# Patient Record
Sex: FEMALE | Race: BLACK OR AFRICAN AMERICAN | NOT HISPANIC OR LATINO | URBAN - METROPOLITAN AREA
[De-identification: names, ages, dates, MRNs, and addresses within clinical notes are randomized per-mention and may not be internally consistent; named-entity substitution may affect disease eponyms.]

---

## 2024-04-16 ENCOUNTER — EMERGENCY (EMERGENCY)
Facility: HOSPITAL | Age: 69
LOS: 0 days | Discharge: ANOTHER HOSPITAL | End: 2024-04-16
Attending: STUDENT IN AN ORGANIZED HEALTH CARE EDUCATION/TRAINING PROGRAM
Payer: MEDICARE

## 2024-04-16 ENCOUNTER — INPATIENT (INPATIENT)
Facility: HOSPITAL | Age: 69
LOS: 55 days | Discharge: NURSING, REHAB OR LONG-TERM CARE | DRG: 4 | End: 2024-06-11
Attending: STUDENT IN AN ORGANIZED HEALTH CARE EDUCATION/TRAINING PROGRAM | Admitting: INTERNAL MEDICINE
Payer: COMMERCIAL

## 2024-04-16 VITALS
RESPIRATION RATE: 40 BRPM | DIASTOLIC BLOOD PRESSURE: 66 MMHG | HEIGHT: 66 IN | OXYGEN SATURATION: 91 % | WEIGHT: 242.51 LBS | TEMPERATURE: 100 F | HEART RATE: 122 BPM | SYSTOLIC BLOOD PRESSURE: 105 MMHG

## 2024-04-16 VITALS
HEIGHT: 66 IN | HEART RATE: 130 BPM | RESPIRATION RATE: 35 BRPM | WEIGHT: 250 LBS | OXYGEN SATURATION: 92 % | SYSTOLIC BLOOD PRESSURE: 99 MMHG | DIASTOLIC BLOOD PRESSURE: 70 MMHG

## 2024-04-16 VITALS
HEART RATE: 119 BPM | SYSTOLIC BLOOD PRESSURE: 159 MMHG | RESPIRATION RATE: 44 BRPM | DIASTOLIC BLOOD PRESSURE: 81 MMHG | OXYGEN SATURATION: 95 %

## 2024-04-16 DIAGNOSIS — Z91.148 PATIENT'S OTHER NONCOMPLIANCE WITH MEDICATION REGIMEN FOR OTHER REASON: ICD-10-CM

## 2024-04-16 DIAGNOSIS — I50.9 HEART FAILURE, UNSPECIFIED: ICD-10-CM

## 2024-04-16 DIAGNOSIS — J44.9 CHRONIC OBSTRUCTIVE PULMONARY DISEASE, UNSPECIFIED: ICD-10-CM

## 2024-04-16 DIAGNOSIS — Z74.01 BED CONFINEMENT STATUS: ICD-10-CM

## 2024-04-16 DIAGNOSIS — R05.9 COUGH, UNSPECIFIED: ICD-10-CM

## 2024-04-16 DIAGNOSIS — I11.0 HYPERTENSIVE HEART DISEASE WITH HEART FAILURE: ICD-10-CM

## 2024-04-16 DIAGNOSIS — C91.00 ACUTE LYMPHOBLASTIC LEUKEMIA NOT HAVING ACHIEVED REMISSION: ICD-10-CM

## 2024-04-16 DIAGNOSIS — Z20.822 CONTACT WITH AND (SUSPECTED) EXPOSURE TO COVID-19: ICD-10-CM

## 2024-04-16 DIAGNOSIS — T50.906A UNDERDOSING OF UNSPECIFIED DRUGS, MEDICAMENTS AND BIOLOGICAL SUBSTANCES, INITIAL ENCOUNTER: ICD-10-CM

## 2024-04-16 DIAGNOSIS — I69.351 HEMIPLEGIA AND HEMIPARESIS FOLLOWING CEREBRAL INFARCTION AFFECTING RIGHT DOMINANT SIDE: ICD-10-CM

## 2024-04-16 DIAGNOSIS — E78.5 HYPERLIPIDEMIA, UNSPECIFIED: ICD-10-CM

## 2024-04-16 DIAGNOSIS — R06.02 SHORTNESS OF BREATH: ICD-10-CM

## 2024-04-16 DIAGNOSIS — R00.0 TACHYCARDIA, UNSPECIFIED: ICD-10-CM

## 2024-04-16 DIAGNOSIS — R07.9 CHEST PAIN, UNSPECIFIED: ICD-10-CM

## 2024-04-16 LAB
ADD ON TEST-SPECIMEN IN LAB: SIGNIFICANT CHANGE UP
ADD ON TEST-SPECIMEN IN LAB: SIGNIFICANT CHANGE UP
ALBUMIN FLD-MCNC: 3 G/DL — SIGNIFICANT CHANGE UP
ALBUMIN SERPL ELPH-MCNC: 2.9 G/DL — LOW (ref 3.3–5)
ALBUMIN SERPL ELPH-MCNC: 3 G/DL — LOW (ref 3.3–5)
ALBUMIN SERPL ELPH-MCNC: 3.3 G/DL — SIGNIFICANT CHANGE UP (ref 3.3–5)
ALBUMIN SERPL ELPH-MCNC: 3.8 G/DL — SIGNIFICANT CHANGE UP (ref 3.3–5)
ALP SERPL-CCNC: 59 U/L — SIGNIFICANT CHANGE UP (ref 40–120)
ALP SERPL-CCNC: 70 U/L — SIGNIFICANT CHANGE UP (ref 40–120)
ALP SERPL-CCNC: 70 U/L — SIGNIFICANT CHANGE UP (ref 40–120)
ALP SERPL-CCNC: 71 U/L — SIGNIFICANT CHANGE UP (ref 40–120)
ALT FLD-CCNC: 10 U/L — SIGNIFICANT CHANGE UP (ref 10–45)
ALT FLD-CCNC: 11 U/L — SIGNIFICANT CHANGE UP (ref 10–45)
ALT FLD-CCNC: 12 U/L — SIGNIFICANT CHANGE UP (ref 10–45)
ALT FLD-CCNC: 12 U/L — SIGNIFICANT CHANGE UP (ref 12–78)
ANION GAP SERPL CALC-SCNC: 11 MMOL/L — SIGNIFICANT CHANGE UP (ref 5–17)
ANION GAP SERPL CALC-SCNC: 13 MMOL/L — SIGNIFICANT CHANGE UP (ref 5–17)
ANION GAP SERPL CALC-SCNC: 16 MMOL/L — SIGNIFICANT CHANGE UP (ref 5–17)
ANION GAP SERPL CALC-SCNC: 7 MMOL/L — SIGNIFICANT CHANGE UP (ref 5–17)
ANISOCYTOSIS BLD QL: SLIGHT — SIGNIFICANT CHANGE UP
APPEARANCE UR: ABNORMAL
APTT BLD: 32 SEC — SIGNIFICANT CHANGE UP (ref 24.5–35.6)
APTT BLD: 34.9 SEC — SIGNIFICANT CHANGE UP (ref 24.5–35.6)
APTT BLD: 35.6 SEC — SIGNIFICANT CHANGE UP (ref 24.5–35.6)
AST SERPL-CCNC: 21 U/L — SIGNIFICANT CHANGE UP (ref 15–37)
AST SERPL-CCNC: 26 U/L — SIGNIFICANT CHANGE UP (ref 10–40)
AST SERPL-CCNC: 29 U/L — SIGNIFICANT CHANGE UP (ref 10–40)
AST SERPL-CCNC: 31 U/L — SIGNIFICANT CHANGE UP (ref 10–40)
B PERT IGG+IGM PNL SER: ABNORMAL
BACTERIA # UR AUTO: NEGATIVE /HPF — SIGNIFICANT CHANGE UP
BASE EXCESS BLDA CALC-SCNC: 2.6 MMOL/L — SIGNIFICANT CHANGE UP (ref -2–3)
BASE EXCESS BLDV CALC-SCNC: 1.2 MMOL/L — SIGNIFICANT CHANGE UP (ref -2–3)
BASE EXCESS BLDV CALC-SCNC: 1.5 MMOL/L — SIGNIFICANT CHANGE UP (ref -2–3)
BASE EXCESS BLDV CALC-SCNC: 5.7 MMOL/L — HIGH (ref -2–3)
BASOPHILS # BLD AUTO: 0 K/UL — SIGNIFICANT CHANGE UP (ref 0–0.2)
BASOPHILS # BLD AUTO: 0 K/UL — SIGNIFICANT CHANGE UP (ref 0–0.2)
BASOPHILS NFR BLD AUTO: 0 % — SIGNIFICANT CHANGE UP (ref 0–2)
BASOPHILS NFR BLD AUTO: 0 % — SIGNIFICANT CHANGE UP (ref 0–2)
BILIRUB SERPL-MCNC: 0.8 MG/DL — SIGNIFICANT CHANGE UP (ref 0.2–1.2)
BILIRUB SERPL-MCNC: 1.1 MG/DL — SIGNIFICANT CHANGE UP (ref 0.2–1.2)
BILIRUB SERPL-MCNC: 1.1 MG/DL — SIGNIFICANT CHANGE UP (ref 0.2–1.2)
BILIRUB SERPL-MCNC: 1.3 MG/DL — HIGH (ref 0.2–1.2)
BILIRUB UR-MCNC: NEGATIVE — SIGNIFICANT CHANGE UP
BLOOD GAS COMMENTS, VENOUS: SIGNIFICANT CHANGE UP
BUN SERPL-MCNC: 31 MG/DL — HIGH (ref 7–23)
BUN SERPL-MCNC: 35 MG/DL — HIGH (ref 7–23)
BUN SERPL-MCNC: 36 MG/DL — HIGH (ref 7–23)
BUN SERPL-MCNC: 38 MG/DL — HIGH (ref 7–23)
CA-I SERPL-SCNC: 1.13 MMOL/L — LOW (ref 1.15–1.33)
CA-I SERPL-SCNC: 1.17 MMOL/L — SIGNIFICANT CHANGE UP (ref 1.15–1.33)
CALCIUM SERPL-MCNC: 9.1 MG/DL — SIGNIFICANT CHANGE UP (ref 8.4–10.5)
CALCIUM SERPL-MCNC: 9.3 MG/DL — SIGNIFICANT CHANGE UP (ref 8.4–10.5)
CALCIUM SERPL-MCNC: 9.3 MG/DL — SIGNIFICANT CHANGE UP (ref 8.4–10.5)
CALCIUM SERPL-MCNC: 9.4 MG/DL — SIGNIFICANT CHANGE UP (ref 8.5–10.1)
CAST: 8 /LPF — HIGH (ref 0–4)
CHLORIDE BLDV-SCNC: 95 MMOL/L — LOW (ref 98–107)
CHLORIDE BLDV-SCNC: 96 MMOL/L — SIGNIFICANT CHANGE UP (ref 96–108)
CHLORIDE BLDV-SCNC: 97 MMOL/L — SIGNIFICANT CHANGE UP (ref 96–108)
CHLORIDE SERPL-SCNC: 95 MMOL/L — LOW (ref 96–108)
CHLORIDE SERPL-SCNC: 96 MMOL/L — SIGNIFICANT CHANGE UP (ref 96–108)
CHLORIDE SERPL-SCNC: 96 MMOL/L — SIGNIFICANT CHANGE UP (ref 96–108)
CHLORIDE SERPL-SCNC: 97 MMOL/L — SIGNIFICANT CHANGE UP (ref 96–108)
CO2 BLDA-SCNC: 28 MMOL/L — HIGH (ref 19–24)
CO2 BLDV-SCNC: 29 MMOL/L — HIGH (ref 22–26)
CO2 BLDV-SCNC: 31 MMOL/L — HIGH (ref 22–26)
CO2 BLDV-SCNC: 34 MMOL/L — HIGH (ref 22–26)
CO2 SERPL-SCNC: 22 MMOL/L — SIGNIFICANT CHANGE UP (ref 22–31)
CO2 SERPL-SCNC: 22 MMOL/L — SIGNIFICANT CHANGE UP (ref 22–31)
CO2 SERPL-SCNC: 24 MMOL/L — SIGNIFICANT CHANGE UP (ref 22–31)
CO2 SERPL-SCNC: 29 MMOL/L — SIGNIFICANT CHANGE UP (ref 22–31)
COLOR FLD: ABNORMAL
COLOR SPEC: YELLOW — SIGNIFICANT CHANGE UP
CREAT SERPL-MCNC: 0.74 MG/DL — SIGNIFICANT CHANGE UP (ref 0.5–1.3)
CREAT SERPL-MCNC: 0.75 MG/DL — SIGNIFICANT CHANGE UP (ref 0.5–1.3)
CREAT SERPL-MCNC: 0.79 MG/DL — SIGNIFICANT CHANGE UP (ref 0.5–1.3)
CREAT SERPL-MCNC: 0.84 MG/DL — SIGNIFICANT CHANGE UP (ref 0.5–1.3)
D DIMER BLD IA.RAPID-MCNC: 711 NG/ML DDU — HIGH
DIFF PNL FLD: ABNORMAL
EGFR: 76 ML/MIN/1.73M2 — SIGNIFICANT CHANGE UP
EGFR: 81 ML/MIN/1.73M2 — SIGNIFICANT CHANGE UP
EGFR: 87 ML/MIN/1.73M2 — SIGNIFICANT CHANGE UP
EGFR: 88 ML/MIN/1.73M2 — SIGNIFICANT CHANGE UP
ELLIPTOCYTES BLD QL SMEAR: SLIGHT — SIGNIFICANT CHANGE UP
EOSINOPHIL # BLD AUTO: 0 K/UL — SIGNIFICANT CHANGE UP (ref 0–0.5)
EOSINOPHIL # BLD AUTO: 0 K/UL — SIGNIFICANT CHANGE UP (ref 0–0.5)
EOSINOPHIL NFR BLD AUTO: 0 % — SIGNIFICANT CHANGE UP (ref 0–6)
EOSINOPHIL NFR BLD AUTO: 0 % — SIGNIFICANT CHANGE UP (ref 0–6)
FLUAV AG NPH QL: SIGNIFICANT CHANGE UP
FLUBV AG NPH QL: SIGNIFICANT CHANGE UP
FLUID INTAKE SUBSTANCE CLASS: SIGNIFICANT CHANGE UP
GAS PNL BLDA: SIGNIFICANT CHANGE UP
GAS PNL BLDV: 126 MMOL/L — LOW (ref 136–145)
GAS PNL BLDV: 129 MMOL/L — LOW (ref 136–145)
GAS PNL BLDV: 131 MMOL/L — LOW (ref 136–145)
GAS PNL BLDV: SIGNIFICANT CHANGE UP
GIANT PLATELETS BLD QL SMEAR: PRESENT — SIGNIFICANT CHANGE UP
GLUCOSE BLDC GLUCOMTR-MCNC: 164 MG/DL — HIGH (ref 70–99)
GLUCOSE BLDC GLUCOMTR-MCNC: 199 MG/DL — HIGH (ref 70–99)
GLUCOSE BLDC GLUCOMTR-MCNC: 234 MG/DL — HIGH (ref 70–99)
GLUCOSE BLDV-MCNC: 115 MG/DL — HIGH (ref 65–95)
GLUCOSE BLDV-MCNC: 139 MG/DL — HIGH (ref 70–99)
GLUCOSE BLDV-MCNC: 140 MG/DL — HIGH (ref 70–99)
GLUCOSE FLD-MCNC: <6 MG/DL — SIGNIFICANT CHANGE UP
GLUCOSE SERPL-MCNC: 113 MG/DL — HIGH (ref 70–99)
GLUCOSE SERPL-MCNC: 140 MG/DL — HIGH (ref 70–99)
GLUCOSE SERPL-MCNC: 157 MG/DL — HIGH (ref 70–99)
GLUCOSE SERPL-MCNC: 248 MG/DL — HIGH (ref 70–99)
GLUCOSE UR QL: NEGATIVE MG/DL — SIGNIFICANT CHANGE UP
GRAM STN FLD: ABNORMAL
GRAM STN FLD: ABNORMAL
GRAM STN FLD: SIGNIFICANT CHANGE UP
HCO3 BLDA-SCNC: 27 MMOL/L — SIGNIFICANT CHANGE UP (ref 21–28)
HCO3 BLDV-SCNC: 28 MMOL/L — SIGNIFICANT CHANGE UP (ref 22–29)
HCO3 BLDV-SCNC: 29 MMOL/L — SIGNIFICANT CHANGE UP (ref 22–29)
HCO3 BLDV-SCNC: 32 MMOL/L — HIGH (ref 22–28)
HCT VFR BLD CALC: 39.5 % — SIGNIFICANT CHANGE UP (ref 34.5–45)
HCT VFR BLD CALC: 39.6 % — SIGNIFICANT CHANGE UP (ref 34.5–45)
HCT VFR BLD CALC: 40 % — SIGNIFICANT CHANGE UP (ref 34.5–45)
HCT VFR BLDA CALC: 38 % — SIGNIFICANT CHANGE UP (ref 34.5–46.5)
HCT VFR BLDA CALC: 41 % — SIGNIFICANT CHANGE UP (ref 34.5–46.5)
HCT VFR BLDA CALC: 42 % — SIGNIFICANT CHANGE UP (ref 37–47)
HGB BLD CALC-MCNC: 12.8 G/DL — SIGNIFICANT CHANGE UP (ref 11.7–16.1)
HGB BLD CALC-MCNC: 13.8 G/DL — SIGNIFICANT CHANGE UP (ref 11.7–16.1)
HGB BLD CALC-MCNC: 14 G/DL — SIGNIFICANT CHANGE UP (ref 11.7–16.1)
HGB BLD-MCNC: 12 G/DL — SIGNIFICANT CHANGE UP (ref 11.5–15.5)
HGB BLD-MCNC: 13.3 G/DL — SIGNIFICANT CHANGE UP (ref 11.5–15.5)
HGB BLD-MCNC: 13.4 G/DL — SIGNIFICANT CHANGE UP (ref 11.5–15.5)
INR BLD: 1.3 RATIO — HIGH (ref 0.85–1.18)
INR BLD: 1.41 RATIO — HIGH (ref 0.85–1.18)
INR BLD: 1.43 RATIO — HIGH (ref 0.85–1.18)
KETONES UR-MCNC: NEGATIVE MG/DL — SIGNIFICANT CHANGE UP
LACTATE BLDV-MCNC: 1.1 MMOL/L — SIGNIFICANT CHANGE UP (ref 0.5–2)
LACTATE BLDV-MCNC: 1.6 MMOL/L — HIGH (ref 0.56–1.39)
LACTATE BLDV-MCNC: 2 MMOL/L — SIGNIFICANT CHANGE UP (ref 0.5–2)
LDH SERPL L TO P-CCNC: 289 U/L — HIGH (ref 50–242)
LDH SERPL L TO P-CCNC: 774 U/L — SIGNIFICANT CHANGE UP
LEUKOCYTE ESTERASE UR-ACNC: NEGATIVE — SIGNIFICANT CHANGE UP
LYMPHOCYTES # BLD AUTO: 205.05 K/UL — HIGH (ref 1–3.3)
LYMPHOCYTES # BLD AUTO: 223.97 K/UL — HIGH (ref 1–3.3)
LYMPHOCYTES # BLD AUTO: 88 % — HIGH (ref 13–44)
LYMPHOCYTES # BLD AUTO: 96 % — HIGH (ref 13–44)
LYMPHOCYTES # FLD: 50 % — SIGNIFICANT CHANGE UP
LYMPHOCYTES # SPEC AUTO: 9 % — HIGH (ref 0–0)
MACROCYTES BLD QL: SLIGHT — SIGNIFICANT CHANGE UP
MAGNESIUM SERPL-MCNC: 2.8 MG/DL — HIGH (ref 1.6–2.6)
MAGNESIUM SERPL-MCNC: 2.8 MG/DL — HIGH (ref 1.6–2.6)
MAGNESIUM SERPL-MCNC: 2.9 MG/DL — HIGH (ref 1.6–2.6)
MANUAL DIF COMMENT BLD-IMP: SIGNIFICANT CHANGE UP
MANUAL SMEAR VERIFICATION: SIGNIFICANT CHANGE UP
MANUAL SMEAR VERIFICATION: SIGNIFICANT CHANGE UP
MCHC RBC-ENTMCNC: 26.2 PG — LOW (ref 27–34)
MCHC RBC-ENTMCNC: 28.5 PG — SIGNIFICANT CHANGE UP (ref 27–34)
MCHC RBC-ENTMCNC: 28.6 PG — SIGNIFICANT CHANGE UP (ref 27–34)
MCHC RBC-ENTMCNC: 30.3 GM/DL — LOW (ref 32–36)
MCHC RBC-ENTMCNC: 33.5 G/DL — SIGNIFICANT CHANGE UP (ref 32–36)
MCHC RBC-ENTMCNC: 33.7 GM/DL — SIGNIFICANT CHANGE UP (ref 32–36)
MCV RBC AUTO: 84.8 FL — SIGNIFICANT CHANGE UP (ref 80–100)
MCV RBC AUTO: 85.3 FL — SIGNIFICANT CHANGE UP (ref 80–100)
MCV RBC AUTO: 86.5 FL — SIGNIFICANT CHANGE UP (ref 80–100)
METHOD TYPE: SIGNIFICANT CHANGE UP
MICROCYTES BLD QL: SLIGHT — SIGNIFICANT CHANGE UP
MONOCYTES # BLD AUTO: 2.33 K/UL — HIGH (ref 0–0.9)
MONOCYTES # BLD AUTO: 4.67 K/UL — HIGH (ref 0–0.9)
MONOCYTES NFR BLD AUTO: 1 % — LOW (ref 2–14)
MONOCYTES NFR BLD AUTO: 2 % — SIGNIFICANT CHANGE UP (ref 2–14)
MONOS+MACROS # FLD: 40 % — SIGNIFICANT CHANGE UP
NEUTROPHILS # BLD AUTO: 4.66 K/UL — SIGNIFICANT CHANGE UP (ref 1.8–7.4)
NEUTROPHILS # BLD AUTO: 4.67 K/UL — SIGNIFICANT CHANGE UP (ref 1.8–7.4)
NEUTROPHILS NFR BLD AUTO: 1 % — LOW (ref 43–77)
NEUTROPHILS NFR BLD AUTO: 2 % — LOW (ref 43–77)
NEUTROPHILS-BODY FLUID: 10 % — SIGNIFICANT CHANGE UP
NEUTS BAND # BLD: 1 % — SIGNIFICANT CHANGE UP (ref 0–8)
NITRITE UR-MCNC: NEGATIVE — SIGNIFICANT CHANGE UP
NRBC # BLD: 0 /100 WBCS — SIGNIFICANT CHANGE UP (ref 0–0)
NRBC # BLD: SIGNIFICANT CHANGE UP /100 WBCS (ref 0–0)
NT-PROBNP SERPL-SCNC: 1606 PG/ML — HIGH (ref 0–125)
NT-PROBNP SERPL-SCNC: 2617 PG/ML — HIGH (ref 0–300)
OVALOCYTES BLD QL SMEAR: SLIGHT — SIGNIFICANT CHANGE UP
PCO2 BLDA: 41 MMHG — SIGNIFICANT CHANGE UP (ref 32–45)
PCO2 BLDV: 50 MMHG — HIGH (ref 39–42)
PCO2 BLDV: 55 MMHG — SIGNIFICANT CHANGE UP (ref 42–55)
PCO2 BLDV: 56 MMHG — HIGH (ref 39–42)
PH BLDA: 7.43 — SIGNIFICANT CHANGE UP (ref 7.35–7.45)
PH BLDV: 7.32 — SIGNIFICANT CHANGE UP (ref 7.32–7.43)
PH BLDV: 7.35 — SIGNIFICANT CHANGE UP (ref 7.32–7.43)
PH BLDV: 7.38 — SIGNIFICANT CHANGE UP (ref 7.32–7.43)
PH FLD: 7.19 — SIGNIFICANT CHANGE UP
PH UR: 5.5 — SIGNIFICANT CHANGE UP (ref 5–8)
PHOSPHATE SERPL-MCNC: 4.9 MG/DL — HIGH (ref 2.5–4.5)
PHOSPHATE SERPL-MCNC: 5 MG/DL — HIGH (ref 2.5–4.5)
PLAT MORPH BLD: NORMAL — SIGNIFICANT CHANGE UP
PLAT MORPH BLD: NORMAL — SIGNIFICANT CHANGE UP
PLATELET # BLD AUTO: 112 K/UL — LOW (ref 150–400)
PLATELET # BLD AUTO: 96 K/UL — LOW (ref 150–400)
PLATELET # BLD AUTO: 96 K/UL — LOW (ref 150–400)
PO2 BLDA: 71 MMHG — LOW (ref 83–108)
PO2 BLDV: 45 MMHG — SIGNIFICANT CHANGE UP (ref 25–45)
PO2 BLDV: 49 MMHG — HIGH (ref 25–45)
PO2 BLDV: 52 MMHG — HIGH (ref 25–45)
POLYCHROMASIA BLD QL SMEAR: SLIGHT — SIGNIFICANT CHANGE UP
POTASSIUM BLDV-SCNC: 4.7 MMOL/L — SIGNIFICANT CHANGE UP (ref 3.5–5.1)
POTASSIUM BLDV-SCNC: 4.8 MMOL/L — SIGNIFICANT CHANGE UP (ref 3.5–5.1)
POTASSIUM BLDV-SCNC: 7.7 MMOL/L — CRITICAL HIGH (ref 3.5–5.1)
POTASSIUM SERPL-MCNC: 4.7 MMOL/L — SIGNIFICANT CHANGE UP (ref 3.5–5.3)
POTASSIUM SERPL-MCNC: 4.9 MMOL/L — SIGNIFICANT CHANGE UP (ref 3.5–5.3)
POTASSIUM SERPL-MCNC: 5.3 MMOL/L — SIGNIFICANT CHANGE UP (ref 3.5–5.3)
POTASSIUM SERPL-MCNC: 6.1 MMOL/L — HIGH (ref 3.5–5.3)
POTASSIUM SERPL-SCNC: 4.7 MMOL/L — SIGNIFICANT CHANGE UP (ref 3.5–5.3)
POTASSIUM SERPL-SCNC: 4.9 MMOL/L — SIGNIFICANT CHANGE UP (ref 3.5–5.3)
POTASSIUM SERPL-SCNC: 5.3 MMOL/L — SIGNIFICANT CHANGE UP (ref 3.5–5.3)
POTASSIUM SERPL-SCNC: 6.1 MMOL/L — HIGH (ref 3.5–5.3)
PROT FLD-MCNC: 4.7 G/DL — SIGNIFICANT CHANGE UP
PROT SERPL-MCNC: 6.1 G/DL — SIGNIFICANT CHANGE UP (ref 6–8.3)
PROT SERPL-MCNC: 6.8 G/DL — SIGNIFICANT CHANGE UP (ref 6–8.3)
PROT SERPL-MCNC: 7.2 G/DL — SIGNIFICANT CHANGE UP (ref 6–8.3)
PROT SERPL-MCNC: 7.4 GM/DL — SIGNIFICANT CHANGE UP (ref 6–8.3)
PROT UR-MCNC: 100 MG/DL
PROTHROM AB SERPL-ACNC: 14.7 SEC — HIGH (ref 9.5–13)
PROTHROM AB SERPL-ACNC: 14.9 SEC — HIGH (ref 9.5–13)
PROTHROM AB SERPL-ACNC: 15.3 SEC — HIGH (ref 9.5–13)
RAPID RVP RESULT: SIGNIFICANT CHANGE UP
RBC # BLD: 4.03 M/UL — SIGNIFICANT CHANGE UP (ref 3.8–5.2)
RBC # BLD: 4.58 M/UL — SIGNIFICANT CHANGE UP (ref 3.8–5.2)
RBC # BLD: 4.66 M/UL — SIGNIFICANT CHANGE UP (ref 3.8–5.2)
RBC # BLD: 4.69 M/UL — SIGNIFICANT CHANGE UP (ref 3.8–5.2)
RBC # FLD: 16.1 % — HIGH (ref 10.3–14.5)
RBC # FLD: 16.9 % — HIGH (ref 10.3–14.5)
RBC # FLD: 17.1 % — HIGH (ref 10.3–14.5)
RBC BLD AUTO: ABNORMAL
RBC BLD AUTO: ABNORMAL
RBC CASTS # UR COMP ASSIST: 2 /HPF — SIGNIFICANT CHANGE UP (ref 0–4)
RCV VOL RI: HIGH CELLS/UL (ref 0–5)
RETICS #: 45.5 K/UL — SIGNIFICANT CHANGE UP (ref 25–125)
RETICS/RBC NFR: 1.1 % — SIGNIFICANT CHANGE UP (ref 0.5–2.5)
REVIEW: SIGNIFICANT CHANGE UP
RSV RNA NPH QL NAA+NON-PROBE: SIGNIFICANT CHANGE UP
S PNEUM DNA BLD POS QL NAA+NON-PROBE: SIGNIFICANT CHANGE UP
SAO2 % BLDA: 95.7 % — SIGNIFICANT CHANGE UP (ref 94–98)
SAO2 % BLDV: 76.2 % — SIGNIFICANT CHANGE UP (ref 67–88)
SAO2 % BLDV: 78.6 % — LOW (ref 94–98)
SAO2 % BLDV: 80.2 % — SIGNIFICANT CHANGE UP (ref 67–88)
SARS-COV-2 RNA SPEC QL NAA+PROBE: SIGNIFICANT CHANGE UP
SARS-COV-2 RNA SPEC QL NAA+PROBE: SIGNIFICANT CHANGE UP
SODIUM SERPL-SCNC: 130 MMOL/L — LOW (ref 135–145)
SODIUM SERPL-SCNC: 131 MMOL/L — LOW (ref 135–145)
SODIUM SERPL-SCNC: 133 MMOL/L — LOW (ref 135–145)
SODIUM SERPL-SCNC: 134 MMOL/L — LOW (ref 135–145)
SP GR SPEC: 1.02 — SIGNIFICANT CHANGE UP (ref 1–1.03)
SPECIMEN SOURCE: SIGNIFICANT CHANGE UP
SQUAMOUS # UR AUTO: 5 /HPF — SIGNIFICANT CHANGE UP (ref 0–5)
TARGETS BLD QL SMEAR: SLIGHT — SIGNIFICANT CHANGE UP
TOTAL NUCLEATED CELL COUNT, BODY FLUID: HIGH CELLS/UL (ref 0–5)
TROPONIN I, HIGH SENSITIVITY RESULT: 12.5 NG/L — SIGNIFICANT CHANGE UP
TROPONIN T, HIGH SENSITIVITY RESULT: 37 NG/L — SIGNIFICANT CHANGE UP (ref 0–51)
TUBE TYPE: SIGNIFICANT CHANGE UP
UROBILINOGEN FLD QL: 0.2 MG/DL — SIGNIFICANT CHANGE UP (ref 0.2–1)
WBC # BLD: 233.01 K/UL — CRITICAL HIGH (ref 3.8–10.5)
WBC # BLD: 233.3 K/UL — CRITICAL HIGH (ref 3.8–10.5)
WBC # BLD: 278.74 K/UL — CRITICAL HIGH (ref 3.8–10.5)
WBC # FLD AUTO: 233.01 K/UL — CRITICAL HIGH (ref 3.8–10.5)
WBC # FLD AUTO: 233.3 K/UL — CRITICAL HIGH (ref 3.8–10.5)
WBC # FLD AUTO: 278.74 K/UL — CRITICAL HIGH (ref 3.8–10.5)
WBC UR QL: 1 /HPF — SIGNIFICANT CHANGE UP (ref 0–5)

## 2024-04-16 PROCEDURE — 99223 1ST HOSP IP/OBS HIGH 75: CPT

## 2024-04-16 PROCEDURE — G0452: CPT | Mod: 26,59

## 2024-04-16 PROCEDURE — 93308 TTE F-UP OR LMTD: CPT | Mod: 26,GC

## 2024-04-16 PROCEDURE — 99285 EMERGENCY DEPT VISIT HI MDM: CPT | Mod: 25

## 2024-04-16 PROCEDURE — 32551 INSERTION OF CHEST TUBE: CPT | Mod: GC,59

## 2024-04-16 PROCEDURE — 93308 TTE F-UP OR LMTD: CPT | Mod: 26

## 2024-04-16 PROCEDURE — 70450 CT HEAD/BRAIN W/O DYE: CPT | Mod: 26

## 2024-04-16 PROCEDURE — 99291 CRITICAL CARE FIRST HOUR: CPT | Mod: GC,25

## 2024-04-16 PROCEDURE — 88291 CYTO/MOLECULAR REPORT: CPT | Mod: 59

## 2024-04-16 PROCEDURE — 71045 X-RAY EXAM CHEST 1 VIEW: CPT | Mod: 26,77

## 2024-04-16 PROCEDURE — 31500 INSERT EMERGENCY AIRWAY: CPT

## 2024-04-16 PROCEDURE — G0452: CPT | Mod: 26

## 2024-04-16 PROCEDURE — 32557 INSERT CATH PLEURA W/ IMAGE: CPT | Mod: GC,59

## 2024-04-16 PROCEDURE — 88189 FLOWCYTOMETRY/READ 16 & >: CPT

## 2024-04-16 PROCEDURE — 76604 US EXAM CHEST: CPT | Mod: 26,GC

## 2024-04-16 PROCEDURE — 71045 X-RAY EXAM CHEST 1 VIEW: CPT | Mod: 26

## 2024-04-16 PROCEDURE — 85060 BLOOD SMEAR INTERPRETATION: CPT

## 2024-04-16 PROCEDURE — 83020 HEMOGLOBIN ELECTROPHORESIS: CPT | Mod: 26

## 2024-04-16 PROCEDURE — 36569 INSJ PICC 5 YR+ W/O IMAGING: CPT | Mod: GC,59

## 2024-04-16 PROCEDURE — 93010 ELECTROCARDIOGRAM REPORT: CPT

## 2024-04-16 PROCEDURE — 99291 CRITICAL CARE FIRST HOUR: CPT | Mod: 25

## 2024-04-16 PROCEDURE — 71045 X-RAY EXAM CHEST 1 VIEW: CPT | Mod: 26,76,77

## 2024-04-16 RX ORDER — PROPOFOL 10 MG/ML
10 INJECTION, EMULSION INTRAVENOUS
Qty: 500 | Refills: 0 | Status: DISCONTINUED | OUTPATIENT
Start: 2024-04-16 | End: 2024-04-16

## 2024-04-16 RX ORDER — DEXTROSE 50 % IN WATER 50 %
15 SYRINGE (ML) INTRAVENOUS ONCE
Refills: 0 | Status: DISCONTINUED | OUTPATIENT
Start: 2024-04-16 | End: 2024-04-16

## 2024-04-16 RX ORDER — CALCIUM GLUCONATE 100 MG/ML
2 VIAL (ML) INTRAVENOUS ONCE
Refills: 0 | Status: COMPLETED | OUTPATIENT
Start: 2024-04-16 | End: 2024-04-16

## 2024-04-16 RX ORDER — PIPERACILLIN AND TAZOBACTAM 4; .5 G/20ML; G/20ML
3.38 INJECTION, POWDER, LYOPHILIZED, FOR SOLUTION INTRAVENOUS ONCE
Refills: 0 | Status: COMPLETED | OUTPATIENT
Start: 2024-04-17 | End: 2024-04-16

## 2024-04-16 RX ORDER — CHLORHEXIDINE GLUCONATE 213 G/1000ML
1 SOLUTION TOPICAL
Refills: 0 | Status: DISCONTINUED | OUTPATIENT
Start: 2024-04-16 | End: 2024-05-09

## 2024-04-16 RX ORDER — VANCOMYCIN HCL 1 G
1750 VIAL (EA) INTRAVENOUS EVERY 12 HOURS
Refills: 0 | Status: DISCONTINUED | OUTPATIENT
Start: 2024-04-16 | End: 2024-04-17

## 2024-04-16 RX ORDER — PIPERACILLIN AND TAZOBACTAM 4; .5 G/20ML; G/20ML
3.38 INJECTION, POWDER, LYOPHILIZED, FOR SOLUTION INTRAVENOUS ONCE
Refills: 0 | Status: COMPLETED | OUTPATIENT
Start: 2024-04-16 | End: 2024-04-16

## 2024-04-16 RX ORDER — HYDROCORTISONE 20 MG
50 TABLET ORAL EVERY 6 HOURS
Refills: 0 | Status: DISCONTINUED | OUTPATIENT
Start: 2024-04-16 | End: 2024-04-17

## 2024-04-16 RX ORDER — INSULIN HUMAN 100 [IU]/ML
5 INJECTION, SOLUTION SUBCUTANEOUS ONCE
Refills: 0 | Status: COMPLETED | OUTPATIENT
Start: 2024-04-16 | End: 2024-04-16

## 2024-04-16 RX ORDER — CHLORHEXIDINE GLUCONATE 213 G/1000ML
15 SOLUTION TOPICAL EVERY 12 HOURS
Refills: 0 | Status: DISCONTINUED | OUTPATIENT
Start: 2024-04-16 | End: 2024-05-16

## 2024-04-16 RX ORDER — DEXTROSE 50 % IN WATER 50 %
12.5 SYRINGE (ML) INTRAVENOUS ONCE
Refills: 0 | Status: DISCONTINUED | OUTPATIENT
Start: 2024-04-16 | End: 2024-04-23

## 2024-04-16 RX ORDER — NOREPINEPHRINE BITARTRATE/D5W 8 MG/250ML
0.05 PLASTIC BAG, INJECTION (ML) INTRAVENOUS
Qty: 8 | Refills: 0 | Status: DISCONTINUED | OUTPATIENT
Start: 2024-04-16 | End: 2024-04-20

## 2024-04-16 RX ORDER — INSULIN LISPRO 100/ML
VIAL (ML) SUBCUTANEOUS EVERY 6 HOURS
Refills: 0 | Status: DISCONTINUED | OUTPATIENT
Start: 2024-04-16 | End: 2024-05-10

## 2024-04-16 RX ORDER — MIDAZOLAM HYDROCHLORIDE 1 MG/ML
2 INJECTION, SOLUTION INTRAMUSCULAR; INTRAVENOUS ONCE
Refills: 0 | Status: DISCONTINUED | OUTPATIENT
Start: 2024-04-16 | End: 2024-04-16

## 2024-04-16 RX ORDER — ENOXAPARIN SODIUM 100 MG/ML
40 INJECTION SUBCUTANEOUS EVERY 24 HOURS
Refills: 0 | Status: DISCONTINUED | OUTPATIENT
Start: 2024-04-16 | End: 2024-04-18

## 2024-04-16 RX ORDER — ACETAMINOPHEN 500 MG
1000 TABLET ORAL ONCE
Refills: 0 | Status: COMPLETED | OUTPATIENT
Start: 2024-04-16 | End: 2024-04-16

## 2024-04-16 RX ORDER — SODIUM CHLORIDE 9 MG/ML
1000 INJECTION, SOLUTION INTRAVENOUS
Refills: 0 | Status: DISCONTINUED | OUTPATIENT
Start: 2024-04-16 | End: 2024-04-16

## 2024-04-16 RX ORDER — ETOMIDATE 2 MG/ML
20 INJECTION INTRAVENOUS ONCE
Refills: 0 | Status: COMPLETED | OUTPATIENT
Start: 2024-04-16 | End: 2024-04-16

## 2024-04-16 RX ORDER — SODIUM CHLORIDE 9 MG/ML
1000 INJECTION INTRAMUSCULAR; INTRAVENOUS; SUBCUTANEOUS ONCE
Refills: 0 | Status: COMPLETED | OUTPATIENT
Start: 2024-04-16 | End: 2024-04-16

## 2024-04-16 RX ORDER — VANCOMYCIN HCL 1 G
1000 VIAL (EA) INTRAVENOUS ONCE
Refills: 0 | Status: COMPLETED | OUTPATIENT
Start: 2024-04-16 | End: 2024-04-16

## 2024-04-16 RX ORDER — MIDAZOLAM HYDROCHLORIDE 1 MG/ML
0.02 INJECTION, SOLUTION INTRAMUSCULAR; INTRAVENOUS
Qty: 100 | Refills: 0 | Status: DISCONTINUED | OUTPATIENT
Start: 2024-04-16 | End: 2024-04-16

## 2024-04-16 RX ORDER — DEXTROSE 50 % IN WATER 50 %
25 SYRINGE (ML) INTRAVENOUS ONCE
Refills: 0 | Status: DISCONTINUED | OUTPATIENT
Start: 2024-04-16 | End: 2024-04-23

## 2024-04-16 RX ORDER — ROCURONIUM BROMIDE 10 MG/ML
100 VIAL (ML) INTRAVENOUS ONCE
Refills: 0 | Status: COMPLETED | OUTPATIENT
Start: 2024-04-16 | End: 2024-04-16

## 2024-04-16 RX ORDER — POLYETHYLENE GLYCOL 3350 17 G/17G
17 POWDER, FOR SOLUTION ORAL EVERY 12 HOURS
Refills: 0 | Status: DISCONTINUED | OUTPATIENT
Start: 2024-04-16 | End: 2024-05-16

## 2024-04-16 RX ORDER — SODIUM CHLORIDE 9 MG/ML
1000 INJECTION, SOLUTION INTRAVENOUS ONCE
Refills: 0 | Status: COMPLETED | OUTPATIENT
Start: 2024-04-16 | End: 2024-04-16

## 2024-04-16 RX ORDER — PROPOFOL 10 MG/ML
10 INJECTION, EMULSION INTRAVENOUS
Qty: 1000 | Refills: 0 | Status: DISCONTINUED | OUTPATIENT
Start: 2024-04-16 | End: 2024-04-20

## 2024-04-16 RX ORDER — FENTANYL CITRATE 50 UG/ML
50 INJECTION INTRAVENOUS ONCE
Refills: 0 | Status: DISCONTINUED | OUTPATIENT
Start: 2024-04-16 | End: 2024-04-16

## 2024-04-16 RX ORDER — SENNA PLUS 8.6 MG/1
10 TABLET ORAL AT BEDTIME
Refills: 0 | Status: DISCONTINUED | OUTPATIENT
Start: 2024-04-16 | End: 2024-06-07

## 2024-04-16 RX ORDER — AZITHROMYCIN 500 MG/1
500 TABLET, FILM COATED ORAL EVERY 24 HOURS
Refills: 0 | Status: DISCONTINUED | OUTPATIENT
Start: 2024-04-16 | End: 2024-04-17

## 2024-04-16 RX ORDER — PIPERACILLIN AND TAZOBACTAM 4; .5 G/20ML; G/20ML
3.38 INJECTION, POWDER, LYOPHILIZED, FOR SOLUTION INTRAVENOUS EVERY 8 HOURS
Refills: 0 | Status: DISCONTINUED | OUTPATIENT
Start: 2024-04-17 | End: 2024-04-17

## 2024-04-16 RX ORDER — GLUCAGON INJECTION, SOLUTION 0.5 MG/.1ML
1 INJECTION, SOLUTION SUBCUTANEOUS ONCE
Refills: 0 | Status: DISCONTINUED | OUTPATIENT
Start: 2024-04-16 | End: 2024-04-23

## 2024-04-16 RX ORDER — DEXTROSE 50 % IN WATER 50 %
50 SYRINGE (ML) INTRAVENOUS ONCE
Refills: 0 | Status: COMPLETED | OUTPATIENT
Start: 2024-04-16 | End: 2024-04-16

## 2024-04-16 RX ORDER — SODIUM CHLORIDE 9 MG/ML
1000 INJECTION INTRAMUSCULAR; INTRAVENOUS; SUBCUTANEOUS
Refills: 0 | Status: DISCONTINUED | OUTPATIENT
Start: 2024-04-16 | End: 2024-04-16

## 2024-04-16 RX ORDER — DEXTROSE 10 % IN WATER 10 %
125 INTRAVENOUS SOLUTION INTRAVENOUS ONCE
Refills: 0 | Status: DISCONTINUED | OUTPATIENT
Start: 2024-04-16 | End: 2024-04-16

## 2024-04-16 RX ORDER — IPRATROPIUM/ALBUTEROL SULFATE 18-103MCG
3 AEROSOL WITH ADAPTER (GRAM) INHALATION ONCE
Refills: 0 | Status: COMPLETED | OUTPATIENT
Start: 2024-04-16 | End: 2024-04-16

## 2024-04-16 RX ORDER — FUROSEMIDE 40 MG
40 TABLET ORAL ONCE
Refills: 0 | Status: COMPLETED | OUTPATIENT
Start: 2024-04-16 | End: 2024-04-16

## 2024-04-16 RX ADMIN — SODIUM CHLORIDE 1000 MILLILITER(S): 9 INJECTION INTRAMUSCULAR; INTRAVENOUS; SUBCUTANEOUS at 11:43

## 2024-04-16 RX ADMIN — SENNA PLUS 10 MILLILITER(S): 8.6 TABLET ORAL at 22:12

## 2024-04-16 RX ADMIN — Medication 125 MILLIGRAM(S): at 05:00

## 2024-04-16 RX ADMIN — Medication 3 MILLILITER(S): at 05:01

## 2024-04-16 RX ADMIN — PROPOFOL 6.6 MICROGRAM(S)/KG/MIN: 10 INJECTION, EMULSION INTRAVENOUS at 17:06

## 2024-04-16 RX ADMIN — Medication 250 MILLIGRAM(S): at 07:38

## 2024-04-16 RX ADMIN — Medication 50 MILLIGRAM(S): at 17:07

## 2024-04-16 RX ADMIN — Medication 50 MILLIGRAM(S): at 23:55

## 2024-04-16 RX ADMIN — ENOXAPARIN SODIUM 40 MILLIGRAM(S): 100 INJECTION SUBCUTANEOUS at 14:10

## 2024-04-16 RX ADMIN — PIPERACILLIN AND TAZOBACTAM 3.38 GRAM(S): 4; .5 INJECTION, POWDER, LYOPHILIZED, FOR SOLUTION INTRAVENOUS at 07:00

## 2024-04-16 RX ADMIN — Medication 1000 MILLIGRAM(S): at 14:40

## 2024-04-16 RX ADMIN — Medication 100 MILLIGRAM(S): at 11:03

## 2024-04-16 RX ADMIN — ETOMIDATE 20 MILLIGRAM(S): 2 INJECTION INTRAVENOUS at 11:02

## 2024-04-16 RX ADMIN — SODIUM CHLORIDE 1000 MILLILITER(S): 9 INJECTION, SOLUTION INTRAVENOUS at 14:10

## 2024-04-16 RX ADMIN — Medication 1000 MILLIGRAM(S): at 07:00

## 2024-04-16 RX ADMIN — Medication 250 MILLIGRAM(S): at 16:22

## 2024-04-16 RX ADMIN — PIPERACILLIN AND TAZOBACTAM 200 GRAM(S): 4; .5 INJECTION, POWDER, LYOPHILIZED, FOR SOLUTION INTRAVENOUS at 14:10

## 2024-04-16 RX ADMIN — Medication 10.3 MICROGRAM(S)/KG/MIN: at 11:43

## 2024-04-16 RX ADMIN — PIPERACILLIN AND TAZOBACTAM 200 GRAM(S): 4; .5 INJECTION, POWDER, LYOPHILIZED, FOR SOLUTION INTRAVENOUS at 06:47

## 2024-04-16 RX ADMIN — MIDAZOLAM HYDROCHLORIDE 2.2 MG/KG/HR: 1 INJECTION, SOLUTION INTRAMUSCULAR; INTRAVENOUS at 11:42

## 2024-04-16 RX ADMIN — CHLORHEXIDINE GLUCONATE 15 MILLILITER(S): 213 SOLUTION TOPICAL at 17:08

## 2024-04-16 RX ADMIN — Medication 40 MILLIGRAM(S): at 05:58

## 2024-04-16 RX ADMIN — PROPOFOL 6.6 MICROGRAM(S)/KG/MIN: 10 INJECTION, EMULSION INTRAVENOUS at 22:12

## 2024-04-16 RX ADMIN — Medication 200 GRAM(S): at 14:22

## 2024-04-16 RX ADMIN — Medication 2: at 17:07

## 2024-04-16 RX ADMIN — Medication 1000 MILLIGRAM(S): at 08:00

## 2024-04-16 RX ADMIN — CHLORHEXIDINE GLUCONATE 1 APPLICATION(S): 213 SOLUTION TOPICAL at 14:11

## 2024-04-16 RX ADMIN — PIPERACILLIN AND TAZOBACTAM 25 GRAM(S): 4; .5 INJECTION, POWDER, LYOPHILIZED, FOR SOLUTION INTRAVENOUS at 23:54

## 2024-04-16 RX ADMIN — Medication 50 MILLILITER(S): at 14:21

## 2024-04-16 RX ADMIN — FENTANYL CITRATE 50 MICROGRAM(S): 50 INJECTION INTRAVENOUS at 16:31

## 2024-04-16 RX ADMIN — INSULIN HUMAN 5 UNIT(S): 100 INJECTION, SOLUTION SUBCUTANEOUS at 14:21

## 2024-04-16 RX ADMIN — Medication 400 MILLIGRAM(S): at 06:47

## 2024-04-16 RX ADMIN — Medication 1: at 23:55

## 2024-04-16 RX ADMIN — Medication 10.3 MICROGRAM(S)/KG/MIN: at 22:12

## 2024-04-16 RX ADMIN — AZITHROMYCIN 255 MILLIGRAM(S): 500 TABLET, FILM COATED ORAL at 14:10

## 2024-04-16 RX ADMIN — SODIUM CHLORIDE 1000 MILLILITER(S): 9 INJECTION INTRAMUSCULAR; INTRAVENOUS; SUBCUTANEOUS at 09:41

## 2024-04-16 RX ADMIN — PIPERACILLIN AND TAZOBACTAM 25 GRAM(S): 4; .5 INJECTION, POWDER, LYOPHILIZED, FOR SOLUTION INTRAVENOUS at 16:32

## 2024-04-16 RX ADMIN — MIDAZOLAM HYDROCHLORIDE 2 MILLIGRAM(S): 1 INJECTION, SOLUTION INTRAMUSCULAR; INTRAVENOUS at 11:06

## 2024-04-16 RX ADMIN — Medication 400 MILLIGRAM(S): at 14:10

## 2024-04-16 RX ADMIN — FENTANYL CITRATE 50 MICROGRAM(S): 50 INJECTION INTRAVENOUS at 17:01

## 2024-04-16 NOTE — CONSULT NOTE ADULT - SUBJECTIVE AND OBJECTIVE BOX
HPI:      14 point ROS otherwise negative    PAST MEDICAL & SURGICAL HISTORY:      Allergies    No Known Allergies    Intolerances        MEDICATIONS  (STANDING):  midazolam Infusion 0.02 mG/kG/Hr (2.2 mL/Hr) IV Continuous <Continuous>  norepinephrine Infusion 0.05 MICROgram(s)/kG/Min (10.3 mL/Hr) IV Continuous <Continuous>  sodium chloride 0.9% Bolus 1000 milliLiter(s) IV Bolus once    MEDICATIONS  (PRN):      FAMILY HISTORY:      SOCIAL HISTORY: No EtOH, no tobacco    Height (cm): 167.6 (04-16 @ 10:33), 167.6 (04-16 @ 04:53)  Weight (kg): 110 (04-16 @ 10:33), 113.4 (04-16 @ 04:53)  BMI (kg/m2): 39.2 (04-16 @ 10:33), 40.4 (04-16 @ 04:53)  BSA (m2): 2.17 (04-16 @ 10:33), 2.2 (04-16 @ 04:53)    VITALS:   T(F): 100.3 (04-16-24 @ 10:33), Max: 104 (04-16-24 @ 06:34)  HR: 122 (04-16-24 @ 10:33)  BP: 105/66 (04-16-24 @ 10:33)  RR: 40 (04-16-24 @ 10:33)  SpO2: 91% (04-16-24 @ 10:33)  Wt(kg): --    PHYSICAL EXAM    GENERAL: NAD, well-developed  HEAD:  Atraumatic, Normocephalic  EYES: EOMI, PERRLA, conjunctiva and sclera clear  NECK: Supple, No JVD  CHEST/LUNG: Clear to auscultation bilaterally; No wheeze  HEART: Regular rate and rhythm; No murmurs, rubs, or gallops  ABDOMEN: Soft, Nontender, Nondistended; Bowel sounds present  EXTREMITIES:  2+ Peripheral Pulses, No clubbing, cyanosis, or edema  NEUROLOGY: non-focal  SKIN: No rashes or lesions    LABS:                         13.3   233.01 )-----------( 112      ( 16 Apr 2024 11:09 )             39.5     04-16    133<L>  |  97  |  31<H>  ----------------------------<  113<H>  4.9   |  29  |  0.84    Ca    9.4      16 Apr 2024 05:05  Mg     2.9     04-16    TPro  7.4  /  Alb  2.9<L>  /  TBili  1.1  /  DBili  x   /  AST  21  /  ALT  12  /  AlkPhos  71  04-16    Magnesium: 2.9 mg/dL (04-16 @ 05:05)    PT/INR - ( 16 Apr 2024 08:14 )   PT: 15.3 sec;   INR: 1.30 ratio         PTT - ( 16 Apr 2024 08:14 )  PTT:35.6 sec      IMAGING: HPI:  69F h/o CVA (bedbound at baseline), COPD, CHF, HTN p/w acute shortness of breath to outside ED. Initially treated for acute pulmonary edema with sublingual nitro x 2, Lasix, and BiPAP. Pt was also found to be febrile to 103, so treated for PNA. BIPAP led to improvement in O2 to 89-90. Pt now transferred to [place] for white count of 233 and plan for possible leukophoresis. In ED, pt intubated iso respiratory tiring and decreasing mental status. Also started on levo for hypotn.     14 point ROS otherwise negative    PAST MEDICAL & SURGICAL HISTORY:      Allergies    No Known Allergies    Intolerances        MEDICATIONS  (STANDING):  midazolam Infusion 0.02 mG/kG/Hr (2.2 mL/Hr) IV Continuous <Continuous>  norepinephrine Infusion 0.05 MICROgram(s)/kG/Min (10.3 mL/Hr) IV Continuous <Continuous>  sodium chloride 0.9% Bolus 1000 milliLiter(s) IV Bolus once    MEDICATIONS  (PRN):      FAMILY HISTORY:      SOCIAL HISTORY: No EtOH, no tobacco    Height (cm): 167.6 (04-16 @ 10:33), 167.6 (04-16 @ 04:53)  Weight (kg): 110 (04-16 @ 10:33), 113.4 (04-16 @ 04:53)  BMI (kg/m2): 39.2 (04-16 @ 10:33), 40.4 (04-16 @ 04:53)  BSA (m2): 2.17 (04-16 @ 10:33), 2.2 (04-16 @ 04:53)    VITALS:   T(F): 100.3 (04-16-24 @ 10:33), Max: 104 (04-16-24 @ 06:34)  HR: 122 (04-16-24 @ 10:33)  BP: 105/66 (04-16-24 @ 10:33)  RR: 40 (04-16-24 @ 10:33)  SpO2: 91% (04-16-24 @ 10:33)  Wt(kg): --    PHYSICAL EXAM    GENERAL: intubated and sedated  HEAD:  Atraumatic, Normocephalic  EYES: EOMI, PERRLA, conjunctiva and sclera clear  NECK: Supple, No JVD  CHEST/LUNG: mechanical breath sounds b/l; No rales, rhonchi, wheezing, or rubs  HEART: Regular rate and rhythm; No murmurs, rubs, or gallops  ABDOMEN: Soft, Nontender, Nondistended; Bowel sounds present  EXTREMITIES:  2+ Peripheral Pulses, No clubbing, cyanosis, or edema  NEUROLOGY: non-focal  SKIN: No rashes or lesions    LABS:                         13.3   233.01 )-----------( 112      ( 16 Apr 2024 11:09 )             39.5     04-16    133<L>  |  97  |  31<H>  ----------------------------<  113<H>  4.9   |  29  |  0.84    Ca    9.4      16 Apr 2024 05:05  Mg     2.9     04-16    TPro  7.4  /  Alb  2.9<L>  /  TBili  1.1  /  DBili  x   /  AST  21  /  ALT  12  /  AlkPhos  71  04-16    Magnesium: 2.9 mg/dL (04-16 @ 05:05)    PT/INR - ( 16 Apr 2024 08:14 )   PT: 15.3 sec;   INR: 1.30 ratio         PTT - ( 16 Apr 2024 08:14 )  PTT:35.6 sec      IMAGING:

## 2024-04-16 NOTE — ED PROVIDER NOTE - PHYSICAL EXAMINATION
Gen: Awake and alert  Head: NCAT  HEENT: EOMI, oral mucosa moist, normal conjunctiva  Lung: tachypneic, accessory muscle use, coarse breath sounds b/l R > L  CV: Tachycardic, no murmurs  Abd: non distended, soft, nontender, no guarding, no CVA tenderness  MSK: no visible deformities  Neuro: LUE contracted, moving R side  Skin: Warm, well perfused  Psych: normal affect

## 2024-04-16 NOTE — ED PROCEDURE NOTE - PROCEDURE ADDITIONAL DETAILS
Emergency Department Focused Ultrasound performed at patient's bedside for educational purposes. The study will have a follow up study performed or was performed in the direct supervision of an ultrasound trained attending.
Emergency Department Focused Ultrasound performed at patient's bedside.  The complete report can be found in PACS.

## 2024-04-16 NOTE — H&P ADULT - ASSESSMENT
69F h/o CVA (bedbound at baseline), COPD, CHF, HTN presenting as transfer from [facility] for SOB iso leukocytosis to 233 c/f acute leukemia. Pt intubated and on pressors, transferred to MICU for further management and leukophoresis.    Neuro  -sedated on   -CTH showing old infarct, no acute process      CV  #Hemodynamics  -currently on levo, ween as tolerated    Resp  -intubated    GI    /Renal    Heme/Onc  #Leukocytosis  - on presentation  -heme c/s appreciated  -plan for urgent leukophoresis    ID    Endo    Ethics 69F h/o CVA (bedbound at baseline), COPD, CHF, HTN presenting as transfer from [facility] for SOB iso leukocytosis to 233 c/f acute leukemia. Pt intubated and on pressors, transferred to MICU for further management and possible leukophoresis.    Neuro  #Encephalopathy   -CTH showing old infarct, no acute process  -sedated on prop      CV  #Hemodynamics  -hypotensive likely iso sedation for intubation and volume down component   -volume down on POCUS, will start with 1L LR bolus  -currently on levo, ween as tolerated      #Chest Pain  -demand vs leukostasis vs non cardiac   -reported JOSEF in II, III, aVF at [facility]  -repeat EKG  -trend trops to peak    #CHF  -f/u TTE    Resp  #Acute hypoxic respiratory failure  -pna vs leukostasis   -intubated: 460/14/30/5  -will treat pna with vanc, zosyn, azithro; deescalate pending cx results  -f/u CXR    GI  #Diet: TF    /Renal  -no active issues    Heme/Onc  #Leukocytosis  - on presentation  -heme c/s appreciated  -no plan for leukophoresis at this time given mature lymphocytes  -follow TLS labs    #DVT ppx: lovenox    ID  #PNA  -f/u cultures, legionella  -cover with vanc, zosyn, azithro and deescalate pending results     Endo  -ISS q6h while NPO    Ethics: Full Code 69F h/o CVA (bedbound at baseline), COPD, CHF, HTN presenting as transfer from [facility] for SOB iso leukocytosis to 233 c/f acute leukemia. Pt intubated and on pressors, transferred to MICU for further management and possible leukophoresis.    Neuro  #Encephalopathy   -CTH showing old infarct, no acute process  -sedated on prop      CV  #Hemodynamics  -hypotensive likely iso sedation for intubation and volume down component   -volume down on POCUS, will start with 1L LR bolus  -currently on levo, ween as tolerated      #Chest Pain  -demand vs leukostasis vs non cardiac   -reported JOSEF in II, III, aVF at [facility]  -repeat EKG  -trend trops to peak  -cards recs appreciated    #CHF  -f/u TTE    Resp  #Acute hypoxic respiratory failure  -pna vs leukostasis   -intubated: 460/14/30/5  -will treat pna with vanc, zosyn, azithro; deescalate pending cx results  -f/u CXR    GI  #Diet: TF    /Renal  -no active issues    Heme/Onc  #Leukocytosis  - on presentation  -heme c/s appreciated  -no plan for leukophoresis at this time given mature lymphocytes  -follow TLS labs    #DVT ppx: lovenox    ID  #PNA  -f/u cultures, legionella  -cover with vanc, zosyn, azithro and deescalate pending results     Endo  -ISS q6h while NPO    Ethics: Full Code 69F h/o CVA (bedbound at baseline), COPD, CHF, HTN presenting as transfer from [facility] for SOB iso leukocytosis to 233 c/f acute leukemia. Pt intubated and on pressors, transferred to MICU for further management and possible leukophoresis.    Neuro  #Encephalopathy   -p/w lethargy potentially from leukostasis vs sepsis  -CTH showing old infarct, no acute process  -currently sedated on propofol while intubated      CV  #Hemodynamics  -hypotensive likely iso vasoplegia from sedatives and sepsis  -volume down on POCUS, will start with 1L LR bolus  -currently on levo, ween as tolerated      #Chest Pain  -demand vs leukostasis vs non cardiac   -reported JOSEF in II, III, aVF at [facility]  -repeat EKG  -trend trops to peak  -cards recs appreciated    #CHF  -unclear hx but elevated pro-BNP to 2600s  -f/u TTE    Resp  #Acute hypoxic respiratory failure  -pna vs leukostasis   -intubated: 460/14/30/5  -will treat pna with vanc, zosyn, azithro; deescalate pending cx results  -f/u CXR    GI  #Diet: TF    /Renal  -no active issues    Heme/Onc  #Leukocytosis  - on presentation  -heme c/s appreciated  -no plan for leukophoresis at this time given mature lymphocytes  -follow TLS labs    #DVT ppx: lovenox    ID  #PNA  -f/u cultures, legionella  -cover with vanc, zosyn, azithro and deescalate pending results     Endo  -ISS q6h while NPO    Ethics: Full Code

## 2024-04-16 NOTE — PATIENT PROFILE ADULT - FUNCTIONAL ASSESSMENT - DAILY ACTIVITY SECTION LABEL
No new care gaps identified.  Powered by [employer]. Reference number: [id number].   4/04/2022 10:33:25 AM CDT   .

## 2024-04-16 NOTE — H&P ADULT - ATTENDING COMMENTS
1. Acute hypoxemic respiratory failure due to LLL pneumonia in patient with CLL.  Adequately oxygenating on FIO2 30%. Continue current AC vent settings..  Pocus with moderate L pleural effusion. Will place small chest to drain and send labs to r/o empyema.  2. ID. POCUS reveals LLL consolidation with dynamic air bronchograms. Start Vancomycin , Zosyn  and Azithromycin. Check MRSA nasal swab and urine legionella antigen,  3. Hypotension from hypovolemia. POCUS with very small IVC and effacement of LV cavity. Pt received 1 liter fluids in ED. Will give additional liter of LR and start  tube feeds. Titrated Levophed for [id number].  4.Heme: WBC> 200k. Small cells . No blast cells. Pt with CLL. No need for leukopheresis.  5.Cardiac.  minimal ST elevation inferior  leads . Follow troponin. and ekg pro-BNP may be elevated from sepsis.  6.DVT prophylaxis: Lovenox  7. GOC: Full code

## 2024-04-16 NOTE — ED PROVIDER NOTE - OBJECTIVE STATEMENT
70 y/o F w/ PMH of CVA w/ L sided weakness, bed bound, HTN, CHF, COPD, HLD presenting w/ shortness of breath. BIBEMS. EMS were called for pt having SOB and chest pain that started this evening. EMS found pt hypoxic to 60s and hypertensive to over 200 systolic. Placed on CPAP and given 2 sublingual nitro sprays. Oxygen improved to 90s. No fever, abd pain, nausea, vomiting.

## 2024-04-16 NOTE — ED ADULT NURSE NOTE - OBJECTIVE STATEMENT
69 year old female pt transfer from [place] for dyspnea and ches tpain x 1 hour, as per ems that took her to  pt was on 3L nc, pt was placed on cpap and taken to  pt o2 improved, pt presented to [facility] on cpap and tachypneic to 42RR, pt drowsy and lethargic, tachycardic, pt intubated 1106

## 2024-04-16 NOTE — ED ADULT TRIAGE NOTE - NSTRIAGECARE_GEN_A_ER
taken to Dr. Talha CHEN and RN Kathrine / Vangie at bedside. placed on monitors. Respiratory at bedside placed on Bipap.

## 2024-04-16 NOTE — ED PROVIDER NOTE - CLINICAL SUMMARY MEDICAL DECISION MAKING FREE TEXT BOX
70 y/o F w/ PMH of CVA w/ L sided weakness, bed bound, HTN, CHF, COPD, HLD presenting w/ shortness of breath. BIBEMS. EMS were called for pt having SOB and chest pain that started this evening. EMS found pt hypoxic to 60s and hypertensive to over 200 systolic. Placed on CPAP and given 2 sublingual nitro sprays. Oxygen improved to 90s. No fever, abd pain, nausea, vomiting. Pt in resp distress on arrival. Transitioned to bipap w/ pt reporting breathing improving. Concern for acute CHF vs. COPD. Doubtful ACS. Possible viral URI. Found to be febrile, eval for PNA. Less likely PE given coarse breath sounds making alternative pulm pathology more likely. Plan for labs, meds, imaging, EKG. Pt will require admission. Will reassess the need for additional interventions as clinically warranted. Refer to any progress notes for updates on clinical course and as a continuation of this MDM.

## 2024-04-16 NOTE — ED ADULT NURSE NOTE - NSFALLHARMRISKINTERV_ED_ALL_ED

## 2024-04-16 NOTE — ED PROVIDER NOTE - OBJECTIVE STATEMENT
69F PMH CVA with L residual weakness, bed bound, CHF, COPD, HLD presenting as transfer from [place] for sob, increased 68 y/o F w/ PMH of CVA w/ L sided weakness, bed bound, HTN, CHF, COPD, HLD presenting w/ shortness of breath. BIBEMS. EMS were called for pt having SOB and chest pain that started this evening. EMS found pt hypoxic to 60s and hypertensive to over 200 systolic. Placed on CPAP and given 2 sublingual nitro sprays. Oxygen improved to 90s. No fever, abd pain, nausea, vomiting. *** pending completion. 69F PMH CVA with L residual weakness, bed bound, CHF, COPD, HLD presenting as transfer from [place] for sob and chest pain in the setting of , pt arrives on bipap 100% fio2 with continued increased WOB, pt also febrile, tachycardic, normotensive, pt received IV abx, duonebs, steroids, 2 sublingual nitroglycerin at outside hospital. Pt arrived to [place] hypoxic to 60s and hypertensive. On NS ED arrival, pt noted to be more fatigued and waxing and waning mental status. No fever, cough, abdominal pain, vomiting, diarrhea according to [place] notes.

## 2024-04-16 NOTE — ED ADULT TRIAGE NOTE - CHIEF COMPLAINT QUOTE
Pt BIBA from home, c/o dyspnea and chest pain x 1 hour. As per EMS, pt was found at home on NC 3L at baseline with O2 sat 62% with rales. With EMS pt placed on CPAP, 2 nitro sprays given by EMS. O2 sat improved to 92%. 20 gauge place in R hand.   pmhx: CVA (bedbound), COPD, CHF, HTN, HLD. Pt BIBA from home, c/o dyspnea and chest pain x 1 hour. As per EMS, pt was found at home on NC 3L which she uses at baseline with O2 sat 62% with rales. With EMS pt placed on CPAP, 2 nitro sprays given by EMS. O2 sat improved to 92%. 20 gauge place in R hand.   pmhx: CVA (bedbound), COPD, CHF, HTN, HLD.

## 2024-04-16 NOTE — ED PROVIDER NOTE - PHYSICAL EXAMINATION
GENERAL: Appears fatigued with increased WOB on bipap  HEENT: NC/AT,   LUNGS: Rales and diminished breath sounds throughout the L lung, course breath sounds at R lung.   CARDIAC: Tachycardic, regular  ABDOMEN: Soft, NT, ND, no rebound, no guarding.  EXT: No edema, no calf tenderness, distal pulses 2+ bilaterally  NEURO: Moving all extremities.   SKIN: Warm and dry.   PSYCH: Normal affect.

## 2024-04-16 NOTE — ED ADULT TRIAGE NOTE - SOURCE OF INFORMATION
Subjective           DURING THE VISIT WITH THE PATIENT TODAY , PATIENT HAD FACE MASK, MY MEDICAL ASSISTANT AND I  HAD PROPPER PROTECTIVE EQUIPMENT, AND I DID HAND HYGIENE WITH SOAP AND WATER BEFORE AND AFTER THE VISIT.     REASONS FOR FOLLOWUP:1  MASSIVE PULMONARY EMBOLISM WITH SEVERE PULMONARY HYPERTENSION REQUIRING CATHETER EMBOLECTOMY  AND SUBSEQUENT ANTICOAGULATION WITH ELIQUIS.  2. DVT RLE AFTER LONG DRIVE 20 HS TO [place].  3. POSITIVE ANTICARDIOLIPIN ANTIBODY IGM. NEGATIVE LUPUS ANTICOAGULANT.    HISTORY OF PRESENT ILLNESS:  The patient is a 61 y.o. year old male  who is here for follow-up with the above-mentioned history.      This patient returns today to the office for follow up. Overall he continues doing fantastic going back to his normal performance of his physical activity on a regular schedule with no limitations. Occasionally he has minimal cramping in the right lower extremity area where he had the clot before. He has not had any swelling or postphlebitic syndrome. His appetite is good, his weight is stable, his bowel function and urination is normal. He has no new cardiovascular or respiratory issues. He denies any fever or infection. He has questions along with his wife in regard to his thrombophilia assessment.         HEMATOLOGIC HISTORY:  Obviously the story of this patient is very typical. He drove to [place] on his own for 20 hours, not stopping. He developed pain in the calf of the right lower extremity that he called charley horse. He has shortness of breath presumed to be related to altitude sickness in [place] and upon returning to the [place] the shortness of breath remained ongoing and became worse. Since then he has had times that he has been able to do his workouts with no major difficulties and later on his workouts have been minimized given the shortness of breath produced by minimal physical activity. A few days ago he had a new pain in the right calf close to the Achilles  EMS tendon. While mowing the grass yesterday he had syncope and very likely the explanation of all this is his pulmonary vasculature could not accommodate anymore blood clots and the flow through the left ventricle from the pulmonary veins was minimized producing syncope. The patient is alive by miracle. The procedure done by Dr. Cantu and stated above is more than dramatic in the description and tells us the amount of clots that this patient had. I pointed out to the patient that it is a miracle that he stayed alive. Many people go through this and they do not live to tell a story. Therefore, if somebody needs to have flowers and chocolates, it is Dr. Cantu and the emergency room team at [facility]. I think the quick transition and the care that this patient received is world quality and deserves to be credited.      The patient has no personal history of thrombophilia before. He has no family history of thrombophilia. He has not been taking anabolic steroids. His white count, hemoglobin and platelets are normal. His platelet count is minimally low because of platelet consumption associated with massive amount of clotting. Chemistry profile is normal. The CT angiogram from [facility] has been reviewed. Eventually we will need to ask the radiologist to amend the report because he mentioned something in the renal artery that probably is a type error. The description by Dr. Cantu has been reviewed.      The patient is already receiving Eliquis and I advised him that he will remain on Eliquis at least for a year and in my opinion probably for the rest of his life. Obviously we need to proceed with thrombophilia assessment but I think in my opinion the most likely factor that triggered all this was the long trip to [place]. He probably has had showers and showers and showers of clots and emboli and his pulmonary circulation could not accommodate anymore clots. He ran out of physical space to  put more clots and the circulation into the left side of the heart through the pulmonary veins ran out. I pointed out to him that it is like if the main water entrance of the water to the house has a clog, the rest of the house is not going to have any water. In any event the patient is up and running. He looks terrific. He has no clinical evidence of pulmonary hypertension on clinical examination and it is amazing that he is alive. I think the tolerance to all this is because he is a very fit individual who exercises all the time.      Obviously I am going to need to proceed with thrombophilia assessment that will include factor V Leiden mutation, prothrombin gene mutation, antithrombin III, lupus anticoagulant, anti-glycoprotein antibody profile, anticardiolipin antibody profile as well as protein C, protein S, fibrinogen, lipid profile as well as lupus anticoagulant. I will perform as well a serum protein electrophoresis and serum free light chains.      The patient has had screening colonoscopy in the past, prostate examination being negative normal. No family history of malignancy. No family history of thrombophilia. I need to see this patient in the office in a couple of weeks to go through the report of his laboratory testing. Eventually we will need to repeat a CT angiogram of the chest to be sure that all of his clots in the pulmonary circulation are clean and I am sure Dr. Cantu eventually will perform another echocardiogram to be sure that there is resolution of his pulmonary hypertension by this methodology.      I pointed out to the patient that he will need to stay away from trauma and I made the description in the common sense utilization at this point.      The patient likes to drink his beers. He does not get drunk but I pointed out that anticoagulants are not good friends of alcohol and his wife will take care of this problem.      He raised the question if sex life will have anything to do with  all these issues in regard to inability to perform and I pointed out to him that should not be an issue. I asked him to give himself a break until things get better in the next couple of weeks.    The patient had a history of going to [place] to take a car to his daughter, driving for 20 hours almost nonstop and he developed swelling and pain in the right lower extremity that continued intermittently after he culminated all the issues pertinent to above. My notes from the original consultation were very explicit and described the history of the present illness extremely well in my opinion. In any event, since the patient was discharged from the hospital, he has been getting better and better as time goes by. He has very occasional sensation of flip flop in his heart but no sensation of persistent tachyarrhythmia. No chest pain, no pleuritic pain, no hemoptysis, no cough, no shortness of breath. His appetite has been terrific. He has not had any diarrhea like he was having before and he has no abdominal pain, distention or jaundice. His urination is normal. He has had significant improvement but not complete resolution of the swelling in the right lower extremity with no pain. He has not had any clinical bleeding. He feels extremely well. He has not had any fever or infection. He reminded me that some of these events happened after he proceeded with his COVID vaccination weeks before the trip.    THROMBOPHILIA  CLINICAL AND LABORATORY REPORT:  DATE:    10 /4 /2021     DIAGNOSIS: MASSIVE PE REQUIRING EMBOLECTOMY. DVT RLE      KEY: P EQUAL TO POSITIVE  , N  EQUAL TO NEGATIVE. NA : NOT APLICABLE    PREPARED BY HAYLEY DICKERSON MD STAYS IN THE CHART PERMANENTLY      CLINICAL FACTORS:     OBESITY: ___N___     DIABETES: __N____.    IMMOBILITY: __N____.    SMOKING HISTORY : ______N______ PACK A DAY/ YEARS ________    LONG TRIPS BY CAR OR AIRPLANE: __YES 20 HS DRIVE TO [place]______    RECENT SURGERY: ____N______   LOCATION:  ________    TRAUMA: ___N_____    MEDICATIONS:   BIRTH CONTROL MEDICATIONS ___N_____ ANDROGENS _N______ ESTROGENS ___N_____ HEPARIN __N______ OTHERS ____N___    PREGNANCY:   ____N____.    FAMILY HISTORY OF BLOOD CLOTHS:    POSITIVE:   ________ NEGATIVE ___N____ on 3/21/2022 brother has pe after prostate cancer surgery    HEMATOLOGIC DISORDER:  POLYCYTHEMIA VERA ____N___ THROMBOCYTOSIS ___N___    DIC:___N____  TTP ___N_______    COLLAGEN VASCULAR DISEASE: _N____   DIAGNOSIS: ________________________.    VASCULITIS: TYPE AND LOCATION ____N___________ BIOPSY PROVEN:____________    CANCER DIAGNOSIS: __N_____    TYPE ________    CANCER SCREENING:  BREAST _______ COLON ___Y_____ LUNG ________ GENITOURINARY __Y_______   CT SCANS ___________ NORMAL ______ ABNORMAL ________    INDWELLING VASCULAR DEVICE:  ___N______. LOCATION _________    INFLAMMATORY BOWEL DISEASE: ULCERATIVE COLITIS ______N____   CHRON'S DISEASE ___________    RECENT COVID 19 INFECTION __N_____________.          LABORATORY:    FACTOR V LEIDEN MUTATION: POSITIVE   _____ NEGATIVE __Y___ HETEROZYGOUS ________ HOMOZYGOUS ________    PROTHROMBIN GENE MUTATION:  POSITIVE _____ NEGATIVE __Y___    ANTITHROMBIN III : NORMAL _____   ABNORMAL __Y____ QUANTIFICATION: __66_____    PROTEIN S ANTIGEN ____N______ PROTEIN S FUNCTIONAL _____N___ PROTEIN C ___N______     LUPUS ANTICOAGULANT: POSITIVE _____  NEGATIVE _N____. REPEATED  6 WEEKS LATER ______    ANTICARDIOLIPIN ANTIBODY PROFILE : POSITIVE ___Y___  NEGATIVE ______  IGG __N___ IGM _34____ REPEATED 6 WEEKS LATER _________    ANTI GLYCOPROTEIN ANTIBODY:  POSITIVE _____  NEGATIVE  __N_____   IGG _____ IGM _____ REPEATED 6 WEEKS LATER ________    ANTIPHOSPATIDIL SERINE ANTIBODY:  POSITIVE _____ NEGATIVE __N_____    SERUM PROTEIN ELECTROPHORESIS AND IMMUNOFIXATION: NO MONOCLONAL PROTEIN ___N_____ POSITIVE MONOCLONAL PROTEIN ______ TYPE _________    C REACTIVE PROTEIN HIGH SENSIBILITY: NORMAL _______ ABNORMAL  :QUANTIFICATION ________    SEDIMENTATION RATE: _____ MM/H.    FIBRINOGEN LEVEL: _________ NORMAL __Y_____  ABNORMAL __________.    LIPID PROFILE:    CHOLESTEROL HIGH _____N_____  TRYGLYCERIDES HIGH  ___N_____    HEPARIN ASSOCIATED ANTIPLATELET ANTIBODY: __NA_______          Past Medical History:   Diagnosis Date   • DVT (deep venous thrombosis) (HCC)     RLE in the distal femoral, popliteal, posterior tibial, peroneal, and soleal.   • Pulmonary emboli (HCC)         Past Surgical History:   Procedure Laterality Date   • APPENDECTOMY     • CARDIAC CATHETERIZATION N/A 9/18/2021    Procedure: Percutaneous Manual Thrombectomy- INARI;  Surgeon: Enrike Cantu MD;  Location: [facility] CATH INVASIVE LOCATION;  Service: Cardiovascular;  Laterality: N/A;   • CARDIAC CATHETERIZATION N/A 9/18/2021    Procedure: Right Heart Cath;  Surgeon: Enrike Cantu MD;  Location: [facility] CATH INVASIVE LOCATION;  Service: Cardiovascular;  Laterality: N/A;   • COLONOSCOPY      negative normal   • EMBOLECTOMY     • INTERVENTIONAL RADIOLOGY PROCEDURE Bilateral 9/18/2021    Procedure: Pumonary angiography -Bilateral;  Surgeon: Enrike Cantu MD;  Location: [facility] CATH INVASIVE LOCATION;  Service: Cardiovascular;  Laterality: Bilateral;   • TONSILLECTOMY          Current Outpatient Medications on File Prior to Visit   Medication Sig Dispense Refill   • Eliquis 5 MG tablet tablet      • escitalopram (LEXAPRO) 20 MG tablet Take 20 mg by mouth Daily.     • loratadine (CLARITIN) 10 MG tablet Take 10 mg by mouth Daily.     • rivaroxaban (XARELTO) 20 MG tablet Take one tablet daily. 30 tablet 6   • Triamcinolone Acetonide (NASACORT) 55 MCG/ACT nasal inhaler 2 sprays into the nostril(s) as directed by provider Daily.       No current facility-administered medications on file prior to visit.        ALLERGIES:    Allergies   Allergen Reactions   • Penicillins Rash        Social History     Socioeconomic History   • Marital status:      Spouse  "name: Marimar   Tobacco Use   • Smoking status: Never Smoker   • Smokeless tobacco: Never Used   • Tobacco comment: CAFFEINE -  2 CUPS COFFEE DAILY    Vaping Use   • Vaping Use: Never used   Substance and Sexual Activity   • Alcohol use: Not Currently     Alcohol/week: 6.0 standard drinks     Types: 6 Cans of beer per week   • Drug use: Never   • Sexual activity: Defer        Family History   Problem Relation Age of Onset   • Hypertension Mother    • Hypertension Father    • Suicidality Brother    • Autism Son    • Hypertension Brother           Objective     Vitals:    03/21/22 0803   BP: 122/81   Pulse: 72   Resp: 16   Temp: 97.1 °F (36.2 °C)   TempSrc: Temporal   SpO2: 97%   Weight: 100 kg (220 lb 6.4 oz)   Height: 175.3 cm (69.02\")   PainSc: 0-No pain     Current Status 10/4/2021   ECOG score 0       Physical Exam   I HAVE PERSONALLY REVIEWED THE HISTORY OF THE PRESENT ILLNESS, PAST MEDICAL HISTORY, FAMILY HISTORY, SOCIAL HISTORY, ALLERGIES, MEDICATIONS STATED ABOVE IN THE  NOTE FROM TODAY.        GENERAL:  Well-developed, well-nourished  Patient  in no acute distress.   SKIN:  Warm, dry ,NO purpura ,NO petechiae, no rash.  HEENT:  Pupils were equal and reactive to light and accomodation, conjunctivae noninjected, no pterygium, normal extraocular movements, normal visual acuity.   NECK:  Supple with good range of motion; no thyromegaly , no other masses, no JVD or bruits,.No carotid artery pain, no carotid abnormal pulsation , NO arterial dance.  LYMPHATICS:  No cervical, NO supraclavicular, NO axillary,NO epitrochlear , NO inguinal adenopathies.  CARDIAC   normal rate , regular rhythm, without murmur,NO rubs NO S3 NO S4   LUNGS: normal breath sounds bilateral, no wheezing, NO rhonchi, NO crackles ,NO rubs.  VASCULAR VENOUS: no cyanosis, NO collateral circulation, NO varicosities, NO edema, NO palpable cords, NO pain,NO erythema, NO pigmentation of the skin.  ABDOMEN:  Soft, NO pain,no hepatomegaly, no " splenomegaly,no masses, no ascites, no collateral circulation,no distention,no Osburn sign.  EXTREMITIES  AND SPINE:  No clubbing, no cyanosis ,no deformities , no pain .No kyphosis,  no pain in spine, no pain in ribs , no pain in pelvic bone.  NEUROLOGICAL:  Patient was awake, alert, oriented to time, person and place.        RECENT LABS:  Hematology WBC   Date Value Ref Range Status   03/21/2022 5.85 3.40 - 10.80 10*3/mm3 Final     RBC   Date Value Ref Range Status   03/21/2022 4.98 4.14 - 5.80 10*6/mm3 Final     Hemoglobin   Date Value Ref Range Status   03/21/2022 15.0 13.0 - 17.7 g/dL Final     Hematocrit   Date Value Ref Range Status   03/21/2022 45.6 37.5 - 51.0 % Final     Platelets   Date Value Ref Range Status   03/21/2022 176 140 - 450 10*3/mm3 Final       CBC:    WBC   Date Value Ref Range Status   03/21/2022 5.85 3.40 - 10.80 10*3/mm3 Final     RBC   Date Value Ref Range Status   03/21/2022 4.98 4.14 - 5.80 10*6/mm3 Final     Hemoglobin   Date Value Ref Range Status   03/21/2022 15.0 13.0 - 17.7 g/dL Final     Hematocrit   Date Value Ref Range Status   03/21/2022 45.6 37.5 - 51.0 % Final     MCV   Date Value Ref Range Status   03/21/2022 91.6 79.0 - 97.0 fL Final     MCH   Date Value Ref Range Status   03/21/2022 30.1 26.6 - 33.0 pg Final     MCHC   Date Value Ref Range Status   03/21/2022 32.9 31.5 - 35.7 g/dL Final     RDW   Date Value Ref Range Status   03/21/2022 12.5 12.3 - 15.4 % Final     RDW-SD   Date Value Ref Range Status   03/21/2022 41.8 37.0 - 54.0 fl Final     MPV   Date Value Ref Range Status   03/21/2022 9.4 6.0 - 12.0 fL Final     Platelets   Date Value Ref Range Status   03/21/2022 176 140 - 450 10*3/mm3 Final     Neutrophil %   Date Value Ref Range Status   03/21/2022 56.9 42.7 - 76.0 % Final     Lymphocyte %   Date Value Ref Range Status   03/21/2022 28.2 19.6 - 45.3 % Final     Monocyte %   Date Value Ref Range Status   03/21/2022 10.3 5.0 - 12.0 % Final     Eosinophil %   Date  Value Ref Range Status   03/21/2022 3.6 0.3 - 6.2 % Final     Basophil %   Date Value Ref Range Status   03/21/2022 0.7 0.0 - 1.5 % Final     Immature Grans %   Date Value Ref Range Status   03/21/2022 0.3 0.0 - 0.5 % Final     Neutrophils, Absolute   Date Value Ref Range Status   03/21/2022 3.33 1.70 - 7.00 10*3/mm3 Final     Lymphocytes, Absolute   Date Value Ref Range Status   03/21/2022 1.65 0.70 - 3.10 10*3/mm3 Final     Monocytes, Absolute   Date Value Ref Range Status   03/21/2022 0.60 0.10 - 0.90 10*3/mm3 Final     Eosinophils, Absolute   Date Value Ref Range Status   03/21/2022 0.21 0.00 - 0.40 10*3/mm3 Final     Basophils, Absolute   Date Value Ref Range Status   03/21/2022 0.04 0.00 - 0.20 10*3/mm3 Final     Immature Grans, Absolute   Date Value Ref Range Status   03/21/2022 0.02 0.00 - 0.05 10*3/mm3 Final     nRBC   Date Value Ref Range Status   03/21/2022 0.0 0.0 - 0.2 /100 WBC Final        CMP:    Glucose   Date Value Ref Range Status   12/28/2021 85 65 - 99 mg/dL Final     BUN   Date Value Ref Range Status   12/28/2021 21 8 - 23 mg/dL Final     Creatinine   Date Value Ref Range Status   12/28/2021 0.95 0.76 - 1.27 mg/dL Final     Sodium   Date Value Ref Range Status   12/28/2021 138 136 - 145 mmol/L Final     Potassium   Date Value Ref Range Status   12/28/2021 4.0 3.5 - 5.2 mmol/L Final     Chloride   Date Value Ref Range Status   12/28/2021 101 98 - 107 mmol/L Final     CO2   Date Value Ref Range Status   12/28/2021 27.3 22.0 - 29.0 mmol/L Final     Calcium   Date Value Ref Range Status   12/28/2021 9.3 8.6 - 10.5 mg/dL Final     Total Protein   Date Value Ref Range Status   12/28/2021 7.4 6.0 - 8.5 g/dL Final     Albumin   Date Value Ref Range Status   12/28/2021 4.50 3.50 - 5.20 g/dL Final     ALT (SGPT)   Date Value Ref Range Status   12/28/2021 32 1 - 41 U/L Final     AST (SGOT)   Date Value Ref Range Status   12/28/2021 36 1 - 40 U/L Final     Alkaline Phosphatase   Date Value Ref Range Status    12/28/2021 93 39 - 117 U/L Final     Total Bilirubin   Date Value Ref Range Status   12/28/2021 0.6 0.0 - 1.2 mg/dL Final     Globulin   Date Value Ref Range Status   12/28/2021 2.9 gm/dL Final     A/G Ratio   Date Value Ref Range Status   12/28/2021 1.6 g/dL Final     BUN/Creatinine Ratio   Date Value Ref Range Status   12/28/2021 22.1 7.0 - 25.0 Final     Anion Gap   Date Value Ref Range Status   12/28/2021 9.7 5.0 - 15.0 mmol/L Final           Recent imaging:    No results found.     Assessment/Plan     Diagnoses and all orders for this visit:    1. Anticardiolipin antibody positive (Primary)    2. Acute saddle pulmonary embolism with acute cor pulmonale (HCC)    3. Acute deep vein thrombosis (DVT) of distal vein of right lower extremity (HCC)         The patient was brought back to the office on 10/04/2021. Since the hospital stay he has been terrific at home. He has gained back his mobility and his ability to function with no limitations. He has had near complete resolution of the swelling and pain in the right calf and right lower extremity and he has not had any shortness of breath, palpitations, pleuritic pain, cough, hemoptysis. He occasional has the sensation of the heart that flip flops and lasts for 1 or 2 seconds in absence of any pain.     He has not had any clinical bleeding on the Eliquis.     His clinical examination today besides minimal residual swelling in the right lower extremity shows no other abnormalities and there is no evidence clinically of pulmonary hypertension.     The thrombophilia labs have documented the only abnormality, an anticardiolipin antibody IgM that was marginally positive. The rest of the studies as posted above in the hematological history were negative. This leads me to conclude that the most likely factor that triggered the clot in this patient was the positive anticardiolipin antibody but most importantly the long drive to [place], 20 hours in a car. The patient  stopped on 2 occasions for gasoline and food and that was the rest of the story. He developed swelling on the trip in the right lower extremity and he kept having swelling and pain in the calf for many weeks after that having very likely frequent showers of emboli that filled up the pulmonary circulation and culminated with his syncopal episode while mowing grass.     The patient's clinical examination today otherwise is unrevealing. He feels great. He also has resolution of the thrombocytopenia that he had in the hospital that is probably associated with excessive platelet consumption but massive amount of clots. The platelet count, hemoglobin and white count today are completely normal.     My recommendations for this patient are as follows:   1. He will remain on his Eliquis for the time being and I have recommended to the patient to remain on this medicine for the rest of his life. I feel afraid of this patient having another episode and I feel afraid of this patient having any other issues that would culminate with more complications or problems associated with this process.   2. I advised the patient to have a repeat CT angiogram of the chest and Doppler study of the right lower extremity in 5 weeks and to repeat the level of antithrombin III as well as the anticardiolipin antibody in 5 weeks. I would like to review him back in 6 weeks.   3. I advised him to use elastic support in the right lower extremity if possible all the time. He does not need to use this at nighttime.   4. I advised him against any potential for trauma to minimize bleeding associated with anticoagulant use.   5. I advised him to resume his beer drinking that he loves to do. I asked him that he needs to do this even with more than moderation. Maybe a beer here and there and he is not allowed to get tipsy. He is aware that too much alcohol and anticoagulants are not good friends from the point of view of falling, trauma and circumstances  of that nature.     The patient raised the question about his sex life and I told him that I think he can resume this according to his circumstances with no significant limitations.     I discussed all these facts with the patient. I provided him copies of the laboratory testing and I discussed this with the patient’s wife.     They raised the question if I do believe that the COVID vaccination had something to do with this. He received this a few weeks before the trip to [place]. Maybe that was a triggering factor for anticardiolipin antibodies. I am not sure about the answer that I need to do in this regard. He received Pfizer vaccination. I advised him though in the home run to avoid the use of a 2nd dose on himself at this point until we clarify and remove more smoke out of the air.  I reviewed the patient on 11/15/2021. Since the previous visit he has had complete resolution of the symptoms pertinent to his pulmonary embolism. He has had a normal clinical examination today. He has had minimal leftover edema in the right lower extremity that is barely visible to my eyes today.    The CT angiogram of the chest shows complete resolution of his massive amount of pulmonary embolism and there is no evidence of pulmonary hypertension.     The Doppler study of the lower extremity documents a subacute clot in the popliteal vein on the right lower extremity. No other sites of clots.     The patient was sent back to the lab today to obtain his laboratory testing that includes a CBC, anticardiolipin antibody and glycoprotein antibodies as well as antithrombin-III.          I had a lengthy conversation with the patient today in discussion with him and his wife present in the room in regard issues pertinent to anticoagulants. I strongly believe that this patient needs to remain on anticoagulant for the rest of his life given the gravity of the clinical picture that he presented with the first time. Maybe in the future it  will be amenable for us to decrease the degree of anticoagulant utilization. His insurance company has already notified him that for next year they will not pay for Eliquis that will obligate us to make the transition from Eliquis to Xarelto. I do not have any difficulties with this. The patient has tablets of Eliquis to last for another 3-4 weeks. I asked him to go ahead and continue taking them and once that he is done with the last dose of Eliquis he will initiate Xarelto at the dose of 20 mg once a day.     I pointed out to the patient that he needs to wear elastic support in his right lower extremity no matter what the situation is. I asked him to stay away from extensive amount of alcohol consumption and minimize trauma.     The patient returned to the office on 03/21/2022. Since the previous visit he has been feeling terrific. He is back to his normal exercise activity with no limitations whatsoever. He is able to do anything that he needs to do physically wise and he has no sensation of shortness of breath, chest pain or palpitation. There is no postphlebitic syndrome in the right lower extremity. The patient remains taking his Eliquis in a normal schedule with no clinical bleeding.     The patient raised the question and his wife sent the message in regard to the correlation between COVID testing and blood clots. Typically the association is related to [employer] vaccine, not related to the Moderna or Pfizer vaccine. [employer] that is not utilized in [place] also has some of those concerns in regard to clots. Mostly happens in women. 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 He required removal of clots and thrombolytic therapy.  He is alive because of a miracle in my opinion and his neighbors who pay attention to his actions outside on the day that the issue happened.    He raised the question in regard for how long he needs to be anticoagulated. I told him that given the intensity of his process in my opinion is that probably for life but he does not want to listen to this answer yet. My approach will be eventually to do a D-dimer that entire literature has been documented to be predictive of future events if the test is abnormal before any anticoagulation is discontinued.     I advised him again to avoid trauma at any cost remaining on anticoagulation and I asked him to return to see us back in 6 months.     If he wishes to have a booster shot for COVID he is welcome to do that either receiving the Pfizer vaccine or Moderna vaccine that is my recommendation.     I advised him also to remain physically active like he is. He is a fan of exercise and I asked him to continue to work on this.    Other than that no other significant recommendation for him at this point. He brought up to me the story that his brother had prostate cancer surgery recently and he ended up with a pulmonary embolism. We have done the thrombophilia assessment for family and all of the testing posted above was negative.     ·

## 2024-04-16 NOTE — ED PROVIDER NOTE - CLINICAL SUMMARY MEDICAL DECISION MAKING FREE TEXT BOX
Gen 69-year-old history of CVA bedbound history of COPD hypertension CHF presents with acute shortness of breath to outside ED initially treated for acute pulmonary edema with sublingual nitro x 2 Lasix and BiPAP with minimal improvement then large left pleural effusion was identified patient found to be febrile to 103 treated for pneumonia mild improvement on BiPAP of oxygenation to pulse ox of 8990 transferred to [place] for white count of 233 no differential was sent with concern for acute leukemia on arrival patient blood pressure 90 systolic tacky to 130 POCUS demonstrated hyperdynamic LV IVC less than 2 cm with consolidation and atelectasis on left with large pleural effusion patient tiring with decreased mental status decision for urgent intubation delayed sequence intubation after BiPAP with glide scope 7.5 with direct visualization color change and breath sounds bilaterally postintubation blood pressure dropped to 80 systolic increased with fluids to 90 but now refractory hypotension will start Levophed EKG demonstrated subtle ST elevations inferiorly with no ST changes and RV for cardiology consulted likely secondary to medical pathology likely demand ischemia will sedate with Versed rather than propofol as blood pressure is low MICU consult pending CCU cards consult pending heme-onc aware that patient is in ED with white count of 230,000 we will discuss possibility of leukapheresis will continue to monitor in ICU

## 2024-04-16 NOTE — ED POST DISCHARGE NOTE - RESULT SUMMARY
[facility] lab called: (+) blood culture, gram positive cocci in pairs in aerobic and anaerobic.

## 2024-04-16 NOTE — CHART NOTE - NSCHARTNOTEFT_GEN_A_CORE
Was called to evaluate for possible STEMI in this patient. Pt is a 69F h/o CVA (bedbound at baseline), COPD, CHF, HTN p/w acute shortness of breath to [facility]. Initially treated for acute pulmonary edema with sublingual nitro x 2, Lasix, and BiPAP. Pt was also found to be febrile to 103, so treated for PNA. BIPAP led to improvement in O2 to 89-90. Pt now transferred to [place] for white count of 233 and plan for leukophoresis. In ED, pt intubated iso respiratory tiring and decreasing mental status. Also started on levo for hypotn.      Called to evaluate EKG as STEMI, on review and discussion with interventionalist Dr. Oliveira, does not meet STEMI criteria, no reciprocal changes and no clear elevations but rather some isolated MI depressions and ST relatively stable in comparison to TP segment from AM EKG. Would repeat serial EKGs and continue to trend hsTnT, would rule out PE given tachycardia and hypoxia. Could obtain TTE on this admission for further workup but presentation likely in the setting of severe hyperleukocytosis and underlying hematologic malignancy. To be admitted to MICU for further care

## 2024-04-16 NOTE — CONSULT NOTE ADULT - ATTENDING COMMENTS
67-yr-old woman admitted with worsening CHF/COPD found to have a WBC count of 230K. Automated diff as well as PBS significant for mature lymphocytes, many are atypical but matured. Agree with fellow's plan of w/u and management. No leukopheresis is needed unless peripheral blood flow suggests scute leukemia. Supportive. 67-yr-old woman admitted with worsening CHF/COPD found to have a WBC count of 230K. Automated diff as well as PBS significant for mature lymphocytes, many are atypical but matured. 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 Agree with fellow's plan of w/u and management. No leukopheresis is needed unless peripheral blood flow suggests scute leukemia. Supportive.

## 2024-04-16 NOTE — ED PROVIDER NOTE - WR ORDER DATE AND TIME 1
Subjective:       Bryce Fox is a 9 y.o. male who presents for evaluation of symptoms of a URI. Symptoms include cough described as productive of preston sputum and purulent nasal discharge. Onset of symptoms was several week ago, and has been gradually worsening since that time. Treatment to date:  OTC sudafed .  Parent denies fever, nausea, vomiting, diarrhea, abdominal pain, and rash    Review of Systems  Review of Systems   Constitutional:  Negative for activity change, appetite change, fatigue and fever.   HENT:  Positive for nasal congestion.    Respiratory:  Positive for cough.    Gastrointestinal:  Negative for abdominal pain, constipation, diarrhea, nausea and vomiting.   Integumentary:  Negative for rash.        Objective:     Vitals:    03/22/24 1322   Temp: 98.1 °F (36.7 °C)   TempSrc: Tympanic   Weight: 39.8 kg (87 lb 11.9 oz)      Physical Exam  Constitutional:       Appearance: Normal appearance.   HENT:      Head: Normocephalic and atraumatic.      Right Ear: Tympanic membrane, ear canal and external ear normal.      Left Ear: Tympanic membrane, ear canal and external ear normal.      Nose: Congestion present. No rhinorrhea.      Mouth/Throat:      Mouth: Mucous membranes are moist.      Pharynx: Posterior oropharyngeal erythema present. No oropharyngeal exudate.   Eyes:      Conjunctiva/sclera: Conjunctivae normal.   Cardiovascular:      Rate and Rhythm: Normal rate and regular rhythm.      Pulses: Normal pulses.      Heart sounds: Normal heart sounds.   Pulmonary:      Effort: Pulmonary effort is normal. No respiratory distress.      Breath sounds: No stridor. No wheezing or rhonchi.      Comments: Dullness over R lower lobe  Abdominal:      General: Bowel sounds are normal. There is no distension.      Palpations: Abdomen is soft.      Tenderness: There is no abdominal tenderness.   Musculoskeletal:         General: Normal range of motion.      Cervical back: Normal range of motion and neck supple.    Skin:     General: Skin is warm and dry.      Capillary Refill: Capillary refill takes less than 2 seconds.   Neurological:      General: No focal deficit present.      Mental Status: He is alert.          Assessment:     1. Community acquired pneumonia, unspecified laterality  Blood ( rust colored ) sputum, dullness on aucultation concerning for CAP  -     amoxicillin (AMOXIL) 500 MG Tab; Take 1 tablet (500 mg total) by mouth every 12 (twelve) hours. for 7 days  Dispense: 14 tablet; Refill: 0      16-Apr-2024 10:54

## 2024-04-16 NOTE — PATIENT PROFILE ADULT - FALL HARM RISK - ATTEMPT OOB
S/W PT, APPT 3/13 9:30 @  [facility]    RT DIAG MAMMOGRAM ONLY, DOES NOT NEED ULTRASOUND PER REPORT (DIAGNOSIS CALCIFICATIONS)   No

## 2024-04-16 NOTE — ED ADULT NURSE NOTE - OBJECTIVE STATEMENT
Pt BIBEMS. As per EMS, pt presents to ED w/ c/o dyspnea and chest pain x1 hour. As per EMS, pt found on 3L [place] home O2, was satting 62% in the field. Pt presents to ED on EMS CPAP. Pt presently tachypneic, tachycardic, and rales noted to b/l anterior chest. Dr. Palencia at bedside to assess patient. Pt placed on hospital BiPAP and cardiac and SPO2 monitor. Left sided weakness noted to upper and lower left extremities due to past CVA, pt is bedbound at baseline. PMH CVA, COPD, CHF, HTN, HLD.

## 2024-04-16 NOTE — H&P ADULT - HISTORY OF PRESENT ILLNESS
69F h/o CVA (bedbound at baseline), COPD, CHF, HTN p/w acute shortness of breath to outside ED. Initially treated for acute pulmonary edema with sublingual nitro x 2, Lasix, and BiPAP. Pt was also found to be febrile to 103, so treated for PNA. BIPAP led to improvement in O2 to 89-90. Pt now transferred to [place] for white count of 233 and plan for leukophoresis. In ED, pt intubated iso respiratory tiring and decreasing mental status. Also started on levo for hypotn.  69F h/o CVA (bedbound at baseline), COPD, CHF, HTN p/w acute shortness of breath to outside ED. Initially treated for acute pulmonary edema with sublingual nitro x 2, Lasix, and BiPAP. Pt was also found to be febrile to 103, so treated for PNA. BIPAP led to improvement in O2 to 89-90. Pt now transferred to [place] for white count of 233 and plan for possible leukophoresis. In ED, pt intubated iso respiratory tiring and decreasing mental status. Also started on levo for hypotn.

## 2024-04-16 NOTE — ED PROVIDER NOTE - PROGRESS NOTE DETAILS
Kamilla Fernández DO (PGY3): Pt received 1L at [place], received second liter of IVF here. Pt became hypotensive, started levophed. hx of CHF. ABX given at [place]. hem onc bedside the think there are no blasts so no longer contemplating leukophoresis -- no shiley will be placed- Kamilla Fernández DO (PGY3): Pt tba to MICU for septic shock secondary to pneumonia with acute leukemia. Kamilla Fernández DO (PGY3): Pt received 1L IVF at [place], received second liter of IVF here. Pt became hypotensive, hx of CHF so would not like to give additional IVF, started levophed with improvement. ABX given at [place].

## 2024-04-16 NOTE — ED PROVIDER NOTE - CARE PLAN
Principal Discharge DX:	Lymphoblastic leukemia, acute  Secondary Diagnosis:	Acute respiratory failure with hypoxia  Secondary Diagnosis:	Pleural effusion  Secondary Diagnosis:	Septic shock   1

## 2024-04-16 NOTE — CHART NOTE - NSCHARTNOTEFT_GEN_A_CORE
: JASBIR Cates MD    INDICATION: Shock, acute respiratory failure    PROCEDURE:  [x] LIMITED ECHO  [x] LIMITED CHEST  [ ] LIMITED RETROPERITONEAL  [ ] LIMITED ABDOMINAL  [ ] LIMITED DVT  [ ] OTHER    FINDINGS: Dense LLL consolidation that contains dynamic air bronchograms, with associated moderate to large simple pleural effusion.  Hyperdynamic LV function with complete effacement of end-systolic LV walls.  Grossly normal RV systolic function.  IVC collapsed and distends to <5mm.      INTERPRETATION:  Dense LLL consolidation with dynamic air bronchograms, most consistent with pneumonia, with associated moderate to large pleural effusion.  Hyperdynamic underfilled LV, which probably represents fluid responsiveness given the clinical setting of vasopressor-dependent shock.    -Jeffery Cates M.D.   PGY-5 EM/IM/CC  Pager #[phone] : JASBIR Cates MD, Berto Villa MD    INDICATION: Shock, acute respiratory failure    PROCEDURE:  [x] LIMITED ECHO  [x] LIMITED CHEST  [ ] LIMITED RETROPERITONEAL  [ ] LIMITED ABDOMINAL  [ ] LIMITED DVT  [ ] OTHER    FINDINGS: Dense LLL consolidation that contains dynamic air bronchograms, with associated moderate to large simple pleural effusion.  Hyperdynamic LV function with complete effacement of end-systolic LV walls.  Grossly normal RV systolic function.  IVC collapsed and distends to <5mm.      INTERPRETATION:  Dense LLL consolidation with dynamic air bronchograms, most consistent with pneumonia, with associated moderate to large pleural effusion.  Hyperdynamic underfilled LV, which probably represents fluid responsiveness given the clinical setting of vasopressor-dependent shock.    -Jeffery Cates M.D.   PGY-5 EM/IM/CC  Pager #[phone]    I have assisted and supervised entire procedure.     Berto Villa MD

## 2024-04-16 NOTE — ED ADULT NURSE REASSESSMENT NOTE - NS ED NURSE REASSESS COMMENT FT1
Pt currently on BiPAP, resting in stretcher at this time. Pt provided with perineal care, new diaper and purewick applied. Clean gown placed on patient and linens changed. Plan of care ongoing. Pt currently on BiPAP, pt tolerating BiPAP well. Pt RR and work of breathing improving at this time. BP currently stable. Pt provided with perineal care, new diaper and purewick applied. Clean gown placed on patient and linens changed. Plan of care ongoing.

## 2024-04-16 NOTE — H&P ADULT - NSHPPHYSICALEXAM_GEN_ALL_CORE
PHYSICAL EXAM:  GENERAL: NAD, lying in bed comfortably  HEAD:  Atraumatic, Normocephalic  EYES: EOMI, PERRLA, conjunctiva and sclera clear  ENT: Moist mucous membranes  NECK: Supple, No JVD  CHEST/LUNG: Clear to auscultation bilaterally; No rales, rhonchi, wheezing, or rubs. Unlabored respirations  HEART: Regular rate and rhythm; No murmurs, rubs, or gallops  ABDOMEN: Bowel sounds present; Soft, Nontender, Nondistended. No hepatomegally  EXTREMITIES:  2+ Peripheral Pulses, brisk capillary refill. No clubbing, cyanosis, or edema  NERVOUS SYSTEM:  Alert & Oriented X3, speech clear. No deficits   MSK: FROM all 4 extremities, full and equal strength  SKIN: No rashes or lesions PHYSICAL EXAM:  GENERAL: NAD, sedated  HEAD:  Atraumatic, Normocephalic  CHEST/LUNG: mechanical breath sounds b/l; No rales, rhonchi, wheezing, or rubs  HEART: tachycardic; No murmurs, rubs, or gallops  ABDOMEN: Bowel sounds present; Soft, Nontender, Nondistended. No hepatomegally  EXTREMITIES:  2+ Peripheral Pulses, brisk capillary refill. No clubbing, cyanosis, or edema

## 2024-04-16 NOTE — ED POST DISCHARGE NOTE - DETAILS
CHart reviewed pt was transferred to [facility] and admitted. Dr Viji Collins made aware through teams and will follow up.

## 2024-04-16 NOTE — H&P ADULT - NSHPLABSRESULTS_GEN_ALL_CORE
13.3   233.01 )-----------( 112      ( 16 Apr 2024 11:09 )             39.5       04-16    134<L>  |  96  |  36<H>  ----------------------------<  140<H>  5.3   |  22  |  0.75    Ca    9.3      16 Apr 2024 11:09  Mg     2.9     04-16    TPro  7.2  /  Alb  3.8  /  TBili  1.1  /  DBili  x   /  AST  26  /  ALT  10  /  AlkPhos  70  04-16          ABG - ( 16 Apr 2024 12:15 )  pH, Arterial: 7.43  pH, Blood: x     /  pCO2: 41    /  pO2: 71    / HCO3: 27    / Base Excess: 2.6   /  SaO2: 95.7                Urinalysis Basic - ( 16 Apr 2024 11:09 )    Color: x / Appearance: x / SG: x / pH: x  Gluc: 140 mg/dL / Ketone: x  / Bili: x / Urobili: x   Blood: x / Protein: x / Nitrite: x   Leuk Esterase: x / RBC: x / WBC x   Sq Epi: x / Non Sq Epi: x / Bacteria: x        PT/INR - ( 16 Apr 2024 11:09 )   PT: 14.9 sec;   INR: 1.43 ratio         PTT - ( 16 Apr 2024 11:09 )  PTT:34.9 sec    Lactate Trend            CAPILLARY BLOOD GLUCOSE

## 2024-04-16 NOTE — ED ADULT NURSE NOTE - CHIEF COMPLAINT QUOTE
Pt BIBA from home, c/o dyspnea and chest pain x 1 hour. As per EMS, pt was found at home on NC 3L which she uses at baseline with O2 sat 62% with rales. With EMS pt placed on CPAP, 2 nitro sprays given by EMS. O2 sat improved to 92%. 20 gauge place in R hand.   pmhx: CVA (bedbound), COPD, CHF, HTN, HLD.

## 2024-04-16 NOTE — ED PROVIDER NOTE - CHIEF COMPLAINT
The patient is a 69y Female complaining of  The patient is a 69y Female complaining of sob/hyperleukocytosis.

## 2024-04-16 NOTE — PATIENT PROFILE ADULT - FALL HARM RISK - HARM RISK INTERVENTIONS

## 2024-04-16 NOTE — ED PROVIDER NOTE - PROGRESS NOTE DETAILS
Attending Talha: signed out to morning attending pending rpt/additional labs. may require transfer for heme/onc eval given marked leukocytosis if rpt testing confirms value. Jean: Spoke with transfer center hematology/ oncology.  Will transfer to [facility] ED.  Updated patient - states she was prescribed medications for leukemia but has not been taking them for the last 2 months.  States she was following here but no other MRN in system and unknown oncologist.  At this time patient appears to be stable on bipap for transfer Jean: Spoke with transfer center hematology/ oncology.  Will transfer to [facility] ED.  Updated patient - states she was prescribed medications for leukemia but has not been taking them for the last 2 months.  States she was following here but no other MRN in system and unknown oncologist.  At this time patient appears to be stable on bipap for transfer.  Sent additional dic labs.

## 2024-04-16 NOTE — CONSULT NOTE ADULT - ASSESSMENT
PLAN:  - Please place [facility] for urgent leukophorersis  - Dr. Teodoro Valerio from blood bank is aware  - T&S STAT  - Ordered and will review peripheral smear  - Requested diff for CBC STAT    NOTE INCOMPLETE UNTIL ATTENDING SIGNS    ***************************************************************  Lynne King, PGY4  Fellow Hematology/Oncology  pager: [phone]   Available on [employer] Teams  After 5pm or on weekends please contact  to page on-call fellow   ***************************************************************       PLAN:  - Determined not to perform leukophoresis, discussed with blood bank (Dr. Valerio) and Dr. Cuevas. On peripheral smear, heterogeneous population of lymphocytes.   - Send viscosity  - Sent flow cytometry, molecular, and FISH on peripheral blood (gave forms and tubes to ED)  - Please trend TLS labs (uric acid, LDH, CMP, Mg, Phos) and CBC w/ diff and retic daily  - If uric acid > 8, start allopurinol and give 3 mg IV rasburicase, and if uric acid > 12 give 6 mg rasburicase  - T&S STAT  - Ordered and will review peripheral smear  - Requested diff for CBC STAT    NOTE INCOMPLETE UNTIL ATTENDING SIGNS    ***************************************************************  Lynne King, PGY4  Fellow Hematology/Oncology  pager: [phone]   Available on [employer] Teams  After 5pm or on weekends please contact  to page on-call fellow   ***************************************************************   69F h/o CVA (bedbound at baseline), COPD, CHF, HTN presenting as transfer from [facility] for SOB iso leukocytosis to 233 c/f acute leukemia. Pt intubated and on pressors in setting of possible pneumonia. Hematology consulted for leukocytosis, suspect underlying CLL with reactive lymphocytosis 2/2 sepsis.     Labs at admission: , Hgb 13.4, PLT 96, 96% lymphocytes, 2% neutrophils.   On peripheral smear (4/16/24): heterogeneous population of lymphocytes, smudge cells present, rare blasts, rare target cells, polychromasia, giant platelets present.     PLAN:  # Leukocytosis  - Determined not to perform leukophoresis, discussed with blood bank (Dr. Valerio) and Dr. Cuevas. Given rare blasts and heterogeneous population of lymphocytes no indication for leukophoresis.  - Please get CT c/a/p with IV contrast to assess for LAD and hepatosplenomegaly   - Please send serum viscosity  - Infectious work up and management per primary team  - Sent flow cytometry, molecular, and FISH on peripheral blood (gave forms and tubes to ED). Will expedite pathology.   - Please trend TLS labs (uric acid, LDH, CMP, Mg, Phos) and CBC w/ diff and retic daily  - If uric acid > 8, start allopurinol and give 3 mg IV rasburicase, and if uric acid > 12 give 6 mg rasburicase  - Maintain active T&S   - Hematology will continue to follow. Will set up appointment at [facility] upon hospital discharge. Please advise when patient is ready to be discharged.    NOTE INCOMPLETE UNTIL ATTENDING SIGNS    ***************************************************************  Lynne King, PGY4  Fellow Hematology/Oncology  pager: [phone]   Available on [employer] Teams  After 5pm or on weekends please contact  to page on-call fellow   ***************************************************************   69F h/o CVA (bedbound at baseline), COPD, CHF, HTN presenting as transfer from [facility] for SOB iso leukocytosis to 233 c/f acute leukemia. Pt intubated and on pressors in setting of possible pneumonia. Hematology consulted for leukocytosis, suspect underlying CLL with reactive lymphocytosis 2/2 sepsis.     Labs at admission: , Hgb 13.4, PLT 96, 96% lymphocytes, 2% neutrophils.   On peripheral smear (4/16/24): heterogeneous population of lymphocytes, smudge cells present, rare blasts, rare target cells, polychromasia, giant platelets present, reactive lymphocytes     PLAN:  # Leukocytosis  - Determined not to perform leukophoresis, discussed with blood bank (Dr. Valerio) and Dr. Cuevas. Given rare blasts and heterogeneous population of lymphocytes no indication for leukophoresis.  - Please get CT c/a/p with IV contrast to assess for LAD and hepatosplenomegaly   - Please order EBV, CMV and Hgb electrophoresis  - Please send serum viscosity  - Infectious work up and management per primary team  - Sent flow cytometry, molecular, and FISH on peripheral blood (gave forms and tubes to ED). Will expedite pathology.   - Please trend TLS labs (uric acid, LDH, CMP, Mg, Phos) and CBC w/ diff and retic daily  - If uric acid > 8, start allopurinol and give 3 mg IV rasburicase, and if uric acid > 12 give 6 mg rasburicase  - Maintain active T&S   - Hematology will continue to follow. Will set up appointment at [facility] upon hospital discharge. Please advise when patient is ready to be discharged.    NOTE INCOMPLETE UNTIL ATTENDING SIGNS    ***************************************************************  Lynne King, PGY4  Fellow Hematology/Oncology  pager: [phone]   Available on [employer] Teams  After 5pm or on weekends please contact  to page on-call fellow   ***************************************************************

## 2024-04-17 LAB
ALBUMIN SERPL ELPH-MCNC: 2.9 G/DL — LOW (ref 3.3–5)
ALBUMIN SERPL ELPH-MCNC: 2.9 G/DL — LOW (ref 3.3–5)
ALP SERPL-CCNC: 63 U/L — SIGNIFICANT CHANGE UP (ref 40–120)
ALP SERPL-CCNC: 68 U/L — SIGNIFICANT CHANGE UP (ref 40–120)
ALT FLD-CCNC: 14 U/L — SIGNIFICANT CHANGE UP (ref 10–45)
ALT FLD-CCNC: 15 U/L — SIGNIFICANT CHANGE UP (ref 10–45)
ANION GAP SERPL CALC-SCNC: 13 MMOL/L — SIGNIFICANT CHANGE UP (ref 5–17)
ANION GAP SERPL CALC-SCNC: 14 MMOL/L — SIGNIFICANT CHANGE UP (ref 5–17)
APTT BLD: 30 SEC — SIGNIFICANT CHANGE UP (ref 24.5–35.6)
AST SERPL-CCNC: 34 U/L — SIGNIFICANT CHANGE UP (ref 10–40)
AST SERPL-CCNC: 40 U/L — SIGNIFICANT CHANGE UP (ref 10–40)
BASE EXCESS BLDV CALC-SCNC: -0.1 MMOL/L — SIGNIFICANT CHANGE UP (ref -2–3)
BASOPHILS # BLD AUTO: 0 K/UL — SIGNIFICANT CHANGE UP (ref 0–0.2)
BASOPHILS # BLD AUTO: 0.1 K/UL — SIGNIFICANT CHANGE UP (ref 0–0.2)
BASOPHILS NFR BLD AUTO: 0 % — SIGNIFICANT CHANGE UP (ref 0–2)
BASOPHILS NFR BLD AUTO: 0 % — SIGNIFICANT CHANGE UP (ref 0–2)
BILIRUB SERPL-MCNC: 0.8 MG/DL — SIGNIFICANT CHANGE UP (ref 0.2–1.2)
BILIRUB SERPL-MCNC: 1 MG/DL — SIGNIFICANT CHANGE UP (ref 0.2–1.2)
BUN SERPL-MCNC: 32 MG/DL — HIGH (ref 7–23)
BUN SERPL-MCNC: 40 MG/DL — HIGH (ref 7–23)
CA-I SERPL-SCNC: 1.2 MMOL/L — SIGNIFICANT CHANGE UP (ref 1.15–1.33)
CALCIUM SERPL-MCNC: 9.2 MG/DL — SIGNIFICANT CHANGE UP (ref 8.4–10.5)
CALCIUM SERPL-MCNC: 9.4 MG/DL — SIGNIFICANT CHANGE UP (ref 8.4–10.5)
CHLORIDE BLDV-SCNC: 96 MMOL/L — SIGNIFICANT CHANGE UP (ref 96–108)
CHLORIDE SERPL-SCNC: 95 MMOL/L — LOW (ref 96–108)
CHLORIDE SERPL-SCNC: 97 MMOL/L — SIGNIFICANT CHANGE UP (ref 96–108)
CMV DNA CSF QL NAA+PROBE: SIGNIFICANT CHANGE UP IU/ML
CMV DNA SPEC NAA+PROBE-LOG#: SIGNIFICANT CHANGE UP LOG10IU/ML
CO2 BLDV-SCNC: 30 MMOL/L — HIGH (ref 22–26)
CO2 SERPL-SCNC: 22 MMOL/L — SIGNIFICANT CHANGE UP (ref 22–31)
CO2 SERPL-SCNC: 25 MMOL/L — SIGNIFICANT CHANGE UP (ref 22–31)
CREAT SERPL-MCNC: 0.59 MG/DL — SIGNIFICANT CHANGE UP (ref 0.5–1.3)
CREAT SERPL-MCNC: 0.69 MG/DL — SIGNIFICANT CHANGE UP (ref 0.5–1.3)
CULTURE RESULTS: SIGNIFICANT CHANGE UP
EGFR: 94 ML/MIN/1.73M2 — SIGNIFICANT CHANGE UP
EGFR: 98 ML/MIN/1.73M2 — SIGNIFICANT CHANGE UP
EOSINOPHIL # BLD AUTO: 0 K/UL — SIGNIFICANT CHANGE UP (ref 0–0.5)
EOSINOPHIL # BLD AUTO: 0 K/UL — SIGNIFICANT CHANGE UP (ref 0–0.5)
EOSINOPHIL NFR BLD AUTO: 0 % — SIGNIFICANT CHANGE UP (ref 0–6)
EOSINOPHIL NFR BLD AUTO: 0 % — SIGNIFICANT CHANGE UP (ref 0–6)
GAS PNL BLDA: SIGNIFICANT CHANGE UP
GAS PNL BLDV: 129 MMOL/L — LOW (ref 136–145)
GAS PNL BLDV: SIGNIFICANT CHANGE UP
GLUCOSE BLDC GLUCOMTR-MCNC: 126 MG/DL — HIGH (ref 70–99)
GLUCOSE BLDC GLUCOMTR-MCNC: 127 MG/DL — HIGH (ref 70–99)
GLUCOSE BLDC GLUCOMTR-MCNC: 129 MG/DL — HIGH (ref 70–99)
GLUCOSE BLDC GLUCOMTR-MCNC: 205 MG/DL — HIGH (ref 70–99)
GLUCOSE BLDV-MCNC: 182 MG/DL — HIGH (ref 70–99)
GLUCOSE SERPL-MCNC: 117 MG/DL — HIGH (ref 70–99)
GLUCOSE SERPL-MCNC: 191 MG/DL — HIGH (ref 70–99)
GRAM STN FLD: ABNORMAL
HAPTOGLOB SERPL-MCNC: 216 MG/DL — HIGH (ref 34–200)
HCO3 BLDV-SCNC: 28 MMOL/L — SIGNIFICANT CHANGE UP (ref 22–29)
HCT VFR BLD CALC: 33.6 % — LOW (ref 34.5–45)
HCT VFR BLD CALC: 34.8 % — SIGNIFICANT CHANGE UP (ref 34.5–45)
HCT VFR BLDA CALC: 38 % — SIGNIFICANT CHANGE UP (ref 34.5–46.5)
HCV AB S/CO SERPL IA: 0.04 S/CO — SIGNIFICANT CHANGE UP (ref 0–0.99)
HCV AB SERPL-IMP: SIGNIFICANT CHANGE UP
HGB BLD CALC-MCNC: 12.5 G/DL — SIGNIFICANT CHANGE UP (ref 11.7–16.1)
HGB BLD-MCNC: 11 G/DL — LOW (ref 11.5–15.5)
HGB BLD-MCNC: 11.3 G/DL — LOW (ref 11.5–15.5)
HGB S BLD QL: NEGATIVE — SIGNIFICANT CHANGE UP
HOROWITZ INDEX BLDV+IHG-RTO: 40 — SIGNIFICANT CHANGE UP
IMM GRANULOCYTES NFR BLD AUTO: 0.5 % — SIGNIFICANT CHANGE UP (ref 0–0.9)
INR BLD: 1.36 RATIO — HIGH (ref 0.85–1.18)
LACTATE BLDV-MCNC: 1.5 MMOL/L — SIGNIFICANT CHANGE UP (ref 0.5–2)
LDH SERPL L TO P-CCNC: 243 U/L — HIGH (ref 50–242)
LDH SERPL L TO P-CCNC: 263 U/L — HIGH (ref 50–242)
LEGIONELLA AG UR QL: NEGATIVE — SIGNIFICANT CHANGE UP
LYMPHOCYTES # BLD AUTO: 205.17 K/UL — HIGH (ref 1–3.3)
LYMPHOCYTES # BLD AUTO: 218.29 K/UL — HIGH (ref 1–3.3)
LYMPHOCYTES # BLD AUTO: 91 % — HIGH (ref 13–44)
LYMPHOCYTES # BLD AUTO: 92.1 % — HIGH (ref 13–44)
LYMPHOCYTES # SPEC AUTO: 7 % — HIGH (ref 0–0)
MAGNESIUM SERPL-MCNC: 2.9 MG/DL — HIGH (ref 1.6–2.6)
MAGNESIUM SERPL-MCNC: 3 MG/DL — HIGH (ref 1.6–2.6)
MANUAL SMEAR VERIFICATION: SIGNIFICANT CHANGE UP
MCHC RBC-ENTMCNC: 27.8 PG — SIGNIFICANT CHANGE UP (ref 27–34)
MCHC RBC-ENTMCNC: 27.8 PG — SIGNIFICANT CHANGE UP (ref 27–34)
MCHC RBC-ENTMCNC: 32.5 GM/DL — SIGNIFICANT CHANGE UP (ref 32–36)
MCHC RBC-ENTMCNC: 32.7 GM/DL — SIGNIFICANT CHANGE UP (ref 32–36)
MCV RBC AUTO: 84.8 FL — SIGNIFICANT CHANGE UP (ref 80–100)
MCV RBC AUTO: 85.5 FL — SIGNIFICANT CHANGE UP (ref 80–100)
MONOCYTES # BLD AUTO: 2.25 K/UL — HIGH (ref 0–0.9)
MONOCYTES # BLD AUTO: 9.25 K/UL — HIGH (ref 0–0.9)
MONOCYTES NFR BLD AUTO: 1 % — LOW (ref 2–14)
MONOCYTES NFR BLD AUTO: 3.9 % — SIGNIFICANT CHANGE UP (ref 2–14)
MRSA PCR RESULT.: SIGNIFICANT CHANGE UP
NEUTROPHILS # BLD AUTO: 2.25 K/UL — SIGNIFICANT CHANGE UP (ref 1.8–7.4)
NEUTROPHILS # BLD AUTO: 8.38 K/UL — HIGH (ref 1.8–7.4)
NEUTROPHILS NFR BLD AUTO: 1 % — LOW (ref 43–77)
NEUTROPHILS NFR BLD AUTO: 3.5 % — LOW (ref 43–77)
NRBC # BLD: 0 /100 WBCS — SIGNIFICANT CHANGE UP (ref 0–0)
NRBC # BLD: 0 /100 WBCS — SIGNIFICANT CHANGE UP (ref 0–0)
PCO2 BLDV: 61 MMHG — HIGH (ref 39–42)
PH BLDV: 7.27 — LOW (ref 7.32–7.43)
PHOSPHATE SERPL-MCNC: 4.4 MG/DL — SIGNIFICANT CHANGE UP (ref 2.5–4.5)
PHOSPHATE SERPL-MCNC: 6.5 MG/DL — HIGH (ref 2.5–4.5)
PLAT MORPH BLD: NORMAL — SIGNIFICANT CHANGE UP
PLATELET # BLD AUTO: 78 K/UL — LOW (ref 150–400)
PLATELET # BLD AUTO: 78 K/UL — LOW (ref 150–400)
PO2 BLDV: 36 MMHG — SIGNIFICANT CHANGE UP (ref 25–45)
POTASSIUM BLDV-SCNC: 4.4 MMOL/L — SIGNIFICANT CHANGE UP (ref 3.5–5.1)
POTASSIUM SERPL-MCNC: 3.4 MMOL/L — LOW (ref 3.5–5.3)
POTASSIUM SERPL-MCNC: 4.5 MMOL/L — SIGNIFICANT CHANGE UP (ref 3.5–5.3)
POTASSIUM SERPL-SCNC: 3.4 MMOL/L — LOW (ref 3.5–5.3)
POTASSIUM SERPL-SCNC: 4.5 MMOL/L — SIGNIFICANT CHANGE UP (ref 3.5–5.3)
PROT SERPL-MCNC: 6.3 G/DL — SIGNIFICANT CHANGE UP (ref 6–8.3)
PROT SERPL-MCNC: 6.5 G/DL — SIGNIFICANT CHANGE UP (ref 6–8.3)
PROTHROM AB SERPL-ACNC: 14.2 SEC — HIGH (ref 9.5–13)
RBC # BLD: 3.96 M/UL — SIGNIFICANT CHANGE UP (ref 3.8–5.2)
RBC # BLD: 4.07 M/UL — SIGNIFICANT CHANGE UP (ref 3.8–5.2)
RBC # BLD: 4.07 M/UL — SIGNIFICANT CHANGE UP (ref 3.8–5.2)
RBC # FLD: 16.1 % — HIGH (ref 10.3–14.5)
RBC # FLD: 16.1 % — HIGH (ref 10.3–14.5)
RBC BLD AUTO: SIGNIFICANT CHANGE UP
RETICS #: 54.1 K/UL — SIGNIFICANT CHANGE UP (ref 25–125)
RETICS/RBC NFR: 1.3 % — SIGNIFICANT CHANGE UP (ref 0.5–2.5)
S AUREUS DNA NOSE QL NAA+PROBE: DETECTED
SAO2 % BLDV: 53.5 % — LOW (ref 67–88)
SODIUM SERPL-SCNC: 131 MMOL/L — LOW (ref 135–145)
SODIUM SERPL-SCNC: 135 MMOL/L — SIGNIFICANT CHANGE UP (ref 135–145)
SOLUBILITY: NEGATIVE — SIGNIFICANT CHANGE UP
SPECIMEN SOURCE: SIGNIFICANT CHANGE UP
SPECIMEN SOURCE: SIGNIFICANT CHANGE UP
TROPONIN T, HIGH SENSITIVITY RESULT: 62 NG/L — HIGH (ref 0–51)
TROPONIN T, HIGH SENSITIVITY RESULT: 75 NG/L — HIGH (ref 0–51)
URATE SERPL-MCNC: 4.8 MG/DL — SIGNIFICANT CHANGE UP (ref 2.5–7)
URATE SERPL-MCNC: 5.2 MG/DL — SIGNIFICANT CHANGE UP (ref 2.5–7)
WBC # BLD: 225.46 K/UL — CRITICAL HIGH (ref 3.8–10.5)
WBC # BLD: 237.12 K/UL — CRITICAL HIGH (ref 3.8–10.5)
WBC # FLD AUTO: 225.46 K/UL — CRITICAL HIGH (ref 3.8–10.5)
WBC # FLD AUTO: 237.12 K/UL — CRITICAL HIGH (ref 3.8–10.5)

## 2024-04-17 PROCEDURE — 99291 CRITICAL CARE FIRST HOUR: CPT | Mod: GC,25

## 2024-04-17 PROCEDURE — 76604 US EXAM CHEST: CPT | Mod: 26,GC

## 2024-04-17 PROCEDURE — 93308 TTE F-UP OR LMTD: CPT | Mod: 26,GC

## 2024-04-17 PROCEDURE — 93306 TTE W/DOPPLER COMPLETE: CPT | Mod: 26

## 2024-04-17 RX ORDER — IPRATROPIUM/ALBUTEROL SULFATE 18-103MCG
3 AEROSOL WITH ADAPTER (GRAM) INHALATION EVERY 6 HOURS
Refills: 0 | Status: DISCONTINUED | OUTPATIENT
Start: 2024-04-17 | End: 2024-04-23

## 2024-04-17 RX ORDER — POTASSIUM CHLORIDE 20 MEQ
10 PACKET (EA) ORAL
Refills: 0 | Status: COMPLETED | OUTPATIENT
Start: 2024-04-17 | End: 2024-04-17

## 2024-04-17 RX ORDER — NYSTATIN CREAM 100000 [USP'U]/G
1 CREAM TOPICAL
Refills: 0 | Status: DISCONTINUED | OUTPATIENT
Start: 2024-04-17 | End: 2024-06-11

## 2024-04-17 RX ORDER — MUPIROCIN 20 MG/G
1 OINTMENT TOPICAL
Refills: 0 | Status: COMPLETED | OUTPATIENT
Start: 2024-04-17 | End: 2024-04-22

## 2024-04-17 RX ORDER — ACETAMINOPHEN 500 MG
650 TABLET ORAL ONCE
Refills: 0 | Status: COMPLETED | OUTPATIENT
Start: 2024-04-17 | End: 2024-04-17

## 2024-04-17 RX ORDER — CEFTRIAXONE 500 MG/1
2000 INJECTION, POWDER, FOR SOLUTION INTRAMUSCULAR; INTRAVENOUS EVERY 24 HOURS
Refills: 0 | Status: COMPLETED | OUTPATIENT
Start: 2024-04-17 | End: 2024-04-23

## 2024-04-17 RX ADMIN — NYSTATIN CREAM 1 APPLICATION(S): 100000 CREAM TOPICAL at 06:01

## 2024-04-17 RX ADMIN — Medication 250 MILLIGRAM(S): at 05:09

## 2024-04-17 RX ADMIN — Medication 650 MILLIGRAM(S): at 23:00

## 2024-04-17 RX ADMIN — PROPOFOL 6.6 MICROGRAM(S)/KG/MIN: 10 INJECTION, EMULSION INTRAVENOUS at 06:01

## 2024-04-17 RX ADMIN — Medication 2: at 05:09

## 2024-04-17 RX ADMIN — ENOXAPARIN SODIUM 40 MILLIGRAM(S): 100 INJECTION SUBCUTANEOUS at 13:51

## 2024-04-17 RX ADMIN — CHLORHEXIDINE GLUCONATE 1 APPLICATION(S): 213 SOLUTION TOPICAL at 05:08

## 2024-04-17 RX ADMIN — SENNA PLUS 10 MILLILITER(S): 8.6 TABLET ORAL at 21:12

## 2024-04-17 RX ADMIN — Medication 10.3 MICROGRAM(S)/KG/MIN: at 05:07

## 2024-04-17 RX ADMIN — Medication 100 MILLIEQUIVALENT(S): at 13:50

## 2024-04-17 RX ADMIN — POLYETHYLENE GLYCOL 3350 17 GRAM(S): 17 POWDER, FOR SOLUTION ORAL at 05:07

## 2024-04-17 RX ADMIN — Medication 100 MILLIEQUIVALENT(S): at 16:28

## 2024-04-17 RX ADMIN — CEFTRIAXONE 100 MILLIGRAM(S): 500 INJECTION, POWDER, FOR SOLUTION INTRAMUSCULAR; INTRAVENOUS at 11:47

## 2024-04-17 RX ADMIN — NYSTATIN CREAM 1 APPLICATION(S): 100000 CREAM TOPICAL at 21:12

## 2024-04-17 RX ADMIN — PROPOFOL 6.6 MICROGRAM(S)/KG/MIN: 10 INJECTION, EMULSION INTRAVENOUS at 21:12

## 2024-04-17 RX ADMIN — Medication 10.3 MICROGRAM(S)/KG/MIN: at 21:12

## 2024-04-17 RX ADMIN — POLYETHYLENE GLYCOL 3350 17 GRAM(S): 17 POWDER, FOR SOLUTION ORAL at 17:32

## 2024-04-17 RX ADMIN — MUPIROCIN 1 APPLICATION(S): 20 OINTMENT TOPICAL at 17:31

## 2024-04-17 RX ADMIN — CHLORHEXIDINE GLUCONATE 15 MILLILITER(S): 213 SOLUTION TOPICAL at 17:32

## 2024-04-17 RX ADMIN — PROPOFOL 6.6 MICROGRAM(S)/KG/MIN: 10 INJECTION, EMULSION INTRAVENOUS at 05:07

## 2024-04-17 RX ADMIN — Medication 100 MILLIEQUIVALENT(S): at 14:56

## 2024-04-17 RX ADMIN — CHLORHEXIDINE GLUCONATE 15 MILLILITER(S): 213 SOLUTION TOPICAL at 05:07

## 2024-04-17 RX ADMIN — Medication 50 MILLIGRAM(S): at 05:07

## 2024-04-17 RX ADMIN — PIPERACILLIN AND TAZOBACTAM 25 GRAM(S): 4; .5 INJECTION, POWDER, LYOPHILIZED, FOR SOLUTION INTRAVENOUS at 06:01

## 2024-04-17 RX ADMIN — PROPOFOL 6.6 MICROGRAM(S)/KG/MIN: 10 INJECTION, EMULSION INTRAVENOUS at 17:32

## 2024-04-17 NOTE — DIETITIAN INITIAL EVALUATION ADULT - PERTINENT LABORATORY DATA
04-17    131<L>  |  95<L>  |  40<H>  ----------------------------<  191<H>  4.5   |  22  |  0.69    Ca    9.2      17 Apr 2024 00:19  Phos  6.5     04-17  Mg     2.9     04-17    TPro  6.3  /  Alb  2.9<L>  /  TBili  1.0  /  DBili  x   /  AST  40  /  ALT  14  /  AlkPhos  68  04-17  POCT Blood Glucose.: 205 mg/dL (04-17-24 @ 05:02)

## 2024-04-17 NOTE — PROGRESS NOTE ADULT - ASSESSMENT
69F h/o CVA (bedbound at baseline), COPD, CHF, HTN presenting as transfer from [facility] for SOB iso leukocytosis to 233 c/f acute leukemia. Pt intubated and on pressors, transferred to MICU for further management and possible leukophoresis.    Neuro  #Encephalopathy   -p/w lethargy potentially from leukostasis vs sepsis  -CTH showing old infarct, no acute process  -currently sedated on propofol while intubated      CV  #Hemodynamics  -hypotensive likely iso vasoplegia from sedatives and sepsis  -volume down on POCUS, will start with 1L LR bolus  -currently on levo, ween as tolerated      #Chest Pain  -demand vs leukostasis vs non cardiac   -reported JOSEF in II, III, aVF at [facility]  -trops peaked  -cards recs appreciated    #CHF  -unclear hx but elevated pro-BNP to 2600s  -f/u TTE    Resp  #Acute hypoxic respiratory failure  -pna vs leukostasis   -intubated: 460/18/30/5  -s/p thora draining 1500cc fluid 4/16; c/w exudative effusion  -MRSA neg  -pleural fluid growing gr + cocci pairs; BCx growing strep pneumo  -will treat pna with vanc, zosyn, azithro; deescalate pending cx results  -azithro dc'd as qtc 580, dc vanc*    GI  #Diet: TF    /Renal  -no active issues    Heme/Onc  #Leukocytosis  - on presentation  -heme c/s appreciated  -no plan for leukophoresis at this time given mature lymphocytes  -follow TLS labs    #DVT ppx: lovenox    ID  #PNA  -f/u cultures, legionella  -cover with vanc, zosyn, azithro and deescalate pending results     Endo  -ISS q6h while NPO    Ethics: Full Code 69F h/o CVA (bedbound at baseline), COPD, CHF, HTN presenting as transfer from [facility] for SOB iso leukocytosis to 233 c/f acute leukemia. Pt intubated and on pressors, transferred to MICU for further management and possible leukophoresis.    Neuro  #Encephalopathy   -p/w lethargy potentially from leukostasis vs sepsis  -CTH showing old infarct, no acute process  -currently sedated on propofol while intubated      CV  #Hemodynamics  -hypotensive likely iso vasoplegia from sedatives and sepsis  -volume down on POCUS, will start with 1L LR bolus  -currently on levo, ween as tolerated      #Chest Pain  -demand vs leukostasis vs non cardiac   -reported JOSEF in II, III, aVF at [facility] --> repeat EKG without ST changes or Q waves  -trops peaked  -cards recs appreciated    #CHF  -unclear hx but elevated pro-BNP to 2600s  -TTE: EF 54%, no significant abnormalities     Resp  #Acute hypoxic respiratory failure  -pna vs leukostasis   -intubated: 460/18/40/5  -s/p thora draining 1500cc fluid 4/16; c/w exudative effusion  -MRSA neg  -pleural fluid growing gr + cocci pairs; BCx growing strep pneumo  -was on vanc/zosyn/azithro (4/16)-->deescalated to CTX (4/17 - )    GI  #Diet: TF    /Renal  -no active issues    Heme/Onc  #Leukocytosis  #CLL  - on presentation  -heme c/s appreciated  -no plan for leukophoresis at this time given mature lymphocytes  -follow TLS labs    #DVT ppx: lovenox    ID  #PNA  -BCx + strep pneumo; fluid culture + gr + cocci  -was on vanc/zosyn/azithro (4/16)-->deescalated to CTX (4/17 - )    Endo  -ISS q6h while NPO    Ethics: Full Code

## 2024-04-17 NOTE — DIETITIAN INITIAL EVALUATION ADULT - NS FNS DIET ORDER
Diet, NPO with Tube Feed:   Tube Feeding Modality: Orogastric  Jevity 1.2 Emre (JEVITY1.2RTH)  Total Volume for 24 Hours (mL): 1080  Continuous  Starting Tube Feed Rate {mL per Hour}: 10  Increase Tube Feed Rate by (mL): 20  Until Goal Tube Feed Rate (mL per Hour): 60  Tube Feed Duration (in Hours): 18  Tube Feed Start Time: 11:00 (04-16-24 @ 14:08)

## 2024-04-17 NOTE — ADVANCED PRACTICE NURSE CONSULT - RECOMMEDATIONS
Impression   right heel deep tissue injury, present on admission.  bilateral sacrum/buttock deep tissue injury, present on admission.   bilateral breast ITD (intertriginous dermatitis)   fecal incontinence   bilateral groin/jennifer rectal incontinence associated dermatitis     Recommendations   1. Bilateral  , sacral / buttocks  deep tissue injury      jennifer rectal/ bilateral groin  incontinence dermatitis     Topical therapy- sacral/bilateral buttocks- cleanse w/incontinent cleanser, pat dry & apply TRAID   twice daily & prn soiling . Monitor  for changes .  2. Right deep tissue injury( A).  cleans  with normal saline pat dry and apply no sting barrier film ( Cavilon ) daily and monitor for changes .  (B)Elevate heels; apply Complete Cair air fluidized boots; ensure that the soles of the feet are not resting on the foot board of the bed.  3  Incontinent management - incontinent cleanser, pads,  jennifer care  BID  4. Maintain on an alternating air with low air loss surface   5. Turn & reposition every 2 hr; Use positioning pillow to turn and reposition, soft pillow between bony prominences; continue measures to decrease friction/shear/pressure.  6. Nutrition optimization.  7.  Place waffle cushion when out of bed to chair .   8. bilateral breast ITD (intertriginous dermatitis) cleanse skin and pat dry thoroughly then apply nystatin  powder as proscribe by covering provider   plan of care reviewed with covering staff RN Impression   right heel deep tissue injury, present on admission.  bilateral sacrum/buttock deep tissue injury, present on admission.   bilateral breast ITD (intertriginous dermatitis)   fecal incontinence   jennifer rectal incontinence associated dermatitis     Recommendations   1. Bilateral  , sacral / buttocks  deep tissue injury      jennifer rectal  incontinence dermatitis     Topical therapy- sacral/bilateral buttocks- cleanse w/incontinent cleanser, pat dry & apply TRAID   twice daily & prn soiling . Monitor  for changes .  2. Right deep tissue injury( A).  cleans  with normal saline pat dry and apply no sting barrier film ( Cavilon ) daily and monitor for changes .  (B)Elevate heels; apply Complete Cair air fluidized boots; ensure that the soles of the feet are not resting on the foot board of the bed.  3  Incontinent management - incontinent cleanser, pads,  jennifer care  BID  4. Maintain on an alternating air with low air loss surface   5. Turn & reposition every 2 hr; Use positioning pillow to turn and reposition, soft pillow between bony prominences; continue measures to decrease friction/shear/pressure.  6. Nutrition optimization.  7.  Place waffle cushion when out of bed to chair .   8. bilateral breast ITD (intertriginous dermatitis) cleanse skin and pat dry thoroughly then apply nystatin  powder as proscribe by covering provider   plan of care reviewed with covering staff RN

## 2024-04-17 NOTE — CHART NOTE - NSCHARTNOTEFT_GEN_A_CORE
: Devan Hewitt PGY-1, Jeffery Cates MD, Berto Villa MD    INDICATION: Shock, Acute respiratory failure    PROCEDURE:  [x] LIMITED ECHO  [x] LIMITED CHEST  [ ] LIMITED RETROPERITONEAL  [ ] LIMITED ABDOMINAL  [ ] LIMITED DVT  [ ] NEEDLE GUIDANCE VASCULAR  [ ] NEEDLE GUIDANCE THORACENTESIS  [ ] NEEDLE GUIDANCE PARACENTESIS  [ ] NEEDLE GUIDANCE PERICARDIOCENTESIS  [ ] OTHER    FINDINGS:  Lungs: Re-demonstrated LLL consolidation with dynamic air bronchograms and associated pleural effusion.   Heart: Normal LVSF. LV > RV. No pericardial effusion. IVC 1.1.    INTERPRETATION:  Re-demonstrated pneumonia with dynamic air bronchograms with associated pleural effusion   No cardiac limitations.    Images uploaded on [employer] : Devan Hewitt PGY-1, Jeffery Cates MD, Berto Villa MD    INDICATION: Shock, Acute respiratory failure    PROCEDURE:  [x] LIMITED ECHO  [x] LIMITED CHEST  [ ] LIMITED RETROPERITONEAL  [ ] LIMITED ABDOMINAL  [ ] LIMITED DVT  [ ] NEEDLE GUIDANCE VASCULAR  [ ] NEEDLE GUIDANCE THORACENTESIS  [ ] NEEDLE GUIDANCE PARACENTESIS  [ ] NEEDLE GUIDANCE PERICARDIOCENTESIS  [ ] OTHER    FINDINGS:  Lungs: Re-demonstrated LLL consolidation with dynamic air bronchograms and associated pleural effusion.   Heart: Normal LVSF. LV > RV. No pericardial effusion. IVC 1.1.    INTERPRETATION:  Re-demonstrated pneumonia with dynamic air bronchograms with associated pleural effusion   No cardiac limitations.    Images uploaded on [employer]    I have assisted and supervised entire procedure.     Berto Villa MD

## 2024-04-17 NOTE — DIETITIAN INITIAL EVALUATION ADULT - ORAL INTAKE PTA/DIET HISTORY
Unknown how pt was eating PTA. Unknown if pt followed therapeutic diet. Unknown if pt with chewing/swallowing issues. Unknown if pt took oral nutrition supplements or micronutrients. NKFA.

## 2024-04-17 NOTE — ADVANCED PRACTICE NURSE CONSULT - ASSESSMENT
arrived on unit, patient was found lying in a low air loss pressure redistribution support surface style bed.  patient  is unable to turn independently and staff assistance x 2was provided, indwelling urethral cathter present. fecal incontinence documented in patient chart.   Nataly rectal/bilateral groin  area with erythema and indurated consistent with incontinence dermatitis.  bilateral  sacrum /buttocks non-blanchable deep red and maroon discoloration and hyperpigmentation approximately 10 cm x 10 cm x 0 cm, consistent with a  deep tissue injury, present on admission.  right  heel  skin is boggy and there is non- blanchable deep  red discoloration  noted to measure approximately 2cm x 2cm x 0cm, consistent  this deep tissue injury, present on admission.   bilateral under  breast moist with scattered Satalite lesion ITD (intertriginous dermatitis)   plan of care reviewed with staff RN COVERING PATIENT.  arrived on unit, patient was found lying in a low air loss pressure redistribution support surface style bed.  patient  is unable to turn independently and staff assistance x 2was provided, indwelling urethral cathter present. fecal incontinence documented in patient chart.   Nataly rectal area with erythema and indurated consistent with incontinence dermatitis.  bilateral  sacrum /buttocks non-blanchable deep red and maroon discoloration and hyperpigmentation approximately 10 cm x 10 cm x 0 cm, consistent with a  deep tissue injury, present on admission.  right  heel  skin is boggy and there is non- blanchable deep  red discoloration  noted to measure approximately 2cm x 2cm x 0cm, consistent  this deep tissue injury, present on admission.   bilateral under  breast moist with scattered Satalite lesion ITD (intertriginous dermatitis)   plan of care reviewed with staff RN COVERING PATIENT.

## 2024-04-17 NOTE — DIETITIAN INITIAL EVALUATION ADULT - REASON INDICATOR FOR ASSESSMENT
Consult for tube feeding, pressure injury stage 2 or >, MST Score 2 or >  Source: Medical record and Interdisciplinary Rounds (pt intubated and sedated)

## 2024-04-17 NOTE — PROGRESS NOTE ADULT - ATTENDING COMMENTS
1. Acute hypoxemic respiratory failure due to LLL pneumonia in patient with CLL.  Adequately oxygenating on FIO2 30%. Continue current AC vent settings..  Pocus with moderate L pleural effusion.  Test results reveal complicated pleural effusion with gram + cocci. Likely strep. See ID below..  2. ID. POCUS reveals LLL consolidation with dynamic air bronchograms. Blood cx with strep pneumonia. Pleural fluid likely strep pneumonia. Change antibiotics to Ceftriaxone.  Continue chest tube drainage. Pocus without collections   on L side where chest tube placed. No need for MIST. Chest tube off suction.  3. Hypotension from hypovolemia. POCUS with very  IVC  1.1  Increase tube feeds. Consider additional fluid. and effacement of LV cavity. Titrated Levophed for [id number].  4.Heme: WBC> 200k. Small cells . No blast cells. Pt with CLL. No need for leukopheresis.  5.Cardiac.  minimal ST elevation inferior  leads  troponin decreasing.  6.DVT prophylaxis: Lovenox  7. GOC: Full code

## 2024-04-17 NOTE — DIETITIAN INITIAL EVALUATION ADULT - OTHER CALCULATIONS
Fluid needs deferred to provider. The modified [employer] equation (BISHOP) 2010 equation was used to calculator resting energy expenditure: 1941 kcals

## 2024-04-17 NOTE — DIETITIAN INITIAL EVALUATION ADULT - ENTERAL
Glucerna 1.5 at 10 mL/hr increasing only as tolerated and electrolytes WNL to goal rate 70 mL/hr x18 hours to provide total volume 1260 mL, 1890 kcals, 104 gm protein, and 956 mL free water. Meets 17 kcals/kG based on dosing wt 111 kg and 1.6 gm protein/kG based on IBW 63.5 kg. Trend phosphorus.

## 2024-04-17 NOTE — PROGRESS NOTE ADULT - SUBJECTIVE AND OBJECTIVE BOX
Patient is a 69y old  Female who presents with a chief complaint of Transfer for leukophoresis (16 Apr 2024 12:59)      24 hour events: ***    REVIEW OF SYSTEMS:  Constitutional: [ ] fevers [ ] chills [ ] weight loss [ ] weight gain  HEENT: [ ] dry eyes [ ] eye irritation [ ] postnasal drip [ ] nasal congestion  CV: [ ] chest pain [ ] orthopnea [ ] palpitations [ ] murmur  Resp: [ ] cough [ ] shortness of breath [ ] dyspnea [ ] wheezing [ ] sputum [ ] hemoptysis  GI: [ ] nausea [ ] vomiting [ ] diarrhea [ ] constipation [ ] abd pain [ ] dysphagia   : [ ] dysuria [ ] nocturia [ ] hematuria [ ] increased urinary frequency  Musculoskeletal: [ ] back pain [ ] myalgias [ ] arthralgias [ ] fracture  Skin: [ ] rash [ ] itch  Neurological: [ ] headache [ ] dizziness [ ] syncope [ ] weakness [ ] numbness  Psychiatric: [ ] anxiety [ ] depression  Endocrine: [ ] diabetes [ ] thyroid problem  Hematologic/Lymphatic: [ ] anemia [ ] bleeding problem  Allergic/Immunologic: [ ] itchy eyes [ ] nasal discharge [ ] hives [ ] angioedema  [ ] All other systems negative  [ ] Unable to assess ROS because ________    OBJECTIVE:  ICU Vital Signs Last 24 Hrs  T(C): 38.3 (17 Apr 2024 06:00), Max: 40.3 (16 Apr 2024 13:01)  T(F): 100.9 (17 Apr 2024 06:00), Max: 104.5 (16 Apr 2024 13:01)  HR: 97 (17 Apr 2024 07:00) (87 - 145)  BP: 101/52 (17 Apr 2024 07:00) (72/50 - 159/81)  BP(mean): 75 (17 Apr 2024 07:00) (58 - 102)  ABP: --  ABP(mean): --  RR: 19 (17 Apr 2024 07:00) (14 - 46)  SpO2: 98% (17 Apr 2024 07:00) (90% - 100%)    O2 Parameters below as of 17 Apr 2024 00:40  Patient On (Oxygen Delivery Method): ventilator, rate down to 18          Mode: AC/ CMV (Assist Control/ Continuous Mandatory Ventilation), RR (machine): 18, TV (machine): 460, FiO2: 40, PEEP: 5, ITime: 1, MAP: 9, PIP: 28    04-16 @ 07:01  -  04-17 @ 07:00  --------------------------------------------------------  IN: 3973.2 mL / OUT: 2790 mL / NET: 1183.2 mL      CAPILLARY BLOOD GLUCOSE      POCT Blood Glucose.: 205 mg/dL (17 Apr 2024 05:02)      PHYSICAL EXAM:  GENERAL: NAD, lying in bed comfortably  HEAD:  Atraumatic, Normocephalic  EYES: EOMI, PERRLA, conjunctiva and sclera clear  ENT: Moist mucous membranes  NECK: Supple, No JVD  CHEST/LUNG: Clear to auscultation bilaterally; No rales, rhonchi, wheezing, or rubs. Unlabored respirations  HEART: Regular rate and rhythm; No murmurs, rubs, or gallops  ABDOMEN: Bowel sounds present; Soft, Nontender, Nondistended. No hepatomegally  EXTREMITIES:  2+ Peripheral Pulses, brisk capillary refill. No clubbing, cyanosis, or edema  NERVOUS SYSTEM:  Alert & Oriented X3, speech clear. No deficits   MSK: FROM all 4 extremities, full and equal strength  SKIN: No rashes or lesions      LINES:    HOSPITAL MEDICATIONS:  MEDICATIONS  (STANDING):  chlorhexidine 0.12% Liquid 15 milliLiter(s) Oral Mucosa every 12 hours  chlorhexidine 2% Cloths 1 Application(s) Topical <User Schedule>  dextrose 50% Injectable 25 Gram(s) IV Push once  dextrose 50% Injectable 12.5 Gram(s) IV Push once  enoxaparin Injectable 40 milliGRAM(s) SubCutaneous every 24 hours  glucagon  Injectable 1 milliGRAM(s) IntraMuscular once  hydrocortisone sodium succinate Injectable 50 milliGRAM(s) IV Push every 6 hours  insulin lispro (ADMELOG) corrective regimen sliding scale   SubCutaneous every 6 hours  norepinephrine Infusion 0.05 MICROgram(s)/kG/Min (10.3 mL/Hr) IV Continuous <Continuous>  piperacillin/tazobactam IVPB.. 3.375 Gram(s) IV Intermittent every 8 hours  polyethylene glycol 3350 17 Gram(s) Oral every 12 hours  propofol Infusion 10 MICROgram(s)/kG/Min (6.6 mL/Hr) IV Continuous <Continuous>  senna Syrup 10 milliLiter(s) Oral at bedtime  vancomycin  IVPB 1750 milliGRAM(s) IV Intermittent every 12 hours    MEDICATIONS  (PRN):  albuterol/ipratropium for Nebulization 3 milliLiter(s) Nebulizer every 6 hours PRN Shortness of Breath and/or Wheezing  nystatin Powder 1 Application(s) Topical two times a day PRN skin irritation      LABS:                        11.3   237.12 )-----------( 78       ( 17 Apr 2024 00:19 )             34.8     Hgb Trend: 11.3<--, 12.0<--, 13.3<--, 13.4<--  04-17    131<L>  |  95<L>  |  40<H>  ----------------------------<  191<H>  4.5   |  22  |  0.69    Ca    9.2      17 Apr 2024 00:19  Phos  6.5     04-17  Mg     2.9     04-17    TPro  6.3  /  Alb  2.9<L>  /  TBili  1.0  /  DBili  x   /  AST  40  /  ALT  14  /  AlkPhos  68  04-17    Creatinine Trend: 0.69<--, 0.74<--, 0.79<--, 0.75<--, 0.84<--  PT/INR - ( 17 Apr 2024 00:20 )   PT: 14.2 sec;   INR: 1.36 ratio         PTT - ( 17 Apr 2024 00:20 )  PTT:30.0 sec  Urinalysis Basic - ( 17 Apr 2024 00:19 )    Color: x / Appearance: x / SG: x / pH: x  Gluc: 191 mg/dL / Ketone: x  / Bili: x / Urobili: x   Blood: x / Protein: x / Nitrite: x   Leuk Esterase: x / RBC: x / WBC x   Sq Epi: x / Non Sq Epi: x / Bacteria: x      Arterial Blood Gas:  04-17 @ 01:48  7.34/48/70/26/96.1/-0.3  ABG lactate: --  Arterial Blood Gas:  04-17 @ 00:15  7.28/54/71/25/94.4/-2.1  ABG lactate: --  Arterial Blood Gas:  04-16 @ 13:23  7.41/43/75/27/96.7/2.3  ABG lactate: --  Arterial Blood Gas:  04-16 @ 12:15  7.43/41/71/27/95.7/2.6  ABG lactate: --    Venous Blood Gas:  04-17 @ 03:11  7.27/61/36/28/53.5  VBG Lactate: 1.5  Venous Blood Gas:  04-16 @ 15:37  7.35/50/45/28/76.2  VBG Lactate: 1.1  Venous Blood Gas:  04-16 @ 10:46  7.32/56/49/29/80.2  VBG Lactate: 2.0  Venous Blood Gas:  04-16 @ 05:19  7.38/55/52/32/78.6  VBG Lactate: 1.60       Patient is a 69y old  Female who presents with a chief complaint of Transfer for leukophoresis (16 Apr 2024 12:59)      24 hour events: Chito fisher'fran overnight iso prolonged QTc to 590.     REVIEW OF SYSTEMS:  Constitutional: [ ] fevers [ ] chills [ ] weight loss [ ] weight gain  HEENT: [ ] dry eyes [ ] eye irritation [ ] postnasal drip [ ] nasal congestion  CV: [ ] chest pain [ ] orthopnea [ ] palpitations [ ] murmur  Resp: [ ] cough [ ] shortness of breath [ ] dyspnea [ ] wheezing [ ] sputum [ ] hemoptysis  GI: [ ] nausea [ ] vomiting [ ] diarrhea [ ] constipation [ ] abd pain [ ] dysphagia   : [ ] dysuria [ ] nocturia [ ] hematuria [ ] increased urinary frequency  Musculoskeletal: [ ] back pain [ ] myalgias [ ] arthralgias [ ] fracture  Skin: [ ] rash [ ] itch  Neurological: [ ] headache [ ] dizziness [ ] syncope [ ] weakness [ ] numbness  Psychiatric: [ ] anxiety [ ] depression  Endocrine: [ ] diabetes [ ] thyroid problem  Hematologic/Lymphatic: [ ] anemia [ ] bleeding problem  Allergic/Immunologic: [ ] itchy eyes [ ] nasal discharge [ ] hives [ ] angioedema  [ ] All other systems negative  [ x] Unable to assess ROS because sedated    OBJECTIVE:  ICU Vital Signs Last 24 Hrs  T(C): 38.3 (17 Apr 2024 06:00), Max: 40.3 (16 Apr 2024 13:01)  T(F): 100.9 (17 Apr 2024 06:00), Max: 104.5 (16 Apr 2024 13:01)  HR: 97 (17 Apr 2024 07:00) (87 - 145)  BP: 101/52 (17 Apr 2024 07:00) (72/50 - 159/81)  BP(mean): 75 (17 Apr 2024 07:00) (58 - 102)  ABP: --  ABP(mean): --  RR: 19 (17 Apr 2024 07:00) (14 - 46)  SpO2: 98% (17 Apr 2024 07:00) (90% - 100%)    O2 Parameters below as of 17 Apr 2024 00:40  Patient On (Oxygen Delivery Method): ventilator, rate down to 18          Mode: AC/ CMV (Assist Control/ Continuous Mandatory Ventilation), RR (machine): 18, TV (machine): 460, FiO2: 40, PEEP: 5, ITime: 1, MAP: 9, PIP: 28    04-16 @ 07:01  -  04-17 @ 07:00  --------------------------------------------------------  IN: 3973.2 mL / OUT: 2790 mL / NET: 1183.2 mL      CAPILLARY BLOOD GLUCOSE      POCT Blood Glucose.: 205 mg/dL (17 Apr 2024 05:02)      PHYSICAL EXAM:  GENERAL: NAD, sedated  HEAD:  Atraumatic, Normocephalic  CHEST/LUNG: mechanical breath sounds; b/l rhonchi  HEART: Regular rate and rhythm; No murmurs, rubs, or gallops  ABDOMEN: Bowel sounds present; Soft, Nontender, Nondistended. No hepatomegally  EXTREMITIES:  2+ Peripheral Pulses, brisk capillary refill. No clubbing, cyanosis, or edema      LINES:    HOSPITAL MEDICATIONS:  MEDICATIONS  (STANDING):  chlorhexidine 0.12% Liquid 15 milliLiter(s) Oral Mucosa every 12 hours  chlorhexidine 2% Cloths 1 Application(s) Topical <User Schedule>  dextrose 50% Injectable 25 Gram(s) IV Push once  dextrose 50% Injectable 12.5 Gram(s) IV Push once  enoxaparin Injectable 40 milliGRAM(s) SubCutaneous every 24 hours  glucagon  Injectable 1 milliGRAM(s) IntraMuscular once  hydrocortisone sodium succinate Injectable 50 milliGRAM(s) IV Push every 6 hours  insulin lispro (ADMELOG) corrective regimen sliding scale   SubCutaneous every 6 hours  norepinephrine Infusion 0.05 MICROgram(s)/kG/Min (10.3 mL/Hr) IV Continuous <Continuous>  piperacillin/tazobactam IVPB.. 3.375 Gram(s) IV Intermittent every 8 hours  polyethylene glycol 3350 17 Gram(s) Oral every 12 hours  propofol Infusion 10 MICROgram(s)/kG/Min (6.6 mL/Hr) IV Continuous <Continuous>  senna Syrup 10 milliLiter(s) Oral at bedtime  vancomycin  IVPB 1750 milliGRAM(s) IV Intermittent every 12 hours    MEDICATIONS  (PRN):  albuterol/ipratropium for Nebulization 3 milliLiter(s) Nebulizer every 6 hours PRN Shortness of Breath and/or Wheezing  nystatin Powder 1 Application(s) Topical two times a day PRN skin irritation      LABS:                        11.3   237.12 )-----------( 78       ( 17 Apr 2024 00:19 )             34.8     Hgb Trend: 11.3<--, 12.0<--, 13.3<--, 13.4<--  04-17    131<L>  |  95<L>  |  40<H>  ----------------------------<  191<H>  4.5   |  22  |  0.69    Ca    9.2      17 Apr 2024 00:19  Phos  6.5     04-17  Mg     2.9     04-17    TPro  6.3  /  Alb  2.9<L>  /  TBili  1.0  /  DBili  x   /  AST  40  /  ALT  14  /  AlkPhos  68  04-17    Creatinine Trend: 0.69<--, 0.74<--, 0.79<--, 0.75<--, 0.84<--  PT/INR - ( 17 Apr 2024 00:20 )   PT: 14.2 sec;   INR: 1.36 ratio         PTT - ( 17 Apr 2024 00:20 )  PTT:30.0 sec  Urinalysis Basic - ( 17 Apr 2024 00:19 )    Color: x / Appearance: x / SG: x / pH: x  Gluc: 191 mg/dL / Ketone: x  / Bili: x / Urobili: x   Blood: x / Protein: x / Nitrite: x   Leuk Esterase: x / RBC: x / WBC x   Sq Epi: x / Non Sq Epi: x / Bacteria: x      Arterial Blood Gas:  04-17 @ 01:48  7.34/48/70/26/96.1/-0.3  ABG lactate: --  Arterial Blood Gas:  04-17 @ 00:15  7.28/54/71/25/94.4/-2.1  ABG lactate: --  Arterial Blood Gas:  04-16 @ 13:23  7.41/43/75/27/96.7/2.3  ABG lactate: --  Arterial Blood Gas:  04-16 @ 12:15  7.43/41/71/27/95.7/2.6  ABG lactate: --    Venous Blood Gas:  04-17 @ 03:11  7.27/61/36/28/53.5  VBG Lactate: 1.5  Venous Blood Gas:  04-16 @ 15:37  7.35/50/45/28/76.2  VBG Lactate: 1.1  Venous Blood Gas:  04-16 @ 10:46  7.32/56/49/29/80.2  VBG Lactate: 2.0  Venous Blood Gas:  04-16 @ 05:19  7.38/55/52/32/78.6  VBG Lactate: 1.60

## 2024-04-17 NOTE — DIETITIAN INITIAL EVALUATION ADULT - PERTINENT MEDS FT
MEDICATIONS  (STANDING):  cefTRIAXone   IVPB 2000 milliGRAM(s) IV Intermittent every 24 hours  chlorhexidine 0.12% Liquid 15 milliLiter(s) Oral Mucosa every 12 hours  chlorhexidine 2% Cloths 1 Application(s) Topical <User Schedule>  dextrose 50% Injectable 25 Gram(s) IV Push once  dextrose 50% Injectable 12.5 Gram(s) IV Push once  enoxaparin Injectable 40 milliGRAM(s) SubCutaneous every 24 hours  glucagon  Injectable 1 milliGRAM(s) IntraMuscular once  insulin lispro (ADMELOG) corrective regimen sliding scale   SubCutaneous every 6 hours  mupirocin 2% Nasal 1 Application(s) Both Nostrils two times a day  norepinephrine Infusion 0.05 MICROgram(s)/kG/Min (10.3 mL/Hr) IV Continuous <Continuous>  polyethylene glycol 3350 17 Gram(s) Oral every 12 hours  propofol Infusion 10 MICROgram(s)/kG/Min (6.6 mL/Hr) IV Continuous <Continuous>  senna Syrup 10 milliLiter(s) Oral at bedtime    MEDICATIONS  (PRN):  albuterol/ipratropium for Nebulization 3 milliLiter(s) Nebulizer every 6 hours PRN Shortness of Breath and/or Wheezing  nystatin Powder 1 Application(s) Topical two times a day PRN skin irritation

## 2024-04-17 NOTE — DIETITIAN INITIAL EVALUATION ADULT - ADD RECOMMEND
Continue to trend labs, weight, skin integrity, and intake. As medically feasible, provide multivitamin and Vitamin C for wound healing.

## 2024-04-17 NOTE — ADVANCED PRACTICE NURSE CONSULT - REASON FOR CONSULT
Consult to assess patient skin initiated by RN sacrum deep tissue injury, present on admission.      History of Present Illness:  Reason for Admission: Transfer for leukophoresis  History of Present Illness:   69F h/o CVA (bedbound at baseline), COPD, CHF, HTN p/w acute shortness of breath to outside ED. Initially treated for acute pulmonary edema with sublingual nitro x 2, Lasix, and BiPAP. Pt was also found to be febrile to 103, so treated for PNA. BIPAP led to improvement in O2 to 89-90. Pt now transferred to [place] for white count of 233 and plan for possible leukophoresis. In ED, pt intubated iso respiratory tiring and decreasing mental status. Also started on levo for hypotn.

## 2024-04-17 NOTE — DIETITIAN INITIAL EVALUATION ADULT - NSFNSGIIOFT_GEN_A_CORE
04-16-24 @ 07:01  -  04-17-24 @ 07:00  --------------------------------------------------------  OUT:    Chest Tube (mL): 715 mL  Total OUT: 715 mL    Total NET: -565 mL

## 2024-04-17 NOTE — DIETITIAN INITIAL EVALUATION ADULT - OTHER INFO
Wt Hx:   Dosing wt 111 kG/244.7 lbs   UBW unknown.    Ht: 68 inches    IBW: 154 lbs   IBW%: 159%  Wt Hx per HIE (lbs): 250 (4/16/24 - day of admission)     Nutrition-Related Concerns:   - Intubated 4/16. On propofol at 9.99 mL/hr (264 kcals/day)  - Elevated BG; On sliding scale of insulin   - Elevated phosphorus noted   - Pleural effusion s/p chest tube placement  - Septic Shock. Pressor: norepinephrine at 0.08 micrograms/kG/min

## 2024-04-18 LAB
-  CEFTRIAXONE (MENINGITIDIS): SIGNIFICANT CHANGE UP
-  CEFTRIAXONE (NON-MENINGITIDIS): SIGNIFICANT CHANGE UP
-  CLINDAMYCIN: SIGNIFICANT CHANGE UP
-  ERYTHROMYCIN: SIGNIFICANT CHANGE UP
-  LEVOFLOXACIN: SIGNIFICANT CHANGE UP
-  PENICILLIN (MENINGITIDIS): SIGNIFICANT CHANGE UP
-  PENICILLIN (NON-MENINGITIDIS): SIGNIFICANT CHANGE UP
-  PENICILLIN (ORAL PENICILLIN V): SIGNIFICANT CHANGE UP
-  TETRACYCLINE: SIGNIFICANT CHANGE UP
-  TRIMETHOPRIM/SULFAMETHOXAZOLE: SIGNIFICANT CHANGE UP
-  VANCOMYCIN: SIGNIFICANT CHANGE UP
ALBUMIN SERPL ELPH-MCNC: 2.4 G/DL — LOW (ref 3.3–5)
ALBUMIN SERPL ELPH-MCNC: 2.7 G/DL — LOW (ref 3.3–5)
ALBUMIN SERPL ELPH-MCNC: 2.7 G/DL — LOW (ref 3.3–5)
ALP SERPL-CCNC: 62 U/L — SIGNIFICANT CHANGE UP (ref 40–120)
ALP SERPL-CCNC: 73 U/L — SIGNIFICANT CHANGE UP (ref 40–120)
ALP SERPL-CCNC: 78 U/L — SIGNIFICANT CHANGE UP (ref 40–120)
ALT FLD-CCNC: 35 U/L — SIGNIFICANT CHANGE UP (ref 10–45)
ALT FLD-CCNC: 49 U/L — HIGH (ref 10–45)
ALT FLD-CCNC: 51 U/L — HIGH (ref 10–45)
ANION GAP SERPL CALC-SCNC: 10 MMOL/L — SIGNIFICANT CHANGE UP (ref 5–17)
ANION GAP SERPL CALC-SCNC: 13 MMOL/L — SIGNIFICANT CHANGE UP (ref 5–17)
ANION GAP SERPL CALC-SCNC: 9 MMOL/L — SIGNIFICANT CHANGE UP (ref 5–17)
APTT BLD: 27.3 SEC — SIGNIFICANT CHANGE UP (ref 24.5–35.6)
AST SERPL-CCNC: 79 U/L — HIGH (ref 10–40)
AST SERPL-CCNC: 84 U/L — HIGH (ref 10–40)
AST SERPL-CCNC: 97 U/L — HIGH (ref 10–40)
BASE EXCESS BLDV CALC-SCNC: 5.7 MMOL/L — HIGH (ref -2–3)
BASOPHILS # BLD AUTO: 0.1 K/UL — SIGNIFICANT CHANGE UP (ref 0–0.2)
BASOPHILS NFR BLD AUTO: 0 % — SIGNIFICANT CHANGE UP (ref 0–2)
BCR/ABL BY RT - PCR QUANTITATIVE: SIGNIFICANT CHANGE UP
BILIRUB SERPL-MCNC: 0.6 MG/DL — SIGNIFICANT CHANGE UP (ref 0.2–1.2)
BUN SERPL-MCNC: 31 MG/DL — HIGH (ref 7–23)
BUN SERPL-MCNC: 31 MG/DL — HIGH (ref 7–23)
BUN SERPL-MCNC: 34 MG/DL — HIGH (ref 7–23)
CA-I SERPL-SCNC: 1.22 MMOL/L — SIGNIFICANT CHANGE UP (ref 1.15–1.33)
CALCIUM SERPL-MCNC: 9.3 MG/DL — SIGNIFICANT CHANGE UP (ref 8.4–10.5)
CALCIUM SERPL-MCNC: 9.5 MG/DL — SIGNIFICANT CHANGE UP (ref 8.4–10.5)
CALCIUM SERPL-MCNC: 9.6 MG/DL — SIGNIFICANT CHANGE UP (ref 8.4–10.5)
CHLORIDE BLDV-SCNC: 103 MMOL/L — SIGNIFICANT CHANGE UP (ref 96–108)
CHLORIDE SERPL-SCNC: 100 MMOL/L — SIGNIFICANT CHANGE UP (ref 96–108)
CHLORIDE SERPL-SCNC: 101 MMOL/L — SIGNIFICANT CHANGE UP (ref 96–108)
CHLORIDE SERPL-SCNC: 97 MMOL/L — SIGNIFICANT CHANGE UP (ref 96–108)
CO2 BLDV-SCNC: 33 MMOL/L — HIGH (ref 22–26)
CO2 SERPL-SCNC: 24 MMOL/L — SIGNIFICANT CHANGE UP (ref 22–31)
CO2 SERPL-SCNC: 26 MMOL/L — SIGNIFICANT CHANGE UP (ref 22–31)
CO2 SERPL-SCNC: 27 MMOL/L — SIGNIFICANT CHANGE UP (ref 22–31)
CREAT SERPL-MCNC: 0.41 MG/DL — LOW (ref 0.5–1.3)
CREAT SERPL-MCNC: 0.44 MG/DL — LOW (ref 0.5–1.3)
CREAT SERPL-MCNC: 0.52 MG/DL — SIGNIFICANT CHANGE UP (ref 0.5–1.3)
CULTURE RESULTS: ABNORMAL
CULTURE RESULTS: ABNORMAL
CULTURE RESULTS: SIGNIFICANT CHANGE UP
DNA PLOIDY SPEC FC-IMP: SIGNIFICANT CHANGE UP
EBV EA AB SER IA-ACNC: <5 U/ML — SIGNIFICANT CHANGE UP
EBV EA AB TITR SER IF: POSITIVE
EBV EA IGG SER-ACNC: NEGATIVE — SIGNIFICANT CHANGE UP
EBV NA IGG SER IA-ACNC: 447 U/ML — HIGH
EBV PATRN SPEC IB-IMP: SIGNIFICANT CHANGE UP
EBV VCA IGG AVIDITY SER QL IA: POSITIVE
EBV VCA IGM SER IA-ACNC: <10 U/ML — SIGNIFICANT CHANGE UP
EBV VCA IGM SER IA-ACNC: >750 U/ML — HIGH
EBV VCA IGM TITR FLD: NEGATIVE — SIGNIFICANT CHANGE UP
EGFR: 101 ML/MIN/1.73M2 — SIGNIFICANT CHANGE UP
EGFR: 105 ML/MIN/1.73M2 — SIGNIFICANT CHANGE UP
EGFR: 107 ML/MIN/1.73M2 — SIGNIFICANT CHANGE UP
EOSINOPHIL # BLD AUTO: 0 K/UL — SIGNIFICANT CHANGE UP (ref 0–0.5)
EOSINOPHIL NFR BLD AUTO: 0 % — SIGNIFICANT CHANGE UP (ref 0–6)
GAS PNL BLDV: 136 MMOL/L — SIGNIFICANT CHANGE UP (ref 136–145)
GAS PNL BLDV: SIGNIFICANT CHANGE UP
GAS PNL BLDV: SIGNIFICANT CHANGE UP
GLUCOSE BLDC GLUCOMTR-MCNC: 144 MG/DL — HIGH (ref 70–99)
GLUCOSE BLDC GLUCOMTR-MCNC: 156 MG/DL — HIGH (ref 70–99)
GLUCOSE BLDV-MCNC: 174 MG/DL — HIGH (ref 70–99)
GLUCOSE SERPL-MCNC: 111 MG/DL — HIGH (ref 70–99)
GLUCOSE SERPL-MCNC: 117 MG/DL — HIGH (ref 70–99)
GLUCOSE SERPL-MCNC: 119 MG/DL — HIGH (ref 70–99)
GRAM STN FLD: ABNORMAL
GRAM STN FLD: ABNORMAL
HCO3 BLDV-SCNC: 31 MMOL/L — HIGH (ref 22–29)
HCT VFR BLD CALC: 32.4 % — LOW (ref 34.5–45)
HCT VFR BLDA CALC: 32 % — LOW (ref 34.5–46.5)
HGB BLD CALC-MCNC: 10.7 G/DL — LOW (ref 11.7–16.1)
HGB BLD-MCNC: 10.8 G/DL — LOW (ref 11.5–15.5)
HLX FLT3 FINAL REPORT: SIGNIFICANT CHANGE UP
IGA FLD-MCNC: 113 MG/DL — SIGNIFICANT CHANGE UP (ref 84–499)
IGG FLD-MCNC: 535 MG/DL — LOW (ref 610–1660)
IGM SERPL-MCNC: 44 MG/DL — SIGNIFICANT CHANGE UP (ref 35–242)
IMM GRANULOCYTES NFR BLD AUTO: 0.5 % — SIGNIFICANT CHANGE UP (ref 0–0.9)
INR BLD: 1.11 RATIO — SIGNIFICANT CHANGE UP (ref 0.85–1.18)
KAPPA LC SER QL IFE: 14.14 MG/DL — HIGH (ref 0.33–1.94)
KAPPA/LAMBDA FREE LIGHT CHAIN RATIO, SERUM: 11.98 RATIO — HIGH (ref 0.26–1.65)
LACTATE BLDV-MCNC: 1.7 MMOL/L — SIGNIFICANT CHANGE UP (ref 0.5–2)
LAMBDA LC SER QL IFE: 1.18 MG/DL — SIGNIFICANT CHANGE UP (ref 0.57–2.63)
LDH SERPL L TO P-CCNC: 313 U/L — HIGH (ref 50–242)
LEGIONELLA AG UR QL: NEGATIVE — SIGNIFICANT CHANGE UP
LYMPHOCYTES # BLD AUTO: 207.72 K/UL — HIGH (ref 1–3.3)
LYMPHOCYTES # BLD AUTO: 91.4 % — HIGH (ref 13–44)
MAGNESIUM SERPL-MCNC: 3.1 MG/DL — HIGH (ref 1.6–2.6)
MAGNESIUM SERPL-MCNC: 3.1 MG/DL — HIGH (ref 1.6–2.6)
MCHC RBC-ENTMCNC: 28.3 PG — SIGNIFICANT CHANGE UP (ref 27–34)
MCHC RBC-ENTMCNC: 33.3 GM/DL — SIGNIFICANT CHANGE UP (ref 32–36)
MCV RBC AUTO: 84.8 FL — SIGNIFICANT CHANGE UP (ref 80–100)
METHOD TYPE: SIGNIFICANT CHANGE UP
MONOCYTES # BLD AUTO: 7.83 K/UL — HIGH (ref 0–0.9)
MONOCYTES NFR BLD AUTO: 3.4 % — SIGNIFICANT CHANGE UP (ref 2–14)
NEUTROPHILS # BLD AUTO: 10.57 K/UL — HIGH (ref 1.8–7.4)
NEUTROPHILS NFR BLD AUTO: 4.7 % — LOW (ref 43–77)
NRBC # BLD: 0 /100 WBCS — SIGNIFICANT CHANGE UP (ref 0–0)
ORGANISM # SPEC MICROSCOPIC CNT: ABNORMAL
ORGANISM # SPEC MICROSCOPIC CNT: ABNORMAL
ORGANISM # SPEC MICROSCOPIC CNT: SIGNIFICANT CHANGE UP
PCO2 BLDV: 48 MMHG — HIGH (ref 39–42)
PH BLDV: 7.42 — SIGNIFICANT CHANGE UP (ref 7.32–7.43)
PHOSPHATE SERPL-MCNC: 3.1 MG/DL — SIGNIFICANT CHANGE UP (ref 2.5–4.5)
PHOSPHATE SERPL-MCNC: 3.4 MG/DL — SIGNIFICANT CHANGE UP (ref 2.5–4.5)
PLATELET # BLD AUTO: 72 K/UL — LOW (ref 150–400)
PO2 BLDV: 44 MMHG — SIGNIFICANT CHANGE UP (ref 25–45)
POTASSIUM BLDV-SCNC: 3.6 MMOL/L — SIGNIFICANT CHANGE UP (ref 3.5–5.1)
POTASSIUM SERPL-MCNC: 4.6 MMOL/L — SIGNIFICANT CHANGE UP (ref 3.5–5.3)
POTASSIUM SERPL-MCNC: 4.8 MMOL/L — SIGNIFICANT CHANGE UP (ref 3.5–5.3)
POTASSIUM SERPL-MCNC: 5.7 MMOL/L — HIGH (ref 3.5–5.3)
POTASSIUM SERPL-SCNC: 4.6 MMOL/L — SIGNIFICANT CHANGE UP (ref 3.5–5.3)
POTASSIUM SERPL-SCNC: 4.8 MMOL/L — SIGNIFICANT CHANGE UP (ref 3.5–5.3)
POTASSIUM SERPL-SCNC: 5.7 MMOL/L — HIGH (ref 3.5–5.3)
PROT SERPL-MCNC: 6.1 G/DL — SIGNIFICANT CHANGE UP (ref 6–8.3)
PROT SERPL-MCNC: 6.3 G/DL — SIGNIFICANT CHANGE UP (ref 6–8.3)
PROT SERPL-MCNC: 6.5 G/DL — SIGNIFICANT CHANGE UP (ref 6–8.3)
PROTHROM AB SERPL-ACNC: 12.2 SEC — SIGNIFICANT CHANGE UP (ref 9.5–13)
RBC # BLD: 3.82 M/UL — SIGNIFICANT CHANGE UP (ref 3.8–5.2)
RBC # FLD: 16 % — HIGH (ref 10.3–14.5)
SAO2 % BLDV: 72 % — SIGNIFICANT CHANGE UP (ref 67–88)
SODIUM SERPL-SCNC: 134 MMOL/L — LOW (ref 135–145)
SODIUM SERPL-SCNC: 136 MMOL/L — SIGNIFICANT CHANGE UP (ref 135–145)
SODIUM SERPL-SCNC: 137 MMOL/L — SIGNIFICANT CHANGE UP (ref 135–145)
SPECIMEN SOURCE: SIGNIFICANT CHANGE UP
TM INTERPRETATION: SIGNIFICANT CHANGE UP
URATE SERPL-MCNC: 3.2 MG/DL — SIGNIFICANT CHANGE UP (ref 2.5–7)
WBC # BLD: 227.29 K/UL — CRITICAL HIGH (ref 3.8–10.5)
WBC # FLD AUTO: 227.29 K/UL — CRITICAL HIGH (ref 3.8–10.5)

## 2024-04-18 PROCEDURE — 71045 X-RAY EXAM CHEST 1 VIEW: CPT | Mod: 26

## 2024-04-18 PROCEDURE — 76604 US EXAM CHEST: CPT | Mod: 26

## 2024-04-18 PROCEDURE — 99291 CRITICAL CARE FIRST HOUR: CPT | Mod: GC

## 2024-04-18 RX ORDER — FENTANYL CITRATE 50 UG/ML
25 INJECTION INTRAVENOUS
Refills: 0 | Status: DISCONTINUED | OUTPATIENT
Start: 2024-04-18 | End: 2024-04-18

## 2024-04-18 RX ORDER — ENOXAPARIN SODIUM 100 MG/ML
40 INJECTION SUBCUTANEOUS EVERY 12 HOURS
Refills: 0 | Status: DISCONTINUED | OUTPATIENT
Start: 2024-04-18 | End: 2024-04-23

## 2024-04-18 RX ORDER — FENTANYL CITRATE 50 UG/ML
50 INJECTION INTRAVENOUS
Refills: 0 | Status: DISCONTINUED | OUTPATIENT
Start: 2024-04-18 | End: 2024-04-21

## 2024-04-18 RX ORDER — DEXMEDETOMIDINE HYDROCHLORIDE IN 0.9% SODIUM CHLORIDE 4 UG/ML
0.2 INJECTION INTRAVENOUS
Qty: 200 | Refills: 0 | Status: DISCONTINUED | OUTPATIENT
Start: 2024-04-18 | End: 2024-04-18

## 2024-04-18 RX ORDER — POTASSIUM CHLORIDE 20 MEQ
10 PACKET (EA) ORAL
Refills: 0 | Status: DISCONTINUED | OUTPATIENT
Start: 2024-04-18 | End: 2024-04-18

## 2024-04-18 RX ORDER — SODIUM CHLORIDE 9 MG/ML
1000 INJECTION, SOLUTION INTRAVENOUS
Refills: 0 | Status: DISCONTINUED | OUTPATIENT
Start: 2024-04-18 | End: 2024-04-20

## 2024-04-18 RX ORDER — DEXMEDETOMIDINE HYDROCHLORIDE IN 0.9% SODIUM CHLORIDE 4 UG/ML
0.2 INJECTION INTRAVENOUS
Qty: 200 | Refills: 0 | Status: DISCONTINUED | OUTPATIENT
Start: 2024-04-18 | End: 2024-04-19

## 2024-04-18 RX ORDER — DEXMEDETOMIDINE HYDROCHLORIDE IN 0.9% SODIUM CHLORIDE 4 UG/ML
0.05 INJECTION INTRAVENOUS
Qty: 200 | Refills: 0 | Status: DISCONTINUED | OUTPATIENT
Start: 2024-04-18 | End: 2024-04-18

## 2024-04-18 RX ORDER — ACETAMINOPHEN 500 MG
1000 TABLET ORAL ONCE
Refills: 0 | Status: COMPLETED | OUTPATIENT
Start: 2024-04-18 | End: 2024-04-18

## 2024-04-18 RX ORDER — FENTANYL CITRATE 50 UG/ML
50 INJECTION INTRAVENOUS ONCE
Refills: 0 | Status: DISCONTINUED | OUTPATIENT
Start: 2024-04-18 | End: 2024-04-18

## 2024-04-18 RX ADMIN — Medication 650 MILLIGRAM(S): at 00:00

## 2024-04-18 RX ADMIN — FENTANYL CITRATE 50 MICROGRAM(S): 50 INJECTION INTRAVENOUS at 21:15

## 2024-04-18 RX ADMIN — CHLORHEXIDINE GLUCONATE 15 MILLILITER(S): 213 SOLUTION TOPICAL at 05:04

## 2024-04-18 RX ADMIN — Medication 10.3 MICROGRAM(S)/KG/MIN: at 21:14

## 2024-04-18 RX ADMIN — ENOXAPARIN SODIUM 40 MILLIGRAM(S): 100 INJECTION SUBCUTANEOUS at 10:13

## 2024-04-18 RX ADMIN — POLYETHYLENE GLYCOL 3350 17 GRAM(S): 17 POWDER, FOR SOLUTION ORAL at 17:08

## 2024-04-18 RX ADMIN — SENNA PLUS 10 MILLILITER(S): 8.6 TABLET ORAL at 21:14

## 2024-04-18 RX ADMIN — CHLORHEXIDINE GLUCONATE 1 APPLICATION(S): 213 SOLUTION TOPICAL at 05:14

## 2024-04-18 RX ADMIN — PROPOFOL 6.6 MICROGRAM(S)/KG/MIN: 10 INJECTION, EMULSION INTRAVENOUS at 02:20

## 2024-04-18 RX ADMIN — Medication 1: at 17:08

## 2024-04-18 RX ADMIN — Medication 1000 MILLIGRAM(S): at 13:00

## 2024-04-18 RX ADMIN — CEFTRIAXONE 100 MILLIGRAM(S): 500 INJECTION, POWDER, FOR SOLUTION INTRAMUSCULAR; INTRAVENOUS at 10:51

## 2024-04-18 RX ADMIN — PROPOFOL 6.6 MICROGRAM(S)/KG/MIN: 10 INJECTION, EMULSION INTRAVENOUS at 21:14

## 2024-04-18 RX ADMIN — FENTANYL CITRATE 50 MICROGRAM(S): 50 INJECTION INTRAVENOUS at 21:00

## 2024-04-18 RX ADMIN — PROPOFOL 6.6 MICROGRAM(S)/KG/MIN: 10 INJECTION, EMULSION INTRAVENOUS at 17:08

## 2024-04-18 RX ADMIN — ENOXAPARIN SODIUM 40 MILLIGRAM(S): 100 INJECTION SUBCUTANEOUS at 17:08

## 2024-04-18 RX ADMIN — Medication 10.3 MICROGRAM(S)/KG/MIN: at 05:14

## 2024-04-18 RX ADMIN — Medication 400 MILLIGRAM(S): at 12:47

## 2024-04-18 RX ADMIN — MUPIROCIN 1 APPLICATION(S): 20 OINTMENT TOPICAL at 05:05

## 2024-04-18 RX ADMIN — SODIUM CHLORIDE 75 MILLILITER(S): 9 INJECTION, SOLUTION INTRAVENOUS at 22:54

## 2024-04-18 RX ADMIN — POLYETHYLENE GLYCOL 3350 17 GRAM(S): 17 POWDER, FOR SOLUTION ORAL at 05:05

## 2024-04-18 RX ADMIN — PROPOFOL 6.6 MICROGRAM(S)/KG/MIN: 10 INJECTION, EMULSION INTRAVENOUS at 12:38

## 2024-04-18 RX ADMIN — DEXMEDETOMIDINE HYDROCHLORIDE IN 0.9% SODIUM CHLORIDE 5.55 MICROGRAM(S)/KG/HR: 4 INJECTION INTRAVENOUS at 08:03

## 2024-04-18 RX ADMIN — MUPIROCIN 1 APPLICATION(S): 20 OINTMENT TOPICAL at 17:08

## 2024-04-18 RX ADMIN — CHLORHEXIDINE GLUCONATE 15 MILLILITER(S): 213 SOLUTION TOPICAL at 17:08

## 2024-04-18 NOTE — PROGRESS NOTE ADULT - ATTENDING COMMENTS
1. Acute hypoxemic respiratory failure due to LLL pneumonia in patient with CLL.  Adequately oxygenating on FIO2 30%. Continue current AC vent settings. Did not tolerate PS wean today.  2. Pocus with moderate L pleural effusion.  Empyema. Still with > 200mls out put Continue to monitor on water seal. Test results reveal complicated  2. ID. POCUS reveals LLL consolidation with dynamic air bronchograms. Blood cx with strep pneumonia. Pleural fluid likely strep pneumonia. Change antibiotics to Ceftriaxone.  Continue chest tube drainage. Pocus without collections   on L side where chest tube placed. No need for MIST. Chest tube off suction.  3. Hypotension from sepsis and sedation. Titrate Levophed for [id number].  4.Heme: WBC> 200k. Small cells . No blast cells. Pt with CLL. No need for leukopheresis.  5.Cardiac.  minimal ST elevation inferior  leads  troponin decreasing.  6.DVT prophylaxis: Lovenox  7. GOC: Full code

## 2024-04-18 NOTE — PROGRESS NOTE ADULT - SUBJECTIVE AND OBJECTIVE BOX
Patient is a 69y old  Female who presents with a chief complaint of Acute lymphoblastic leukemia (ALL) not having achieved remission     (17 Apr 2024 10:41)      24 hour events: ***    REVIEW OF SYSTEMS:  Constitutional: [ ] fevers [ ] chills [ ] weight loss [ ] weight gain  HEENT: [ ] dry eyes [ ] eye irritation [ ] postnasal drip [ ] nasal congestion  CV: [ ] chest pain [ ] orthopnea [ ] palpitations [ ] murmur  Resp: [ ] cough [ ] shortness of breath [ ] dyspnea [ ] wheezing [ ] sputum [ ] hemoptysis  GI: [ ] nausea [ ] vomiting [ ] diarrhea [ ] constipation [ ] abd pain [ ] dysphagia   : [ ] dysuria [ ] nocturia [ ] hematuria [ ] increased urinary frequency  Musculoskeletal: [ ] back pain [ ] myalgias [ ] arthralgias [ ] fracture  Skin: [ ] rash [ ] itch  Neurological: [ ] headache [ ] dizziness [ ] syncope [ ] weakness [ ] numbness  Psychiatric: [ ] anxiety [ ] depression  Endocrine: [ ] diabetes [ ] thyroid problem  Hematologic/Lymphatic: [ ] anemia [ ] bleeding problem  Allergic/Immunologic: [ ] itchy eyes [ ] nasal discharge [ ] hives [ ] angioedema  [ ] All other systems negative  [ ] Unable to assess ROS because ________    OBJECTIVE:  ICU Vital Signs Last 24 Hrs  T(C): 37.9 (18 Apr 2024 04:00), Max: 38.6 (18 Apr 2024 01:00)  T(F): 100.2 (18 Apr 2024 04:00), Max: 101.5 (18 Apr 2024 01:00)  HR: 79 (18 Apr 2024 07:00) (79 - 114)  BP: 94/50 (18 Apr 2024 07:00) (80/43 - 143/64)  BP(mean): 67 (18 Apr 2024 07:00) (55 - 92)  ABP: --  ABP(mean): --  RR: 18 (18 Apr 2024 07:00) (18 - 41)  SpO2: 98% (18 Apr 2024 07:00) (92% - 100%)    O2 Parameters below as of 17 Apr 2024 20:00  Patient On (Oxygen Delivery Method): ventilator          Mode: AC/ CMV (Assist Control/ Continuous Mandatory Ventilation), RR (machine): 18, TV (machine): 460, FiO2: 30, PEEP: 5, ITime: 1, MAP: 9, PIP: 25    04-17 @ 07:01  -  04-18 @ 07:00  --------------------------------------------------------  IN: 2228.1 mL / OUT: 2145 mL / NET: 83.1 mL      CAPILLARY BLOOD GLUCOSE      POCT Blood Glucose.: 144 mg/dL (18 Apr 2024 05:03)      PHYSICAL EXAM:  GENERAL: NAD, lying in bed comfortably  HEAD:  Atraumatic, Normocephalic  EYES: EOMI, PERRLA, conjunctiva and sclera clear  ENT: Moist mucous membranes  NECK: Supple, No JVD  CHEST/LUNG: Clear to auscultation bilaterally; No rales, rhonchi, wheezing, or rubs. Unlabored respirations  HEART: Regular rate and rhythm; No murmurs, rubs, or gallops  ABDOMEN: Bowel sounds present; Soft, Nontender, Nondistended. No hepatomegally  EXTREMITIES:  2+ Peripheral Pulses, brisk capillary refill. No clubbing, cyanosis, or edema  NERVOUS SYSTEM:  Alert & Oriented X3, speech clear. No deficits   MSK: FROM all 4 extremities, full and equal strength  SKIN: No rashes or lesions      LINES:    HOSPITAL MEDICATIONS:  MEDICATIONS  (STANDING):  cefTRIAXone   IVPB 2000 milliGRAM(s) IV Intermittent every 24 hours  chlorhexidine 0.12% Liquid 15 milliLiter(s) Oral Mucosa every 12 hours  chlorhexidine 2% Cloths 1 Application(s) Topical <User Schedule>  dextrose 50% Injectable 25 Gram(s) IV Push once  dextrose 50% Injectable 12.5 Gram(s) IV Push once  enoxaparin Injectable 40 milliGRAM(s) SubCutaneous every 24 hours  glucagon  Injectable 1 milliGRAM(s) IntraMuscular once  insulin lispro (ADMELOG) corrective regimen sliding scale   SubCutaneous every 6 hours  mupirocin 2% Nasal 1 Application(s) Both Nostrils two times a day  norepinephrine Infusion 0.05 MICROgram(s)/kG/Min (10.3 mL/Hr) IV Continuous <Continuous>  polyethylene glycol 3350 17 Gram(s) Oral every 12 hours  propofol Infusion 10 MICROgram(s)/kG/Min (6.6 mL/Hr) IV Continuous <Continuous>  senna Syrup 10 milliLiter(s) Oral at bedtime    MEDICATIONS  (PRN):  albuterol/ipratropium for Nebulization 3 milliLiter(s) Nebulizer every 6 hours PRN Shortness of Breath and/or Wheezing  nystatin Powder 1 Application(s) Topical two times a day PRN skin irritation      LABS:                        10.8   227.29 )-----------( 72       ( 18 Apr 2024 00:48 )             32.4     Hgb Trend: 10.8<--, 11.0<--, 11.3<--, 12.0<--, 13.3<--  04-18    134<L>  |  97  |  34<H>  ----------------------------<  117<H>  4.8   |  24  |  0.52    Ca    9.6      18 Apr 2024 00:48  Phos  3.4     04-18  Mg     3.1     04-18    TPro  6.5  /  Alb  2.4<L>  /  TBili  0.6  /  DBili  x   /  AST  84<H>  /  ALT  35  /  AlkPhos  78  04-18    Creatinine Trend: 0.52<--, 0.59<--, 0.69<--, 0.74<--, 0.79<--, 0.75<--  PT/INR - ( 18 Apr 2024 00:48 )   PT: 12.2 sec;   INR: 1.11 ratio         PTT - ( 18 Apr 2024 00:48 )  PTT:27.3 sec  Urinalysis Basic - ( 18 Apr 2024 00:48 )    Color: x / Appearance: x / SG: x / pH: x  Gluc: 117 mg/dL / Ketone: x  / Bili: x / Urobili: x   Blood: x / Protein: x / Nitrite: x   Leuk Esterase: x / RBC: x / WBC x   Sq Epi: x / Non Sq Epi: x / Bacteria: x      Arterial Blood Gas:  04-17 @ 23:56  7.48/41/67/30/95.3/6.4  ABG lactate: --  Arterial Blood Gas:  04-17 @ 01:48  7.34/48/70/26/96.1/-0.3  ABG lactate: --  Arterial Blood Gas:  04-17 @ 00:15  7.28/54/71/25/94.4/-2.1  ABG lactate: --  Arterial Blood Gas:  04-16 @ 13:23  7.41/43/75/27/96.7/2.3  ABG lactate: --  Arterial Blood Gas:  04-16 @ 12:15  7.43/41/71/27/95.7/2.6  ABG lactate: --    Venous Blood Gas:  04-17 @ 03:11  7.27/61/36/28/53.5  VBG Lactate: 1.5  Venous Blood Gas:  04-16 @ 15:37  7.35/50/45/28/76.2  VBG Lactate: 1.1  Venous Blood Gas:  04-16 @ 10:46  7.32/56/49/29/80.2  VBG Lactate: 2.0       Patient is a 69y old  Female who presents with a chief complaint of Acute lymphoblastic leukemia (ALL) not having achieved remission     (17 Apr 2024 10:41)      24 hour events: Tmax 101.5 overnight.    REVIEW OF SYSTEMS:  Constitutional: [ ] fevers [ ] chills [ ] weight loss [ ] weight gain  HEENT: [ ] dry eyes [ ] eye irritation [ ] postnasal drip [ ] nasal congestion  CV: [ ] chest pain [ ] orthopnea [ ] palpitations [ ] murmur  Resp: [ ] cough [ ] shortness of breath [ ] dyspnea [ ] wheezing [ ] sputum [ ] hemoptysis  GI: [ ] nausea [ ] vomiting [ ] diarrhea [ ] constipation [ ] abd pain [ ] dysphagia   : [ ] dysuria [ ] nocturia [ ] hematuria [ ] increased urinary frequency  Musculoskeletal: [ ] back pain [ ] myalgias [ ] arthralgias [ ] fracture  Skin: [ ] rash [ ] itch  Neurological: [ ] headache [ ] dizziness [ ] syncope [ ] weakness [ ] numbness  Psychiatric: [ ] anxiety [ ] depression  Endocrine: [ ] diabetes [ ] thyroid problem  Hematologic/Lymphatic: [ ] anemia [ ] bleeding problem  Allergic/Immunologic: [ ] itchy eyes [ ] nasal discharge [ ] hives [ ] angioedema  [ ] All other systems negative  [x ] Unable to assess ROS because sedation    OBJECTIVE:  ICU Vital Signs Last 24 Hrs  T(C): 37.9 (18 Apr 2024 04:00), Max: 38.6 (18 Apr 2024 01:00)  T(F): 100.2 (18 Apr 2024 04:00), Max: 101.5 (18 Apr 2024 01:00)  HR: 79 (18 Apr 2024 07:00) (79 - 114)  BP: 94/50 (18 Apr 2024 07:00) (80/43 - 143/64)  BP(mean): 67 (18 Apr 2024 07:00) (55 - 92)  ABP: --  ABP(mean): --  RR: 18 (18 Apr 2024 07:00) (18 - 41)  SpO2: 98% (18 Apr 2024 07:00) (92% - 100%)    O2 Parameters below as of 17 Apr 2024 20:00  Patient On (Oxygen Delivery Method): ventilator          Mode: AC/ CMV (Assist Control/ Continuous Mandatory Ventilation), RR (machine): 18, TV (machine): 460, FiO2: 30, PEEP: 5, ITime: 1, MAP: 9, PIP: 25    04-17 @ 07:01  -  04-18 @ 07:00  --------------------------------------------------------  IN: 2228.1 mL / OUT: 2145 mL / NET: 83.1 mL      CAPILLARY BLOOD GLUCOSE      POCT Blood Glucose.: 144 mg/dL (18 Apr 2024 05:03)      PHYSICAL EXAM:  GENERAL: NAD, opening eyes and moving extremities spontaneously   HEAD:  Atraumatic, Normocephalic  EYES: EOMI, conjunctiva and sclera clear  CHEST/LUNG: mechanical breath sounds b/l; No rales, rhonchi, wheezing, or rubs. Unlabored respirations  HEART: Regular rate and rhythm; No murmurs, rubs, or gallops  ABDOMEN: Bowel sounds present; Soft, Nontender, Nondistended. No hepatomegally  EXTREMITIES:  2+ Peripheral Pulses, brisk capillary refill. No clubbing, cyanosis, or edema    LINES:    HOSPITAL MEDICATIONS:  MEDICATIONS  (STANDING):  cefTRIAXone   IVPB 2000 milliGRAM(s) IV Intermittent every 24 hours  chlorhexidine 0.12% Liquid 15 milliLiter(s) Oral Mucosa every 12 hours  chlorhexidine 2% Cloths 1 Application(s) Topical <User Schedule>  dextrose 50% Injectable 25 Gram(s) IV Push once  dextrose 50% Injectable 12.5 Gram(s) IV Push once  enoxaparin Injectable 40 milliGRAM(s) SubCutaneous every 24 hours  glucagon  Injectable 1 milliGRAM(s) IntraMuscular once  insulin lispro (ADMELOG) corrective regimen sliding scale   SubCutaneous every 6 hours  mupirocin 2% Nasal 1 Application(s) Both Nostrils two times a day  norepinephrine Infusion 0.05 MICROgram(s)/kG/Min (10.3 mL/Hr) IV Continuous <Continuous>  polyethylene glycol 3350 17 Gram(s) Oral every 12 hours  propofol Infusion 10 MICROgram(s)/kG/Min (6.6 mL/Hr) IV Continuous <Continuous>  senna Syrup 10 milliLiter(s) Oral at bedtime    MEDICATIONS  (PRN):  albuterol/ipratropium for Nebulization 3 milliLiter(s) Nebulizer every 6 hours PRN Shortness of Breath and/or Wheezing  nystatin Powder 1 Application(s) Topical two times a day PRN skin irritation      LABS:                        10.8   227.29 )-----------( 72       ( 18 Apr 2024 00:48 )             32.4     Hgb Trend: 10.8<--, 11.0<--, 11.3<--, 12.0<--, 13.3<--  04-18    134<L>  |  97  |  34<H>  ----------------------------<  117<H>  4.8   |  24  |  0.52    Ca    9.6      18 Apr 2024 00:48  Phos  3.4     04-18  Mg     3.1     04-18    TPro  6.5  /  Alb  2.4<L>  /  TBili  0.6  /  DBili  x   /  AST  84<H>  /  ALT  35  /  AlkPhos  78  04-18    Creatinine Trend: 0.52<--, 0.59<--, 0.69<--, 0.74<--, 0.79<--, 0.75<--  PT/INR - ( 18 Apr 2024 00:48 )   PT: 12.2 sec;   INR: 1.11 ratio         PTT - ( 18 Apr 2024 00:48 )  PTT:27.3 sec  Urinalysis Basic - ( 18 Apr 2024 00:48 )    Color: x / Appearance: x / SG: x / pH: x  Gluc: 117 mg/dL / Ketone: x  / Bili: x / Urobili: x   Blood: x / Protein: x / Nitrite: x   Leuk Esterase: x / RBC: x / WBC x   Sq Epi: x / Non Sq Epi: x / Bacteria: x      Arterial Blood Gas:  04-17 @ 23:56  7.48/41/67/30/95.3/6.4  ABG lactate: --  Arterial Blood Gas:  04-17 @ 01:48  7.34/48/70/26/96.1/-0.3  ABG lactate: --  Arterial Blood Gas:  04-17 @ 00:15  7.28/54/71/25/94.4/-2.1  ABG lactate: --  Arterial Blood Gas:  04-16 @ 13:23  7.41/43/75/27/96.7/2.3  ABG lactate: --  Arterial Blood Gas:  04-16 @ 12:15  7.43/41/71/27/95.7/2.6  ABG lactate: --    Venous Blood Gas:  04-17 @ 03:11  7.27/61/36/28/53.5  VBG Lactate: 1.5  Venous Blood Gas:  04-16 @ 15:37  7.35/50/45/28/76.2  VBG Lactate: 1.1  Venous Blood Gas:  04-16 @ 10:46  7.32/56/49/29/80.2  VBG Lactate: 2.0

## 2024-04-18 NOTE — CHART NOTE - NSCHARTNOTEFT_GEN_A_CORE
: Melina CATES NP     INDICATION: Shock     PROCEDURE:  [x ] LIMITED ECHO  [x ] LIMITED CHEST  [ ] LIMITED RETROPERITONEAL  [x ] LIMITED ABDOMINAL  [ ] LIMITED DVT  [ ] NEEDLE GUIDANCE VASCULAR  [ ] NEEDLE GUIDANCE THORACENTESIS  [ ] NEEDLE GUIDANCE PARACENTESIS  [ ] NEEDLE GUIDANCE PERICARDIOCENTESIS  [ ] OTHER    FINDINGS:  Echo   RV smaller than LV   LV and RV appear to have normal systolic function   IVC 1.2 cm   no pericardial effusion seen     Chest   Right and left anterior chest wall with A line predominance   small consolidation with small effusion on right   Unable to assess due to patient position and dressing     Abd   Bladder is distended with anechoic fluid       INTERPRETATION:  Normal Cardiac size and function   Right pleural base with small consolidation and small PLEF   distended bladder

## 2024-04-18 NOTE — PROGRESS NOTE ADULT - ASSESSMENT
69F h/o CVA (bedbound at baseline), COPD, CHF, HTN presenting as transfer from [facility] for SOB iso leukocytosis to 233 c/f acute leukemia. Pt intubated and on pressors, transferred to MICU for further management and possible leukophoresis.    Neuro  #Encephalopathy   -p/w lethargy potentially from leukostasis vs sepsis  -CTH showing old infarct, no acute process  -currently sedated on propofol while intubated      CV  #Hemodynamics  -hypotensive likely iso vasoplegia from sedatives and sepsis  -volume down on POCUS, will start with 1L LR bolus  -currently on levo, ween as tolerated      #Chest Pain  -demand vs leukostasis vs non cardiac   -reported JOSEF in II, III, aVF at [facility] --> repeat EKG without ST changes or Q waves  -trops peaked  -cards recs appreciated    #CHF  -unclear hx but elevated pro-BNP to 2600s  -TTE: EF 54%, no significant abnormalities     Resp  #Acute hypoxic respiratory failure  -pna vs leukostasis   -intubated: 460/18/40/5  -s/p thora draining 1500cc fluid 4/16; c/w exudative effusion  -MRSA neg  -pleural fluid growing gr + cocci pairs; BCx growing strep pneumo  -was on vanc/zosyn/azithro (4/16)-->deescalated to CTX 2g qd (4/17 - )    GI  #Diet: TF    /Renal  -no active issues    Heme/Onc  #Leukocytosis  #CLL  - on presentation  -heme c/s appreciated  -no plan for leukophoresis at this time given mature lymphocytes  -follow TLS labs    #DVT ppx: lovenox    ID  #PNA  -BCx + strep pneumo; fluid culture + gr + cocci  -was on vanc/zosyn/azithro (4/16)-->deescalated to CTX (4/17 - )    Endo  -ISS q6h while NPO    Ethics: Full Code 69F h/o CVA (bedbound at baseline), COPD, CHF, HTN presenting as transfer from [facility] for SOB iso leukocytosis to 233 c/f acute leukemia. Pt intubated and on pressors, transferred to MICU for further management and possible leukophoresis.    Neuro  #Encephalopathy   -p/w lethargy potentially from leukostasis vs sepsis  -CTH showing old infarct, no acute process  -currently sedated on propofol/precedex while intubated      CV  #Hemodynamics  -hypotensive likely iso vasoplegia from sedatives and sepsis  -volume down on POCUS, will start with 1L LR bolus  -currently on levo, ween as tolerated      #Chest Pain  -demand vs leukostasis vs non cardiac   -reported JOSEF in II, III, aVF at [facility] --> repeat EKG without ST changes or Q waves  -trops peaked  -cards recs appreciated    #CHF  -unclear hx but elevated pro-BNP to 2600s  -TTE: EF 54%, no significant abnormalities     Resp  #Acute hypoxic respiratory failure  -pna vs leukostasis   -intubated: 460/18/40/5; ween off as tolerated  -s/p thora draining 1500cc fluid 4/16; c/w exudative effusion  -MRSA neg  -pleural fluid growing gr + cocci pairs; BCx growing strep pneumo  -was on vanc/zosyn/azithro (4/16)-->deescalated to CTX 2g qd (4/17 - )    GI  #Diet: TF    /Renal  -no active issues    Heme/Onc  #Leukocytosis  #CLL  - on presentation  -heme c/s appreciated  -no plan for leukophoresis at this time given mature lymphocytes  -follow TLS labs    #DVT ppx: lovenox    ID  #PNA  -BCx + strep pneumo; fluid culture + gr + cocci  -was on vanc/zosyn/azithro (4/16)-->deescalated to CTX (4/17 - )    Endo  -ISS q6h while NPO    Ethics: Full Code 69F h/o CVA (bedbound at baseline), COPD, CHF, HTN presenting as transfer from [facility] for SOB iso leukocytosis to 233 c/f acute leukemia. Pt intubated and on pressors, transferred to MICU for further management and possible leukophoresis.    Neuro  #Encephalopathy   -p/w lethargy potentially from leukostasis vs sepsis  -CTH showing old infarct, no acute process  -currently sedated on propofol with plan to switch to precedex while intubated      CV  #Hemodynamics  -hypotensive likely iso vasoplegia from sedatives and sepsis  -volume down on POCUS, will start with 1L LR bolus  -currently on levo, ween as tolerated      #Chest Pain  -demand vs leukostasis vs non cardiac   -reported JOSEF in II, III, aVF at [facility] --> repeat EKG without ST changes or Q waves  -trops peaked  -cards recs appreciated    #CHF  -unclear hx but elevated pro-BNP to 2600s  -TTE: EF 54%, no significant abnormalities     Resp  #Acute hypoxic respiratory failure  -pna vs leukostasis   -intubated: 460/18/40/5; ween off as tolerated  -s/p thora draining 1500cc fluid 4/16; c/w exudative effusion  -MRSA neg  -pleural fluid growing gr + cocci pairs; BCx growing strep pneumo  -was on vanc/zosyn/azithro (4/16)-->deescalated to CTX 2g qd (4/17 - )    GI  #Diet: TF    /Renal  -no active issues    Heme/Onc  #Leukocytosis  #CLL  - on presentation  -heme c/s appreciated  -no plan for leukophoresis at this time given mature lymphocytes  -follow TLS labs    #DVT ppx: lovenox    ID  #PNA  -BCx + strep pneumo; fluid culture + gr + cocci  -repeat BCx 4/18  -was on vanc/zosyn/azithro (4/16)-->deescalated to CTX (4/17 - )    Endo  -ISS q6h while NPO    Ethics: Full Code

## 2024-04-19 LAB
ALBUMIN SERPL ELPH-MCNC: 2.8 G/DL — LOW (ref 3.3–5)
ALP SERPL-CCNC: 71 U/L — SIGNIFICANT CHANGE UP (ref 40–120)
ALT FLD-CCNC: 54 U/L — HIGH (ref 10–45)
ANION GAP SERPL CALC-SCNC: 11 MMOL/L — SIGNIFICANT CHANGE UP (ref 5–17)
APTT BLD: 29.4 SEC — SIGNIFICANT CHANGE UP (ref 24.5–35.6)
AST SERPL-CCNC: 76 U/L — HIGH (ref 10–40)
BASOPHILS # BLD AUTO: 0.15 K/UL — SIGNIFICANT CHANGE UP (ref 0–0.2)
BASOPHILS NFR BLD AUTO: 0.1 % — SIGNIFICANT CHANGE UP (ref 0–2)
BILIRUB SERPL-MCNC: 0.5 MG/DL — SIGNIFICANT CHANGE UP (ref 0.2–1.2)
BUN SERPL-MCNC: 24 MG/DL — HIGH (ref 7–23)
CALCIUM SERPL-MCNC: 9.2 MG/DL — SIGNIFICANT CHANGE UP (ref 8.4–10.5)
CHLORIDE SERPL-SCNC: 102 MMOL/L — SIGNIFICANT CHANGE UP (ref 96–108)
CO2 SERPL-SCNC: 25 MMOL/L — SIGNIFICANT CHANGE UP (ref 22–31)
CREAT SERPL-MCNC: 0.37 MG/DL — LOW (ref 0.5–1.3)
EGFR: 110 ML/MIN/1.73M2 — SIGNIFICANT CHANGE UP
EOSINOPHIL # BLD AUTO: 0.02 K/UL — SIGNIFICANT CHANGE UP (ref 0–0.5)
EOSINOPHIL NFR BLD AUTO: 0 % — SIGNIFICANT CHANGE UP (ref 0–6)
FIBRINOGEN AG PPP IA-MCNC: 880 MG/DL — HIGH (ref 233–496)
GAS PNL BLDA: SIGNIFICANT CHANGE UP
GLUCOSE BLDC GLUCOMTR-MCNC: 127 MG/DL — HIGH (ref 70–99)
GLUCOSE BLDC GLUCOMTR-MCNC: 142 MG/DL — HIGH (ref 70–99)
GLUCOSE BLDC GLUCOMTR-MCNC: 159 MG/DL — HIGH (ref 70–99)
GLUCOSE BLDC GLUCOMTR-MCNC: 186 MG/DL — HIGH (ref 70–99)
GLUCOSE SERPL-MCNC: 172 MG/DL — HIGH (ref 70–99)
HCT VFR BLD CALC: 32.4 % — LOW (ref 34.5–45)
HGB BLD-MCNC: 10.4 G/DL — LOW (ref 11.5–15.5)
IMM GRANULOCYTES NFR BLD AUTO: 0.4 % — SIGNIFICANT CHANGE UP (ref 0–0.9)
INR BLD: 1.19 RATIO — HIGH (ref 0.85–1.18)
LDH SERPL L TO P-CCNC: 229 U/L — SIGNIFICANT CHANGE UP (ref 50–242)
LYMPHOCYTES # BLD AUTO: 174.32 K/UL — HIGH (ref 1–3.3)
LYMPHOCYTES # BLD AUTO: 91.2 % — HIGH (ref 13–44)
MAGNESIUM SERPL-MCNC: 2.9 MG/DL — HIGH (ref 1.6–2.6)
MCHC RBC-ENTMCNC: 27.4 PG — SIGNIFICANT CHANGE UP (ref 27–34)
MCHC RBC-ENTMCNC: 32.1 GM/DL — SIGNIFICANT CHANGE UP (ref 32–36)
MCV RBC AUTO: 85.3 FL — SIGNIFICANT CHANGE UP (ref 80–100)
MONOCYTES # BLD AUTO: 4.76 K/UL — HIGH (ref 0–0.9)
MONOCYTES NFR BLD AUTO: 2.5 % — SIGNIFICANT CHANGE UP (ref 2–14)
NEUTROPHILS # BLD AUTO: 11.13 K/UL — HIGH (ref 1.8–7.4)
NEUTROPHILS NFR BLD AUTO: 5.8 % — LOW (ref 43–77)
NRBC # BLD: 0 /100 WBCS — SIGNIFICANT CHANGE UP (ref 0–0)
PHOSPHATE SERPL-MCNC: 2.5 MG/DL — SIGNIFICANT CHANGE UP (ref 2.5–4.5)
PLATELET # BLD AUTO: 66 K/UL — LOW (ref 150–400)
POTASSIUM SERPL-MCNC: 4.4 MMOL/L — SIGNIFICANT CHANGE UP (ref 3.5–5.3)
POTASSIUM SERPL-SCNC: 4.4 MMOL/L — SIGNIFICANT CHANGE UP (ref 3.5–5.3)
PROT SERPL-MCNC: 6.1 G/DL — SIGNIFICANT CHANGE UP (ref 6–8.3)
PROTHROM AB SERPL-ACNC: 12.4 SEC — SIGNIFICANT CHANGE UP (ref 9.5–13)
RBC # BLD: 3.8 M/UL — SIGNIFICANT CHANGE UP (ref 3.8–5.2)
RBC # FLD: 15.8 % — HIGH (ref 10.3–14.5)
SODIUM SERPL-SCNC: 138 MMOL/L — SIGNIFICANT CHANGE UP (ref 135–145)
URATE SERPL-MCNC: 2.3 MG/DL — LOW (ref 2.5–7)
WBC # BLD: 191.12 K/UL — CRITICAL HIGH (ref 3.8–10.5)
WBC # FLD AUTO: 191.12 K/UL — CRITICAL HIGH (ref 3.8–10.5)

## 2024-04-19 PROCEDURE — 71045 X-RAY EXAM CHEST 1 VIEW: CPT | Mod: 26

## 2024-04-19 PROCEDURE — 76604 US EXAM CHEST: CPT | Mod: 26,GC

## 2024-04-19 PROCEDURE — 99291 CRITICAL CARE FIRST HOUR: CPT | Mod: GC,25

## 2024-04-19 PROCEDURE — 99233 SBSQ HOSP IP/OBS HIGH 50: CPT | Mod: GC

## 2024-04-19 RX ORDER — SODIUM CHLORIDE 9 MG/ML
4 INJECTION INTRAMUSCULAR; INTRAVENOUS; SUBCUTANEOUS EVERY 6 HOURS
Refills: 0 | Status: DISCONTINUED | OUTPATIENT
Start: 2024-04-19 | End: 2024-04-23

## 2024-04-19 RX ORDER — ACETAMINOPHEN 500 MG
650 TABLET ORAL ONCE
Refills: 0 | Status: COMPLETED | OUTPATIENT
Start: 2024-04-19 | End: 2024-04-19

## 2024-04-19 RX ORDER — POTASSIUM PHOSPHATE, MONOBASIC POTASSIUM PHOSPHATE, DIBASIC 236; 224 MG/ML; MG/ML
15 INJECTION, SOLUTION INTRAVENOUS ONCE
Refills: 0 | Status: COMPLETED | OUTPATIENT
Start: 2024-04-19 | End: 2024-04-19

## 2024-04-19 RX ORDER — ACETAMINOPHEN 500 MG
1000 TABLET ORAL ONCE
Refills: 0 | Status: COMPLETED | OUTPATIENT
Start: 2024-04-19 | End: 2024-04-19

## 2024-04-19 RX ORDER — PANTOPRAZOLE SODIUM 20 MG/1
40 TABLET, DELAYED RELEASE ORAL DAILY
Refills: 0 | Status: DISCONTINUED | OUTPATIENT
Start: 2024-04-19 | End: 2024-04-20

## 2024-04-19 RX ORDER — ACETAMINOPHEN 500 MG
650 TABLET ORAL ONCE
Refills: 0 | Status: COMPLETED | OUTPATIENT
Start: 2024-04-19 | End: 2024-04-20

## 2024-04-19 RX ADMIN — FENTANYL CITRATE 50 MICROGRAM(S): 50 INJECTION INTRAVENOUS at 12:05

## 2024-04-19 RX ADMIN — CHLORHEXIDINE GLUCONATE 15 MILLILITER(S): 213 SOLUTION TOPICAL at 05:03

## 2024-04-19 RX ADMIN — MUPIROCIN 1 APPLICATION(S): 20 OINTMENT TOPICAL at 05:04

## 2024-04-19 RX ADMIN — SODIUM CHLORIDE 4 MILLILITER(S): 9 INJECTION INTRAMUSCULAR; INTRAVENOUS; SUBCUTANEOUS at 23:45

## 2024-04-19 RX ADMIN — ENOXAPARIN SODIUM 40 MILLIGRAM(S): 100 INJECTION SUBCUTANEOUS at 17:24

## 2024-04-19 RX ADMIN — POTASSIUM PHOSPHATE, MONOBASIC POTASSIUM PHOSPHATE, DIBASIC 62.5 MILLIMOLE(S): 236; 224 INJECTION, SOLUTION INTRAVENOUS at 02:16

## 2024-04-19 RX ADMIN — Medication 1: at 00:38

## 2024-04-19 RX ADMIN — DEXMEDETOMIDINE HYDROCHLORIDE IN 0.9% SODIUM CHLORIDE 5.55 MICROGRAM(S)/KG/HR: 4 INJECTION INTRAVENOUS at 21:00

## 2024-04-19 RX ADMIN — FENTANYL CITRATE 50 MICROGRAM(S): 50 INJECTION INTRAVENOUS at 00:45

## 2024-04-19 RX ADMIN — ENOXAPARIN SODIUM 40 MILLIGRAM(S): 100 INJECTION SUBCUTANEOUS at 05:03

## 2024-04-19 RX ADMIN — FENTANYL CITRATE 50 MICROGRAM(S): 50 INJECTION INTRAVENOUS at 16:05

## 2024-04-19 RX ADMIN — Medication 10.3 MICROGRAM(S)/KG/MIN: at 05:04

## 2024-04-19 RX ADMIN — POLYETHYLENE GLYCOL 3350 17 GRAM(S): 17 POWDER, FOR SOLUTION ORAL at 05:03

## 2024-04-19 RX ADMIN — FENTANYL CITRATE 50 MICROGRAM(S): 50 INJECTION INTRAVENOUS at 01:00

## 2024-04-19 RX ADMIN — Medication 400 MILLIGRAM(S): at 12:05

## 2024-04-19 RX ADMIN — DEXMEDETOMIDINE HYDROCHLORIDE IN 0.9% SODIUM CHLORIDE 5.55 MICROGRAM(S)/KG/HR: 4 INJECTION INTRAVENOUS at 05:04

## 2024-04-19 RX ADMIN — Medication 3 MILLILITER(S): at 23:05

## 2024-04-19 RX ADMIN — Medication 1000 MILLIGRAM(S): at 12:20

## 2024-04-19 RX ADMIN — POLYETHYLENE GLYCOL 3350 17 GRAM(S): 17 POWDER, FOR SOLUTION ORAL at 17:24

## 2024-04-19 RX ADMIN — CEFTRIAXONE 100 MILLIGRAM(S): 500 INJECTION, POWDER, FOR SOLUTION INTRAMUSCULAR; INTRAVENOUS at 12:00

## 2024-04-19 RX ADMIN — Medication 650 MILLIGRAM(S): at 03:01

## 2024-04-19 RX ADMIN — Medication 10.3 MICROGRAM(S)/KG/MIN: at 21:00

## 2024-04-19 RX ADMIN — CHLORHEXIDINE GLUCONATE 15 MILLILITER(S): 213 SOLUTION TOPICAL at 17:25

## 2024-04-19 RX ADMIN — FENTANYL CITRATE 50 MICROGRAM(S): 50 INJECTION INTRAVENOUS at 08:42

## 2024-04-19 RX ADMIN — PROPOFOL 6.6 MICROGRAM(S)/KG/MIN: 10 INJECTION, EMULSION INTRAVENOUS at 15:56

## 2024-04-19 RX ADMIN — Medication 650 MILLIGRAM(S): at 02:16

## 2024-04-19 RX ADMIN — FENTANYL CITRATE 50 MICROGRAM(S): 50 INJECTION INTRAVENOUS at 15:51

## 2024-04-19 RX ADMIN — PROPOFOL 6.6 MICROGRAM(S)/KG/MIN: 10 INJECTION, EMULSION INTRAVENOUS at 05:05

## 2024-04-19 RX ADMIN — SENNA PLUS 10 MILLILITER(S): 8.6 TABLET ORAL at 22:00

## 2024-04-19 RX ADMIN — FENTANYL CITRATE 50 MICROGRAM(S): 50 INJECTION INTRAVENOUS at 12:20

## 2024-04-19 RX ADMIN — FENTANYL CITRATE 50 MICROGRAM(S): 50 INJECTION INTRAVENOUS at 08:57

## 2024-04-19 RX ADMIN — MUPIROCIN 1 APPLICATION(S): 20 OINTMENT TOPICAL at 17:25

## 2024-04-19 RX ADMIN — DEXMEDETOMIDINE HYDROCHLORIDE IN 0.9% SODIUM CHLORIDE 5.55 MICROGRAM(S)/KG/HR: 4 INJECTION INTRAVENOUS at 00:38

## 2024-04-19 RX ADMIN — CHLORHEXIDINE GLUCONATE 1 APPLICATION(S): 213 SOLUTION TOPICAL at 05:04

## 2024-04-19 RX ADMIN — Medication 1: at 05:03

## 2024-04-19 RX ADMIN — Medication 1: at 23:55

## 2024-04-19 NOTE — CHART NOTE - NSCHARTNOTEFT_GEN_A_CORE
: Devan Hewitt PGY-1, Jeffery Cates MD, Berto Villa MD     INDICATION: Shock    PROCEDURE:  [x] LIMITED ECHO  [x] LIMITED CHEST  [ ] LIMITED RETROPERITONEAL  [ ] LIMITED ABDOMINAL  [ ] LIMITED DVT  [ ] NEEDLE GUIDANCE VASCULAR  [ ] NEEDLE GUIDANCE THORACENTESIS  [ ] NEEDLE GUIDANCE PARACENTESIS  [ ] NEEDLE GUIDANCE PERICARDIOCENTESIS  [ ] OTHER    FINDINGS:  Lungs: A line predominance in bilateral anterior lung fields. Lung sliding b/l. Scattered B lines in left lung posterior field. Small effusion on the right.   Heart: IVC 1.5 cm    INTERPRETATION:  Right pleural effusion. Resolving left-sided pneumonia.     Images uploaded on [employer] : Devan Hewitt PGY-1, Jeffery Cates MD, Berto Villa MD     INDICATION: Shock    PROCEDURE:  [x] LIMITED ECHO  [x] LIMITED CHEST  [ ] LIMITED RETROPERITONEAL  [ ] LIMITED ABDOMINAL  [ ] LIMITED DVT  [ ] NEEDLE GUIDANCE VASCULAR  [ ] NEEDLE GUIDANCE THORACENTESIS  [ ] NEEDLE GUIDANCE PARACENTESIS  [ ] NEEDLE GUIDANCE PERICARDIOCENTESIS  [ ] OTHER    FINDINGS:  Lungs: A line predominance in bilateral anterior lung fields. Lung sliding b/l. Scattered B lines in left lung posterior field and consolidation. Dynamic air bronchograms not visualized in today's exam. Small effusion on the right.   Heart: IVC 1.5 cm    INTERPRETATION:  Right pleural effusion. Improving left-sided pneumonia.     Images uploaded on [employer] : Devan Hewitt PGY-1, Jeffery Cates MD, Berto Villa MD     INDICATION: Shock    PROCEDURE:  [x] LIMITED ECHO  [x] LIMITED CHEST  [ ] LIMITED RETROPERITONEAL  [ ] LIMITED ABDOMINAL  [ ] LIMITED DVT  [ ] NEEDLE GUIDANCE VASCULAR  [ ] NEEDLE GUIDANCE THORACENTESIS  [ ] NEEDLE GUIDANCE PARACENTESIS  [ ] NEEDLE GUIDANCE PERICARDIOCENTESIS  [ ] OTHER    FINDINGS:  Lungs: A line predominance in bilateral anterior lung fields. Lung sliding b/l. Scattered B lines in left lung posterior field and consolidation. Dynamic air bronchograms not visualized in today's exam. Small effusion on the right.   Heart: IVC 1.5 cm    INTERPRETATION:   Very small Right pleural effusion. Improving left-sided pneumonia.     Images uploaded on [employer]      I have assisted and supervised entire procedure.    Berto Villa MD.

## 2024-04-19 NOTE — PROGRESS NOTE ADULT - ASSESSMENT
69F h/o CVA (bedbound at baseline), COPD, CHF, HTN presenting as transfer from [facility] for SOB iso leukocytosis to 233 c/f acute leukemia. Pt intubated and on pressors, transferred to MICU for further management and possible leukophoresis.    Neuro  #Encephalopathy   -p/w lethargy potentially from leukostasis vs sepsis  -CTH showing old infarct, no acute process  -currently sedated on propofol/precedex while intubated      CV  #Hemodynamics  -hypotensive likely iso vasoplegia from sedatives and sepsis  -volume down on POCUS, will start with 1L LR bolus  -currently on levo, ween as tolerated      #Chest Pain  -demand vs leukostasis vs non cardiac   -reported JOSEF in II, III, aVF at [facility] --> repeat EKG without ST changes or Q waves  -trops peaked  -cards recs appreciated    #CHF  -unclear hx but elevated pro-BNP to 2600s  -TTE: EF 54%, no significant abnormalities     Resp  #Acute hypoxic respiratory failure  -pna vs leukostasis   -intubated: 460/18/40/5; ween off as tolerated  -s/p thora draining 1500cc fluid 4/16 with chest tube placement; c/w exudative effusion  -MRSA neg  -pleural fluid growing rare alpha hemolytic strep; BCx growing strep pneumo  -was on vanc/zosyn/azithro (4/16)-->deescalated to CTX 2g qd (4/17 - )  -24h chest tube output: 90cc    GI  #Diet: TF    /Renal  -no active issues    Heme/Onc  #Leukocytosis  #CLL  - on presentation  -heme c/s appreciated  -no plan for leukophoresis at this time given mature lymphocytes  -follow TLS labs    #DVT ppx: lovenox    ID  #PNA  -BCx + strep pneumo; fluid culture with rare alpha hemolytic strep  - f/u repeat BCx 4/18  -was on vanc/zosyn/azithro (4/16)-->deescalated to CTX (4/17 - )    Endo  -ISS q6h while NPO    Ethics: Full Code 69F h/o CVA (bedbound at baseline), COPD, CHF, HTN presenting as transfer from [facility] for SOB iso leukocytosis to 233 c/f acute leukemia. Pt intubated and on pressors, transferred to MICU for further management and possible leukophoresis.    Neuro  #Encephalopathy   -p/w lethargy potentially from leukostasis vs sepsis  -CTH showing old infarct, no acute process  -currently sedated on precedex while intubated      CV  #Hemodynamics  -hypotensive likely iso vasoplegia from sedatives and sepsis  -volume down on POCUS, will start with 1L LR bolus  -currently on levo, ween as tolerated      #Chest Pain  -demand vs leukostasis vs non cardiac   -reported JOSEF in II, III, aVF at [facility] --> repeat EKG without ST changes or Q waves  -trops peaked  -cards recs appreciated    #CHF  -unclear hx but elevated pro-BNP to 2600s  -TTE: EF 54%, no significant abnormalities     Resp  #Acute hypoxic respiratory failure  -pna vs leukostasis   -intubated: 460/18/40/5; ween off as tolerated  -s/p thora draining 1500cc fluid 4/16 with chest tube placement; c/w exudative effusion  -MRSA neg  -pleural fluid growing rare alpha hemolytic strep; BCx growing strep pneumo  -was on vanc/zosyn/azithro (4/16)-->deescalated to CTX 2g qd (4/17 - )  -24h chest tube output: 90cc x24h - pull tube 4/19    GI  #Diet: TF    /Renal  -no active issues    Heme/Onc  #Leukocytosis  #CLL  - on presentation  -heme c/s appreciated  -no plan for leukophoresis at this time given mature lymphocytes  -follow TLS labs    #DVT ppx: lovenox    ID  #PNA  -BCx + strep pneumo; fluid culture with rare alpha hemolytic strep  - f/u repeat BCx 4/18  -was on vanc/zosyn/azithro (4/16)-->deescalated to CTX (4/17 - )    Endo  -ISS q6h while NPO    Ethics: Full Code

## 2024-04-19 NOTE — PROGRESS NOTE ADULT - ATTENDING COMMENTS
Patient with strepto coccus pneumonia and bacteremia with reactive lymphocytosis in patient with untreated chronic lymphocytic leukemia. Please continue antibiotics and we will follow course of infection. she remains orally intubated and on mechanical ventilation and sedation /muscle relaxant. She is not ready for anti CD 20 therapy until bacteremia resolves.

## 2024-04-19 NOTE — PROGRESS NOTE ADULT - SUBJECTIVE AND OBJECTIVE BOX
Patient is a 69y old  Female who presents with a chief complaint of Transfer for leukophoresis (18 Apr 2024 07:32)      24 hour events: ***    REVIEW OF SYSTEMS:  Constitutional: [ ] fevers [ ] chills [ ] weight loss [ ] weight gain  HEENT: [ ] dry eyes [ ] eye irritation [ ] postnasal drip [ ] nasal congestion  CV: [ ] chest pain [ ] orthopnea [ ] palpitations [ ] murmur  Resp: [ ] cough [ ] shortness of breath [ ] dyspnea [ ] wheezing [ ] sputum [ ] hemoptysis  GI: [ ] nausea [ ] vomiting [ ] diarrhea [ ] constipation [ ] abd pain [ ] dysphagia   : [ ] dysuria [ ] nocturia [ ] hematuria [ ] increased urinary frequency  Musculoskeletal: [ ] back pain [ ] myalgias [ ] arthralgias [ ] fracture  Skin: [ ] rash [ ] itch  Neurological: [ ] headache [ ] dizziness [ ] syncope [ ] weakness [ ] numbness  Psychiatric: [ ] anxiety [ ] depression  Endocrine: [ ] diabetes [ ] thyroid problem  Hematologic/Lymphatic: [ ] anemia [ ] bleeding problem  Allergic/Immunologic: [ ] itchy eyes [ ] nasal discharge [ ] hives [ ] angioedema  [ ] All other systems negative  [ ] Unable to assess ROS because ________    OBJECTIVE:  ICU Vital Signs Last 24 Hrs  T(C): 37.8 (19 Apr 2024 07:00), Max: 38.7 (19 Apr 2024 02:00)  T(F): 100 (19 Apr 2024 07:00), Max: 101.7 (19 Apr 2024 02:00)  HR: 68 (19 Apr 2024 07:00) (67 - 98)  BP: 91/53 (19 Apr 2024 07:00) (69/39 - 150/79)  BP(mean): 68 (19 Apr 2024 07:00) (49 - 109)  ABP: --  ABP(mean): --  RR: 20 (19 Apr 2024 07:00) (14 - 37)  SpO2: 99% (19 Apr 2024 07:00) (91% - 100%)    O2 Parameters below as of 18 Apr 2024 20:00  Patient On (Oxygen Delivery Method): ventilator    O2 Concentration (%): 30      Mode: AC/ CMV (Assist Control/ Continuous Mandatory Ventilation), RR (machine): 18, TV (machine): 460, FiO2: 30, PEEP: 5, ITime: 1, MAP: 11, PIP: 28    04-18 @ 07:01  -  04-19 @ 07:00  --------------------------------------------------------  IN: 3335.6 mL / OUT: 2015 mL / NET: 1320.6 mL      CAPILLARY BLOOD GLUCOSE      POCT Blood Glucose.: 159 mg/dL (19 Apr 2024 05:03)      PHYSICAL EXAM:  GENERAL: NAD, lying in bed comfortably  HEAD:  Atraumatic, Normocephalic  EYES: EOMI, PERRLA, conjunctiva and sclera clear  ENT: Moist mucous membranes  NECK: Supple, No JVD  CHEST/LUNG: Clear to auscultation bilaterally; No rales, rhonchi, wheezing, or rubs. Unlabored respirations  HEART: Regular rate and rhythm; No murmurs, rubs, or gallops  ABDOMEN: Bowel sounds present; Soft, Nontender, Nondistended. No hepatomegally  EXTREMITIES:  2+ Peripheral Pulses, brisk capillary refill. No clubbing, cyanosis, or edema  NERVOUS SYSTEM:  Alert & Oriented X3, speech clear. No deficits   MSK: FROM all 4 extremities, full and equal strength  SKIN: No rashes or lesions      LINES:    HOSPITAL MEDICATIONS:  MEDICATIONS  (STANDING):  cefTRIAXone   IVPB 2000 milliGRAM(s) IV Intermittent every 24 hours  chlorhexidine 0.12% Liquid 15 milliLiter(s) Oral Mucosa every 12 hours  chlorhexidine 2% Cloths 1 Application(s) Topical <User Schedule>  dexMEDEtomidine Infusion 0.2 MICROgram(s)/kG/Hr (5.55 mL/Hr) IV Continuous <Continuous>  dextrose 50% Injectable 25 Gram(s) IV Push once  dextrose 50% Injectable 12.5 Gram(s) IV Push once  enoxaparin Injectable 40 milliGRAM(s) SubCutaneous every 12 hours  glucagon  Injectable 1 milliGRAM(s) IntraMuscular once  insulin lispro (ADMELOG) corrective regimen sliding scale   SubCutaneous every 6 hours  lactated ringers. 1000 milliLiter(s) (75 mL/Hr) IV Continuous <Continuous>  mupirocin 2% Nasal 1 Application(s) Both Nostrils two times a day  norepinephrine Infusion 0.05 MICROgram(s)/kG/Min (10.3 mL/Hr) IV Continuous <Continuous>  polyethylene glycol 3350 17 Gram(s) Oral every 12 hours  propofol Infusion 10 MICROgram(s)/kG/Min (6.6 mL/Hr) IV Continuous <Continuous>  senna Syrup 10 milliLiter(s) Oral at bedtime    MEDICATIONS  (PRN):  albuterol/ipratropium for Nebulization 3 milliLiter(s) Nebulizer every 6 hours PRN Shortness of Breath and/or Wheezing  fentaNYL    Injectable 50 MICROGram(s) IV Push every 3 hours PRN Mild Pain (1 - 3)  nystatin Powder 1 Application(s) Topical two times a day PRN skin irritation      LABS:                        10.4   191.12 )-----------( 66       ( 19 Apr 2024 00:27 )             32.4     Hgb Trend: 10.4<--, 10.8<--, 11.0<--, 11.3<--, 12.0<--  04-19    138  |  102  |  24<H>  ----------------------------<  172<H>  4.4   |  25  |  0.37<L>    Ca    9.2      19 Apr 2024 00:27  Phos  2.5     04-19  Mg     2.9     04-19    TPro  6.1  /  Alb  2.8<L>  /  TBili  0.5  /  DBili  x   /  AST  76<H>  /  ALT  54<H>  /  AlkPhos  71  04-19    Creatinine Trend: 0.37<--, 0.44<--, 0.41<--, 0.52<--, 0.59<--, 0.69<--  PT/INR - ( 19 Apr 2024 00:27 )   PT: 12.4 sec;   INR: 1.19 ratio         PTT - ( 19 Apr 2024 00:27 )  PTT:29.4 sec  Urinalysis Basic - ( 19 Apr 2024 00:27 )    Color: x / Appearance: x / SG: x / pH: x  Gluc: 172 mg/dL / Ketone: x  / Bili: x / Urobili: x   Blood: x / Protein: x / Nitrite: x   Leuk Esterase: x / RBC: x / WBC x   Sq Epi: x / Non Sq Epi: x / Bacteria: x      Arterial Blood Gas:  04-19 @ 00:15  7.49/39/65/30/94.2/5.9  ABG lactate: --  Arterial Blood Gas:  04-17 @ 23:56  7.48/41/67/30/95.3/6.4  ABG lactate: --    Venous Blood Gas:  04-18 @ 22:21  7.42/48/44/31/72.0  VBG Lactate: 1.7       Patient is a 69y old  Female who presents with a chief complaint of Transfer for leukophoresis (18 Apr 2024 07:32)      24 hour events: Required 2 pushes of fent overnight and started on LR.    REVIEW OF SYSTEMS:  Constitutional: [ ] fevers [ ] chills [ ] weight loss [ ] weight gain  HEENT: [ ] dry eyes [ ] eye irritation [ ] postnasal drip [ ] nasal congestion  CV: [ ] chest pain [ ] orthopnea [ ] palpitations [ ] murmur  Resp: [ ] cough [ ] shortness of breath [ ] dyspnea [ ] wheezing [ ] sputum [ ] hemoptysis  GI: [ ] nausea [ ] vomiting [ ] diarrhea [ ] constipation [ ] abd pain [ ] dysphagia   : [ ] dysuria [ ] nocturia [ ] hematuria [ ] increased urinary frequency  Musculoskeletal: [ ] back pain [ ] myalgias [ ] arthralgias [ ] fracture  Skin: [ ] rash [ ] itch  Neurological: [ ] headache [ ] dizziness [ ] syncope [ ] weakness [ ] numbness  Psychiatric: [ ] anxiety [ ] depression  Endocrine: [ ] diabetes [ ] thyroid problem  Hematologic/Lymphatic: [ ] anemia [ ] bleeding problem  Allergic/Immunologic: [ ] itchy eyes [ ] nasal discharge [ ] hives [ ] angioedema  [ ] All other systems negative  [x ] Unable to assess ROS because sedated    OBJECTIVE:  ICU Vital Signs Last 24 Hrs  T(C): 37.8 (19 Apr 2024 07:00), Max: 38.7 (19 Apr 2024 02:00)  T(F): 100 (19 Apr 2024 07:00), Max: 101.7 (19 Apr 2024 02:00)  HR: 68 (19 Apr 2024 07:00) (67 - 98)  BP: 91/53 (19 Apr 2024 07:00) (69/39 - 150/79)  BP(mean): 68 (19 Apr 2024 07:00) (49 - 109)  ABP: --  ABP(mean): --  RR: 20 (19 Apr 2024 07:00) (14 - 37)  SpO2: 99% (19 Apr 2024 07:00) (91% - 100%)    O2 Parameters below as of 18 Apr 2024 20:00  Patient On (Oxygen Delivery Method): ventilator    O2 Concentration (%): 30      Mode: AC/ CMV (Assist Control/ Continuous Mandatory Ventilation), RR (machine): 18, TV (machine): 460, FiO2: 30, PEEP: 5, ITime: 1, MAP: 11, PIP: 28    04-18 @ 07:01  -  04-19 @ 07:00  --------------------------------------------------------  IN: 3335.6 mL / OUT: 2015 mL / NET: 1320.6 mL      CAPILLARY BLOOD GLUCOSE      POCT Blood Glucose.: 159 mg/dL (19 Apr 2024 05:03)      PHYSICAL EXAM:  GENERAL: NAD, sedated  HEAD:  Atraumatic, Normocephalic  CHEST/LUNG: mechanical breath sounds b/l; + rhonchi  HEART: Regular rate and rhythm; No murmurs, rubs, or gallops  ABDOMEN: Bowel sounds present; Soft, Nontender, Nondistended. No hepatomegally  EXTREMITIES:  2+ Peripheral Pulses, brisk capillary refill. No clubbing, cyanosis, or edema      LINES:    HOSPITAL MEDICATIONS:  MEDICATIONS  (STANDING):  cefTRIAXone   IVPB 2000 milliGRAM(s) IV Intermittent every 24 hours  chlorhexidine 0.12% Liquid 15 milliLiter(s) Oral Mucosa every 12 hours  chlorhexidine 2% Cloths 1 Application(s) Topical <User Schedule>  dexMEDEtomidine Infusion 0.2 MICROgram(s)/kG/Hr (5.55 mL/Hr) IV Continuous <Continuous>  dextrose 50% Injectable 25 Gram(s) IV Push once  dextrose 50% Injectable 12.5 Gram(s) IV Push once  enoxaparin Injectable 40 milliGRAM(s) SubCutaneous every 12 hours  glucagon  Injectable 1 milliGRAM(s) IntraMuscular once  insulin lispro (ADMELOG) corrective regimen sliding scale   SubCutaneous every 6 hours  lactated ringers. 1000 milliLiter(s) (75 mL/Hr) IV Continuous <Continuous>  mupirocin 2% Nasal 1 Application(s) Both Nostrils two times a day  norepinephrine Infusion 0.05 MICROgram(s)/kG/Min (10.3 mL/Hr) IV Continuous <Continuous>  polyethylene glycol 3350 17 Gram(s) Oral every 12 hours  propofol Infusion 10 MICROgram(s)/kG/Min (6.6 mL/Hr) IV Continuous <Continuous>  senna Syrup 10 milliLiter(s) Oral at bedtime    MEDICATIONS  (PRN):  albuterol/ipratropium for Nebulization 3 milliLiter(s) Nebulizer every 6 hours PRN Shortness of Breath and/or Wheezing  fentaNYL    Injectable 50 MICROGram(s) IV Push every 3 hours PRN Mild Pain (1 - 3)  nystatin Powder 1 Application(s) Topical two times a day PRN skin irritation      LABS:                        10.4   191.12 )-----------( 66       ( 19 Apr 2024 00:27 )             32.4     Hgb Trend: 10.4<--, 10.8<--, 11.0<--, 11.3<--, 12.0<--  04-19    138  |  102  |  24<H>  ----------------------------<  172<H>  4.4   |  25  |  0.37<L>    Ca    9.2      19 Apr 2024 00:27  Phos  2.5     04-19  Mg     2.9     04-19    TPro  6.1  /  Alb  2.8<L>  /  TBili  0.5  /  DBili  x   /  AST  76<H>  /  ALT  54<H>  /  AlkPhos  71  04-19    Creatinine Trend: 0.37<--, 0.44<--, 0.41<--, 0.52<--, 0.59<--, 0.69<--  PT/INR - ( 19 Apr 2024 00:27 )   PT: 12.4 sec;   INR: 1.19 ratio         PTT - ( 19 Apr 2024 00:27 )  PTT:29.4 sec  Urinalysis Basic - ( 19 Apr 2024 00:27 )    Color: x / Appearance: x / SG: x / pH: x  Gluc: 172 mg/dL / Ketone: x  / Bili: x / Urobili: x   Blood: x / Protein: x / Nitrite: x   Leuk Esterase: x / RBC: x / WBC x   Sq Epi: x / Non Sq Epi: x / Bacteria: x      Arterial Blood Gas:  04-19 @ 00:15  7.49/39/65/30/94.2/5.9  ABG lactate: --  Arterial Blood Gas:  04-17 @ 23:56  7.48/41/67/30/95.3/6.4  ABG lactate: --    Venous Blood Gas:  04-18 @ 22:21  7.42/48/44/31/72.0  VBG Lactate: 1.7

## 2024-04-19 NOTE — PROGRESS NOTE ADULT - ATTENDING COMMENTS
1. Acute hypoxemic respiratory failure due to LLL pneumonia in patient with CLL.  Adequately oxygenating on FIO2 30%. Continue current AC vent settings. Did not tolerate PS wean  again today.  2. Pocus with moderate L pleural effusion.  Empyema. Still with > 100mls out put. No pleura effusion seen on  POCUS. D/C chest tube  2. ID. POCUS reveals LLL consolidation with dynamic air bronchograms. Blood cx with strep pneumonia. Pleural fluid likely strep pneumonia. Change antibiotics to Ceftriaxone.    3. Hypotension from sepsis and sedation. Titrate Levophed for MNC60-96.  4.Heme: WBC< 200k. Small cells . No blast cells. Pt with CLL. No need for leukopheresis.  5.Cardiac.  minimal ST elevation inferior  leads  troponin decreasing.  6.DVT prophylaxis: Lovenox  7. GOC: Full code

## 2024-04-19 NOTE — CHART NOTE - NSCHARTNOTEFT_GEN_A_CORE
NITA FRIEDMAN  MRN-[id number] 69y    Discussed case with attending, minimal drainage from L chest tube. CXR reviewed prior to tube removal, no pneumothorax, minimal to no effusion.  No air leak. L sided chest tube removed without complication. Pigtail catheter remained stable and intact. Patient tolerated procedure well.       -Giovanni Pelayo PA-C NITA FRIEDMAN  MRN-[id number] 69y    Discussed case with attending, minimal drainage from L chest tube. CXR reviewed prior to tube removal, no pneumothorax, minimal to no effusion.  No air leak. L sided chest tube removed without complication. Pigtail catheter remained stable and intact. Patient tolerated procedure well. CXR ordered 1 hr post removal.      -Giovanni Pelayo PA-C

## 2024-04-19 NOTE — PROGRESS NOTE ADULT - SUBJECTIVE AND OBJECTIVE BOX
Hematology Follow-up    INTERVAL HPI/OVERNIGHT EVENTS:  Patient S&E at bedside. She wakes to verbal and physical stimuli but dozes off quickly. Unable to obtain ROS due to patient's current mental status on Precedex.    VITAL SIGNS:  T(F): 101.5 (04-19-24 @ 12:00)  HR: 69 (04-19-24 @ 12:30)  BP: 73/43 (04-19-24 @ 12:30)  RR: 22 (04-19-24 @ 12:30)  SpO2: 100% (04-19-24 @ 12:30)  Wt(kg): --    PHYSICAL EXAM:  Constitutional: Intubated. NAD  Eyes: PERRL, EOMI, sclera non-icteric  Respiratory: CTA b/l, good air entry b/l, no wheezing, rhonchi, rales, with normal respiratory effort and no intercostal retractions  Cardiovascular: RRR, normal S1S2, no M/R/G  Gastrointestinal: soft, NTND, no masses palpable, BS normal in all four quadrants, no HSM  Extremities:  no c/c/e  Neurological: Opens eyes and nods for yes/no questions but dozes off quickly  Skin: Normal temperature    MEDICATIONS  (STANDING):  cefTRIAXone   IVPB 2000 milliGRAM(s) IV Intermittent every 24 hours  chlorhexidine 0.12% Liquid 15 milliLiter(s) Oral Mucosa every 12 hours  chlorhexidine 2% Cloths 1 Application(s) Topical <User Schedule>  dexMEDEtomidine Infusion 0.2 MICROgram(s)/kG/Hr (5.55 mL/Hr) IV Continuous <Continuous>  dextrose 50% Injectable 25 Gram(s) IV Push once  dextrose 50% Injectable 12.5 Gram(s) IV Push once  enoxaparin Injectable 40 milliGRAM(s) SubCutaneous every 12 hours  glucagon  Injectable 1 milliGRAM(s) IntraMuscular once  insulin lispro (ADMELOG) corrective regimen sliding scale   SubCutaneous every 6 hours  lactated ringers. 1000 milliLiter(s) (75 mL/Hr) IV Continuous <Continuous>  mupirocin 2% Nasal 1 Application(s) Both Nostrils two times a day  norepinephrine Infusion 0.05 MICROgram(s)/kG/Min (10.3 mL/Hr) IV Continuous <Continuous>  polyethylene glycol 3350 17 Gram(s) Oral every 12 hours  propofol Infusion 10 MICROgram(s)/kG/Min (6.6 mL/Hr) IV Continuous <Continuous>  senna Syrup 10 milliLiter(s) Oral at bedtime    MEDICATIONS  (PRN):  albuterol/ipratropium for Nebulization 3 milliLiter(s) Nebulizer every 6 hours PRN Shortness of Breath and/or Wheezing  fentaNYL    Injectable 50 MICROGram(s) IV Push every 3 hours PRN Mild Pain (1 - 3)  nystatin Powder 1 Application(s) Topical two times a day PRN skin irritation      No Known Allergies      LABS:                        10.4   191.12 )-----------( 66       ( 19 Apr 2024 00:27 )             32.4     04-19    138  |  102  |  24<H>  ----------------------------<  172<H>  4.4   |  25  |  0.37<L>    Ca    9.2      19 Apr 2024 00:27  Phos  2.5     04-19  Mg     2.9     04-19    TPro  6.1  /  Alb  2.8<L>  /  TBili  0.5  /  DBili  x   /  AST  76<H>  /  ALT  54<H>  /  AlkPhos  71  04-19    PT/INR - ( 19 Apr 2024 00:27 )   PT: 12.4 sec;   INR: 1.19 ratio         PTT - ( 19 Apr 2024 00:27 )  PTT:29.4 sec Lactate Dehydrogenase, Serum: 229 U/L (04-19 @ 00:27)    Urinalysis Basic - ( 19 Apr 2024 00:27 )    Color: x / Appearance: x / SG: x / pH: x  Gluc: 172 mg/dL / Ketone: x  / Bili: x / Urobili: x   Blood: x / Protein: x / Nitrite: x   Leuk Esterase: x / RBC: x / WBC x   Sq Epi: x / Non Sq Epi: x / Bacteria: x        RADIOLOGY & ADDITIONAL TESTS:  Studies reviewed.

## 2024-04-19 NOTE — PROGRESS NOTE ADULT - ASSESSMENT
69F h/o CVA (bedbound at baseline), COPD, CHF, HTN presenting as transfer from [facility] for SOB iso leukocytosis to 233 c/f acute leukemia. Pt intubated and on pressors in setting of possible pneumonia. Hematology consulted for leukocytosis, suspect underlying CLL with reactive lymphocytosis 2/2 sepsis.     # CLL  Patient Labs at admission: , Hgb 13.4, PLT 96, 96% lymphocytes, 2% neutrophils.   On peripheral smear (4/16/24): heterogeneous population of lymphocytes, smudge cells present, rare blasts, rare target cells, polychromasia, giant platelets present, reactive lymphocytes   - Please get CT c/a/p with IV contrast to assess for LAD and hepatosplenomegaly, but not urgent.  - CMV negative, EBV serologies indicate recent or past infection  - f/u serum viscosity though low suspicion for leukostasis as CLL cells are mature  - IgG 535, no need for IVIg as IgG > 500  - Please trend TLS labs (uric acid, LDH, CMP, Mg, Phos) and CBC w/ diff and retic daily  - If uric acid > 8, start allopurinol and give 3 mg IV rasburicase, and if uric acid > 12 give 6 mg rasburicase  - She had prior workup at [facility] (prior flow cytometry from October 2023 is in the HIE). Will need to determine if she has a primary hematologist. If not, can offer to refer her to the [facility] [place]    ***Note is incomplete. Will discuss plan with attending.  Note is not finalized until signed by attending.     Ke Roy MD  Hematology/Oncology Fellow PGY-5  Pager: [facility] [phone] / AM[place] [phone]  After 5pm and on weekends please page on-call fellow  69F h/o CVA (bedbound at baseline), COPD, CHF, HTN presenting as transfer from [facility] for SOB iso leukocytosis to 233 c/f acute leukemia. Pt intubated and on pressors in setting of possible pneumonia. Hematology consulted for leukocytosis, suspect underlying CLL with reactive lymphocytosis 2/2 sepsis.     # CLL  Patient Labs at admission: , Hgb 13.4, PLT 96, 96% lymphocytes, 2% neutrophils.   On peripheral smear (4/16/24): heterogeneous population of lymphocytes, smudge cells present, rare blasts, rare target cells, polychromasia, giant platelets present, reactive lymphocytes   - Please get CT c/a/p with IV contrast to assess for LAD and hepatosplenomegaly, but not urgent.  - CMV negative, EBV serologies indicate recent or past infection  - f/u serum viscosity though low suspicion for leukostasis as CLL cells are mature  - IgG 535, no need for IVIg as IgG > 500  - Please trend TLS labs (uric acid, LDH, CMP, Mg, Phos) and CBC w/ diff and retic daily  - If uric acid > 8, start allopurinol and give 3 mg IV rasburicase, and if uric acid > 12 give 6 mg rasburicase  - She had prior workup at [facility] (prior flow cytometry from October 2023 is in the HIE). Will need to determine if she has a primary hematologist. If not, can offer to refer her to the [facility] [place]    Note is not finalized until signed by attending.     Ke Roy MD  Hematology/Oncology Fellow PGY-5  Pager: [facility] [phone] / AM[place]I [phone]  After 5pm and on weekends please page on-call fellow

## 2024-04-20 LAB
-  CEFTRIAXONE (NON-MENINGITIDIS): SIGNIFICANT CHANGE UP
-  CLINDAMYCIN: SIGNIFICANT CHANGE UP
-  ERYTHROMYCIN: SIGNIFICANT CHANGE UP
-  LEVOFLOXACIN: SIGNIFICANT CHANGE UP
-  PENICILLIN (NON-MENINGITIDIS): SIGNIFICANT CHANGE UP
-  TETRACYCLINE: SIGNIFICANT CHANGE UP
-  TRIMETHOPRIM/SULFAMETHOXAZOLE: SIGNIFICANT CHANGE UP
-  VANCOMYCIN: SIGNIFICANT CHANGE UP
ALBUMIN SERPL ELPH-MCNC: 2.6 G/DL — LOW (ref 3.3–5)
ALP SERPL-CCNC: 74 U/L — SIGNIFICANT CHANGE UP (ref 40–120)
ALT FLD-CCNC: 40 U/L — SIGNIFICANT CHANGE UP (ref 10–45)
ANION GAP SERPL CALC-SCNC: 7 MMOL/L — SIGNIFICANT CHANGE UP (ref 5–17)
ANION GAP SERPL CALC-SCNC: 9 MMOL/L — SIGNIFICANT CHANGE UP (ref 5–17)
APTT BLD: 30.4 SEC — SIGNIFICANT CHANGE UP (ref 24.5–35.6)
AST SERPL-CCNC: 44 U/L — HIGH (ref 10–40)
BASE EXCESS BLDV CALC-SCNC: 5 MMOL/L — HIGH (ref -2–3)
BASOPHILS # BLD AUTO: 0.2 K/UL — SIGNIFICANT CHANGE UP (ref 0–0.2)
BASOPHILS NFR BLD AUTO: 0.1 % — SIGNIFICANT CHANGE UP (ref 0–2)
BILIRUB SERPL-MCNC: 0.5 MG/DL — SIGNIFICANT CHANGE UP (ref 0.2–1.2)
BUN SERPL-MCNC: 14 MG/DL — SIGNIFICANT CHANGE UP (ref 7–23)
BUN SERPL-MCNC: 16 MG/DL — SIGNIFICANT CHANGE UP (ref 7–23)
CA-I SERPL-SCNC: 1.17 MMOL/L — SIGNIFICANT CHANGE UP (ref 1.15–1.33)
CALCIUM SERPL-MCNC: 8.6 MG/DL — SIGNIFICANT CHANGE UP (ref 8.4–10.5)
CALCIUM SERPL-MCNC: 9 MG/DL — SIGNIFICANT CHANGE UP (ref 8.4–10.5)
CHLORIDE BLDV-SCNC: 106 MMOL/L — SIGNIFICANT CHANGE UP (ref 96–108)
CHLORIDE SERPL-SCNC: 105 MMOL/L — SIGNIFICANT CHANGE UP (ref 96–108)
CHLORIDE SERPL-SCNC: 107 MMOL/L — SIGNIFICANT CHANGE UP (ref 96–108)
CO2 BLDV-SCNC: 31 MMOL/L — HIGH (ref 22–26)
CO2 SERPL-SCNC: 25 MMOL/L — SIGNIFICANT CHANGE UP (ref 22–31)
CO2 SERPL-SCNC: 27 MMOL/L — SIGNIFICANT CHANGE UP (ref 22–31)
CREAT SERPL-MCNC: 0.33 MG/DL — LOW (ref 0.5–1.3)
CREAT SERPL-MCNC: <0.3 MG/DL — LOW (ref 0.5–1.3)
EGFR: 113 ML/MIN/1.73M2 — SIGNIFICANT CHANGE UP
EGFR: 115 ML/MIN/1.73M2 — SIGNIFICANT CHANGE UP
EOSINOPHIL # BLD AUTO: 0.03 K/UL — SIGNIFICANT CHANGE UP (ref 0–0.5)
EOSINOPHIL NFR BLD AUTO: 0 % — SIGNIFICANT CHANGE UP (ref 0–6)
GAS PNL BLDV: 138 MMOL/L — SIGNIFICANT CHANGE UP (ref 136–145)
GAS PNL BLDV: SIGNIFICANT CHANGE UP
GLUCOSE BLDC GLUCOMTR-MCNC: 140 MG/DL — HIGH (ref 70–99)
GLUCOSE BLDC GLUCOMTR-MCNC: 143 MG/DL — HIGH (ref 70–99)
GLUCOSE BLDC GLUCOMTR-MCNC: 157 MG/DL — HIGH (ref 70–99)
GLUCOSE BLDV-MCNC: 166 MG/DL — HIGH (ref 70–99)
GLUCOSE SERPL-MCNC: 102 MG/DL — HIGH (ref 70–99)
GLUCOSE SERPL-MCNC: 169 MG/DL — HIGH (ref 70–99)
HCO3 BLDV-SCNC: 30 MMOL/L — HIGH (ref 22–29)
HCT VFR BLD CALC: 29 % — LOW (ref 34.5–45)
HCT VFR BLDA CALC: 30 % — LOW (ref 34.5–46.5)
HEMOGLOBIN INTERPRETATION: SIGNIFICANT CHANGE UP
HGB A MFR BLD: 60.1 % — LOW (ref 95.8–98)
HGB A2 MFR BLD: 3 % — SIGNIFICANT CHANGE UP (ref 2–3.2)
HGB BLD CALC-MCNC: 10 G/DL — LOW (ref 11.7–16.1)
HGB BLD-MCNC: 9.4 G/DL — LOW (ref 11.5–15.5)
HGB C MFR BLD: 36.5 % — HIGH
HGB F MFR BLD: 0.4 % — SIGNIFICANT CHANGE UP (ref 0–1)
HOROWITZ INDEX BLDV+IHG-RTO: 30 — SIGNIFICANT CHANGE UP
IMM GRANULOCYTES NFR BLD AUTO: 0.7 % — SIGNIFICANT CHANGE UP (ref 0–0.9)
INR BLD: 1.21 RATIO — HIGH (ref 0.85–1.18)
LACTATE BLDV-MCNC: 1.3 MMOL/L — SIGNIFICANT CHANGE UP (ref 0.5–2)
LDH SERPL L TO P-CCNC: 359 U/L — HIGH (ref 50–242)
LYMPHOCYTES # BLD AUTO: 149.01 K/UL — HIGH (ref 1–3.3)
LYMPHOCYTES # BLD AUTO: 88.2 % — HIGH (ref 13–44)
MAGNESIUM SERPL-MCNC: 2.8 MG/DL — HIGH (ref 1.6–2.6)
MCHC RBC-ENTMCNC: 27.9 PG — SIGNIFICANT CHANGE UP (ref 27–34)
MCHC RBC-ENTMCNC: 32.4 GM/DL — SIGNIFICANT CHANGE UP (ref 32–36)
MCV RBC AUTO: 86.1 FL — SIGNIFICANT CHANGE UP (ref 80–100)
METHOD TYPE: SIGNIFICANT CHANGE UP
MONOCYTES # BLD AUTO: 5.39 K/UL — HIGH (ref 0–0.9)
MONOCYTES NFR BLD AUTO: 3.2 % — SIGNIFICANT CHANGE UP (ref 2–14)
NEUTROPHILS # BLD AUTO: 13.18 K/UL — HIGH (ref 1.8–7.4)
NEUTROPHILS NFR BLD AUTO: 7.8 % — LOW (ref 43–77)
NRBC # BLD: 0 /100 WBCS — SIGNIFICANT CHANGE UP (ref 0–0)
ONKOSIGHT MYELOID SEQUENCE: (no result)
PCO2 BLDV: 45 MMHG — HIGH (ref 39–42)
PH BLDV: 7.43 — SIGNIFICANT CHANGE UP (ref 7.32–7.43)
PHOSPHATE SERPL-MCNC: 3.4 MG/DL — SIGNIFICANT CHANGE UP (ref 2.5–4.5)
PLATELET # BLD AUTO: 79 K/UL — LOW (ref 150–400)
PO2 BLDV: 39 MMHG — SIGNIFICANT CHANGE UP (ref 25–45)
POTASSIUM BLDV-SCNC: 4.6 MMOL/L — SIGNIFICANT CHANGE UP (ref 3.5–5.1)
POTASSIUM SERPL-MCNC: 4.5 MMOL/L — SIGNIFICANT CHANGE UP (ref 3.5–5.3)
POTASSIUM SERPL-MCNC: 6.1 MMOL/L — HIGH (ref 3.5–5.3)
POTASSIUM SERPL-SCNC: 4.5 MMOL/L — SIGNIFICANT CHANGE UP (ref 3.5–5.3)
POTASSIUM SERPL-SCNC: 6.1 MMOL/L — HIGH (ref 3.5–5.3)
PROT SERPL-MCNC: 5.7 G/DL — LOW (ref 6–8.3)
PROTHROM AB SERPL-ACNC: 13.2 SEC — HIGH (ref 9.5–13)
RBC # BLD: 3.37 M/UL — LOW (ref 3.8–5.2)
RBC # FLD: 15.9 % — HIGH (ref 10.3–14.5)
SAO2 % BLDV: 64.6 % — LOW (ref 67–88)
SODIUM SERPL-SCNC: 139 MMOL/L — SIGNIFICANT CHANGE UP (ref 135–145)
SODIUM SERPL-SCNC: 141 MMOL/L — SIGNIFICANT CHANGE UP (ref 135–145)
URATE SERPL-MCNC: 1.7 MG/DL — LOW (ref 2.5–7)
WBC # BLD: 168.91 K/UL — CRITICAL HIGH (ref 3.8–10.5)
WBC # FLD AUTO: 168.91 K/UL — CRITICAL HIGH (ref 3.8–10.5)

## 2024-04-20 PROCEDURE — 99291 CRITICAL CARE FIRST HOUR: CPT | Mod: GC

## 2024-04-20 PROCEDURE — 71045 X-RAY EXAM CHEST 1 VIEW: CPT | Mod: 26

## 2024-04-20 RX ORDER — PROPOFOL 10 MG/ML
10 INJECTION, EMULSION INTRAVENOUS
Qty: 1000 | Refills: 0 | Status: DISCONTINUED | OUTPATIENT
Start: 2024-04-20 | End: 2024-04-20

## 2024-04-20 RX ORDER — PROPOFOL 10 MG/ML
9.91 INJECTION, EMULSION INTRAVENOUS
Qty: 1000 | Refills: 0 | Status: DISCONTINUED | OUTPATIENT
Start: 2024-04-20 | End: 2024-05-04

## 2024-04-20 RX ORDER — NOREPINEPHRINE BITARTRATE/D5W 8 MG/250ML
0.05 PLASTIC BAG, INJECTION (ML) INTRAVENOUS
Qty: 8 | Refills: 0 | Status: DISCONTINUED | OUTPATIENT
Start: 2024-04-20 | End: 2024-04-21

## 2024-04-20 RX ORDER — DROXIDOPA 100 MG/1
100 CAPSULE ORAL THREE TIMES A DAY
Refills: 0 | Status: DISCONTINUED | OUTPATIENT
Start: 2024-04-20 | End: 2024-04-22

## 2024-04-20 RX ORDER — DEXMEDETOMIDINE HYDROCHLORIDE IN 0.9% SODIUM CHLORIDE 4 UG/ML
0.2 INJECTION INTRAVENOUS
Qty: 200 | Refills: 0 | Status: DISCONTINUED | OUTPATIENT
Start: 2024-04-20 | End: 2024-04-21

## 2024-04-20 RX ADMIN — SODIUM CHLORIDE 4 MILLILITER(S): 9 INJECTION INTRAMUSCULAR; INTRAVENOUS; SUBCUTANEOUS at 23:07

## 2024-04-20 RX ADMIN — Medication 1: at 06:00

## 2024-04-20 RX ADMIN — CHLORHEXIDINE GLUCONATE 15 MILLILITER(S): 213 SOLUTION TOPICAL at 17:06

## 2024-04-20 RX ADMIN — Medication 10.3 MICROGRAM(S)/KG/MIN: at 11:08

## 2024-04-20 RX ADMIN — DEXMEDETOMIDINE HYDROCHLORIDE IN 0.9% SODIUM CHLORIDE 5.55 MICROGRAM(S)/KG/HR: 4 INJECTION INTRAVENOUS at 22:30

## 2024-04-20 RX ADMIN — CEFTRIAXONE 100 MILLIGRAM(S): 500 INJECTION, POWDER, FOR SOLUTION INTRAMUSCULAR; INTRAVENOUS at 11:07

## 2024-04-20 RX ADMIN — DROXIDOPA 100 MILLIGRAM(S): 100 CAPSULE ORAL at 17:05

## 2024-04-20 RX ADMIN — MUPIROCIN 1 APPLICATION(S): 20 OINTMENT TOPICAL at 06:00

## 2024-04-20 RX ADMIN — PROPOFOL 6.6 MICROGRAM(S)/KG/MIN: 10 INJECTION, EMULSION INTRAVENOUS at 06:00

## 2024-04-20 RX ADMIN — CHLORHEXIDINE GLUCONATE 15 MILLILITER(S): 213 SOLUTION TOPICAL at 05:30

## 2024-04-20 RX ADMIN — SODIUM CHLORIDE 4 MILLILITER(S): 9 INJECTION INTRAMUSCULAR; INTRAVENOUS; SUBCUTANEOUS at 05:30

## 2024-04-20 RX ADMIN — Medication 650 MILLIGRAM(S): at 10:02

## 2024-04-20 RX ADMIN — PROPOFOL 6.6 MICROGRAM(S)/KG/MIN: 10 INJECTION, EMULSION INTRAVENOUS at 10:03

## 2024-04-20 RX ADMIN — Medication 650 MILLIGRAM(S): at 11:00

## 2024-04-20 RX ADMIN — ENOXAPARIN SODIUM 40 MILLIGRAM(S): 100 INJECTION SUBCUTANEOUS at 17:05

## 2024-04-20 RX ADMIN — PROPOFOL 6.6 MICROGRAM(S)/KG/MIN: 10 INJECTION, EMULSION INTRAVENOUS at 22:30

## 2024-04-20 RX ADMIN — SODIUM CHLORIDE 4 MILLILITER(S): 9 INJECTION INTRAMUSCULAR; INTRAVENOUS; SUBCUTANEOUS at 11:40

## 2024-04-20 RX ADMIN — PROPOFOL 6.6 MICROGRAM(S)/KG/MIN: 10 INJECTION, EMULSION INTRAVENOUS at 00:00

## 2024-04-20 RX ADMIN — DEXMEDETOMIDINE HYDROCHLORIDE IN 0.9% SODIUM CHLORIDE 5.55 MICROGRAM(S)/KG/HR: 4 INJECTION INTRAVENOUS at 11:08

## 2024-04-20 RX ADMIN — MUPIROCIN 1 APPLICATION(S): 20 OINTMENT TOPICAL at 17:05

## 2024-04-20 RX ADMIN — ENOXAPARIN SODIUM 40 MILLIGRAM(S): 100 INJECTION SUBCUTANEOUS at 06:00

## 2024-04-20 RX ADMIN — SENNA PLUS 10 MILLILITER(S): 8.6 TABLET ORAL at 22:30

## 2024-04-20 RX ADMIN — POLYETHYLENE GLYCOL 3350 17 GRAM(S): 17 POWDER, FOR SOLUTION ORAL at 17:05

## 2024-04-20 RX ADMIN — CHLORHEXIDINE GLUCONATE 1 APPLICATION(S): 213 SOLUTION TOPICAL at 05:30

## 2024-04-20 RX ADMIN — SODIUM CHLORIDE 4 MILLILITER(S): 9 INJECTION INTRAMUSCULAR; INTRAVENOUS; SUBCUTANEOUS at 17:46

## 2024-04-20 RX ADMIN — Medication 10.3 MICROGRAM(S)/KG/MIN: at 10:03

## 2024-04-20 NOTE — PROGRESS NOTE ADULT - ASSESSMENT
69F h/o CVA (bedbound at baseline), COPD, CHF, HTN presenting as transfer from [facility] for SOB iso leukocytosis to 233 c/f acute leukemia. Pt intubated and on pressors, transferred to MICU for further management and possible leukophoresis.    Neuro  #Encephalopathy   -p/w lethargy potentially from leukostasis vs sepsis  -CTH showing old infarct, no acute process  -currently sedated on precedex while intubated      CV  #Hemodynamics  -hypotensive likely iso vasoplegia from sedatives and sepsis  -volume down on POCUS, will start with 1L LR bolus  -currently on levo, ween as tolerated      #Chest Pain  -demand vs leukostasis vs non cardiac   -reported JOSEF in II, III, aVF at [facility] --> repeat EKG without ST changes or Q waves  -trops peaked  -cards recs appreciated    #CHF  -unclear hx but elevated pro-BNP to 2600s  -TTE: EF 54%, no significant abnormalities     Resp  #Acute hypoxic respiratory failure  -pna vs leukostasis   -intubated: 460/18/40/5; ween off as tolerated  -s/p thora draining 1500cc fluid 4/16 with chest tube placement; c/w exudative effusion  -MRSA neg  -pleural fluid growing rare alpha hemolytic strep; BCx growing strep pneumo  -was on vanc/zosyn/azithro (4/16)-->deescalated to CTX 2g qd (4/17 - )  -24h chest tube output: 90cc x24h - pull tube 4/19    GI  #Diet: TF    /Renal  -no active issues    Heme/Onc  #Leukocytosis  #CLL  - on presentation  -heme c/s appreciated  -no plan for leukophoresis at this time given mature lymphocytes  -follow TLS labs    #DVT ppx: lovenox    ID  #PNA  -BCx + strep pneumo; fluid culture with rare alpha hemolytic strep  - f/u repeat BCx 4/18  -was on vanc/zosyn/azithro (4/16)-->deescalated to CTX (4/17 - )    Endo  -ISS q6h while NPO    Ethics: Full Code 69F h/o CVA (bedbound at baseline), COPD, CHF, HTN presenting as transfer from [facility] for SOB iso leukocytosis to 233 c/f acute leukemia. Pt intubated and on pressors, transferred to MICU for further management and possible leukophoresis.    Neuro  #Encephalopathy   -p/w lethargy potentially from leukostasis vs sepsis  -CTH showing old infarct, no acute process  -currently sedated on precedex while intubated      CV  #Hemodynamics  -hypotensive likely iso vasoplegia from sedatives and sepsis  -volume down on POCUS, will start with 1L LR bolus  -currently on levo, ween as tolerated- can start midodrine or droxydopa to assist with weening      #Chest Pain  -demand vs leukostasis vs non cardiac   -reported JOSEF in II, III, aVF at [facility] --> repeat EKG without ST changes or Q waves  -trops peaked  -cards recs appreciated    #CHF  -unclear hx but elevated pro-BNP to 2600s  -TTE: EF 54%, no significant abnormalities     Resp  #Acute hypoxic respiratory failure  -pna vs leukostasis   -intubated: 460/18/40/5; ween off as tolerated  -s/p thora draining 1500cc fluid 4/16 with chest tube placement; c/w exudative effusion  -MRSA neg  -pleural fluid growing strep pneumo; BCx growing strep pneumo  -was on vanc/zosyn/azithro (4/16)-->deescalated to CTX 2g qd (4/17 - )  -24h chest tube output: 90cc x24h - removed 4/19  -f/u repeat CXR 4/20 to monitor small apical L pneumo post chest tube removal  -trial Tpiece 4/21    GI  #Diet: TF    /Renal  -no active issues    Heme/Onc  #Leukocytosis  #CLL  - on presentation  -heme c/s appreciated  -no plan for leukophoresis at this time given mature lymphocytes  -follow TLS labs    #DVT ppx: lovenox    ID  #PNA  -BCx + strep pneumo; fluid culture with strep pneumo  -repeat BCx 4/18 ngtd  -repeat BCx 4/20 iso fevers still  -was on vanc/zosyn/azithro (4/16)-->deescalated to CTX (4/17 - )    Endo  -ISS q6h while NPO    Ethics: Full Code

## 2024-04-20 NOTE — PROGRESS NOTE ADULT - ATTENDING COMMENTS
1. Acute hypoxemic respiratory failure due to LLL pneumonia in patient with CLL.  Adequately oxygenating on FIO2 30%. Continue current AC vent settings. Did not tolerate PS wean  again today.  2. Pocus with moderate L pleural effusion.  Empyema. No  PTX on CXR today.  2. ID. POCUS reveals LLL consolidation with dynamic air bronchograms. Blood cx with strep pneumonia. Pleural fluid likely strep pneumonia. Change antibiotics to Ceftriaxone.    3. Hypotension from sepsis and sedation. Titrate Levophed for [id number].  4.Heme: WBC< 200k. Small cells . No blast cells. Pt with CLL. No need for leukopheresis.  5.Cardiac.  minimal ST elevation inferior  leads  troponin decreasing.  6.DVT prophylaxis: Lovenox  7. GOC: Full code

## 2024-04-20 NOTE — PROGRESS NOTE ADULT - SUBJECTIVE AND OBJECTIVE BOX
Patient is a 69y old  Female who presents with a chief complaint of Transfer for leukapheresis (19 Apr 2024 10:37)      24 hour events: ***    REVIEW OF SYSTEMS:  Constitutional: [ ] fevers [ ] chills [ ] weight loss [ ] weight gain  HEENT: [ ] dry eyes [ ] eye irritation [ ] postnasal drip [ ] nasal congestion  CV: [ ] chest pain [ ] orthopnea [ ] palpitations [ ] murmur  Resp: [ ] cough [ ] shortness of breath [ ] dyspnea [ ] wheezing [ ] sputum [ ] hemoptysis  GI: [ ] nausea [ ] vomiting [ ] diarrhea [ ] constipation [ ] abd pain [ ] dysphagia   : [ ] dysuria [ ] nocturia [ ] hematuria [ ] increased urinary frequency  Musculoskeletal: [ ] back pain [ ] myalgias [ ] arthralgias [ ] fracture  Skin: [ ] rash [ ] itch  Neurological: [ ] headache [ ] dizziness [ ] syncope [ ] weakness [ ] numbness  Psychiatric: [ ] anxiety [ ] depression  Endocrine: [ ] diabetes [ ] thyroid problem  Hematologic/Lymphatic: [ ] anemia [ ] bleeding problem  Allergic/Immunologic: [ ] itchy eyes [ ] nasal discharge [ ] hives [ ] angioedema  [ ] All other systems negative  [ ] Unable to assess ROS because ________    OBJECTIVE:  ICU Vital Signs Last 24 Hrs  T(C): 37.7 (20 Apr 2024 06:00), Max: 38.6 (19 Apr 2024 12:00)  T(F): 99.9 (20 Apr 2024 06:00), Max: 101.5 (19 Apr 2024 12:00)  HR: 76 (20 Apr 2024 06:45) (62 - 85)  BP: 115/57 (20 Apr 2024 06:45) (73/43 - 122/59)  BP(mean): 82 (20 Apr 2024 06:45) (53 - 85)  ABP: --  ABP(mean): --  RR: 18 (20 Apr 2024 06:45) (0 - 38)  SpO2: 100% (20 Apr 2024 06:45) (88% - 100%)    O2 Parameters below as of 19 Apr 2024 23:49  Patient On (Oxygen Delivery Method): ventilator          Mode: AC/ CMV (Assist Control/ Continuous Mandatory Ventilation), RR (machine): 14, TV (machine): 460, FiO2: 30, PEEP: 5, ITime: 1, MAP: 11, PIP: 29    04-19 @ 07:01  -  04-20 @ 07:00  --------------------------------------------------------  IN: 2172.3 mL / OUT: 925 mL / NET: 1247.3 mL      CAPILLARY BLOOD GLUCOSE  157 (20 Apr 2024 05:45)      POCT Blood Glucose.: 157 mg/dL (20 Apr 2024 05:57)      PHYSICAL EXAM:  GENERAL: NAD, lying in bed comfortably  HEAD:  Atraumatic, Normocephalic  EYES: EOMI, PERRLA, conjunctiva and sclera clear  ENT: Moist mucous membranes  NECK: Supple, No JVD  CHEST/LUNG: Clear to auscultation bilaterally; No rales, rhonchi, wheezing, or rubs. Unlabored respirations  HEART: Regular rate and rhythm; No murmurs, rubs, or gallops  ABDOMEN: Bowel sounds present; Soft, Nontender, Nondistended. No hepatomegally  EXTREMITIES:  2+ Peripheral Pulses, brisk capillary refill. No clubbing, cyanosis, or edema  NERVOUS SYSTEM:  Alert & Oriented X3, speech clear. No deficits   MSK: FROM all 4 extremities, full and equal strength  SKIN: No rashes or lesions      LINES:    HOSPITAL MEDICATIONS:  MEDICATIONS  (STANDING):  acetaminophen   Oral Liquid .. 650 milliGRAM(s) Oral once  cefTRIAXone   IVPB 2000 milliGRAM(s) IV Intermittent every 24 hours  chlorhexidine 0.12% Liquid 15 milliLiter(s) Oral Mucosa every 12 hours  chlorhexidine 2% Cloths 1 Application(s) Topical <User Schedule>  dextrose 50% Injectable 25 Gram(s) IV Push once  dextrose 50% Injectable 12.5 Gram(s) IV Push once  enoxaparin Injectable 40 milliGRAM(s) SubCutaneous every 12 hours  glucagon  Injectable 1 milliGRAM(s) IntraMuscular once  insulin lispro (ADMELOG) corrective regimen sliding scale   SubCutaneous every 6 hours  mupirocin 2% Nasal 1 Application(s) Both Nostrils two times a day  norepinephrine Infusion 0.05 MICROgram(s)/kG/Min (10.3 mL/Hr) IV Continuous <Continuous>  polyethylene glycol 3350 17 Gram(s) Oral every 12 hours  propofol Infusion 10 MICROgram(s)/kG/Min (6.6 mL/Hr) IV Continuous <Continuous>  senna Syrup 10 milliLiter(s) Oral at bedtime  sodium chloride 3%  Inhalation 4 milliLiter(s) Inhalation every 6 hours    MEDICATIONS  (PRN):  albuterol/ipratropium for Nebulization 3 milliLiter(s) Nebulizer every 6 hours PRN Shortness of Breath and/or Wheezing  fentaNYL    Injectable 50 MICROGram(s) IV Push every 3 hours PRN Mild Pain (1 - 3)  nystatin Powder 1 Application(s) Topical two times a day PRN skin irritation      LABS:                        9.4    168.91 )-----------( 79       ( 20 Apr 2024 00:20 )             29.0     Hgb Trend: 9.4<--, 10.4<--, 10.8<--, 11.0<--, 11.3<--  04-20    139  |  105  |  16  ----------------------------<  169<H>  6.1<H>   |  27  |  0.33<L>    Ca    8.6      20 Apr 2024 00:20  Phos  3.4     04-20  Mg     2.8     04-20    TPro  5.7<L>  /  Alb  2.6<L>  /  TBili  0.5  /  DBili  x   /  AST  44<H>  /  ALT  40  /  AlkPhos  74  04-20    Creatinine Trend: 0.33<--, 0.37<--, 0.44<--, 0.41<--, 0.52<--, 0.59<--  PT/INR - ( 20 Apr 2024 00:20 )   PT: 13.2 sec;   INR: 1.21 ratio         PTT - ( 20 Apr 2024 00:20 )  PTT:30.4 sec  Urinalysis Basic - ( 20 Apr 2024 00:20 )    Color: x / Appearance: x / SG: x / pH: x  Gluc: 169 mg/dL / Ketone: x  / Bili: x / Urobili: x   Blood: x / Protein: x / Nitrite: x   Leuk Esterase: x / RBC: x / WBC x   Sq Epi: x / Non Sq Epi: x / Bacteria: x      Arterial Blood Gas:  04-19 @ 00:15  7.49/39/65/30/94.2/5.9  ABG lactate: --    Venous Blood Gas:  04-20 @ 00:10  7.43/45/39/30/64.6  VBG Lactate: 1.3  Venous Blood Gas:  04-18 @ 22:21  7.42/48/44/31/72.0  VBG Lactate: 1.7       Patient is a 69y old  Female who presents with a chief complaint of Transfer for leukapheresis (19 Apr 2024 10:37)      24 hour events: Started on hypertonic saline neb overnight for secretions. Not tolerating PS this morning.     REVIEW OF SYSTEMS:  Constitutional: [ ] fevers [ ] chills [ ] weight loss [ ] weight gain  HEENT: [ ] dry eyes [ ] eye irritation [ ] postnasal drip [ ] nasal congestion  CV: [ ] chest pain [ ] orthopnea [ ] palpitations [ ] murmur  Resp: [ ] cough [ ] shortness of breath [ ] dyspnea [ ] wheezing [ ] sputum [ ] hemoptysis  GI: [ ] nausea [ ] vomiting [ ] diarrhea [ ] constipation [ ] abd pain [ ] dysphagia   : [ ] dysuria [ ] nocturia [ ] hematuria [ ] increased urinary frequency  Musculoskeletal: [ ] back pain [ ] myalgias [ ] arthralgias [ ] fracture  Skin: [ ] rash [ ] itch  Neurological: [ ] headache [ ] dizziness [ ] syncope [ ] weakness [ ] numbness  Psychiatric: [ ] anxiety [ ] depression  Endocrine: [ ] diabetes [ ] thyroid problem  Hematologic/Lymphatic: [ ] anemia [ ] bleeding problem  Allergic/Immunologic: [ ] itchy eyes [ ] nasal discharge [ ] hives [ ] angioedema  [ ] All other systems negative  [x ] Unable to assess ROS because sedated    OBJECTIVE:  ICU Vital Signs Last 24 Hrs  T(C): 37.7 (20 Apr 2024 06:00), Max: 38.6 (19 Apr 2024 12:00)  T(F): 99.9 (20 Apr 2024 06:00), Max: 101.5 (19 Apr 2024 12:00)  HR: 76 (20 Apr 2024 06:45) (62 - 85)  BP: 115/57 (20 Apr 2024 06:45) (73/43 - 122/59)  BP(mean): 82 (20 Apr 2024 06:45) (53 - 85)  ABP: --  ABP(mean): --  RR: 18 (20 Apr 2024 06:45) (0 - 38)  SpO2: 100% (20 Apr 2024 06:45) (88% - 100%)    O2 Parameters below as of 19 Apr 2024 23:49  Patient On (Oxygen Delivery Method): ventilator          Mode: AC/ CMV (Assist Control/ Continuous Mandatory Ventilation), RR (machine): 14, TV (machine): 460, FiO2: 30, PEEP: 5, ITime: 1, MAP: 11, PIP: 29    04-19 @ 07:01  -  04-20 @ 07:00  --------------------------------------------------------  IN: 2172.3 mL / OUT: 925 mL / NET: 1247.3 mL      CAPILLARY BLOOD GLUCOSE  157 (20 Apr 2024 05:45)      POCT Blood Glucose.: 157 mg/dL (20 Apr 2024 05:57)      PHYSICAL EXAM:  GENERAL: NAD, opens eyes and moves extremities spontaneously   HEAD:  Atraumatic, Normocephalic  CHEST/LUNG: mechanical breath sounds b/l; + rhonchi  HEART: Regular rate and rhythm; No murmurs, rubs, or gallops  ABDOMEN: Bowel sounds present; Soft, Nontender, Nondistended. No hepatomegally  EXTREMITIES:  2+ Peripheral Pulses, brisk capillary refill. No clubbing, cyanosis, or edema      LINES:    HOSPITAL MEDICATIONS:  MEDICATIONS  (STANDING):  acetaminophen   Oral Liquid .. 650 milliGRAM(s) Oral once  cefTRIAXone   IVPB 2000 milliGRAM(s) IV Intermittent every 24 hours  chlorhexidine 0.12% Liquid 15 milliLiter(s) Oral Mucosa every 12 hours  chlorhexidine 2% Cloths 1 Application(s) Topical <User Schedule>  dextrose 50% Injectable 25 Gram(s) IV Push once  dextrose 50% Injectable 12.5 Gram(s) IV Push once  enoxaparin Injectable 40 milliGRAM(s) SubCutaneous every 12 hours  glucagon  Injectable 1 milliGRAM(s) IntraMuscular once  insulin lispro (ADMELOG) corrective regimen sliding scale   SubCutaneous every 6 hours  mupirocin 2% Nasal 1 Application(s) Both Nostrils two times a day  norepinephrine Infusion 0.05 MICROgram(s)/kG/Min (10.3 mL/Hr) IV Continuous <Continuous>  polyethylene glycol 3350 17 Gram(s) Oral every 12 hours  propofol Infusion 10 MICROgram(s)/kG/Min (6.6 mL/Hr) IV Continuous <Continuous>  senna Syrup 10 milliLiter(s) Oral at bedtime  sodium chloride 3%  Inhalation 4 milliLiter(s) Inhalation every 6 hours    MEDICATIONS  (PRN):  albuterol/ipratropium for Nebulization 3 milliLiter(s) Nebulizer every 6 hours PRN Shortness of Breath and/or Wheezing  fentaNYL    Injectable 50 MICROGram(s) IV Push every 3 hours PRN Mild Pain (1 - 3)  nystatin Powder 1 Application(s) Topical two times a day PRN skin irritation      LABS:                        9.4    168.91 )-----------( 79       ( 20 Apr 2024 00:20 )             29.0     Hgb Trend: 9.4<--, 10.4<--, 10.8<--, 11.0<--, 11.3<--  04-20    139  |  105  |  16  ----------------------------<  169<H>  6.1<H>   |  27  |  0.33<L>    Ca    8.6      20 Apr 2024 00:20  Phos  3.4     04-20  Mg     2.8     04-20    TPro  5.7<L>  /  Alb  2.6<L>  /  TBili  0.5  /  DBili  x   /  AST  44<H>  /  ALT  40  /  AlkPhos  74  04-20    Creatinine Trend: 0.33<--, 0.37<--, 0.44<--, 0.41<--, 0.52<--, 0.59<--  PT/INR - ( 20 Apr 2024 00:20 )   PT: 13.2 sec;   INR: 1.21 ratio         PTT - ( 20 Apr 2024 00:20 )  PTT:30.4 sec  Urinalysis Basic - ( 20 Apr 2024 00:20 )    Color: x / Appearance: x / SG: x / pH: x  Gluc: 169 mg/dL / Ketone: x  / Bili: x / Urobili: x   Blood: x / Protein: x / Nitrite: x   Leuk Esterase: x / RBC: x / WBC x   Sq Epi: x / Non Sq Epi: x / Bacteria: x      Arterial Blood Gas:  04-19 @ 00:15  7.49/39/65/30/94.2/5.9  ABG lactate: --    Venous Blood Gas:  04-20 @ 00:10  7.43/45/39/30/64.6  VBG Lactate: 1.3  Venous Blood Gas:  04-18 @ 22:21  7.42/48/44/31/72.0  VBG Lactate: 1.7

## 2024-04-21 LAB
ALBUMIN SERPL ELPH-MCNC: 2.5 G/DL — LOW (ref 3.3–5)
ALP SERPL-CCNC: 71 U/L — SIGNIFICANT CHANGE UP (ref 40–120)
ALT FLD-CCNC: 34 U/L — SIGNIFICANT CHANGE UP (ref 10–45)
ANION GAP SERPL CALC-SCNC: 13 MMOL/L — SIGNIFICANT CHANGE UP (ref 5–17)
APTT BLD: 30.5 SEC — SIGNIFICANT CHANGE UP (ref 24.5–35.6)
AST SERPL-CCNC: 31 U/L — SIGNIFICANT CHANGE UP (ref 10–40)
BASOPHILS # BLD AUTO: 0 K/UL — SIGNIFICANT CHANGE UP (ref 0–0.2)
BASOPHILS NFR BLD AUTO: 0 % — SIGNIFICANT CHANGE UP (ref 0–2)
BILIRUB SERPL-MCNC: 0.4 MG/DL — SIGNIFICANT CHANGE UP (ref 0.2–1.2)
BUN SERPL-MCNC: 13 MG/DL — SIGNIFICANT CHANGE UP (ref 7–23)
CALCIUM SERPL-MCNC: 8.7 MG/DL — SIGNIFICANT CHANGE UP (ref 8.4–10.5)
CHLORIDE SERPL-SCNC: 107 MMOL/L — SIGNIFICANT CHANGE UP (ref 96–108)
CO2 SERPL-SCNC: 24 MMOL/L — SIGNIFICANT CHANGE UP (ref 22–31)
CREAT SERPL-MCNC: <0.3 MG/DL — LOW (ref 0.5–1.3)
CULTURE RESULTS: ABNORMAL
CULTURE RESULTS: SIGNIFICANT CHANGE UP
CULTURE RESULTS: SIGNIFICANT CHANGE UP
EGFR: 115 ML/MIN/1.73M2 — SIGNIFICANT CHANGE UP
ELLIPTOCYTES BLD QL SMEAR: SLIGHT — SIGNIFICANT CHANGE UP
EOSINOPHIL # BLD AUTO: 0 K/UL — SIGNIFICANT CHANGE UP (ref 0–0.5)
EOSINOPHIL NFR BLD AUTO: 0 % — SIGNIFICANT CHANGE UP (ref 0–6)
GAS PNL BLDA: SIGNIFICANT CHANGE UP
GIANT PLATELETS BLD QL SMEAR: PRESENT — SIGNIFICANT CHANGE UP
GLUCOSE BLDC GLUCOMTR-MCNC: 126 MG/DL — HIGH (ref 70–99)
GLUCOSE BLDC GLUCOMTR-MCNC: 136 MG/DL — HIGH (ref 70–99)
GLUCOSE BLDC GLUCOMTR-MCNC: 140 MG/DL — HIGH (ref 70–99)
GLUCOSE BLDC GLUCOMTR-MCNC: 144 MG/DL — HIGH (ref 70–99)
GLUCOSE SERPL-MCNC: 173 MG/DL — HIGH (ref 70–99)
HCT VFR BLD CALC: 28 % — LOW (ref 34.5–45)
HGB BLD-MCNC: 9 G/DL — LOW (ref 11.5–15.5)
INR BLD: 1.28 RATIO — HIGH (ref 0.85–1.18)
LDH SERPL L TO P-CCNC: 280 U/L — HIGH (ref 50–242)
LG PLATELETS BLD QL AUTO: SLIGHT — SIGNIFICANT CHANGE UP
LYMPHOCYTES # BLD AUTO: 146.35 K/UL — HIGH (ref 1–3.3)
LYMPHOCYTES # BLD AUTO: 90 % — HIGH (ref 13–44)
MAGNESIUM SERPL-MCNC: 2.8 MG/DL — HIGH (ref 1.6–2.6)
MANUAL SMEAR VERIFICATION: SIGNIFICANT CHANGE UP
MCHC RBC-ENTMCNC: 28.2 PG — SIGNIFICANT CHANGE UP (ref 27–34)
MCHC RBC-ENTMCNC: 32.1 GM/DL — SIGNIFICANT CHANGE UP (ref 32–36)
MCV RBC AUTO: 87.8 FL — SIGNIFICANT CHANGE UP (ref 80–100)
MONOCYTES # BLD AUTO: 4.88 K/UL — HIGH (ref 0–0.9)
MONOCYTES NFR BLD AUTO: 3 % — SIGNIFICANT CHANGE UP (ref 2–14)
NEUTROPHILS # BLD AUTO: 11.38 K/UL — HIGH (ref 1.8–7.4)
NEUTROPHILS NFR BLD AUTO: 7 % — LOW (ref 43–77)
NRBC # BLD: 0 /100 WBCS — SIGNIFICANT CHANGE UP (ref 0–0)
ORGANISM # SPEC MICROSCOPIC CNT: ABNORMAL
ORGANISM # SPEC MICROSCOPIC CNT: ABNORMAL
PHOSPHATE SERPL-MCNC: 3.4 MG/DL — SIGNIFICANT CHANGE UP (ref 2.5–4.5)
PLAT MORPH BLD: ABNORMAL
PLATELET # BLD AUTO: 131 K/UL — LOW (ref 150–400)
POIKILOCYTOSIS BLD QL AUTO: SLIGHT — SIGNIFICANT CHANGE UP
POTASSIUM SERPL-MCNC: 4.9 MMOL/L — SIGNIFICANT CHANGE UP (ref 3.5–5.3)
POTASSIUM SERPL-SCNC: 4.9 MMOL/L — SIGNIFICANT CHANGE UP (ref 3.5–5.3)
PROT SERPL-MCNC: 6 G/DL — SIGNIFICANT CHANGE UP (ref 6–8.3)
PROTHROM AB SERPL-ACNC: 13.3 SEC — HIGH (ref 9.5–13)
RBC # BLD: 3.19 M/UL — LOW (ref 3.8–5.2)
RBC # FLD: 16.2 % — HIGH (ref 10.3–14.5)
RBC BLD AUTO: SIGNIFICANT CHANGE UP
SMUDGE CELLS # BLD: PRESENT — SIGNIFICANT CHANGE UP
SODIUM SERPL-SCNC: 144 MMOL/L — SIGNIFICANT CHANGE UP (ref 135–145)
SPECIMEN SOURCE: SIGNIFICANT CHANGE UP
TARGETS BLD QL SMEAR: SLIGHT — SIGNIFICANT CHANGE UP
URATE SERPL-MCNC: 1.5 MG/DL — LOW (ref 2.5–7)
WBC # BLD: 162.61 K/UL — CRITICAL HIGH (ref 3.8–10.5)
WBC # FLD AUTO: 162.61 K/UL — CRITICAL HIGH (ref 3.8–10.5)

## 2024-04-21 PROCEDURE — 71045 X-RAY EXAM CHEST 1 VIEW: CPT | Mod: 26

## 2024-04-21 PROCEDURE — 99291 CRITICAL CARE FIRST HOUR: CPT | Mod: GC

## 2024-04-21 RX ORDER — FENTANYL CITRATE 50 UG/ML
50 INJECTION INTRAVENOUS EVERY 4 HOURS
Refills: 0 | Status: DISCONTINUED | OUTPATIENT
Start: 2024-04-21 | End: 2024-04-25

## 2024-04-21 RX ORDER — ACETAMINOPHEN 500 MG
650 TABLET ORAL ONCE
Refills: 0 | Status: COMPLETED | OUTPATIENT
Start: 2024-04-21 | End: 2024-04-21

## 2024-04-21 RX ADMIN — DEXMEDETOMIDINE HYDROCHLORIDE IN 0.9% SODIUM CHLORIDE 5.55 MICROGRAM(S)/KG/HR: 4 INJECTION INTRAVENOUS at 05:44

## 2024-04-21 RX ADMIN — Medication 10.3 MICROGRAM(S)/KG/MIN: at 05:44

## 2024-04-21 RX ADMIN — SODIUM CHLORIDE 4 MILLILITER(S): 9 INJECTION INTRAMUSCULAR; INTRAVENOUS; SUBCUTANEOUS at 17:30

## 2024-04-21 RX ADMIN — CEFTRIAXONE 100 MILLIGRAM(S): 500 INJECTION, POWDER, FOR SOLUTION INTRAMUSCULAR; INTRAVENOUS at 11:26

## 2024-04-21 RX ADMIN — DROXIDOPA 100 MILLIGRAM(S): 100 CAPSULE ORAL at 05:44

## 2024-04-21 RX ADMIN — Medication 650 MILLIGRAM(S): at 20:45

## 2024-04-21 RX ADMIN — SENNA PLUS 10 MILLILITER(S): 8.6 TABLET ORAL at 21:49

## 2024-04-21 RX ADMIN — CHLORHEXIDINE GLUCONATE 15 MILLILITER(S): 213 SOLUTION TOPICAL at 05:45

## 2024-04-21 RX ADMIN — FENTANYL CITRATE 50 MICROGRAM(S): 50 INJECTION INTRAVENOUS at 18:25

## 2024-04-21 RX ADMIN — PROPOFOL 6.6 MICROGRAM(S)/KG/MIN: 10 INJECTION, EMULSION INTRAVENOUS at 17:20

## 2024-04-21 RX ADMIN — MUPIROCIN 1 APPLICATION(S): 20 OINTMENT TOPICAL at 17:20

## 2024-04-21 RX ADMIN — Medication 1: at 00:55

## 2024-04-21 RX ADMIN — POLYETHYLENE GLYCOL 3350 17 GRAM(S): 17 POWDER, FOR SOLUTION ORAL at 17:20

## 2024-04-21 RX ADMIN — DROXIDOPA 100 MILLIGRAM(S): 100 CAPSULE ORAL at 17:19

## 2024-04-21 RX ADMIN — DROXIDOPA 100 MILLIGRAM(S): 100 CAPSULE ORAL at 11:26

## 2024-04-21 RX ADMIN — FENTANYL CITRATE 50 MICROGRAM(S): 50 INJECTION INTRAVENOUS at 11:26

## 2024-04-21 RX ADMIN — MUPIROCIN 1 APPLICATION(S): 20 OINTMENT TOPICAL at 05:45

## 2024-04-21 RX ADMIN — ENOXAPARIN SODIUM 40 MILLIGRAM(S): 100 INJECTION SUBCUTANEOUS at 05:45

## 2024-04-21 RX ADMIN — ENOXAPARIN SODIUM 40 MILLIGRAM(S): 100 INJECTION SUBCUTANEOUS at 17:20

## 2024-04-21 RX ADMIN — FENTANYL CITRATE 50 MICROGRAM(S): 50 INJECTION INTRAVENOUS at 18:10

## 2024-04-21 RX ADMIN — SODIUM CHLORIDE 4 MILLILITER(S): 9 INJECTION INTRAMUSCULAR; INTRAVENOUS; SUBCUTANEOUS at 23:17

## 2024-04-21 RX ADMIN — SODIUM CHLORIDE 4 MILLILITER(S): 9 INJECTION INTRAMUSCULAR; INTRAVENOUS; SUBCUTANEOUS at 05:23

## 2024-04-21 RX ADMIN — PROPOFOL 6.6 MICROGRAM(S)/KG/MIN: 10 INJECTION, EMULSION INTRAVENOUS at 05:40

## 2024-04-21 RX ADMIN — POLYETHYLENE GLYCOL 3350 17 GRAM(S): 17 POWDER, FOR SOLUTION ORAL at 05:45

## 2024-04-21 RX ADMIN — Medication 10.3 MICROGRAM(S)/KG/MIN: at 17:20

## 2024-04-21 RX ADMIN — FENTANYL CITRATE 50 MICROGRAM(S): 50 INJECTION INTRAVENOUS at 11:45

## 2024-04-21 RX ADMIN — CHLORHEXIDINE GLUCONATE 15 MILLILITER(S): 213 SOLUTION TOPICAL at 17:20

## 2024-04-21 RX ADMIN — Medication 650 MILLIGRAM(S): at 20:03

## 2024-04-21 RX ADMIN — CHLORHEXIDINE GLUCONATE 1 APPLICATION(S): 213 SOLUTION TOPICAL at 05:45

## 2024-04-21 RX ADMIN — SODIUM CHLORIDE 4 MILLILITER(S): 9 INJECTION INTRAMUSCULAR; INTRAVENOUS; SUBCUTANEOUS at 11:20

## 2024-04-21 NOTE — PROGRESS NOTE ADULT - ASSESSMENT
69F h/o CVA (bedbound at baseline), COPD, CHF, HTN presenting as transfer from [facility] for SOB iso leukocytosis to 233 c/f acute leukemia. Pt intubated and on pressors, transferred to MICU for further management and possible leukophoresis.    Neuro  #Encephalopathy   -p/w lethargy potentially from leukostasis vs sepsis  -CTH showing old infarct, no acute process  -currently sedated propofol   -precedex stopped overnight        CV  #Hemodynamics  -hypotensive likely iso vasoplegia from sedatives and sepsis  -currently on levo, ween as tolerated- can start midodrine or droxydopa to assist with weening      #Chest Pain  -demand vs leukostasis vs non cardiac   -reported JOSEF in II, III, aVF at [facility] --> repeat EKG without ST changes or Q waves  -trops peaked  -cards recs appreciated    #CHF  -unclear hx but elevated pro-BNP to 2600s  -TTE: EF 54%, no significant abnormalities     Resp  #Acute hypoxic respiratory failure  -pna vs leukostasis   -intubated: 460/14/30/5; ween off as tolerated  -s/p thora draining 1500cc fluid 4/16 with chest tube placement; c/w exudative effusion  -MRSA neg  -pleural fluid growing strep pneumo; BCx growing strep pneumo  -was on vanc/zosyn/azithro (4/16)-->deescalated to CTX 2g qd (4/17 - )  -24h chest tube output: 90cc x24h - removed 4/19  -trial Tpiece 4/21    GI  #Diet: TF    /Renal  -no active issues    Heme/Onc  #Leukocytosis  #CLL  - on presentation  -heme c/s appreciated  -no plan for leukophoresis at this time given mature lymphocytes  -follow TLS labs    #DVT ppx: lovenox    ID  #PNA  -BCx + strep pneumo; fluid culture with strep pneumo  -repeat BCx 4/18 ngtd  -repeat BCx 4/20 iso fevers still  -was on vanc/zosyn/azithro (4/16)-->deescalated to CTX (4/17 - )    Endo  -ISS q6h while NPO    Ethics: Full Code   69F h/o CVA (bedbound at baseline), COPD, CHF, HTN presenting as transfer from [facility] for SOB iso leukocytosis to 233 c/f acute leukemia. Pt intubated and on pressors, transferred to MICU for further management and possible leukophoresis.    Neuro  #Encephalopathy   -p/w lethargy potentially from leukostasis vs sepsis  -CTH showing old infarct, no acute process  -currently sedated propofol   -precedex stopped overnight    CV  #Hemodynamics  -hypotensive likely iso vasoplegia from sedatives and sepsis  -currently on levo, ween as tolerated- can start midodrine or droxydopa to assist with weening    #Chest Pain  -demand vs leukostasis vs non cardiac   -reported JOSEF in II, III, aVF at [facility] --> repeat EKG without ST changes or Q waves  -trops peaked  -cards recs appreciated    #CHF  -unclear hx but elevated pro-BNP to 2600s  -TTE: EF 54%, no significant abnormalities     Resp  #Acute hypoxic respiratory failure  -pna vs leukostasis   -intubated: 460/14/30/5; ween off as tolerated  -s/p thora draining 1500cc fluid 4/16 with chest tube placement; c/w exudative effusion  -MRSA neg  -pleural fluid growing strep pneumo; BCx growing strep pneumo  -was on vanc/zosyn/azithro (4/16)-->deescalated to CTX 2g qd (4/17 - )  -24h chest tube output: 90cc x24h - removed 4/19  -trial Tpiece 4/21    GI  #Diet: TF    /Renal  -no active issues    Heme/Onc  #Leukocytosis  #CLL  - on presentation  -no plan for leukophoresis at this time given mature lymphocytes  -follow TLS labs  -If uric acid > 8, start allopurinol and give 3 mg IV rasburicase, and if uric acid > 12 give 6 mg rasburicase    #DVT ppx: lovenox    ID  #PNA  -BCx + strep pneumo; fluid culture with strep pneumo  -repeat BCx 4/18 ngtd  -repeat BCx 4/20 iso fevers still  -was on vanc/zosyn/azithro (4/16)-->deescalated to CTX (4/17 - )    Endo  -ISS q6h while NPO    Ethics: Full Code   69F h/o CVA (bedbound at baseline), COPD, CHF, HTN presenting as transfer from [facility] for SOB iso leukocytosis to 233 c/f acute leukemia. Pt intubated and on pressors, transferred to MICU for further management and possible leukophoresis.    Neuro  #Encephalopathy   -p/w lethargy potentially from leukostasis vs sepsis  -CTH showing old infarct, no acute process  -precedex stopped overnight  -d/c propofol    CV  #Hemodynamics  -hypotensive likely iso vasoplegia from sedatives and sepsis  -currently on levo, ween as tolerated- can start midodrine or droxydopa to assist with weening    #Chest Pain  -demand vs leukostasis vs non cardiac   -reported JOSEF in II, III, aVF at [facility] --> repeat EKG without ST changes or Q waves  -trops peaked  -cards recs appreciated    #CHF  -unclear hx but elevated pro-BNP to 2600s  -TTE: EF 54%, no significant abnormalities     Resp  #Acute hypoxic respiratory failure  -pna vs leukostasis   -intubated: 460/14/30/5; ween off as tolerated  -s/p thora draining 1500cc fluid 4/16 with chest tube placement; c/w exudative effusion  -MRSA neg  -pleural fluid growing strep pneumo; BCx growing strep pneumo  -was on vanc/zosyn/azithro (4/16)-->deescalated to CTX 2g qd (4/17 - )  -24h chest tube output: 90cc x24h - removed 4/19  -d/c daily xrays  -trial Tpiece 4/21    GI  #Diet: TF    /Renal  -no active issues    Heme/Onc  #Leukocytosis  #CLL  - on presentation  -no plan for leukophoresis at this time given mature lymphocytes  -follow TLS labs  -If uric acid > 8, start allopurinol and give 3 mg IV rasburicase, and if uric acid > 12 give 6 mg rasburicase    #DVT ppx: lovenox    ID  #PNA  -BCx + strep pneumo; fluid culture with strep pneumo  -repeat BCx 4/18 ngtd  -repeat BCx 4/20 iso fevers still  -was on vanc/zosyn/azithro (4/16)-->deescalated to CTX (4/17 - )    Endo  -ISS q6h while NPO    Ethics: Full Code   69F h/o CVA (bedbound at baseline), COPD, CHF, HTN presenting as transfer from [facility] for SOB iso leukocytosis to 233 c/f acute leukemia. Pt intubated and on pressors, transferred to MICU for further management and possible leukophoresis.    Neuro  #Encephalopathy   -p/w lethargy potentially from leukostasis vs sepsis  -CTH showing old infarct, no acute process  -precedex stopped overnight  -d/c propofol    CV  #Hemodynamics  -hypotensive likely iso vasoplegia from sedatives and sepsis  -currently on levo, ween as tolerated- can start midodrine or droxydopa to assist with weening    #Chest Pain  -demand vs leukostasis vs non cardiac   -reported JOSEF in II, III, aVF at [facility] --> repeat EKG without ST changes or Q waves  -trops peaked  -cards recs appreciated    #CHF  -unclear hx but elevated pro-BNP to 2600s  -TTE: EF 54%, no significant abnormalities     Resp  #Acute hypoxic respiratory failure  -pna vs leukostasis   -intubated: 460/14/30/5; ween off as tolerated  -s/p thora draining 1500cc fluid 4/16 with chest tube placement; c/w exudative effusion  -MRSA neg  -pleural fluid growing strep pneumo; BCx growing strep pneumo  -was on vanc/zosyn/azithro (4/16)-->deescalated to CTX 2g qd (4/17 - )  -24h chest tube output: 90cc x24h - removed 4/19  -d/c daily xrays  -trial Tpiece 4/21    GI  #Diet: TF    /Renal  -no active issues    Heme/Onc  #Leukocytosis  #CLL  - on presentation  -no plan for leukophoresis at this time given mature lymphocytes  -d/c trending TLS labs      #DVT ppx: lovenox    ID  #PNA  -BCx + strep pneumo; fluid culture with strep pneumo  -repeat BCx 4/18 ngtd  -repeat BCx 4/20 iso fevers still  -was on vanc/zosyn/azithro (4/16)-->deescalated to CTX (4/17 - )    Endo  -ISS q6h while NPO    Ethics: Full Code

## 2024-04-21 NOTE — PROGRESS NOTE ADULT - SUBJECTIVE AND OBJECTIVE BOX
69F h/o CVA (bedbound at baseline), COPD, CHF, HTN presenting as transfer from [facility] for SOB iso leukocytosis to 233 c/f acute leukemia. Pt intubated and on pressors, transferred to MICU for further management and possible leukophoresis.      INTERVAL HPI/OVERNIGHT EVENTS:    SUBJECTIVE: Patient seen and examined at bedside.     ROS: All negative except as listed above.    VITAL SIGNS:  ICU Vital Signs Last 24 Hrs  T(C): 38 (21 Apr 2024 08:00), Max: 38.5 (20 Apr 2024 10:00)  T(F): 100.4 (21 Apr 2024 08:00), Max: 101.3 (20 Apr 2024 10:00)  HR: 88 (21 Apr 2024 09:00) (59 - 89)  BP: 101/56 (21 Apr 2024 09:00) (84/46 - 120/56)  BP(mean): 74 (21 Apr 2024 09:00) (61 - 80)  ABP: --  ABP(mean): --  RR: 20 (21 Apr 2024 09:00) (14 - 31)  SpO2: 98% (21 Apr 2024 09:00) (96% - 100%)    O2 Parameters below as of 21 Apr 2024 08:00  Patient On (Oxygen Delivery Method): ventilator    O2 Concentration (%): 30      Mode: AC/ CMV (Assist Control/ Continuous Mandatory Ventilation), RR (machine): 14, TV (machine): 460, FiO2: 30, PEEP: 5, ITime: 1, MAP: 11, PIP: 29  Plateau pressure:   P/F ratio:     04-20 @ 07:01  -  04-21 @ 07:00  --------------------------------------------------------  IN: 2283.5 mL / OUT: 1200 mL / NET: 1083.5 mL    04-21 @ 07:01  -  04-21 @ 09:39  --------------------------------------------------------  IN: 79 mL / OUT: 0 mL / NET: 79 mL      CAPILLARY BLOOD GLUCOSE  157 (20 Apr 2024 05:45)      POCT Blood Glucose.: 136 mg/dL (21 Apr 2024 05:48)      ECG: reviewed.    PHYSICAL EXAM:    GENERAL: NAD, intubated  HEAD:  Atraumatic, normocephalic  EYES: EOMI, PERRLA,   NECK: Supple, trachea midline, no JVD  HEART: Regular rate and rhythm  LUNGS: mechanical respirations.   ABDOMEN: Soft, nontender, nondistended, +BS  EXTREMITIES: 2+ peripheral pulses bilaterally, cap refill<2 secs. No clubbing, cyanosis, or edema  NERVOUS SYSTEM: moves all extremities   SKIN: No rashes or lesions    MEDICATIONS:  MEDICATIONS  (STANDING):  cefTRIAXone   IVPB 2000 milliGRAM(s) IV Intermittent every 24 hours  chlorhexidine 0.12% Liquid 15 milliLiter(s) Oral Mucosa every 12 hours  chlorhexidine 2% Cloths 1 Application(s) Topical <User Schedule>  dexMEDEtomidine Infusion 0.2 MICROgram(s)/kG/Hr (5.55 mL/Hr) IV Continuous <Continuous>  dextrose 50% Injectable 25 Gram(s) IV Push once  dextrose 50% Injectable 12.5 Gram(s) IV Push once  droxidopa 100 milliGRAM(s) Oral three times a day  enoxaparin Injectable 40 milliGRAM(s) SubCutaneous every 12 hours  glucagon  Injectable 1 milliGRAM(s) IntraMuscular once  insulin lispro (ADMELOG) corrective regimen sliding scale   SubCutaneous every 6 hours  mupirocin 2% Nasal 1 Application(s) Both Nostrils two times a day  norepinephrine Infusion 0.049 MICROgram(s)/kG/Min (10.3 mL/Hr) IV Continuous <Continuous>  polyethylene glycol 3350 17 Gram(s) Oral every 12 hours  propofol Infusion 9.91 MICROgram(s)/kG/Min (6.6 mL/Hr) IV Continuous <Continuous>  senna Syrup 10 milliLiter(s) Oral at bedtime  sodium chloride 3%  Inhalation 4 milliLiter(s) Inhalation every 6 hours    MEDICATIONS  (PRN):  albuterol/ipratropium for Nebulization 3 milliLiter(s) Nebulizer every 6 hours PRN Shortness of Breath and/or Wheezing  fentaNYL    Injectable 50 MICROGram(s) IV Push every 3 hours PRN Mild Pain (1 - 3)  nystatin Powder 1 Application(s) Topical two times a day PRN skin irritation      ALLERGIES:  Allergies    No Known Allergies    Intolerances        LABS:                        9.0    162.61 )-----------( 131      ( 21 Apr 2024 00:48 )             28.0     04-21    144  |  107  |  13  ----------------------------<  173<H>  4.9   |  24  |  <0.30<L>    Ca    8.7      21 Apr 2024 00:48  Phos  3.4     04-21  Mg     2.8     04-21    TPro  6.0  /  Alb  2.5<L>  /  TBili  0.4  /  DBili  x   /  AST  31  /  ALT  34  /  AlkPhos  71  04-21    PT/INR - ( 21 Apr 2024 00:48 )   PT: 13.3 sec;   INR: 1.28 ratio         PTT - ( 21 Apr 2024 00:48 )  PTT:30.5 sec  Urinalysis Basic - ( 21 Apr 2024 00:48 )    Color: x / Appearance: x / SG: x / pH: x  Gluc: 173 mg/dL / Ketone: x  / Bili: x / Urobili: x   Blood: x / Protein: x / Nitrite: x   Leuk Esterase: x / RBC: x / WBC x   Sq Epi: x / Non Sq Epi: x / Bacteria: x      ABG:      vBG:    Micro:    Culture - Blood (collected 04-18-24 @ 11:00)  Source: .Blood Blood-Peripheral  Preliminary Report (04-20-24 @ 17:01):    No growth at 48 Hours    Culture - Blood (collected 04-18-24 @ 10:50)  Source: .Blood Blood-Peripheral  Preliminary Report (04-20-24 @ 17:01):    No growth at 48 Hours    Culture - Blood (collected 04-16-24 @ 15:40)  Source: .Blood Blood-Peripheral  Preliminary Report (04-20-24 @ 21:06):    No growth at 4 days    Culture - Blood (collected 04-16-24 @ 13:17)  Source: .Blood Blood-Peripheral  Preliminary Report (04-20-24 @ 18:01):    No growth at 4 days    Culture - Blood (collected 04-16-24 @ 06:42)  Source: .Blood Blood-Peripheral  Gram Stain (04-18-24 @ 11:52):    Growth in aerobic bottle: Gram Positive Cocci in Pairs and Chains    Growth in anaerobic bottle: Gram positive cocci in pairs  Final Report (04-18-24 @ 11:52):    Growth in aerobic and anaerobic bottles: Streptococcus pneumoniae    See previous culture [id number]    Culture - Blood (collected 04-16-24 @ 06:35)  Source: .Blood Blood-Peripheral  Gram Stain (04-18-24 @ 11:51):    Growth in aerobic bottle: Gram positive cocci in pairs    Growth in anaerobic bottle: Gram positive cocci in pairs  Final Report (04-18-24 @ 11:51):    Growth in aerobic and anaerobic bottles: Streptococcus pneumoniae    Direct identification is available within approximately 3-5    hours either by Blood Panel Multiplexed PCR or Direct    MALDI-TOF. Details: [email or web address]  Organism: Blood Culture PCR  Streptococcus pneumoniae (04-18-24 @ 11:51)  Organism: Streptococcus pneumoniae (04-18-24 @ 11:51)      Method Type: LESLY      -  Clindamycin: S <=0.06      -  Erythromycin: S <=0.06 Predicts results for azithromycin.      -  Levofloxacin: S 1      -  Tetracycline: S <=0.5      -  Trimethoprim/Sulfamethoxazole: S <=.25/4.75      -  Vancomycin: S 0.5      -  Ceftriaxone (meningitidis): S <=0.25      -  Ceftriaxone (non-meningitidis): S <=0.25      -  Penicillin (meningitidis): S 0.06      -  Penicillin (non-meningitidis): S 0.06      -  Penicillin (oral penicillin V): S 0.06  Organism: Blood Culture PCR (04-18-24 @ 11:51)      Method Type: PCR      -  Streptococcus pneumoniae: Detec        Culture - Sputum (collected 04-16-24 @ 15:44)  Source: ET Tube ET Tube  Gram Stain (04-16-24 @ 22:00):    Few polymorphonuclear leukocytes per low power field    No Squamous epithelial cells per low power field    No organisms seen per oil power field  Final Report (04-18-24 @ 09:40):    Normal Respiratory Kayla present        RADIOLOGY & ADDITIONAL TESTS: Reviewed. 69F h/o CVA (bedbound at baseline), COPD, CHF, HTN presenting as transfer from [facility] for SOB iso leukocytosis to 233 c/f acute leukemia. Pt intubated and on pressors, transferred to MICU for further management and possible leukophoresis.      INTERVAL HPI/OVERNIGHT EVENTS:  ON: patient became bradycardic to 35. Precedex was stopped.     SUBJECTIVE: Patient seen and examined at bedside.     REVIEW OF SYSTEMS:  Constitutional: [ ] fevers [ ] chills [ ] weight loss [ ] weight gain  HEENT: [ ] dry eyes [ ] eye irritation [ ] postnasal drip [ ] nasal congestion  CV: [ ] chest pain [ ] orthopnea [ ] palpitations [ ] murmur  Resp: [ ] cough [ ] shortness of breath [ ] dyspnea [ ] wheezing [ ] sputum [ ] hemoptysis  GI: [ ] nausea [ ] vomiting [ ] diarrhea [ ] constipation [ ] abd pain [ ] dysphagia   : [ ] dysuria [ ] nocturia [ ] hematuria [ ] increased urinary frequency  Musculoskeletal: [ ] back pain [ ] myalgias [ ] arthralgias [ ] fracture  Skin: [ ] rash [ ] itch  Neurological: [ ] headache [ ] dizziness [ ] syncope [ ] weakness [ ] numbness  Psychiatric: [ ] anxiety [ ] depression  Endocrine: [ ] diabetes [ ] thyroid problem  Hematologic/Lymphatic: [ ] anemia [ ] bleeding problem  Allergic/Immunologic: [ ] itchy eyes [ ] nasal discharge [ ] hives [ ] angioedema  [ ] All other systems negative  [ x] Unable to assess ROS because sedated    VITAL SIGNS:  ICU Vital Signs Last 24 Hrs  T(C): 38 (21 Apr 2024 08:00), Max: 38.5 (20 Apr 2024 10:00)  T(F): 100.4 (21 Apr 2024 08:00), Max: 101.3 (20 Apr 2024 10:00)  HR: 88 (21 Apr 2024 09:00) (59 - 89)  BP: 101/56 (21 Apr 2024 09:00) (84/46 - 120/56)  BP(mean): 74 (21 Apr 2024 09:00) (61 - 80)  ABP: --  ABP(mean): --  RR: 20 (21 Apr 2024 09:00) (14 - 31)  SpO2: 98% (21 Apr 2024 09:00) (96% - 100%)    O2 Parameters below as of 21 Apr 2024 08:00  Patient On (Oxygen Delivery Method): ventilator    O2 Concentration (%): 30      Mode: AC/ CMV (Assist Control/ Continuous Mandatory Ventilation), RR (machine): 14, TV (machine): 460, FiO2: 30, PEEP: 5, ITime: 1, MAP: 11, PIP: 29  Plateau pressure:   P/F ratio:     04-20 @ 07:01  -  04-21 @ 07:00  --------------------------------------------------------  IN: 2283.5 mL / OUT: 1200 mL / NET: 1083.5 mL    04-21 @ 07:01  -  04-21 @ 09:39  --------------------------------------------------------  IN: 79 mL / OUT: 0 mL / NET: 79 mL      CAPILLARY BLOOD GLUCOSE  157 (20 Apr 2024 05:45)      POCT Blood Glucose.: 136 mg/dL (21 Apr 2024 05:48)      ECG: reviewed.    PHYSICAL EXAM:    GENERAL: NAD, intubated  HEAD:  Atraumatic, normocephalic  EYES: EOMI, PERRLA,   NECK: Supple, trachea midline, no JVD  HEART: Regular rate and rhythm  LUNGS: mechanical respirations.   ABDOMEN: Soft, nontender, nondistended, +BS  EXTREMITIES: 2+ peripheral pulses bilaterally, cap refill<2 secs. No clubbing, cyanosis, or edema  NERVOUS SYSTEM: moves all extremities spontaneously  SKIN: No rashes or lesions    MEDICATIONS:  MEDICATIONS  (STANDING):  cefTRIAXone   IVPB 2000 milliGRAM(s) IV Intermittent every 24 hours  chlorhexidine 0.12% Liquid 15 milliLiter(s) Oral Mucosa every 12 hours  chlorhexidine 2% Cloths 1 Application(s) Topical <User Schedule>  dexMEDEtomidine Infusion 0.2 MICROgram(s)/kG/Hr (5.55 mL/Hr) IV Continuous <Continuous>  dextrose 50% Injectable 25 Gram(s) IV Push once  dextrose 50% Injectable 12.5 Gram(s) IV Push once  droxidopa 100 milliGRAM(s) Oral three times a day  enoxaparin Injectable 40 milliGRAM(s) SubCutaneous every 12 hours  glucagon  Injectable 1 milliGRAM(s) IntraMuscular once  insulin lispro (ADMELOG) corrective regimen sliding scale   SubCutaneous every 6 hours  mupirocin 2% Nasal 1 Application(s) Both Nostrils two times a day  norepinephrine Infusion 0.049 MICROgram(s)/kG/Min (10.3 mL/Hr) IV Continuous <Continuous>  polyethylene glycol 3350 17 Gram(s) Oral every 12 hours  propofol Infusion 9.91 MICROgram(s)/kG/Min (6.6 mL/Hr) IV Continuous <Continuous>  senna Syrup 10 milliLiter(s) Oral at bedtime  sodium chloride 3%  Inhalation 4 milliLiter(s) Inhalation every 6 hours    MEDICATIONS  (PRN):  albuterol/ipratropium for Nebulization 3 milliLiter(s) Nebulizer every 6 hours PRN Shortness of Breath and/or Wheezing  fentaNYL    Injectable 50 MICROGram(s) IV Push every 3 hours PRN Mild Pain (1 - 3)  nystatin Powder 1 Application(s) Topical two times a day PRN skin irritation      ALLERGIES:  Allergies    No Known Allergies    Intolerances        LABS:                        9.0    162.61 )-----------( 131      ( 21 Apr 2024 00:48 )             28.0     04-21    144  |  107  |  13  ----------------------------<  173<H>  4.9   |  24  |  <0.30<L>    Ca    8.7      21 Apr 2024 00:48  Phos  3.4     04-21  Mg     2.8     04-21    TPro  6.0  /  Alb  2.5<L>  /  TBili  0.4  /  DBili  x   /  AST  31  /  ALT  34  /  AlkPhos  71  04-21    PT/INR - ( 21 Apr 2024 00:48 )   PT: 13.3 sec;   INR: 1.28 ratio         PTT - ( 21 Apr 2024 00:48 )  PTT:30.5 sec  Urinalysis Basic - ( 21 Apr 2024 00:48 )    Color: x / Appearance: x / SG: x / pH: x  Gluc: 173 mg/dL / Ketone: x  / Bili: x / Urobili: x   Blood: x / Protein: x / Nitrite: x   Leuk Esterase: x / RBC: x / WBC x   Sq Epi: x / Non Sq Epi: x / Bacteria: x      ABG:      vBG:    Micro:    Culture - Blood (collected 04-18-24 @ 11:00)  Source: .Blood Blood-Peripheral  Preliminary Report (04-20-24 @ 17:01):    No growth at 48 Hours    Culture - Blood (collected 04-18-24 @ 10:50)  Source: .Blood Blood-Peripheral  Preliminary Report (04-20-24 @ 17:01):    No growth at 48 Hours    Culture - Blood (collected 04-16-24 @ 15:40)  Source: .Blood Blood-Peripheral  Preliminary Report (04-20-24 @ 21:06):    No growth at 4 days    Culture - Blood (collected 04-16-24 @ 13:17)  Source: .Blood Blood-Peripheral  Preliminary Report (04-20-24 @ 18:01):    No growth at 4 days    Culture - Blood (collected 04-16-24 @ 06:42)  Source: .Blood Blood-Peripheral  Gram Stain (04-18-24 @ 11:52):    Growth in aerobic bottle: Gram Positive Cocci in Pairs and Chains    Growth in anaerobic bottle: Gram positive cocci in pairs  Final Report (04-18-24 @ 11:52):    Growth in aerobic and anaerobic bottles: Streptococcus pneumoniae    See previous culture [id number]    Culture - Blood (collected 04-16-24 @ 06:35)  Source: .Blood Blood-Peripheral  Gram Stain (04-18-24 @ 11:51):    Growth in aerobic bottle: Gram positive cocci in pairs    Growth in anaerobic bottle: Gram positive cocci in pairs  Final Report (04-18-24 @ 11:51):    Growth in aerobic and anaerobic bottles: Streptococcus pneumoniae    Direct identification is available within approximately 3-5    hours either by Blood Panel Multiplexed PCR or Direct    MALDI-TOF. Details: [email or web address]  Organism: Blood Culture PCR  Streptococcus pneumoniae (04-18-24 @ 11:51)  Organism: Streptococcus pneumoniae (04-18-24 @ 11:51)      Method Type: LESLY      -  Clindamycin: S <=0.06      -  Erythromycin: S <=0.06 Predicts results for azithromycin.      -  Levofloxacin: S 1      -  Tetracycline: S <=0.5      -  Trimethoprim/Sulfamethoxazole: S <=.25/4.75      -  Vancomycin: S 0.5      -  Ceftriaxone (meningitidis): S <=0.25      -  Ceftriaxone (non-meningitidis): S <=0.25      -  Penicillin (meningitidis): S 0.06      -  Penicillin (non-meningitidis): S 0.06      -  Penicillin (oral penicillin V): S 0.06  Organism: Blood Culture PCR (04-18-24 @ 11:51)      Method Type: PCR      -  Streptococcus pneumoniae: Detec        Culture - Sputum (collected 04-16-24 @ 15:44)  Source: ET Tube ET Tube  Gram Stain (04-16-24 @ 22:00):    Few polymorphonuclear leukocytes per low power field    No Squamous epithelial cells per low power field    No organisms seen per oil power field  Final Report (04-18-24 @ 09:40):    Normal Respiratory Kayla present        RADIOLOGY & ADDITIONAL TESTS: Reviewed. 69F h/o CVA (bedbound at baseline), COPD, CHF, HTN presenting as transfer from [facility] for SOB iso leukocytosis to [facility] for possible leukophoresis. In ED patient found to be in septic shock,  Pt intubated and on pressors, transferred to MICU for further management and possible leukophoresis.      INTERVAL HPI/OVERNIGHT EVENTS:  ON: patient became bradycardic to 35. Precedex was stopped.     SUBJECTIVE: Patient seen and examined at bedside.     REVIEW OF SYSTEMS:  Constitutional: [ ] fevers [ ] chills [ ] weight loss [ ] weight gain  HEENT: [ ] dry eyes [ ] eye irritation [ ] postnasal drip [ ] nasal congestion  CV: [ ] chest pain [ ] orthopnea [ ] palpitations [ ] murmur  Resp: [ ] cough [ ] shortness of breath [ ] dyspnea [ ] wheezing [ ] sputum [ ] hemoptysis  GI: [ ] nausea [ ] vomiting [ ] diarrhea [ ] constipation [ ] abd pain [ ] dysphagia   : [ ] dysuria [ ] nocturia [ ] hematuria [ ] increased urinary frequency  Musculoskeletal: [ ] back pain [ ] myalgias [ ] arthralgias [ ] fracture  Skin: [ ] rash [ ] itch  Neurological: [ ] headache [ ] dizziness [ ] syncope [ ] weakness [ ] numbness  Psychiatric: [ ] anxiety [ ] depression  Endocrine: [ ] diabetes [ ] thyroid problem  Hematologic/Lymphatic: [ ] anemia [ ] bleeding problem  Allergic/Immunologic: [ ] itchy eyes [ ] nasal discharge [ ] hives [ ] angioedema  [ ] All other systems negative  [ x] Unable to assess ROS because sedated    VITAL SIGNS:  ICU Vital Signs Last 24 Hrs  T(C): 38 (21 Apr 2024 08:00), Max: 38.5 (20 Apr 2024 10:00)  T(F): 100.4 (21 Apr 2024 08:00), Max: 101.3 (20 Apr 2024 10:00)  HR: 88 (21 Apr 2024 09:00) (59 - 89)  BP: 101/56 (21 Apr 2024 09:00) (84/46 - 120/56)  BP(mean): 74 (21 Apr 2024 09:00) (61 - 80)  ABP: --  ABP(mean): --  RR: 20 (21 Apr 2024 09:00) (14 - 31)  SpO2: 98% (21 Apr 2024 09:00) (96% - 100%)    O2 Parameters below as of 21 Apr 2024 08:00  Patient On (Oxygen Delivery Method): ventilator    O2 Concentration (%): 30      Mode: AC/ CMV (Assist Control/ Continuous Mandatory Ventilation), RR (machine): 14, TV (machine): 460, FiO2: 30, PEEP: 5, ITime: 1, MAP: 11, PIP: 29  Plateau pressure:   P/F ratio:     04-20 @ 07:01  -  04-21 @ 07:00  --------------------------------------------------------  IN: 2283.5 mL / OUT: 1200 mL / NET: 1083.5 mL    04-21 @ 07:01  -  04-21 @ 09:39  --------------------------------------------------------  IN: 79 mL / OUT: 0 mL / NET: 79 mL      CAPILLARY BLOOD GLUCOSE  157 (20 Apr 2024 05:45)      POCT Blood Glucose.: 136 mg/dL (21 Apr 2024 05:48)      ECG: reviewed.    PHYSICAL EXAM:    GENERAL: NAD, intubated  HEAD:  Atraumatic, normocephalic  EYES: EOMI, PERRLA,   NECK: Supple, trachea midline, no JVD  HEART: Regular rate and rhythm  LUNGS: mechanical respirations.   ABDOMEN: Soft, nontender, nondistended, +BS  EXTREMITIES: 2+ peripheral pulses bilaterally, cap refill<2 secs. No clubbing, cyanosis, or edema  NERVOUS SYSTEM: moves all extremities spontaneously  SKIN: No rashes or lesions    MEDICATIONS:  MEDICATIONS  (STANDING):  cefTRIAXone   IVPB 2000 milliGRAM(s) IV Intermittent every 24 hours  chlorhexidine 0.12% Liquid 15 milliLiter(s) Oral Mucosa every 12 hours  chlorhexidine 2% Cloths 1 Application(s) Topical <User Schedule>  dexMEDEtomidine Infusion 0.2 MICROgram(s)/kG/Hr (5.55 mL/Hr) IV Continuous <Continuous>  dextrose 50% Injectable 25 Gram(s) IV Push once  dextrose 50% Injectable 12.5 Gram(s) IV Push once  droxidopa 100 milliGRAM(s) Oral three times a day  enoxaparin Injectable 40 milliGRAM(s) SubCutaneous every 12 hours  glucagon  Injectable 1 milliGRAM(s) IntraMuscular once  insulin lispro (ADMELOG) corrective regimen sliding scale   SubCutaneous every 6 hours  mupirocin 2% Nasal 1 Application(s) Both Nostrils two times a day  norepinephrine Infusion 0.049 MICROgram(s)/kG/Min (10.3 mL/Hr) IV Continuous <Continuous>  polyethylene glycol 3350 17 Gram(s) Oral every 12 hours  propofol Infusion 9.91 MICROgram(s)/kG/Min (6.6 mL/Hr) IV Continuous <Continuous>  senna Syrup 10 milliLiter(s) Oral at bedtime  sodium chloride 3%  Inhalation 4 milliLiter(s) Inhalation every 6 hours    MEDICATIONS  (PRN):  albuterol/ipratropium for Nebulization 3 milliLiter(s) Nebulizer every 6 hours PRN Shortness of Breath and/or Wheezing  fentaNYL    Injectable 50 MICROGram(s) IV Push every 3 hours PRN Mild Pain (1 - 3)  nystatin Powder 1 Application(s) Topical two times a day PRN skin irritation      ALLERGIES:  Allergies    No Known Allergies    Intolerances        LABS:                        9.0    162.61 )-----------( 131      ( 21 Apr 2024 00:48 )             28.0     04-21    144  |  107  |  13  ----------------------------<  173<H>  4.9   |  24  |  <0.30<L>    Ca    8.7      21 Apr 2024 00:48  Phos  3.4     04-21  Mg     2.8     04-21    TPro  6.0  /  Alb  2.5<L>  /  TBili  0.4  /  DBili  x   /  AST  31  /  ALT  34  /  AlkPhos  71  04-21    PT/INR - ( 21 Apr 2024 00:48 )   PT: 13.3 sec;   INR: 1.28 ratio         PTT - ( 21 Apr 2024 00:48 )  PTT:30.5 sec  Urinalysis Basic - ( 21 Apr 2024 00:48 )    Color: x / Appearance: x / SG: x / pH: x  Gluc: 173 mg/dL / Ketone: x  / Bili: x / Urobili: x   Blood: x / Protein: x / Nitrite: x   Leuk Esterase: x / RBC: x / WBC x   Sq Epi: x / Non Sq Epi: x / Bacteria: x      ABG:      vBG:    Micro:    Culture - Blood (collected 04-18-24 @ 11:00)  Source: .Blood Blood-Peripheral  Preliminary Report (04-20-24 @ 17:01):    No growth at 48 Hours    Culture - Blood (collected 04-18-24 @ 10:50)  Source: .Blood Blood-Peripheral  Preliminary Report (04-20-24 @ 17:01):    No growth at 48 Hours    Culture - Blood (collected 04-16-24 @ 15:40)  Source: .Blood Blood-Peripheral  Preliminary Report (04-20-24 @ 21:06):    No growth at 4 days    Culture - Blood (collected 04-16-24 @ 13:17)  Source: .Blood Blood-Peripheral  Preliminary Report (04-20-24 @ 18:01):    No growth at 4 days    Culture - Blood (collected 04-16-24 @ 06:42)  Source: .Blood Blood-Peripheral  Gram Stain (04-18-24 @ 11:52):    Growth in aerobic bottle: Gram Positive Cocci in Pairs and Chains    Growth in anaerobic bottle: Gram positive cocci in pairs  Final Report (04-18-24 @ 11:52):    Growth in aerobic and anaerobic bottles: Streptococcus pneumoniae    See previous culture [id number]    Culture - Blood (collected 04-16-24 @ 06:35)  Source: .Blood Blood-Peripheral  Gram Stain (04-18-24 @ 11:51):    Growth in aerobic bottle: Gram positive cocci in pairs    Growth in anaerobic bottle: Gram positive cocci in pairs  Final Report (04-18-24 @ 11:51):    Growth in aerobic and anaerobic bottles: Streptococcus pneumoniae    Direct identification is available within approximately 3-5    hours either by Blood Panel Multiplexed PCR or Direct    MALDI-TOF. Details: [email or web address]  Organism: Blood Culture PCR  Streptococcus pneumoniae (04-18-24 @ 11:51)  Organism: Streptococcus pneumoniae (04-18-24 @ 11:51)      Method Type: LESLY      -  Clindamycin: S <=0.06      -  Erythromycin: S <=0.06 Predicts results for azithromycin.      -  Levofloxacin: S 1      -  Tetracycline: S <=0.5      -  Trimethoprim/Sulfamethoxazole: S <=.25/4.75      -  Vancomycin: S 0.5      -  Ceftriaxone (meningitidis): S <=0.25      -  Ceftriaxone (non-meningitidis): S <=0.25      -  Penicillin (meningitidis): S 0.06      -  Penicillin (non-meningitidis): S 0.06      -  Penicillin (oral penicillin V): S 0.06  Organism: Blood Culture PCR (04-18-24 @ 11:51)      Method Type: PCR      -  Streptococcus pneumoniae: Detec        Culture - Sputum (collected 04-16-24 @ 15:44)  Source: ET Tube ET Tube  Gram Stain (04-16-24 @ 22:00):    Few polymorphonuclear leukocytes per low power field    No Squamous epithelial cells per low power field    No organisms seen per oil power field  Final Report (04-18-24 @ 09:40):    Normal Respiratory Kayla present        RADIOLOGY & ADDITIONAL TESTS: Reviewed.

## 2024-04-21 NOTE — PROGRESS NOTE ADULT - ATTENDING COMMENTS
1. Acute hypoxemic respiratory failure due to LLL pneumonia in patient with CLL.  Adequately oxygenating on FIO2 30%. Continue current AC vent settings. Did not tolerate PS wean  again today. still with thick secretions from ETT.  2. Pocus today with minimal plef on L side where chest tube pulled out. NO ptx on cxr.  3. ID.  Strep bacteremia , pneumonia and empyema. Continue ceftriaxone. POCUS reveals LLL consolidation with dynamic air bronchograms. Blood cx with strep pneumonia. Pleural fluid likely strep pneumonia. Change antibiotics to Ceftriaxone.    3. Hypotension from sepsis and sedation. Titrate Levophed for AIF19-72.  4.Heme: WBC< 200k. Small cells . No blast cells. Pt with CLL. No need for leukopheresis.  5.Cardiac.  minimal ST elevation inferior  leads  troponin decreasing.  6.DVT prophylaxis: Lovenox  7. GOC: Full code

## 2024-04-22 LAB
ALBUMIN SERPL ELPH-MCNC: 2.7 G/DL — LOW (ref 3.3–5)
ALP SERPL-CCNC: 78 U/L — SIGNIFICANT CHANGE UP (ref 40–120)
ALT FLD-CCNC: 56 U/L — HIGH (ref 10–45)
ANION GAP SERPL CALC-SCNC: 10 MMOL/L — SIGNIFICANT CHANGE UP (ref 5–17)
APTT BLD: 31.5 SEC — SIGNIFICANT CHANGE UP (ref 24.5–35.6)
AST SERPL-CCNC: 72 U/L — HIGH (ref 10–40)
BASOPHILS # BLD AUTO: 0.12 K/UL — SIGNIFICANT CHANGE UP (ref 0–0.2)
BASOPHILS NFR BLD AUTO: 0.1 % — SIGNIFICANT CHANGE UP (ref 0–2)
BILIRUB SERPL-MCNC: 0.3 MG/DL — SIGNIFICANT CHANGE UP (ref 0.2–1.2)
BUN SERPL-MCNC: 13 MG/DL — SIGNIFICANT CHANGE UP (ref 7–23)
CALCIUM SERPL-MCNC: 8.6 MG/DL — SIGNIFICANT CHANGE UP (ref 8.4–10.5)
CHLORIDE SERPL-SCNC: 107 MMOL/L — SIGNIFICANT CHANGE UP (ref 96–108)
CHROM ANALY INTERPHASE BLD FISH-IMP: SIGNIFICANT CHANGE UP
CO2 SERPL-SCNC: 26 MMOL/L — SIGNIFICANT CHANGE UP (ref 22–31)
CREAT SERPL-MCNC: 0.3 MG/DL — LOW (ref 0.5–1.3)
EGFR: 115 ML/MIN/1.73M2 — SIGNIFICANT CHANGE UP
EOSINOPHIL # BLD AUTO: 0.09 K/UL — SIGNIFICANT CHANGE UP (ref 0–0.5)
EOSINOPHIL NFR BLD AUTO: 0.1 % — SIGNIFICANT CHANGE UP (ref 0–6)
FLUAV AG NPH QL: SIGNIFICANT CHANGE UP
FLUBV AG NPH QL: SIGNIFICANT CHANGE UP
GLUCOSE BLDC GLUCOMTR-MCNC: 107 MG/DL — HIGH (ref 70–99)
GLUCOSE BLDC GLUCOMTR-MCNC: 118 MG/DL — HIGH (ref 70–99)
GLUCOSE BLDC GLUCOMTR-MCNC: 125 MG/DL — HIGH (ref 70–99)
GLUCOSE BLDC GLUCOMTR-MCNC: 95 MG/DL — SIGNIFICANT CHANGE UP (ref 70–99)
GLUCOSE SERPL-MCNC: 142 MG/DL — HIGH (ref 70–99)
HCT VFR BLD CALC: 27.3 % — LOW (ref 34.5–45)
HGB BLD-MCNC: 8.7 G/DL — LOW (ref 11.5–15.5)
IMM GRANULOCYTES NFR BLD AUTO: 0.7 % — SIGNIFICANT CHANGE UP (ref 0–0.9)
INR BLD: 1.26 RATIO — HIGH (ref 0.85–1.18)
LDH SERPL L TO P-CCNC: 280 U/L — HIGH (ref 50–242)
LYMPHOCYTES # BLD AUTO: 107.53 K/UL — HIGH (ref 1–3.3)
LYMPHOCYTES # BLD AUTO: 88.1 % — HIGH (ref 13–44)
MAGNESIUM SERPL-MCNC: 2.9 MG/DL — HIGH (ref 1.6–2.6)
MCHC RBC-ENTMCNC: 27.7 PG — SIGNIFICANT CHANGE UP (ref 27–34)
MCHC RBC-ENTMCNC: 31.9 GM/DL — LOW (ref 32–36)
MCV RBC AUTO: 86.9 FL — SIGNIFICANT CHANGE UP (ref 80–100)
MONOCYTES # BLD AUTO: 2.78 K/UL — HIGH (ref 0–0.9)
MONOCYTES NFR BLD AUTO: 2.3 % — SIGNIFICANT CHANGE UP (ref 2–14)
NEUTROPHILS # BLD AUTO: 10.67 K/UL — HIGH (ref 1.8–7.4)
NEUTROPHILS NFR BLD AUTO: 8.7 % — LOW (ref 43–77)
NRBC # BLD: 0 /100 WBCS — SIGNIFICANT CHANGE UP (ref 0–0)
PHOSPHATE SERPL-MCNC: 3.7 MG/DL — SIGNIFICANT CHANGE UP (ref 2.5–4.5)
PLATELET # BLD AUTO: 140 K/UL — LOW (ref 150–400)
POTASSIUM SERPL-MCNC: 3.9 MMOL/L — SIGNIFICANT CHANGE UP (ref 3.5–5.3)
POTASSIUM SERPL-SCNC: 3.9 MMOL/L — SIGNIFICANT CHANGE UP (ref 3.5–5.3)
PROT SERPL-MCNC: 6 G/DL — SIGNIFICANT CHANGE UP (ref 6–8.3)
PROTHROM AB SERPL-ACNC: 13.8 SEC — HIGH (ref 9.5–13)
RBC # BLD: 3.14 M/UL — LOW (ref 3.8–5.2)
RBC # FLD: 15.9 % — HIGH (ref 10.3–14.5)
RSV RNA NPH QL NAA+NON-PROBE: SIGNIFICANT CHANGE UP
SARS-COV-2 RNA SPEC QL NAA+PROBE: SIGNIFICANT CHANGE UP
SODIUM SERPL-SCNC: 143 MMOL/L — SIGNIFICANT CHANGE UP (ref 135–145)
URATE SERPL-MCNC: 1.5 MG/DL — LOW (ref 2.5–7)
WBC # BLD: 122.04 K/UL — CRITICAL HIGH (ref 3.8–10.5)
WBC # FLD AUTO: 122.04 K/UL — CRITICAL HIGH (ref 3.8–10.5)

## 2024-04-22 PROCEDURE — 99233 SBSQ HOSP IP/OBS HIGH 50: CPT

## 2024-04-22 PROCEDURE — 99291 CRITICAL CARE FIRST HOUR: CPT | Mod: GC,25

## 2024-04-22 PROCEDURE — 71045 X-RAY EXAM CHEST 1 VIEW: CPT | Mod: 26

## 2024-04-22 PROCEDURE — 74177 CT ABD & PELVIS W/CONTRAST: CPT | Mod: 26

## 2024-04-22 PROCEDURE — 71260 CT THORAX DX C+: CPT | Mod: 26

## 2024-04-22 PROCEDURE — 76604 US EXAM CHEST: CPT | Mod: 26,GC

## 2024-04-22 RX ORDER — POTASSIUM CHLORIDE 20 MEQ
20 PACKET (EA) ORAL ONCE
Refills: 0 | Status: COMPLETED | OUTPATIENT
Start: 2024-04-22 | End: 2024-04-22

## 2024-04-22 RX ORDER — POTASSIUM CHLORIDE 20 MEQ
10 PACKET (EA) ORAL
Refills: 0 | Status: DISCONTINUED | OUTPATIENT
Start: 2024-04-22 | End: 2024-04-22

## 2024-04-22 RX ORDER — ACETAMINOPHEN 500 MG
1000 TABLET ORAL ONCE
Refills: 0 | Status: COMPLETED | OUTPATIENT
Start: 2024-04-22 | End: 2024-04-22

## 2024-04-22 RX ORDER — SODIUM CHLORIDE 9 MG/ML
1000 INJECTION, SOLUTION INTRAVENOUS ONCE
Refills: 0 | Status: COMPLETED | OUTPATIENT
Start: 2024-04-22 | End: 2024-04-22

## 2024-04-22 RX ORDER — DROXIDOPA 100 MG/1
200 CAPSULE ORAL EVERY 8 HOURS
Refills: 0 | Status: DISCONTINUED | OUTPATIENT
Start: 2024-04-22 | End: 2024-04-23

## 2024-04-22 RX ADMIN — Medication 1000 MILLIGRAM(S): at 22:15

## 2024-04-22 RX ADMIN — DROXIDOPA 200 MILLIGRAM(S): 100 CAPSULE ORAL at 17:37

## 2024-04-22 RX ADMIN — ENOXAPARIN SODIUM 40 MILLIGRAM(S): 100 INJECTION SUBCUTANEOUS at 17:37

## 2024-04-22 RX ADMIN — DROXIDOPA 100 MILLIGRAM(S): 100 CAPSULE ORAL at 05:09

## 2024-04-22 RX ADMIN — SODIUM CHLORIDE 4 MILLILITER(S): 9 INJECTION INTRAMUSCULAR; INTRAVENOUS; SUBCUTANEOUS at 23:18

## 2024-04-22 RX ADMIN — SENNA PLUS 10 MILLILITER(S): 8.6 TABLET ORAL at 21:46

## 2024-04-22 RX ADMIN — Medication 3 MILLILITER(S): at 11:10

## 2024-04-22 RX ADMIN — DROXIDOPA 100 MILLIGRAM(S): 100 CAPSULE ORAL at 11:12

## 2024-04-22 RX ADMIN — MUPIROCIN 1 APPLICATION(S): 20 OINTMENT TOPICAL at 05:09

## 2024-04-22 RX ADMIN — CHLORHEXIDINE GLUCONATE 1 APPLICATION(S): 213 SOLUTION TOPICAL at 05:10

## 2024-04-22 RX ADMIN — CHLORHEXIDINE GLUCONATE 15 MILLILITER(S): 213 SOLUTION TOPICAL at 17:37

## 2024-04-22 RX ADMIN — SODIUM CHLORIDE 4 MILLILITER(S): 9 INJECTION INTRAMUSCULAR; INTRAVENOUS; SUBCUTANEOUS at 05:06

## 2024-04-22 RX ADMIN — SODIUM CHLORIDE 4 MILLILITER(S): 9 INJECTION INTRAMUSCULAR; INTRAVENOUS; SUBCUTANEOUS at 11:11

## 2024-04-22 RX ADMIN — Medication 20 MILLIEQUIVALENT(S): at 08:02

## 2024-04-22 RX ADMIN — PROPOFOL 6.6 MICROGRAM(S)/KG/MIN: 10 INJECTION, EMULSION INTRAVENOUS at 21:44

## 2024-04-22 RX ADMIN — CEFTRIAXONE 100 MILLIGRAM(S): 500 INJECTION, POWDER, FOR SOLUTION INTRAMUSCULAR; INTRAVENOUS at 11:12

## 2024-04-22 RX ADMIN — ENOXAPARIN SODIUM 40 MILLIGRAM(S): 100 INJECTION SUBCUTANEOUS at 05:09

## 2024-04-22 RX ADMIN — PROPOFOL 6.6 MICROGRAM(S)/KG/MIN: 10 INJECTION, EMULSION INTRAVENOUS at 08:02

## 2024-04-22 RX ADMIN — Medication 3 MILLILITER(S): at 17:02

## 2024-04-22 RX ADMIN — Medication 400 MILLIGRAM(S): at 21:45

## 2024-04-22 RX ADMIN — CHLORHEXIDINE GLUCONATE 15 MILLILITER(S): 213 SOLUTION TOPICAL at 05:10

## 2024-04-22 RX ADMIN — SODIUM CHLORIDE 4 MILLILITER(S): 9 INJECTION INTRAMUSCULAR; INTRAVENOUS; SUBCUTANEOUS at 17:02

## 2024-04-22 RX ADMIN — POLYETHYLENE GLYCOL 3350 17 GRAM(S): 17 POWDER, FOR SOLUTION ORAL at 17:35

## 2024-04-22 RX ADMIN — Medication 3 MILLILITER(S): at 23:23

## 2024-04-22 RX ADMIN — POLYETHYLENE GLYCOL 3350 17 GRAM(S): 17 POWDER, FOR SOLUTION ORAL at 05:09

## 2024-04-22 RX ADMIN — SODIUM CHLORIDE 500 MILLILITER(S): 9 INJECTION, SOLUTION INTRAVENOUS at 21:44

## 2024-04-22 NOTE — PROGRESS NOTE ADULT - ATTENDING COMMENTS
69 year old female with a history of CVA (bedbound at baseline) COPD, CHF, HTN presented initially as a transfer with hyperleukocytosis with concerning for acute leukemia (no blasts, more consistent with CLL and leukamoid reaction in the setting of acute infection) and acute hypoxic respiratory failure eventually found to have strep pneumonia bacteremia, pneumonia c/b empyema s/p chest tube (4/16-4/19)    Lines:   PIV    N:   - Propofol, RASS -1  - Fentanyl for pain  - Delirium precautions  - Daily SAT  CV:   Septic shock- trending towards improvement  TTE negative for vegetation  Elevated pro-BNP >2k  - Will increase Droxidopa, Off IV vasopressor support  - MAP >65  P: Acute hypoxic respiratory failure  Strep pneumonia  Empyema s/p chest tube  Intubated 4/16  - Lung protective settings  - Failing SBT 2/2 tachypnea, daily SBT  - Trend BG  - Suctioning, duonebs PRN, oral care  GI:   - Tube feeds  Renal:   - Trend cr, lytes  Heme onc:   CLL  Leukamoid reaction  - Eventual CT A/P per Heme Onc  - TLS labs per heme  - Appreciate heme recs  ID: Pneumonia, empyema s/p chest tube  Strep bacteremia  - Day 6 CTX  - Will redraw cultures if >100.4 and sustained  Endo: Avoid hypoglycemia,   BG <210    DVT: Lovenox BID  Full code  HCP- confirming, ?aunt

## 2024-04-22 NOTE — PROGRESS NOTE ADULT - SUBJECTIVE AND OBJECTIVE BOX
INTERVAL HPI/OVERNIGHT EVENTS:    SUBJECTIVE: Patient seen and examined at bedside. Off levo overnight, pressure support, febrile received tylenol    ROS: All negative except as listed above.    VITAL SIGNS:  ICU Vital Signs Last 24 Hrs  T(C): 37.9 (22 Apr 2024 07:00), Max: 38.5 (21 Apr 2024 20:00)  T(F): 100.2 (22 Apr 2024 07:00), Max: 101.3 (21 Apr 2024 20:00)  HR: 98 (22 Apr 2024 07:00) (70 - 110)  BP: 95/54 (22 Apr 2024 07:00) (78/53 - 121/58)  BP(mean): 72 (22 Apr 2024 07:00) (61 - 83)  ABP: --  ABP(mean): --  RR: 18 (22 Apr 2024 07:00) (14 - 27)  SpO2: 94% (22 Apr 2024 07:00) (94% - 100%)    O2 Parameters below as of 21 Apr 2024 19:00  Patient On (Oxygen Delivery Method): ventilator    O2 Concentration (%): 30      Mode: AC/ CMV (Assist Control/ Continuous Mandatory Ventilation), RR (machine): 14, TV (machine): 460, FiO2: 30, PEEP: 5, ITime: 1, MAP: 11, PIP: 29  Plateau pressure:   P/F ratio:     04-21 @ 07:01  -  04-22 @ 07:00  --------------------------------------------------------  IN: 2045.3 mL / OUT: 650 mL / NET: 1395.3 mL      CAPILLARY BLOOD GLUCOSE      POCT Blood Glucose.: 125 mg/dL (22 Apr 2024 05:07)      ECG: reviewed.    PHYSICAL EXAM:    GENERAL: NAD, lying in bed comfortably  HEAD:  Atraumatic, normocephalic  EYES: EOMI, PERRLA, conjunctiva and sclera clear  NECK: Supple, trachea midline, no JVD  HEART: Regular rate and rhythm, no murmurs, rubs, or gallops  LUNGS: Unlabored respirations.  Clear to auscultation bilaterally, no crackles, wheezing, or rhonchi  ABDOMEN: Soft, nontender, nondistended, +BS  EXTREMITIES: 2+ peripheral pulses bilaterally, cap refill<2 secs. No clubbing, cyanosis, or edema  NERVOUS SYSTEM:  A&Ox3, following commands, moving all extremities, no focal deficits   SKIN: No rashes or lesions    MEDICATIONS:  MEDICATIONS  (STANDING):  cefTRIAXone   IVPB 2000 milliGRAM(s) IV Intermittent every 24 hours  chlorhexidine 0.12% Liquid 15 milliLiter(s) Oral Mucosa every 12 hours  chlorhexidine 2% Cloths 1 Application(s) Topical <User Schedule>  dextrose 50% Injectable 25 Gram(s) IV Push once  dextrose 50% Injectable 12.5 Gram(s) IV Push once  droxidopa 100 milliGRAM(s) Oral three times a day  enoxaparin Injectable 40 milliGRAM(s) SubCutaneous every 12 hours  glucagon  Injectable 1 milliGRAM(s) IntraMuscular once  insulin lispro (ADMELOG) corrective regimen sliding scale   SubCutaneous every 6 hours  polyethylene glycol 3350 17 Gram(s) Oral every 12 hours  propofol Infusion 9.91 MICROgram(s)/kG/Min (6.6 mL/Hr) IV Continuous <Continuous>  senna Syrup 10 milliLiter(s) Oral at bedtime  sodium chloride 3%  Inhalation 4 milliLiter(s) Inhalation every 6 hours    MEDICATIONS  (PRN):  albuterol/ipratropium for Nebulization 3 milliLiter(s) Nebulizer every 6 hours PRN Shortness of Breath and/or Wheezing  fentaNYL    Injectable 50 MICROGram(s) IV Push every 4 hours PRN Moderate Pain (4 - 6)  nystatin Powder 1 Application(s) Topical two times a day PRN skin irritation      ALLERGIES:  Allergies    No Known Allergies    Intolerances        LABS:                        8.7    122.04 )-----------( 140      ( 22 Apr 2024 00:14 )             27.3     04-22    143  |  107  |  13  ----------------------------<  142<H>  3.9   |  26  |  0.30<L>    Ca    8.6      22 Apr 2024 00:15  Phos  3.7     04-22  Mg     2.9     04-22    TPro  6.0  /  Alb  2.7<L>  /  TBili  0.3  /  DBili  x   /  AST  72<H>  /  ALT  56<H>  /  AlkPhos  78  04-22    PT/INR - ( 22 Apr 2024 00:14 )   PT: 13.8 sec;   INR: 1.26 ratio         PTT - ( 22 Apr 2024 00:14 )  PTT:31.5 sec  Urinalysis Basic - ( 22 Apr 2024 00:15 )    Color: x / Appearance: x / SG: x / pH: x  Gluc: 142 mg/dL / Ketone: x  / Bili: x / Urobili: x   Blood: x / Protein: x / Nitrite: x   Leuk Esterase: x / RBC: x / WBC x   Sq Epi: x / Non Sq Epi: x / Bacteria: x      ABG:  pH, Arterial: 7.44 (04-21-24 @ 23:55)  pCO2, Arterial: 42 mmHg (04-21-24 @ 23:55)  pO2, Arterial: 103 mmHg (04-21-24 @ 23:55)      vBG:    Micro:    Culture - Blood (collected 04-20-24 @ 12:15)  Source: .Blood Blood-Peripheral  Preliminary Report (04-21-24 @ 19:02):    No growth at 24 hours    Culture - Blood (collected 04-20-24 @ 12:00)  Source: .Blood Blood-Peripheral  Preliminary Report (04-21-24 @ 19:02):    No growth at 24 hours    Culture - Blood (collected 04-18-24 @ 11:00)  Source: .Blood Blood-Peripheral  Preliminary Report (04-21-24 @ 17:01):    No growth at 72 Hours    Culture - Blood (collected 04-18-24 @ 10:50)  Source: .Blood Blood-Peripheral  Preliminary Report (04-21-24 @ 17:01):    No growth at 72 Hours    Culture - Blood (collected 04-16-24 @ 15:40)  Source: .Blood Blood-Peripheral  Final Report (04-21-24 @ 21:01):    No growth at 5 days    Culture - Blood (collected 04-16-24 @ 13:17)  Source: .Blood Blood-Peripheral  Final Report (04-21-24 @ 18:01):    No growth at 5 days    Culture - Blood (collected 04-16-24 @ 06:42)  Source: .Blood Blood-Peripheral  Gram Stain (04-18-24 @ 11:52):    Growth in aerobic bottle: Gram Positive Cocci in Pairs and Chains    Growth in anaerobic bottle: Gram positive cocci in pairs  Final Report (04-18-24 @ 11:52):    Growth in aerobic and anaerobic bottles: Streptococcus pneumoniae    See previous culture [id number]    Culture - Blood (collected 04-16-24 @ 06:35)  Source: .Blood Blood-Peripheral  Gram Stain (04-18-24 @ 11:51):    Growth in aerobic bottle: Gram positive cocci in pairs    Growth in anaerobic bottle: Gram positive cocci in pairs  Final Report (04-18-24 @ 11:51):    Growth in aerobic and anaerobic bottles: Streptococcus pneumoniae    Direct identification is available within approximately 3-5    hours either by Blood Panel Multiplexed PCR or Direct    MALDI-TOF. Details: [email or web address]  Organism: Blood Culture PCR  Streptococcus pneumoniae (04-18-24 @ 11:51)  Organism: Streptococcus pneumoniae (04-18-24 @ 11:51)      Method Type: LESLY      -  Clindamycin: S <=0.06      -  Erythromycin: S <=0.06 Predicts results for azithromycin.      -  Levofloxacin: S 1      -  Tetracycline: S <=0.5      -  Trimethoprim/Sulfamethoxazole: S <=.25/4.75      -  Vancomycin: S 0.5      -  Ceftriaxone (meningitidis): S <=0.25      -  Ceftriaxone (non-meningitidis): S <=0.25      -  Penicillin (meningitidis): S 0.06      -  Penicillin (non-meningitidis): S 0.06      -  Penicillin (oral penicillin V): S 0.06  Organism: Blood Culture PCR (04-18-24 @ 11:51)      Method Type: PCR      -  Streptococcus pneumoniae: Detec        Culture - Sputum (collected 04-16-24 @ 15:44)  Source: ET Tube ET Tube  Gram Stain (04-16-24 @ 22:00):    Few polymorphonuclear leukocytes per low power field    No Squamous epithelial cells per low power field    No organisms seen per oil power field  Final Report (04-18-24 @ 09:40):    Normal Respiratory Kayla present        RADIOLOGY & ADDITIONAL TESTS: Reviewed.       INTERVAL HPI/OVERNIGHT EVENTS:    SUBJECTIVE: Patient seen and examined at bedside. Off levo overnight, pressure support, febrile received tylenol    ROS: All negative except as listed above.    VITAL SIGNS:  ICU Vital Signs Last 24 Hrs  T(C): 37.9 (22 Apr 2024 07:00), Max: 38.5 (21 Apr 2024 20:00)  T(F): 100.2 (22 Apr 2024 07:00), Max: 101.3 (21 Apr 2024 20:00)  HR: 98 (22 Apr 2024 07:00) (70 - 110)  BP: 95/54 (22 Apr 2024 07:00) (78/53 - 121/58)  BP(mean): 72 (22 Apr 2024 07:00) (61 - 83)  ABP: --  ABP(mean): --  RR: 18 (22 Apr 2024 07:00) (14 - 27)  SpO2: 94% (22 Apr 2024 07:00) (94% - 100%)    O2 Parameters below as of 21 Apr 2024 19:00  Patient On (Oxygen Delivery Method): ventilator    O2 Concentration (%): 30      Mode: AC/ CMV (Assist Control/ Continuous Mandatory Ventilation), RR (machine): 14, TV (machine): 460, FiO2: 30, PEEP: 5, ITime: 1, MAP: 11, PIP: 29  Plateau pressure:   P/F ratio:     04-21 @ 07:01  -  04-22 @ 07:00  --------------------------------------------------------  IN: 2045.3 mL / OUT: 650 mL / NET: 1395.3 mL      CAPILLARY BLOOD GLUCOSE      POCT Blood Glucose.: 125 mg/dL (22 Apr 2024 05:07)      ECG: reviewed.    PHYSICAL EXAM:    GENERAL: NAD, lying in bed comfortably  HEAD:  Atraumatic, normocephalic  EYES: EOMI, PERRLA, conjunctiva and sclera clear  NECK: Supple, trachea midline, no JVD  HEART: Regular rate and rhythm, no murmurs, rubs, or gallops  LUNGS: Unlabored respirations.  Productive cough, diffuse expiratory wheezes on auscultation  ABDOMEN: Soft, nontender, nondistended, +BS  EXTREMITIES: 2+ peripheral pulses bilaterally, cap refill<2 secs. No clubbing, cyanosis, or edema  NERVOUS SYSTEM:  A&Ox3, following commands, moving all extremities, no focal deficits   SKIN: No rashes or lesions    MEDICATIONS:  MEDICATIONS  (STANDING):  cefTRIAXone   IVPB 2000 milliGRAM(s) IV Intermittent every 24 hours  chlorhexidine 0.12% Liquid 15 milliLiter(s) Oral Mucosa every 12 hours  chlorhexidine 2% Cloths 1 Application(s) Topical <User Schedule>  dextrose 50% Injectable 25 Gram(s) IV Push once  dextrose 50% Injectable 12.5 Gram(s) IV Push once  droxidopa 100 milliGRAM(s) Oral three times a day  enoxaparin Injectable 40 milliGRAM(s) SubCutaneous every 12 hours  glucagon  Injectable 1 milliGRAM(s) IntraMuscular once  insulin lispro (ADMELOG) corrective regimen sliding scale   SubCutaneous every 6 hours  polyethylene glycol 3350 17 Gram(s) Oral every 12 hours  propofol Infusion 9.91 MICROgram(s)/kG/Min (6.6 mL/Hr) IV Continuous <Continuous>  senna Syrup 10 milliLiter(s) Oral at bedtime  sodium chloride 3%  Inhalation 4 milliLiter(s) Inhalation every 6 hours    MEDICATIONS  (PRN):  albuterol/ipratropium for Nebulization 3 milliLiter(s) Nebulizer every 6 hours PRN Shortness of Breath and/or Wheezing  fentaNYL    Injectable 50 MICROGram(s) IV Push every 4 hours PRN Moderate Pain (4 - 6)  nystatin Powder 1 Application(s) Topical two times a day PRN skin irritation      ALLERGIES:  Allergies    No Known Allergies    Intolerances        LABS:                        8.7    122.04 )-----------( 140      ( 22 Apr 2024 00:14 )             27.3     04-22    143  |  107  |  13  ----------------------------<  142<H>  3.9   |  26  |  0.30<L>    Ca    8.6      22 Apr 2024 00:15  Phos  3.7     04-22  Mg     2.9     04-22    TPro  6.0  /  Alb  2.7<L>  /  TBili  0.3  /  DBili  x   /  AST  72<H>  /  ALT  56<H>  /  AlkPhos  78  04-22    PT/INR - ( 22 Apr 2024 00:14 )   PT: 13.8 sec;   INR: 1.26 ratio         PTT - ( 22 Apr 2024 00:14 )  PTT:31.5 sec  Urinalysis Basic - ( 22 Apr 2024 00:15 )    Color: x / Appearance: x / SG: x / pH: x  Gluc: 142 mg/dL / Ketone: x  / Bili: x / Urobili: x   Blood: x / Protein: x / Nitrite: x   Leuk Esterase: x / RBC: x / WBC x   Sq Epi: x / Non Sq Epi: x / Bacteria: x      ABG:  pH, Arterial: 7.44 (04-21-24 @ 23:55)  pCO2, Arterial: 42 mmHg (04-21-24 @ 23:55)  pO2, Arterial: 103 mmHg (04-21-24 @ 23:55)      vBG:    Micro:    Culture - Blood (collected 04-20-24 @ 12:15)  Source: .Blood Blood-Peripheral  Preliminary Report (04-21-24 @ 19:02):    No growth at 24 hours    Culture - Blood (collected 04-20-24 @ 12:00)  Source: .Blood Blood-Peripheral  Preliminary Report (04-21-24 @ 19:02):    No growth at 24 hours    Culture - Blood (collected 04-18-24 @ 11:00)  Source: .Blood Blood-Peripheral  Preliminary Report (04-21-24 @ 17:01):    No growth at 72 Hours    Culture - Blood (collected 04-18-24 @ 10:50)  Source: .Blood Blood-Peripheral  Preliminary Report (04-21-24 @ 17:01):    No growth at 72 Hours    Culture - Blood (collected 04-16-24 @ 15:40)  Source: .Blood Blood-Peripheral  Final Report (04-21-24 @ 21:01):    No growth at 5 days    Culture - Blood (collected 04-16-24 @ 13:17)  Source: .Blood Blood-Peripheral  Final Report (04-21-24 @ 18:01):    No growth at 5 days    Culture - Blood (collected 04-16-24 @ 06:42)  Source: .Blood Blood-Peripheral  Gram Stain (04-18-24 @ 11:52):    Growth in aerobic bottle: Gram Positive Cocci in Pairs and Chains    Growth in anaerobic bottle: Gram positive cocci in pairs  Final Report (04-18-24 @ 11:52):    Growth in aerobic and anaerobic bottles: Streptococcus pneumoniae    See previous culture [id number]    Culture - Blood (collected 04-16-24 @ 06:35)  Source: .Blood Blood-Peripheral  Gram Stain (04-18-24 @ 11:51):    Growth in aerobic bottle: Gram positive cocci in pairs    Growth in anaerobic bottle: Gram positive cocci in pairs  Final Report (04-18-24 @ 11:51):    Growth in aerobic and anaerobic bottles: Streptococcus pneumoniae    Direct identification is available within approximately 3-5    hours either by Blood Panel Multiplexed PCR or Direct    MALDI-TOF. Details: [email or web address]  Organism: Blood Culture PCR  Streptococcus pneumoniae (04-18-24 @ 11:51)  Organism: Streptococcus pneumoniae (04-18-24 @ 11:51)      Method Type: LESLY      -  Clindamycin: S <=0.06      -  Erythromycin: S <=0.06 Predicts results for azithromycin.      -  Levofloxacin: S 1      -  Tetracycline: S <=0.5      -  Trimethoprim/Sulfamethoxazole: S <=.25/4.75      -  Vancomycin: S 0.5      -  Ceftriaxone (meningitidis): S <=0.25      -  Ceftriaxone (non-meningitidis): S <=0.25      -  Penicillin (meningitidis): S 0.06      -  Penicillin (non-meningitidis): S 0.06      -  Penicillin (oral penicillin V): S 0.06  Organism: Blood Culture PCR (04-18-24 @ 11:51)      Method Type: PCR      -  Streptococcus pneumoniae: Detec        Culture - Sputum (collected 04-16-24 @ 15:44)  Source: ET Tube ET Tube  Gram Stain (04-16-24 @ 22:00):    Few polymorphonuclear leukocytes per low power field    No Squamous epithelial cells per low power field    No organisms seen per oil power field  Final Report (04-18-24 @ 09:40):    Normal Respiratory Kayla present        RADIOLOGY & ADDITIONAL TESTS: Reviewed.       INTERVAL HPI/OVERNIGHT EVENTS:    SUBJECTIVE: Patient seen and examined at bedside. Off levo overnight, pressure support, febrile received tylenol    ROS: All negative except as listed above.    VITAL SIGNS:  ICU Vital Signs Last 24 Hrs  T(C): 37.9 (22 Apr 2024 07:00), Max: 38.5 (21 Apr 2024 20:00)  T(F): 100.2 (22 Apr 2024 07:00), Max: 101.3 (21 Apr 2024 20:00)  HR: 98 (22 Apr 2024 07:00) (70 - 110)  BP: 95/54 (22 Apr 2024 07:00) (78/53 - 121/58)  BP(mean): 72 (22 Apr 2024 07:00) (61 - 83)  ABP: --  ABP(mean): --  RR: 18 (22 Apr 2024 07:00) (14 - 27)  SpO2: 94% (22 Apr 2024 07:00) (94% - 100%)    O2 Parameters below as of 21 Apr 2024 19:00  Patient On (Oxygen Delivery Method): ventilator    O2 Concentration (%): 30      Mode: AC/ CMV (Assist Control/ Continuous Mandatory Ventilation), RR (machine): 14, TV (machine): 460, FiO2: 30, PEEP: 5, ITime: 1, MAP: 11, PIP: 29  Plateau pressure:   P/F ratio:     04-21 @ 07:01  -  04-22 @ 07:00  --------------------------------------------------------  IN: 2045.3 mL / OUT: 650 mL / NET: 1395.3 mL      CAPILLARY BLOOD GLUCOSE      POCT Blood Glucose.: 125 mg/dL (22 Apr 2024 05:07)      ECG: reviewed.    PHYSICAL EXAM:    GENERAL: NAD, lying in bed comfortably, intubated and sedated on propofol; opens eyes to voice   HEAD:  Atraumatic, normocephalic  EYES: EOMI, PERRLA, conjunctiva and sclera clear  NECK: Supple, trachea midline, no JVD  HEART: Regular rate and rhythm, no murmurs, rubs, or gallops  LUNGS: Unlabored respirations, triggering own respirations intermittently, clear lungs   ABDOMEN: Soft, nontender, nondistended, +BS  EXTREMITIES: 2+ peripheral pulses bilaterally, cap refill<2 secs. No clubbing, cyanosis, or edema  NERVOUS SYSTEM:  Intubated sedated on Propofol, opens eyes and follows commands   SKIN: No rashes or lesions    MEDICATIONS:  MEDICATIONS  (STANDING):  cefTRIAXone   IVPB 2000 milliGRAM(s) IV Intermittent every 24 hours  chlorhexidine 0.12% Liquid 15 milliLiter(s) Oral Mucosa every 12 hours  chlorhexidine 2% Cloths 1 Application(s) Topical <User Schedule>  dextrose 50% Injectable 25 Gram(s) IV Push once  dextrose 50% Injectable 12.5 Gram(s) IV Push once  droxidopa 100 milliGRAM(s) Oral three times a day  enoxaparin Injectable 40 milliGRAM(s) SubCutaneous every 12 hours  glucagon  Injectable 1 milliGRAM(s) IntraMuscular once  insulin lispro (ADMELOG) corrective regimen sliding scale   SubCutaneous every 6 hours  polyethylene glycol 3350 17 Gram(s) Oral every 12 hours  propofol Infusion 9.91 MICROgram(s)/kG/Min (6.6 mL/Hr) IV Continuous <Continuous>  senna Syrup 10 milliLiter(s) Oral at bedtime  sodium chloride 3%  Inhalation 4 milliLiter(s) Inhalation every 6 hours    MEDICATIONS  (PRN):  albuterol/ipratropium for Nebulization 3 milliLiter(s) Nebulizer every 6 hours PRN Shortness of Breath and/or Wheezing  fentaNYL    Injectable 50 MICROGram(s) IV Push every 4 hours PRN Moderate Pain (4 - 6)  nystatin Powder 1 Application(s) Topical two times a day PRN skin irritation      ALLERGIES:  Allergies    No Known Allergies    Intolerances        LABS:                        8.7    122.04 )-----------( 140      ( 22 Apr 2024 00:14 )             27.3     04-22    143  |  107  |  13  ----------------------------<  142<H>  3.9   |  26  |  0.30<L>    Ca    8.6      22 Apr 2024 00:15  Phos  3.7     04-22  Mg     2.9     04-22    TPro  6.0  /  Alb  2.7<L>  /  TBili  0.3  /  DBili  x   /  AST  72<H>  /  ALT  56<H>  /  AlkPhos  78  04-22    PT/INR - ( 22 Apr 2024 00:14 )   PT: 13.8 sec;   INR: 1.26 ratio         PTT - ( 22 Apr 2024 00:14 )  PTT:31.5 sec  Urinalysis Basic - ( 22 Apr 2024 00:15 )    Color: x / Appearance: x / SG: x / pH: x  Gluc: 142 mg/dL / Ketone: x  / Bili: x / Urobili: x   Blood: x / Protein: x / Nitrite: x   Leuk Esterase: x / RBC: x / WBC x   Sq Epi: x / Non Sq Epi: x / Bacteria: x      ABG:  pH, Arterial: 7.44 (04-21-24 @ 23:55)  pCO2, Arterial: 42 mmHg (04-21-24 @ 23:55)  pO2, Arterial: 103 mmHg (04-21-24 @ 23:55)      vBG:    Micro:    Culture - Blood (collected 04-20-24 @ 12:15)  Source: .Blood Blood-Peripheral  Preliminary Report (04-21-24 @ 19:02):    No growth at 24 hours    Culture - Blood (collected 04-20-24 @ 12:00)  Source: .Blood Blood-Peripheral  Preliminary Report (04-21-24 @ 19:02):    No growth at 24 hours    Culture - Blood (collected 04-18-24 @ 11:00)  Source: .Blood Blood-Peripheral  Preliminary Report (04-21-24 @ 17:01):    No growth at 72 Hours    Culture - Blood (collected 04-18-24 @ 10:50)  Source: .Blood Blood-Peripheral  Preliminary Report (04-21-24 @ 17:01):    No growth at 72 Hours    Culture - Blood (collected 04-16-24 @ 15:40)  Source: .Blood Blood-Peripheral  Final Report (04-21-24 @ 21:01):    No growth at 5 days    Culture - Blood (collected 04-16-24 @ 13:17)  Source: .Blood Blood-Peripheral  Final Report (04-21-24 @ 18:01):    No growth at 5 days    Culture - Blood (collected 04-16-24 @ 06:42)  Source: .Blood Blood-Peripheral  Gram Stain (04-18-24 @ 11:52):    Growth in aerobic bottle: Gram Positive Cocci in Pairs and Chains    Growth in anaerobic bottle: Gram positive cocci in pairs  Final Report (04-18-24 @ 11:52):    Growth in aerobic and anaerobic bottles: Streptococcus pneumoniae    See previous culture [id number]    Culture - Blood (collected 04-16-24 @ 06:35)  Source: .Blood Blood-Peripheral  Gram Stain (04-18-24 @ 11:51):    Growth in aerobic bottle: Gram positive cocci in pairs    Growth in anaerobic bottle: Gram positive cocci in pairs  Final Report (04-18-24 @ 11:51):    Growth in aerobic and anaerobic bottles: Streptococcus pneumoniae    Direct identification is available within approximately 3-5    hours either by Blood Panel Multiplexed PCR or Direct    MALDI-TOF. Details: [email or web address]  Organism: Blood Culture PCR  Streptococcus pneumoniae (04-18-24 @ 11:51)  Organism: Streptococcus pneumoniae (04-18-24 @ 11:51)      Method Type: LESLY      -  Clindamycin: S <=0.06      -  Erythromycin: S <=0.06 Predicts results for azithromycin.      -  Levofloxacin: S 1      -  Tetracycline: S <=0.5      -  Trimethoprim/Sulfamethoxazole: S <=.25/4.75      -  Vancomycin: S 0.5      -  Ceftriaxone (meningitidis): S <=0.25      -  Ceftriaxone (non-meningitidis): S <=0.25      -  Penicillin (meningitidis): S 0.06      -  Penicillin (non-meningitidis): S 0.06      -  Penicillin (oral penicillin V): S 0.06  Organism: Blood Culture PCR (04-18-24 @ 11:51)      Method Type: PCR      -  Streptococcus pneumoniae: Detec        Culture - Sputum (collected 04-16-24 @ 15:44)  Source: ET Tube ET Tube  Gram Stain (04-16-24 @ 22:00):    Few polymorphonuclear leukocytes per low power field    No Squamous epithelial cells per low power field    No organisms seen per oil power field  Final Report (04-18-24 @ 09:40):    Normal Respiratory Kayla present        RADIOLOGY & ADDITIONAL TESTS: Reviewed.

## 2024-04-22 NOTE — CHART NOTE - NSCHARTNOTEFT_GEN_A_CORE
: Silvana Bauman     INDICATION: POCUS    PROCEDURE:  [ ] LIMITED ECHO  [x] LIMITED CHEST  [ ] LIMITED RETROPERITONEAL  [ ] LIMITED ABDOMINAL  [ ] LIMITED DVT  [ ] NEEDLE GUIDANCE VASCULAR  [ ] NEEDLE GUIDANCE THORACENTESIS  [ ] NEEDLE GUIDANCE PARACENTESIS  [ ] NEEDLE GUIDANCE PERICARDIOCENTESIS  [ ] OTHER    FINDINGS:  A-line predominant anterior lung fields b/l. L sided pleural effusion w/ heterogenous nature measuring about 2cm at posterior lung base.     INTERPRETATION:  May be reaccumulation of empyema, at this time not amenable to drainage. Will perform serial US.

## 2024-04-22 NOTE — PROGRESS NOTE ADULT - ASSESSMENT
69F h/o CVA (bedbound at baseline), COPD, CHF, HTN presenting as transfer from [facility] for SOB iso leukocytosis to 233 c/f acute leukemia. Pt intubated and on pressors in setting of possible pneumonia. Hematology consulted for leukocytosis, suspect underlying CLL with reactive lymphocytosis 2/2 sepsis.     # CLL  Patient Labs at admission: , Hgb 13.4, PLT 96, 96% lymphocytes, 2% neutrophils.   On peripheral smear (4/16/24): heterogeneous population of lymphocytes, smudge cells present, rare blasts, rare target cells, polychromasia, giant platelets present, reactive lymphocytes   - Please get CT c/a/p with IV contrast to assess for LAD and hepatosplenomegaly, but not urgent.  - CMV negative, EBV serologies indicate recent or past infection  - f/u serum viscosity though low suspicion for leukostasis as CLL cells are mature  - IgG 535, no need for IVIg as IgG > 500  - Please trend TLS labs (uric acid, LDH, CMP, Mg, Phos) and CBC w/ diff and retic daily  - If uric acid > 8, start allopurinol and give 3 mg IV rasburicase, and if uric acid > 12 give 6 mg rasburicase  - She had prior workup at [facility] (prior flow cytometry from October 2023 is in the HIE). Will need to determine if she has a primary hematologist. If not, can offer to refer her to the [facility], [place]    Note is not finalized until signed by attending.     ***************************************************************  Lynne King, PGY4  Fellow Hematology/Oncology  pager: [phone]   Available on Microsoft Teams  After 5pm or on weekends please contact  to page on-call fellow   ***************************************************************   69F h/o CVA (bedbound at baseline), COPD, CHF, HTN presenting as transfer from [facility] for SOB iso leukocytosis to 233 c/f acute leukemia. Pt intubated and on pressors in setting of strep pneumo pneumonia. Hematology consulted for leukocytosis, confirmed underlying CLL with reactive lymphocytosis 2/2 sepsis.     Labs today: , Hgb 8.7,   - Patient Labs at admission: , Hgb 13.4, PLT 96, 96% lymphocytes, 2% neutrophils.   - On peripheral smear (4/16/24): heterogeneous population of lymphocytes, smudge cells present, rare blasts, rare target cells, polychromasia, giant platelets present, reactive lymphocytes   - CMV negative, EBV serologies indicate recent or past   - There was no indication for leukophoresis at admission, no blasts seen on peripheral smear at admission. Now, WBCs are downtrending as infection is being treated.   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%)  - 4/18/24: Flow Cytometry c/w CLL/SLL 88.8% Monotypic B-cells positive for CD5 (dim), CD19, CD20 (dimmer), CD23 (partial), , surface kappa light chain; negative for CD10, CD11c, CD38, FMC7, lambda surface light chain    PLAN  # CLL  - Please get CT c/a/p with IV contrast to assess for LAD and hepatosplenomegaly, but not urgent  - f/u Cytogenetics from peripheral blood (collected 4/16/24)  - f/u serum viscosity though low suspicion for leukostasis as CLL cells are mature  - IgG 535, no need for IVIg as IgG > 500  - Please trend TLS labs (uric acid, LDH, CMP, Mg, Phos) and CBC w/ diff and retic daily  - If uric acid > 8, start allopurinol and give 3 mg IV rasburicase, and if uric acid > 12 give 6 mg rasburicase  - Confirmed with patient that she has had no prior bone marrow biopsy. Will recommend performing inpatient with IR once she is extubated to determine whether patient qualifies for treatment vs observation.  - She had prior workup at [facility] (prior flow cytometry from October 2023 is in the HIE). Will need to determine if she has a primary hematologist. If not, can offer to refer her to the [facility]    Note is not finalized until signed by attending.     ***************************************************************  Lynne King, PGY4  Fellow Hematology/Oncology  pager: [phone]   Available on Microsoft Teams  After 5pm or on weekends please contact  to page on-call fellow   ***************************************************************   69F h/o CVA (bedbound at baseline), COPD, CHF, HTN presenting as transfer from [facility] for SOB iso leukocytosis to 233 c/f acute leukemia. Pt intubated and on pressors in setting of strep pneumo pneumonia. Hematology consulted for leukocytosis, confirmed underlying CLL with reactive lymphocytosis 2/2 sepsis.     Labs today: , Hgb 8.7,   - Patient Labs at admission: , Hgb 13.4, PLT 96, 96% lymphocytes, 2% neutrophils.   - On peripheral smear (4/16/24): heterogeneous population of lymphocytes, smudge cells present, rare blasts, rare target cells, polychromasia, giant platelets present, reactive lymphocytes   - CMV negative, EBV serologies indicate recent or past   - There was no indication for leukophoresis at admission, no blasts seen on peripheral smear at admission. Now, WBCs are downtrending as infection is being treated.   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%)  - 4/18/24: Flow Cytometry c/w CLL/SLL 88.8% Monotypic B-cells positive for CD5 (dim), CD19, CD20 (dimmer), CD23 (partial), , surface kappa light chain; negative for CD10, CD11c, CD38, FMC7, lambda surface light chain    PLAN  # CLL  - Please get CT c/a/p with IV contrast to assess for LAD and hepatosplenomegaly, but not urgent  - f/u Cytogenetics from peripheral blood (collected 4/16/24)  - IgG 535, no need for IVIg as IgG > 500  - Please trend TLS labs (uric acid, LDH, CMP, Mg, Phos) and CBC w/ diff and retic daily  - If uric acid > 8, start allopurinol and give 3 mg IV rasburicase, and if uric acid > 12 give 6 mg rasburicase  - Confirmed with patient that she has had no prior bone marrow biopsy. Will recommend performing inpatient with IR once she is extubated to determine whether patient qualifies for treatment vs observation.  - She had prior workup at [facility] (prior flow cytometry from October 2023 is in the HIE). Will need to determine if she has a primary hematologist. If not, can offer to refer her to the [facility]    Note is not finalized until signed by attending.     ***************************************************************  Lynne King, PGY4  Fellow Hematology/Oncology  pager: [phone]   Available on [employer] Teams  After 5pm or on weekends please contact  to page on-call fellow   ***************************************************************

## 2024-04-22 NOTE — PROGRESS NOTE ADULT - SUBJECTIVE AND OBJECTIVE BOX
INTERVAL HPI/OVERNIGHT EVENTS:  No overnight events.     MEDICATIONS  (STANDING):  cefTRIAXone   IVPB 2000 milliGRAM(s) IV Intermittent every 24 hours  chlorhexidine 0.12% Liquid 15 milliLiter(s) Oral Mucosa every 12 hours  chlorhexidine 2% Cloths 1 Application(s) Topical <User Schedule>  dextrose 50% Injectable 25 Gram(s) IV Push once  dextrose 50% Injectable 12.5 Gram(s) IV Push once  droxidopa 100 milliGRAM(s) Oral three times a day  enoxaparin Injectable 40 milliGRAM(s) SubCutaneous every 12 hours  glucagon  Injectable 1 milliGRAM(s) IntraMuscular once  insulin lispro (ADMELOG) corrective regimen sliding scale   SubCutaneous every 6 hours  polyethylene glycol 3350 17 Gram(s) Oral every 12 hours  propofol Infusion 9.91 MICROgram(s)/kG/Min (6.6 mL/Hr) IV Continuous <Continuous>  senna Syrup 10 milliLiter(s) Oral at bedtime  sodium chloride 3%  Inhalation 4 milliLiter(s) Inhalation every 6 hours    MEDICATIONS  (PRN):  albuterol/ipratropium for Nebulization 3 milliLiter(s) Nebulizer every 6 hours PRN Shortness of Breath and/or Wheezing  fentaNYL    Injectable 50 MICROGram(s) IV Push every 4 hours PRN Moderate Pain (4 - 6)  nystatin Powder 1 Application(s) Topical two times a day PRN skin irritation    Allergies    No Known Allergies    Intolerances          VITAL SIGNS:  T(F): 100.2 (04-22-24 @ 08:00)  HR: 91 (04-22-24 @ 09:00)  BP: 86/51 (04-22-24 @ 09:00)  RR: 17 (04-22-24 @ 09:00)  SpO2: 99% (04-22-24 @ 09:00)  Wt(kg): --    PHYSICAL EXAM:    Constitutional: Intubated. NAD  Eyes: PERRL, EOMI, sclera non-icteric  Respiratory: CTA b/l, good air entry b/l, no wheezing, rhonchi, rales, with normal respiratory effort and no intercostal retractions  Cardiovascular: RRR, normal S1S2, no M/R/G  Gastrointestinal: soft, NTND, no masses palpable, BS normal in all four quadrants, no HSM  Extremities:  no c/c/e  Neurological: Opens eyes and nods for yes/no questions but dozes off quickly  Skin: Normal temperature    LABS:                        8.7    122.04 )-----------( 140      ( 22 Apr 2024 00:14 )             27.3     04-22    143  |  107  |  13  ----------------------------<  142<H>  3.9   |  26  |  0.30<L>    Ca    8.6      22 Apr 2024 00:15  Phos  3.7     04-22  Mg     2.9     04-22    TPro  6.0  /  Alb  2.7<L>  /  TBili  0.3  /  DBili  x   /  AST  72<H>  /  ALT  56<H>  /  AlkPhos  78  04-22    PT/INR - ( 22 Apr 2024 00:14 )   PT: 13.8 sec;   INR: 1.26 ratio         PTT - ( 22 Apr 2024 00:14 )  PTT:31.5 sec  Urinalysis Basic - ( 22 Apr 2024 00:15 )    Color: x / Appearance: x / SG: x / pH: x  Gluc: 142 mg/dL / Ketone: x  / Bili: x / Urobili: x   Blood: x / Protein: x / Nitrite: x   Leuk Esterase: x / RBC: x / WBC x   Sq Epi: x / Non Sq Epi: x / Bacteria: x        RADIOLOGY & ADDITIONAL TESTS:  Studies reviewed.   INTERVAL HPI/OVERNIGHT EVENTS:  No overnight events.     MEDICATIONS  (STANDING):  cefTRIAXone   IVPB 2000 milliGRAM(s) IV Intermittent every 24 hours  chlorhexidine 0.12% Liquid 15 milliLiter(s) Oral Mucosa every 12 hours  chlorhexidine 2% Cloths 1 Application(s) Topical <User Schedule>  dextrose 50% Injectable 25 Gram(s) IV Push once  dextrose 50% Injectable 12.5 Gram(s) IV Push once  droxidopa 100 milliGRAM(s) Oral three times a day  enoxaparin Injectable 40 milliGRAM(s) SubCutaneous every 12 hours  glucagon  Injectable 1 milliGRAM(s) IntraMuscular once  insulin lispro (ADMELOG) corrective regimen sliding scale   SubCutaneous every 6 hours  polyethylene glycol 3350 17 Gram(s) Oral every 12 hours  propofol Infusion 9.91 MICROgram(s)/kG/Min (6.6 mL/Hr) IV Continuous <Continuous>  senna Syrup 10 milliLiter(s) Oral at bedtime  sodium chloride 3%  Inhalation 4 milliLiter(s) Inhalation every 6 hours    MEDICATIONS  (PRN):  albuterol/ipratropium for Nebulization 3 milliLiter(s) Nebulizer every 6 hours PRN Shortness of Breath and/or Wheezing  fentaNYL    Injectable 50 MICROGram(s) IV Push every 4 hours PRN Moderate Pain (4 - 6)  nystatin Powder 1 Application(s) Topical two times a day PRN skin irritation    Allergies    No Known Allergies    Intolerances          VITAL SIGNS:  T(F): 100.2 (04-22-24 @ 08:00)  HR: 91 (04-22-24 @ 09:00)  BP: 86/51 (04-22-24 @ 09:00)  RR: 17 (04-22-24 @ 09:00)  SpO2: 99% (04-22-24 @ 09:00)  Wt(kg): --    PHYSICAL EXAM:    GENERAL: NAD, lying in bed comfortably, intubated and sedated on propofol; opens eyes to voice   HEAD:  Atraumatic, normocephalic  EYES: EOMI, PERRLA, conjunctiva and sclera clear  NECK: Supple, trachea midline, no JVD  HEART: Regular rate and rhythm, no murmurs, rubs, or gallops  LUNGS: Unlabored respirations, triggering own respirations intermittently, clear lungs   ABDOMEN: Soft, nontender, nondistended, +BS  EXTREMITIES: 2+ peripheral pulses bilaterally, cap refill<2 secs. No clubbing, cyanosis, or edema  NERVOUS SYSTEM:  Intubated sedated on Propofol, opens eyes and follows commands   SKIN: No rashes or lesions    LABS:                        8.7    122.04 )-----------( 140      ( 22 Apr 2024 00:14 )             27.3     04-22    143  |  107  |  13  ----------------------------<  142<H>  3.9   |  26  |  0.30<L>    Ca    8.6      22 Apr 2024 00:15  Phos  3.7     04-22  Mg     2.9     04-22    TPro  6.0  /  Alb  2.7<L>  /  TBili  0.3  /  DBili  x   /  AST  72<H>  /  ALT  56<H>  /  AlkPhos  78  04-22    PT/INR - ( 22 Apr 2024 00:14 )   PT: 13.8 sec;   INR: 1.26 ratio         PTT - ( 22 Apr 2024 00:14 )  PTT:31.5 sec  Urinalysis Basic - ( 22 Apr 2024 00:15 )    Color: x / Appearance: x / SG: x / pH: x  Gluc: 142 mg/dL / Ketone: x  / Bili: x / Urobili: x   Blood: x / Protein: x / Nitrite: x   Leuk Esterase: x / RBC: x / WBC x   Sq Epi: x / Non Sq Epi: x / Bacteria: x        RADIOLOGY & ADDITIONAL TESTS:  Studies reviewed.   INTERVAL HPI/OVERNIGHT EVENTS:  No overnight events. Patient remains intubated but answers by nodding head appropriately.    MEDICATIONS  (STANDING):  cefTRIAXone   IVPB 2000 milliGRAM(s) IV Intermittent every 24 hours  chlorhexidine 0.12% Liquid 15 milliLiter(s) Oral Mucosa every 12 hours  chlorhexidine 2% Cloths 1 Application(s) Topical <User Schedule>  dextrose 50% Injectable 25 Gram(s) IV Push once  dextrose 50% Injectable 12.5 Gram(s) IV Push once  droxidopa 100 milliGRAM(s) Oral three times a day  enoxaparin Injectable 40 milliGRAM(s) SubCutaneous every 12 hours  glucagon  Injectable 1 milliGRAM(s) IntraMuscular once  insulin lispro (ADMELOG) corrective regimen sliding scale   SubCutaneous every 6 hours  polyethylene glycol 3350 17 Gram(s) Oral every 12 hours  propofol Infusion 9.91 MICROgram(s)/kG/Min (6.6 mL/Hr) IV Continuous <Continuous>  senna Syrup 10 milliLiter(s) Oral at bedtime  sodium chloride 3%  Inhalation 4 milliLiter(s) Inhalation every 6 hours    MEDICATIONS  (PRN):  albuterol/ipratropium for Nebulization 3 milliLiter(s) Nebulizer every 6 hours PRN Shortness of Breath and/or Wheezing  fentaNYL    Injectable 50 MICROGram(s) IV Push every 4 hours PRN Moderate Pain (4 - 6)  nystatin Powder 1 Application(s) Topical two times a day PRN skin irritation    Allergies    No Known Allergies    Intolerances          VITAL SIGNS:  T(F): 100.2 (04-22-24 @ 08:00)  HR: 91 (04-22-24 @ 09:00)  BP: 86/51 (04-22-24 @ 09:00)  RR: 17 (04-22-24 @ 09:00)  SpO2: 99% (04-22-24 @ 09:00)  Wt(kg): --    PHYSICAL EXAM:    GENERAL: NAD, lying in bed comfortably, intubated and sedated on propofol; opens eyes to voice   HEAD:  Atraumatic, normocephalic  EYES: EOMI, PERRLA, conjunctiva and sclera clear  NECK: Supple, trachea midline, no JVD  HEART: Regular rate and rhythm, no murmurs, rubs, or gallops  LUNGS: Unlabored respirations, triggering own respirations intermittently, clear lungs   ABDOMEN: Soft, nontender, nondistended, +BS  EXTREMITIES: 2+ peripheral pulses bilaterally, cap refill<2 secs. No clubbing, cyanosis, or edema  NERVOUS SYSTEM:  Intubated sedated on Propofol, opens eyes and follows commands   SKIN: No rashes or lesions    LABS:                        8.7    122.04 )-----------( 140      ( 22 Apr 2024 00:14 )             27.3     04-22    143  |  107  |  13  ----------------------------<  142<H>  3.9   |  26  |  0.30<L>    Ca    8.6      22 Apr 2024 00:15  Phos  3.7     04-22  Mg     2.9     04-22    TPro  6.0  /  Alb  2.7<L>  /  TBili  0.3  /  DBili  x   /  AST  72<H>  /  ALT  56<H>  /  AlkPhos  78  04-22    PT/INR - ( 22 Apr 2024 00:14 )   PT: 13.8 sec;   INR: 1.26 ratio         PTT - ( 22 Apr 2024 00:14 )  PTT:31.5 sec  Urinalysis Basic - ( 22 Apr 2024 00:15 )    Color: x / Appearance: x / SG: x / pH: x  Gluc: 142 mg/dL / Ketone: x  / Bili: x / Urobili: x   Blood: x / Protein: x / Nitrite: x   Leuk Esterase: x / RBC: x / WBC x   Sq Epi: x / Non Sq Epi: x / Bacteria: x        RADIOLOGY & ADDITIONAL TESTS:  Studies reviewed.

## 2024-04-22 NOTE — PROGRESS NOTE ADULT - ATTENDING COMMENTS
69F h/o CVA (bedbound at baseline), COPD, CHF, HTN presenting as transfer from [facility] for SOB iso leukocytosis to 233 c/f acute leukemia. Pt intubated and on pressors in setting of strep pneumo pneumonia. Hematology consulted for leukocytosis, confirmed underlying CLL with reactive lymphocytosis 2/2 sepsis.     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%)  - 4/18/24: Flow Cytometry c/w CLL/SLL 88.8% Monotypic B-cells positive for CD5 (dim), CD19, CD20 (dimmer), CD23 (partial), , surface kappa light chain; negative for CD10, CD11c, CD38, FMC7, lambda surface light chain    - Please get CT c/a/p with IV contrast to assess for LAD and hepatosplenomegaly, but not urgent  - f/u Cytogenetics from peripheral blood (collected 4/16/24)  - Please trend TLS labs (uric acid, LDH, CMP, Mg, Phos) and CBC w/ diff and retic daily  - If uric acid > 8, start allopurinol and give 3 mg IV rasburicase, and if uric acid > 12 give 6 mg rasburicase  - Confirmed with patient that she has had no prior bone marrow biopsy. Will recommend performing inpatient with IR once she is extubated to determine whether patient qualifies for treatment vs observation.  Will follow

## 2024-04-22 NOTE — CHART NOTE - NSCHARTNOTEFT_GEN_A_CORE
Nutrition Follow Up Note  Patient seen for: length of stay on MICU follow up.     Chart reviewed, events noted.    Source: [] Patient       [x] Medical Record        [x] RN        [] Family at bedside       [x] Other: Interdisciplinary Rounds     -If unable to interview patient: [x] Trach/Vent/BiPAP  [] Disoriented/confused/inappropriate to interview    Diet Order:   Diet, NPO with Tube Feed:   Tube Feeding Modality: Orogastric  Jevity 1.2 Emre (JEVITY1.2RTH)  Total Volume for 24 Hours (mL): 1080  Continuous  Starting Tube Feed Rate {mL per Hour}: 10  Increase Tube Feed Rate by (mL): 20  Until Goal Tube Feed Rate (mL per Hour): 60  Tube Feed Duration (in Hours): 18  Tube Feed Start Time: 11:00 (24)    Enteral Order Provides: total volume 1080 mL, 1296 kcals, 60 gm protein, and 872 mL free water. Meets 12 kcals/kG based on daily wt 106.8 kg () and 0.9 gm protein/kG based on IBW 63.5 kg.    Current Pump Rate: Currently on hold per MICU 18 hr policy. Was at 60 mL/hr    5-Day Enteral Average Provision per RN flowsheet: 1010 mL, 1212 kcals, and 56 gm protein  5-Day Propofol Average Provision per RN flowsheet: 393 kcals (+ enteral = 1605 kcals)    Is current diet order appropriate/adequate? See recommendations below    PO intake :   [] >75%  Adequate    [] 50-75%  Fair       [] <50%  Poor   [x] N/A    Nutrition-related concerns:  - Intubated . On propofol at 23.31 mL/hr (615 kcals/day)  - Elevated BG; On sliding scale of insulin   - Elevated Mg noted     GI:  Last BM .   Bowel Regimen? [x] Yes   [] No    Weights:   Daily Weight in k.2 (), 106.8 (), 108.2 ()  Slight wt fluctuations noted, likely fluid shifts.  RD will continue to trend as new wts available/able.     Drug Dosing Weight  Height (cm): 172.7 (2024 13:01)  Weight (kg): 111 (2024 13:01)  BMI (kg/m2): 37.2 (2024 13:01); based on daily wt 106.8 kg (): 35.8    Nutritionally Pertinent:   MEDICATIONS  (STANDING):  cefTRIAXone   IVPB 2000 milliGRAM(s) IV Intermittent every 24 hours  dextrose 50% Injectable 25 Gram(s) IV Push once  dextrose 50% Injectable 12.5 Gram(s) IV Push once  droxidopa 100 milliGRAM(s) Oral three times a day  enoxaparin Injectable 40 milliGRAM(s) SubCutaneous every 12 hours  insulin lispro (ADMELOG) corrective regimen sliding scale   SubCutaneous every 6 hours  polyethylene glycol 3350 17 Gram(s) Oral every 12 hours  propofol Infusion 9.91 MICROgram(s)/kG/Min (6.6 mL/Hr) IV Continuous <Continuous>  senna Syrup 10 milliLiter(s) Oral at bedtime    Pertinent Labs:  @ 00:15: Na 143, BUN 13, Cr 0.30<L>, <H>, K+ 3.9, Phos 3.7, Mg 2.9<H>, Alk Phos 78, ALT/SGPT 56<H>, AST/SGOT 72<H>    Finger Sticks:  POCT Blood Glucose.: 125 mg/dL ( @ 05:07)  POCT Blood Glucose.: 144 mg/dL ( @ 23:50)  POCT Blood Glucose.: 140 mg/dL ( @ 17:21)  POCT Blood Glucose.: 126 mg/dL ( @ 11:25)    Skin per nursing documentation: Suspected deep tissue injury sacrum  Edema per nursing documentation: 2+ dependent    Estimated Energy Needs: (15-20 kcals/kg based on daily wt 106.8 kg ()) 2184-5061 kcals  Estimated Protein Needs: (1.5-2.0 gm/kg based on IBW 63.5 kg)  gm  Fluid needs deferred to provider.   [place] State Equation (based on daily wt 106.8 kg ()): 1877 kcals ()    Previous Nutrition Diagnosis: Increased protein-energy needs  Nutrition Diagnosis is: [x] ongoing  [] resolved [] not applicable     Nutrition Care Plan:  [x] In Progress  [] Achieved  [] Not applicable    New Nutrition Diagnosis: [x] Not applicable    Nutrition Interventions:     Education Provided:       [] Yes:  [x] No: Not applicable    Recommendations:      1) Vital High Protein at 10 mL/hr increasing only as tolerated and electrolytes WNL to goal rate 70 mL/hr x18 hours to provide total volume 1260 mL, 1260 kcals (Total 1875 kcals with propofol providing 615 kcals/day), 110 gm protein, and 1053 mL free water. Meets 17.6 kcals/kG based on daily wt 106.8 kg (04-18) with propofol and 1.7 gm protein/kG based on IBW 63.5 kg.  -> Continue to trend provision of propofol for changes to feeds   2) As medically feasible, provide multivitamin and Vitamin C for wound healing.     Monitoring and Evaluation:   Continue to monitor nutritional intake, tolerance to diet prescription, weights, labs, skin integrity    RD remains available upon request and will follow up per protocol  Taylor Aguero RD, MS, CDN, CNSC, CDCES TEAMS Nutrition Follow Up Note  Patient seen for: length of stay on MICU follow up.     Chart reviewed, events noted.    Source: [] Patient       [x] Medical Record        [x] RN        [] Family at bedside       [x] Other: Interdisciplinary Rounds     -If unable to interview patient: [x] Trach/Vent/BiPAP  [] Disoriented/confused/inappropriate to interview    Diet Order:   Diet, NPO with Tube Feed:   Tube Feeding Modality: Orogastric  Jevity 1.2 Emre (JEVITY1.2RTH)  Total Volume for 24 Hours (mL): 1080  Continuous  Starting Tube Feed Rate {mL per Hour}: 10  Increase Tube Feed Rate by (mL): 20  Until Goal Tube Feed Rate (mL per Hour): 60  Tube Feed Duration (in Hours): 18  Tube Feed Start Time: 11:00 (24)    Enteral Order Provides: total volume 1080 mL, 1296 kcals, 60 gm protein, and 872 mL free water. Meets 12 kcals/kG based on daily wt 106.8 kg () and 0.9 gm protein/kG based on IBW 63.5 kg.    Current Pump Rate: Currently on hold per MICU 18 hr policy. Was at 60 mL/hr    5-Day Enteral Average Provision per RN flowsheet: 1010 mL, 1212 kcals, and 56 gm protein  5-Day Propofol Average Provision per RN flowsheet: 393 kcals (+ enteral = 1605 kcals)    Is current diet order appropriate/adequate? See recommendations below    PO intake :   [] >75%  Adequate    [] 50-75%  Fair       [] <50%  Poor   [x] N/A    Nutrition-related concerns:  - Intubated . On propofol at 23.31 mL/hr (615 kcals/day)  - Elevated BG; On sliding scale of insulin   - Elevated Mg noted     GI:  Last BM .   Bowel Regimen? [x] Yes   [] No    Weights:   Daily Weight in k.2 (), 106.8 (), 108.2 ()  Slight wt fluctuations noted, likely fluid shifts.  RD will continue to trend as new wts available/able.     Drug Dosing Weight  Height (cm): 172.7 (2024 13:01)  Weight (kg): 111 (2024 13:01)  BMI (kg/m2): 37.2 (2024 13:01); based on daily wt 106.8 kg (): 35.8    Nutritionally Pertinent:   MEDICATIONS  (STANDING):  cefTRIAXone   IVPB 2000 milliGRAM(s) IV Intermittent every 24 hours  dextrose 50% Injectable 25 Gram(s) IV Push once  dextrose 50% Injectable 12.5 Gram(s) IV Push once  droxidopa 100 milliGRAM(s) Oral three times a day  enoxaparin Injectable 40 milliGRAM(s) SubCutaneous every 12 hours  insulin lispro (ADMELOG) corrective regimen sliding scale   SubCutaneous every 6 hours  polyethylene glycol 3350 17 Gram(s) Oral every 12 hours  propofol Infusion 9.91 MICROgram(s)/kG/Min (6.6 mL/Hr) IV Continuous <Continuous>  senna Syrup 10 milliLiter(s) Oral at bedtime    Pertinent Labs:  @ 00:15: Na 143, BUN 13, Cr 0.30<L>, <H>, K+ 3.9, Phos 3.7, Mg 2.9<H>, Alk Phos 78, ALT/SGPT 56<H>, AST/SGOT 72<H>    Finger Sticks:  POCT Blood Glucose.: 125 mg/dL ( @ 05:07)  POCT Blood Glucose.: 144 mg/dL ( @ 23:50)  POCT Blood Glucose.: 140 mg/dL ( @ 17:21)  POCT Blood Glucose.: 126 mg/dL ( @ 11:25)    Skin per nursing documentation: Suspected deep tissue injury sacrum  Edema per nursing documentation: 2+ dependent    Estimated Energy Needs: (15-20 kcals/kg based on daily wt 106.8 kg ()) 3799-9133 kcals  Estimated Protein Needs: (1.5-2.0 gm/kg based on IBW 63.5 kg)  gm  Fluid needs deferred to provider.   [place] State Equation (based on daily wt 106.8 kg ()): 1877 kcals ()    Previous Nutrition Diagnosis: Increased protein-energy needs  Nutrition Diagnosis is: [x] ongoing  [] resolved [] not applicable     Nutrition Care Plan:  [x] In Progress  [] Achieved  [] Not applicable    New Nutrition Diagnosis: [x] Not applicable    Nutrition Interventions:     Education Provided:       [] Yes:  [x] No: Not applicable    Recommendations:      1) Vital High Protein at goal rate 70 mL/hr x18 hours to provide total volume 1260 mL, 1260 kcals (Total 1875 kcals with propofol providing 615 kcals/day), 110 gm protein, and 1053 mL free water. Meets 17.6 kcals/kG based on daily wt 106.8 kg (04-18) with propofol and 1.7 gm protein/kG based on IBW 63.5 kg.  -> Continue to trend provision of propofol for changes to feeds   2) As medically feasible, provide multivitamin and Vitamin C for wound healing.     Monitoring and Evaluation:   Continue to monitor nutritional intake, tolerance to diet prescription, weights, labs, skin integrity    RD remains available upon request and will follow up per protocol  Taylor Aguero RD, MS, CDN, CNSC, CDCES TEAMS

## 2024-04-22 NOTE — PROGRESS NOTE ADULT - ASSESSMENT
69F h/o CVA (bedbound at baseline), COPD, CHF, HTN presenting as transfer from [facility] for SOB iso leukocytosis to 233 c/f acute leukemia. Pt intubated and on pressors, transferred to MICU for further management and possible leukophoresis.    Neuro  #Encephalopathy   -p/w lethargy likely sepsis; Per Heme, leukostasis unlikely as cells mature  -CTH showing old infarct, no acute process  - off Precedex and propofol    CV  #Hemodynamics  -hypotensive likely iso vasoplegia from sedatives and sepsis  -currently on levo, ween as tolerated- can start midodrine or droxydopa to assist with weening    #Chest Pain  -demand vs leukostasis vs non cardiac   -reported JOSEF in II, III, aVF at [facility] --> repeat EKG without ST changes or Q waves  -trops peaked  -cards recs appreciated    #CHF  -unclear hx but elevated pro-BNP to 2600s  -TTE: EF 54%, no significant abnormalities     Resp  #Acute hypoxic respiratory failure  -pna vs leukostasis   -intubated: 460/14/30/5; ween off as tolerated  -s/p thora draining 1500cc fluid 4/16 with chest tube placement; c/w exudative effusion  -MRSA neg  -pleural fluid growing strep pneumo; BCx growing strep pneumo  -was on vanc/zosyn/azithro (4/16)-->deescalated to CTX 2g qd (4/17 - )  -24h chest tube output: 90cc x24h - removed 4/19  -d/c daily xrays  -trial Tpiece 4/21    GI  #Diet: TF    /Renal  -no active issues    Heme/Onc  #Leukocytosis  #CLL  - on presentation  -no plan for leukophoresis at this time given mature lymphocytes  -d/c trending TLS labs      #DVT ppx: lovenox    ID  #PNA  -BCx + strep pneumo; fluid culture with strep pneumo  -repeat BCx 4/18 ngtd  -repeat BCx 4/20 iso fevers still  -was on vanc/zosyn/azithro (4/16)-->deescalated to CTX (4/17 - )    Endo  -ISS q6h while NPO    Ethics: Full Code   69F h/o CVA (bedbound at baseline), COPD, CHF, HTN presenting as transfer from [facility] for SOB iso leukocytosis to 233 c/f acute leukemia. Pt intubated and on pressors, transferred to MICU for further management and possible leukophoresis.    Neuro  #Encephalopathy   -p/w lethargy likely sepsis; Per Heme, leukostasis unlikely as cells mature  -CTH showing old infarct, no acute process  - off Precedex, on propofol appropriately sedated     CV  #Hemodynamics  -hypotensive likely iso vasoplegia from sedatives and sepsis  - Weaned off levo, on Droxydopa can start midodrine for assistance with weaning    #Chest Pain  -demand vs leukostasis vs non cardiac   -reported JOSEF in II, III, aVF at [facility] --> repeat EKG without ST changes or Q waves  -trops peaked  -cards recs appreciated    #CHF  -unclear hx but elevated pro-BNP to 2600s  -TTE: EF 54%, no significant abnormalities     Resp  #Acute hypoxic respiratory failure  -pna vs leukostasis   -intubated: 460/14/30/5; ween off as tolerated  -s/p thora draining 1500cc fluid 4/16 with chest tube placement; c/w exudative effusion  -MRSA neg  -pleural fluid growing strep pneumo; BCx growing strep pneumo  -was on vanc/zosyn/azithro (4/16)-->deescalated to CTX 2g qd (4/17 - )  -24h chest tube output: 90cc x24h - removed 4/19  -d/c daily xrays  -trial Tpiece 4/21    GI  #Diet: TF    /Renal  -no active issues    Heme/Onc  #Leukocytosis  #CLL  - on presentation  -no plan for leukophoresis at this time given mature lymphocytes  -d/c trending TLS labs      #DVT ppx: lovenox    ID  #PNA  -BCx + strep pneumo; fluid culture with strep pneumo  -repeat BCx 4/18 ngtd  -repeat BCx 4/20 iso fevers still  -was on vanc/zosyn/azithro (4/16)-->deescalated to CTX (4/17 - )    Endo  -ISS q6h while NPO    Ethics: Full Code   69F h/o CVA (bedbound at baseline), COPD, CHF, HTN presenting as transfer from [facility] for SOB iso leukocytosis to 233 c/f acute leukemia. Pt intubated and on pressors, transferred to MICU for further management and possible leukophoresis.    Neuro  #Encephalopathy   -p/w lethargy likely sepsis; Per Heme, leukostasis unlikely as cells mature  -CTH showing old infarct, no acute process  - off Precedex, on propofol, following commands.      CV  #Hemodynamics  -hypotensive likely iso vasoplegia from sedatives and sepsis  - Weaned off levo, on Droxydopa, elevated to 200 TID; MAP goal >65    #Chest Pain  -demand vs leukostasis vs non cardiac   -reported JOSEF in II, III, aVF at [facility] --> repeat EKG without ST changes or Q waves  -trops peaked    #CHF  -unclear hx but elevated pro-BNP to 2600s  -TTE: EF 54%, no significant abnormalities; CTM    Resp  #Acute hypoxic respiratory failure  #Empyema Pansensitive Strep Pneumo  -pna vs leukostasis   -intubated: 460/14/30/5; wean off as tolerated  -s/p thora draining 1500cc fluid 4/16 with chest tube placement; c/w exudative effusion growing Strep Pneumo  -MRSA neg  -pleural fluid growing strep pneumo; BCx growing strep pneumo; Pansensitive  -was on vanc/zosyn/azithro (4/16)-->deescalated to CTX 2g qd (4/17 - ); anticipate 5-day course   -24h chest tube output: 90cc x24h - removed 4/19; Residual 2cm effusion on Pocus on 4/22  -trial Tpiece 4/21    GI  #Diet: TF    /Renal  -no active issues    Heme/Onc  #Leukocytosis  #CLL  - on presentation; improving  -no plan for leukophoresis at this time given mature lymphocytes  -stop trending TLS labs as they have been stable       #DVT ppx: lovenox    ID  #Empyema 2/2 Pansensitive Strep Pneumo  #Bacteremia  -BCx + strep pneumo; fluid culture with strep pneumo  -repeat BCx 4/20 ngtd  -was on vanc/zosyn/azithro (4/16)-->deescalated to CTX (4/17 - )  - Low grade fevers to 100.4F 4/22 AM, repeat Cx if febrile again    Endo  -ISS q6h while NPO    Ethics: Full Code  Only known family member is Aunt Mili Gordon ([phone], [phone]) who lives in [place]

## 2024-04-23 LAB
ALBUMIN FLD-MCNC: 2.1 G/DL — SIGNIFICANT CHANGE UP
ALBUMIN SERPL ELPH-MCNC: 2.7 G/DL — LOW (ref 3.3–5)
ALP SERPL-CCNC: 77 U/L — SIGNIFICANT CHANGE UP (ref 40–120)
ALT FLD-CCNC: 57 U/L — HIGH (ref 10–45)
ANION GAP SERPL CALC-SCNC: 11 MMOL/L — SIGNIFICANT CHANGE UP (ref 5–17)
APPEARANCE UR: CLEAR — SIGNIFICANT CHANGE UP
APTT BLD: 30.6 SEC — SIGNIFICANT CHANGE UP (ref 24.5–35.6)
AST SERPL-CCNC: 52 U/L — HIGH (ref 10–40)
B PERT IGG+IGM PNL SER: ABNORMAL
BASOPHILS # BLD AUTO: 0.16 K/UL — SIGNIFICANT CHANGE UP (ref 0–0.2)
BASOPHILS NFR BLD AUTO: 0.1 % — SIGNIFICANT CHANGE UP (ref 0–2)
BILIRUB SERPL-MCNC: 0.4 MG/DL — SIGNIFICANT CHANGE UP (ref 0.2–1.2)
BILIRUB UR-MCNC: NEGATIVE — SIGNIFICANT CHANGE UP
BUN SERPL-MCNC: 14 MG/DL — SIGNIFICANT CHANGE UP (ref 7–23)
CALCIUM SERPL-MCNC: 8.5 MG/DL — SIGNIFICANT CHANGE UP (ref 8.4–10.5)
CHLORIDE SERPL-SCNC: 106 MMOL/L — SIGNIFICANT CHANGE UP (ref 96–108)
CO2 SERPL-SCNC: 25 MMOL/L — SIGNIFICANT CHANGE UP (ref 22–31)
COLOR FLD: ABNORMAL
COLOR SPEC: YELLOW — SIGNIFICANT CHANGE UP
CREAT SERPL-MCNC: 0.33 MG/DL — LOW (ref 0.5–1.3)
CULTURE RESULTS: SIGNIFICANT CHANGE UP
CULTURE RESULTS: SIGNIFICANT CHANGE UP
DIFF PNL FLD: NEGATIVE — SIGNIFICANT CHANGE UP
EGFR: 113 ML/MIN/1.73M2 — SIGNIFICANT CHANGE UP
EOSINOPHIL # BLD AUTO: 0.09 K/UL — SIGNIFICANT CHANGE UP (ref 0–0.5)
EOSINOPHIL NFR BLD AUTO: 0.1 % — SIGNIFICANT CHANGE UP (ref 0–6)
FLUID INTAKE SUBSTANCE CLASS: SIGNIFICANT CHANGE UP
GAS PNL BLDA: SIGNIFICANT CHANGE UP
GLUCOSE BLDC GLUCOMTR-MCNC: 117 MG/DL — HIGH (ref 70–99)
GLUCOSE BLDC GLUCOMTR-MCNC: 154 MG/DL — HIGH (ref 70–99)
GLUCOSE BLDC GLUCOMTR-MCNC: 84 MG/DL — SIGNIFICANT CHANGE UP (ref 70–99)
GLUCOSE FLD-MCNC: 7 MG/DL — SIGNIFICANT CHANGE UP
GLUCOSE SERPL-MCNC: 142 MG/DL — HIGH (ref 70–99)
GLUCOSE UR QL: NEGATIVE MG/DL — SIGNIFICANT CHANGE UP
HCT VFR BLD CALC: 26.1 % — LOW (ref 34.5–45)
HGB BLD-MCNC: 8.5 G/DL — LOW (ref 11.5–15.5)
IMM GRANULOCYTES NFR BLD AUTO: 0.6 % — SIGNIFICANT CHANGE UP (ref 0–0.9)
INR BLD: 1.29 RATIO — HIGH (ref 0.85–1.18)
KETONES UR-MCNC: NEGATIVE MG/DL — SIGNIFICANT CHANGE UP
LDH SERPL L TO P-CCNC: 9745 U/L — SIGNIFICANT CHANGE UP
LEUKOCYTE ESTERASE UR-ACNC: NEGATIVE — SIGNIFICANT CHANGE UP
LYMPHOCYTES # BLD AUTO: 111.14 K/UL — HIGH (ref 1–3.3)
LYMPHOCYTES # BLD AUTO: 84.6 % — HIGH (ref 13–44)
LYMPHOCYTES # FLD: 44 % — SIGNIFICANT CHANGE UP
MAGNESIUM SERPL-MCNC: 2.9 MG/DL — HIGH (ref 1.6–2.6)
MCHC RBC-ENTMCNC: 28.1 PG — SIGNIFICANT CHANGE UP (ref 27–34)
MCHC RBC-ENTMCNC: 32.6 GM/DL — SIGNIFICANT CHANGE UP (ref 32–36)
MCV RBC AUTO: 86.4 FL — SIGNIFICANT CHANGE UP (ref 80–100)
MONOCYTES # BLD AUTO: 6.79 K/UL — HIGH (ref 0–0.9)
MONOCYTES NFR BLD AUTO: 5.2 % — SIGNIFICANT CHANGE UP (ref 2–14)
MONOS+MACROS # FLD: 5 % — SIGNIFICANT CHANGE UP
NEUTROPHILS # BLD AUTO: 12.35 K/UL — HIGH (ref 1.8–7.4)
NEUTROPHILS NFR BLD AUTO: 9.4 % — LOW (ref 43–77)
NEUTROPHILS-BODY FLUID: 51 % — SIGNIFICANT CHANGE UP
NITRITE UR-MCNC: NEGATIVE — SIGNIFICANT CHANGE UP
NRBC # BLD: 0 /100 WBCS — SIGNIFICANT CHANGE UP (ref 0–0)
PH FLD: 7.45 — SIGNIFICANT CHANGE UP
PH UR: 5.5 — SIGNIFICANT CHANGE UP (ref 5–8)
PHOSPHATE SERPL-MCNC: 4.2 MG/DL — SIGNIFICANT CHANGE UP (ref 2.5–4.5)
PLATELET # BLD AUTO: 161 K/UL — SIGNIFICANT CHANGE UP (ref 150–400)
POTASSIUM SERPL-MCNC: 4.1 MMOL/L — SIGNIFICANT CHANGE UP (ref 3.5–5.3)
POTASSIUM SERPL-SCNC: 4.1 MMOL/L — SIGNIFICANT CHANGE UP (ref 3.5–5.3)
PROT FLD-MCNC: 3.8 G/DL — SIGNIFICANT CHANGE UP
PROT SERPL-MCNC: 5.9 G/DL — LOW (ref 6–8.3)
PROT UR-MCNC: SIGNIFICANT CHANGE UP MG/DL
PROTHROM AB SERPL-ACNC: 13.4 SEC — HIGH (ref 9.5–13)
RBC # BLD: 3.02 M/UL — LOW (ref 3.8–5.2)
RBC # FLD: 15.7 % — HIGH (ref 10.3–14.5)
RCV VOL RI: HIGH CELLS/UL (ref 0–5)
SODIUM SERPL-SCNC: 142 MMOL/L — SIGNIFICANT CHANGE UP (ref 135–145)
SP GR SPEC: >1.03 — HIGH (ref 1–1.03)
SPECIMEN SOURCE: SIGNIFICANT CHANGE UP
SPECIMEN SOURCE: SIGNIFICANT CHANGE UP
TOTAL NUCLEATED CELL COUNT, BODY FLUID: 8802 CELLS/UL — HIGH (ref 0–5)
TUBE TYPE: SIGNIFICANT CHANGE UP
UROBILINOGEN FLD QL: 0.2 MG/DL — SIGNIFICANT CHANGE UP (ref 0.2–1)
VISC SER: 1.5 — SIGNIFICANT CHANGE UP (ref 1.4–2.1)
VISC SER: 1.8 — SIGNIFICANT CHANGE UP (ref 1.4–2.1)
WBC # BLD: 131.35 K/UL — CRITICAL HIGH (ref 3.8–10.5)
WBC # FLD AUTO: 131.35 K/UL — CRITICAL HIGH (ref 3.8–10.5)

## 2024-04-23 PROCEDURE — 99292 CRITICAL CARE ADDL 30 MIN: CPT | Mod: GC,25

## 2024-04-23 PROCEDURE — 99291 CRITICAL CARE FIRST HOUR: CPT | Mod: GC,25

## 2024-04-23 PROCEDURE — 76604 US EXAM CHEST: CPT | Mod: 26,GC

## 2024-04-23 PROCEDURE — 32557 INSERT CATH PLEURA W/ IMAGE: CPT | Mod: LT

## 2024-04-23 PROCEDURE — 99222 1ST HOSP IP/OBS MODERATE 55: CPT

## 2024-04-23 RX ORDER — NOREPINEPHRINE BITARTRATE/D5W 8 MG/250ML
0.05 PLASTIC BAG, INJECTION (ML) INTRAVENOUS
Qty: 16 | Refills: 0 | Status: DISCONTINUED | OUTPATIENT
Start: 2024-04-23 | End: 2024-04-23

## 2024-04-23 RX ORDER — SODIUM CHLORIDE 9 MG/ML
4 INJECTION INTRAMUSCULAR; INTRAVENOUS; SUBCUTANEOUS EVERY 8 HOURS
Refills: 0 | Status: DISCONTINUED | OUTPATIENT
Start: 2024-04-23 | End: 2024-05-01

## 2024-04-23 RX ORDER — NOREPINEPHRINE BITARTRATE/D5W 8 MG/250ML
0.02 PLASTIC BAG, INJECTION (ML) INTRAVENOUS
Qty: 8 | Refills: 0 | Status: DISCONTINUED | OUTPATIENT
Start: 2024-04-23 | End: 2024-05-03

## 2024-04-23 RX ORDER — DROXIDOPA 100 MG/1
300 CAPSULE ORAL EVERY 8 HOURS
Refills: 0 | Status: DISCONTINUED | OUTPATIENT
Start: 2024-04-23 | End: 2024-04-23

## 2024-04-23 RX ORDER — IPRATROPIUM/ALBUTEROL SULFATE 18-103MCG
3 AEROSOL WITH ADAPTER (GRAM) INHALATION EVERY 8 HOURS
Refills: 0 | Status: DISCONTINUED | OUTPATIENT
Start: 2024-04-23 | End: 2024-05-16

## 2024-04-23 RX ORDER — DROXIDOPA 100 MG/1
300 CAPSULE ORAL EVERY 8 HOURS
Refills: 0 | Status: DISCONTINUED | OUTPATIENT
Start: 2024-04-23 | End: 2024-04-26

## 2024-04-23 RX ORDER — CEFTRIAXONE 500 MG/1
2000 INJECTION, POWDER, FOR SOLUTION INTRAMUSCULAR; INTRAVENOUS EVERY 24 HOURS
Refills: 0 | Status: DISCONTINUED | OUTPATIENT
Start: 2024-04-24 | End: 2024-04-24

## 2024-04-23 RX ADMIN — SODIUM CHLORIDE 4 MILLILITER(S): 9 INJECTION INTRAMUSCULAR; INTRAVENOUS; SUBCUTANEOUS at 13:16

## 2024-04-23 RX ADMIN — Medication 0: at 18:24

## 2024-04-23 RX ADMIN — Medication 3 MILLILITER(S): at 23:02

## 2024-04-23 RX ADMIN — SODIUM CHLORIDE 4 MILLILITER(S): 9 INJECTION INTRAMUSCULAR; INTRAVENOUS; SUBCUTANEOUS at 23:02

## 2024-04-23 RX ADMIN — Medication 0: at 11:10

## 2024-04-23 RX ADMIN — CEFTRIAXONE 100 MILLIGRAM(S): 500 INJECTION, POWDER, FOR SOLUTION INTRAMUSCULAR; INTRAVENOUS at 11:08

## 2024-04-23 RX ADMIN — ENOXAPARIN SODIUM 40 MILLIGRAM(S): 100 INJECTION SUBCUTANEOUS at 06:08

## 2024-04-23 RX ADMIN — CHLORHEXIDINE GLUCONATE 1 APPLICATION(S): 213 SOLUTION TOPICAL at 06:11

## 2024-04-23 RX ADMIN — PROPOFOL 6.6 MICROGRAM(S)/KG/MIN: 10 INJECTION, EMULSION INTRAVENOUS at 11:10

## 2024-04-23 RX ADMIN — CHLORHEXIDINE GLUCONATE 15 MILLILITER(S): 213 SOLUTION TOPICAL at 18:24

## 2024-04-23 RX ADMIN — CHLORHEXIDINE GLUCONATE 15 MILLILITER(S): 213 SOLUTION TOPICAL at 06:11

## 2024-04-23 RX ADMIN — Medication 3 MILLILITER(S): at 13:17

## 2024-04-23 RX ADMIN — DROXIDOPA 300 MILLIGRAM(S): 100 CAPSULE ORAL at 13:24

## 2024-04-23 RX ADMIN — Medication 1: at 05:50

## 2024-04-23 RX ADMIN — SODIUM CHLORIDE 4 MILLILITER(S): 9 INJECTION INTRAMUSCULAR; INTRAVENOUS; SUBCUTANEOUS at 05:03

## 2024-04-23 RX ADMIN — DROXIDOPA 200 MILLIGRAM(S): 100 CAPSULE ORAL at 02:17

## 2024-04-23 RX ADMIN — POLYETHYLENE GLYCOL 3350 17 GRAM(S): 17 POWDER, FOR SOLUTION ORAL at 18:24

## 2024-04-23 RX ADMIN — POLYETHYLENE GLYCOL 3350 17 GRAM(S): 17 POWDER, FOR SOLUTION ORAL at 06:08

## 2024-04-23 NOTE — OCCUPATIONAL THERAPY INITIAL EVALUATION ADULT - RANGE OF MOTION EXAMINATION, UPPER EXTREMITY
Left UE Passive ROM was WNL (within normal limits)/Right UE Active Assistive ROM was WNL (within normal limits)

## 2024-04-23 NOTE — CHART NOTE - NSCHARTNOTEFT_GEN_A_CORE
Patient requires an emergent procedure for Chest tube placement. Attempted to contact, the patient's family member but unable to make contact. Given the need for this procedure, will provide 2 physician consent with myself and Dr. Rogers.

## 2024-04-23 NOTE — PROGRESS NOTE ADULT - PROVIDER SPECIALTY LIST ADULT
Spoke with Malcolm at [facility]. Started a case. Case number is [id number]. They will need clinical office notes faxed to : [phone]   MICU

## 2024-04-23 NOTE — CHART NOTE - NSCHARTNOTEFT_GEN_A_CORE
Patient requires emergent chest tube placement for uncontrolled sepsis. Attempted to contact patients family member, Mili Evan, without response.   Given the necessity of the procedure, patient requires 2 physician consent. Myself and Dr. Adhikari will are in agreement to proceed with planned procedure.

## 2024-04-23 NOTE — PROGRESS NOTE ADULT - ATTENDING COMMENTS
69 year old female with a history of CVA (bedbound at baseline) COPD, CHF, HTN presented initially as a transfer with hyperleukocytosis with concerning for acute leukemia (no blasts, more consistent with CLL and leukamoid reaction in the setting of acute infection) and acute hypoxic respiratory failure eventually found to have strep pneumonia bacteremia, pneumonia c/b empyema s/p chest tube (4/16-4/19)    Continues to spike, 101   Lines:   PIV    N:   - Propofol, RASS -1; will check TG  - Fentanyl for pain  - Delirium precautions  - Daily SAT  CV:   Septic shock- trending towards improvement  TTE negative for vegetation  Elevated pro-BNP >2k  - Will increase Droxidopa, Off IV vasopressor support  - MAP >65  P: Acute hypoxic respiratory failure  Strep pneumonia  Empyema s/p chest tube (4/16-14/19)- did not do intrapleural fibrinolytic therapy  Intubated 4/16  - Continues to spike fevers, small- moderate sized loculated effusion. Difficult to access with bedside procedure. Will discuss with thoracic v. repeat drainage.   - Continueto monitor fluid with daily ultrasound  - Lung protective settings  - Failing SBT 2/2 tachypnea, daily SBT  - Trend BG  - Suctioning, duonebs PRN, oral care  GI:   - Tube feeds  Renal:   - Trend cr, lytes  Heme onc:   CLL  Leukamoid reaction  - Heme following  - TLS labs per heme  - Appreciate heme recs  ID: Pneumonia, empyema s/p chest tube without lytics, persistent fevers and fluid.  Strep bacteremia  - Day 6 CTX- will continue pending source control  - Will redraw cultures if >100.4 and sustained  - Management of pleural space for source control as above  Endo: Avoid hypoglycemia,   BG <210    DVT: Lovenox BID, weight based prophylaxis  Full code  HCP- Presumably Aunt (only known family member)

## 2024-04-23 NOTE — PHYSICAL THERAPY INITIAL EVALUATION ADULT - PRECAUTIONS/LIMITATIONS, REHAB EVAL
CHECK ONBASE FOR SCAN         Electronically Signed On 07/19/2017 11:10  __________________________________________________   Radha Gonzalez    
fall precautions

## 2024-04-23 NOTE — OCCUPATIONAL THERAPY INITIAL EVALUATION ADULT - ADDITIONAL COMMENTS
Per care coordinator note: " Patient resides on the main floor of a private home. 3 friends reside in the basement/upstairs apartment. PTA patient requires assistance with ADL's and ambulation. Patient owns rw, wc, shower chair, and home o2. Unsure of home o2 vendor. Patient with 2 HHA, unclear agency."

## 2024-04-23 NOTE — PROGRESS NOTE ADULT - ASSESSMENT
69F h/o CVA (bedbound at baseline), COPD, CHF, HTN presenting as transfer from [facility] for SOB iso leukocytosis to 233 c/f acute leukemia. Pt intubated and on pressors, transferred to MICU for further management and possible leukophoresis.    Neuro  #Encephalopathy   -p/w lethargy likely sepsis; Per Heme, leukostasis unlikely as cells mature  -CTH showing old infarct, no acute process  - off Precedex, on propofol, following commands.      CV  #Hemodynamics  -hypotensive likely iso vasoplegia from sedatives and sepsis  - Weaned off levo, on Droxydopa, elevated to 200 TID; MAP goal >65    #Chest Pain  -demand vs leukostasis vs non cardiac   -reported JOSEF in II, III, aVF at [facility] --> repeat EKG without ST changes or Q waves  -trops peaked    #CHF  -unclear hx but elevated pro-BNP to 2600s  -TTE: EF 54%, no significant abnormalities; CTM    Resp  #Acute hypoxic respiratory failure  #Empyema Pansensitive Strep Pneumo  -pna vs leukostasis   -intubated: 460/14/30/5; wean off as tolerated  -s/p thora draining 1500cc fluid 4/16 with chest tube placement; c/w exudative effusion growing Strep Pneumo  -MRSA neg  -pleural fluid growing strep pneumo; BCx growing strep pneumo; Pansensitive  -was on vanc/zosyn/azithro (4/16)-->deescalated to CTX 2g qd (4/17 - ); anticipate 5-day course   -24h chest tube output: 90cc x24h - removed 4/19; Residual 2cm effusion on Pocus on 4/22  -trial Tpiece 4/21    GI  #Diet: TF    /Renal  -no active issues    Heme/Onc  #Leukocytosis  #CLL  - on presentation; improving  -no plan for leukophoresis at this time given mature lymphocytes  -stop trending TLS labs as they have been stable       #DVT ppx: lovenox    ID  #Empyema 2/2 Pansensitive Strep Pneumo  #Bacteremia  -BCx + strep pneumo; fluid culture with strep pneumo  -repeat BCx 4/20 ngtd  -was on vanc/zosyn/azithro (4/16)-->deescalated to CTX (4/17 - )  - Low grade fevers to 100.4F 4/22 AM, repeat Cx if febrile again    Endo  -ISS q6h while NPO    Ethics: Full Code  Only known family member is Aunt Mili Gordon ([phone], [phone]) who lives in [place]   69F h/o CVA (bedbound at baseline), COPD, CHF, HTN presenting as transfer from [facility] for SOB iso leukocytosis to 233 c/f acute leukemia. Pt intubated and on pressors, transferred to MICU for further management and possible leukophoresis.    Neuro  #Encephalopathy   -p/w lethargy likely sepsis; Per Heme, leukostasis unlikely as cells mature  -CTH showing old infarct, no acute process  - off Precedex, on propofol, following commands, aim for RASS score -1 to 0    CV  #Hemodynamics  -hypotensive likely iso vasoplegia from sedatives and sepsis  - Weaned off levo, on Droxydopa, elevated to 300 TID; MAP goal >65    #Chest Pain  -demand vs leukostasis vs non cardiac   -reported JOSEF in II, III, aVF at [facility] --> repeat EKG without ST changes or Q waves  -trops peaked    #CHF  -unclear hx but elevated pro-BNP to 2600s  -TTE: EF 54%, no significant abnormalities; CTM    Resp  #Acute hypoxic respiratory failure  #Empyema Pansensitive Strep Pneumo  -intubated: 460/14/30/5; wean off as tolerated w/ daily CPAP trial  -s/p thora draining 1500cc fluid 4/16 with chest tube placement; c/w exudative effusion growing Strep Pneumo  -MRSA neg  -pleural fluid growing strep pneumo; BCx growing strep pneumo; Pansensitive  -was on vanc/zosyn/azithro (4/16)-->deescalated to CTX 2g qd (4/17 - ); c/w total 14-day course of Abx therapy   -24h chest tube output: 90cc x24h - removed 4/19; Residual 2cm effusion on Pocus on 4/22  -CT chest 4/22 reveals persistent loculated effusion, CT-Surg consulted regarding drainge     GI  #Diet: TF    /Renal  -no active issues    Heme/Onc  #Leukocytosis  #CLL  - on presentation; improving to 130s  -no plan for leukophoresis at this time given mature lymphocytes  -stop trending TLS labs as they have been stable   - CT Chest A&P showing diffuse axillary, inguinal mediastinal RP lymphadenopathy.  - Appreciate Heme recs       #DVT ppx: lovenox 40 BID     ID  #Empyema 2/2 Pansensitive Strep Pneumo  #Bacteremia  -BCx + strep pneumo; fluid culture with strep pneumo  -repeat BCx 4/20 ngtd, ReCx 4/23 due to fever   -was on vanc/zosyn/azithro (4/16)-->deescalated to CTX (4/17 - )  - Febrile to 38C 4/23; likely persistent infection iso inadequate Source control; CT-surg consulted for L loculated pleural effusion re-drainage     Endo  -ISS q6h while NPO    Ethics: Full Code  Only known family member is Aunt Mili Gordon ([phone], [phone]) who lives in [place]

## 2024-04-23 NOTE — PRE-ANESTHESIA EVALUATION ADULT - NSATTENDATTESTRD_GEN_ALL_CORE
The patient has been re-examined and I agree with the above assessment or I updated with my findings.
normal S1, S2 heard

## 2024-04-23 NOTE — CONSULT NOTE ADULT - SUBJECTIVE AND OBJECTIVE BOX
SURGERY CONSULT NOTE    Patient is a 69y old  Female who presents with a chief complaint of Transfer for leukophoresis (23 Apr 2024 13:43)      HPI:  69F h/o CVA (bedbound at baseline), COPD, CHF, HTN p/w acute shortness of breath to outside ED. Initially treated for acute pulmonary edema with sublingual nitro x 2, Lasix, and BiPAP. Pt was also found to be febrile to 103, so treated for PNA. BIPAP led to improvement in O2 to 89-90. Pt now transferred to [place] for white count of 233 and plan for possible leukophoresis. In ED, pt intubated iso respiratory tiring and decreasing mental status. Also started on levo for hypotn.  (16 Apr 2024 12:59)    Interval:  Patient was admitted to MICU and intubated for Acute hypoxic respiratory failure with strep pneumonia, had chest tube placed draining 1.5 L - Chest tube removed 4/19. Remains in ventilator, failing SBTs 2/2 tachypnea. Continues to spike fevers. CT 4/22 shows small- moderate sized loculated effusion. Thoracic surgery consulted.     PAST MEDICAL & SURGICAL HISTORY:  Acute CHF      COPD, severe        [  ] No significant past history as reviewed with the patient and family    FAMILY HISTORY:    [  ] Family history not pertinent as reviewed with the patient and family    SOCIAL HISTORY:    MEDICATIONS  (STANDING):  albuterol/ipratropium for Nebulization 3 milliLiter(s) Nebulizer every 8 hours  cefTRIAXone   IVPB 2000 milliGRAM(s) IV Intermittent every 24 hours  chlorhexidine 0.12% Liquid 15 milliLiter(s) Oral Mucosa every 12 hours  chlorhexidine 2% Cloths 1 Application(s) Topical <User Schedule>  dextrose 50% Injectable 25 Gram(s) IV Push once  dextrose 50% Injectable 12.5 Gram(s) IV Push once  glucagon  Injectable 1 milliGRAM(s) IntraMuscular once  insulin lispro (ADMELOG) corrective regimen sliding scale   SubCutaneous every 6 hours  norepinephrine Infusion 0.05 MICROgram(s)/kG/Min (5.2 mL/Hr) IV Continuous <Continuous>  polyethylene glycol 3350 17 Gram(s) Oral every 12 hours  propofol Infusion 9.91 MICROgram(s)/kG/Min (6.6 mL/Hr) IV Continuous <Continuous>  senna Syrup 10 milliLiter(s) Oral at bedtime  sodium chloride 3%  Inhalation 4 milliLiter(s) Inhalation every 8 hours    MEDICATIONS  (PRN):  fentaNYL    Injectable 50 MICROGram(s) IV Push every 4 hours PRN Moderate Pain (4 - 6)  nystatin Powder 1 Application(s) Topical two times a day PRN skin irritation    Allergies    No Known Allergies    Intolerances        Vital Signs Last 24 Hrs  T(C): 38 (23 Apr 2024 12:00), Max: 38.4 (22 Apr 2024 20:00)  T(F): 100.4 (23 Apr 2024 12:00), Max: 101.1 (22 Apr 2024 20:00)  HR: 87 (23 Apr 2024 15:54) (87 - 114)  BP: 96/50 (23 Apr 2024 15:00) (86/50 - 111/56)  BP(mean): 69 (23 Apr 2024 15:00) (57 - 81)  RR: 15 (23 Apr 2024 15:00) (14 - 34)  SpO2: 100% (23 Apr 2024 15:54) (96% - 100%)    Parameters below as of 23 Apr 2024 08:00  Patient On (Oxygen Delivery Method): ventilator    O2 Concentration (%): 30  Daily     Daily                             8.5    131.35 )-----------( 161      ( 23 Apr 2024 00:29 )             26.1     04-23    142  |  106  |  14  ----------------------------<  142<H>  4.1   |  25  |  0.33<L>    Ca    8.5      23 Apr 2024 00:29  Phos  4.2     04-23  Mg     2.9     04-23    TPro  5.9<L>  /  Alb  2.7<L>  /  TBili  0.4  /  DBili  x   /  AST  52<H>  /  ALT  57<H>  /  AlkPhos  77  04-23    PT/INR - ( 23 Apr 2024 00:29 )   PT: 13.4 sec;   INR: 1.29 ratio         PTT - ( 23 Apr 2024 00:29 )  PTT:30.6 sec  Urinalysis Basic - ( 23 Apr 2024 04:37 )    Color: Yellow / Appearance: Clear / SG: >1.030 / pH: x  Gluc: x / Ketone: Negative mg/dL  / Bili: Negative / Urobili: 0.2 mg/dL   Blood: x / Protein: Trace mg/dL / Nitrite: Negative   Leuk Esterase: Negative / RBC: x / WBC x   Sq Epi: x / Non Sq Epi: x / Bacteria: x        IMAGING STUDIES:  < from: CT Chest w/ IV Cont (04.22.24 @ 20:21) >  FINDINGS:  CHEST:  LUNGS AND LARGE AIRWAYS: Patent central airways. Left lower lobe   compressive atelectasis. Endotracheal tube is in place above the juan.  PLEURA: Small left and trace right pleural effusions. A small left apical   pneumothorax.  VESSELS: Within normal limits.  HEART: Heart size is normal.  No pericardial effusion.  MEDIASTINUM AND HEIDY: No lymphadenopathy.  CHEST WALL AND LOWER NECK: Axillary and cervical lymphadenopathy. A 4.4 x 2.8 cm left lower cervical node series 3 image 7. A 5.1 x 2 cm left axillary node image 51. Bilateral retropectoral lymph nodes.    < end of copied text >  < from: CT Chest w/ IV Cont (04.22.24 @ 20:21) >  IMPRESSION:  Small left apical pneumothorax.  Small left pleural effusion with left lower lobe compressive atelectasis.  Extensive lymphadenopathy consistent with history of lymphoma.             SURGERY CONSULT NOTE    Patient is a 69y old  Female who presents with a chief complaint of Transfer for leukophoresis (23 Apr 2024 13:43)      HPI:  69F h/o CVA (bedbound at baseline), COPD, CHF, HTN p/w acute shortness of breath to outside ED. Initially treated for acute pulmonary edema with sublingual nitro x 2, Lasix, and BiPAP. Pt was also found to be febrile to 103, so treated for PNA. BIPAP led to improvement in O2 to 89-90. Pt now transferred to [place] for white count of 233 and plan for possible leukophoresis. In ED, pt intubated iso respiratory tiring and decreasing mental status. Also started on levo for hypotn.  (16 Apr 2024 12:59)    Interval:  Patient was admitted to MICU and intubated for Acute hypoxic respiratory failure with strep pneumonia, had chest tube placed draining 1.5 L - Chest tube removed 4/19. Remains in ventilator, failing SBTs 2/2 tachypnea. Continues to spike fevers. CT 4/22 shows small- moderate sized loculated effusion. Thoracic surgery consulted.     PAST MEDICAL & SURGICAL HISTORY:  Acute CHF      COPD, severe        [  ] No significant past history as reviewed with the patient and family    FAMILY HISTORY:    [  ] Family history not pertinent as reviewed with the patient and family    SOCIAL HISTORY:    MEDICATIONS  (STANDING):  albuterol/ipratropium for Nebulization 3 milliLiter(s) Nebulizer every 8 hours  cefTRIAXone   IVPB 2000 milliGRAM(s) IV Intermittent every 24 hours  chlorhexidine 0.12% Liquid 15 milliLiter(s) Oral Mucosa every 12 hours  chlorhexidine 2% Cloths 1 Application(s) Topical <User Schedule>  dextrose 50% Injectable 25 Gram(s) IV Push once  dextrose 50% Injectable 12.5 Gram(s) IV Push once  glucagon  Injectable 1 milliGRAM(s) IntraMuscular once  insulin lispro (ADMELOG) corrective regimen sliding scale   SubCutaneous every 6 hours  norepinephrine Infusion 0.05 MICROgram(s)/kG/Min (5.2 mL/Hr) IV Continuous <Continuous>  polyethylene glycol 3350 17 Gram(s) Oral every 12 hours  propofol Infusion 9.91 MICROgram(s)/kG/Min (6.6 mL/Hr) IV Continuous <Continuous>  senna Syrup 10 milliLiter(s) Oral at bedtime  sodium chloride 3%  Inhalation 4 milliLiter(s) Inhalation every 8 hours    MEDICATIONS  (PRN):  fentaNYL    Injectable 50 MICROGram(s) IV Push every 4 hours PRN Moderate Pain (4 - 6)  nystatin Powder 1 Application(s) Topical two times a day PRN skin irritation    Allergies    No Known Allergies    Intolerances        Vital Signs Last 24 Hrs  T(C): 38 (23 Apr 2024 12:00), Max: 38.4 (22 Apr 2024 20:00)  T(F): 100.4 (23 Apr 2024 12:00), Max: 101.1 (22 Apr 2024 20:00)  HR: 87 (23 Apr 2024 15:54) (87 - 114)  BP: 96/50 (23 Apr 2024 15:00) (86/50 - 111/56)  BP(mean): 69 (23 Apr 2024 15:00) (57 - 81)  RR: 15 (23 Apr 2024 15:00) (14 - 34)  SpO2: 100% (23 Apr 2024 15:54) (96% - 100%)    Parameters below as of 23 Apr 2024 08:00  Patient On (Oxygen Delivery Method): ventilator    O2 Concentration (%): 30  Daily     Daily     PHYSICAL EXAM  General: sedated  Respiratory: intubated, MV  Cardio: s1s2 HR90                          8.5    131.35 )-----------( 161      ( 23 Apr 2024 00:29 )             26.1     04-23    142  |  106  |  14  ----------------------------<  142<H>  4.1   |  25  |  0.33<L>    Ca    8.5      23 Apr 2024 00:29  Phos  4.2     04-23  Mg     2.9     04-23    TPro  5.9<L>  /  Alb  2.7<L>  /  TBili  0.4  /  DBili  x   /  AST  52<H>  /  ALT  57<H>  /  AlkPhos  77  04-23    PT/INR - ( 23 Apr 2024 00:29 )   PT: 13.4 sec;   INR: 1.29 ratio         PTT - ( 23 Apr 2024 00:29 )  PTT:30.6 sec  Urinalysis Basic - ( 23 Apr 2024 04:37 )    Color: Yellow / Appearance: Clear / SG: >1.030 / pH: x  Gluc: x / Ketone: Negative mg/dL  / Bili: Negative / Urobili: 0.2 mg/dL   Blood: x / Protein: Trace mg/dL / Nitrite: Negative   Leuk Esterase: Negative / RBC: x / WBC x   Sq Epi: x / Non Sq Epi: x / Bacteria: x        IMAGING STUDIES:  < from: CT Chest w/ IV Cont (04.22.24 @ 20:21) >  FINDINGS:  CHEST:  LUNGS AND LARGE AIRWAYS: Patent central airways. Left lower lobe   compressive atelectasis. Endotracheal tube is in place above the juan.  PLEURA: Small left and trace right pleural effusions. A small left apical   pneumothorax.  VESSELS: Within normal limits.  HEART: Heart size is normal.  No pericardial effusion.  MEDIASTINUM AND HEIDY: No lymphadenopathy.  CHEST WALL AND LOWER NECK: Axillary and cervical lymphadenopathy. A 4.4 x 2.8 cm left lower cervical node series 3 image 7. A 5.1 x 2 cm left axillary node image 51. Bilateral retropectoral lymph nodes.    < end of copied text >  < from: CT Chest w/ IV Cont (04.22.24 @ 20:21) >  IMPRESSION:  Small left apical pneumothorax.  Small left pleural effusion with left lower lobe compressive atelectasis.  Extensive lymphadenopathy consistent with history of lymphoma.

## 2024-04-23 NOTE — PROGRESS NOTE ADULT - CRITICAL CARE ATTENDING COMMENT
Addendum 1:35PM:   Patient persistently febrile despite cooling blanket. No other appreciable infectious source. UA negative, blood cultures cleared.   Case discussed with Dr. Anna from thoracic. At this time favor repeat drainage with chest tube and intrapleural fibrinolytic therapy.   Reevaluated space with bedside ultrasonography. Effusion is small with appreciable loculations, with largest pocket difficult to access and close to spine. Case discussed with IR, will place consult for chest tube Addendum 1:35PM:   Patient persistently febrile despite cooling blanket. No other appreciable infectious source. UA negative, blood cultures cleared.   Case discussed with Dr. Anna from thoracic. At this time favor repeat drainage with chest tube and intrapleural fibrinolytic therapy.   Reevaluated space with bedside ultrasonography. Effusion is small with appreciable loculations, with largest pocket difficult to access and close to spine. Case discussed with IR, will place consult for chest tube  Additional 45 minutes critical care time spent Addendum 1:35PM:   Patient persistently febrile despite cooling blanket. No other appreciable infectious source. UA negative, blood cultures cleared. Resumed on levophed.  Case discussed with Dr. Anna from thoracic. At this time favor repeat drainage with chest tube and intrapleural fibrinolytic therapy.   Reevaluated space with bedside ultrasonography. Effusion is small with appreciable loculations, with largest pocket difficult to access and close to spine. Case discussed with IR, will place consult for chest tube  Additional 45 minutes critical care time spent

## 2024-04-23 NOTE — PHYSICAL THERAPY INITIAL EVALUATION ADULT - PERTINENT HX OF CURRENT PROBLEM, REHAB EVAL
69F h/o CVA (bedbound at baseline), COPD, CHF, HTN p/w acute shortness of breath to outside ED. Initially treated for acute pulmonary edema with sublingual nitro x 2, Lasix, and BiPAP. Pt was also found to be febrile to 103, so treated for PNA. BIPAP led to improvement in O2 to 89-90. Pt now transferred to [place] for white count of 233 and plan for possible leukophoresis. In ED, pt intubated iso respiratory tiring and decreasing mental status. Also started on levo for hypotn.

## 2024-04-23 NOTE — PROGRESS NOTE ADULT - SUBJECTIVE AND OBJECTIVE BOX
INTERVAL HPI/OVERNIGHT EVENTS:    SUBJECTIVE: Patient seen and examined at bedside. Off levo overnight, pressure support, febrile received tylenol    ROS: All negative except as listed above.    VITAL SIGNS:  ICU Vital Signs Last 24 Hrs  T(C): 37.9 (22 Apr 2024 07:00), Max: 38.5 (21 Apr 2024 20:00)  T(F): 100.2 (22 Apr 2024 07:00), Max: 101.3 (21 Apr 2024 20:00)  HR: 98 (22 Apr 2024 07:00) (70 - 110)  BP: 95/54 (22 Apr 2024 07:00) (78/53 - 121/58)  BP(mean): 72 (22 Apr 2024 07:00) (61 - 83)  ABP: --  ABP(mean): --  RR: 18 (22 Apr 2024 07:00) (14 - 27)  SpO2: 94% (22 Apr 2024 07:00) (94% - 100%)    O2 Parameters below as of 21 Apr 2024 19:00  Patient On (Oxygen Delivery Method): ventilator    O2 Concentration (%): 30      Mode: AC/ CMV (Assist Control/ Continuous Mandatory Ventilation), RR (machine): 14, TV (machine): 460, FiO2: 30, PEEP: 5, ITime: 1, MAP: 11, PIP: 29  Plateau pressure:   P/F ratio:     04-21 @ 07:01  -  04-22 @ 07:00  --------------------------------------------------------  IN: 2045.3 mL / OUT: 650 mL / NET: 1395.3 mL      CAPILLARY BLOOD GLUCOSE      POCT Blood Glucose.: 125 mg/dL (22 Apr 2024 05:07)      ECG: reviewed.    PHYSICAL EXAM:    GENERAL: NAD, lying in bed comfortably, intubated and sedated on propofol; opens eyes to voice   HEAD:  Atraumatic, normocephalic  EYES: EOMI, PERRLA, conjunctiva and sclera clear  NECK: Supple, trachea midline, no JVD  HEART: Regular rate and rhythm, no murmurs, rubs, or gallops  LUNGS: Unlabored respirations, triggering own respirations intermittently, clear lungs   ABDOMEN: Soft, nontender, nondistended, +BS  EXTREMITIES: 2+ peripheral pulses bilaterally, cap refill<2 secs. No clubbing, cyanosis, or edema  NERVOUS SYSTEM:  Intubated sedated on Propofol, opens eyes and follows commands   SKIN: No rashes or lesions    MEDICATIONS:  MEDICATIONS  (STANDING):  cefTRIAXone   IVPB 2000 milliGRAM(s) IV Intermittent every 24 hours  chlorhexidine 0.12% Liquid 15 milliLiter(s) Oral Mucosa every 12 hours  chlorhexidine 2% Cloths 1 Application(s) Topical <User Schedule>  dextrose 50% Injectable 25 Gram(s) IV Push once  dextrose 50% Injectable 12.5 Gram(s) IV Push once  droxidopa 100 milliGRAM(s) Oral three times a day  enoxaparin Injectable 40 milliGRAM(s) SubCutaneous every 12 hours  glucagon  Injectable 1 milliGRAM(s) IntraMuscular once  insulin lispro (ADMELOG) corrective regimen sliding scale   SubCutaneous every 6 hours  polyethylene glycol 3350 17 Gram(s) Oral every 12 hours  propofol Infusion 9.91 MICROgram(s)/kG/Min (6.6 mL/Hr) IV Continuous <Continuous>  senna Syrup 10 milliLiter(s) Oral at bedtime  sodium chloride 3%  Inhalation 4 milliLiter(s) Inhalation every 6 hours    MEDICATIONS  (PRN):  albuterol/ipratropium for Nebulization 3 milliLiter(s) Nebulizer every 6 hours PRN Shortness of Breath and/or Wheezing  fentaNYL    Injectable 50 MICROGram(s) IV Push every 4 hours PRN Moderate Pain (4 - 6)  nystatin Powder 1 Application(s) Topical two times a day PRN skin irritation      ALLERGIES:  Allergies    No Known Allergies    Intolerances        LABS:                        8.7    122.04 )-----------( 140      ( 22 Apr 2024 00:14 )             27.3     04-22    143  |  107  |  13  ----------------------------<  142<H>  3.9   |  26  |  0.30<L>    Ca    8.6      22 Apr 2024 00:15  Phos  3.7     04-22  Mg     2.9     04-22    TPro  6.0  /  Alb  2.7<L>  /  TBili  0.3  /  DBili  x   /  AST  72<H>  /  ALT  56<H>  /  AlkPhos  78  04-22    PT/INR - ( 22 Apr 2024 00:14 )   PT: 13.8 sec;   INR: 1.26 ratio         PTT - ( 22 Apr 2024 00:14 )  PTT:31.5 sec  Urinalysis Basic - ( 22 Apr 2024 00:15 )    Color: x / Appearance: x / SG: x / pH: x  Gluc: 142 mg/dL / Ketone: x  / Bili: x / Urobili: x   Blood: x / Protein: x / Nitrite: x   Leuk Esterase: x / RBC: x / WBC x   Sq Epi: x / Non Sq Epi: x / Bacteria: x      ABG:  pH, Arterial: 7.44 (04-21-24 @ 23:55)  pCO2, Arterial: 42 mmHg (04-21-24 @ 23:55)  pO2, Arterial: 103 mmHg (04-21-24 @ 23:55)      vBG:    Micro:    Culture - Blood (collected 04-20-24 @ 12:15)  Source: .Blood Blood-Peripheral  Preliminary Report (04-21-24 @ 19:02):    No growth at 24 hours    Culture - Blood (collected 04-20-24 @ 12:00)  Source: .Blood Blood-Peripheral  Preliminary Report (04-21-24 @ 19:02):    No growth at 24 hours    Culture - Blood (collected 04-18-24 @ 11:00)  Source: .Blood Blood-Peripheral  Preliminary Report (04-21-24 @ 17:01):    No growth at 72 Hours    Culture - Blood (collected 04-18-24 @ 10:50)  Source: .Blood Blood-Peripheral  Preliminary Report (04-21-24 @ 17:01):    No growth at 72 Hours    Culture - Blood (collected 04-16-24 @ 15:40)  Source: .Blood Blood-Peripheral  Final Report (04-21-24 @ 21:01):    No growth at 5 days    Culture - Blood (collected 04-16-24 @ 13:17)  Source: .Blood Blood-Peripheral  Final Report (04-21-24 @ 18:01):    No growth at 5 days    Culture - Blood (collected 04-16-24 @ 06:42)  Source: .Blood Blood-Peripheral  Gram Stain (04-18-24 @ 11:52):    Growth in aerobic bottle: Gram Positive Cocci in Pairs and Chains    Growth in anaerobic bottle: Gram positive cocci in pairs  Final Report (04-18-24 @ 11:52):    Growth in aerobic and anaerobic bottles: Streptococcus pneumoniae    See previous culture [id number]    Culture - Blood (collected 04-16-24 @ 06:35)  Source: .Blood Blood-Peripheral  Gram Stain (04-18-24 @ 11:51):    Growth in aerobic bottle: Gram positive cocci in pairs    Growth in anaerobic bottle: Gram positive cocci in pairs  Final Report (04-18-24 @ 11:51):    Growth in aerobic and anaerobic bottles: Streptococcus pneumoniae    Direct identification is available within approximately 3-5    hours either by Blood Panel Multiplexed PCR or Direct    MALDI-TOF. Details: [email or web address]  Organism: Blood Culture PCR  Streptococcus pneumoniae (04-18-24 @ 11:51)  Organism: Streptococcus pneumoniae (04-18-24 @ 11:51)      Method Type: LESLY      -  Clindamycin: S <=0.06      -  Erythromycin: S <=0.06 Predicts results for azithromycin.      -  Levofloxacin: S 1      -  Tetracycline: S <=0.5      -  Trimethoprim/Sulfamethoxazole: S <=.25/4.75      -  Vancomycin: S 0.5      -  Ceftriaxone (meningitidis): S <=0.25      -  Ceftriaxone (non-meningitidis): S <=0.25      -  Penicillin (meningitidis): S 0.06      -  Penicillin (non-meningitidis): S 0.06      -  Penicillin (oral penicillin V): S 0.06  Organism: Blood Culture PCR (04-18-24 @ 11:51)      Method Type: PCR      -  Streptococcus pneumoniae: Detec        Culture - Sputum (collected 04-16-24 @ 15:44)  Source: ET Tube ET Tube  Gram Stain (04-16-24 @ 22:00):    Few polymorphonuclear leukocytes per low power field    No Squamous epithelial cells per low power field    No organisms seen per oil power field  Final Report (04-18-24 @ 09:40):    Normal Respiratory Kayla present        RADIOLOGY & ADDITIONAL TESTS: Reviewed.

## 2024-04-23 NOTE — CONSULT NOTE ADULT - ATTENDING COMMENTS
morbidly obese female with small left effusion and persistent fevers, us not good window per icu team. recommend IR guided pigtail for MIST protocol  patient had pigtail placed with approx 100 cc in pleurevac - serous. will repeat cxr in AM for possible MIST

## 2024-04-23 NOTE — CONSULT NOTE ADULT - SUBJECTIVE AND OBJECTIVE BOX
Interventional Radiology    Evaluate for Procedure: left Chest Pigtail    HPI: 69 year old female with a history of CVA (bedbound at baseline) COPD, CHF, HTN presented initially as a transfer with hyperleukocytosis with concerning for acute leukemia (no blasts, more consistent with CLL and leukamoid reaction in the setting of acute infection) and acute hypoxic respiratory failure eventually found to have strep pneumonia bacteremia, pneumonia c/b empyema s/p chest tube (4/16-4/19). Complex left lung effusion noted on CT scan with concern for empyema IR consulted for left Chest pigtail drainage.    Allergies: No Known Allergies    Medications (Abx/Cardiac/Anticoagulation/Blood Products)  cefTRIAXone   IVPB: 100 mL/Hr IV Intermittent (04-23 @ 11:08)  droxidopa: 200 milliGRAM(s) Oral (04-23 @ 02:17)  droxidopa: 300 milliGRAM(s) Oral (04-23 @ 13:24)  droxidopa: 100 milliGRAM(s) Oral (04-22 @ 11:12)  enoxaparin Injectable: 40 milliGRAM(s) SubCutaneous (04-23 @ 06:08)    Data: T(C): 37.3  HR: 107  BP: 91/53  RR: 16  SpO2: 100%    -.35 / HgB 8.5 / Hct 26.1 / Plt 161  -Na 142 / Cl 106 / BUN 14 / Glucose 142  -K 4.1 / CO2 25 / Cr 0.33  -ALT 57 / Alk Phos 77 / T.Bili 0.4  -INR 1.29 / PTT 30.6    Radiology: < from: CT Chest w/ IV Cont (04.22.24 @ 20:21) >  PROCEDURE DATE:  04/22/2024      INTERPRETATION:  CLINICAL INFORMATION: Patient with CLL, evaluate for   hepatosplenomegaly and lymphadenopathy.    COMPARISON: None.    CONTRAST/COMPLICATIONS:  IV Contrast: Omnipaque 350 (accession [id number]), IV contrast documented   in unlinked concurrent exam (accession [id number])  90 cc administered   10   cc discarded  Oral Contrast: NONE  Complications: None reported at time of study completion    PROCEDURE:  CT of the Chest, Abdomen and Pelvis was performed.  Sagittal and coronal reformats were performed.    FINDINGS:  CHEST:  LUNGS AND LARGE AIRWAYS: Patent central airways. Left lower lobe   compressive atelectasis. Endotracheal tube is in place above the juan.  PLEURA: Small left and trace right pleural effusions. A small left apical   pneumothorax.  VESSELS: Within normal limits.  HEART: Heart size is normal.  No pericardial effusion.  MEDIASTINUM AND HEIDY: No lymphadenopathy.  CHEST WALL AND LOWER NECK: Axillary and cervical lymphadenopathy. A 4.4 x   2.8 cm left lower cervical node series 3 image 7. A 5.1 x 2 cm left   axillary node image 51. Bilateral retropectoral lymph nodes.    ABDOMEN AND PELVIS:  LIVER: Within normal limits.  BILE DUCTS: Normal caliber.  GALLBLADDER: Within normal limits.  SPLEEN: Borderline enlarged.  PANCREAS: Within normal limits.  ADRENALS: Within normal limits.  KIDNEYS/URETERS: Cysts.    BLADDER: Within normal limits.  REPRODUCTIVE ORGANS: Within normal limits.    BOWEL: No bowel obstruction. Nasogastric tube terminates in the stomach.    Rectum mildly distended by stool.  PERITONEUM: No ascites.  VESSELS:  IVCfilter.  RETROPERITONEUM/LYMPH NODES: Extensive inguinal and retroperitoneal   lymphadenopathy. There is confluent retroperitoneal lymphadenopathy   encasing the aorta and IVC. There is a reference 4.7 x 3.6 cm left   external iliac lymph node image 262. A 4 x 3 cm right inguinal node image   299.  ABDOMINAL WALL: Within normal limits.  BONES: Within normal limits.    IMPRESSION:  Small left apical pneumothorax.    Small left pleural effusion with left lower lobe compressive atelectasis.    Extensive lymphadenopathy consistent with history of lymphoma.          Assessment/Plan:  69 year old female with a history of CVA (bedbound at baseline) COPD, CHF, HTN presented initially as a transfer with hyperleukocytosis with concerning for acute leukemia (no blasts, more consistent with CLL and leukamoid reaction in the setting of acute infection) and acute hypoxic respiratory failure eventually found to have strep pneumonia bacteremia, pneumonia c/b empyema s/p chest tube (4/16-4/19). Complex left lung effusion noted on CT scan with concern for empyema IR consulted for left Chest pigtail drainage.    - Will plan for left Chest Pigtail drainage today in IR  - Place IR procedure order approved by Dr. Rogers

## 2024-04-23 NOTE — OCCUPATIONAL THERAPY INITIAL EVALUATION ADULT - PERTINENT HX OF CURRENT PROBLEM, REHAB EVAL
69F h/o CVA (bedbound at baseline), COPD, CHF, HTN p/w acute shortness of breath to outside ED. Initially treated for acute pulmonary edema with sublingual nitro x 2, Lasix, and BiPAP. Pt was also found to be febrile to 103, so treated for PNA. BIPAP led to improvement in O2 to 89-90. Pt now transferred to [place] for white count of 233 and plan for possible leukophoresis. In ED, pt intubated iso respiratory tiring and decreasing mental status. Also started on levo for hypotn.

## 2024-04-23 NOTE — CONSULT NOTE ADULT - ASSESSMENT
69F h/o CVA (bedbound at baseline), COPD, CHF, HTN admitted to MICU 4/16 and intubated for Acute hypoxic respiratory failure with strep pneumonia, had chest tube 4/16-4/19. Remains on ventilator, failing SBTs. Continues to spike fevers. CT 4/22 shows small- moderate sized loculated effusion. Thoracic surgery consulted.     Plan/Recs  - Recommend IR consult for repeat drainage with chest tube  - Please contact with any further questions, concerns    Thoracic Surgery  [id number]

## 2024-04-23 NOTE — PHYSICAL THERAPY INITIAL EVALUATION ADULT - STRENGTHENING, PT EVAL
GOAL: Pt will improve strength to at least a 3/5 in order to improve safety with functional mobility in 2 weeks

## 2024-04-23 NOTE — PRE PROCEDURE NOTE - PRE PROCEDURE EVALUATION
Interventional Radiology    HPI: 69y Female with sepsis and loculated left pleural effusion requiring chest tube.     Allergies: No Known Allergies    Medications (Abx/Cardiac/Anticoagulation/Blood Products)    cefTRIAXone   IVPB: 100 mL/Hr IV Intermittent (04-23 @ 11:08)  droxidopa: 300 milliGRAM(s) Oral (04-23 @ 13:24)  droxidopa: 100 milliGRAM(s) Oral (04-22 @ 11:12)  droxidopa: 200 milliGRAM(s) Oral (04-23 @ 02:17)  enoxaparin Injectable: 40 milliGRAM(s) SubCutaneous (04-23 @ 06:08)    Data:    T(C): 38  HR: 87  BP: 96/50  RR: 15  SpO2: 100%    Exam  General: No acute distress  Chest: Non labored breathing  Abdomen: Non-distended  Extremities: No swelling, warm    -.35 / HgB 8.5 / Hct 26.1 / Plt 161  -Na 142 / Cl 106 / BUN 14 / Glucose 142  -K 4.1 / CO2 25 / Cr 0.33  -ALT 57 / Alk Phos 77 / T.Bili 0.4  -INR1.29    Imaging: Reviewed     Plan: 69y Female presents for left chest tube placement.   - Unable to contact family for consent, 2 physician consent documented by ICU

## 2024-04-23 NOTE — CHART NOTE - NSCHARTNOTEFT_GEN_A_CORE
: Silvana Bauman     INDICATION: POCUS    PROCEDURE:  [ ] LIMITED ECHO  [x] LIMITED CHEST  [ ] LIMITED RETROPERITONEAL  [ ] LIMITED ABDOMINAL  [ ] LIMITED DVT  [ ] NEEDLE GUIDANCE VASCULAR  [ ] NEEDLE GUIDANCE THORACENTESIS  [ ] NEEDLE GUIDANCE PARACENTESIS  [ ] NEEDLE GUIDANCE PERICARDIOCENTESIS  [ ] OTHER    FINDINGS:  Scattered B lines on L side < R. 2.5 cm complex effusion seen posteriorly at L lung base.     INTERPRETATION:  Difficult to access complex effusion posteriorly that remains relatively stable in size since last exam.

## 2024-04-23 NOTE — PHYSICAL THERAPY INITIAL EVALUATION ADULT - ADDITIONAL COMMENTS
Per care coordinator note: " Patient resides on the main floor of a private home. 3 friends reside in the basement/upstairs apartment. PTA patient requires assistance with ADL's and ambulation. Patient owns rw, wc, shower chair, and home o2. Unsure of home o2 vendor. Patient with 2 HHA, unclear agency.

## 2024-04-24 DIAGNOSIS — J86.9 PYOTHORAX WITHOUT FISTULA: ICD-10-CM

## 2024-04-24 LAB
-  STAPHYLOCOCCUS EPIDERMIDIS, METHICILLIN RESISTANT: SIGNIFICANT CHANGE UP
ALBUMIN SERPL ELPH-MCNC: 2.4 G/DL — LOW (ref 3.3–5)
ALP SERPL-CCNC: 75 U/L — SIGNIFICANT CHANGE UP (ref 40–120)
ALT FLD-CCNC: 46 U/L — HIGH (ref 10–45)
ANION GAP SERPL CALC-SCNC: 11 MMOL/L — SIGNIFICANT CHANGE UP (ref 5–17)
APTT BLD: 28.2 SEC — SIGNIFICANT CHANGE UP (ref 24.5–35.6)
AST SERPL-CCNC: 33 U/L — SIGNIFICANT CHANGE UP (ref 10–40)
BASOPHILS # BLD AUTO: 0 K/UL — SIGNIFICANT CHANGE UP (ref 0–0.2)
BASOPHILS NFR BLD AUTO: 0 % — SIGNIFICANT CHANGE UP (ref 0–2)
BILIRUB SERPL-MCNC: 0.3 MG/DL — SIGNIFICANT CHANGE UP (ref 0.2–1.2)
BUN SERPL-MCNC: 16 MG/DL — SIGNIFICANT CHANGE UP (ref 7–23)
CALCIUM SERPL-MCNC: 8.8 MG/DL — SIGNIFICANT CHANGE UP (ref 8.4–10.5)
CHLORIDE SERPL-SCNC: 107 MMOL/L — SIGNIFICANT CHANGE UP (ref 96–108)
CO2 SERPL-SCNC: 23 MMOL/L — SIGNIFICANT CHANGE UP (ref 22–31)
CREAT SERPL-MCNC: 0.32 MG/DL — LOW (ref 0.5–1.3)
CULTURE RESULTS: ABNORMAL
EGFR: 114 ML/MIN/1.73M2 — SIGNIFICANT CHANGE UP
EOSINOPHIL # BLD AUTO: 0 K/UL — SIGNIFICANT CHANGE UP (ref 0–0.5)
EOSINOPHIL NFR BLD AUTO: 0 % — SIGNIFICANT CHANGE UP (ref 0–6)
GAS PNL BLDA: SIGNIFICANT CHANGE UP
GLUCOSE BLDC GLUCOMTR-MCNC: 119 MG/DL — HIGH (ref 70–99)
GLUCOSE BLDC GLUCOMTR-MCNC: 164 MG/DL — HIGH (ref 70–99)
GLUCOSE BLDC GLUCOMTR-MCNC: 197 MG/DL — HIGH (ref 70–99)
GLUCOSE SERPL-MCNC: 167 MG/DL — HIGH (ref 70–99)
GRAM STN FLD: ABNORMAL
GRAM STN FLD: SIGNIFICANT CHANGE UP
HCT VFR BLD CALC: 26.6 % — LOW (ref 34.5–45)
HGB BLD-MCNC: 8.3 G/DL — LOW (ref 11.5–15.5)
INR BLD: 1.24 RATIO — HIGH (ref 0.85–1.18)
LYMPHOCYTES # BLD AUTO: 137.04 K/UL — HIGH (ref 1–3.3)
LYMPHOCYTES # BLD AUTO: 86 % — HIGH (ref 13–44)
LYMPHOCYTES # SPEC AUTO: 6 % — HIGH (ref 0–0)
MAGNESIUM SERPL-MCNC: 2.9 MG/DL — HIGH (ref 1.6–2.6)
MANUAL SMEAR VERIFICATION: SIGNIFICANT CHANGE UP
MCHC RBC-ENTMCNC: 27.7 PG — SIGNIFICANT CHANGE UP (ref 27–34)
MCHC RBC-ENTMCNC: 31.2 GM/DL — LOW (ref 32–36)
MCV RBC AUTO: 88.7 FL — SIGNIFICANT CHANGE UP (ref 80–100)
METHOD TYPE: SIGNIFICANT CHANGE UP
MONOCYTES # BLD AUTO: 3.19 K/UL — HIGH (ref 0–0.9)
MONOCYTES NFR BLD AUTO: 2 % — SIGNIFICANT CHANGE UP (ref 2–14)
NEUTROPHILS # BLD AUTO: 9.56 K/UL — HIGH (ref 1.8–7.4)
NEUTROPHILS NFR BLD AUTO: 6 % — LOW (ref 43–77)
NRBC # BLD: 0 /100 WBCS — SIGNIFICANT CHANGE UP (ref 0–0)
ORGANISM # SPEC MICROSCOPIC CNT: ABNORMAL
ORGANISM # SPEC MICROSCOPIC CNT: ABNORMAL
PHOSPHATE SERPL-MCNC: 3.9 MG/DL — SIGNIFICANT CHANGE UP (ref 2.5–4.5)
PLAT MORPH BLD: NORMAL — SIGNIFICANT CHANGE UP
PLATELET # BLD AUTO: 237 K/UL — SIGNIFICANT CHANGE UP (ref 150–400)
POTASSIUM SERPL-MCNC: 4.5 MMOL/L — SIGNIFICANT CHANGE UP (ref 3.5–5.3)
POTASSIUM SERPL-SCNC: 4.5 MMOL/L — SIGNIFICANT CHANGE UP (ref 3.5–5.3)
PROT SERPL-MCNC: 6.3 G/DL — SIGNIFICANT CHANGE UP (ref 6–8.3)
PROTHROM AB SERPL-ACNC: 13.6 SEC — HIGH (ref 9.5–13)
RBC # BLD: 3 M/UL — LOW (ref 3.8–5.2)
RBC # FLD: 15.9 % — HIGH (ref 10.3–14.5)
RBC BLD AUTO: SIGNIFICANT CHANGE UP
SODIUM SERPL-SCNC: 141 MMOL/L — SIGNIFICANT CHANGE UP (ref 135–145)
SPECIMEN SOURCE: SIGNIFICANT CHANGE UP
TRIGL SERPL-MCNC: 248 MG/DL — HIGH
WBC # BLD: 159.35 K/UL — CRITICAL HIGH (ref 3.8–10.5)
WBC # FLD AUTO: 159.35 K/UL — CRITICAL HIGH (ref 3.8–10.5)

## 2024-04-24 PROCEDURE — 99232 SBSQ HOSP IP/OBS MODERATE 35: CPT

## 2024-04-24 PROCEDURE — 32561 LYSE CHEST FIBRIN INIT DAY: CPT | Mod: GC

## 2024-04-24 PROCEDURE — 71045 X-RAY EXAM CHEST 1 VIEW: CPT | Mod: 26

## 2024-04-24 PROCEDURE — 99231 SBSQ HOSP IP/OBS SF/LOW 25: CPT

## 2024-04-24 PROCEDURE — 99291 CRITICAL CARE FIRST HOUR: CPT | Mod: GC,25

## 2024-04-24 PROCEDURE — 93971 EXTREMITY STUDY: CPT | Mod: 26,59,LT

## 2024-04-24 PROCEDURE — 99233 SBSQ HOSP IP/OBS HIGH 50: CPT | Mod: 57

## 2024-04-24 RX ORDER — VANCOMYCIN HCL 1 G
VIAL (EA) INTRAVENOUS
Refills: 0 | Status: DISCONTINUED | OUTPATIENT
Start: 2024-04-24 | End: 2024-04-25

## 2024-04-24 RX ORDER — CEFEPIME 1 G/1
1000 INJECTION, POWDER, FOR SOLUTION INTRAMUSCULAR; INTRAVENOUS EVERY 12 HOURS
Refills: 0 | Status: DISCONTINUED | OUTPATIENT
Start: 2024-04-24 | End: 2024-04-24

## 2024-04-24 RX ORDER — VANCOMYCIN HCL 1 G
1750 VIAL (EA) INTRAVENOUS ONCE
Refills: 0 | Status: COMPLETED | OUTPATIENT
Start: 2024-04-24 | End: 2024-04-24

## 2024-04-24 RX ORDER — ACETAMINOPHEN 500 MG
650 TABLET ORAL ONCE
Refills: 0 | Status: COMPLETED | OUTPATIENT
Start: 2024-04-24 | End: 2024-04-24

## 2024-04-24 RX ORDER — VANCOMYCIN HCL 1 G
1750 VIAL (EA) INTRAVENOUS EVERY 12 HOURS
Refills: 0 | Status: DISCONTINUED | OUTPATIENT
Start: 2024-04-24 | End: 2024-04-24

## 2024-04-24 RX ORDER — VANCOMYCIN HCL 1 G
1750 VIAL (EA) INTRAVENOUS EVERY 12 HOURS
Refills: 0 | Status: DISCONTINUED | OUTPATIENT
Start: 2024-04-24 | End: 2024-04-25

## 2024-04-24 RX ORDER — DORNASE ALFA 1 MG/ML
5 SOLUTION RESPIRATORY (INHALATION) EVERY 12 HOURS
Refills: 0 | Status: COMPLETED | OUTPATIENT
Start: 2024-04-24 | End: 2024-04-27

## 2024-04-24 RX ORDER — HEPARIN SODIUM 5000 [USP'U]/ML
1400 INJECTION INTRAVENOUS; SUBCUTANEOUS
Qty: 25000 | Refills: 0 | Status: DISCONTINUED | OUTPATIENT
Start: 2024-04-24 | End: 2024-04-24

## 2024-04-24 RX ORDER — CEFTRIAXONE 500 MG/1
2000 INJECTION, POWDER, FOR SOLUTION INTRAMUSCULAR; INTRAVENOUS EVERY 24 HOURS
Refills: 0 | Status: DISCONTINUED | OUTPATIENT
Start: 2024-04-24 | End: 2024-04-25

## 2024-04-24 RX ORDER — ENOXAPARIN SODIUM 100 MG/ML
40 INJECTION SUBCUTANEOUS EVERY 12 HOURS
Refills: 0 | Status: DISCONTINUED | OUTPATIENT
Start: 2024-04-24 | End: 2024-04-24

## 2024-04-24 RX ORDER — ALTEPLASE 100 MG
10 KIT INTRAVENOUS EVERY 12 HOURS
Refills: 0 | Status: COMPLETED | OUTPATIENT
Start: 2024-04-24 | End: 2024-04-27

## 2024-04-24 RX ORDER — SODIUM CHLORIDE 9 MG/ML
40 INJECTION INTRAMUSCULAR; INTRAVENOUS; SUBCUTANEOUS EVERY 12 HOURS
Refills: 0 | Status: COMPLETED | OUTPATIENT
Start: 2024-04-24 | End: 2024-04-27

## 2024-04-24 RX ADMIN — SODIUM CHLORIDE 4 MILLILITER(S): 9 INJECTION INTRAMUSCULAR; INTRAVENOUS; SUBCUTANEOUS at 15:07

## 2024-04-24 RX ADMIN — DROXIDOPA 300 MILLIGRAM(S): 100 CAPSULE ORAL at 00:25

## 2024-04-24 RX ADMIN — CHLORHEXIDINE GLUCONATE 15 MILLILITER(S): 213 SOLUTION TOPICAL at 17:01

## 2024-04-24 RX ADMIN — DROXIDOPA 300 MILLIGRAM(S): 100 CAPSULE ORAL at 13:38

## 2024-04-24 RX ADMIN — Medication 3 MILLILITER(S): at 23:03

## 2024-04-24 RX ADMIN — SODIUM CHLORIDE 4 MILLILITER(S): 9 INJECTION INTRAMUSCULAR; INTRAVENOUS; SUBCUTANEOUS at 05:19

## 2024-04-24 RX ADMIN — Medication 1: at 17:01

## 2024-04-24 RX ADMIN — Medication 1: at 05:35

## 2024-04-24 RX ADMIN — Medication 1: at 01:07

## 2024-04-24 RX ADMIN — CEFTRIAXONE 100 MILLIGRAM(S): 500 INJECTION, POWDER, FOR SOLUTION INTRAMUSCULAR; INTRAVENOUS at 13:38

## 2024-04-24 RX ADMIN — DORNASE ALFA 5 MILLIGRAM(S): 1 SOLUTION RESPIRATORY (INHALATION) at 22:24

## 2024-04-24 RX ADMIN — Medication 3 MILLILITER(S): at 05:18

## 2024-04-24 RX ADMIN — PROPOFOL 6.6 MICROGRAM(S)/KG/MIN: 10 INJECTION, EMULSION INTRAVENOUS at 23:24

## 2024-04-24 RX ADMIN — SENNA PLUS 10 MILLILITER(S): 8.6 TABLET ORAL at 22:32

## 2024-04-24 RX ADMIN — FENTANYL CITRATE 50 MICROGRAM(S): 50 INJECTION INTRAVENOUS at 12:04

## 2024-04-24 RX ADMIN — SODIUM CHLORIDE 40 MILLILITER(S): 9 INJECTION INTRAMUSCULAR; INTRAVENOUS; SUBCUTANEOUS at 22:24

## 2024-04-24 RX ADMIN — Medication 650 MILLIGRAM(S): at 03:00

## 2024-04-24 RX ADMIN — CHLORHEXIDINE GLUCONATE 15 MILLILITER(S): 213 SOLUTION TOPICAL at 05:29

## 2024-04-24 RX ADMIN — POLYETHYLENE GLYCOL 3350 17 GRAM(S): 17 POWDER, FOR SOLUTION ORAL at 17:01

## 2024-04-24 RX ADMIN — Medication 250 MILLIGRAM(S): at 18:27

## 2024-04-24 RX ADMIN — DROXIDOPA 300 MILLIGRAM(S): 100 CAPSULE ORAL at 22:32

## 2024-04-24 RX ADMIN — DROXIDOPA 300 MILLIGRAM(S): 100 CAPSULE ORAL at 05:29

## 2024-04-24 RX ADMIN — CHLORHEXIDINE GLUCONATE 1 APPLICATION(S): 213 SOLUTION TOPICAL at 05:28

## 2024-04-24 RX ADMIN — Medication 3 MILLILITER(S): at 15:07

## 2024-04-24 RX ADMIN — Medication 250 MILLIGRAM(S): at 05:28

## 2024-04-24 RX ADMIN — CEFEPIME 100 MILLIGRAM(S): 1 INJECTION, POWDER, FOR SOLUTION INTRAMUSCULAR; INTRAVENOUS at 05:28

## 2024-04-24 RX ADMIN — ENOXAPARIN SODIUM 40 MILLIGRAM(S): 100 INJECTION SUBCUTANEOUS at 07:36

## 2024-04-24 RX ADMIN — SODIUM CHLORIDE 4 MILLILITER(S): 9 INJECTION INTRAMUSCULAR; INTRAVENOUS; SUBCUTANEOUS at 23:03

## 2024-04-24 RX ADMIN — PROPOFOL 6.6 MICROGRAM(S)/KG/MIN: 10 INJECTION, EMULSION INTRAVENOUS at 07:37

## 2024-04-24 RX ADMIN — ALTEPLASE 10 MILLIGRAM(S): KIT at 22:24

## 2024-04-24 RX ADMIN — Medication 4.16 MICROGRAM(S)/KG/MIN: at 22:32

## 2024-04-24 RX ADMIN — Medication 650 MILLIGRAM(S): at 02:27

## 2024-04-24 RX ADMIN — FENTANYL CITRATE 50 MICROGRAM(S): 50 INJECTION INTRAVENOUS at 11:54

## 2024-04-24 RX ADMIN — ENOXAPARIN SODIUM 40 MILLIGRAM(S): 100 INJECTION SUBCUTANEOUS at 17:07

## 2024-04-24 RX ADMIN — POLYETHYLENE GLYCOL 3350 17 GRAM(S): 17 POWDER, FOR SOLUTION ORAL at 05:29

## 2024-04-24 RX ADMIN — Medication 4.16 MICROGRAM(S)/KG/MIN: at 07:37

## 2024-04-24 NOTE — PROGRESS NOTE ADULT - SUBJECTIVE AND OBJECTIVE BOX
Interventional Radiology Follow-Up Note    This is a 69y Female s/p Left Chest tube placement on 4/23 in Interventional Radiology with Dr. Watson.     S: Patient seen and examined @ bedside. NAD, pending unable to provide appropriate HPI secondary to clinical condition      Medication:  cefepime   IVPB: (04-24)  cefTRIAXone   IVPB: (04-23)  droxidopa: (04-22)  droxidopa: (04-23)  droxidopa: (04-24)  droxidopa: (04-23)  enoxaparin Injectable: (04-24)  enoxaparin Injectable: (04-23)  norepinephrine Infusion: (04-23)  vancomycin  IVPB: (04-24)    Vitals:   T(F): 99.5, Max: 100.6 (00:00)  HR: 85  BP: 107/57  RR: 27  SpO2: 100%    Physical Exam:  General: NAD  Respiratory: intubated CPAP 15/5  Abdomen: soft, NTND, no peritoneal signs.  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     - continue global management per primary team  - trend vs/labs  - daily CXR while chest tube in place  - maintain to H2O seal  - f/u thoracic surgery recs  - record output q12 to facilitate timely removal  -they will benefit from VNS service to help with drainage catheter care; they should continue same drainage catheter care as an outpatient.     Please call IR at extension [phone] with any questions, concerns, or issues regarding above.       Interventional Radiology Follow-Up Note    This is a 69y Female s/p Left Chest tube placement on 4/23 in Interventional Radiology with Dr. Watson.     S: Patient seen and examined @ bedside. NAD, pending unable to provide appropriate HPI secondary to clinical condition      Medication:  cefepime   IVPB: (04-24)  cefTRIAXone   IVPB: (04-23)  droxidopa: (04-22)  droxidopa: (04-23)  droxidopa: (04-24)  droxidopa: (04-23)  enoxaparin Injectable: (04-24)  enoxaparin Injectable: (04-23)  norepinephrine Infusion: (04-23)  vancomycin  IVPB: (04-24)    Vitals:   T(F): 99.5, Max: 100.6 (00:00)  HR: 85  BP: 107/57  RR: 27  SpO2: 100%    Physical Exam:  General: NAD  Respiratory: intubated CPAP 15/5  Abdomen: soft, NTND, no peritoneal signs.  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     - continue global management per primary team  - trend vs/labs  - daily CXR while chest tube in place (4/24 AM CXR with tiny left apical ptx- continue to monitor)  - maintain to H2O seal  - f/u thoracic surgery recs  - record output q12 to facilitate timely removal  -they will benefit from VNS service to help with drainage catheter care; they should continue same drainage catheter care as an outpatient.     Please call IR at extension [phone] with any questions, concerns, or issues regarding above.       Interventional Radiology Follow-Up Note    This is a 69y Female s/p Left Chest tube placement on 4/23 in Interventional Radiology with Dr. Watson.     S: Patient seen and examined @ bedside. NAD, pending unable to provide appropriate HPI secondary to clinical condition      Medication:  cefepime   IVPB: (04-24)  cefTRIAXone   IVPB: (04-23)  droxidopa: (04-22)  droxidopa: (04-23)  droxidopa: (04-24)  droxidopa: (04-23)  enoxaparin Injectable: (04-24)  enoxaparin Injectable: (04-23)  norepinephrine Infusion: (04-23)  vancomycin  IVPB: (04-24)    Vitals:   T(F): 99.5, Max: 100.6 (00:00)  HR: 85  BP: 107/57  RR: 27  SpO2: 100%    Physical Exam:  General: NAD  Respiratory: intubated CPAP 15/5  Abdomen: soft, NTND, no peritoneal signs.  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     - continue global management per primary team  - trend vs/labs, wean O2 as tolerated  - daily CXR while chest tube in place (4/24 AM CXR with tiny left apical ptx- continue to monitor)  - maintain to H2O seal  - f/u pleural fluid studies- exudative by light's criteria, culture with NGTD, cyto pending  - record output q12 to facilitate timely removal    Please call IR at extension [phone] with any questions, concerns, or issues regarding above.

## 2024-04-24 NOTE — PROGRESS NOTE ADULT - PROBLEM SELECTOR PLAN 1
S/P pigtail placement  Out put minimal  MICU to proceed w/ [facility]  Dr Anna discussed w/ MICU attdg at bedside  Daily CXR

## 2024-04-24 NOTE — PROGRESS NOTE ADULT - SUBJECTIVE AND OBJECTIVE BOX
Subjective:  Orally intubated/sedated    V/S  T(C): 37.5 (04-24-24 @ 08:00), Max: 38.1 (04-24-24 @ 00:00)  HR: 96 (04-24-24 @ 11:00) (80 - 107)  BP: 99/50 (04-24-24 @ 11:00) (71/47 - 126/58)  RR: 33 (04-24-24 @ 11:00) (14 - 35)  SpO2: 100% (04-24-24 @ 11:00) (92% - 100%)      Vent Mode: CPAP with PS  FiO2: 30  PEEP: 5  MAP: 11  PIP: 21      CHEST TUBES:  Left CT     [  ]LWS  [ x ]H2O seal      I&O's Detail    23 Apr 2024 07:01  -  24 Apr 2024 07:00  --------------------------------------------------------  IN:    Enteral Tube Flush: 200 mL    IV PiggyBack: 600 mL    Norepinephrine: 4 mL    Norepinephrine: 104.3 mL    Propofol: 480 mL    Vital High Protein: 1050 mL  Total IN: 2438.3 mL    OUT:    Chest Tube (mL): 70 mL    Voided (mL): 550 mL  Total OUT: 620 mL    Total NET: 1818.3 mL      24 Apr 2024 07:01  -  24 Apr 2024 11:38  --------------------------------------------------------  IN:    Enteral Tube Flush: 50 mL    Norepinephrine: 39 mL    Propofol: 100 mL    Vital High Protein: 140 mL  Total IN: 329 mL    OUT:  Total OUT: 0 mL    Total NET: 329 mL          04-23-24 @ 07:01  -  04-24-24 @ 07:00  --------------------------------------------------------  IN: 2438.3 mL / OUT: 620 mL / NET: 1818.3 mL    04-24-24 @ 07:01  -  04-24-24 @ 11:38  --------------------------------------------------------  IN: 329 mL / OUT: 0 mL / NET: 329 mL      MEDICATIONS  (STANDING):  albuterol/ipratropium for Nebulization 3 milliLiter(s) Nebulizer every 8 hours  cefTRIAXone   IVPB 2000 milliGRAM(s) IV Intermittent every 24 hours  chlorhexidine 0.12% Liquid 15 milliLiter(s) Oral Mucosa every 12 hours  chlorhexidine 2% Cloths 1 Application(s) Topical <User Schedule>  droxidopa 300 milliGRAM(s) Oral every 8 hours  enoxaparin Injectable 40 milliGRAM(s) SubCutaneous every 12 hours  insulin lispro (ADMELOG) corrective regimen sliding scale   SubCutaneous every 6 hours  norepinephrine Infusion 0.02 MICROgram(s)/kG/Min (4.16 mL/Hr) IV Continuous <Continuous>  polyethylene glycol 3350 17 Gram(s) Oral every 12 hours  propofol Infusion 9.91 MICROgram(s)/kG/Min (6.6 mL/Hr) IV Continuous <Continuous>  senna Syrup 10 milliLiter(s) Oral at bedtime  sodium chloride 3%  Inhalation 4 milliLiter(s) Inhalation every 8 hours  vancomycin  IVPB 1750 milliGRAM(s) IV Intermittent every 12 hours  vancomycin  IVPB          04-24    141  |  107  |  16  ----------------------------<  167<H>  4.5   |  23  |  0.32<L>    Ca    8.8      24 Apr 2024 00:30  Phos  3.9     04-24  Mg     2.9     04-24    TPro  6.3  /  Alb  2.4<L>  /  TBili  0.3  /  DBili  x   /  AST  33  /  ALT  46<H>  /  AlkPhos  75  04-24                               8.3    159.35 )-----------( 237      ( 24 Apr 2024 00:30 )             26.6        PT/INR - ( 24 Apr 2024 00:30 )   PT: 13.6 sec;   INR: 1.24 ratio         PTT - ( 24 Apr 2024 00:30 )  PTT:28.2 sec         CAPILLARY BLOOD GLUCOSE      POCT Blood Glucose.: 119 mg/dL (24 Apr 2024 10:58)  POCT Blood Glucose.: 197 mg/dL (24 Apr 2024 05:31)  POCT Blood Glucose.: 84 mg/dL (23 Apr 2024 18:23)         Physical Exam:    GENERAL: NAD,  intubated and sedated on propofol;  HEART: Regular rate and rhythm, no murmurs, rubs, or gallops  LUNGS: Unlabored respirations, triggering own respirations intermittently, clear lungs L pigtail in place  ABDOMEN: Soft,obese abdomen  nontender, nondistended, +BS  EXTREMITIES Warm well perfused  No clubbing, cyanosis, or edema  NERVOUS SYSTEM:  Intubated sedated on Propofol, opens eyes and follows commands         PAST MEDICAL & SURGICAL HISTORY:  Acute CHF      COPD, severe

## 2024-04-24 NOTE — PROGRESS NOTE ADULT - SUBJECTIVE AND OBJECTIVE BOX
INTERVAL HPI/OVERNIGHT EVENTS:    SUBJECTIVE: Patient seen and examined at bedside.     ROS: All negative except as listed above.    VITAL SIGNS:  ICU Vital Signs Last 24 Hrs  T(C): 37.4 (24 Apr 2024 04:00), Max: 38.1 (24 Apr 2024 00:00)  T(F): 99.3 (24 Apr 2024 04:00), Max: 100.6 (24 Apr 2024 00:00)  HR: 88 (24 Apr 2024 06:45) (80 - 107)  BP: 100/57 (24 Apr 2024 06:45) (71/47 - 114/56)  BP(mean): 75 (24 Apr 2024 06:45) (55 - 84)  ABP: --  ABP(mean): --  RR: 19 (24 Apr 2024 06:45) (14 - 35)  SpO2: 100% (24 Apr 2024 06:45) (92% - 100%)    O2 Parameters below as of 24 Apr 2024 05:52  Patient On (Oxygen Delivery Method): ventilator          Mode: AC/ CMV (Assist Control/ Continuous Mandatory Ventilation), RR (machine): 14, TV (machine): 460, FiO2: 30, PEEP: 5, ITime: 1, MAP: 9, PIP: 27  Plateau pressure:   P/F ratio:     04-23 @ 07:01  -  04-24 @ 07:00  --------------------------------------------------------  IN: 2438.3 mL / OUT: 620 mL / NET: 1818.3 mL      CAPILLARY BLOOD GLUCOSE      POCT Blood Glucose.: 197 mg/dL (24 Apr 2024 05:31)      ECG: reviewed.    PHYSICAL EXAM:    GENERAL: NAD, lying in bed comfortably, intubated and sedated on propofol; opens eyes to voice   HEAD:  Atraumatic, normocephalic  EYES: EOMI, PERRLA, conjunctiva and sclera clear  NECK: Supple, trachea midline, no JVD  HEART: Regular rate and rhythm, no murmurs, rubs, or gallops  LUNGS: Unlabored respirations, triggering own respirations intermittently, clear lungs   ABDOMEN: Soft, nontender, nondistended, +BS  EXTREMITIES: 2+ peripheral pulses bilaterally, cap refill<2 secs. No clubbing, cyanosis, or edema  NERVOUS SYSTEM:  Intubated sedated on Propofol, opens eyes and follows commands   SKIN: No rashes or lesions    MEDICATIONS:  MEDICATIONS  (STANDING):  albuterol/ipratropium for Nebulization 3 milliLiter(s) Nebulizer every 8 hours  cefepime   IVPB 1000 milliGRAM(s) IV Intermittent every 12 hours  chlorhexidine 0.12% Liquid 15 milliLiter(s) Oral Mucosa every 12 hours  chlorhexidine 2% Cloths 1 Application(s) Topical <User Schedule>  droxidopa 300 milliGRAM(s) Oral every 8 hours  enoxaparin Injectable 40 milliGRAM(s) SubCutaneous every 12 hours  insulin lispro (ADMELOG) corrective regimen sliding scale   SubCutaneous every 6 hours  norepinephrine Infusion 0.02 MICROgram(s)/kG/Min (4.16 mL/Hr) IV Continuous <Continuous>  polyethylene glycol 3350 17 Gram(s) Oral every 12 hours  propofol Infusion 9.91 MICROgram(s)/kG/Min (6.6 mL/Hr) IV Continuous <Continuous>  senna Syrup 10 milliLiter(s) Oral at bedtime  sodium chloride 3%  Inhalation 4 milliLiter(s) Inhalation every 8 hours  vancomycin  IVPB      vancomycin  IVPB 1750 milliGRAM(s) IV Intermittent every 12 hours    MEDICATIONS  (PRN):  fentaNYL    Injectable 50 MICROGram(s) IV Push every 4 hours PRN Moderate Pain (4 - 6)  nystatin Powder 1 Application(s) Topical two times a day PRN skin irritation      ALLERGIES:  Allergies    No Known Allergies    Intolerances        LABS:                        8.3    159.35 )-----------( 237      ( 24 Apr 2024 00:30 )             26.6     04-24    141  |  107  |  16  ----------------------------<  167<H>  4.5   |  23  |  0.32<L>    Ca    8.8      24 Apr 2024 00:30  Phos  3.9     04-24  Mg     2.9     04-24    TPro  6.3  /  Alb  2.4<L>  /  TBili  0.3  /  DBili  x   /  AST  33  /  ALT  46<H>  /  AlkPhos  75  04-24    PT/INR - ( 24 Apr 2024 00:30 )   PT: 13.6 sec;   INR: 1.24 ratio         PTT - ( 24 Apr 2024 00:30 )  PTT:28.2 sec  Urinalysis Basic - ( 24 Apr 2024 00:30 )    Color: x / Appearance: x / SG: x / pH: x  Gluc: 167 mg/dL / Ketone: x  / Bili: x / Urobili: x   Blood: x / Protein: x / Nitrite: x   Leuk Esterase: x / RBC: x / WBC x   Sq Epi: x / Non Sq Epi: x / Bacteria: x      ABG:  pH, Arterial: 7.44 (04-24-24 @ 00:06)  pCO2, Arterial: 42 mmHg (04-24-24 @ 00:06)  pO2, Arterial: 137 mmHg (04-24-24 @ 00:06)      vBG:    Micro:    Culture - Blood (collected 04-22-24 @ 21:37)  Source: .Blood Blood-Peripheral  Gram Stain (04-24-24 @ 00:09):    Growth in aerobic bottle: Gram Positive Cocci in Clusters  Preliminary Report (04-24-24 @ 00:09):    Growth in aerobic bottle: Gram Positive Cocci in Clusters    Direct identification is available within approximately 3-5    hours either by Blood Panel Multiplexed PCR or Direct    MALDI-TOF. Details: [email or web address]  Organism: Blood Culture PCR (04-24-24 @ 00:15)  Organism: Blood Culture PCR (04-24-24 @ 00:15)      Method Type: PCR      -  Staphylococcus epidermidis, Methicillin resistant: Detec    Culture - Blood (collected 04-22-24 @ 19:30)  Source: .Blood Blood-Peripheral  Preliminary Report (04-24-24 @ 01:02):    No growth at 24 hours    Culture - Blood (collected 04-20-24 @ 12:15)  Source: .Blood Blood-Peripheral  Preliminary Report (04-23-24 @ 19:02):    No growth at 72 Hours    Culture - Blood (collected 04-20-24 @ 12:00)  Source: .Blood Blood-Peripheral  Preliminary Report (04-23-24 @ 19:02):    No growth at 72 Hours    Culture - Blood (collected 04-18-24 @ 11:00)  Source: .Blood Blood-Peripheral  Final Report (04-23-24 @ 17:01):    No growth at 5 days    Culture - Blood (collected 04-18-24 @ 10:50)  Source: .Blood Blood-Peripheral  Final Report (04-23-24 @ 17:01):    No growth at 5 days    Culture - Blood (collected 04-16-24 @ 15:40)  Source: .Blood Blood-Peripheral  Final Report (04-21-24 @ 21:01):    No growth at 5 days    Culture - Blood (collected 04-16-24 @ 13:17)  Source: .Blood Blood-Peripheral  Final Report (04-21-24 @ 18:01):    No growth at 5 days    Culture - Blood (collected 04-16-24 @ 06:42)  Source: .Blood Blood-Peripheral  Gram Stain (04-18-24 @ 11:52):    Growth in aerobic bottle: Gram Positive Cocci in Pairs and Chains    Growth in anaerobic bottle: Gram positive cocci in pairs  Final Report (04-18-24 @ 11:52):    Growth in aerobic and anaerobic bottles: Streptococcus pneumoniae    See previous culture [id number]    Culture - Blood (collected 04-16-24 @ 06:35)  Source: .Blood Blood-Peripheral  Gram Stain (04-18-24 @ 11:51):    Growth in aerobic bottle: Gram positive cocci in pairs    Growth in anaerobic bottle: Gram positive cocci in pairs  Final Report (04-18-24 @ 11:51):    Growth in aerobic and anaerobic bottles: Streptococcus pneumoniae    Direct identification is available within approximately 3-5    hours either by Blood Panel Multiplexed PCR or Direct    MALDI-TOF. Details: [email or web address]  Organism: Blood Culture PCR  Streptococcus pneumoniae (04-18-24 @ 11:51)  Organism: Streptococcus pneumoniae (04-18-24 @ 11:51)      Method Type: LESLY      -  Clindamycin: S <=0.06      -  Erythromycin: S <=0.06 Predicts results for azithromycin.      -  Levofloxacin: S 1      -  Tetracycline: S <=0.5      -  Trimethoprim/Sulfamethoxazole: S <=.25/4.75      -  Vancomycin: S 0.5      -  Ceftriaxone (meningitidis): S <=0.25      -  Ceftriaxone (non-meningitidis): S <=0.25      -  Penicillin (meningitidis): S 0.06      -  Penicillin (non-meningitidis): S 0.06      -  Penicillin (oral penicillin V): S 0.06  Organism: Blood Culture PCR (04-18-24 @ 11:51)      Method Type: PCR      -  Streptococcus pneumoniae: Detec        Culture - Sputum (collected 04-16-24 @ 15:44)  Source: ET Tube ET Tube  Gram Stain (04-16-24 @ 22:00):    Few polymorphonuclear leukocytes per low power field    No Squamous epithelial cells per low power field    No organisms seen per oil power field  Final Report (04-18-24 @ 09:40):    Normal Respiratory Kayla present        RADIOLOGY & ADDITIONAL TESTS: Reviewed.

## 2024-04-24 NOTE — PROGRESS NOTE ADULT - ASSESSMENT
69F h/o CVA (bedbound at baseline), COPD, CHF, HTN presenting as transfer from [facility] for SOB iso leukocytosis to 233 c/f acute leukemia. Pt intubated and on pressors, transferred to MICU for further management and possible leukophoresis.    Neuro  #Encephalopathy; currently intubated and sedated on Propofol   -p/w lethargy likely sepsis; Per Heme, leukostasis unlikely as cells mature  -CTH showing old infarct, no acute process  - off Precedex, on propofol, following commands, aim for RASS score -1 to 0    CV  #Hemodynamics  - hypotensive likely iso vasoplegia from sedatives and sepsis  - Back on Levophed for procedure, plan to wean off levo while on Droxidopa 300 TID     #Chest Pain  -demand vs leukostasis vs non cardiac   -reported JOSEF in II, III, aVF at [facility] --> repeat EKG without ST changes or Q waves  -trops peaked    #CHF  -unclear hx but elevated pro-BNP to 2600s  -TTE: EF 54%, no significant abnormalities; CTM    Resp  #Acute hypoxic respiratory failure  #Empyema Pansensitive Strep Pneumo  -intubated: 460/14/30/5; wean off as tolerated w/ daily CPAP trial  -s/p thora draining 1500cc fluid 4/16 with chest tube placement; c/w exudative effusion growing Strep Pneumo  -MRSA neg  -pleural fluid growing strep pneumo; BCx growing strep pneumo; Pansensitive  -was on vanc/zosyn/azithro (4/16)-->deescalated to CTX 2g qd (4/17 - ); c/w total 14-day course of Abx therapy   -24h chest tube output: 90cc x24h - removed 4/19; Residual 2cm effusion on Pocus on 4/22  -CT chest 4/22 reveals persistent loculated effusion, s/p IR Pigtail placement 4/24    GI  #Diet: TF    /Renal  -no active issues    Heme/Onc  #Leukocytosis  #CLL  - on presentation; improving to 130s  -no plan for leukophoresis at this time given mature lymphocytes  -stop trending TLS labs as they have been stable   - CT Chest A&P showing diffuse axillary, inguinal mediastinal RP lymphadenopathy.  - Appreciate Heme recs       #DVT ppx: lovenox 40 BID     ID  #Empyema 2/2 Pansensitive Strep Pneumo  #Bacteremia  -BCx + strep pneumo; fluid culture with strep pneumo  -repeat BCx 4/20 ngtd, ReCx 4/23 due to fever   -was on vanc/zosyn/azithro (4/16)-->deescalated to CTX (4/17 -4/23), Added on Vancomycin (4/23-) due to MRSE 1/2 Cx, expanded coverage to Cefepime (4/23- )     Endo  -ISS q6h while NPO    Ethics: Full Code  Only known family member is Aunt Mili Gordon ([phone], [phone]) who lives in [place]   69F h/o CVA (bedbound at baseline), COPD, CHF, HTN presenting as transfer from [facility] for SOB iso leukocytosis to 233 c/f acute leukemia. Pt intubated and on pressors, transferred to MICU for further management and possible leukophoresis.    Neuro  #Encephalopathy; currently intubated and sedated on Propofol   -p/w lethargy likely sepsis; Per Heme, leukostasis unlikely as cells mature  -CTH showing old infarct, no acute process  - off Precedex, on propofol, following commands, aim for RASS score -1 to 0    CV  #Septic Shock   - Strep Pneumo Bacteremia   - Back on Levophed for procedure, to wean off levo while on Droxidopa 300 TID     #Chest Pain  -demand vs leukostasis vs non cardiac   -reported JOSEF in II, III, aVF at [facility] --> repeat EKG without ST changes or Q waves  -trops peaked    #CHF  -unclear hx but elevated pro-BNP to 2600s  -TTE: EF 54%, no significant abnormalities; CTM    Resp  #Acute hypoxic respiratory failure  #Empyema Pansensitive Strep Pneumo  -intubated: 460/14/30/5; wean off as tolerated w/ daily CPAP trial  -s/p thora draining 1500cc fluid 4/16 with chest tube placement; c/w exudative effusion growing Strep Pneumo  -MRSA neg  -pleural fluid growing strep pneumo; BCx growing strep pneumo; Pansensitive  -was on vanc/zosyn/azithro (4/16)-->deescalated to CTX 2g qd (4/17 - ); c/w total 14-day course of Abx therapy   -CT chest 4/22 reveals persistent loculated effusion, s/p IR Pigtail placement 4/24 w/ Exudative effusion   - CPAP Trial daily    GI  #Diet: TF    /Renal  -no active issues    Heme/Onc  #Leukocytosis  #CLL  - on presentation; improving to 130s  -no plan for leukophoresis at this time given mature lymphocytes  -stop trending TLS labs as they have been stable   - CT Chest A&P showing diffuse axillary, inguinal mediastinal RP lymphadenopathy.  - Appreciate Heme recs       #DVT ppx: lovenox 40 BID     ID  #Empyema 2/2 Pansensitive Strep Pneumo  #Strep Pneumo Bacteremia  -BCx + strep pneumo; fluid culture with strep pneumo  -repeat BCx 4/20 ngtd, ReCx 4/23 due to fever   -CT Chest A&P demonstrates LLL empyema not showing any other intraabdominal infectious source  -was on vanc/zosyn/azithro (4/16)-->deescalated to CTX (4/17 -4/23), Added on Vancomycin (4/23-) due to MRSE 1/2 Cx, expanded coverage to Cefepime (4/23 1x dose), back on CTX for 14-day course   - Fever curve trending down, but persistently febrile. If persistent fevers, MARIO ALBERTO to eval for Endocarditis     #Positive MRSE BCx 1 of 2  - Repeat Cx obtained   - Vancomycin restarted (4/23- )  - Plan to DC Vanc if repeat Cx negative     Endo  -ISS q6h while NPO    Ethics: Full Code  Only known family member is Aunt Mili Gordon ([phone], [phone]) who lives in [place]

## 2024-04-24 NOTE — PROGRESS NOTE ADULT - ATTENDING COMMENTS
69 year old female with a history of CVA (bedbound at baseline) COPD, CHF, HTN presented initially as a transfer with hyperleukocytosis with concerning for acute leukemia (no blasts, more consistent with CLL and leukamoid reaction in the setting of acute infection) and acute hypoxic respiratory failure eventually found to have strep pneumonia bacteremia, pneumonia c/b empyema s/p chest tube (4/16-4/19)    Continues to spike, 101   Lines:   PIV  Chest tube 4/23/24    N:   - Propofol, RASS 0 to -1  - Fentanyl for pain  - Delirium precautions  - Daily SAT  CV:   Septic shock  TTE negative for vegetation  Elevated pro-BNP >2k  - On low dose levophed  - MAP >65  P: Acute hypoxic respiratory failure  Strep pneumonia  Empyema s/p chest tube (4/16-14/19)- did not do intrapleural fibrinolytic therapy; s/p CT 4/23/24 with IR- will plan for intrapleural fibrolytic therapy  Intubated 4/16  - No indication for surgical management of pleural space at this time. S/P CT placement, will plan to start 6 doses of intrapleural fibrinolytic therapy  - Continue to monitor fluid with bedside ultrasound  - Lung protective settings  - Failing SBT 2/2 tachypnea, daily SBT  - Trend BG  - Suctioning, duonebs PRN, oral care  GI:   - Tube feeds  Renal:   - Trend cr, lytes  Heme onc:   CLL  Leukamoid reaction  - Appreciate heme recs  ID: Pneumonia, empyema s/p chest tube without lytics, persistent fevers and fluid. Now s/p repeat CT placement 4/23/24, will plan for intrapleural fibrinolytic therapy  Strep bacteremia  - MRSE in 1 culture, suspect contaminant. Repeat cultures pending  - Day 7 CTX  - Will redraw cultures if >100.4 and sustained  - Management of pleural space for source control as above  - If fevers persistent, will plan for MARIO ALBERTO  Endo: Avoid hypoglycemia,   BG <210    DVT: Lovenox BID, weight based prophylaxis  Full code  HCP- Presumably Aunt (only known family member)

## 2024-04-24 NOTE — PROVIDER CONTACT NOTE (OTHER) - ASSESSMENT
Pt repeatedly attempting to pull ET tube despite being oriented and told not to. Pt has been notified of necessity of ventilation and what could occur should the ET tube be removed. She acknowledged said outcomes and expressed she would still like to be extubated.

## 2024-04-24 NOTE — PROGRESS NOTE ADULT - NS ATTEND AMEND GEN_ALL_CORE FT
patient with pigtail in place, drainage of serous fluid in pleurevac.   would recommend MIST protocol and rescan after completes dosing.   will follow

## 2024-04-24 NOTE — CHART NOTE - NSCHARTNOTEFT_GEN_A_CORE
: Silvana Bauman     INDICATION:     PROCEDURE:  [x] LIMITED ECHO  [x] LIMITED CHEST  [ ] LIMITED RETROPERITONEAL  [ ] LIMITED ABDOMINAL  [ ] LIMITED DVT  [ ] NEEDLE GUIDANCE VASCULAR  [ ] NEEDLE GUIDANCE THORACENTESIS  [ ] NEEDLE GUIDANCE PARACENTESIS  [ ] NEEDLE GUIDANCE PERICARDIOCENTESIS  [ ] OTHER    FINDINGS:  Parasternal long/short reveals grossly normal LV function w/ concentric squeeze.   INTERPRETATION: : Silvana Bauman     INDICATION:     PROCEDURE:  [x] LIMITED ECHO  [x] LIMITED CHEST  [ ] LIMITED RETROPERITONEAL  [ ] LIMITED ABDOMINAL  [ ] LIMITED DVT  [ ] NEEDLE GUIDANCE VASCULAR  [ ] NEEDLE GUIDANCE THORACENTESIS  [ ] NEEDLE GUIDANCE PARACENTESIS  [ ] NEEDLE GUIDANCE PERICARDIOCENTESIS  [ ] OTHER    FINDINGS:  Parasternal long/short reveals grossly normal LV function w/ concentric squeeze. Scattered B lines in R lung.     INTERPRETATION:  Exam equivocal compared to prior. No cardiac limitations.

## 2024-04-24 NOTE — PROGRESS NOTE ADULT - ASSESSMENT
69F h/o CVA (bedbound at baseline), COPD, CHF, HTN admitted to MICU 4/16 and intubated for Acute hypoxic respiratory failure with strep pneumonia, had chest tube 4/16-4/19. Remains on ventilator, failing SBTs. Continues to spike fevers. CT 4/22 shows small- moderate sized loculated effusion. Thoracic surgery consulted.   Pigtail placed 4/23

## 2024-04-24 NOTE — CHART NOTE - NSCHARTNOTEFT_GEN_A_CORE
Dose 1/6 of tPA/DNAse administered through chest tube for 1 hour.   Heparin drip held during instillation.

## 2024-04-25 LAB
ALBUMIN SERPL ELPH-MCNC: 2.8 G/DL — LOW (ref 3.3–5)
ALP SERPL-CCNC: 77 U/L — SIGNIFICANT CHANGE UP (ref 40–120)
ALT FLD-CCNC: 40 U/L — SIGNIFICANT CHANGE UP (ref 10–45)
ANION GAP SERPL CALC-SCNC: 9 MMOL/L — SIGNIFICANT CHANGE UP (ref 5–17)
APTT BLD: 30.9 SEC — SIGNIFICANT CHANGE UP (ref 24.5–35.6)
APTT BLD: 34 SEC — SIGNIFICANT CHANGE UP (ref 24.5–35.6)
AST SERPL-CCNC: 30 U/L — SIGNIFICANT CHANGE UP (ref 10–40)
BASOPHILS # BLD AUTO: 0 K/UL — SIGNIFICANT CHANGE UP (ref 0–0.2)
BASOPHILS # BLD AUTO: 0.06 K/UL — SIGNIFICANT CHANGE UP (ref 0–0.2)
BASOPHILS NFR BLD AUTO: 0 % — SIGNIFICANT CHANGE UP (ref 0–2)
BASOPHILS NFR BLD AUTO: 0.1 % — SIGNIFICANT CHANGE UP (ref 0–2)
BILIRUB SERPL-MCNC: 0.3 MG/DL — SIGNIFICANT CHANGE UP (ref 0.2–1.2)
BUN SERPL-MCNC: 12 MG/DL — SIGNIFICANT CHANGE UP (ref 7–23)
CALCIUM SERPL-MCNC: 8.9 MG/DL — SIGNIFICANT CHANGE UP (ref 8.4–10.5)
CHLORIDE SERPL-SCNC: 106 MMOL/L — SIGNIFICANT CHANGE UP (ref 96–108)
CO2 SERPL-SCNC: 25 MMOL/L — SIGNIFICANT CHANGE UP (ref 22–31)
CREAT SERPL-MCNC: <0.3 MG/DL — LOW (ref 0.5–1.3)
CULTURE RESULTS: SIGNIFICANT CHANGE UP
CULTURE RESULTS: SIGNIFICANT CHANGE UP
EGFR: 115 ML/MIN/1.73M2 — SIGNIFICANT CHANGE UP
EOSINOPHIL # BLD AUTO: 0 K/UL — SIGNIFICANT CHANGE UP (ref 0–0.5)
EOSINOPHIL # BLD AUTO: 0.07 K/UL — SIGNIFICANT CHANGE UP (ref 0–0.5)
EOSINOPHIL NFR BLD AUTO: 0 % — SIGNIFICANT CHANGE UP (ref 0–6)
EOSINOPHIL NFR BLD AUTO: 0.1 % — SIGNIFICANT CHANGE UP (ref 0–6)
GAS PNL BLDA: SIGNIFICANT CHANGE UP
GLUCOSE BLDC GLUCOMTR-MCNC: 121 MG/DL — HIGH (ref 70–99)
GLUCOSE BLDC GLUCOMTR-MCNC: 124 MG/DL — HIGH (ref 70–99)
GLUCOSE BLDC GLUCOMTR-MCNC: 129 MG/DL — HIGH (ref 70–99)
GLUCOSE BLDC GLUCOMTR-MCNC: 161 MG/DL — HIGH (ref 70–99)
GLUCOSE SERPL-MCNC: 156 MG/DL — HIGH (ref 70–99)
HCT VFR BLD CALC: 25 % — LOW (ref 34.5–45)
HCT VFR BLD CALC: 25.6 % — LOW (ref 34.5–45)
HGB BLD-MCNC: 8.1 G/DL — LOW (ref 11.5–15.5)
HGB BLD-MCNC: 8.3 G/DL — LOW (ref 11.5–15.5)
IMM GRANULOCYTES NFR BLD AUTO: 0.5 % — SIGNIFICANT CHANGE UP (ref 0–0.9)
INR BLD: 1.2 RATIO — HIGH (ref 0.85–1.18)
INR BLD: 1.33 RATIO — HIGH (ref 0.85–1.18)
LYMPHOCYTES # BLD AUTO: 101.8 K/UL — HIGH (ref 1–3.3)
LYMPHOCYTES # BLD AUTO: 112.36 K/UL — HIGH (ref 1–3.3)
LYMPHOCYTES # BLD AUTO: 88.4 % — HIGH (ref 13–44)
LYMPHOCYTES # BLD AUTO: 88.8 % — HIGH (ref 13–44)
MAGNESIUM SERPL-MCNC: 2.8 MG/DL — HIGH (ref 1.6–2.6)
MANUAL SMEAR VERIFICATION: SIGNIFICANT CHANGE UP
MCHC RBC-ENTMCNC: 28.2 PG — SIGNIFICANT CHANGE UP (ref 27–34)
MCHC RBC-ENTMCNC: 28.6 PG — SIGNIFICANT CHANGE UP (ref 27–34)
MCHC RBC-ENTMCNC: 32.4 GM/DL — SIGNIFICANT CHANGE UP (ref 32–36)
MCHC RBC-ENTMCNC: 32.4 GM/DL — SIGNIFICANT CHANGE UP (ref 32–36)
MCV RBC AUTO: 87.1 FL — SIGNIFICANT CHANGE UP (ref 80–100)
MCV RBC AUTO: 88.3 FL — SIGNIFICANT CHANGE UP (ref 80–100)
MONOCYTES # BLD AUTO: 3.63 K/UL — HIGH (ref 0–0.9)
MONOCYTES # BLD AUTO: 4.68 K/UL — HIGH (ref 0–0.9)
MONOCYTES NFR BLD AUTO: 3.2 % — SIGNIFICANT CHANGE UP (ref 2–14)
MONOCYTES NFR BLD AUTO: 3.7 % — SIGNIFICANT CHANGE UP (ref 2–14)
NEUTROPHILS # BLD AUTO: 9.08 K/UL — HIGH (ref 1.8–7.4)
NEUTROPHILS # BLD AUTO: 9.49 K/UL — HIGH (ref 1.8–7.4)
NEUTROPHILS NFR BLD AUTO: 7.5 % — LOW (ref 43–77)
NEUTROPHILS NFR BLD AUTO: 7.7 % — LOW (ref 43–77)
NRBC # BLD: 0 /100 WBCS — SIGNIFICANT CHANGE UP (ref 0–0)
PHOSPHATE SERPL-MCNC: 3.3 MG/DL — SIGNIFICANT CHANGE UP (ref 2.5–4.5)
PLAT MORPH BLD: NORMAL — SIGNIFICANT CHANGE UP
PLATELET # BLD AUTO: 226 K/UL — SIGNIFICANT CHANGE UP (ref 150–400)
PLATELET # BLD AUTO: 230 K/UL — SIGNIFICANT CHANGE UP (ref 150–400)
POTASSIUM SERPL-MCNC: 3.9 MMOL/L — SIGNIFICANT CHANGE UP (ref 3.5–5.3)
POTASSIUM SERPL-SCNC: 3.9 MMOL/L — SIGNIFICANT CHANGE UP (ref 3.5–5.3)
PROT SERPL-MCNC: 6.3 G/DL — SIGNIFICANT CHANGE UP (ref 6–8.3)
PROTHROM AB SERPL-ACNC: 13.1 SEC — HIGH (ref 9.5–13)
PROTHROM AB SERPL-ACNC: 13.9 SEC — HIGH (ref 9.5–13)
RBC # BLD: 2.83 M/UL — LOW (ref 3.8–5.2)
RBC # BLD: 2.94 M/UL — LOW (ref 3.8–5.2)
RBC # FLD: 15.3 % — HIGH (ref 10.3–14.5)
RBC # FLD: 15.6 % — HIGH (ref 10.3–14.5)
RBC BLD AUTO: SIGNIFICANT CHANGE UP
SMUDGE CELLS # BLD: PRESENT — SIGNIFICANT CHANGE UP
SODIUM SERPL-SCNC: 140 MMOL/L — SIGNIFICANT CHANGE UP (ref 135–145)
SPECIMEN SOURCE: SIGNIFICANT CHANGE UP
SPECIMEN SOURCE: SIGNIFICANT CHANGE UP
WBC # BLD: 115.18 K/UL — CRITICAL HIGH (ref 3.8–10.5)
WBC # BLD: 126.53 K/UL — CRITICAL HIGH (ref 3.8–10.5)
WBC # FLD AUTO: 115.18 K/UL — CRITICAL HIGH (ref 3.8–10.5)
WBC # FLD AUTO: 126.53 K/UL — CRITICAL HIGH (ref 3.8–10.5)

## 2024-04-25 PROCEDURE — 71045 X-RAY EXAM CHEST 1 VIEW: CPT | Mod: 26

## 2024-04-25 PROCEDURE — 99233 SBSQ HOSP IP/OBS HIGH 50: CPT | Mod: 57

## 2024-04-25 PROCEDURE — 99231 SBSQ HOSP IP/OBS SF/LOW 25: CPT

## 2024-04-25 PROCEDURE — 99291 CRITICAL CARE FIRST HOUR: CPT | Mod: GC,25

## 2024-04-25 PROCEDURE — 99222 1ST HOSP IP/OBS MODERATE 55: CPT

## 2024-04-25 PROCEDURE — 32562 LYSE CHEST FIBRIN SUBQ DAY: CPT | Mod: GC

## 2024-04-25 PROCEDURE — 99232 SBSQ HOSP IP/OBS MODERATE 35: CPT

## 2024-04-25 RX ORDER — AMPICILLIN SODIUM AND SULBACTAM SODIUM 250; 125 MG/ML; MG/ML
3 INJECTION, POWDER, FOR SUSPENSION INTRAMUSCULAR; INTRAVENOUS EVERY 6 HOURS
Refills: 0 | Status: COMPLETED | OUTPATIENT
Start: 2024-04-26 | End: 2024-05-08

## 2024-04-25 RX ORDER — AMPICILLIN SODIUM AND SULBACTAM SODIUM 250; 125 MG/ML; MG/ML
3 INJECTION, POWDER, FOR SUSPENSION INTRAMUSCULAR; INTRAVENOUS ONCE
Refills: 0 | Status: COMPLETED | OUTPATIENT
Start: 2024-04-25 | End: 2024-04-25

## 2024-04-25 RX ORDER — SODIUM CHLORIDE 9 MG/ML
1000 INJECTION, SOLUTION INTRAVENOUS
Refills: 0 | Status: DISCONTINUED | OUTPATIENT
Start: 2024-04-25 | End: 2024-04-25

## 2024-04-25 RX ORDER — ACETAMINOPHEN 500 MG
650 TABLET ORAL ONCE
Refills: 0 | Status: COMPLETED | OUTPATIENT
Start: 2024-04-25 | End: 2024-04-25

## 2024-04-25 RX ORDER — HEPARIN SODIUM 5000 [USP'U]/ML
1600 INJECTION INTRAVENOUS; SUBCUTANEOUS
Qty: 25000 | Refills: 0 | Status: DISCONTINUED | OUTPATIENT
Start: 2024-04-25 | End: 2024-04-25

## 2024-04-25 RX ORDER — FENTANYL CITRATE 50 UG/ML
50 INJECTION INTRAVENOUS
Refills: 0 | Status: DISCONTINUED | OUTPATIENT
Start: 2024-04-25 | End: 2024-05-02

## 2024-04-25 RX ORDER — HEPARIN SODIUM 5000 [USP'U]/ML
1400 INJECTION INTRAVENOUS; SUBCUTANEOUS
Qty: 25000 | Refills: 0 | Status: DISCONTINUED | OUTPATIENT
Start: 2024-04-25 | End: 2024-04-25

## 2024-04-25 RX ORDER — AMPICILLIN SODIUM AND SULBACTAM SODIUM 250; 125 MG/ML; MG/ML
INJECTION, POWDER, FOR SUSPENSION INTRAMUSCULAR; INTRAVENOUS
Refills: 0 | Status: COMPLETED | OUTPATIENT
Start: 2024-04-25 | End: 2024-05-08

## 2024-04-25 RX ORDER — FENTANYL CITRATE 50 UG/ML
50 INJECTION INTRAVENOUS ONCE
Refills: 0 | Status: DISCONTINUED | OUTPATIENT
Start: 2024-04-25 | End: 2024-04-25

## 2024-04-25 RX ADMIN — FENTANYL CITRATE 50 MICROGRAM(S): 50 INJECTION INTRAVENOUS at 11:52

## 2024-04-25 RX ADMIN — CHLORHEXIDINE GLUCONATE 15 MILLILITER(S): 213 SOLUTION TOPICAL at 17:06

## 2024-04-25 RX ADMIN — SODIUM CHLORIDE 40 MILLILITER(S): 9 INJECTION INTRAMUSCULAR; INTRAVENOUS; SUBCUTANEOUS at 12:00

## 2024-04-25 RX ADMIN — DROXIDOPA 300 MILLIGRAM(S): 100 CAPSULE ORAL at 13:34

## 2024-04-25 RX ADMIN — HEPARIN SODIUM 14 UNIT(S)/HR: 5000 INJECTION INTRAVENOUS; SUBCUTANEOUS at 07:39

## 2024-04-25 RX ADMIN — SODIUM CHLORIDE 40 MILLILITER(S): 9 INJECTION INTRAMUSCULAR; INTRAVENOUS; SUBCUTANEOUS at 17:08

## 2024-04-25 RX ADMIN — Medication 4.16 MICROGRAM(S)/KG/MIN: at 07:41

## 2024-04-25 RX ADMIN — PROPOFOL 6.6 MICROGRAM(S)/KG/MIN: 10 INJECTION, EMULSION INTRAVENOUS at 16:59

## 2024-04-25 RX ADMIN — Medication 4.16 MICROGRAM(S)/KG/MIN: at 19:34

## 2024-04-25 RX ADMIN — PROPOFOL 6.6 MICROGRAM(S)/KG/MIN: 10 INJECTION, EMULSION INTRAVENOUS at 20:51

## 2024-04-25 RX ADMIN — AMPICILLIN SODIUM AND SULBACTAM SODIUM 200 GRAM(S): 250; 125 INJECTION, POWDER, FOR SUSPENSION INTRAMUSCULAR; INTRAVENOUS at 17:07

## 2024-04-25 RX ADMIN — FENTANYL CITRATE 50 MICROGRAM(S): 50 INJECTION INTRAVENOUS at 20:45

## 2024-04-25 RX ADMIN — POLYETHYLENE GLYCOL 3350 17 GRAM(S): 17 POWDER, FOR SOLUTION ORAL at 17:07

## 2024-04-25 RX ADMIN — Medication 650 MILLIGRAM(S): at 18:05

## 2024-04-25 RX ADMIN — DROXIDOPA 300 MILLIGRAM(S): 100 CAPSULE ORAL at 22:34

## 2024-04-25 RX ADMIN — SODIUM CHLORIDE 4 MILLILITER(S): 9 INJECTION INTRAMUSCULAR; INTRAVENOUS; SUBCUTANEOUS at 05:03

## 2024-04-25 RX ADMIN — ALTEPLASE 10 MILLIGRAM(S): KIT at 12:00

## 2024-04-25 RX ADMIN — Medication 250 MILLIGRAM(S): at 05:24

## 2024-04-25 RX ADMIN — Medication 3 MILLILITER(S): at 21:27

## 2024-04-25 RX ADMIN — SODIUM CHLORIDE 4 MILLILITER(S): 9 INJECTION INTRAMUSCULAR; INTRAVENOUS; SUBCUTANEOUS at 13:03

## 2024-04-25 RX ADMIN — SODIUM CHLORIDE 4 MILLILITER(S): 9 INJECTION INTRAMUSCULAR; INTRAVENOUS; SUBCUTANEOUS at 21:28

## 2024-04-25 RX ADMIN — CEFTRIAXONE 100 MILLIGRAM(S): 500 INJECTION, POWDER, FOR SOLUTION INTRAMUSCULAR; INTRAVENOUS at 12:21

## 2024-04-25 RX ADMIN — Medication 1: at 00:43

## 2024-04-25 RX ADMIN — HEPARIN SODIUM 14 UNIT(S)/HR: 5000 INJECTION INTRAVENOUS; SUBCUTANEOUS at 02:15

## 2024-04-25 RX ADMIN — ALTEPLASE 10 MILLIGRAM(S): KIT at 17:06

## 2024-04-25 RX ADMIN — FENTANYL CITRATE 50 MICROGRAM(S): 50 INJECTION INTRAVENOUS at 12:15

## 2024-04-25 RX ADMIN — CHLORHEXIDINE GLUCONATE 15 MILLILITER(S): 213 SOLUTION TOPICAL at 05:24

## 2024-04-25 RX ADMIN — DORNASE ALFA 5 MILLIGRAM(S): 1 SOLUTION RESPIRATORY (INHALATION) at 12:00

## 2024-04-25 RX ADMIN — SODIUM CHLORIDE 100 MILLILITER(S): 9 INJECTION, SOLUTION INTRAVENOUS at 13:34

## 2024-04-25 RX ADMIN — DROXIDOPA 300 MILLIGRAM(S): 100 CAPSULE ORAL at 05:24

## 2024-04-25 RX ADMIN — Medication 3 MILLILITER(S): at 05:03

## 2024-04-25 RX ADMIN — POLYETHYLENE GLYCOL 3350 17 GRAM(S): 17 POWDER, FOR SOLUTION ORAL at 05:24

## 2024-04-25 RX ADMIN — FENTANYL CITRATE 50 MICROGRAM(S): 50 INJECTION INTRAVENOUS at 20:15

## 2024-04-25 RX ADMIN — CHLORHEXIDINE GLUCONATE 1 APPLICATION(S): 213 SOLUTION TOPICAL at 05:25

## 2024-04-25 RX ADMIN — PROPOFOL 6.6 MICROGRAM(S)/KG/MIN: 10 INJECTION, EMULSION INTRAVENOUS at 07:39

## 2024-04-25 RX ADMIN — Medication 650 MILLIGRAM(S): at 17:40

## 2024-04-25 RX ADMIN — Medication 3 MILLILITER(S): at 13:03

## 2024-04-25 RX ADMIN — DORNASE ALFA 5 MILLIGRAM(S): 1 SOLUTION RESPIRATORY (INHALATION) at 17:06

## 2024-04-25 RX ADMIN — FENTANYL CITRATE 50 MICROGRAM(S): 50 INJECTION INTRAVENOUS at 19:30

## 2024-04-25 RX ADMIN — PROPOFOL 6.6 MICROGRAM(S)/KG/MIN: 10 INJECTION, EMULSION INTRAVENOUS at 16:58

## 2024-04-25 RX ADMIN — SENNA PLUS 10 MILLILITER(S): 8.6 TABLET ORAL at 22:34

## 2024-04-25 RX ADMIN — FENTANYL CITRATE 50 MICROGRAM(S): 50 INJECTION INTRAVENOUS at 19:45

## 2024-04-25 NOTE — CHART NOTE - NSCHARTNOTEFT_GEN_A_CORE
Case discussed with MICU team. No indication for palliative care at this time. Please reconsult as needed. Can be reached by TEAMS M-F 9-5 Sammie Armendariz Any other time please page [phone] if needed

## 2024-04-25 NOTE — PROGRESS NOTE ADULT - NS ATTEND AMEND GEN_ALL_CORE FT
patient with drainage of approx 400 cc after tpa.   will continue to monitor for repeat imaging after MIST

## 2024-04-25 NOTE — CONSULT NOTE ADULT - ATTENDING COMMENTS
69-year-old female with a past medical history of CVA, bedbound at baseline, COPD, hypertension, CLL who initially had presented to an outside hospital due to acute shortness of breath.  Patient was found to have pulmonary edema and was managed with diuretics and BiPAP.  Patient also found to be febrile to 103 Fahrenheit, found to have pneumonia therefore started on antibiotics for this.  Labs were notable for a leukocytosis of 233 in setting of CLL and then was transferred to [facility] for leukopheresis.  Upon arrival to [place], intubated due to concern for respiratory compromise as well as worsening AMS.  Patient was started on levofloxacin due to hypotension.    Hospitalization course includes chest tube drainage.  Blood and pleural fluid cultures are positive growing Streptococcus pneumonia A.  Chest tube was removed on 4/19/2024 however patient remains febrile with a left-sided loculated effusion therefore left pigtail catheter was placed on 4/23/2024.  Patient's antibiotics were switched from ceftriaxone to cefepime and now is requiring pressors again.  Blood cultures were repeated on 4/22/2024 with Staphylococcus epidermidis growth, repeat pleural fluid cultures obtained on 4/23/2024 are negative to date, repeat blood cultures from 4/24/2024 are negative as well.    #Streptococcal bacteremia, likely source empyema  #Abnormal imaging of the lungs  #Streptococcal pneumonia and empyema  #Acute hypoxic respiratory failure  #CLL    Recommendations  Patient continues to have persistent fevers, unclear if uncontrolled source exists within chest, would monitor with imaging and ensuring chest tube is in place appropriately  Would discontinue ceftriaxone and start ampicillin/sulbactam  CT surgery eval  Follow fever curve and WBC count    Abimael Jain MD  Division of Infectious Diseases

## 2024-04-25 NOTE — CONSULT NOTE ADULT - ASSESSMENT
69F h/o CVA (bedbound at baseline), COPD, CHF, HTN, CLL initially presented on 4/16 to outside ED with acute shortness of breath, Initially treated for acute pulmonary edema with sublingual nitro x 2, Lasix, and BiPAP. Pt was also found to be febrile to 103, so treated for PNA. Then was transferred to [place] for white count of 233 and plan for possible leukophoresis. In ED, pt intubated iso respiratory tiring and decreasing mental status. Also started on levo for hypotension.     Hospital course    Underwent L chest tube drainage and 4/16; Blood and pleural fluid cultures with pneumococcus. Chest tube removed on 4/19. Remained febrile and with L sided loculated effusion; Left pigtail placed on 4/23. Ceftriaxone changed to cefepime. Noted to have hypotension and levo was restarted.     Continues with daily fevers,  vent and pressor dependent    Bcx 4/22 1/4 MRSE  Bcx 4/24 NGTD      TTE 4/17  1. Left ventricular cavity is normal in size. Left ventricular wall thickness is normal. Left ventricular systolic function is normal with an ejection fraction of 54 % by Tinajero's method of disks. There are no regional wall motion abnormalities seen.   2. Normal left ventricular diastolic function, with normal filling pressure.   3. Normal right ventricular cavity size, with normal wall thickness, and normal systolic function.   4. Normal left and right atrial size.   5. No significant valvular disease.   6. No pericardial effusion seen.   7. Pulmonary artery systolic pressure could not be estimated.   8. No prior echocardiogram is available for comparison.        s/p Azithromycin 4/16  s/p cefepime 4/24  S/p Vancomycin 4/16-4/17, 4/24-4/25  s/p Zosyn 4/16-4/17    On Ceftriaxone 4/17-    # CLL  # Pneumococcal pneumonia with empyema and bacteremia  # Vent dependent respiratory failure  # Hypotension    - Continue Ceftriaxone 2 gm Q24h  - will discuss possible role of IVIG  - Evaluation of alternate etiology of hypotension and management per primary        All recommendations are tentative pending Attending Attestation.    Oz Fernandez MD, PGY-4  ID Fellow  [employer] Teams Preferred  After 5pm/weekends call [phone]   69F h/o CVA (bedbound at baseline), COPD, CHF, HTN, CLL initially presented on 4/16 to outside ED with acute shortness of breath, Initially treated for acute pulmonary edema with sublingual nitro x 2, Lasix, and BiPAP. Pt was also found to be febrile to 103, so treated for PNA. Then was transferred to [place] for white count of 233 and plan for possible leukophoresis. In ED, pt intubated iso respiratory tiring and decreasing mental status. Also started on levo for hypotension.     Hospital course    Underwent L chest tube drainage and 4/16; Blood and pleural fluid cultures with pneumococcus. Chest tube removed on 4/19. Remained febrile and with L sided loculated effusion; Left pigtail placed on 4/23. Ceftriaxone changed to cefepime. Noted to have hypotension and levo was restarted.     Continues with daily fevers,  vent and pressor dependent    Bcx 4/22 1/4 MRSE  Bcx 4/24 NGTD      TTE 4/17  1. Left ventricular cavity is normal in size. Left ventricular wall thickness is normal. Left ventricular systolic function is normal with an ejection fraction of 54 % by Tinajero's method of disks. There are no regional wall motion abnormalities seen.   2. Normal left ventricular diastolic function, with normal filling pressure.   3. Normal right ventricular cavity size, with normal wall thickness, and normal systolic function.   4. Normal left and right atrial size.   5. No significant valvular disease.   6. No pericardial effusion seen.   7. Pulmonary artery systolic pressure could not be estimated.   8. No prior echocardiogram is available for comparison.        s/p Azithromycin 4/16  s/p cefepime 4/24  S/p Vancomycin 4/16-4/17, 4/24-4/25  s/p Zosyn 4/16-4/17    On Ceftriaxone 4/17-    # CLL  # Pneumococcal pneumonia with empyema and bacteremia  # Vent dependent respiratory failure  # Hypotension    - Continue Ceftriaxone 2 gm Q24h  - will discuss empiric anaerobic coverage  - will discuss possible role of IVIG  - Evaluation of alternate etiology of hypotension and management per primary        All recommendations are tentative pending Attending Attestation.    Oz Fernandez MD, PGY-4  ID Fellow  [employer] Teams Preferred  After 5pm/weekends call [phone]   69F h/o CVA (bedbound at baseline), COPD, CHF, HTN, CLL initially presented on 4/16 to outside ED with acute shortness of breath, Initially treated for acute pulmonary edema with sublingual nitro x 2, Lasix, and BiPAP. Pt was also found to be febrile to 103, so treated for PNA. Then was transferred to [place] for white count of 233 and plan for possible leukophoresis. In ED, pt intubated iso respiratory tiring and decreasing mental status. Also started on levo for hypotension.     Hospital course    Underwent L chest tube drainage and 4/16; Blood and pleural fluid cultures with pneumococcus. Chest tube removed on 4/19. Remained febrile and with L sided loculated effusion; Left pigtail placed on 4/23, receiving TPA. Ceftriaxone was changed to cefepime. Noted to have hypotension and levo was restarted.     Continues with daily fevers,  vent and pressor dependent    Bcx 4/22 1/4 MRSE  Bcx 4/24 NGTD      TTE 4/17  1. Left ventricular cavity is normal in size. Left ventricular wall thickness is normal. Left ventricular systolic function is normal with an ejection fraction of 54 % by Tinajero's method of disks. There are no regional wall motion abnormalities seen.   2. Normal left ventricular diastolic function, with normal filling pressure.   3. Normal right ventricular cavity size, with normal wall thickness, and normal systolic function.   4. Normal left and right atrial size.   5. No significant valvular disease.   6. No pericardial effusion seen.   7. Pulmonary artery systolic pressure could not be estimated.   8. No prior echocardiogram is available for comparison.        s/p Azithromycin 4/16  s/p cefepime 4/24  S/p Vancomycin 4/16-4/17, 4/24-4/25  s/p Zosyn 4/16-4/17    On Ceftriaxone 4/17-    # Pneumococcal pneumonia with empyema and bacteremia  # CLL  # Vent dependent respiratory failure  # Hypotension    - Persistent fevers likely associated with empyema  - Please switch Ceftriaxone to Unasyn 3 gm Q6h  - Thoracic surgery following  - Evaluation of alternate etiology of hypotension and management per primary        Discussed with attending and primary service    Oz Fernandez MD, PGY-4  ID Fellow  [employer] Teams Preferred  After 5pm/weekends call [phone]

## 2024-04-25 NOTE — CONSULT NOTE ADULT - SUBJECTIVE AND OBJECTIVE BOX
Patient is a 69y old  Female who presents with a chief complaint of Transfer for leukophoresis (25 Apr 2024 09:37)    HPI:  69F h/o CVA (bedbound at baseline), COPD, CHF, HTN, CLL initially presented on 4/16 to outside ED with acute shortness of breath, Initially treated for acute pulmonary edema with sublingual nitro x 2, Lasix, and BiPAP. Pt was also found to be febrile to 103, so treated for PNA. Then was transferred to [place] for white count of 233 and plan for possible leukophoresis. In ED, pt intubated iso respiratory tiring and decreasing mental status. Also started on levo for hypotension.     Hospital course    Underwent chest tube drainage and 4/16; Blood and pleural fluid cultures with pneumococcus. Chest tube removed on 4/19. Remained febrile and with L sided loculated effusion; Left pigtail placed on 4/23. Ceftriaxone changed to cefepime. Noted to have hypotension and levo was restarted.     Remains febrile Tmax 101.3,  vent and pressor dependent    Bcx 4/22 1/4 MRSE  Bcx 4/24 NGTD       prior hospital charts reviewed [  ]  primary team notes reviewed [x  ]  other consultant notes reviewed [  x]    PAST MEDICAL & SURGICAL HISTORY:  Acute CHF      COPD, severe          Allergies  No Known Allergies    ANTIMICROBIALS (past 90 days)  MEDICATIONS  (STANDING):  azithromycin  IVPB   255 mL/Hr IV Intermittent (04-16-24 @ 14:10)    cefepime   IVPB   100 mL/Hr IV Intermittent (04-24-24 @ 05:28)    cefTRIAXone   IVPB   100 mL/Hr IV Intermittent (04-25-24 @ 12:21)   100 mL/Hr IV Intermittent (04-24-24 @ 13:38)    cefTRIAXone   IVPB   100 mL/Hr IV Intermittent (04-23-24 @ 11:08)   100 mL/Hr IV Intermittent (04-22-24 @ 11:12)   100 mL/Hr IV Intermittent (04-21-24 @ 11:26)   100 mL/Hr IV Intermittent (04-20-24 @ 11:07)   100 mL/Hr IV Intermittent (04-19-24 @ 12:00)   100 mL/Hr IV Intermittent (04-18-24 @ 10:51)   100 mL/Hr IV Intermittent (04-17-24 @ 11:47)    piperacillin/tazobactam IVPB.   200 mL/Hr IV Intermittent (04-16-24 @ 14:10)    piperacillin/tazobactam IVPB.-   25 mL/Hr IV Intermittent (04-16-24 @ 16:32)    piperacillin/tazobactam IVPB.-   25 mL/Hr IV Intermittent (04-16-24 @ 23:54)    piperacillin/tazobactam IVPB..   25 mL/Hr IV Intermittent (04-17-24 @ 06:01)    vancomycin  IVPB   250 mL/Hr IV Intermittent (04-17-24 @ 05:09)   250 mL/Hr IV Intermittent (04-16-24 @ 16:22)    vancomycin  IVPB   250 mL/Hr IV Intermittent (04-25-24 @ 05:24)   250 mL/Hr IV Intermittent (04-24-24 @ 18:27)    vancomycin  IVPB   250 mL/Hr IV Intermittent (04-24-24 @ 05:28)        cefTRIAXone   IVPB 2000 every 24 hours    MEDICATIONS  (STANDING):  albuterol/ipratropium for Nebulization 3 every 8 hours  alteplase  Injectable for Pleural Effusion 10 every 12 hours  dornase rosemary Solution for Pleural Effusion 5 every 12 hours  droxidopa 300 every 8 hours  fentaNYL    Injectable 50 every 4 hours PRN  heparin  Infusion. 1600 <Continuous>  insulin lispro (ADMELOG) corrective regimen sliding scale  every 6 hours  norepinephrine Infusion 0.02 <Continuous>  polyethylene glycol 3350 17 every 12 hours  propofol Infusion 9.91 <Continuous>  senna Syrup 10 at bedtime  sodium chloride 3%  Inhalation 4 every 8 hours    SOCIAL HISTORY:       FAMILY HISTORY:    REVIEW OF SYSTEMS  [  ] ROS unobtainable because:    [  ] All other systems negative except as noted below:	    Constitutional:  [ ] fever [ ] chills  [ ] weight loss  [ ] weakness  Skin:  [ ] rash [ ] phlebitis	  Eyes: [ ] icterus [ ] pain  [ ] discharge	  ENMT: [ ] sore throat  [ ] thrush [ ] ulcers [ ] exudates  Respiratory: [ ] dyspnea [ ] hemoptysis [ ] cough [ ] sputum	  Cardiovascular:  [ ] chest pain [ ] palpitations [ ] edema	  Gastrointestinal:  [ ] nausea [ ] vomiting [ ] diarrhea [ ] constipation [ ] pain	  Genitourinary:  [ ] dysuria [ ] frequency [ ] hematuria [ ] discharge [ ] flank pain  [ ] incontinence  Musculoskeletal:  [ ] myalgias [ ] arthralgias [ ] arthritis  [ ] back pain  Neurological:  [ ] headache [ ] seizures  [ ] confusion/altered mental status  Psychiatric:  [ ] anxiety [ ] depression	  Hematology/Lymphatics:  [ ] lymphadenopathy  Endocrine:  [ ] adrenal [ ] thyroid  Allergic/Immunologic:	 [ ] transplant [ ] seasonal    Vital Signs Last 24 Hrs  T(F): 100 (04-25-24 @ 11:00), Max: 101.7 (04-19-24 @ 02:00)  Vital Signs Last 24 Hrs  HR: 107 (04-25-24 @ 12:30) (69 - 109)  BP: 95/55 (04-25-24 @ 12:30) (79/44 - 123/59)  RR: 21 (04-25-24 @ 12:30)  SpO2: 99% (04-25-24 @ 12:30) (96% - 100%)  Wt(kg): --    PHYSICAL EXAM:                              8.1    126.53 )-----------( 226      ( 25 Apr 2024 08:11 )             25.0   04-25    140  |  106  |  12  ----------------------------<  156<H>  3.9   |  25  |  <0.30<L>    Ca    8.9      25 Apr 2024 00:39  Phos  3.3     04-25  Mg     2.8     04-25    TPro  6.3  /  Alb  2.8<L>  /  TBili  0.3  /  DBili  x   /  AST  30  /  ALT  40  /  AlkPhos  77  04-25    Urinalysis Basic - ( 25 Apr 2024 00:39 )    Color: x / Appearance: x / SG: x / pH: x  Gluc: 156 mg/dL / Ketone: x  / Bili: x / Urobili: x   Blood: x / Protein: x / Nitrite: x   Leuk Esterase: x / RBC: x / WBC x   Sq Epi: x / Non Sq Epi: x / Bacteria: x    MICROBIOLOGY:  Culture - Blood (collected 24 Apr 2024 05:53)  Source: .Blood Blood  Preliminary Report (25 Apr 2024 11:02):    No growth at 24 hours    Culture - Blood (collected 24 Apr 2024 05:53)  Source: .Blood Blood  Preliminary Report (25 Apr 2024 10:02):    No growth at 24 hours    Culture - Body Fluid with Gram Stain (collected 23 Apr 2024 17:46)  Source: Pleural Fl Pleural Fluid  Gram Stain (24 Apr 2024 06:00):    polymorphonuclear leukocytes seen    No organisms seen    by cytocentrifuge  Preliminary Report (24 Apr 2024 16:51):    No growth to date.    Culture - Fungal, Body Fluid (collected 23 Apr 2024 17:46)  Source: Pleural Fl Pleural Fluid  Preliminary Report (25 Apr 2024 11:45):    Testing in progress    Culture - Blood (collected 22 Apr 2024 21:37)  Source: .Blood Blood-Peripheral  Gram Stain (24 Apr 2024 00:09):    Growth in aerobic bottle: Gram Positive Cocci in Clusters  Final Report (24 Apr 2024 23:16):    Growth in aerobic bottle: Staphylococcus epidermidis    Isolation of Coagulase negative Staphylococcus from single blood culture    sets may represent    contamination. Contact the Microbiology Department at [phone] if    susceptibility testing is    clinically indicated.    Direct identification is available within approximately 3-5    hours either by Blood Panel Multiplexed PCR or Direct    MALDI-TOF. Details: [email or web address]  Organism: Blood Culture PCR (24 Apr 2024 23:16)  Organism: Blood Culture PCR (24 Apr 2024 23:16)      -  Staphylococcus epidermidis, Methicillin resistant: Detec      Method Type: PCR    Culture - Blood (collected 22 Apr 2024 19:30)  Source: .Blood Blood-Peripheral  Preliminary Report (25 Apr 2024 01:02):    No growth at 48 Hours                  RADIOLOGY:  imaging below personally reviewed and agree with findings    < from: [facility] Duplex Lower Ext Vein Scan, Left (04.24.24 @ 22:09) >    ACC: [id number] EXAM:  DUPLEX EXT VEINS LOWER LT   ORDERED BY:  ALIZE PIERCE     PROCEDURE DATE:  04/24/2024          INTERPRETATION:  CLINICAL HISTORY: Left lower extremity swelling, history   of lymphoma    COMPARISON: None.    TECHNIQUE: Grayscale color and Doppler examination of the left lower   extremity deep venous systems.    FINDINGS:  Lack of compression diminished flow in the left common femoral vein and   left superficial femoral vein junction.    Sonographic evaluation of the left superficial femoral vein and popliteal   vein shows normal flow, compressibility, respiratory variation and   augmentation.    Bilateral cystic inguinal lymph nodes. The lymph nodes measure 2.0 x 1.5   x 2.2 cm on the right and 2.7 x 0.2 x 2 cm and the left.    IMPRESSION:    Nonocclusive left common femoral vein DVT. Findings discussed with Dr. Sylvester by the sonographic technologist.  Abnormal bilateral inguinal adenopathy.    --- End of Report ---        < end of copied text >  < from: Xray Chest 1 View- PORTABLE-Urgent (Xray Chest 1 View- PORTABLE-Urgent .) (04.24.24 @ 07:02) >  ACC: [id number] EXAM:  XR CHEST PORTABLE URGENT 1V   ORDERED BY: MARLI JOSEPH     PROCEDURE DATE:  04/24/2024          INTERPRETATION:  EXAMINATION: XR CHEST URGENT    CLINICAL INDICATION: Status post pigtail catheter placement    TECHNIQUE: Single frontal, portable view of the chest was obtained.    COMPARISON: Chest x-ray 4/22/2024.    FINDINGS:  Support devices: Endotracheal tube tip in the upper trachea. Enteric tube   coursing below the diaphragm with tip at field-of-view. Interval   placement of a pigtail catheter in the left basilar pleural space.  The cardiomediastinal silhouette is  not accurately assessed in this AP   projection.  Similar small left pleural effusion with associated compressive   atelectasis.  No pneumothorax.  No acute bony abnormality.    IMPRESSION:  Interval placement of a pigtail catheter in left basilar pleural space.    Similar small left pleural effusion with associated compressive   atelectasis.    < end of copied text >  < from: CT Abdomen and Pelvis w/ IV Cont (04.22.24 @ 20:21) >  ACC: [id number] EXAM:  CT ABDOMEN AND PELVIS IC   ORDERED BY:  ALIZE PIERCE     ACC: [id number] EXAM:  CT CHEST IC   ORDERED BY:  ALIZE PIERCE     PROCEDURE DATE:  04/22/2024          INTERPRETATION:  CLINICAL INFORMATION: Patient with CLL, evaluate for   hepatosplenomegaly and lymphadenopathy.    COMPARISON: None.    CONTRAST/COMPLICATIONS:  IV Contrast: Omnipaque 350 (accession [id number]), IV contrast documented   in unlinked concurrent exam (accession [id number])  90 cc administered   10   cc discarded  Oral Contrast: NONE  Complications: None reported at time of study completion    PROCEDURE:  CT of the Chest, Abdomen and Pelvis was performed.  Sagittal and coronal reformats were performed.    FINDINGS:  CHEST:  LUNGS AND LARGE AIRWAYS: Patent central airways. Left lower lobe   compressive atelectasis. Endotracheal tube is in place above the juan.  PLEURA: Small left and trace right pleural effusions. A small left apical   pneumothorax.  VESSELS: Within normal limits.  HEART: Heart size is normal.  No pericardial effusion.  MEDIASTINUM AND HEIDY: No lymphadenopathy.  CHEST WALL AND LOWER NECK: Axillary and cervical lymphadenopathy. A 4.4 x   2.8 cm left lower cervical node series 3 image 7. A 5.1 x 2 cm left   axillary node image 51. Bilateral retropectoral lymph nodes.    ABDOMEN AND PELVIS:  LIVER: Within normal limits.  BILE DUCTS: Normal caliber.  GALLBLADDER: Within normal limits.  SPLEEN: Borderline enlarged.  PANCREAS: Within normal limits.  ADRENALS: Within normal limits.  KIDNEYS/URETERS: Cysts.    BLADDER: Within normal limits.  REPRODUCTIVE ORGANS: Within normal limits.    BOWEL: No bowel obstruction. Nasogastric tube terminates in the stomach.    Rectum mildly distended by stool.  PERITONEUM: No ascites.  VESSELS:  IVCfilter.  RETROPERITONEUM/LYMPH NODES: Extensive inguinal and retroperitoneal   lymphadenopathy. There is confluent retroperitoneal lymphadenopathy   encasing the aorta and IVC. There is a reference 4.7 x 3.6 cm left   external iliac lymph node image 262. A 4 x 3 cm right inguinal node image   299.  ABDOMINAL WALL: Within normal limits.  BONES: Within normal limits.    IMPRESSION:  Small left apical pneumothorax.    Small left pleural effusion with left lower lobe compressive atelectasis.    Extensive lymphadenopathy consistent with history of lymphoma.      --- End of Report ---      < end of copied text >  < from: CT Chest w/ IV Cont (04.22.24 @ 20:21) >    ACC: [id number] EXAM:  CT ABDOMEN AND PELVIS IC   ORDERED BY:  ALIZE PIERCE     ACC: [id number] EXAM:  CT CHEST IC   ORDERED BY:  ALIZE PIERCE     PROCEDURE DATE:  04/22/2024          INTERPRETATION:  CLINICAL INFORMATION: Patient with CLL, evaluate for   hepatosplenomegaly and lymphadenopathy.    COMPARISON: None.    CONTRAST/COMPLICATIONS:  IV Contrast: Omnipaque 350 (accession [id number]), IV contrast documented   in unlinked concurrent exam (accession [id number])  90 cc administered   10   cc discarded  Oral Contrast: NONE  Complications: None reported at time of study completion    PROCEDURE:  CT of the Chest, Abdomen and Pelvis was performed.  Sagittal and coronal reformats were performed.    FINDINGS:  CHEST:  LUNGS AND LARGE AIRWAYS: Patent central airways. Left lower lobe   compressive atelectasis. Endotracheal tube is in place above the juan.  PLEURA: Small left and trace right pleural effusions. A small left apical   pneumothorax.  VESSELS: Within normal limits.  HEART: Heart size is normal.  No pericardial effusion.  MEDIASTINUM AND HEIDY: No lymphadenopathy.  CHEST WALL AND LOWER NECK: Axillary and cervical lymphadenopathy. A 4.4 x   2.8 cm left lower cervical node series 3 image 7. A 5.1 x 2 cm left   axillary node image 51. Bilateral retropectoral lymph nodes.    ABDOMEN AND PELVIS:  LIVER: Within normal limits.  BILE DUCTS: Normal caliber.  GALLBLADDER: Within normal limits.  SPLEEN: Borderline enlarged.  PANCREAS: Within normal limits.  ADRENALS: Within normal limits.  KIDNEYS/URETERS: Cysts.    BLADDER: Within normal limits.  REPRODUCTIVE ORGANS: Within normal limits.    BOWEL: No bowel obstruction. Nasogastric tube terminates in the stomach.    Rectum mildly distended by stool.  PERITONEUM: No ascites.  VESSELS:  IVCfilter.  RETROPERITONEUM/LYMPH NODES: Extensive inguinal and retroperitoneal   lymphadenopathy. There is confluent retroperitoneal lymphadenopathy   encasing the aorta and IVC. There is a reference 4.7 x 3.6 cm left   external iliac lymph node image 262. A 4 x 3 cm right inguinal node image   299.  ABDOMINAL WALL: Within normal limits.  BONES: Within normal limits.    IMPRESSION:  Small left apical pneumothorax.    Small left pleural effusion with left lower lobe compressive atelectasis.    Extensive lymphadenopathy consistent with history of lymphoma.     Patient is a 69y old  Female who presents with a chief complaint of Transfer for leukophoresis (25 Apr 2024 09:37)    HPI:  69F h/o CVA (bedbound at baseline), COPD, CHF, HTN, CLL initially presented on 4/16 to outside ED with acute shortness of breath, Initially treated for acute pulmonary edema with sublingual nitro x 2, Lasix, and BiPAP. Pt was also found to be febrile to 103, so treated for PNA. Then was transferred to [place] for white count of 233 and plan for possible leukophoresis. In ED, pt intubated iso respiratory tiring and decreasing mental status. Also started on levo for hypotension.     Hospital course    Underwent chest tube drainage and 4/16; Blood and pleural fluid cultures with pneumococcus. Chest tube removed on 4/19. Remained febrile and with L sided loculated effusion; Left pigtail placed on 4/23. Ceftriaxone changed to cefepime. Noted to have hypotension and levo was restarted.     Remains febrile Tmax 101.3,  vent and pressor dependent    Bcx 4/22 1/4 MRSE  Bcx 4/24 NGTD       prior hospital charts reviewed [  ]  primary team notes reviewed [x  ]  other consultant notes reviewed [  x]    PAST MEDICAL & SURGICAL HISTORY:  Acute CHF      COPD, severe          Allergies  No Known Allergies    ANTIMICROBIALS (past 90 days)  MEDICATIONS  (STANDING):  azithromycin  IVPB   255 mL/Hr IV Intermittent (04-16-24 @ 14:10)    cefepime   IVPB   100 mL/Hr IV Intermittent (04-24-24 @ 05:28)    cefTRIAXone   IVPB   100 mL/Hr IV Intermittent (04-25-24 @ 12:21)   100 mL/Hr IV Intermittent (04-24-24 @ 13:38)    cefTRIAXone   IVPB   100 mL/Hr IV Intermittent (04-23-24 @ 11:08)   100 mL/Hr IV Intermittent (04-22-24 @ 11:12)   100 mL/Hr IV Intermittent (04-21-24 @ 11:26)   100 mL/Hr IV Intermittent (04-20-24 @ 11:07)   100 mL/Hr IV Intermittent (04-19-24 @ 12:00)   100 mL/Hr IV Intermittent (04-18-24 @ 10:51)   100 mL/Hr IV Intermittent (04-17-24 @ 11:47)    piperacillin/tazobactam IVPB.   200 mL/Hr IV Intermittent (04-16-24 @ 14:10)    piperacillin/tazobactam IVPB.-   25 mL/Hr IV Intermittent (04-16-24 @ 16:32)    piperacillin/tazobactam IVPB.-   25 mL/Hr IV Intermittent (04-16-24 @ 23:54)    piperacillin/tazobactam IVPB..   25 mL/Hr IV Intermittent (04-17-24 @ 06:01)    vancomycin  IVPB   250 mL/Hr IV Intermittent (04-17-24 @ 05:09)   250 mL/Hr IV Intermittent (04-16-24 @ 16:22)    vancomycin  IVPB   250 mL/Hr IV Intermittent (04-25-24 @ 05:24)   250 mL/Hr IV Intermittent (04-24-24 @ 18:27)    vancomycin  IVPB   250 mL/Hr IV Intermittent (04-24-24 @ 05:28)        cefTRIAXone   IVPB 2000 every 24 hours    MEDICATIONS  (STANDING):  albuterol/ipratropium for Nebulization 3 every 8 hours  alteplase  Injectable for Pleural Effusion 10 every 12 hours  dornase rosemary Solution for Pleural Effusion 5 every 12 hours  droxidopa 300 every 8 hours  fentaNYL    Injectable 50 every 4 hours PRN  heparin  Infusion. 1600 <Continuous>  insulin lispro (ADMELOG) corrective regimen sliding scale  every 6 hours  norepinephrine Infusion 0.02 <Continuous>  polyethylene glycol 3350 17 every 12 hours  propofol Infusion 9.91 <Continuous>  senna Syrup 10 at bedtime  sodium chloride 3%  Inhalation 4 every 8 hours    SOCIAL HISTORY: unable to obtain    FAMILY HISTORY: Unable to obtain    REVIEW OF SYSTEMS  [  ] ROS unobtainable because: Limited , intubated patients, nodding yes/No   [  ] All other systems negative except as noted below:	    Constitutional:  [ ] fever [ ] chills  [ ] weight loss  [ ] weakness  Skin:  [ ] rash [ ] phlebitis	  Eyes: [ ] icterus [ ] pain  [ ] discharge	  ENMT: [ ] sore throat  [ ] thrush [ ] ulcers [ ] exudates  Respiratory: [ ] dyspnea [ ] hemoptysis [ ] cough [ ] sputum	  Cardiovascular:  [ ] chest pain [ ] palpitations [ ] edema	  Gastrointestinal:  [ ] nausea [ ] vomiting [ ] diarrhea [ ] constipation [ ] pain	  Genitourinary:  [ ] dysuria [ ] frequency [ ] hematuria [ ] discharge [ ] flank pain  [ ] incontinence  Musculoskeletal:  [ ] myalgias [ ] arthralgias [ ] arthritis  [ ] back pain  Neurological:  [ ] headache [ ] seizures  [ ] confusion/altered mental status  Psychiatric:  [ ] anxiety [ ] depression	  Hematology/Lymphatics:  [ ] lymphadenopathy  Endocrine:  [ ] adrenal [ ] thyroid  Allergic/Immunologic:	 [ ] transplant [ ] seasonal    Vital Signs Last 24 Hrs  T(F): 100 (04-25-24 @ 11:00), Max: 101.7 (04-19-24 @ 02:00)  Vital Signs Last 24 Hrs  HR: 107 (04-25-24 @ 12:30) (69 - 109)  BP: 95/55 (04-25-24 @ 12:30) (79/44 - 123/59)  RR: 21 (04-25-24 @ 12:30)  SpO2: 99% (04-25-24 @ 12:30) (96% - 100%)  Wt(kg): --    PHYSICAL EXAM:    General: Patient in NAD, Intubated,   HEENT: NCAT, EOMI, PERRL, no oral lesions  CV: S1+S2, no m/r/g appreciated   Lungs: On MV, Left chest tube  Abd: Soft, nontender, no guarding, no rebound tenderness, + bowel sounds   : No suprapubic tenderness  Neuro: Awake, on propofol, following commands, left sided weakness  Ext: No cyanosis, Left extremities dependent edema  Skin: No rash, no phlebitis                              8.1    126.53 )-----------( 226      ( 25 Apr 2024 08:11 )             25.0   04-25    140  |  106  |  12  ----------------------------<  156<H>  3.9   |  25  |  <0.30<L>    Ca    8.9      25 Apr 2024 00:39  Phos  3.3     04-25  Mg     2.8     04-25    TPro  6.3  /  Alb  2.8<L>  /  TBili  0.3  /  DBili  x   /  AST  30  /  ALT  40  /  AlkPhos  77  04-25    Urinalysis Basic - ( 25 Apr 2024 00:39 )    Color: x / Appearance: x / SG: x / pH: x  Gluc: 156 mg/dL / Ketone: x  / Bili: x / Urobili: x   Blood: x / Protein: x / Nitrite: x   Leuk Esterase: x / RBC: x / WBC x   Sq Epi: x / Non Sq Epi: x / Bacteria: x    MICROBIOLOGY:  Culture - Blood (collected 24 Apr 2024 05:53)  Source: .Blood Blood  Preliminary Report (25 Apr 2024 11:02):    No growth at 24 hours    Culture - Blood (collected 24 Apr 2024 05:53)  Source: .Blood Blood  Preliminary Report (25 Apr 2024 10:02):    No growth at 24 hours    Culture - Body Fluid with Gram Stain (collected 23 Apr 2024 17:46)  Source: Pleural Fl Pleural Fluid  Gram Stain (24 Apr 2024 06:00):    polymorphonuclear leukocytes seen    No organisms seen    by cytocentrifuge  Preliminary Report (24 Apr 2024 16:51):    No growth to date.    Culture - Fungal, Body Fluid (collected 23 Apr 2024 17:46)  Source: Pleural Fl Pleural Fluid  Preliminary Report (25 Apr 2024 11:45):    Testing in progress    Culture - Blood (collected 22 Apr 2024 21:37)  Source: .Blood Blood-Peripheral  Gram Stain (24 Apr 2024 00:09):    Growth in aerobic bottle: Gram Positive Cocci in Clusters  Final Report (24 Apr 2024 23:16):    Growth in aerobic bottle: Staphylococcus epidermidis    Isolation of Coagulase negative Staphylococcus from single blood culture    sets may represent    contamination. Contact the Microbiology Department at [phone] if    susceptibility testing is    clinically indicated.    Direct identification is available within approximately 3-5    hours either by Blood Panel Multiplexed PCR or Direct    MALDI-TOF. Details: [email or web address]  Organism: Blood Culture PCR (24 Apr 2024 23:16)  Organism: Blood Culture PCR (24 Apr 2024 23:16)      -  Staphylococcus epidermidis, Methicillin resistant: Detec      Method Type: PCR    Culture - Blood (collected 22 Apr 2024 19:30)  Source: .Blood Blood-Peripheral  Preliminary Report (25 Apr 2024 01:02):    No growth at 48 Hours                  RADIOLOGY:  imaging below personally reviewed and agree with findings    < from: [facility] Duplex Lower Ext Vein Scan, Left (04.24.24 @ 22:09) >    ACC: [id number] EXAM:  DUPLEX EXT VEINS LOWER LT   ORDERED BY:  ALIZE PIERCE     PROCEDURE DATE:  04/24/2024          INTERPRETATION:  CLINICAL HISTORY: Left lower extremity swelling, history   of lymphoma    COMPARISON: None.    TECHNIQUE: Grayscale color and Doppler examination of the left lower   extremity deep venous systems.    FINDINGS:  Lack of compression diminished flow in the left common femoral vein and   left superficial femoral vein junction.    Sonographic evaluation of the left superficial femoral vein and popliteal   vein shows normal flow, compressibility, respiratory variation and   augmentation.    Bilateral cystic inguinal lymph nodes. The lymph nodes measure 2.0 x 1.5   x 2.2 cm on the right and 2.7 x 0.2 x 2 cm and the left.    IMPRESSION:    Nonocclusive left common femoral vein DVT. Findings discussed with Dr. Sylvester by the sonographic technologist.  Abnormal bilateral inguinal adenopathy.    --- End of Report ---        < end of copied text >  < from: Xray Chest 1 View- PORTABLE-Urgent (Xray Chest 1 View- PORTABLE-Urgent .) (04.24.24 @ 07:02) >  ACC: [id number] EXAM:  XR CHEST PORTABLE URGENT 1V   ORDERED BY: MARLI JOSEPH     PROCEDURE DATE:  04/24/2024          INTERPRETATION:  EXAMINATION: XR CHEST URGENT    CLINICAL INDICATION: Status post pigtail catheter placement    TECHNIQUE: Single frontal, portable view of the chest was obtained.    COMPARISON: Chest x-ray 4/22/2024.    FINDINGS:  Support devices: Endotracheal tube tip in the upper trachea. Enteric tube   coursing below the diaphragm with tip at field-of-view. Interval   placement of a pigtail catheter in the left basilar pleural space.  The cardiomediastinal silhouette is  not accurately assessed in this AP   projection.  Similar small left pleural effusion with associated compressive   atelectasis.  No pneumothorax.  No acute bony abnormality.    IMPRESSION:  Interval placement of a pigtail catheter in left basilar pleural space.    Similar small left pleural effusion with associated compressive   atelectasis.    < end of copied text >  < from: CT Abdomen and Pelvis w/ IV Cont (04.22.24 @ 20:21) >  ACC: [id number] EXAM:  CT ABDOMEN AND PELVIS IC   ORDERED BY:  ALIZE PIERCE     ACC: [id number] EXAM:  CT CHEST IC   ORDERED BY:  ALIZE PIERCE     PROCEDURE DATE:  04/22/2024          INTERPRETATION:  CLINICAL INFORMATION: Patient with CLL, evaluate for   hepatosplenomegaly and lymphadenopathy.    COMPARISON: None.    CONTRAST/COMPLICATIONS:  IV Contrast: Omnipaque 350 (accession [id number]), IV contrast documented   in unlinked concurrent exam (accession [id number])  90 cc administered   10   cc discarded  Oral Contrast: NONE  Complications: None reported at time of study completion    PROCEDURE:  CT of the Chest, Abdomen and Pelvis was performed.  Sagittal and coronal reformats were performed.    FINDINGS:  CHEST:  LUNGS AND LARGE AIRWAYS: Patent central airways. Left lower lobe   compressive atelectasis. Endotracheal tube is in place above the juan.  PLEURA: Small left and trace right pleural effusions. A small left apical   pneumothorax.  VESSELS: Within normal limits.  HEART: Heart size is normal.  No pericardial effusion.  MEDIASTINUM AND HEIDY: No lymphadenopathy.  CHEST WALL AND LOWER NECK: Axillary and cervical lymphadenopathy. A 4.4 x   2.8 cm left lower cervical node series 3 image 7. A 5.1 x 2 cm left   axillary node image 51. Bilateral retropectoral lymph nodes.    ABDOMEN AND PELVIS:  LIVER: Within normal limits.  BILE DUCTS: Normal caliber.  GALLBLADDER: Within normal limits.  SPLEEN: Borderline enlarged.  PANCREAS: Within normal limits.  ADRENALS: Within normal limits.  KIDNEYS/URETERS: Cysts.    BLADDER: Within normal limits.  REPRODUCTIVE ORGANS: Within normal limits.    BOWEL: No bowel obstruction. Nasogastric tube terminates in the stomach.    Rectum mildly distended by stool.  PERITONEUM: No ascites.  VESSELS:  IVCfilter.  RETROPERITONEUM/LYMPH NODES: Extensive inguinal and retroperitoneal   lymphadenopathy. There is confluent retroperitoneal lymphadenopathy   encasing the aorta and IVC. There is a reference 4.7 x 3.6 cm left   external iliac lymph node image 262. A 4 x 3 cm right inguinal node image   299.  ABDOMINAL WALL: Within normal limits.  BONES: Within normal limits.    IMPRESSION:  Small left apical pneumothorax.    Small left pleural effusion with left lower lobe compressive atelectasis.    Extensive lymphadenopathy consistent with history of lymphoma.      --- End of Report ---      < end of copied text >  < from: CT Chest w/ IV Cont (04.22.24 @ 20:21) >    ACC: [id number] EXAM:  CT ABDOMEN AND PELVIS IC   ORDERED BY:  ALIZE PIERCE     ACC: [id number] EXAM:  CT CHEST IC   ORDERED BY:  ALIZE PIERCE     PROCEDURE DATE:  04/22/2024          INTERPRETATION:  CLINICAL INFORMATION: Patient with CLL, evaluate for   hepatosplenomegaly and lymphadenopathy.    COMPARISON: None.    CONTRAST/COMPLICATIONS:  IV Contrast: Omnipaque 350 (accession [id number]), IV contrast documented   in unlinked concurrent exam (accession [id number])  90 cc administered   10   cc discarded  Oral Contrast: NONE  Complications: None reported at time of study completion    PROCEDURE:  CT of the Chest, Abdomen and Pelvis was performed.  Sagittal and coronal reformats were performed.    FINDINGS:  CHEST:  LUNGS AND LARGE AIRWAYS: Patent central airways. Left lower lobe   compressive atelectasis. Endotracheal tube is in place above the juan.  PLEURA: Small left and trace right pleural effusions. A small left apical   pneumothorax.  VESSELS: Within normal limits.  HEART: Heart size is normal.  No pericardial effusion.  MEDIASTINUM AND HEIDY: No lymphadenopathy.  CHEST WALL AND LOWER NECK: Axillary and cervical lymphadenopathy. A 4.4 x   2.8 cm left lower cervical node series 3 image 7. A 5.1 x 2 cm left   axillary node image 51. Bilateral retropectoral lymph nodes.    ABDOMEN AND PELVIS:  LIVER: Within normal limits.  BILE DUCTS: Normal caliber.  GALLBLADDER: Within normal limits.  SPLEEN: Borderline enlarged.  PANCREAS: Within normal limits.  ADRENALS: Within normal limits.  KIDNEYS/URETERS: Cysts.    BLADDER: Within normal limits.  REPRODUCTIVE ORGANS: Within normal limits.    BOWEL: No bowel obstruction. Nasogastric tube terminates in the stomach.    Rectum mildly distended by stool.  PERITONEUM: No ascites.  VESSELS:  IVCfilter.  RETROPERITONEUM/LYMPH NODES: Extensive inguinal and retroperitoneal   lymphadenopathy. There is confluent retroperitoneal lymphadenopathy   encasing the aorta and IVC. There is a reference 4.7 x 3.6 cm left   external iliac lymph node image 262. A 4 x 3 cm right inguinal node image   299.  ABDOMINAL WALL: Within normal limits.  BONES: Within normal limits.    IMPRESSION:  Small left apical pneumothorax.    Small left pleural effusion with left lower lobe compressive atelectasis.    Extensive lymphadenopathy consistent with history of lymphoma.

## 2024-04-25 NOTE — PROGRESS NOTE ADULT - PROBLEM SELECTOR PLAN 1
S/P pigtail waterseal  TPA interpleural 6 doses  Dr Anna discussed w/ MICU attdg at bedside  Daily CXR

## 2024-04-25 NOTE — PROGRESS NOTE ADULT - ASSESSMENT
69F h/o CVA (bedbound at baseline), COPD, CHF, HTN presenting as transfer from [facility] for SOB iso leukocytosis to 233 c/f acute leukemia. Pt intubated and on pressors, transferred to MICU for further management and possible leukophoresis.    Neuro  #Encephalopathy; currently intubated and sedated on Propofol   -p/w lethargy likely sepsis; Per Heme, leukostasis unlikely as cells mature  -CTH showing old infarct, no acute process  - off Precedex, on propofol, following commands, aim for RASS score -1 to 0    CV  #Septic Shock   - Strep Pneumo Bacteremia   - Back on Levophed for procedure, to wean off levo while on Droxidopa 300 TID     #Chest Pain  -demand vs leukostasis vs non cardiac   -reported JOSEF in II, III, aVF at [facility] --> repeat EKG without ST changes or Q waves  -trops peaked    #CHF  -unclear hx but elevated pro-BNP to 2600s  -TTE: EF 54%, no significant abnormalities; CTM    Resp  #Acute hypoxic respiratory failure  #Empyema Pansensitive Strep Pneumo  -intubated: 460/14/30/5; wean off as tolerated w/ daily CPAP trial  -s/p thora draining 1500cc fluid 4/16 with chest tube placement; c/w exudative effusion growing Strep Pneumo  -MRSA neg  -pleural fluid growing strep pneumo; BCx growing strep pneumo; Pansensitive  -was on vanc/zosyn/azithro (4/16)-->deescalated to CTX 2g qd (4/17 - ); c/w total 14-day course of Abx therapy   -CT chest 4/22 reveals persistent loculated effusion, s/p IR Pigtail placement 4/24 w/ Exudative effusion   - CPAP Trial daily    GI  #Diet: TF    /Renal  -no active issues    Heme/Onc  #Leukocytosis  #CLL  - on presentation; improving to 130s  -no plan for leukophoresis at this time given mature lymphocytes  -stop trending TLS labs as they have been stable   - CT Chest A&P showing diffuse axillary, inguinal mediastinal RP lymphadenopathy.  - Appreciate Heme recs       #DVT ppx: lovenox 40 BID     ID  #Empyema 2/2 Pansensitive Strep Pneumo  #Strep Pneumo Bacteremia  -BCx + strep pneumo; fluid culture with strep pneumo  -repeat BCx 4/20 ngtd, ReCx 4/23 due to fever   -CT Chest A&P demonstrates LLL empyema not showing any other intraabdominal infectious source  -was on vanc/zosyn/azithro (4/16)-->deescalated to CTX (4/17 -4/23), Added on Vancomycin (4/23-) due to MRSE 1/2 Cx, expanded coverage to Cefepime (4/23 1x dose), back on CTX for 14-day course   - Fever curve trending down, but persistently febrile. If persistent fevers, MARIO ALBERTO to eval for Endocarditis     #Positive MRSE BCx 1 of 2  - Repeat Cx obtained   - Vancomycin restarted (4/23- )  - Plan to DC Vanc if repeat Cx negative     Endo  -ISS q6h while NPO    Ethics: Full Code  Only known family member is Aunt Mili Gordon ([phone], [phone]) who lives in [place]   69F h/o CVA (bedbound at baseline), COPD, CHF, HTN presenting as transfer from [facility] for SOB iso leukocytosis to 233 c/f acute leukemia. Pt intubated and on pressors, transferred to MICU for further management and possible leukophoresis.    Neuro  #Encephalopathy; currently intubated and sedated on Propofol   -p/w lethargy likely sepsis; Per Heme, leukostasis unlikely as cells mature  -CTH showing old infarct, no acute process  - off Precedex, on propofol, following commands, aim for RASS score -1 to 0    CV  #Septic Shock   - Strep Pneumo Bacteremia   - Back on Levophed for procedure, to wean off levo while on Droxidopa 300 TID     #Chest Pain  -demand vs leukostasis vs non cardiac   -reported JOSEF in II, III, aVF at [facility] --> repeat EKG without ST changes or Q waves  -trops peaked    #CHF  -unclear hx but elevated pro-BNP to 2600s  -TTE: EF 54%, no significant abnormalities; CTM    Resp  #Acute hypoxic respiratory failure  #Empyema Pansensitive Strep Pneumo  -intubated: 460/14/30/5; wean off as tolerated w/ daily CPAP trial  -s/p thora draining 1500cc fluid 4/16 with chest tube placement; c/w exudative effusion growing Strep Pneumo  -MRSA neg  -pleural fluid growing strep pneumo; BCx growing strep pneumo; Pansensitive  -was on vanc/zosyn/azithro (4/16)-->deescalated to CTX 2g qd (4/17 - ); c/w total 14-day course of Abx therapy   -CT chest 4/22 reveals persistent loculated effusion, s/p IR Pigtail placement 4/24 w/ Exudative effusion   - CPAP Trial daily    GI  #Diet: TF    /Renal  -no active issues    Heme/Onc  #Leukocytosis  #CLL  - on presentation; improving to 130s  -no plan for leukophoresis at this time given mature lymphocytes  -stop trending TLS labs as they have been stable   - CT Chest A&P showing diffuse axillary, inguinal mediastinal RP lymphadenopathy.  - Appreciate Heme recs     #L Common Femoral Vein DVT   - Non-occlusive   - Heparin Drip     #DVT ppx: Heparin Drip     ID  #Empyema 2/2 Pansensitive Strep Pneumo  #Strep Pneumo Bacteremia  -BCx + strep pneumo; fluid culture with strep pneumo  -repeat BCx 4/20 ngtd, ReCx 4/23 due to fever   -CT Chest A&P demonstrates LLL empyema not showing any other intraabdominal infectious source  -was on vanc/zosyn/azithro (4/16)-->deescalated to CTX (4/17 -4/23), Added on Vancomycin (4/23-) due to MRSE 1/2 Cx, expanded coverage to Cefepime (4/23 1x dose), back on CTX for 14-day course   - Fever curve trending down, but persistently febrile. If persistent fevers, MARIO ALBERTO to eval for Endocarditis     #Positive MRSE BCx 1 of 2  - Repeat Cx obtained   - Vancomycin restarted (4/23- )  - Plan to DC Vanc if repeat Cx negative     Endo  -ISS q6h while NPO    Ethics: Full Code  Only known family member is Aunt Mili Gordon ([phone], [phone]) who lives in [place]   69F h/o CVA (bedbound at baseline), COPD, CHF, HTN presenting as transfer from [facility] for SOB iso leukocytosis to 233 c/f acute leukemia. Pt intubated and on pressors, transferred to MICU for further management and possible leukophoresis.    Neuro  #Encephalopathy; currently intubated and sedated on Propofol   -p/w lethargy likely sepsis; Per Heme, leukostasis unlikely as cells mature  -CTH showing old infarct, no acute process  - off Precedex, on propofol, following commands, aim for RASS score -1 to 0    CV  #Septic Shock   - Strep Pneumo Bacteremia   - Back on Levophed to wean off levo while on Droxidopa 300 TID     #Chest Pain  -demand vs leukostasis vs non cardiac   -reported JOSEF in II, III, aVF at [facility] --> repeat EKG without ST changes or Q waves  -trops peaked    #CHF  -unclear hx but elevated pro-BNP to 2600s  -TTE: EF 54%, no significant abnormalities; CTM    Resp  #Acute hypoxic respiratory failure  #Empyema Pansensitive Strep Pneumo  -intubated: 460/14/30/5; wean off as tolerated w/ daily CPAP trial  -s/p thora draining 1500cc fluid 4/16 with chest tube placement; c/w exudative effusion growing Strep Pneumo  -MRSA neg  -pleural fluid growing strep pneumo; BCx growing strep pneumo; Pansensitive  -was on vanc/zosyn/azithro (4/16)-->deescalated to CTX 2g qd (4/17 - ); c/w total 14-day course of Abx therapy   -CT chest 4/22 reveals persistent loculated effusion, s/p IR Pigtail placement 4/24 w/ Exudative effusion; Alteplase and Dornase through pigtal x6 doses   - CPAP Trial daily    GI  #Diet: TF    /Renal  -no active issues    Heme/Onc  #Leukocytosis  #CLL  - on presentation; improving to 130s  -no plan for leukophoresis at this time given mature lymphocytes  -stop trending TLS labs as they have been stable   - CT Chest A&P showing diffuse axillary, inguinal mediastinal RP lymphadenopathy.  - Appreciate Heme recs     #L Common Femoral Vein DVT   - Non-occlusive   - Heparin Drip     #DVT ppx: Heparin Drip     ID  #Empyema 2/2 Pansensitive Strep Pneumo  #Strep Pneumo Bacteremia  -BCx + strep pneumo; fluid culture with strep pneumo  -repeat BCx 4/20 ngtd, ReCx 4/23 due to fever   -CT Chest A&P demonstrates LLL empyema not showing any other intraabdominal infectious source  -was on vanc/zosyn/azithro (4/16)-->deescalated to CTX (4/17 -4/23), Added on Vancomycin (4/23-) due to MRSE 1/2 Cx, expanded coverage to Cefepime (4/23 1x dose), back on CTX for 14-day course   - Fever curve trending down, but persistently febrile. If persistent fevers, MARIO ALBERTO to eval for Endocarditis   - ID Consulted for persistent fevers despite source control; appreciate recs     #Positive MRSE BCx 1 of 2; contaminant   - Repeat Cx negative  - Vancomycin restarted (4/23-4/25)    Endo  -ISS q6h while NPO    Ethics: Full Code  Only known family member is Aunt Mili Gordon ([phone], [phone]) who lives in [place]

## 2024-04-25 NOTE — PROGRESS NOTE ADULT - SUBJECTIVE AND OBJECTIVE BOX
Interventional Radiology Follow-Up Note    This is a 69y Female s/p left chest tube placement on 4/23/24 in Interventional Radiology with Dr. Rogers.     S: Patient seen and examined @ bedside. Patient febrile.     Medication:  alteplase  Injectable for Pleural Effusion: (04-25)  cefepime   IVPB: (04-24)  cefTRIAXone   IVPB: (04-25)  droxidopa: (04-25)  enoxaparin Injectable: (04-24)  heparin  Infusion: (04-25)  norepinephrine Infusion: (04-24)  vancomycin  IVPB: (04-25)  vancomycin  IVPB: (04-24)    Vitals:   T(F): 100.4, Max: 101.3 (18:00)  HR: 112  BP: 98/52  RR: 20  SpO2: 100%    Physical Exam:  General: Nontoxic, in NAD, intubated, opens eyes.   Chest: left CT in place attached to pleuravac with cloudy pink somewhat viscous fluid. Chest tube to LWS. No air leak identified. Dressing c/d/i.     LABS:  .53 / Hgb 8.1 / Hct 25.0 / Plt 226  Na -- / K -- / CO2 -- / Cl -- / BUN -- / Cr -- / Glucose --  ALT -- / AST -- / Alk Phos -- / Tbili --  Ptt 34.0 / Pt 13.1 / INR 1.20    Preliminary Report:   No growth to date.    24hr Drain output: 450cc    Assessment/Plan:      -monitor h/h; transfuse as needed  -trend vs/labs  -flush drain with 5cc NS daily forward only; DO NOT aspirate  -change dressing q3 days or when dressing is saturated  -regarding outpatient follow up with IR, if the patient is d/c home with drainage catheter they can make an appointment with IR by calling the IR booking office at [phone]; recommend IR follow in _____wks/months for tube evaluation.  -they will benefit from VNS service to help with drainage catheter care; they should continue same drainage catheter care as an outpatient.  -continue global management per primary team   -Greater than 50% of the encounter was spent counseling and/ or coordination of care on drain care and follow up.   -Please call IR at extension [phone] with any questions, concerns, or issues regarding above.      Nuha Galvez PA-C  Available on teams     Interventional Radiology Follow-Up Note    This is a 69y Female s/p left chest tube placement on 4/23/24 in Interventional Radiology with Dr. Rogers.     S: Patient seen and examined @ bedside. Patient febrile.     Medication:  alteplase  Injectable for Pleural Effusion: (04-25)  cefepime   IVPB: (04-24)  cefTRIAXone   IVPB: (04-25)  droxidopa: (04-25)  enoxaparin Injectable: (04-24)  heparin  Infusion: (04-25)  norepinephrine Infusion: (04-24)  vancomycin  IVPB: (04-25)  vancomycin  IVPB: (04-24)    Vitals:   T(F): 100.4, Max: 101.3 (18:00)  HR: 112  BP: 98/52  RR: 20  SpO2: 100%    Physical Exam:  General: Nontoxic, in NAD, intubated, opens eyes.   Chest: left CT in place attached to pleuravac with cloudy pink somewhat viscous fluid. Chest tube to LWS. No air leak identified. Dressing c/d/i.     LABS:  .53 / Hgb 8.1 / Hct 25.0 / Plt 226  Na -- / K -- / CO2 -- / Cl -- / BUN -- / Cr -- / Glucose --  ALT -- / AST -- / Alk Phos -- / Tbili --  Ptt 34.0 / Pt 13.1 / INR 1.20    Preliminary Report:   No growth to date.    24hr Drain output: 450cc    Assessment/Plan:  69F h/o CVA (bedbound at baseline), COPD, CHF, HTN admitted to MICU 4/16 and intubated for Acute hypoxic respiratory failure with strep pneumonia, had chest tube 4/16-4/19. Remains on ventilator, failing SBTs. Continues to spike fevers. CT 4/22 shows small- moderate sized loculated effusion s/p left chest tube placement on 4/23/24 in Interventional Radiology with Dr. Rogers.     - patient undergoing MIST protocol by thoracic   - trend vs/labs, wean O2 as tolerated  - daily CXR while chest tube in place (4/24 AM CXR with tiny left apical ptx- continue to monitor)  - f/u pleural fluid studies- exudative by light's criteria, culture with NGTD, cyto pending  - record output q12 to facilitate timely removal  - continue global management per primary team    Please call IR at extension [phone] with any questions, concerns, or issues regarding above.      Nuha Galvez PA-C  Available on teams

## 2024-04-25 NOTE — PROGRESS NOTE ADULT - SUBJECTIVE AND OBJECTIVE BOX
INTERVAL HPI/OVERNIGHT EVENTS:    SUBJECTIVE: Patient seen and examined at bedside.     ROS: All negative except as listed above.    VITAL SIGNS:  ICU Vital Signs Last 24 Hrs  T(C): 37.3 (25 Apr 2024 07:00), Max: 38.5 (24 Apr 2024 18:00)  T(F): 99.1 (25 Apr 2024 07:00), Max: 101.3 (24 Apr 2024 18:00)  HR: 78 (25 Apr 2024 07:15) (69 - 109)  BP: 107/55 (25 Apr 2024 07:15) (80/44 - 126/58)  BP(mean): 78 (25 Apr 2024 07:15) (58 - 85)  ABP: --  ABP(mean): --  RR: 22 (25 Apr 2024 07:15) (14 - 34)  SpO2: 98% (25 Apr 2024 07:15) (94% - 100%)    O2 Parameters below as of 25 Apr 2024 07:00  Patient On (Oxygen Delivery Method): ventilator  O2 Flow (L/min): 30        Mode: AC/ CMV (Assist Control/ Continuous Mandatory Ventilation), RR (machine): 14, TV (machine): 460, FiO2: 30, PEEP: 5, ITime: 1, MAP: 11, PIP: 29  Plateau pressure:   P/F ratio:     04-24 @ 07:01  -  04-25 @ 07:00  --------------------------------------------------------  IN: 2821.7 mL / OUT: 1600 mL / NET: 1221.7 mL      CAPILLARY BLOOD GLUCOSE      POCT Blood Glucose.: 129 mg/dL (25 Apr 2024 07:08)      ECG: reviewed.    PHYSICAL EXAM:    GENERAL: NAD, lying in bed comfortably  HEAD:  Atraumatic, normocephalic  EYES: EOMI, PERRLA, conjunctiva and sclera clear  NECK: Supple, trachea midline, no JVD  HEART: Regular rate and rhythm, no murmurs, rubs, or gallops  LUNGS: Unlabored respirations.  Clear to auscultation bilaterally, no crackles, wheezing, or rhonchi  ABDOMEN: Soft, nontender, nondistended, +BS  EXTREMITIES: 2+ peripheral pulses bilaterally, cap refill<2 secs. No clubbing, cyanosis, or edema  NERVOUS SYSTEM:  A&Ox3, following commands, moving all extremities, no focal deficits   SKIN: No rashes or lesions    MEDICATIONS:  MEDICATIONS  (STANDING):  albuterol/ipratropium for Nebulization 3 milliLiter(s) Nebulizer every 8 hours  alteplase  Injectable for Pleural Effusion 10 milliGRAM(s) IntraPleural. every 12 hours  cefTRIAXone   IVPB 2000 milliGRAM(s) IV Intermittent every 24 hours  chlorhexidine 0.12% Liquid 15 milliLiter(s) Oral Mucosa every 12 hours  chlorhexidine 2% Cloths 1 Application(s) Topical <User Schedule>  dornase rosemary Solution for Pleural Effusion 5 milliGRAM(s) IntraPleural. every 12 hours  droxidopa 300 milliGRAM(s) Oral every 8 hours  heparin  Infusion 1400 Unit(s)/Hr (14 mL/Hr) IV Continuous <Continuous>  insulin lispro (ADMELOG) corrective regimen sliding scale   SubCutaneous every 6 hours  norepinephrine Infusion 0.02 MICROgram(s)/kG/Min (4.16 mL/Hr) IV Continuous <Continuous>  polyethylene glycol 3350 17 Gram(s) Oral every 12 hours  propofol Infusion 9.91 MICROgram(s)/kG/Min (6.6 mL/Hr) IV Continuous <Continuous>  senna Syrup 10 milliLiter(s) Oral at bedtime  sodium chloride 0.9% Solution for Pleural Effusion 40 milliLiter(s) IntraPleural. every 12 hours  sodium chloride 3%  Inhalation 4 milliLiter(s) Inhalation every 8 hours  vancomycin  IVPB 1750 milliGRAM(s) IV Intermittent every 12 hours  vancomycin  IVPB        MEDICATIONS  (PRN):  fentaNYL    Injectable 50 MICROGram(s) IV Push every 4 hours PRN Moderate Pain (4 - 6)  nystatin Powder 1 Application(s) Topical two times a day PRN skin irritation      ALLERGIES:  Allergies    No Known Allergies    Intolerances        LABS:                        8.3    115.18 )-----------( 230      ( 25 Apr 2024 00:39 )             25.6     04-25    140  |  106  |  12  ----------------------------<  156<H>  3.9   |  25  |  <0.30<L>    Ca    8.9      25 Apr 2024 00:39  Phos  3.3     04-25  Mg     2.8     04-25    TPro  6.3  /  Alb  2.8<L>  /  TBili  0.3  /  DBili  x   /  AST  30  /  ALT  40  /  AlkPhos  77  04-25    PT/INR - ( 25 Apr 2024 00:39 )   PT: 13.9 sec;   INR: 1.33 ratio         PTT - ( 25 Apr 2024 00:39 )  PTT:30.9 sec  Urinalysis Basic - ( 25 Apr 2024 00:39 )    Color: x / Appearance: x / SG: x / pH: x  Gluc: 156 mg/dL / Ketone: x  / Bili: x / Urobili: x   Blood: x / Protein: x / Nitrite: x   Leuk Esterase: x / RBC: x / WBC x   Sq Epi: x / Non Sq Epi: x / Bacteria: x      ABG:  pH, Arterial: 7.40 (04-25-24 @ 00:31)  pCO2, Arterial: 46 mmHg (04-25-24 @ 00:31)  pO2, Arterial: 91 mmHg (04-25-24 @ 00:31)      vBG:    Micro:    Culture - Blood (collected 04-22-24 @ 21:37)  Source: .Blood Blood-Peripheral  Gram Stain (04-24-24 @ 00:09):    Growth in aerobic bottle: Gram Positive Cocci in Clusters  Final Report (04-24-24 @ 23:16):    Growth in aerobic bottle: Staphylococcus epidermidis    Isolation of Coagulase negative Staphylococcus from single blood culture    sets may represent    contamination. Contact the Microbiology Department at [phone] if    susceptibility testing is    clinically indicated.    Direct identification is available within approximately 3-5    hours either by Blood Panel Multiplexed PCR or Direct    MALDI-TOF. Details: [email or web address]  Organism: Blood Culture PCR (04-24-24 @ 23:16)  Organism: Blood Culture PCR (04-24-24 @ 23:16)      -  Staphylococcus epidermidis, Methicillin resistant: Detec      Method Type: PCR    Culture - Blood (collected 04-22-24 @ 19:30)  Source: .Blood Blood-Peripheral  Preliminary Report (04-25-24 @ 01:02):    No growth at 48 Hours    Culture - Blood (collected 04-20-24 @ 12:15)  Source: .Blood Blood-Peripheral  Preliminary Report (04-24-24 @ 19:01):    No growth at 4 days    Culture - Blood (collected 04-20-24 @ 12:00)  Source: .Blood Blood-Peripheral  Preliminary Report (04-24-24 @ 19:01):    No growth at 4 days    Culture - Blood (collected 04-18-24 @ 11:00)  Source: .Blood Blood-Peripheral  Final Report (04-23-24 @ 17:01):    No growth at 5 days    Culture - Blood (collected 04-18-24 @ 10:50)  Source: .Blood Blood-Peripheral  Final Report (04-23-24 @ 17:01):    No growth at 5 days    Culture - Blood (collected 04-16-24 @ 15:40)  Source: .Blood Blood-Peripheral  Final Report (04-21-24 @ 21:01):    No growth at 5 days    Culture - Blood (collected 04-16-24 @ 13:17)  Source: .Blood Blood-Peripheral  Final Report (04-21-24 @ 18:01):    No growth at 5 days    Culture - Blood (collected 04-16-24 @ 06:42)  Source: .Blood Blood-Peripheral  Gram Stain (04-18-24 @ 11:52):    Growth in aerobic bottle: Gram Positive Cocci in Pairs and Chains    Growth in anaerobic bottle: Gram positive cocci in pairs  Final Report (04-18-24 @ 11:52):    Growth in aerobic and anaerobic bottles: Streptococcus pneumoniae    See previous culture [id number]    Culture - Blood (collected 04-16-24 @ 06:35)  Source: .Blood Blood-Peripheral  Gram Stain (04-18-24 @ 11:51):    Growth in aerobic bottle: Gram positive cocci in pairs    Growth in anaerobic bottle: Gram positive cocci in pairs  Final Report (04-18-24 @ 11:51):    Growth in aerobic and anaerobic bottles: Streptococcus pneumoniae    Direct identification is available within approximately 3-5    hours either by Blood Panel Multiplexed PCR or Direct    MALDI-TOF. Details: [email or web address]  Organism: Blood Culture PCR  Streptococcus pneumoniae (04-18-24 @ 11:51)  Organism: Streptococcus pneumoniae (04-18-24 @ 11:51)      -  Levofloxacin: S 1      -  Clindamycin: S <=0.06      -  Penicillin (oral penicillin V): S 0.06      -  Vancomycin: S 0.5      -  Tetracycline: S <=0.5      Method Type: LESLY      -  Ceftriaxone (non-meningitidis): S <=0.25      -  Ceftriaxone (meningitidis): S <=0.25      -  Erythromycin: S <=0.06 Predicts results for azithromycin.      -  Penicillin (non-meningitidis): S 0.06      -  Penicillin (meningitidis): S 0.06      -  Trimethoprim/Sulfamethoxazole: S <=.25/4.75  Organism: Blood Culture PCR (04-18-24 @ 11:51)      -  Streptococcus pneumoniae: Detec      Method Type: PCR        Culture - Sputum (collected 04-16-24 @ 15:44)  Source: ET Tube ET Tube  Gram Stain (04-16-24 @ 22:00):    Few polymorphonuclear leukocytes per low power field    No Squamous epithelial cells per low power field    No organisms seen per oil power field  Final Report (04-18-24 @ 09:40):    Normal Respiratory Kayla present        RADIOLOGY & ADDITIONAL TESTS: Reviewed.       INTERVAL HPI/OVERNIGHT EVENTS:    SUBJECTIVE: Patient seen and examined at bedside.     ROS: All negative except as listed above.    VITAL SIGNS:  ICU Vital Signs Last 24 Hrs  T(C): 37.3 (25 Apr 2024 07:00), Max: 38.5 (24 Apr 2024 18:00)  T(F): 99.1 (25 Apr 2024 07:00), Max: 101.3 (24 Apr 2024 18:00)  HR: 78 (25 Apr 2024 07:15) (69 - 109)  BP: 107/55 (25 Apr 2024 07:15) (80/44 - 126/58)  BP(mean): 78 (25 Apr 2024 07:15) (58 - 85)  ABP: --  ABP(mean): --  RR: 22 (25 Apr 2024 07:15) (14 - 34)  SpO2: 98% (25 Apr 2024 07:15) (94% - 100%)    O2 Parameters below as of 25 Apr 2024 07:00  Patient On (Oxygen Delivery Method): ventilator  O2 Flow (L/min): 30        Mode: AC/ CMV (Assist Control/ Continuous Mandatory Ventilation), RR (machine): 14, TV (machine): 460, FiO2: 30, PEEP: 5, ITime: 1, MAP: 11, PIP: 29  Plateau pressure:   P/F ratio:     04-24 @ 07:01  -  04-25 @ 07:00  --------------------------------------------------------  IN: 2821.7 mL / OUT: 1600 mL / NET: 1221.7 mL      CAPILLARY BLOOD GLUCOSE      POCT Blood Glucose.: 129 mg/dL (25 Apr 2024 07:08)      ECG: reviewed.    PHYSICAL EXAM:    GENERAL: NAD, lying in bed comfortably  HEAD:  Atraumatic, normocephalic  EYES: EOMI, PERRLA, conjunctiva and sclera clear  NECK: Supple, trachea midline, no JVD  HEART: Regular rate and rhythm, no murmurs, rubs, or gallops  LUNGS: Unlabored respirations.  Clear to auscultation bilaterally, no crackles, wheezing, or rhonchi  ABDOMEN: Soft, nontender, nondistended, +BS  EXTREMITIES: 2+ peripheral pulses bilaterally, cap refill<2 secs. No clubbing, cyanosis, or edema  NERVOUS SYSTEM:  A&Ox3, following commands, moving all extremities, no focal deficits   SKIN: No rashes or lesions    MEDICATIONS:  MEDICATIONS  (STANDING):  albuterol/ipratropium for Nebulization 3 milliLiter(s) Nebulizer every 8 hours  alteplase  Injectable for Pleural Effusion 10 milliGRAM(s) IntraPleural. every 12 hours  cefTRIAXone   IVPB 2000 milliGRAM(s) IV Intermittent every 24 hours  chlorhexidine 0.12% Liquid 15 milliLiter(s) Oral Mucosa every 12 hours  chlorhexidine 2% Cloths 1 Application(s) Topical <User Schedule>  dornase rosemary Solution for Pleural Effusion 5 milliGRAM(s) IntraPleural. every 12 hours  droxidopa 300 milliGRAM(s) Oral every 8 hours  heparin  Infusion 1400 Unit(s)/Hr (14 mL/Hr) IV Continuous <Continuous>  insulin lispro (ADMELOG) corrective regimen sliding scale   SubCutaneous every 6 hours  norepinephrine Infusion 0.02 MICROgram(s)/kG/Min (4.16 mL/Hr) IV Continuous <Continuous>  polyethylene glycol 3350 17 Gram(s) Oral every 12 hours  propofol Infusion 9.91 MICROgram(s)/kG/Min (6.6 mL/Hr) IV Continuous <Continuous>  senna Syrup 10 milliLiter(s) Oral at bedtime  sodium chloride 0.9% Solution for Pleural Effusion 40 milliLiter(s) IntraPleural. every 12 hours  sodium chloride 3%  Inhalation 4 milliLiter(s) Inhalation every 8 hours  vancomycin  IVPB 1750 milliGRAM(s) IV Intermittent every 12 hours  vancomycin  IVPB        MEDICATIONS  (PRN):  fentaNYL    Injectable 50 MICROGram(s) IV Push every 4 hours PRN Moderate Pain (4 - 6)  nystatin Powder 1 Application(s) Topical two times a day PRN skin irritation      ALLERGIES:  Allergies    No Known Allergies    Intolerances        LABS:                        8.3    115.18 )-----------( 230      ( 25 Apr 2024 00:39 )             25.6     04-25    140  |  106  |  12  ----------------------------<  156<H>  3.9   |  25  |  <0.30<L>    Ca    8.9      25 Apr 2024 00:39  Phos  3.3     04-25  Mg     2.8     04-25    TPro  6.3  /  Alb  2.8<L>  /  TBili  0.3  /  DBili  x   /  AST  30  /  ALT  40  /  AlkPhos  77  04-25    PT/INR - ( 25 Apr 2024 00:39 )   PT: 13.9 sec;   INR: 1.33 ratio         PTT - ( 25 Apr 2024 00:39 )  PTT:30.9 sec  Urinalysis Basic - ( 25 Apr 2024 00:39 )    Color: x / Appearance: x / SG: x / pH: x  Gluc: 156 mg/dL / Ketone: x  / Bili: x / Urobili: x   Blood: x / Protein: x / Nitrite: x   Leuk Esterase: x / RBC: x / WBC x   Sq Epi: x / Non Sq Epi: x / Bacteria: x      ABG:  pH, Arterial: 7.40 (04-25-24 @ 00:31)  pCO2, Arterial: 46 mmHg (04-25-24 @ 00:31)  pO2, Arterial: 91 mmHg (04-25-24 @ 00:31)      vBG:    Micro:    Culture - Blood (collected 04-22-24 @ 21:37)  Source: .Blood Blood-Peripheral  Gram Stain (04-24-24 @ 00:09):    Growth in aerobic bottle: Gram Positive Cocci in Clusters  Final Report (04-24-24 @ 23:16):    Growth in aerobic bottle: Staphylococcus epidermidis    Isolation of Coagulase negative Staphylococcus from single blood culture    sets may represent    contamination. Contact the Microbiology Department at [phone] if    susceptibility testing is    clinically indicated.    Direct identification is available within approximately 3-5    hours either by Blood Panel Multiplexed PCR or Direct    MALDI-TOF. Details: [email or web address]  Organism: Blood Culture PCR (04-24-24 @ 23:16)  Organism: Blood Culture PCR (04-24-24 @ 23:16)      -  Staphylococcus epidermidis, Methicillin resistant: Detec      Method Type: PCR    Culture - Blood (collected 04-22-24 @ 19:30)  Source: .Blood Blood-Peripheral  Preliminary Report (04-25-24 @ 01:02):    No growth at 48 Hours    Culture - Blood (collected 04-20-24 @ 12:15)  Source: .Blood Blood-Peripheral  Preliminary Report (04-24-24 @ 19:01):    No growth at 4 days    Culture - Blood (collected 04-20-24 @ 12:00)  Source: .Blood Blood-Peripheral  Preliminary Report (04-24-24 @ 19:01):    No growth at 4 days    Culture - Blood (collected 04-18-24 @ 11:00)  Source: .Blood Blood-Peripheral  Final Report (04-23-24 @ 17:01):    No growth at 5 days    Culture - Blood (collected 04-18-24 @ 10:50)  Source: .Blood Blood-Peripheral  Final Report (04-23-24 @ 17:01):    No growth at 5 days    Culture - Blood (collected 04-16-24 @ 15:40)  Source: .Blood Blood-Peripheral  Final Report (04-21-24 @ 21:01):    No growth at 5 days    Culture - Blood (collected 04-16-24 @ 13:17)  Source: .Blood Blood-Peripheral  Final Report (04-21-24 @ 18:01):    No growth at 5 days    Culture - Blood (collected 04-16-24 @ 06:42)  Source: .Blood Blood-Peripheral  Gram Stain (04-18-24 @ 11:52):    Growth in aerobic bottle: Gram Positive Cocci in Pairs and Chains    Growth in anaerobic bottle: Gram positive cocci in pairs  Final Report (04-18-24 @ 11:52):    Growth in aerobic and anaerobic bottles: Streptococcus pneumoniae    See previous culture [id number]    Culture - Blood (collected 04-16-24 @ 06:35)  Source: .Blood Blood-Peripheral  Gram Stain (04-18-24 @ 11:51):    Growth in aerobic bottle: Gram positive cocci in pairs    Growth in anaerobic bottle: Gram positive cocci in pairs  Final Report (04-18-24 @ 11:51):    Growth in aerobic and anaerobic bottles: Streptococcus pneumoniae    Direct identification is available within approximately 3-5    hours either by Blood Panel Multiplexed PCR or Direct    MALDI-TOF. Details: [email or web address]  Organism: Blood Culture PCR  Streptococcus pneumoniae (04-18-24 @ 11:51)  Organism: Streptococcus pneumoniae (04-18-24 @ 11:51)      -  Levofloxacin: S 1      -  Clindamycin: S <=0.06      -  Penicillin (oral penicillin V): S 0.06      -  Vancomycin: S 0.5      -  Tetracycline: S <=0.5      Method Type: LESLY      -  Ceftriaxone (non-meningitidis): S <=0.25      -  Ceftriaxone (meningitidis): S <=0.25      -  Erythromycin: S <=0.06 Predicts results for azithromycin.      -  Penicillin (non-meningitidis): S 0.06      -  Penicillin (meningitidis): S 0.06      -  Trimethoprim/Sulfamethoxazole: S <=.25/4.75  Organism: Blood Culture PCR (04-18-24 @ 11:51)      -  Streptococcus pneumoniae: Detec      Method Type: PCR        Culture - Sputum (collected 04-16-24 @ 15:44)  Source: ET Tube ET Tube  Gram Stain (04-16-24 @ 22:00):    Few polymorphonuclear leukocytes per low power field    No Squamous epithelial cells per low power field    No organisms seen per oil power field  Final Report (04-18-24 @ 09:40):    Normal Respiratory Kayla present        RADIOLOGY & ADDITIONAL TESTS: Reviewed.

## 2024-04-25 NOTE — PROGRESS NOTE ADULT - ATTENDING COMMENTS
69 year old female with a history of CVA (bedbound at baseline) COPD, CHF, HTN presented initially as a transfer with hyperleukocytosis with concerning for acute leukemia (no blasts, more consistent with CLL and leukamoid reaction in the setting of acute infection) and acute hypoxic respiratory failure eventually found to have strep pneumonia bacteremia, pneumonia c/b empyema s/p chest tube (4/16-4/19)    Continues to spike, 101   Lines:   PIV  Chest tube 4/23/24    N:   - Propofol, RASS 0 to -1  - Fentanyl for pain  - Delirium precautions  - Daily SAT  CV:   Septic shock, vasoplegia  Sedation related hypotension  TTE negative for vegetation  Elevated pro-BNP >2k  - On low dose levophed  - MAP >65  P: Acute hypoxic respiratory failure  Strep pneumonia  Empyema s/p chest tube (4/16-14/19)- did not do intrapleural fibrinolytic therapy; s/p CT 4/23/24 with IR- will plan for intrapleural fibrolytic therapy  Intubated 4/16  - No indication for surgical management of pleural space at this time. S/P CT placement, will plan to start 6 doses of intrapleural fibrinolytic therapy  - Continue to monitor fluid with bedside ultrasound  - Lung protective settings  - Failing SBT 2/2 tachypnea, daily SBT  - Trend BG  - Suctioning, duonebs PRN, oral care  GI:   - Tube feeds  Renal:   - Trend cr, lytes  Heme onc:   CLL  Leukamoid reaction  Nonocclusive left common femoral vein DVT  - Appreciate heme recs  - Started on Heparin drip, held around instillation of lytics due to hemorrhage risk  ID: Pneumonia, empyema s/p chest tube without lytics, persistent fevers and fluid. Now s/p repeat CT placement 4/23/24, will plan for intrapleural fibrinolytic therapy. SHe is 2/6 doses.   Strep bacteremia  - MRSE in 1 culture, suspect contaminant. Repeat cultures pending  - Day 8 CTX  - Will redraw cultures if >100.4 and sustained  - Management of pleural space for source control as above  - Persistent fevers, despite "source control". Will consult ID. Potentially MARIO ALBERTO? v. from thrombus  Endo: Avoid hypoglycemia,   BG <210    DVT: Full A/C as above  Full code  HCP- Presumably Aunt (only known family member)

## 2024-04-25 NOTE — PROGRESS NOTE ADULT - SUBJECTIVE AND OBJECTIVE BOX
Subjective: Sedated  Intubated      V/S  T(C): 37.3 (04-25-24 @ 07:00), Max: 38.5 (04-24-24 @ 18:00)  HR: 97 (04-25-24 @ 09:30) (69 - 109)  BP: 102/55 (04-25-24 @ 09:30) (80/44 - 126/58)  RR: 20 (04-25-24 @ 09:00) (14 - 34)  SpO2: 98% (04-25-24 @ 09:30) (94% - 100%)      Mode: AC/ CMV (Assist Control/ Continuous Mandatory Ventilation)  RR (machine): 14  TV (machine): 460  FiO2: 30  PEEP: 5  ITime: 1  MAP: 11  PIP: 30    CHEST TUBES:  Left CT     [  ]LWS  [ x ]H2O seal      I&O's Detail    24 Apr 2024 07:01  -  25 Apr 2024 07:00  --------------------------------------------------------  IN:    Enteral Tube Flush: 300 mL    Heparin: 70 mL    IV PiggyBack: 500 mL    Norepinephrine: 171.9 mL    Propofol: 499.8 mL    Vital High Protein: 1330 mL  Total IN: 2871.7 mL    OUT:    Chest Tube (mL): 450 mL    Voided (mL): 1150 mL  Total OUT: 1600 mL    Total NET: 1271.7 mL      25 Apr 2024 07:01  -  25 Apr 2024 09:38  --------------------------------------------------------  IN:    Heparin: 14 mL    Norepinephrine: 4.2 mL    Propofol: 46.6 mL  Total IN: 64.8 mL    OUT:    Vital High Protein: 0 mL  Total OUT: 0 mL    Total NET: 64.8 mL          04-24-24 @ 07:01  -  04-25-24 @ 07:00  --------------------------------------------------------  IN: 2871.7 mL / OUT: 1600 mL / NET: 1271.7 mL    04-25-24 @ 07:01  -  04-25-24 @ 09:38  --------------------------------------------------------  IN: 64.8 mL / OUT: 0 mL / NET: 64.8 mL      MEDICATIONS  (STANDING):  albuterol/ipratropium for Nebulization 3 milliLiter(s) Nebulizer every 8 hours  alteplase  Injectable for Pleural Effusion 10 milliGRAM(s) IntraPleural. every 12 hours  cefTRIAXone   IVPB 2000 milliGRAM(s) IV Intermittent every 24 hours  chlorhexidine 0.12% Liquid 15 milliLiter(s) Oral Mucosa every 12 hours  chlorhexidine 2% Cloths 1 Application(s) Topical <User Schedule>  dornase rosemary Solution for Pleural Effusion 5 milliGRAM(s) IntraPleural. every 12 hours  droxidopa 300 milliGRAM(s) Oral every 8 hours  heparin  Infusion. 1600 Unit(s)/Hr (16 mL/Hr) IV Continuous <Continuous>  insulin lispro (ADMELOG) corrective regimen sliding scale   SubCutaneous every 6 hours  norepinephrine Infusion 0.02 MICROgram(s)/kG/Min (4.16 mL/Hr) IV Continuous <Continuous>  polyethylene glycol 3350 17 Gram(s) Oral every 12 hours  propofol Infusion 9.91 MICROgram(s)/kG/Min (6.6 mL/Hr) IV Continuous <Continuous>  senna Syrup 10 milliLiter(s) Oral at bedtime  sodium chloride 0.9% Solution for Pleural Effusion 40 milliLiter(s) IntraPleural. every 12 hours  sodium chloride 3%  Inhalation 4 milliLiter(s) Inhalation every 8 hours  vancomycin  IVPB 1750 milliGRAM(s) IV Intermittent every 12 hours  vancomycin  IVPB          04-25    140  |  106  |  12  ----------------------------<  156<H>  3.9   |  25  |  <0.30<L>    Ca    8.9      25 Apr 2024 00:39  Phos  3.3     04-25  Mg     2.8     04-25    TPro  6.3  /  Alb  2.8<L>  /  TBili  0.3  /  DBili  x   /  AST  30  /  ALT  40  /  AlkPhos  77  04-25                               8.1    126.53 )-----------( 226      ( 25 Apr 2024 08:11 )             25.0        PT/INR - ( 25 Apr 2024 08:11 )   PT: 13.1 sec;   INR: 1.20 ratio         PTT - ( 25 Apr 2024 08:11 )  PTT:34.0 sec         CAPILLARY BLOOD GLUCOSE      POCT Blood Glucose.: 129 mg/dL (25 Apr 2024 07:08)  POCT Blood Glucose.: 161 mg/dL (25 Apr 2024 00:32)  POCT Blood Glucose.: 164 mg/dL (24 Apr 2024 17:00)  POCT Blood Glucose.: 119 mg/dL (24 Apr 2024 10:58)             Physical Exam:    GENERAL: NAD,  intubated and sedated on propofol;  HEART: Regular rate and rhythm, no murmurs, rubs, or gallops  LUNGS: Unlabored respirations, triggering own respirations intermittently, clear lungs L pigtail in place>serosang fluid w/ stranding noted  ABDOMEN: Soft,obese abdomen  nontender, nondistended, +BS  EXTREMITIES Warm well perfused  No clubbing, cyanosis, or edema  NERVOUS SYSTEM:  Intubated sedated on Propofol, opens eyes and follows commands               PAST MEDICAL & SURGICAL HISTORY:  Acute CHF      COPD, severe

## 2024-04-26 LAB
ALBUMIN SERPL ELPH-MCNC: 2.6 G/DL — LOW (ref 3.3–5)
ALP SERPL-CCNC: 76 U/L — SIGNIFICANT CHANGE UP (ref 40–120)
ALT FLD-CCNC: 33 U/L — SIGNIFICANT CHANGE UP (ref 10–45)
ANION GAP SERPL CALC-SCNC: 9 MMOL/L — SIGNIFICANT CHANGE UP (ref 5–17)
APPEARANCE UR: CLEAR — SIGNIFICANT CHANGE UP
APTT BLD: 27.6 SEC — SIGNIFICANT CHANGE UP (ref 24.5–35.6)
AST SERPL-CCNC: 27 U/L — SIGNIFICANT CHANGE UP (ref 10–40)
BACTERIA # UR AUTO: NEGATIVE /HPF — SIGNIFICANT CHANGE UP
BASOPHILS # BLD AUTO: 0.14 K/UL — SIGNIFICANT CHANGE UP (ref 0–0.2)
BASOPHILS NFR BLD AUTO: 0.1 % — SIGNIFICANT CHANGE UP (ref 0–2)
BILIRUB SERPL-MCNC: 0.3 MG/DL — SIGNIFICANT CHANGE UP (ref 0.2–1.2)
BILIRUB UR-MCNC: NEGATIVE — SIGNIFICANT CHANGE UP
BUN SERPL-MCNC: 12 MG/DL — SIGNIFICANT CHANGE UP (ref 7–23)
CALCIUM SERPL-MCNC: 8.3 MG/DL — LOW (ref 8.4–10.5)
CAST: 5 /LPF — HIGH (ref 0–4)
CHLORIDE SERPL-SCNC: 105 MMOL/L — SIGNIFICANT CHANGE UP (ref 96–108)
CO2 SERPL-SCNC: 25 MMOL/L — SIGNIFICANT CHANGE UP (ref 22–31)
COLOR SPEC: YELLOW — SIGNIFICANT CHANGE UP
CREAT SERPL-MCNC: <0.3 MG/DL — LOW (ref 0.5–1.3)
DIFF PNL FLD: NEGATIVE — SIGNIFICANT CHANGE UP
EGFR: 115 ML/MIN/1.73M2 — SIGNIFICANT CHANGE UP
EOSINOPHIL # BLD AUTO: 0.11 K/UL — SIGNIFICANT CHANGE UP (ref 0–0.5)
EOSINOPHIL NFR BLD AUTO: 0.1 % — SIGNIFICANT CHANGE UP (ref 0–6)
GAS PNL BLDA: SIGNIFICANT CHANGE UP
GLUCOSE BLDC GLUCOMTR-MCNC: 129 MG/DL — HIGH (ref 70–99)
GLUCOSE BLDC GLUCOMTR-MCNC: 140 MG/DL — HIGH (ref 70–99)
GLUCOSE BLDC GLUCOMTR-MCNC: 148 MG/DL — HIGH (ref 70–99)
GLUCOSE BLDC GLUCOMTR-MCNC: 171 MG/DL — HIGH (ref 70–99)
GLUCOSE SERPL-MCNC: 170 MG/DL — HIGH (ref 70–99)
GLUCOSE UR QL: NEGATIVE MG/DL — SIGNIFICANT CHANGE UP
GRAM STN FLD: SIGNIFICANT CHANGE UP
HCT VFR BLD CALC: 26.9 % — LOW (ref 34.5–45)
HGB BLD-MCNC: 8.5 G/DL — LOW (ref 11.5–15.5)
IMM GRANULOCYTES NFR BLD AUTO: 0.5 % — SIGNIFICANT CHANGE UP (ref 0–0.9)
INR BLD: 1.3 RATIO — HIGH (ref 0.85–1.18)
KETONES UR-MCNC: NEGATIVE MG/DL — SIGNIFICANT CHANGE UP
LEUKOCYTE ESTERASE UR-ACNC: ABNORMAL
LYMPHOCYTES # BLD AUTO: 142.45 K/UL — HIGH (ref 1–3.3)
LYMPHOCYTES # BLD AUTO: 89.7 % — HIGH (ref 13–44)
MAGNESIUM SERPL-MCNC: 2.6 MG/DL — SIGNIFICANT CHANGE UP (ref 1.6–2.6)
MCHC RBC-ENTMCNC: 28.1 PG — SIGNIFICANT CHANGE UP (ref 27–34)
MCHC RBC-ENTMCNC: 31.6 GM/DL — LOW (ref 32–36)
MCV RBC AUTO: 89.1 FL — SIGNIFICANT CHANGE UP (ref 80–100)
MONOCYTES # BLD AUTO: 5.02 K/UL — HIGH (ref 0–0.9)
MONOCYTES NFR BLD AUTO: 3.2 % — SIGNIFICANT CHANGE UP (ref 2–14)
MRSA PCR RESULT.: SIGNIFICANT CHANGE UP
NEUTROPHILS # BLD AUTO: 10.41 K/UL — HIGH (ref 1.8–7.4)
NEUTROPHILS NFR BLD AUTO: 6.4 % — LOW (ref 43–77)
NITRITE UR-MCNC: NEGATIVE — SIGNIFICANT CHANGE UP
NRBC # BLD: 0 /100 WBCS — SIGNIFICANT CHANGE UP (ref 0–0)
PH UR: 5.5 — SIGNIFICANT CHANGE UP (ref 5–8)
PHOSPHATE SERPL-MCNC: 3.7 MG/DL — SIGNIFICANT CHANGE UP (ref 2.5–4.5)
PLATELET # BLD AUTO: 264 K/UL — SIGNIFICANT CHANGE UP (ref 150–400)
POTASSIUM SERPL-MCNC: 4.5 MMOL/L — SIGNIFICANT CHANGE UP (ref 3.5–5.3)
POTASSIUM SERPL-SCNC: 4.5 MMOL/L — SIGNIFICANT CHANGE UP (ref 3.5–5.3)
PROT SERPL-MCNC: 6 G/DL — SIGNIFICANT CHANGE UP (ref 6–8.3)
PROT UR-MCNC: NEGATIVE MG/DL — SIGNIFICANT CHANGE UP
PROTHROM AB SERPL-ACNC: 14.2 SEC — HIGH (ref 9.5–13)
RBC # BLD: 3.02 M/UL — LOW (ref 3.8–5.2)
RBC # FLD: 15.8 % — HIGH (ref 10.3–14.5)
RBC CASTS # UR COMP ASSIST: 2 /HPF — SIGNIFICANT CHANGE UP (ref 0–4)
REVIEW: SIGNIFICANT CHANGE UP
S AUREUS DNA NOSE QL NAA+PROBE: DETECTED
SODIUM SERPL-SCNC: 139 MMOL/L — SIGNIFICANT CHANGE UP (ref 135–145)
SP GR SPEC: 1.01 — SIGNIFICANT CHANGE UP (ref 1–1.03)
SPECIMEN SOURCE: SIGNIFICANT CHANGE UP
SQUAMOUS # UR AUTO: 3 /HPF — SIGNIFICANT CHANGE UP (ref 0–5)
UROBILINOGEN FLD QL: 0.2 MG/DL — SIGNIFICANT CHANGE UP (ref 0.2–1)
WBC # BLD: 158.86 K/UL — CRITICAL HIGH (ref 3.8–10.5)
WBC # FLD AUTO: 158.86 K/UL — CRITICAL HIGH (ref 3.8–10.5)
WBC UR QL: 2 /HPF — SIGNIFICANT CHANGE UP (ref 0–5)

## 2024-04-26 PROCEDURE — 99232 SBSQ HOSP IP/OBS MODERATE 35: CPT

## 2024-04-26 PROCEDURE — 71045 X-RAY EXAM CHEST 1 VIEW: CPT | Mod: 26

## 2024-04-26 PROCEDURE — 99291 CRITICAL CARE FIRST HOUR: CPT | Mod: GC,25

## 2024-04-26 PROCEDURE — 99233 SBSQ HOSP IP/OBS HIGH 50: CPT | Mod: 57

## 2024-04-26 PROCEDURE — 71045 X-RAY EXAM CHEST 1 VIEW: CPT | Mod: 26,77

## 2024-04-26 PROCEDURE — 32562 LYSE CHEST FIBRIN SUBQ DAY: CPT | Mod: GC

## 2024-04-26 RX ORDER — ACETAMINOPHEN 500 MG
1000 TABLET ORAL ONCE
Refills: 0 | Status: COMPLETED | OUTPATIENT
Start: 2024-04-26 | End: 2024-04-26

## 2024-04-26 RX ORDER — ACETAMINOPHEN 500 MG
650 TABLET ORAL ONCE
Refills: 0 | Status: COMPLETED | OUTPATIENT
Start: 2024-04-26 | End: 2024-04-26

## 2024-04-26 RX ORDER — DIPHENHYDRAMINE HCL 50 MG
50 CAPSULE ORAL ONCE
Refills: 0 | Status: COMPLETED | OUTPATIENT
Start: 2024-04-26 | End: 2024-04-26

## 2024-04-26 RX ORDER — DROXIDOPA 100 MG/1
300 CAPSULE ORAL
Refills: 0 | Status: ACTIVE | OUTPATIENT
Start: 2024-04-26 | End: 2025-03-25

## 2024-04-26 RX ORDER — IMMUNE GLOBULIN (HUMAN) 10 G/100ML
35 INJECTION INTRAVENOUS; SUBCUTANEOUS ONCE
Refills: 0 | Status: COMPLETED | OUTPATIENT
Start: 2024-04-26 | End: 2024-04-26

## 2024-04-26 RX ORDER — ENOXAPARIN SODIUM 100 MG/ML
40 INJECTION SUBCUTANEOUS EVERY 12 HOURS
Refills: 0 | Status: DISCONTINUED | OUTPATIENT
Start: 2024-04-26 | End: 2024-04-29

## 2024-04-26 RX ORDER — FUROSEMIDE 40 MG
40 TABLET ORAL ONCE
Refills: 0 | Status: COMPLETED | OUTPATIENT
Start: 2024-04-26 | End: 2024-04-26

## 2024-04-26 RX ORDER — ACETAMINOPHEN 500 MG
650 TABLET ORAL ONCE
Refills: 0 | Status: DISCONTINUED | OUTPATIENT
Start: 2024-04-26 | End: 2024-04-26

## 2024-04-26 RX ADMIN — Medication 40 MILLIGRAM(S): at 11:53

## 2024-04-26 RX ADMIN — FENTANYL CITRATE 50 MICROGRAM(S): 50 INJECTION INTRAVENOUS at 03:37

## 2024-04-26 RX ADMIN — POLYETHYLENE GLYCOL 3350 17 GRAM(S): 17 POWDER, FOR SOLUTION ORAL at 05:23

## 2024-04-26 RX ADMIN — FENTANYL CITRATE 50 MICROGRAM(S): 50 INJECTION INTRAVENOUS at 19:07

## 2024-04-26 RX ADMIN — Medication 3 MILLILITER(S): at 13:03

## 2024-04-26 RX ADMIN — Medication 1000 MILLIGRAM(S): at 19:03

## 2024-04-26 RX ADMIN — FENTANYL CITRATE 50 MICROGRAM(S): 50 INJECTION INTRAVENOUS at 14:50

## 2024-04-26 RX ADMIN — FENTANYL CITRATE 50 MICROGRAM(S): 50 INJECTION INTRAVENOUS at 13:33

## 2024-04-26 RX ADMIN — SENNA PLUS 10 MILLILITER(S): 8.6 TABLET ORAL at 22:23

## 2024-04-26 RX ADMIN — AMPICILLIN SODIUM AND SULBACTAM SODIUM 200 GRAM(S): 250; 125 INJECTION, POWDER, FOR SUSPENSION INTRAMUSCULAR; INTRAVENOUS at 11:54

## 2024-04-26 RX ADMIN — DROXIDOPA 300 MILLIGRAM(S): 100 CAPSULE ORAL at 05:23

## 2024-04-26 RX ADMIN — FENTANYL CITRATE 50 MICROGRAM(S): 50 INJECTION INTRAVENOUS at 19:20

## 2024-04-26 RX ADMIN — DROXIDOPA 300 MILLIGRAM(S): 100 CAPSULE ORAL at 17:07

## 2024-04-26 RX ADMIN — Medication 3 MILLILITER(S): at 21:57

## 2024-04-26 RX ADMIN — CHLORHEXIDINE GLUCONATE 15 MILLILITER(S): 213 SOLUTION TOPICAL at 17:06

## 2024-04-26 RX ADMIN — FENTANYL CITRATE 50 MICROGRAM(S): 50 INJECTION INTRAVENOUS at 01:15

## 2024-04-26 RX ADMIN — SODIUM CHLORIDE 40 MILLILITER(S): 9 INJECTION INTRAMUSCULAR; INTRAVENOUS; SUBCUTANEOUS at 08:24

## 2024-04-26 RX ADMIN — FENTANYL CITRATE 50 MICROGRAM(S): 50 INJECTION INTRAVENOUS at 03:17

## 2024-04-26 RX ADMIN — CHLORHEXIDINE GLUCONATE 15 MILLILITER(S): 213 SOLUTION TOPICAL at 05:23

## 2024-04-26 RX ADMIN — Medication 400 MILLIGRAM(S): at 18:20

## 2024-04-26 RX ADMIN — CHLORHEXIDINE GLUCONATE 1 APPLICATION(S): 213 SOLUTION TOPICAL at 06:05

## 2024-04-26 RX ADMIN — DORNASE ALFA 5 MILLIGRAM(S): 1 SOLUTION RESPIRATORY (INHALATION) at 20:30

## 2024-04-26 RX ADMIN — Medication 400 MILLIGRAM(S): at 13:42

## 2024-04-26 RX ADMIN — DORNASE ALFA 5 MILLIGRAM(S): 1 SOLUTION RESPIRATORY (INHALATION) at 09:07

## 2024-04-26 RX ADMIN — Medication 1: at 00:59

## 2024-04-26 RX ADMIN — Medication 4.16 MICROGRAM(S)/KG/MIN: at 14:21

## 2024-04-26 RX ADMIN — AMPICILLIN SODIUM AND SULBACTAM SODIUM 200 GRAM(S): 250; 125 INJECTION, POWDER, FOR SUSPENSION INTRAMUSCULAR; INTRAVENOUS at 06:05

## 2024-04-26 RX ADMIN — PROPOFOL 6.6 MICROGRAM(S)/KG/MIN: 10 INJECTION, EMULSION INTRAVENOUS at 00:26

## 2024-04-26 RX ADMIN — Medication 1000 MILLIGRAM(S): at 14:25

## 2024-04-26 RX ADMIN — PROPOFOL 6.6 MICROGRAM(S)/KG/MIN: 10 INJECTION, EMULSION INTRAVENOUS at 14:21

## 2024-04-26 RX ADMIN — DROXIDOPA 300 MILLIGRAM(S): 100 CAPSULE ORAL at 14:21

## 2024-04-26 RX ADMIN — Medication 50 MILLIGRAM(S): at 17:02

## 2024-04-26 RX ADMIN — ALTEPLASE 10 MILLIGRAM(S): KIT at 08:24

## 2024-04-26 RX ADMIN — PROPOFOL 6.6 MICROGRAM(S)/KG/MIN: 10 INJECTION, EMULSION INTRAVENOUS at 05:23

## 2024-04-26 RX ADMIN — POLYETHYLENE GLYCOL 3350 17 GRAM(S): 17 POWDER, FOR SOLUTION ORAL at 17:07

## 2024-04-26 RX ADMIN — SODIUM CHLORIDE 4 MILLILITER(S): 9 INJECTION INTRAMUSCULAR; INTRAVENOUS; SUBCUTANEOUS at 05:16

## 2024-04-26 RX ADMIN — ENOXAPARIN SODIUM 40 MILLIGRAM(S): 100 INJECTION SUBCUTANEOUS at 17:06

## 2024-04-26 RX ADMIN — SODIUM CHLORIDE 40 MILLILITER(S): 9 INJECTION INTRAMUSCULAR; INTRAVENOUS; SUBCUTANEOUS at 20:30

## 2024-04-26 RX ADMIN — Medication 3 MILLILITER(S): at 05:16

## 2024-04-26 RX ADMIN — ALTEPLASE 10 MILLIGRAM(S): KIT at 20:30

## 2024-04-26 RX ADMIN — SODIUM CHLORIDE 4 MILLILITER(S): 9 INJECTION INTRAMUSCULAR; INTRAVENOUS; SUBCUTANEOUS at 21:57

## 2024-04-26 RX ADMIN — AMPICILLIN SODIUM AND SULBACTAM SODIUM 200 GRAM(S): 250; 125 INJECTION, POWDER, FOR SUSPENSION INTRAMUSCULAR; INTRAVENOUS at 00:59

## 2024-04-26 RX ADMIN — IMMUNE GLOBULIN (HUMAN) 58.33 GRAM(S): 10 INJECTION INTRAVENOUS; SUBCUTANEOUS at 17:07

## 2024-04-26 RX ADMIN — FENTANYL CITRATE 50 MICROGRAM(S): 50 INJECTION INTRAVENOUS at 01:30

## 2024-04-26 RX ADMIN — AMPICILLIN SODIUM AND SULBACTAM SODIUM 200 GRAM(S): 250; 125 INJECTION, POWDER, FOR SUSPENSION INTRAMUSCULAR; INTRAVENOUS at 17:08

## 2024-04-26 RX ADMIN — SODIUM CHLORIDE 4 MILLILITER(S): 9 INJECTION INTRAMUSCULAR; INTRAVENOUS; SUBCUTANEOUS at 13:05

## 2024-04-26 NOTE — PROGRESS NOTE ADULT - ASSESSMENT
69F h/o CVA (bedbound at baseline), COPD, CHF, HTN presenting as transfer from [facility] for SOB iso leukocytosis to 233 c/f acute leukemia. Pt intubated and on pressors, transferred to MICU for further management and possible leukophoresis.    Neuro  #Encephalopathy; currently intubated and sedated on Propofol   -p/w lethargy likely sepsis; Per Heme, leukostasis unlikely as cells mature  -CTH showing old infarct, no acute process  - off Precedex, on propofol, following commands, aim for RASS score -1 to 0    CV  #Septic Shock   - Strep Pneumo Bacteremia   - Back on Levophed to wean off levo while on Droxidopa 300 TID     #Chest Pain  -demand vs leukostasis vs non cardiac   -reported JOSEF in II, III, aVF at [facility] --> repeat EKG without ST changes or Q waves  -trops peaked    #CHF  -unclear hx but elevated pro-BNP to 2600s  -TTE: EF 54%, no significant abnormalities; CTM    Resp  #Acute hypoxic respiratory failure  #Empyema Pansensitive Strep Pneumo  -intubated: 460/14/30/5; wean off as tolerated w/ daily CPAP trial  -s/p thora draining 1500cc fluid 4/16 with chest tube placement; c/w exudative effusion growing Strep Pneumo  -MRSA neg  -pleural fluid growing strep pneumo; BCx growing strep pneumo; Pansensitive  -was on vanc/zosyn/azithro (4/16)-->deescalated to CTX 2g qd (4/17 - ); c/w total 14-day course of Abx therapy   -CT chest 4/22 reveals persistent loculated effusion, s/p IR Pigtail placement 4/24 w/ Exudative effusion; Alteplase and Dornase through pigtal x6 doses   - CPAP Trial daily    GI  #Diet: TF    /Renal  -no active issues    Heme/Onc  #Leukocytosis  #CLL  - on presentation; improving to 130s  -no plan for leukophoresis at this time given mature lymphocytes  -stop trending TLS labs as they have been stable   - CT Chest A&P showing diffuse axillary, inguinal mediastinal RP lymphadenopathy.  - Appreciate Heme recs     #L Common Femoral Vein DVT   - Non-occlusive   - Heparin Drip     #DVT ppx: Heparin Drip     ID  #Empyema 2/2 Pansensitive Strep Pneumo  #Strep Pneumo Bacteremia  -BCx + strep pneumo; fluid culture with strep pneumo  -repeat BCx 4/20 ngtd, ReCx 4/23 due to fever   -CT Chest A&P demonstrates LLL empyema not showing any other intraabdominal infectious source  -was on vanc/zosyn/azithro (4/16)-->deescalated to CTX (4/17 -4/23), Added on Vancomycin (4/23-) due to MRSE 1/2 Cx, expanded coverage to Cefepime (4/23 1x dose), back on CTX, switched to Unasyn 3g Q6 (4/25- )  - Fever curve trending down, but persistently febrile. If persistent fevers, MARIO ALBERTO to eval for Endocarditis   - ID Consulted for persistent fevers despite source control; appreciate recs     #Positive MRSE BCx 1 of 2; contaminant   - Repeat Cx negative  - Vancomycin (4/16, 4/23-4/25)    Endo  -ISS q6h while NPO    Ethics: Full Code  Only known family member is Aunt Mili Gordon ([phone], [phone]) who lives in [place]   69F h/o CVA (bedbound at baseline), COPD, CHF, HTN presenting as transfer from [facility] for SOB iso leukocytosis to 233 c/f acute leukemia. Pt intubated and on pressors, transferred to MICU for further management and possible leukophoresis.    Neuro  #Encephalopathy; currently intubated and sedated on Propofol   -p/w lethargy likely sepsis; Per Heme, leukostasis unlikely as cells mature  -CTH showing old infarct, no acute process  - off Precedex, on propofol, following commands, aim for RASS score -1 to 0    CV  #Septic Shock   - Strep Pneumo Bacteremia   - Back on Levophed to wean off levo while on Droxidopa 300 TID     #Chest Pain  -demand vs leukostasis vs non cardiac   -reported JOSEF in II, III, aVF at [facility] --> repeat EKG without ST changes or Q waves  -trops peaked    #CHF  -unclear hx but elevated pro-BNP to 2600s  -TTE: EF 54%, no significant abnormalities; CTM    Resp  #Acute hypoxic respiratory failure  #Empyema Pansensitive Strep Pneumo  -intubated: 460/14/30/5; wean off as tolerated w/ daily CPAP trial  -s/p thora draining 1500cc fluid 4/16 with chest tube placement; c/w exudative effusion growing Strep Pneumo  -MRSA neg  -pleural fluid growing strep pneumo; BCx growing strep pneumo; Pansensitive  -was on vanc/zosyn/azithro (4/16)-->deescalated to CTX 2g qd (4/17 - ); c/w total 14-day course of Abx therapy   -CT chest 4/22 reveals persistent loculated effusion, s/p IR Pigtail placement 4/24 w/ Exudative effusion; Alteplase and Dornase through pigtal x6 doses   - CPAP Trial daily  - IV Lasix 40 --> goal -500 to -1000cc net negative     GI  #Diet: TF    /Renal  -no active issues    Heme/Onc  #Leukocytosis  #CLL  - on presentation; improving to 130s  -no plan for leukophoresis at this time given mature lymphocytes  -stop trending TLS labs as they have been stable   - CT Chest A&P showing diffuse axillary, inguinal mediastinal RP lymphadenopathy.  - Appreciate Heme recs     #L Common Femoral Vein DVT   - Non-occlusive   - Prophylactic Lovenox until back on Heparin drip after Alteplase Dornase     #DVT ppx: Prophylactic lovenox until back on heparin drip     ID  #Empyema 2/2 Pansensitive Strep Pneumo  #Strep Pneumo Bacteremia  -BCx + strep pneumo; fluid culture with strep pneumo  -repeat BCx 4/20 ngtd, ReCx 4/23 due to fever   -CT Chest A&P demonstrates LLL empyema not showing any other intraabdominal infectious source  -was on vanc/zosyn/azithro (4/16)-->deescalated to CTX (4/17 -4/23), Added on Vancomycin (4/23-) due to MRSE 1/2 Cx, expanded coverage to Cefepime (4/23 1x dose), back on CTX, switched to Unasyn 3g Q6 (4/25- )  - Fever curve trending down, but persistently febrile. If persistent fevers, MARIO ALBERTO to eval for Endocarditis   - ID Consulted for persistent fevers despite source control; appreciate recs   - IVIG 4/26     #Positive MRSE BCx 1 of 2; contaminant   - Repeat Cx negative  - Vancomycin (4/16, 4/23-4/25)    Endo  -ISS q6h while NPO    Ethics: Full Code  Only known family member is Aunt Mili Gordon ([phone], [phone]) who lives in [place]

## 2024-04-26 NOTE — PROGRESS NOTE ADULT - ASSESSMENT
69-year-old female with a past medical history of CVA, bedbound at baseline, COPD, hypertension, CLL who initially had presented to an outside hospital due to acute shortness of breath.  Patient was found to have pulmonary edema and was managed with diuretics and BiPAP.  Patient also found to be febrile to 103 Fahrenheit, found to have pneumonia therefore started on antibiotics for this.  Labs were notable for a leukocytosis of 233 in setting of CLL and then was transferred to [facility] for leukopheresis.  Upon arrival to [place], intubated due to concern for respiratory compromise as well as worsening AMS.  Patient was started on levofloxacin due to hypotension.    Hospitalization course includes chest tube drainage.  Blood and pleural fluid cultures are positive growing Streptococcus pneumonia A.  Chest tube was removed on 4/19/2024 however patient remains febrile with a left-sided loculated effusion therefore left pigtail catheter was placed on 4/23/2024.  Patient's antibiotics were switched from ceftriaxone to cefepime and now is requiring pressors again.  Blood cultures were repeated on 4/22/2024 with Staphylococcus epidermidis growth, repeat pleural fluid cultures obtained on 4/23/2024 are negative to date, repeat blood cultures from 4/24/2024 are negative as well.    #Streptococcal bacteremia, likely source empyema  #Abnormal imaging of the lungs  #Streptococcal pneumonia and empyema  #Acute hypoxic respiratory failure  #CLL  #LLE DVT    Recommendations  Patient continues to have persistent fevers, unclear if uncontrolled source exists within chest, would monitor with imaging and ensuring chest tube is in place appropriately  Continue ampicillin/sulbactam  CT surgery eval  DVT can cause fevers as  well - management per primary team  Follow fever curve and WBC count    Abimael Jain MD  Division of Infectious Diseases 69-year-old female with a past medical history of CVA, bedbound at baseline, COPD, hypertension, CLL who initially had presented to an outside hospital due to acute shortness of breath.  Patient was found to have pulmonary edema and was managed with diuretics and BiPAP.  Patient also found to be febrile to 103 Fahrenheit, found to have pneumonia therefore started on antibiotics for this.  Labs were notable for a leukocytosis of 233 in setting of CLL and then was transferred to [facility] for leukopheresis.  Upon arrival to [place], intubated due to concern for respiratory compromise as well as worsening AMS.  Patient was started on levofloxacin due to hypotension.    Hospitalization course includes chest tube drainage.  Blood and pleural fluid cultures are positive growing Streptococcus pneumonia A.  Chest tube was removed on 4/19/2024 however patient remains febrile with a left-sided loculated effusion therefore left pigtail catheter was placed on 4/23/2024.  Patient's antibiotics were switched from ceftriaxone to cefepime and now is requiring pressors again.  Blood cultures were repeated on 4/22/2024 with Staphylococcus epidermidis growth, repeat pleural fluid cultures obtained on 4/23/2024 are negative to date, repeat blood cultures from 4/24/2024 are negative as well.    #Streptococcal bacteremia, likely source empyema  #Abnormal imaging of the lungs  #Streptococcal pneumonia and empyema  #Acute hypoxic respiratory failure  #CLL  #LLE DVT    Recommendations  Patient continues to have persistent fevers, unclear if uncontrolled source exists within chest, would monitor with imaging and ensuring chest tube is in place appropriately  Continue ampicillin/sulbactam  CT surgery eval - chest tube managment per primary team  DVT can cause fevers as  well - management per primary team  Follow fever curve and WBC count    Abimael Jain MD  Division of Infectious Diseases

## 2024-04-26 NOTE — PROGRESS NOTE ADULT - ATTENDING COMMENTS
69 year old female with a history of CVA (bedbound at baseline) COPD, CHF, HTN presented initially as a transfer with hyperleukocytosis with concerning for acute leukemia (no blasts, more consistent with CLL and leukamoid reaction in the setting of acute infection) and acute hypoxic respiratory failure eventually found to have strep pneumonia bacteremia, pneumonia c/b empyema s/p chest tube (4/16-4/19), c/b persistent fevers, CT replaced (4/23/24) currently undergoing intrapleural fibrinolytic therapy    Lines:   PIV  Chest tube 4/23/24  Intubated 4/16/24    N: Bedbound at baseline  History of CVA, with left sided residual deficit  - Propofol, RASS 0  - Fentanyl for pain  - Delirium precautions  - Daily SAT  CV:   Septic shock, vasoplegia  Sedation related hypotension  TTE negative for vegetation  Elevated pro-BNP >2k, with evidence of volume overload on exam  - On low dose levophed  - Droxidopa  - MAP >65  - Trial of diuresis, net negative 500 to 1 L  P: Acute hypoxic respiratory failure  Strep pneumonia  Empyema s/p chest tube (4/16-14/19)- did not do intrapleural fibrinolytic therapy; s/p CT 4/23/24 with IR- will plan for intrapleural fibrolytic therapy  Intubated 4/16  - Need to start discussion of tracheostomy as continues to fail SBT due to weakness, infection, volume  - Diuresis as above  - No indication for surgical management of pleural space at this time. S/P CT placement with IR, will plan to start 6 doses of intrapleural fibrinolytic therapy  - Continue to monitor fluid with bedside ultrasound  - Lung protective settings  - Failing SBT 2/2 tachypnea, daily SBT  - Trend BG  - Suctioning, duonebs PRN, oral care  GI:   - Tube feeds  Renal:   - Trend cr, lytes  Heme onc:   CLL  Leukamoid reaction  Nonocclusive left common femoral vein DVT  - Appreciate heme recs  - Started on Heparin drip, held around instillation of lytics due to hemorrhage risk; will continue with BID weight based lovenox until completes lytic therapy  ID: Pneumonia, empyema s/p chest tube without lytics, persistent fevers and fluid. Now s/p repeat CT placement 4/23/24, will plan for intrapleural fibrinolytic therapy. SHe is 2/6 doses.   Strep bacteremia  Persistently febrile, ? if related to uncontrolled infection v. thrombus  - Day 8 CTX--> broadened to Unasyn per ID  - Management of pleural space for source control as above  - Appreciate ID consult.   Endo: Avoid hypoglycemia,   BG <210    DVT: A/C as above  Full code  HCP- Presumably Aunt (only known family member)

## 2024-04-26 NOTE — PROGRESS NOTE ADULT - SUBJECTIVE AND OBJECTIVE BOX
Subjective:   Sedated intubated      V/S  T(C): 37.5 (04-26-24 @ 08:00), Max: 38.7 (04-25-24 @ 18:00)  HR: 97 (04-26-24 @ 08:45) (75 - 112)  BP: 108/54 (04-26-24 @ 08:45) (81/43 - 153/102)  RR: 20 (04-26-24 @ 08:45) (14 - 38)  SpO2: 99% (04-26-24 @ 08:45) (95% - 100%)    CHEST TUBES:  Left CT     [ x ]LWS  [  ]H2O seal    Mode: CPAP with PS  FiO2: 30  PEEP: 5  PS: 15  MAP: 10    I&O's Detail    25 Apr 2024 07:01  -  26 Apr 2024 07:00  --------------------------------------------------------  IN:    Enteral Tube Flush: 160 mL    Heparin: 14 mL    Heparin Infusion: 96 mL    IV PiggyBack: 350 mL    Lactated Ringers: 600 mL    Norepinephrine: 245.9 mL    Propofol: 589.4 mL    Vital High Protein: 1330 mL  Total IN: 3385.3 mL    OUT:    Chest Tube (mL): 550 mL    Voided (mL): 950 mL  Total OUT: 1500 mL    Total NET: 1885.3 mL      26 Apr 2024 07:01  -  26 Apr 2024 11:49  --------------------------------------------------------  IN:    Norepinephrine: 33.4 mL    Propofol: 46.6 mL  Total IN: 80 mL    OUT:  Total OUT: 0 mL    Total NET: 80 mL          04-25-24 @ 07:01  -  04-26-24 @ 07:00  --------------------------------------------------------  IN: 3385.3 mL / OUT: 1500 mL / NET: 1885.3 mL    04-26-24 @ 07:01  -  04-26-24 @ 11:49  --------------------------------------------------------  IN: 80 mL / OUT: 0 mL / NET: 80 mL      MEDICATIONS  (STANDING):  albuterol/ipratropium for Nebulization 3 milliLiter(s) Nebulizer every 8 hours  alteplase  Injectable for Pleural Effusion 10 milliGRAM(s) IntraPleural. every 12 hours  ampicillin/sulbactam  IVPB 3 Gram(s) IV Intermittent every 6 hours  ampicillin/sulbactam  IVPB      chlorhexidine 0.12% Liquid 15 milliLiter(s) Oral Mucosa every 12 hours  chlorhexidine 2% Cloths 1 Application(s) Topical <User Schedule>  dornase rosemary Solution for Pleural Effusion 5 milliGRAM(s) IntraPleural. every 12 hours  enoxaparin Injectable 40 milliGRAM(s) SubCutaneous every 12 hours  furosemide   Injectable 40 milliGRAM(s) IV Push once  insulin lispro (ADMELOG) corrective regimen sliding scale   SubCutaneous every 6 hours  norepinephrine Infusion 0.02 MICROgram(s)/kG/Min (4.16 mL/Hr) IV Continuous <Continuous>  polyethylene glycol 3350 17 Gram(s) Oral every 12 hours  propofol Infusion 9.91 MICROgram(s)/kG/Min (6.6 mL/Hr) IV Continuous <Continuous>  senna Syrup 10 milliLiter(s) Oral at bedtime  sodium chloride 0.9% Solution for Pleural Effusion 40 milliLiter(s) IntraPleural. every 12 hours  sodium chloride 3%  Inhalation 4 milliLiter(s) Inhalation every 8 hours      04-26    139  |  105  |  12  ----------------------------<  170<H>  4.5   |  25  |  <0.30<L>    Ca    8.3<L>      26 Apr 2024 00:11  Phos  3.7     04-26  Mg     2.6     04-26    TPro  6.0  /  Alb  2.6<L>  /  TBili  0.3  /  DBili  x   /  AST  27  /  ALT  33  /  AlkPhos  76  04-26                               8.5    158.86 )-----------( 264      ( 26 Apr 2024 00:11 )             26.9        PT/INR - ( 26 Apr 2024 00:11 )   PT: 14.2 sec;   INR: 1.30 ratio         PTT - ( 26 Apr 2024 00:11 )  PTT:27.6 sec         CAPILLARY BLOOD GLUCOSE      POCT Blood Glucose.: 140 mg/dL (26 Apr 2024 06:07)  POCT Blood Glucose.: 171 mg/dL (26 Apr 2024 00:14)  POCT Blood Glucose.: 124 mg/dL (25 Apr 2024 16:56)           CXR:      Physical Exam:      GENERAL: NAD,  intubated and sedated on propofol;  HEART: Regular rate and rhythm, no murmurs, rubs, or gallops  LUNGS: Unlabored respirations, triggering own respirations intermittently,   diminished lungs L pigtail in place>serosang fluid w/ stranding noted  ABDOMEN: Soft,obese abdomen  nontender, nondistended, +BS  EXTREMITIES Warm well perfused  No clubbing, cyanosis, or edema  NERVOUS SYSTEM:  Intubated sedated on Propofol, opens eyes and follows commands               PAST MEDICAL & SURGICAL HISTORY:  Acute CHF      COPD, severe

## 2024-04-26 NOTE — PROGRESS NOTE ADULT - SUBJECTIVE AND OBJECTIVE BOX
Follow Up:  fever    Interval History/ROS:  Overnight: No acute events.  Patient continues to be febrile with a Tmax 101.7 Fahrenheit past 24 hours.  Latest labs show WC count of 158, anemia 8.5/26.9, BMP with renal function within normal limits, hepatic function within normal limits.  Latest blood cultures 4/24/2024 are negative to date.  Doppler ultrasound of the left lower extremity shows nonocclusive left common femoral DVT.  Patient also noted to have abnormal bilateral inguinal lymphadenopathy.    Patient seen examined at bedside.  Unable to obtain reliable ROS.  Allergies  No Known Allergies        ANTIMICROBIALS:  ampicillin/sulbactam  IVPB    ampicillin/sulbactam  IVPB 3 every 6 hours      OTHER MEDS:  MEDICATIONS  (STANDING):  albuterol/ipratropium for Nebulization 3 every 8 hours  alteplase  Injectable for Pleural Effusion 10 every 12 hours  dornase rosemary Solution for Pleural Effusion 5 every 12 hours  enoxaparin Injectable 40 every 12 hours  fentaNYL    Injectable 50 every 2 hours PRN  insulin lispro (ADMELOG) corrective regimen sliding scale  every 6 hours  norepinephrine Infusion 0.02 <Continuous>  polyethylene glycol 3350 17 every 12 hours  propofol Infusion 9.91 <Continuous>  senna Syrup 10 at bedtime  sodium chloride 3%  Inhalation 4 every 8 hours      Vital Signs Last 24 Hrs  T(C): 37.5 (26 Apr 2024 08:00), Max: 38.7 (25 Apr 2024 18:00)  T(F): 99.5 (26 Apr 2024 08:00), Max: 101.7 (25 Apr 2024 18:00)  HR: 105 (26 Apr 2024 12:00) (75 - 112)  BP: 100/53 (26 Apr 2024 12:00) (68/52 - 153/102)  BP(mean): 73 (26 Apr 2024 12:00) (57 - 121)  RR: 34 (26 Apr 2024 12:00) (14 - 38)  SpO2: 96% (26 Apr 2024 12:00) (95% - 100%)    Parameters below as of 26 Apr 2024 08:00  Patient On (Oxygen Delivery Method): ventilator    O2 Concentration (%): 30    PHYSICAL EXAM:  General: Patient in NAD, Intubated,   HEENT: NCAT, EOMI, PERRL, no oral lesions  CV: S1+S2, no m/r/g appreciated   Lungs: On MV, Left chest tube  Abd: Soft, nontender, no guarding, no rebound tenderness, + bowel sounds   : No suprapubic tenderness  Neuro: Awake, on propofol, following commands, left sided weakness  Ext: No cyanosis, Left extremities dependent edema  Skin: No rash, no phlebitis                            8.5    158.86 )-----------( 264      ( 26 Apr 2024 00:11 )             26.9       04-26    139  |  105  |  12  ----------------------------<  170<H>  4.5   |  25  |  <0.30<L>    Ca    8.3<L>      26 Apr 2024 00:11  Phos  3.7     04-26  Mg     2.6     04-26    TPro  6.0  /  Alb  2.6<L>  /  TBili  0.3  /  DBili  x   /  AST  27  /  ALT  33  /  AlkPhos  76  04-26      Urinalysis Basic - ( 26 Apr 2024 00:11 )    Color: x / Appearance: x / SG: x / pH: x  Gluc: 170 mg/dL / Ketone: x  / Bili: x / Urobili: x   Blood: x / Protein: x / Nitrite: x   Leuk Esterase: x / RBC: x / WBC x   Sq Epi: x / Non Sq Epi: x / Bacteria: x        MICROBIOLOGY:  v    Culture - Blood (collected 24 Apr 2024 05:53)  Source: .Blood Blood  Preliminary Report (26 Apr 2024 10:01):    No growth at 48 Hours    Culture - Blood (collected 24 Apr 2024 05:53)  Source: .Blood Blood  Preliminary Report (26 Apr 2024 10:01):    No growth at 48 Hours    Culture - Body Fluid with Gram Stain (collected 23 Apr 2024 17:46)  Source: Pleural Fl Pleural Fluid  Gram Stain (24 Apr 2024 06:00):    polymorphonuclear leukocytes seen    No organisms seen    by cytocentrifuge  Preliminary Report (24 Apr 2024 16:51):    No growth to date.    Culture - Fungal, Body Fluid (collected 23 Apr 2024 17:46)  Source: Pleural Fl Pleural Fluid  Preliminary Report (25 Apr 2024 11:45):    Testing in progress    Culture - Blood (collected 22 Apr 2024 21:37)  Source: .Blood Blood-Peripheral  Gram Stain (24 Apr 2024 00:09):    Growth in aerobic bottle: Gram Positive Cocci in Clusters  Final Report (24 Apr 2024 23:16):    Growth in aerobic bottle: Staphylococcus epidermidis    Isolation of Coagulase negative Staphylococcus from single blood culture    sets may represent    contamination. Contact the Microbiology Department at [phone] if    susceptibility testing is    clinically indicated.    Direct identification is available within approximately 3-5    hours either by Blood Panel Multiplexed PCR or Direct    MALDI-TOF. Details: [email or web address]  Organism: Blood Culture PCR (24 Apr 2024 23:16)  Organism: Blood Culture PCR (24 Apr 2024 23:16)      -  Staphylococcus epidermidis, Methicillin resistant: Detec      Method Type: PCR    Culture - Blood (collected 22 Apr 2024 19:30)  Source: .Blood Blood-Peripheral  Preliminary Report (26 Apr 2024 01:01):    No growth at 72 Hours                    RADIOLOGY:   Follow Up:  fever    Interval History/ROS:  Overnight: No acute events.  Patient continues to be febrile with a Tmax 101.7 Fahrenheit past 24 hours.  Latest labs show WC count of 158, anemia 8.5/26.9, BMP with renal function within normal limits, hepatic function within normal limits.  Latest blood cultures 4/24/2024 are negative to date.  Doppler ultrasound of the left lower extremity shows nonocclusive left common femoral DVT.  Patient also noted to have abnormal bilateral inguinal lymphadenopathy.    Patient seen examined at bedside.  Unable to obtain reliable ROS.  Allergies  No Known Allergies        ANTIMICROBIALS:  ampicillin/sulbactam  IVPB    ampicillin/sulbactam  IVPB 3 every 6 hours      OTHER MEDS:  MEDICATIONS  (STANDING):  albuterol/ipratropium for Nebulization 3 every 8 hours  alteplase  Injectable for Pleural Effusion 10 every 12 hours  dornase rosemary Solution for Pleural Effusion 5 every 12 hours  enoxaparin Injectable 40 every 12 hours  fentaNYL    Injectable 50 every 2 hours PRN  insulin lispro (ADMELOG) corrective regimen sliding scale  every 6 hours  norepinephrine Infusion 0.02 <Continuous>  polyethylene glycol 3350 17 every 12 hours  propofol Infusion 9.91 <Continuous>  senna Syrup 10 at bedtime  sodium chloride 3%  Inhalation 4 every 8 hours      Vital Signs Last 24 Hrs  T(C): 37.5 (26 Apr 2024 08:00), Max: 38.7 (25 Apr 2024 18:00)  T(F): 99.5 (26 Apr 2024 08:00), Max: 101.7 (25 Apr 2024 18:00)  HR: 105 (26 Apr 2024 12:00) (75 - 112)  BP: 100/53 (26 Apr 2024 12:00) (68/52 - 153/102)  BP(mean): 73 (26 Apr 2024 12:00) (57 - 121)  RR: 34 (26 Apr 2024 12:00) (14 - 38)  SpO2: 96% (26 Apr 2024 12:00) (95% - 100%)    Parameters below as of 26 Apr 2024 08:00  Patient On (Oxygen Delivery Method): ventilator    O2 Concentration (%): 30    PHYSICAL EXAM:  General: Patient in NAD, Intubated,   HEENT: NCAT, EOMI, PERRL, no oral lesions  CV: S1+S2, no m/r/g appreciated   Lungs: On MV, Left chest tube  Abd: Soft, nontender, no guarding, no rebound tenderness, + bowel sounds   : No suprapubic tenderness  Neuro: Awake, on propofol, following commands, left sided weakness  Ext: No cyanosis, Left extremities dependent edema  Skin: No rash, no phlebitis                            8.5    158.86 )-----------( 264      ( 26 Apr 2024 00:11 )             26.9       04-26    139  |  105  |  12  ----------------------------<  170<H>  4.5   |  25  |  <0.30<L>    Ca    8.3<L>      26 Apr 2024 00:11  Phos  3.7     04-26  Mg     2.6     04-26    TPro  6.0  /  Alb  2.6<L>  /  TBili  0.3  /  DBili  x   /  AST  27  /  ALT  33  /  AlkPhos  76  04-26      Urinalysis Basic - ( 26 Apr 2024 00:11 )    Color: x / Appearance: x / SG: x / pH: x  Gluc: 170 mg/dL / Ketone: x  / Bili: x / Urobili: x   Blood: x / Protein: x / Nitrite: x   Leuk Esterase: x / RBC: x / WBC x   Sq Epi: x / Non Sq Epi: x / Bacteria: x        MICROBIOLOGY:  v    Culture - Blood (collected 24 Apr 2024 05:53)  Source: .Blood Blood  Preliminary Report (26 Apr 2024 10:01):    No growth at 48 Hours    Culture - Blood (collected 24 Apr 2024 05:53)  Source: .Blood Blood  Preliminary Report (26 Apr 2024 10:01):    No growth at 48 Hours    Culture - Body Fluid with Gram Stain (collected 23 Apr 2024 17:46)  Source: Pleural Fl Pleural Fluid  Gram Stain (24 Apr 2024 06:00):    polymorphonuclear leukocytes seen    No organisms seen    by cytocentrifuge  Preliminary Report (24 Apr 2024 16:51):    No growth to date.    Culture - Fungal, Body Fluid (collected 23 Apr 2024 17:46)  Source: Pleural Fl Pleural Fluid  Preliminary Report (25 Apr 2024 11:45):    Testing in progress    Culture - Blood (collected 22 Apr 2024 21:37)  Source: .Blood Blood-Peripheral  Gram Stain (24 Apr 2024 00:09):    Growth in aerobic bottle: Gram Positive Cocci in Clusters  Final Report (24 Apr 2024 23:16):    Growth in aerobic bottle: Staphylococcus epidermidis    Isolation of Coagulase negative Staphylococcus from single blood culture    sets may represent    contamination. Contact the Microbiology Department at [phone] if    susceptibility testing is    clinically indicated.    Direct identification is available within approximately 3-5    hours either by Blood Panel Multiplexed PCR or Direct    MALDI-TOF. Details: [email or web address]  Organism: Blood Culture PCR (24 Apr 2024 23:16)  Organism: Blood Culture PCR (24 Apr 2024 23:16)      -  Staphylococcus epidermidis, Methicillin resistant: Detec      Method Type: PCR    Culture - Blood (collected 22 Apr 2024 19:30)  Source: .Blood Blood-Peripheral  Preliminary Report (26 Apr 2024 01:01):    No growth at 72 Hours                    RADIOLOGY:  Imaging reviewed

## 2024-04-26 NOTE — PROGRESS NOTE ADULT - SUBJECTIVE AND OBJECTIVE BOX
INTERVAL HPI/OVERNIGHT EVENTS:    SUBJECTIVE: Patient seen and examined at bedside.     ROS: All negative except as listed above.    VITAL SIGNS:  ICU Vital Signs Last 24 Hrs  T(C): 37.9 (26 Apr 2024 04:00), Max: 38.7 (25 Apr 2024 18:00)  T(F): 100.2 (26 Apr 2024 04:00), Max: 101.7 (25 Apr 2024 18:00)  HR: 81 (26 Apr 2024 06:45) (75 - 112)  BP: 107/55 (26 Apr 2024 06:45) (79/44 - 153/102)  BP(mean): 75 (26 Apr 2024 06:45) (56 - 121)  ABP: --  ABP(mean): --  RR: 16 (26 Apr 2024 06:45) (14 - 38)  SpO2: 99% (26 Apr 2024 06:45) (96% - 100%)    O2 Parameters below as of 26 Apr 2024 05:41  Patient On (Oxygen Delivery Method): ventilator          Mode: AC/ CMV (Assist Control/ Continuous Mandatory Ventilation), RR (machine): 14, TV (machine): 460, FiO2: 30, PEEP: 5, ITime: 1, MAP: 10, PIP: 30  Plateau pressure:   P/F ratio:     04-25 @ 07:01  -  04-26 @ 07:00  --------------------------------------------------------  IN: 3345.3 mL / OUT: 1500 mL / NET: 1845.3 mL      CAPILLARY BLOOD GLUCOSE      POCT Blood Glucose.: 140 mg/dL (26 Apr 2024 06:07)      ECG: reviewed.    PHYSICAL EXAM:    GENERAL: NAD, lying in bed comfortably  HEAD:  Atraumatic, normocephalic  EYES: EOMI, PERRLA, conjunctiva and sclera clear  NECK: Supple, trachea midline, no JVD  HEART: Regular rate and rhythm, no murmurs, rubs, or gallops  LUNGS: Unlabored respirations.  Clear to auscultation bilaterally, no crackles, wheezing, or rhonchi  ABDOMEN: Soft, nontender, nondistended, +BS  EXTREMITIES: 2+ peripheral pulses bilaterally, cap refill<2 secs. No clubbing, cyanosis, or edema  NERVOUS SYSTEM:  A&Ox3, following commands, moving all extremities, no focal deficits   SKIN: No rashes or lesions    MEDICATIONS:  MEDICATIONS  (STANDING):  albuterol/ipratropium for Nebulization 3 milliLiter(s) Nebulizer every 8 hours  alteplase  Injectable for Pleural Effusion 10 milliGRAM(s) IntraPleural. every 12 hours  ampicillin/sulbactam  IVPB 3 Gram(s) IV Intermittent every 6 hours  ampicillin/sulbactam  IVPB      chlorhexidine 0.12% Liquid 15 milliLiter(s) Oral Mucosa every 12 hours  chlorhexidine 2% Cloths 1 Application(s) Topical <User Schedule>  dornase rosemary Solution for Pleural Effusion 5 milliGRAM(s) IntraPleural. every 12 hours  droxidopa 300 milliGRAM(s) Oral every 8 hours  insulin lispro (ADMELOG) corrective regimen sliding scale   SubCutaneous every 6 hours  norepinephrine Infusion 0.02 MICROgram(s)/kG/Min (4.16 mL/Hr) IV Continuous <Continuous>  polyethylene glycol 3350 17 Gram(s) Oral every 12 hours  propofol Infusion 9.91 MICROgram(s)/kG/Min (6.6 mL/Hr) IV Continuous <Continuous>  senna Syrup 10 milliLiter(s) Oral at bedtime  sodium chloride 0.9% Solution for Pleural Effusion 40 milliLiter(s) IntraPleural. every 12 hours  sodium chloride 3%  Inhalation 4 milliLiter(s) Inhalation every 8 hours    MEDICATIONS  (PRN):  fentaNYL    Injectable 50 MICROGram(s) IV Push every 2 hours PRN Moderate Pain (4 - 6)  nystatin Powder 1 Application(s) Topical two times a day PRN skin irritation      ALLERGIES:  Allergies    No Known Allergies    Intolerances        LABS:                        8.5    158.86 )-----------( 264      ( 26 Apr 2024 00:11 )             26.9     04-26    139  |  105  |  12  ----------------------------<  170<H>  4.5   |  25  |  <0.30<L>    Ca    8.3<L>      26 Apr 2024 00:11  Phos  3.7     04-26  Mg     2.6     04-26    TPro  6.0  /  Alb  2.6<L>  /  TBili  0.3  /  DBili  x   /  AST  27  /  ALT  33  /  AlkPhos  76  04-26    PT/INR - ( 26 Apr 2024 00:11 )   PT: 14.2 sec;   INR: 1.30 ratio         PTT - ( 26 Apr 2024 00:11 )  PTT:27.6 sec  Urinalysis Basic - ( 26 Apr 2024 00:11 )    Color: x / Appearance: x / SG: x / pH: x  Gluc: 170 mg/dL / Ketone: x  / Bili: x / Urobili: x   Blood: x / Protein: x / Nitrite: x   Leuk Esterase: x / RBC: x / WBC x   Sq Epi: x / Non Sq Epi: x / Bacteria: x      ABG:  pH, Arterial: 7.39 (04-26-24 @ 00:07)  pCO2, Arterial: 48 mmHg (04-26-24 @ 00:07)  pO2, Arterial: 103 mmHg (04-26-24 @ 00:07)      vBG:    Micro:    Culture - Blood (collected 04-24-24 @ 05:53)  Source: .Blood Blood  Preliminary Report (04-25-24 @ 10:02):    No growth at 24 hours    Culture - Blood (collected 04-24-24 @ 05:53)  Source: .Blood Blood  Preliminary Report (04-25-24 @ 11:02):    No growth at 24 hours    Culture - Blood (collected 04-22-24 @ 21:37)  Source: .Blood Blood-Peripheral  Gram Stain (04-24-24 @ 00:09):    Growth in aerobic bottle: Gram Positive Cocci in Clusters  Final Report (04-24-24 @ 23:16):    Growth in aerobic bottle: Staphylococcus epidermidis    Isolation of Coagulase negative Staphylococcus from single blood culture    sets may represent    contamination. Contact the Microbiology Department at [phone] if    susceptibility testing is    clinically indicated.    Direct identification is available within approximately 3-5    hours either by Blood Panel Multiplexed PCR or Direct    MALDI-TOF. Details: [email or web address]  Organism: Blood Culture PCR (04-24-24 @ 23:16)  Organism: Blood Culture PCR (04-24-24 @ 23:16)      -  Staphylococcus epidermidis, Methicillin resistant: Detec      Method Type: PCR    Culture - Blood (collected 04-22-24 @ 19:30)  Source: .Blood Blood-Peripheral  Preliminary Report (04-26-24 @ 01:01):    No growth at 72 Hours    Culture - Blood (collected 04-20-24 @ 12:15)  Source: .Blood Blood-Peripheral  Final Report (04-25-24 @ 19:00):    No growth at 5 days    Culture - Blood (collected 04-20-24 @ 12:00)  Source: .Blood Blood-Peripheral  Final Report (04-25-24 @ 19:00):    No growth at 5 days    Culture - Blood (collected 04-18-24 @ 11:00)  Source: .Blood Blood-Peripheral  Final Report (04-23-24 @ 17:01):    No growth at 5 days    Culture - Blood (collected 04-18-24 @ 10:50)  Source: .Blood Blood-Peripheral  Final Report (04-23-24 @ 17:01):    No growth at 5 days    Culture - Blood (collected 04-16-24 @ 15:40)  Source: .Blood Blood-Peripheral  Final Report (04-21-24 @ 21:01):    No growth at 5 days    Culture - Blood (collected 04-16-24 @ 13:17)  Source: .Blood Blood-Peripheral  Final Report (04-21-24 @ 18:01):    No growth at 5 days    Culture - Blood (collected 04-16-24 @ 06:42)  Source: .Blood Blood-Peripheral  Gram Stain (04-18-24 @ 11:52):    Growth in aerobic bottle: Gram Positive Cocci in Pairs and Chains    Growth in anaerobic bottle: Gram positive cocci in pairs  Final Report (04-18-24 @ 11:52):    Growth in aerobic and anaerobic bottles: Streptococcus pneumoniae    See previous culture [id number]    Culture - Blood (collected 04-16-24 @ 06:35)  Source: .Blood Blood-Peripheral  Gram Stain (04-18-24 @ 11:51):    Growth in aerobic bottle: Gram positive cocci in pairs    Growth in anaerobic bottle: Gram positive cocci in pairs  Final Report (04-18-24 @ 11:51):    Growth in aerobic and anaerobic bottles: Streptococcus pneumoniae    Direct identification is available within approximately 3-5    hours either by Blood Panel Multiplexed PCR or Direct    MALDI-TOF. Details: [email or web address]  Organism: Blood Culture PCR  Streptococcus pneumoniae (04-18-24 @ 11:51)  Organism: Streptococcus pneumoniae (04-18-24 @ 11:51)      -  Levofloxacin: S 1      -  Clindamycin: S <=0.06      -  Penicillin (oral penicillin V): S 0.06      -  Vancomycin: S 0.5      -  Tetracycline: S <=0.5      Method Type: LESLY      -  Ceftriaxone (non-meningitidis): S <=0.25      -  Ceftriaxone (meningitidis): S <=0.25      -  Erythromycin: S <=0.06 Predicts results for azithromycin.      -  Penicillin (non-meningitidis): S 0.06      -  Penicillin (meningitidis): S 0.06      -  Trimethoprim/Sulfamethoxazole: S <=.25/4.75  Organism: Blood Culture PCR (04-18-24 @ 11:51)      -  Streptococcus pneumoniae: Detec      Method Type: PCR        Culture - Sputum (collected 04-16-24 @ 15:44)  Source: ET Tube ET Tube  Gram Stain (04-16-24 @ 22:00):    Few polymorphonuclear leukocytes per low power field    No Squamous epithelial cells per low power field    No organisms seen per oil power field  Final Report (04-18-24 @ 09:40):    Normal Respiratory Kayla present        RADIOLOGY & ADDITIONAL TESTS: Reviewed.       INTERVAL HPI/OVERNIGHT EVENTS:    SUBJECTIVE: Patient seen and examined at bedside.     ROS: All negative except as listed above.    VITAL SIGNS:  ICU Vital Signs Last 24 Hrs  T(C): 37.9 (26 Apr 2024 04:00), Max: 38.7 (25 Apr 2024 18:00)  T(F): 100.2 (26 Apr 2024 04:00), Max: 101.7 (25 Apr 2024 18:00)  HR: 81 (26 Apr 2024 06:45) (75 - 112)  BP: 107/55 (26 Apr 2024 06:45) (79/44 - 153/102)  BP(mean): 75 (26 Apr 2024 06:45) (56 - 121)  ABP: --  ABP(mean): --  RR: 16 (26 Apr 2024 06:45) (14 - 38)  SpO2: 99% (26 Apr 2024 06:45) (96% - 100%)    O2 Parameters below as of 26 Apr 2024 05:41  Patient On (Oxygen Delivery Method): ventilator          Mode: AC/ CMV (Assist Control/ Continuous Mandatory Ventilation), RR (machine): 14, TV (machine): 460, FiO2: 30, PEEP: 5, ITime: 1, MAP: 10, PIP: 30  Plateau pressure:   P/F ratio:     04-25 @ 07:01  -  04-26 @ 07:00  --------------------------------------------------------  IN: 3345.3 mL / OUT: 1500 mL / NET: 1845.3 mL      CAPILLARY BLOOD GLUCOSE      POCT Blood Glucose.: 140 mg/dL (26 Apr 2024 06:07)      ECG: reviewed.    PHYSICAL EXAM:    GENERAL: NAD, lying in bed comfortably, intubated and sedated on propofol; opens eyes to voice   HEAD:  Atraumatic, normocephalic  EYES: EOMI, PERRLA, conjunctiva and sclera clear  NECK: Supple, trachea midline, no JVD  HEART: Regular rate and rhythm, no murmurs, rubs, or gallops  LUNGS: Unlabored respirations, triggering own respirations intermittently, clear lungs   ABDOMEN: Soft, nontender, nondistended, +BS  EXTREMITIES: 2+ peripheral pulses bilaterally, cap refill<2 secs. No clubbing, cyanosis, or edema  NERVOUS SYSTEM:  Intubated sedated on Propofol, opens eyes and follows commands   SKIN: No rashes or lesions    MEDICATIONS:  MEDICATIONS  (STANDING):  albuterol/ipratropium for Nebulization 3 milliLiter(s) Nebulizer every 8 hours  alteplase  Injectable for Pleural Effusion 10 milliGRAM(s) IntraPleural. every 12 hours  ampicillin/sulbactam  IVPB 3 Gram(s) IV Intermittent every 6 hours  ampicillin/sulbactam  IVPB      chlorhexidine 0.12% Liquid 15 milliLiter(s) Oral Mucosa every 12 hours  chlorhexidine 2% Cloths 1 Application(s) Topical <User Schedule>  dornase rosemary Solution for Pleural Effusion 5 milliGRAM(s) IntraPleural. every 12 hours  droxidopa 300 milliGRAM(s) Oral every 8 hours  insulin lispro (ADMELOG) corrective regimen sliding scale   SubCutaneous every 6 hours  norepinephrine Infusion 0.02 MICROgram(s)/kG/Min (4.16 mL/Hr) IV Continuous <Continuous>  polyethylene glycol 3350 17 Gram(s) Oral every 12 hours  propofol Infusion 9.91 MICROgram(s)/kG/Min (6.6 mL/Hr) IV Continuous <Continuous>  senna Syrup 10 milliLiter(s) Oral at bedtime  sodium chloride 0.9% Solution for Pleural Effusion 40 milliLiter(s) IntraPleural. every 12 hours  sodium chloride 3%  Inhalation 4 milliLiter(s) Inhalation every 8 hours    MEDICATIONS  (PRN):  fentaNYL    Injectable 50 MICROGram(s) IV Push every 2 hours PRN Moderate Pain (4 - 6)  nystatin Powder 1 Application(s) Topical two times a day PRN skin irritation      ALLERGIES:  Allergies    No Known Allergies    Intolerances        LABS:                        8.5    158.86 )-----------( 264      ( 26 Apr 2024 00:11 )             26.9     04-26    139  |  105  |  12  ----------------------------<  170<H>  4.5   |  25  |  <0.30<L>    Ca    8.3<L>      26 Apr 2024 00:11  Phos  3.7     04-26  Mg     2.6     04-26    TPro  6.0  /  Alb  2.6<L>  /  TBili  0.3  /  DBili  x   /  AST  27  /  ALT  33  /  AlkPhos  76  04-26    PT/INR - ( 26 Apr 2024 00:11 )   PT: 14.2 sec;   INR: 1.30 ratio         PTT - ( 26 Apr 2024 00:11 )  PTT:27.6 sec  Urinalysis Basic - ( 26 Apr 2024 00:11 )    Color: x / Appearance: x / SG: x / pH: x  Gluc: 170 mg/dL / Ketone: x  / Bili: x / Urobili: x   Blood: x / Protein: x / Nitrite: x   Leuk Esterase: x / RBC: x / WBC x   Sq Epi: x / Non Sq Epi: x / Bacteria: x      ABG:  pH, Arterial: 7.39 (04-26-24 @ 00:07)  pCO2, Arterial: 48 mmHg (04-26-24 @ 00:07)  pO2, Arterial: 103 mmHg (04-26-24 @ 00:07)      vBG:    Micro:    Culture - Blood (collected 04-24-24 @ 05:53)  Source: .Blood Blood  Preliminary Report (04-25-24 @ 10:02):    No growth at 24 hours    Culture - Blood (collected 04-24-24 @ 05:53)  Source: .Blood Blood  Preliminary Report (04-25-24 @ 11:02):    No growth at 24 hours    Culture - Blood (collected 04-22-24 @ 21:37)  Source: .Blood Blood-Peripheral  Gram Stain (04-24-24 @ 00:09):    Growth in aerobic bottle: Gram Positive Cocci in Clusters  Final Report (04-24-24 @ 23:16):    Growth in aerobic bottle: Staphylococcus epidermidis    Isolation of Coagulase negative Staphylococcus from single blood culture    sets may represent    contamination. Contact the Microbiology Department at [phone] if    susceptibility testing is    clinically indicated.    Direct identification is available within approximately 3-5    hours either by Blood Panel Multiplexed PCR or Direct    MALDI-TOF. Details: [email or web address]  Organism: Blood Culture PCR (04-24-24 @ 23:16)  Organism: Blood Culture PCR (04-24-24 @ 23:16)      -  Staphylococcus epidermidis, Methicillin resistant: Detec      Method Type: PCR    Culture - Blood (collected 04-22-24 @ 19:30)  Source: .Blood Blood-Peripheral  Preliminary Report (04-26-24 @ 01:01):    No growth at 72 Hours    Culture - Blood (collected 04-20-24 @ 12:15)  Source: .Blood Blood-Peripheral  Final Report (04-25-24 @ 19:00):    No growth at 5 days    Culture - Blood (collected 04-20-24 @ 12:00)  Source: .Blood Blood-Peripheral  Final Report (04-25-24 @ 19:00):    No growth at 5 days    Culture - Blood (collected 04-18-24 @ 11:00)  Source: .Blood Blood-Peripheral  Final Report (04-23-24 @ 17:01):    No growth at 5 days    Culture - Blood (collected 04-18-24 @ 10:50)  Source: .Blood Blood-Peripheral  Final Report (04-23-24 @ 17:01):    No growth at 5 days    Culture - Blood (collected 04-16-24 @ 15:40)  Source: .Blood Blood-Peripheral  Final Report (04-21-24 @ 21:01):    No growth at 5 days    Culture - Blood (collected 04-16-24 @ 13:17)  Source: .Blood Blood-Peripheral  Final Report (04-21-24 @ 18:01):    No growth at 5 days    Culture - Blood (collected 04-16-24 @ 06:42)  Source: .Blood Blood-Peripheral  Gram Stain (04-18-24 @ 11:52):    Growth in aerobic bottle: Gram Positive Cocci in Pairs and Chains    Growth in anaerobic bottle: Gram positive cocci in pairs  Final Report (04-18-24 @ 11:52):    Growth in aerobic and anaerobic bottles: Streptococcus pneumoniae    See previous culture [id number]    Culture - Blood (collected 04-16-24 @ 06:35)  Source: .Blood Blood-Peripheral  Gram Stain (04-18-24 @ 11:51):    Growth in aerobic bottle: Gram positive cocci in pairs    Growth in anaerobic bottle: Gram positive cocci in pairs  Final Report (04-18-24 @ 11:51):    Growth in aerobic and anaerobic bottles: Streptococcus pneumoniae    Direct identification is available within approximately 3-5    hours either by Blood Panel Multiplexed PCR or Direct    MALDI-TOF. Details: [email or web address]  Organism: Blood Culture PCR  Streptococcus pneumoniae (04-18-24 @ 11:51)  Organism: Streptococcus pneumoniae (04-18-24 @ 11:51)      -  Levofloxacin: S 1      -  Clindamycin: S <=0.06      -  Penicillin (oral penicillin V): S 0.06      -  Vancomycin: S 0.5      -  Tetracycline: S <=0.5      Method Type: LESLY      -  Ceftriaxone (non-meningitidis): S <=0.25      -  Ceftriaxone (meningitidis): S <=0.25      -  Erythromycin: S <=0.06 Predicts results for azithromycin.      -  Penicillin (non-meningitidis): S 0.06      -  Penicillin (meningitidis): S 0.06      -  Trimethoprim/Sulfamethoxazole: S <=.25/4.75  Organism: Blood Culture PCR (04-18-24 @ 11:51)      -  Streptococcus pneumoniae: Detec      Method Type: PCR        Culture - Sputum (collected 04-16-24 @ 15:44)  Source: ET Tube ET Tube  Gram Stain (04-16-24 @ 22:00):    Few polymorphonuclear leukocytes per low power field    No Squamous epithelial cells per low power field    No organisms seen per oil power field  Final Report (04-18-24 @ 09:40):    Normal Respiratory Kayla present        RADIOLOGY & ADDITIONAL TESTS: Reviewed.

## 2024-04-26 NOTE — CHART NOTE - NSCHARTNOTEFT_GEN_A_CORE
HEMATOLOGY FELLOW NOTE     Patient had left chest tube placed again due to ongoing infection. Though IgG > 500, recommend giving IVIg. Please order Gammagard 400 mg/kg IV once with premedications Tylenol 650mg x1 and diphenhydramine 50mg x1.    Ke Roy MD  Hematology/Oncology Fellow PGY-5  Pager: [facility] [phone] / AMARI [phone]  After 5pm and on weekends please page on-call fellow

## 2024-04-26 NOTE — PROGRESS NOTE ADULT - ASSESSMENT
69F h/o CVA (bedbound at baseline), COPD, CHF, HTN admitted to MICU 4/16 and intubated for Acute hypoxic respiratory failure with strep pneumonia, had chest tube 4/16-4/19. Remains on ventilator, failing SBTs. Continues to spike fevers. CT 4/22 shows small- moderate sized loculated effusion. Thoracic surgery consulted.   Pigtail placed 4/23 4/24 TPA for 6 doses initiated  4/25 Pigtail waterseal TPA 2/6 completed  4/26 TPA 4/6 doses Pigtail remains to suction after 1 hour clamp post TPA

## 2024-04-26 NOTE — PROGRESS NOTE ADULT - SUBJECTIVE AND OBJECTIVE BOX
Interventional Radiology Follow-Up Note    Patient seen and examined @ bedside     This is a 69 year old Female s/p left chest tube placement on 4/23/24 in Interventional Radiology with Dr. Rogers.     Patient intubated and sedated    Medication:  alteplase  Injectable for Pleural Effusion: (04-26)  ampicillin/sulbactam  IVPB: (04-25)  ampicillin/sulbactam  IVPB: (04-26)  cefTRIAXone   IVPB: (04-25)  droxidopa: (04-26)  enoxaparin Injectable: (04-24)  heparin  Infusion: (04-25)  norepinephrine Infusion: (04-25)  vancomycin  IVPB: (04-25)    Vitals:  T(F): 99.5, Max: 101.7 (18:00)  HR: 97  BP: 108/54  RR: 20  SpO2: 99%    Physical Exam:  General: Nontoxic, in NAD, intubated, opens eyes.   Abdomen: soft, NTND.   Drain Device:  Left chest pigtail Drain intact attached to low cont suction, no air leak. Dressing clean, dry, intact.   24hr Drain output: 550cc serosanguinous output    LABS:  Na: 139 (04-26 @ 00:11), 140 (04-25 @ 00:39), 141 (04-24 @ 00:30)  K: 4.5 (04-26 @ 00:11), 3.9 (04-25 @ 00:39), 4.5 (04-24 @ 00:30)  Cl: 105 (04-26 @ 00:11), 106 (04-25 @ 00:39), 107 (04-24 @ 00:30)  CO2: 25 (04-26 @ 00:11), 25 (04-25 @ 00:39), 23 (04-24 @ 00:30)  BUN: 12 (04-26 @ 00:11), 12 (04-25 @ 00:39), 16 (04-24 @ 00:30)  Cr: <0.30 (04-26 @ 00:11), <0.30 (04-25 @ 00:39), 0.32 (04-24 @ 00:30)  Glu: 170(04-26 @ 00:11), 156(04-25 @ 00:39), 167(04-24 @ 00:30)  Hgb: 8.5 (04-26 @ 00:11), 8.1 (04-25 @ 08:11), 8.3 (04-25 @ 00:39), 8.3 (04-24 @ 00:30)  Hct: 26.9 (04-26 @ 00:11), 25.0 (04-25 @ 08:11), 25.6 (04-25 @ 00:39), 26.6 (04-24 @ 00:30)  WBC: 158.86 (04-26 @ 00:11), 126.53 (04-25 @ 08:11), 115.18 (04-25 @ 00:39), 159.35 (04-24 @ 00:30)  Plt: 264 (04-26 @ 00:11), 226 (04-25 @ 08:11), 230 (04-25 @ 00:39), 237 (04-24 @ 00:30)  INR: 1.30 04-26-24 @ 00:11, 1.20 04-25-24 @ 08:11, 1.33 04-25-24 @ 00:39, 1.24 04-24-24 @ 00:30  PTT: 27.6 04-26-24 @ 00:11, 34.0 04-25-24 @ 08:11, 30.9 04-25-24 @ 00:39, 28.2 04-24-24 @ 00:30      LIVER FUNCTIONS - ( 26 Apr 2024 00:11 )  Alb: 2.6 g/dL / Pro: 6.0 g/dL / ALK PHOS: 76 U/L / ALT: 33 U/L / AST: 27 U/L / GGT: x         ABG - ( 26 Apr 2024 00:07 )  pH, Arterial: 7.39  pH, Blood: x     /  pCO2: 48    /  pO2: 103   / HCO3: 29    / Base Excess: 3.6   /  SaO2: 98.6    Bilirubin Total: 0.3 mg/dL (04-26-24 @ 00:11)  Aspartate Aminotransferase (AST/SGOT): 27 U/L (04-26-24 @ 00:11)  Alanine Aminotransferase (ALT/SGPT): 33 U/L (04-26-24 @ 00:11)  Aspartate Aminotransferase (AST/SGOT): 30 U/L (04-25-24 @ 00:39)  Alanine Aminotransferase (ALT/SGPT): 40 U/L (04-25-24 @ 00:39)  Bilirubin Total: 0.3 mg/dL (04-26-24 @ 00:11)  Bilirubin Total: 0.3 mg/dL (04-25-24 @ 00:39)  Bilirubin Total: 0.3 mg/dL (04-24-24 @ 00:30)  Bilirubin Total: 0.4 mg/dL (04-23-24 @ 00:29)  Bilirubin Total: 0.3 mg/dL (04-22-24 @ 00:15)      Culture - Body Fluid with Gram Stain (collected 04-23-24 @ 17:46)  Source: Pleural Fl Pleural Fluid  Gram Stain (04-24-24 @ 06:00):    polymorphonuclear leukocytes seen    No organisms seen    by cytocentrifuge  Preliminary Report (04-24-24 @ 16:51):    No growth to date.          Assessment/Plan: 69 year old female with h/o CVA (bedbound at baseline), COPD, CHF, HTN admitted to MICU 4/16 and intubated for Acute hypoxic respiratory failure with strep pneumonia, had chest tube 4/16-4/19. Remains on ventilator, failing SBTs. Continues to spike fevers. CT 4/22 shows small- moderate sized loculated effusion s/p left chest tube placement on 4/23/24 in Interventional Radiology with Dr. Rogers.     - MIST protocol by thoracic   - trend vs/labs, wean O2 as tolerated  - daily CXR while chest tube in place  - f/u pleural fluid studies  - record output q12 to facilitate timely removal  - continue global management per primary team    Please call IR at extension [phone] with any questions, concerns, or issues regarding above.      Also available on TEAMS

## 2024-04-26 NOTE — CHART NOTE - NSCHARTNOTEFT_GEN_A_CORE
Dose 4/6 intrapleural fibrinolytic therapy instilled without complication. Will continue to monitor chest tube output.

## 2024-04-27 LAB
ALBUMIN SERPL ELPH-MCNC: 2.6 G/DL — LOW (ref 3.3–5)
ALP SERPL-CCNC: 77 U/L — SIGNIFICANT CHANGE UP (ref 40–120)
ALT FLD-CCNC: 30 U/L — SIGNIFICANT CHANGE UP (ref 10–45)
ANION GAP SERPL CALC-SCNC: 11 MMOL/L — SIGNIFICANT CHANGE UP (ref 5–17)
APTT BLD: 28.6 SEC — SIGNIFICANT CHANGE UP (ref 24.5–35.6)
AST SERPL-CCNC: 27 U/L — SIGNIFICANT CHANGE UP (ref 10–40)
BASOPHILS # BLD AUTO: 0.45 K/UL — HIGH (ref 0–0.2)
BASOPHILS NFR BLD AUTO: 0.3 % — SIGNIFICANT CHANGE UP (ref 0–2)
BILIRUB SERPL-MCNC: 0.4 MG/DL — SIGNIFICANT CHANGE UP (ref 0.2–1.2)
BUN SERPL-MCNC: 12 MG/DL — SIGNIFICANT CHANGE UP (ref 7–23)
CALCIUM SERPL-MCNC: 8.6 MG/DL — SIGNIFICANT CHANGE UP (ref 8.4–10.5)
CHLORIDE SERPL-SCNC: 101 MMOL/L — SIGNIFICANT CHANGE UP (ref 96–108)
CO2 SERPL-SCNC: 24 MMOL/L — SIGNIFICANT CHANGE UP (ref 22–31)
CREAT SERPL-MCNC: <0.3 MG/DL — LOW (ref 0.5–1.3)
EGFR: 115 ML/MIN/1.73M2 — SIGNIFICANT CHANGE UP
EOSINOPHIL # BLD AUTO: 0.09 K/UL — SIGNIFICANT CHANGE UP (ref 0–0.5)
EOSINOPHIL NFR BLD AUTO: 0.1 % — SIGNIFICANT CHANGE UP (ref 0–6)
GAS PNL BLDA: SIGNIFICANT CHANGE UP
GLUCOSE BLDC GLUCOMTR-MCNC: 147 MG/DL — HIGH (ref 70–99)
GLUCOSE BLDC GLUCOMTR-MCNC: 167 MG/DL — HIGH (ref 70–99)
GLUCOSE BLDC GLUCOMTR-MCNC: 177 MG/DL — HIGH (ref 70–99)
GLUCOSE SERPL-MCNC: 162 MG/DL — HIGH (ref 70–99)
HCT VFR BLD CALC: 28 % — LOW (ref 34.5–45)
HGB BLD-MCNC: 8.8 G/DL — LOW (ref 11.5–15.5)
IMM GRANULOCYTES NFR BLD AUTO: 0.7 % — SIGNIFICANT CHANGE UP (ref 0–0.9)
INR BLD: 1.32 RATIO — HIGH (ref 0.85–1.18)
LYMPHOCYTES # BLD AUTO: 153.67 K/UL — HIGH (ref 1–3.3)
LYMPHOCYTES # BLD AUTO: 85.5 % — HIGH (ref 13–44)
MAGNESIUM SERPL-MCNC: 2.4 MG/DL — SIGNIFICANT CHANGE UP (ref 1.6–2.6)
MCHC RBC-ENTMCNC: 27.9 PG — SIGNIFICANT CHANGE UP (ref 27–34)
MCHC RBC-ENTMCNC: 31.4 GM/DL — LOW (ref 32–36)
MCV RBC AUTO: 88.9 FL — SIGNIFICANT CHANGE UP (ref 80–100)
MONOCYTES # BLD AUTO: 10.45 K/UL — HIGH (ref 0–0.9)
MONOCYTES NFR BLD AUTO: 5.8 % — SIGNIFICANT CHANGE UP (ref 2–14)
NEUTROPHILS # BLD AUTO: 13.8 K/UL — HIGH (ref 1.8–7.4)
NEUTROPHILS NFR BLD AUTO: 7.6 % — LOW (ref 43–77)
NRBC # BLD: 0 /100 WBCS — SIGNIFICANT CHANGE UP (ref 0–0)
PHOSPHATE SERPL-MCNC: 3.4 MG/DL — SIGNIFICANT CHANGE UP (ref 2.5–4.5)
PLATELET # BLD AUTO: 280 K/UL — SIGNIFICANT CHANGE UP (ref 150–400)
POTASSIUM SERPL-MCNC: 4.2 MMOL/L — SIGNIFICANT CHANGE UP (ref 3.5–5.3)
POTASSIUM SERPL-SCNC: 4.2 MMOL/L — SIGNIFICANT CHANGE UP (ref 3.5–5.3)
PROT SERPL-MCNC: 6.8 G/DL — SIGNIFICANT CHANGE UP (ref 6–8.3)
PROTHROM AB SERPL-ACNC: 13.7 SEC — HIGH (ref 9.5–13)
RBC # BLD: 3.15 M/UL — LOW (ref 3.8–5.2)
RBC # FLD: 15.5 % — HIGH (ref 10.3–14.5)
SODIUM SERPL-SCNC: 136 MMOL/L — SIGNIFICANT CHANGE UP (ref 135–145)
WBC # BLD: 179.64 K/UL — CRITICAL HIGH (ref 3.8–10.5)
WBC # FLD AUTO: 179.64 K/UL — CRITICAL HIGH (ref 3.8–10.5)

## 2024-04-27 PROCEDURE — 99291 CRITICAL CARE FIRST HOUR: CPT | Mod: GC

## 2024-04-27 PROCEDURE — 71045 X-RAY EXAM CHEST 1 VIEW: CPT | Mod: 26,77,76

## 2024-04-27 PROCEDURE — 71045 X-RAY EXAM CHEST 1 VIEW: CPT | Mod: 26,76

## 2024-04-27 PROCEDURE — 99232 SBSQ HOSP IP/OBS MODERATE 35: CPT

## 2024-04-27 PROCEDURE — 71260 CT THORAX DX C+: CPT | Mod: 26

## 2024-04-27 RX ORDER — ACETAMINOPHEN 500 MG
650 TABLET ORAL ONCE
Refills: 0 | Status: COMPLETED | OUTPATIENT
Start: 2024-04-27 | End: 2024-04-27

## 2024-04-27 RX ADMIN — Medication 1: at 06:23

## 2024-04-27 RX ADMIN — PROPOFOL 6.6 MICROGRAM(S)/KG/MIN: 10 INJECTION, EMULSION INTRAVENOUS at 14:59

## 2024-04-27 RX ADMIN — Medication 4.16 MICROGRAM(S)/KG/MIN: at 14:57

## 2024-04-27 RX ADMIN — AMPICILLIN SODIUM AND SULBACTAM SODIUM 200 GRAM(S): 250; 125 INJECTION, POWDER, FOR SUSPENSION INTRAMUSCULAR; INTRAVENOUS at 06:18

## 2024-04-27 RX ADMIN — PROPOFOL 6.6 MICROGRAM(S)/KG/MIN: 10 INJECTION, EMULSION INTRAVENOUS at 07:36

## 2024-04-27 RX ADMIN — AMPICILLIN SODIUM AND SULBACTAM SODIUM 200 GRAM(S): 250; 125 INJECTION, POWDER, FOR SUSPENSION INTRAMUSCULAR; INTRAVENOUS at 01:10

## 2024-04-27 RX ADMIN — CHLORHEXIDINE GLUCONATE 1 APPLICATION(S): 213 SOLUTION TOPICAL at 06:20

## 2024-04-27 RX ADMIN — ENOXAPARIN SODIUM 40 MILLIGRAM(S): 100 INJECTION SUBCUTANEOUS at 17:18

## 2024-04-27 RX ADMIN — DROXIDOPA 300 MILLIGRAM(S): 100 CAPSULE ORAL at 06:17

## 2024-04-27 RX ADMIN — ALTEPLASE 10 MILLIGRAM(S): KIT at 12:12

## 2024-04-27 RX ADMIN — CHLORHEXIDINE GLUCONATE 15 MILLILITER(S): 213 SOLUTION TOPICAL at 17:18

## 2024-04-27 RX ADMIN — NYSTATIN CREAM 1 APPLICATION(S): 100000 CREAM TOPICAL at 14:59

## 2024-04-27 RX ADMIN — SODIUM CHLORIDE 4 MILLILITER(S): 9 INJECTION INTRAMUSCULAR; INTRAVENOUS; SUBCUTANEOUS at 23:14

## 2024-04-27 RX ADMIN — AMPICILLIN SODIUM AND SULBACTAM SODIUM 200 GRAM(S): 250; 125 INJECTION, POWDER, FOR SUSPENSION INTRAMUSCULAR; INTRAVENOUS at 17:18

## 2024-04-27 RX ADMIN — DROXIDOPA 300 MILLIGRAM(S): 100 CAPSULE ORAL at 18:54

## 2024-04-27 RX ADMIN — Medication 3 MILLILITER(S): at 05:01

## 2024-04-27 RX ADMIN — DROXIDOPA 300 MILLIGRAM(S): 100 CAPSULE ORAL at 13:12

## 2024-04-27 RX ADMIN — ENOXAPARIN SODIUM 40 MILLIGRAM(S): 100 INJECTION SUBCUTANEOUS at 06:17

## 2024-04-27 RX ADMIN — Medication 650 MILLIGRAM(S): at 16:27

## 2024-04-27 RX ADMIN — PROPOFOL 6.6 MICROGRAM(S)/KG/MIN: 10 INJECTION, EMULSION INTRAVENOUS at 10:43

## 2024-04-27 RX ADMIN — CHLORHEXIDINE GLUCONATE 15 MILLILITER(S): 213 SOLUTION TOPICAL at 06:19

## 2024-04-27 RX ADMIN — SODIUM CHLORIDE 40 MILLILITER(S): 9 INJECTION INTRAMUSCULAR; INTRAVENOUS; SUBCUTANEOUS at 12:12

## 2024-04-27 RX ADMIN — Medication 650 MILLIGRAM(S): at 17:30

## 2024-04-27 RX ADMIN — Medication 3 MILLILITER(S): at 22:03

## 2024-04-27 RX ADMIN — Medication 1: at 17:24

## 2024-04-27 RX ADMIN — PROPOFOL 6.6 MICROGRAM(S)/KG/MIN: 10 INJECTION, EMULSION INTRAVENOUS at 11:50

## 2024-04-27 RX ADMIN — Medication 3 MILLILITER(S): at 13:04

## 2024-04-27 RX ADMIN — DORNASE ALFA 5 MILLIGRAM(S): 1 SOLUTION RESPIRATORY (INHALATION) at 12:12

## 2024-04-27 RX ADMIN — SODIUM CHLORIDE 4 MILLILITER(S): 9 INJECTION INTRAMUSCULAR; INTRAVENOUS; SUBCUTANEOUS at 05:01

## 2024-04-27 RX ADMIN — SODIUM CHLORIDE 4 MILLILITER(S): 9 INJECTION INTRAMUSCULAR; INTRAVENOUS; SUBCUTANEOUS at 13:03

## 2024-04-27 RX ADMIN — AMPICILLIN SODIUM AND SULBACTAM SODIUM 200 GRAM(S): 250; 125 INJECTION, POWDER, FOR SUSPENSION INTRAMUSCULAR; INTRAVENOUS at 11:49

## 2024-04-27 NOTE — PROGRESS NOTE ADULT - ATTENDING COMMENTS
69 year old female with a history of CVA (bedbound at baseline) COPD, CHF, HTN presented initially as a transfer with hyperleukocytosis with concerning for acute leukemia (no blasts, more consistent with CLL and leukamoid reaction in the setting of acute infection) and acute hypoxic respiratory failure eventually found to have strep pneumonia bacteremia, pneumonia c/b empyema s/p chest tube (4/16-4/19), c/b persistent fevers, CT replaced (4/23/24) currently undergoing intrapleural fibrinolytic therapy.     Lines:   PIV  Chest tube 4/23/24- 4/27/24  Intubated 4/16/24    N: Bedbound at baseline  History of CVA, with left sided residual deficit  - Propofol, RASS 0  - Fentanyl for pain  - Delirium precautions  - Daily SAT  CV:   Septic shock, vasoplegia  Sedation related hypotension  TTE negative for vegetation  Elevated pro-BNP >2k, with evidence of volume overload on exam  - On low dose levophed  - Droxidopa  - MAP >65  - Trial of diuresis, net negative 500 to 1 L  P: Acute hypoxic respiratory failure  Strep pneumonia  Empyema s/p chest tube (4/16-14/19)- did not do intrapleural fibrinolytic therapy; s/p CT 4/23/24-4/27/24 with intrapleural fibrinolytic therapy - 5/6 doses given. Chest tube clogged this am, not flushing or draining. Removed at bedside. today. Plan to get CT to assess residual effusion.   Intubated 4/16  - Will need tracheostomy as continues to fail SBT due to weakness, infection, volume  - Diuresis as above  - Thoracic following for empyema in case vats warranted.   - Continue to monitor fluid with bedside ultrasound  - Lung protective settings  - Failing SBT 2/2 tachypnea, daily SBT  - Trend BG  - Suctioning, duonebs PRN, oral care  GI:   - Tube feeds  Renal:   - Trend cr, lytes  Heme onc:   CLL  Leukamoid reaction  Nonocclusive left common femoral vein DVT  - Appreciate heme recs  - Omega plan to restart AC tomorrow if no further procedures warranted.   ID: Pneumonia, empyema s/p chest tube without lytics, persistent fevers and fluid. Repeat chest tube placed 4/23/24-Removed today as not draining or flushing.   Strep bacteremia  Persistently febrile, ? if related to uncontrolled infection v. thrombus  - Day 8 CTX--> broadened to Unasyn per ID  - Management of pleural space for source control as above  Endo: Avoid hypoglycemia,   BG <210    DVT: A/C as above  Full code  HCP- Presumably Aunt (only known family member).

## 2024-04-27 NOTE — PROGRESS NOTE ADULT - SUBJECTIVE AND OBJECTIVE BOX
Subjective:  Intubated sedated      V/S  T(C): 37.6 (04-27-24 @ 12:00), Max: 39.1 (04-26-24 @ 20:00)  HR: 100 (04-27-24 @ 13:15) (85 - 114)  BP: 105/53 (04-27-24 @ 13:15) (77/44 - 162/72)  RR: 21 (04-27-24 @ 13:15) (14 - 38)  SpO2: 100% (04-27-24 @ 13:15) (90% - 100%)    CHEST TUBES:  Left CT     [ x ]LWS  [  ]H2O seal      I&O's Detail    26 Apr 2024 07:01  -  27 Apr 2024 07:00  --------------------------------------------------------  IN:    Enteral Tube Flush: 310 mL    IV PiggyBack: 450 mL    Norepinephrine: 714.2 mL    Propofol: 605.5 mL    Vital High Protein: 1330 mL  Total IN: 3409.7 mL    OUT:    Chest Tube (mL): 750 mL    Voided (mL): 2400 mL  Total OUT: 3150 mL    Total NET: 259.7 mL      27 Apr 2024 07:01  -  27 Apr 2024 15:20  --------------------------------------------------------  IN:    IV PiggyBack: 100 mL    Norepinephrine: 300 mL    Propofol: 139.8 mL  Total IN: 539.8 mL    OUT:  Total OUT: 0 mL    Total NET: 539.8 mL          04-26-24 @ 07:01  -  04-27-24 @ 07:00  --------------------------------------------------------  IN: 3409.7 mL / OUT: 3150 mL / NET: 259.7 mL    04-27-24 @ 07:01  -  04-27-24 @ 15:20  --------------------------------------------------------  IN: 539.8 mL / OUT: 0 mL / NET: 539.8 mL      MEDICATIONS  (STANDING):  albuterol/ipratropium for Nebulization 3 milliLiter(s) Nebulizer every 8 hours  ampicillin/sulbactam  IVPB      ampicillin/sulbactam  IVPB 3 Gram(s) IV Intermittent every 6 hours  chlorhexidine 0.12% Liquid 15 milliLiter(s) Oral Mucosa every 12 hours  chlorhexidine 2% Cloths 1 Application(s) Topical <User Schedule>  droxidopa 300 milliGRAM(s) Oral <User Schedule>  enoxaparin Injectable 40 milliGRAM(s) SubCutaneous every 12 hours  insulin lispro (ADMELOG) corrective regimen sliding scale   SubCutaneous every 6 hours  norepinephrine Infusion 0.02 MICROgram(s)/kG/Min (4.16 mL/Hr) IV Continuous <Continuous>  polyethylene glycol 3350 17 Gram(s) Oral every 12 hours  propofol Infusion 9.91 MICROgram(s)/kG/Min (6.6 mL/Hr) IV Continuous <Continuous>  senna Syrup 10 milliLiter(s) Oral at bedtime  sodium chloride 3%  Inhalation 4 milliLiter(s) Inhalation every 8 hours      04-27    136  |  101  |  12  ----------------------------<  162<H>  4.2   |  24  |  <0.30<L>    Ca    8.6      27 Apr 2024 00:14  Phos  3.4     04-27  Mg     2.4     04-27    TPro  6.8  /  Alb  2.6<L>  /  TBili  0.4  /  DBili  x   /  AST  27  /  ALT  30  /  AlkPhos  77  04-27                               8.8    179.64 )-----------( 280      ( 27 Apr 2024 00:14 )             28.0        PT/INR - ( 27 Apr 2024 00:14 )   PT: 13.7 sec;   INR: 1.32 ratio         PTT - ( 27 Apr 2024 00:14 )  PTT:28.6 sec         CAPILLARY BLOOD GLUCOSE      POCT Blood Glucose.: 147 mg/dL (27 Apr 2024 11:42)  POCT Blood Glucose.: 167 mg/dL (27 Apr 2024 06:22)  POCT Blood Glucose.: 148 mg/dL (26 Apr 2024 17:05)          Mode: AC/ CMV (Assist Control/ Continuous Mandatory Ventilation)  RR (machine): 14  TV (machine): 460  FiO2: 30  PEEP: 5  ITime: 1  MAP: 12  PIP: 28        Physical Exam:    GENERAL: NAD,  intubated and sedated on propofol;  HEART: Regular rate and rhythm, no murmurs, rubs, or gallops  LUNGS: Unlabored respirations, triggering own respirations intermittently,   diminished lungs L pigtail in place>serosang fluid w/ stranding noted  ABDOMEN: Soft,obese abdomen  nontender, nondistended, +BS  EXTREMITIES Warm well perfused  No clubbing, cyanosis, or edema  NERVOUS SYSTEM:  Intubated sedated on Propofol, opens eyes and follows commands                 PAST MEDICAL & SURGICAL HISTORY:  Acute CHF      COPD, severe

## 2024-04-27 NOTE — PROGRESS NOTE ADULT - SUBJECTIVE AND OBJECTIVE BOX
INTERVAL HPI/OVERNIGHT EVENTS:    SUBJECTIVE: Patient seen and examined at bedside.     ROS: All negative except as listed above.    VITAL SIGNS:  ICU Vital Signs Last 24 Hrs  T(C): 37.3 (27 Apr 2024 04:00), Max: 39.1 (26 Apr 2024 20:00)  T(F): 99.1 (27 Apr 2024 04:00), Max: 102.4 (26 Apr 2024 20:00)  HR: 97 (27 Apr 2024 05:26) (83 - 123)  BP: 114/56 (27 Apr 2024 04:30) (68/52 - 162/72)  BP(mean): 80 (27 Apr 2024 04:30) (54 - 104)  ABP: --  ABP(mean): --  RR: 15 (27 Apr 2024 04:45) (14 - 37)  SpO2: 100% (27 Apr 2024 05:26) (94% - 100%)    O2 Parameters below as of 27 Apr 2024 05:26  Patient On (Oxygen Delivery Method): ventilator          Mode: AC/ CMV (Assist Control/ Continuous Mandatory Ventilation), RR (machine): 14, TV (machine): 460, FiO2: 30, PEEP: 5, ITime: 1, MAP: 10, PIP: 25  Plateau pressure:   P/F ratio:     04-26 @ 07:01  -  04-27 @ 07:00  --------------------------------------------------------  IN: 3263.1 mL / OUT: 2450 mL / NET: 813.1 mL      CAPILLARY BLOOD GLUCOSE      POCT Blood Glucose.: 167 mg/dL (27 Apr 2024 06:22)      ECG: reviewed.    PHYSICAL EXAM:    GENERAL: NAD, lying in bed comfortably  HEAD:  Atraumatic, normocephalic  EYES: EOMI, PERRLA, conjunctiva and sclera clear  NECK: Supple, trachea midline, no JVD  HEART: Regular rate and rhythm, no murmurs, rubs, or gallops  LUNGS: Unlabored respirations.  Clear to auscultation bilaterally, no crackles, wheezing, or rhonchi  ABDOMEN: Soft, nontender, nondistended, +BS  EXTREMITIES: 2+ peripheral pulses bilaterally, cap refill<2 secs. No clubbing, cyanosis, or edema  NERVOUS SYSTEM:  A&Ox3, following commands, moving all extremities, no focal deficits   SKIN: No rashes or lesions    MEDICATIONS:  MEDICATIONS  (STANDING):  albuterol/ipratropium for Nebulization 3 milliLiter(s) Nebulizer every 8 hours  alteplase  Injectable for Pleural Effusion 10 milliGRAM(s) IntraPleural. every 12 hours  ampicillin/sulbactam  IVPB      ampicillin/sulbactam  IVPB 3 Gram(s) IV Intermittent every 6 hours  chlorhexidine 0.12% Liquid 15 milliLiter(s) Oral Mucosa every 12 hours  chlorhexidine 2% Cloths 1 Application(s) Topical <User Schedule>  dornase rosemary Solution for Pleural Effusion 5 milliGRAM(s) IntraPleural. every 12 hours  droxidopa 300 milliGRAM(s) Oral <User Schedule>  enoxaparin Injectable 40 milliGRAM(s) SubCutaneous every 12 hours  insulin lispro (ADMELOG) corrective regimen sliding scale   SubCutaneous every 6 hours  norepinephrine Infusion 0.02 MICROgram(s)/kG/Min (4.16 mL/Hr) IV Continuous <Continuous>  polyethylene glycol 3350 17 Gram(s) Oral every 12 hours  propofol Infusion 9.91 MICROgram(s)/kG/Min (6.6 mL/Hr) IV Continuous <Continuous>  senna Syrup 10 milliLiter(s) Oral at bedtime  sodium chloride 0.9% Solution for Pleural Effusion 40 milliLiter(s) IntraPleural. every 12 hours  sodium chloride 3%  Inhalation 4 milliLiter(s) Inhalation every 8 hours    MEDICATIONS  (PRN):  fentaNYL    Injectable 50 MICROGram(s) IV Push every 2 hours PRN Moderate Pain (4 - 6)  nystatin Powder 1 Application(s) Topical two times a day PRN skin irritation      ALLERGIES:  Allergies    No Known Allergies    Intolerances        LABS:                        8.8    179.64 )-----------( 280      ( 27 Apr 2024 00:14 )             28.0     04-27    136  |  101  |  12  ----------------------------<  162<H>  4.2   |  24  |  <0.30<L>    Ca    8.6      27 Apr 2024 00:14  Phos  3.4     04-27  Mg     2.4     04-27    TPro  6.8  /  Alb  2.6<L>  /  TBili  0.4  /  DBili  x   /  AST  27  /  ALT  30  /  AlkPhos  77  04-27    PT/INR - ( 27 Apr 2024 00:14 )   PT: 13.7 sec;   INR: 1.32 ratio         PTT - ( 27 Apr 2024 00:14 )  PTT:28.6 sec  Urinalysis Basic - ( 27 Apr 2024 00:14 )    Color: x / Appearance: x / SG: x / pH: x  Gluc: 162 mg/dL / Ketone: x  / Bili: x / Urobili: x   Blood: x / Protein: x / Nitrite: x   Leuk Esterase: x / RBC: x / WBC x   Sq Epi: x / Non Sq Epi: x / Bacteria: x      ABG:  pH, Arterial: 7.42 (04-27-24 @ 00:08)  pCO2, Arterial: 42 mmHg (04-27-24 @ 00:08)  pO2, Arterial: 92 mmHg (04-27-24 @ 00:08)      vBG:    Micro:    Culture - Blood (collected 04-24-24 @ 05:53)  Source: .Blood Blood  Preliminary Report (04-26-24 @ 10:01):    No growth at 48 Hours    Culture - Blood (collected 04-24-24 @ 05:53)  Source: .Blood Blood  Preliminary Report (04-26-24 @ 10:01):    No growth at 48 Hours    Culture - Blood (collected 04-22-24 @ 21:37)  Source: .Blood Blood-Peripheral  Gram Stain (04-24-24 @ 00:09):    Growth in aerobic bottle: Gram Positive Cocci in Clusters  Final Report (04-24-24 @ 23:16):    Growth in aerobic bottle: Staphylococcus epidermidis    Isolation of Coagulase negative Staphylococcus from single blood culture    sets may represent    contamination. Contact the Microbiology Department at [phone] if    susceptibility testing is    clinically indicated.    Direct identification is available within approximately 3-5    hours either by Blood Panel Multiplexed PCR or Direct    MALDI-TOF. Details: [email or web address]  Organism: Blood Culture PCR (04-24-24 @ 23:16)  Organism: Blood Culture PCR (04-24-24 @ 23:16)      Method Type: PCR      -  Staphylococcus epidermidis, Methicillin resistant: Detec    Culture - Blood (collected 04-22-24 @ 19:30)  Source: .Blood Blood-Peripheral  Preliminary Report (04-27-24 @ 01:01):    No growth at 4 days    Culture - Blood (collected 04-20-24 @ 12:15)  Source: .Blood Blood-Peripheral  Final Report (04-25-24 @ 19:00):    No growth at 5 days    Culture - Blood (collected 04-20-24 @ 12:00)  Source: .Blood Blood-Peripheral  Final Report (04-25-24 @ 19:00):    No growth at 5 days    Culture - Blood (collected 04-18-24 @ 11:00)  Source: .Blood Blood-Peripheral  Final Report (04-23-24 @ 17:01):    No growth at 5 days    Culture - Blood (collected 04-18-24 @ 10:50)  Source: .Blood Blood-Peripheral  Final Report (04-23-24 @ 17:01):    No growth at 5 days    Culture - Blood (collected 04-16-24 @ 15:40)  Source: .Blood Blood-Peripheral  Final Report (04-21-24 @ 21:01):    No growth at 5 days    Culture - Blood (collected 04-16-24 @ 13:17)  Source: .Blood Blood-Peripheral  Final Report (04-21-24 @ 18:01):    No growth at 5 days    Culture - Blood (collected 04-16-24 @ 06:42)  Source: .Blood Blood-Peripheral  Gram Stain (04-18-24 @ 11:52):    Growth in aerobic bottle: Gram Positive Cocci in Pairs and Chains    Growth in anaerobic bottle: Gram positive cocci in pairs  Final Report (04-18-24 @ 11:52):    Growth in aerobic and anaerobic bottles: Streptococcus pneumoniae    See previous culture [id number]    Culture - Blood (collected 04-16-24 @ 06:35)  Source: .Blood Blood-Peripheral  Gram Stain (04-18-24 @ 11:51):    Growth in aerobic bottle: Gram positive cocci in pairs    Growth in anaerobic bottle: Gram positive cocci in pairs  Final Report (04-18-24 @ 11:51):    Growth in aerobic and anaerobic bottles: Streptococcus pneumoniae    Direct identification is available within approximately 3-5    hours either by Blood Panel Multiplexed PCR or Direct    MALDI-TOF. Details: [email or web address]  Organism: Blood Culture PCR  Streptococcus pneumoniae (04-18-24 @ 11:51)  Organism: Streptococcus pneumoniae (04-18-24 @ 11:51)      Method Type: LESLY      -  Clindamycin: S <=0.06      -  Erythromycin: S <=0.06 Predicts results for azithromycin.      -  Levofloxacin: S 1      -  Tetracycline: S <=0.5      -  Trimethoprim/Sulfamethoxazole: S <=.25/4.75      -  Vancomycin: S 0.5      -  Ceftriaxone (meningitidis): S <=0.25      -  Ceftriaxone (non-meningitidis): S <=0.25      -  Penicillin (meningitidis): S 0.06      -  Penicillin (non-meningitidis): S 0.06      -  Penicillin (oral penicillin V): S 0.06  Organism: Blood Culture PCR (04-18-24 @ 11:51)      Method Type: PCR      -  Streptococcus pneumoniae: Detec        Culture - Sputum (collected 04-26-24 @ 13:44)  Source: .Sputum Sputum  Gram Stain (04-26-24 @ 20:33):    Rare polymorphonuclear leukocytes per low power field    No Squamous epithelial cells per low power field    No organisms seen per oil power field    Culture - Sputum (collected 04-16-24 @ 15:44)  Source: ET Tube ET Tube  Gram Stain (04-16-24 @ 22:00):    Few polymorphonuclear leukocytes per low power field    No Squamous epithelial cells per low power field    No organisms seen per oil power field  Final Report (04-18-24 @ 09:40):    Normal Respiratory Kayla present        RADIOLOGY & ADDITIONAL TESTS: Reviewed.       INTERVAL HPI/OVERNIGHT EVENTS:    SUBJECTIVE: Patient seen and examined at bedside.     ROS: All negative except as listed above.    VITAL SIGNS:  ICU Vital Signs Last 24 Hrs  T(C): 37.3 (27 Apr 2024 04:00), Max: 39.1 (26 Apr 2024 20:00)  T(F): 99.1 (27 Apr 2024 04:00), Max: 102.4 (26 Apr 2024 20:00)  HR: 97 (27 Apr 2024 05:26) (83 - 123)  BP: 114/56 (27 Apr 2024 04:30) (68/52 - 162/72)  BP(mean): 80 (27 Apr 2024 04:30) (54 - 104)  ABP: --  ABP(mean): --  RR: 15 (27 Apr 2024 04:45) (14 - 37)  SpO2: 100% (27 Apr 2024 05:26) (94% - 100%)    O2 Parameters below as of 27 Apr 2024 05:26  Patient On (Oxygen Delivery Method): ventilator          Mode: AC/ CMV (Assist Control/ Continuous Mandatory Ventilation), RR (machine): 14, TV (machine): 460, FiO2: 30, PEEP: 5, ITime: 1, MAP: 10, PIP: 25  Plateau pressure:   P/F ratio:     04-26 @ 07:01  -  04-27 @ 07:00  --------------------------------------------------------  IN: 3263.1 mL / OUT: 2450 mL / NET: 813.1 mL      CAPILLARY BLOOD GLUCOSE      POCT Blood Glucose.: 167 mg/dL (27 Apr 2024 06:22)      ECG: reviewed.    PHYSICAL EXAM:    GENERAL: NAD, lying in bed comfortably, intubated and sedated on propofol; opens eyes to voice   HEAD:  Atraumatic, normocephalic  EYES: EOMI, PERRLA, conjunctiva and sclera clear  NECK: Supple, trachea midline, no JVD  HEART: Regular rate and rhythm, no murmurs, rubs, or gallops  LUNGS: Unlabored respirations, triggering own respirations intermittently, clear lungs   ABDOMEN: Soft, nontender, nondistended, +BS  EXTREMITIES: 2+ peripheral pulses bilaterally, cap refill<2 secs. No clubbing, cyanosis, or edema  NERVOUS SYSTEM:  Intubated sedated on Propofol, opens eyes and follows commands   SKIN: No rashes or lesions    MEDICATIONS:  MEDICATIONS  (STANDING):  albuterol/ipratropium for Nebulization 3 milliLiter(s) Nebulizer every 8 hours  alteplase  Injectable for Pleural Effusion 10 milliGRAM(s) IntraPleural. every 12 hours  ampicillin/sulbactam  IVPB      ampicillin/sulbactam  IVPB 3 Gram(s) IV Intermittent every 6 hours  chlorhexidine 0.12% Liquid 15 milliLiter(s) Oral Mucosa every 12 hours  chlorhexidine 2% Cloths 1 Application(s) Topical <User Schedule>  dornase rosemary Solution for Pleural Effusion 5 milliGRAM(s) IntraPleural. every 12 hours  droxidopa 300 milliGRAM(s) Oral <User Schedule>  enoxaparin Injectable 40 milliGRAM(s) SubCutaneous every 12 hours  insulin lispro (ADMELOG) corrective regimen sliding scale   SubCutaneous every 6 hours  norepinephrine Infusion 0.02 MICROgram(s)/kG/Min (4.16 mL/Hr) IV Continuous <Continuous>  polyethylene glycol 3350 17 Gram(s) Oral every 12 hours  propofol Infusion 9.91 MICROgram(s)/kG/Min (6.6 mL/Hr) IV Continuous <Continuous>  senna Syrup 10 milliLiter(s) Oral at bedtime  sodium chloride 0.9% Solution for Pleural Effusion 40 milliLiter(s) IntraPleural. every 12 hours  sodium chloride 3%  Inhalation 4 milliLiter(s) Inhalation every 8 hours    MEDICATIONS  (PRN):  fentaNYL    Injectable 50 MICROGram(s) IV Push every 2 hours PRN Moderate Pain (4 - 6)  nystatin Powder 1 Application(s) Topical two times a day PRN skin irritation      ALLERGIES:  Allergies    No Known Allergies    Intolerances        LABS:                        8.8    179.64 )-----------( 280      ( 27 Apr 2024 00:14 )             28.0     04-27    136  |  101  |  12  ----------------------------<  162<H>  4.2   |  24  |  <0.30<L>    Ca    8.6      27 Apr 2024 00:14  Phos  3.4     04-27  Mg     2.4     04-27    TPro  6.8  /  Alb  2.6<L>  /  TBili  0.4  /  DBili  x   /  AST  27  /  ALT  30  /  AlkPhos  77  04-27    PT/INR - ( 27 Apr 2024 00:14 )   PT: 13.7 sec;   INR: 1.32 ratio         PTT - ( 27 Apr 2024 00:14 )  PTT:28.6 sec  Urinalysis Basic - ( 27 Apr 2024 00:14 )    Color: x / Appearance: x / SG: x / pH: x  Gluc: 162 mg/dL / Ketone: x  / Bili: x / Urobili: x   Blood: x / Protein: x / Nitrite: x   Leuk Esterase: x / RBC: x / WBC x   Sq Epi: x / Non Sq Epi: x / Bacteria: x      ABG:  pH, Arterial: 7.42 (04-27-24 @ 00:08)  pCO2, Arterial: 42 mmHg (04-27-24 @ 00:08)  pO2, Arterial: 92 mmHg (04-27-24 @ 00:08)      vBG:    Micro:    Culture - Blood (collected 04-24-24 @ 05:53)  Source: .Blood Blood  Preliminary Report (04-26-24 @ 10:01):    No growth at 48 Hours    Culture - Blood (collected 04-24-24 @ 05:53)  Source: .Blood Blood  Preliminary Report (04-26-24 @ 10:01):    No growth at 48 Hours    Culture - Blood (collected 04-22-24 @ 21:37)  Source: .Blood Blood-Peripheral  Gram Stain (04-24-24 @ 00:09):    Growth in aerobic bottle: Gram Positive Cocci in Clusters  Final Report (04-24-24 @ 23:16):    Growth in aerobic bottle: Staphylococcus epidermidis    Isolation of Coagulase negative Staphylococcus from single blood culture    sets may represent    contamination. Contact the Microbiology Department at [phone] if    susceptibility testing is    clinically indicated.    Direct identification is available within approximately 3-5    hours either by Blood Panel Multiplexed PCR or Direct    MALDI-TOF. Details: [email or web address]  Organism: Blood Culture PCR (04-24-24 @ 23:16)  Organism: Blood Culture PCR (04-24-24 @ 23:16)      Method Type: PCR      -  Staphylococcus epidermidis, Methicillin resistant: Detec    Culture - Blood (collected 04-22-24 @ 19:30)  Source: .Blood Blood-Peripheral  Preliminary Report (04-27-24 @ 01:01):    No growth at 4 days    Culture - Blood (collected 04-20-24 @ 12:15)  Source: .Blood Blood-Peripheral  Final Report (04-25-24 @ 19:00):    No growth at 5 days    Culture - Blood (collected 04-20-24 @ 12:00)  Source: .Blood Blood-Peripheral  Final Report (04-25-24 @ 19:00):    No growth at 5 days    Culture - Blood (collected 04-18-24 @ 11:00)  Source: .Blood Blood-Peripheral  Final Report (04-23-24 @ 17:01):    No growth at 5 days    Culture - Blood (collected 04-18-24 @ 10:50)  Source: .Blood Blood-Peripheral  Final Report (04-23-24 @ 17:01):    No growth at 5 days    Culture - Blood (collected 04-16-24 @ 15:40)  Source: .Blood Blood-Peripheral  Final Report (04-21-24 @ 21:01):    No growth at 5 days    Culture - Blood (collected 04-16-24 @ 13:17)  Source: .Blood Blood-Peripheral  Final Report (04-21-24 @ 18:01):    No growth at 5 days    Culture - Blood (collected 04-16-24 @ 06:42)  Source: .Blood Blood-Peripheral  Gram Stain (04-18-24 @ 11:52):    Growth in aerobic bottle: Gram Positive Cocci in Pairs and Chains    Growth in anaerobic bottle: Gram positive cocci in pairs  Final Report (04-18-24 @ 11:52):    Growth in aerobic and anaerobic bottles: Streptococcus pneumoniae    See previous culture [id number]    Culture - Blood (collected 04-16-24 @ 06:35)  Source: .Blood Blood-Peripheral  Gram Stain (04-18-24 @ 11:51):    Growth in aerobic bottle: Gram positive cocci in pairs    Growth in anaerobic bottle: Gram positive cocci in pairs  Final Report (04-18-24 @ 11:51):    Growth in aerobic and anaerobic bottles: Streptococcus pneumoniae    Direct identification is available within approximately 3-5    hours either by Blood Panel Multiplexed PCR or Direct    MALDI-TOF. Details: [email or web address]  Organism: Blood Culture PCR  Streptococcus pneumoniae (04-18-24 @ 11:51)  Organism: Streptococcus pneumoniae (04-18-24 @ 11:51)      Method Type: LESLY      -  Clindamycin: S <=0.06      -  Erythromycin: S <=0.06 Predicts results for azithromycin.      -  Levofloxacin: S 1      -  Tetracycline: S <=0.5      -  Trimethoprim/Sulfamethoxazole: S <=.25/4.75      -  Vancomycin: S 0.5      -  Ceftriaxone (meningitidis): S <=0.25      -  Ceftriaxone (non-meningitidis): S <=0.25      -  Penicillin (meningitidis): S 0.06      -  Penicillin (non-meningitidis): S 0.06      -  Penicillin (oral penicillin V): S 0.06  Organism: Blood Culture PCR (04-18-24 @ 11:51)      Method Type: PCR      -  Streptococcus pneumoniae: Detec        Culture - Sputum (collected 04-26-24 @ 13:44)  Source: .Sputum Sputum  Gram Stain (04-26-24 @ 20:33):    Rare polymorphonuclear leukocytes per low power field    No Squamous epithelial cells per low power field    No organisms seen per oil power field    Culture - Sputum (collected 04-16-24 @ 15:44)  Source: ET Tube ET Tube  Gram Stain (04-16-24 @ 22:00):    Few polymorphonuclear leukocytes per low power field    No Squamous epithelial cells per low power field    No organisms seen per oil power field  Final Report (04-18-24 @ 09:40):    Normal Respiratory Kayla present        RADIOLOGY & ADDITIONAL TESTS: Reviewed.

## 2024-04-27 NOTE — PROGRESS NOTE ADULT - ASSESSMENT
69F h/o CVA (bedbound at baseline), COPD, CHF, HTN presenting as transfer from [facility] for SOB iso leukocytosis to 233 c/f acute leukemia. Pt intubated and on pressors, transferred to MICU for further management and possible leukophoresis.    Neuro  #Encephalopathy; currently intubated and sedated on Propofol   -p/w lethargy likely sepsis; Per Heme, leukostasis unlikely as cells mature  -CTH showing old infarct, no acute process  - off Precedex, on propofol, following commands, aim for RASS score -1 to 0    CV  #Septic Shock   - Strep Pneumo Bacteremia   - Back on Levophed to wean off levo while on Droxidopa 300 TID     #Chest Pain  -demand vs leukostasis vs non cardiac   -reported JOSEF in II, III, aVF at [facility] --> repeat EKG without ST changes or Q waves  -trops peaked    #CHF  -unclear hx but elevated pro-BNP to 2600s  -TTE: EF 54%, no significant abnormalities; CTM    Resp  #Acute hypoxic respiratory failure  #Empyema Pansensitive Strep Pneumo  -intubated: 460/14/30/5; wean off as tolerated w/ daily CPAP trial  -s/p thora draining 1500cc fluid 4/16 with chest tube placement; c/w exudative effusion growing Strep Pneumo  -MRSA neg  -pleural fluid growing strep pneumo; BCx growing strep pneumo; Pansensitive  -was on vanc/zosyn/azithro (4/16)-->deescalated to CTX 2g qd (4/17 - ); c/w total 14-day course of Abx therapy   -CT chest 4/22 reveals persistent loculated effusion, s/p IR Pigtail placement 4/24 w/ Exudative effusion; Alteplase and Dornase through pigtal x6 doses   - CPAP Trial daily  - IV Lasix 40 --> goal -500 to -1000cc net negative     GI  #Diet: TF via OG tube    /Renal  -no active issues    Heme/Onc  #Leukocytosis  #CLL  - on presentation; improving to 130s  -no plan for leukophoresis at this time given mature lymphocytes  -stop trending TLS labs as they have been stable   - CT Chest A&P showing diffuse axillary, inguinal mediastinal RP lymphadenopathy.  - Appreciate Heme recs     #L Common Femoral Vein DVT   - Non-occlusive   - Prophylactic Lovenox until back on Heparin drip after Alteplase Dornase   - F/u Bedside POCUS eval R heart strain given patient persistent tachycardia and fevers    #DVT ppx: Prophylactic lovenox until back on heparin drip     ID  #Empyema 2/2 Pansensitive Strep Pneumo  #Strep Pneumo Bacteremia  -BCx + strep pneumo; fluid culture with strep pneumo  -repeat BCx 4/20 ngtd, ReCx 4/23 due to fever   -CT Chest A&P demonstrates LLL empyema not showing any other intraabdominal infectious source  -was on vanc/zosyn/azithro (4/16)-->deescalated to CTX (4/17 -4/23), Added on Vancomycin (4/23-) due to MRSE 1/2 Cx, expanded coverage to Cefepime (4/23 1x dose), back on CTX, switched to Unasyn 3g Q6 (4/25- )  - Fever curve trending down, but persistently febrile. If persistent fevers, MARIO ALBERTO to eval for Endocarditis   - ID Consulted for persistent fevers despite source control; appreciate recs   - IVIG 4/26 x1    #Positive MRSE BCx 1 of 2; contaminant   - Repeat Cx negative  - Vancomycin (4/16, 4/23-4/25)    Endo  -ISS q6h while NPO w/ tube feed     Ethics: Full Code  Only known family member is Aunt Mili Gordon ([phone], [phone]) who lives in [place]   69F h/o CVA (bedbound at baseline), COPD, CHF, HTN presenting as transfer from [facility] for SOB iso leukocytosis to 233 c/f acute leukemia. Pt intubated and on pressors, transferred to MICU for further management and possible leukophoresis.    Neuro  #Encephalopathy; currently intubated and sedated on Propofol   -p/w lethargy likely sepsis; Per Heme, leukostasis unlikely as cells mature  -CTH showing old infarct, no acute process  - off Precedex, on propofol, following commands, aim for RASS score -1 to 0    CV  #Septic Shock   - Strep Pneumo Empyema  - Back on Levophed to wean off levo while on Droxidopa 300 TID     #Chest Pain, Resolved   -demand vs leukostasis vs non cardiac   -reported JOSEF in II, III, aVF at [facility] --> repeat EKG without ST changes or Q waves  -trops peaked    #CHF  -unclear hx but elevated pro-BNP to 2600s  -TTE: EF 54%, no significant abnormalities  - Diurese as appropriate    Resp  #Acute hypoxic respiratory failure  #Empyema Pansensitive Strep Pneumo  -intubated: 460/14/30/5; wean off as tolerated w/ daily CPAP trial  -s/p thora draining 1500cc fluid 4/16 with chest tube placement; c/w exudative effusion growing Strep Pneumo  -MRSA neg  -pleural fluid growing strep pneumo; BCx growing strep pneumo; Pansensitive  -was on vanc/zosyn/azithro (4/16)-->deescalated to CTX 2g qd (4/17 - ); c/w total 14-day course of Abx therapy   -CT chest 4/22 reveals persistent loculated effusion, s/p IR Pigtail placement 4/24 w/ Exudative effusion; Alteplase and Dornase through pigtal x6 doses; unable to finish 6th Alteplase+Dornase due to catheter occlusion   - CPAP Trial daily  - Intubated on 4/16, patient will need trach if not able to be extubated by 2-week jewell    Plan:  - CPAP trial daily   - CT Chest w/ Contrast evaluate degree of residual effusion   - Hold heparin until decision on whether to place another chest tube.    GI  #Diet: TF via OG tube    /Renal  -no active issues    Heme/Onc  #Leukocytosis  #CLL  - on presentation; improving to 130s  -no plan for leukophoresis at this time given mature lymphocytes  -stop trending TLS labs as they have been stable   - CT Chest A&P showing diffuse axillary, inguinal mediastinal RP lymphadenopathy.  - Appreciate Heme recs     #L Common Femoral Vein DVT   - Non-occlusive   - Prophylactic Lovenox until back on Heparin drip  - Plan to restart heparin drip after decision on L side chest tube 4/27    #DVT ppx: Prophylactic lovenox until back on heparin drip     ID  #Empyema 2/2 Pansensitive Strep Pneumo  #Strep Pneumo Bacteremia  -BCx + strep pneumo; fluid culture with strep pneumo  -repeat BCx 4/20 ngtd, ReCx 4/23 due to fever   -CT Chest A&P demonstrates LLL empyema not showing any other intraabdominal infectious source  -was on vanc/zosyn/azithro (4/16)-->deescalated to CTX (4/17 -4/23), Added on Vancomycin (4/23-) due to MRSE 1/2 Cx, expanded coverage to Cefepime (4/23 1x dose), back on CTX, switched to Unasyn 3g Q6 (4/25- )  - Fever curve trending down, but persistently febrile. If persistent fevers, MARIO ALBERTO to eval for Endocarditis   - ID Consulted for persistent fevers despite source control; appreciate recs   - IVIG 4/26 x1    #Positive MRSE BCx 1 of 2; contaminant   - Repeat Cx negative  - Vancomycin (4/16, 4/23-4/25)    Endo  -ISS q6h while NPO w/ tube feed     Ethics: Full Code  Only known family member is Aunt Mili Gordon ([phone], [phone]) who lives in [place]

## 2024-04-28 LAB
ALBUMIN SERPL ELPH-MCNC: 2.4 G/DL — LOW (ref 3.3–5)
ALP SERPL-CCNC: 74 U/L — SIGNIFICANT CHANGE UP (ref 40–120)
ALT FLD-CCNC: 29 U/L — SIGNIFICANT CHANGE UP (ref 10–45)
ANION GAP SERPL CALC-SCNC: 11 MMOL/L — SIGNIFICANT CHANGE UP (ref 5–17)
ANISOCYTOSIS BLD QL: SLIGHT — SIGNIFICANT CHANGE UP
APTT BLD: 30.7 SEC — SIGNIFICANT CHANGE UP (ref 24.5–35.6)
AST SERPL-CCNC: 31 U/L — SIGNIFICANT CHANGE UP (ref 10–40)
BASOPHILS # BLD AUTO: 0 K/UL — SIGNIFICANT CHANGE UP (ref 0–0.2)
BASOPHILS NFR BLD AUTO: 0 % — SIGNIFICANT CHANGE UP (ref 0–2)
BILIRUB SERPL-MCNC: 0.5 MG/DL — SIGNIFICANT CHANGE UP (ref 0.2–1.2)
BUN SERPL-MCNC: 10 MG/DL — SIGNIFICANT CHANGE UP (ref 7–23)
CALCIUM SERPL-MCNC: 8.5 MG/DL — SIGNIFICANT CHANGE UP (ref 8.4–10.5)
CHLORIDE SERPL-SCNC: 103 MMOL/L — SIGNIFICANT CHANGE UP (ref 96–108)
CO2 SERPL-SCNC: 25 MMOL/L — SIGNIFICANT CHANGE UP (ref 22–31)
CREAT SERPL-MCNC: 0.3 MG/DL — LOW (ref 0.5–1.3)
CULTURE RESULTS: NO GROWTH — SIGNIFICANT CHANGE UP
CULTURE RESULTS: SIGNIFICANT CHANGE UP
CULTURE RESULTS: SIGNIFICANT CHANGE UP
EGFR: 115 ML/MIN/1.73M2 — SIGNIFICANT CHANGE UP
EOSINOPHIL # BLD AUTO: 0 K/UL — SIGNIFICANT CHANGE UP (ref 0–0.5)
EOSINOPHIL NFR BLD AUTO: 0 % — SIGNIFICANT CHANGE UP (ref 0–6)
GAS PNL BLDA: SIGNIFICANT CHANGE UP
GLUCOSE BLDC GLUCOMTR-MCNC: 141 MG/DL — HIGH (ref 70–99)
GLUCOSE BLDC GLUCOMTR-MCNC: 153 MG/DL — HIGH (ref 70–99)
GLUCOSE BLDC GLUCOMTR-MCNC: 188 MG/DL — HIGH (ref 70–99)
GLUCOSE SERPL-MCNC: 141 MG/DL — HIGH (ref 70–99)
HCT VFR BLD CALC: 25.5 % — LOW (ref 34.5–45)
HGB BLD-MCNC: 8.1 G/DL — LOW (ref 11.5–15.5)
INR BLD: 1.23 RATIO — HIGH (ref 0.85–1.18)
LYMPHOCYTES # BLD AUTO: 164.33 K/UL — HIGH (ref 1–3.3)
LYMPHOCYTES # BLD AUTO: 93.8 % — HIGH (ref 13–44)
MAGNESIUM SERPL-MCNC: 2.5 MG/DL — SIGNIFICANT CHANGE UP (ref 1.6–2.6)
MANUAL SMEAR VERIFICATION: SIGNIFICANT CHANGE UP
MCHC RBC-ENTMCNC: 28.3 PG — SIGNIFICANT CHANGE UP (ref 27–34)
MCHC RBC-ENTMCNC: 31.8 GM/DL — LOW (ref 32–36)
MCV RBC AUTO: 89.2 FL — SIGNIFICANT CHANGE UP (ref 80–100)
MICROCYTES BLD QL: SLIGHT — SIGNIFICANT CHANGE UP
MONOCYTES # BLD AUTO: 1.58 K/UL — HIGH (ref 0–0.9)
MONOCYTES NFR BLD AUTO: 0.9 % — LOW (ref 2–14)
NEUTROPHILS # BLD AUTO: 9.29 K/UL — HIGH (ref 1.8–7.4)
NEUTROPHILS NFR BLD AUTO: 5.3 % — LOW (ref 43–77)
PHOSPHATE SERPL-MCNC: 2.9 MG/DL — SIGNIFICANT CHANGE UP (ref 2.5–4.5)
PLAT MORPH BLD: NORMAL — SIGNIFICANT CHANGE UP
PLATELET # BLD AUTO: 276 K/UL — SIGNIFICANT CHANGE UP (ref 150–400)
POLYCHROMASIA BLD QL SMEAR: SLIGHT — SIGNIFICANT CHANGE UP
POTASSIUM SERPL-MCNC: 4.1 MMOL/L — SIGNIFICANT CHANGE UP (ref 3.5–5.3)
POTASSIUM SERPL-SCNC: 4.1 MMOL/L — SIGNIFICANT CHANGE UP (ref 3.5–5.3)
PROT SERPL-MCNC: 6.3 G/DL — SIGNIFICANT CHANGE UP (ref 6–8.3)
PROTHROM AB SERPL-ACNC: 13.4 SEC — HIGH (ref 9.5–13)
RBC # BLD: 2.86 M/UL — LOW (ref 3.8–5.2)
RBC # FLD: 15.7 % — HIGH (ref 10.3–14.5)
RBC BLD AUTO: SIGNIFICANT CHANGE UP
SODIUM SERPL-SCNC: 139 MMOL/L — SIGNIFICANT CHANGE UP (ref 135–145)
SPECIMEN SOURCE: SIGNIFICANT CHANGE UP
WBC # BLD: 175.19 K/UL — CRITICAL HIGH (ref 3.8–10.5)
WBC # FLD AUTO: 175.19 K/UL — CRITICAL HIGH (ref 3.8–10.5)

## 2024-04-28 PROCEDURE — 31615 TRCHEOBRNCHSC EST TRACHS INC: CPT | Mod: GC

## 2024-04-28 PROCEDURE — 99291 CRITICAL CARE FIRST HOUR: CPT | Mod: GC,25

## 2024-04-28 PROCEDURE — 76536 US EXAM OF HEAD AND NECK: CPT | Mod: 26,GC

## 2024-04-28 PROCEDURE — 31645 BRNCHSC W/THER ASPIR 1ST: CPT | Mod: 59,GC

## 2024-04-28 PROCEDURE — 31600 PLANNED TRACHEOSTOMY: CPT | Mod: GC

## 2024-04-28 PROCEDURE — 99232 SBSQ HOSP IP/OBS MODERATE 35: CPT

## 2024-04-28 PROCEDURE — 99233 SBSQ HOSP IP/OBS HIGH 50: CPT | Mod: 57

## 2024-04-28 PROCEDURE — 71045 X-RAY EXAM CHEST 1 VIEW: CPT | Mod: 26,76

## 2024-04-28 RX ORDER — CISATRACURIUM BESYLATE 2 MG/ML
20 INJECTION INTRAVENOUS ONCE
Refills: 0 | Status: COMPLETED | OUTPATIENT
Start: 2024-04-28 | End: 2024-04-28

## 2024-04-28 RX ORDER — MIDAZOLAM HYDROCHLORIDE 1 MG/ML
6 INJECTION, SOLUTION INTRAMUSCULAR; INTRAVENOUS ONCE
Refills: 0 | Status: DISCONTINUED | OUTPATIENT
Start: 2024-04-28 | End: 2024-04-28

## 2024-04-28 RX ORDER — LIDOCAINE HYDROCHLORIDE AND EPINEPHRINE 10; 10 MG/ML; UG/ML
3 INJECTION, SOLUTION INFILTRATION; PERINEURAL ONCE
Refills: 0 | Status: DISCONTINUED | OUTPATIENT
Start: 2024-04-28 | End: 2024-04-28

## 2024-04-28 RX ORDER — LIDOCAINE HYDROCHLORIDE AND EPINEPHRINE 10; 10 MG/ML; UG/ML
3 INJECTION, SOLUTION INFILTRATION; PERINEURAL ONCE
Refills: 0 | Status: COMPLETED | OUTPATIENT
Start: 2024-04-28 | End: 2024-04-28

## 2024-04-28 RX ORDER — MIDAZOLAM HYDROCHLORIDE 1 MG/ML
4 INJECTION, SOLUTION INTRAMUSCULAR; INTRAVENOUS ONCE
Refills: 0 | Status: DISCONTINUED | OUTPATIENT
Start: 2024-04-28 | End: 2024-04-28

## 2024-04-28 RX ORDER — TRANEXAMIC ACID 100 MG/ML
5 INJECTION, SOLUTION INTRAVENOUS ONCE
Refills: 0 | Status: DISCONTINUED | OUTPATIENT
Start: 2024-04-28 | End: 2024-04-28

## 2024-04-28 RX ORDER — FENTANYL CITRATE 50 UG/ML
100 INJECTION INTRAVENOUS ONCE
Refills: 0 | Status: DISCONTINUED | OUTPATIENT
Start: 2024-04-28 | End: 2024-04-28

## 2024-04-28 RX ADMIN — CHLORHEXIDINE GLUCONATE 15 MILLILITER(S): 213 SOLUTION TOPICAL at 17:28

## 2024-04-28 RX ADMIN — CHLORHEXIDINE GLUCONATE 15 MILLILITER(S): 213 SOLUTION TOPICAL at 05:17

## 2024-04-28 RX ADMIN — AMPICILLIN SODIUM AND SULBACTAM SODIUM 200 GRAM(S): 250; 125 INJECTION, POWDER, FOR SUSPENSION INTRAMUSCULAR; INTRAVENOUS at 17:28

## 2024-04-28 RX ADMIN — PROPOFOL 6.6 MICROGRAM(S)/KG/MIN: 10 INJECTION, EMULSION INTRAVENOUS at 12:57

## 2024-04-28 RX ADMIN — FENTANYL CITRATE 100 MICROGRAM(S): 50 INJECTION INTRAVENOUS at 14:45

## 2024-04-28 RX ADMIN — SODIUM CHLORIDE 4 MILLILITER(S): 9 INJECTION INTRAMUSCULAR; INTRAVENOUS; SUBCUTANEOUS at 05:08

## 2024-04-28 RX ADMIN — AMPICILLIN SODIUM AND SULBACTAM SODIUM 200 GRAM(S): 250; 125 INJECTION, POWDER, FOR SUSPENSION INTRAMUSCULAR; INTRAVENOUS at 05:16

## 2024-04-28 RX ADMIN — ENOXAPARIN SODIUM 40 MILLIGRAM(S): 100 INJECTION SUBCUTANEOUS at 05:17

## 2024-04-28 RX ADMIN — SODIUM CHLORIDE 4 MILLILITER(S): 9 INJECTION INTRAMUSCULAR; INTRAVENOUS; SUBCUTANEOUS at 15:39

## 2024-04-28 RX ADMIN — ENOXAPARIN SODIUM 40 MILLIGRAM(S): 100 INJECTION SUBCUTANEOUS at 21:24

## 2024-04-28 RX ADMIN — AMPICILLIN SODIUM AND SULBACTAM SODIUM 200 GRAM(S): 250; 125 INJECTION, POWDER, FOR SUSPENSION INTRAMUSCULAR; INTRAVENOUS at 00:48

## 2024-04-28 RX ADMIN — MIDAZOLAM HYDROCHLORIDE 6 MILLIGRAM(S): 1 INJECTION, SOLUTION INTRAMUSCULAR; INTRAVENOUS at 14:45

## 2024-04-28 RX ADMIN — AMPICILLIN SODIUM AND SULBACTAM SODIUM 200 GRAM(S): 250; 125 INJECTION, POWDER, FOR SUSPENSION INTRAMUSCULAR; INTRAVENOUS at 11:36

## 2024-04-28 RX ADMIN — Medication 3 MILLILITER(S): at 15:39

## 2024-04-28 RX ADMIN — Medication 3 MILLILITER(S): at 05:08

## 2024-04-28 RX ADMIN — CHLORHEXIDINE GLUCONATE 1 APPLICATION(S): 213 SOLUTION TOPICAL at 05:17

## 2024-04-28 RX ADMIN — Medication 3 MILLILITER(S): at 23:10

## 2024-04-28 RX ADMIN — Medication 1: at 05:16

## 2024-04-28 RX ADMIN — PROPOFOL 6.6 MICROGRAM(S)/KG/MIN: 10 INJECTION, EMULSION INTRAVENOUS at 15:47

## 2024-04-28 RX ADMIN — Medication 1: at 17:27

## 2024-04-28 RX ADMIN — DROXIDOPA 300 MILLIGRAM(S): 100 CAPSULE ORAL at 12:06

## 2024-04-28 RX ADMIN — CISATRACURIUM BESYLATE 20 MILLIGRAM(S): 2 INJECTION INTRAVENOUS at 14:47

## 2024-04-28 RX ADMIN — LIDOCAINE HYDROCHLORIDE AND EPINEPHRINE 3 MILLILITER(S): 10; 10 INJECTION, SOLUTION INFILTRATION; PERINEURAL at 14:50

## 2024-04-28 RX ADMIN — Medication 4.16 MICROGRAM(S)/KG/MIN: at 12:07

## 2024-04-28 RX ADMIN — DROXIDOPA 300 MILLIGRAM(S): 100 CAPSULE ORAL at 05:17

## 2024-04-28 RX ADMIN — PROPOFOL 6.6 MICROGRAM(S)/KG/MIN: 10 INJECTION, EMULSION INTRAVENOUS at 12:07

## 2024-04-28 RX ADMIN — DROXIDOPA 300 MILLIGRAM(S): 100 CAPSULE ORAL at 19:36

## 2024-04-28 RX ADMIN — SODIUM CHLORIDE 4 MILLILITER(S): 9 INJECTION INTRAMUSCULAR; INTRAVENOUS; SUBCUTANEOUS at 23:10

## 2024-04-28 RX ADMIN — Medication 4.16 MICROGRAM(S)/KG/MIN: at 15:47

## 2024-04-28 RX ADMIN — MIDAZOLAM HYDROCHLORIDE 4 MILLIGRAM(S): 1 INJECTION, SOLUTION INTRAMUSCULAR; INTRAVENOUS at 14:47

## 2024-04-28 NOTE — CHART NOTE - NSCHARTNOTEFT_GEN_A_CORE
: Sheela Thompson    INDICATION: Percutaneous Tracheostomy    PROCEDURE:  [ ] LIMITED ECHO  [ ] LIMITED CHEST  [ ] LIMITED RETROPERITONEAL  [ ] LIMITED ABDOMINAL  [ ] LIMITED DVT  [ ] NEEDLE GUIDANCE VASCULAR  [ ] NEEDLE GUIDANCE THORACENTESIS  [ ] NEEDLE GUIDANCE PARACENTESIS  [ ] NEEDLE GUIDANCE PERICARDIOCENTESIS  [X] LIMITED NECK    FINDINGS: The thyroid cartilage, cricoid cartilage and tracheal rings were located. The thyroid and isthmus was located. No high riding vessel noted. No vessels noted in the field of the percutaneous tracheostomy.     INTERPRETATION: No contraindications to proceeding with percutaneous tracheostomy.     Images stored on [facility].

## 2024-04-28 NOTE — PROGRESS NOTE ADULT - ATTENDING COMMENTS
69 year old female with a history of CVA (bedbound at baseline) COPD, CHF, HTN presented initially as a transfer with hyperleukocytosis with concerning for acute leukemia (no blasts, more consistent with CLL and leukemoid reaction in the setting of acute infection) and acute hypoxic respiratory failure eventually found to have strep pneumonia bacteremia, pneumonia c/b empyema s/p chest tube (4/16-4/19), c/b persistent fevers, CT replaced (4/23/24) currently undergoing intrapleural fibrinolytic therapy.     Lines:   PIV  Chest tube 4/23/24- 4/27/24  Intubated 4/16/24    N: Bedbound at baseline  History of CVA, with left sided residual deficit  - Propofol, RASS 0  - Fentanyl for pain  - Delirium precautions  - Daily SAT  CV:   Septic shock, vasoplegia  Sedation related hypotension  TTE negative for vegetation  - On low dose levophed  - Droxidopa  - MAP >65  - Trial of diuresis, net negative 500 to 1 L  P: Acute hypoxic respiratory failure  Strep pneumonia  Empyema s/p chest tube (4/16-14/19)- did not do intrapleural fibrinolytic therapy; s/p CT 4/23/24-4/27/24 with intrapleural fibrinolytic therapy - 5/6 doses given. Chest tube clogged and removed yesterday. Repeat CT noted. Thin rim of fluid and mostly consolidation. Will close loop with thoracic surgery, but likely no surgical intervention at this time.   Intubated 4/16  - Will need tracheostomy as continues to fail SBT due to weakness, infection, volume. Started discussion with family.   - Diuresis as above  - Thoracic following for empyema in case vats warranted, unlikely to be needed at this time.   - Continue to monitor fluid with bedside ultrasound  - Lung protective settings  - Failing SBT 2/2 tachypnea, daily SBT  - Trend BG  - Suctioning, duonebs PRN, oral care  GI:   - Tube feeds  Renal:   - Trend cr, lytes  Heme onc:   CLL  Leukamoid reaction  Nonocclusive left common femoral vein DVT  - Appreciate heme recs  - Omega plan to restart AC tomorrow if no further procedures warranted.   ID: Pneumonia, empyema s/p chest tube without lytics, persistent fevers and fluid. Repeat chest tube placed 4/23/24-Removed today as not draining or flushing.   Strep bacteremia  Persistently febrile, ? if related to uncontrolled infection v. thrombus  - Unasyn  - Management of pleural space for source control as above  Endo: Avoid hypoglycemia,   BG <210    DVT: A/C as above  Full code  HCP- Presumably Aunt (only known family member).

## 2024-04-28 NOTE — PROGRESS NOTE ADULT - SUBJECTIVE AND OBJECTIVE BOX
INTERVAL HPI/OVERNIGHT EVENTS:    SUBJECTIVE: Patient seen and examined at bedside.     CONSTITUTIONAL: No weakness, fevers or chills  EYES/ENT: No visual changes;  No vertigo or throat pain   NECK: No pain or stiffness  RESPIRATORY: No cough, wheezing, hemoptysis; No shortness of breath  CARDIOVASCULAR: No chest pain or palpitations  GASTROINTESTINAL: No abdominal or epigastric pain. No nausea, vomiting, or hematemesis; No diarrhea or constipation. No melena or hematochezia.  GENITOURINARY: No dysuria, frequency or hematuria  NEUROLOGICAL: No numbness or weakness  SKIN: No itching, rashes    OBJECTIVE:    VITAL SIGNS:  ICU Vital Signs Last 24 Hrs  T(C): 37.9 (28 Apr 2024 04:00), Max: 38.5 (27 Apr 2024 20:00)  T(F): 100.2 (28 Apr 2024 04:00), Max: 101.3 (27 Apr 2024 20:00)  HR: 106 (28 Apr 2024 07:00) (83 - 114)  BP: 102/51 (28 Apr 2024 07:00) (82/44 - 158/70)  BP(mean): 73 (28 Apr 2024 07:00) (57 - 100)  ABP: --  ABP(mean): --  RR: 22 (28 Apr 2024 07:00) (14 - 38)  SpO2: 100% (28 Apr 2024 07:00) (95% - 100%)    O2 Parameters below as of 28 Apr 2024 05:30  Patient On (Oxygen Delivery Method): ventilator          Mode: AC/ CMV (Assist Control/ Continuous Mandatory Ventilation), RR (machine): 14, TV (machine): 460, FiO2: 40, PEEP: 5, ITime: 1, MAP: 9, PIP: 24    04-27 @ 07:01  -  04-28 @ 07:00  --------------------------------------------------------  IN: 3411 mL / OUT: 1050 mL / NET: 2361 mL      CAPILLARY BLOOD GLUCOSE      POCT Blood Glucose.: 188 mg/dL (28 Apr 2024 05:14)      PHYSICAL EXAM:    General: NAD  HEENT: NC/AT; PERRL, clear conjunctiva  Neck: supple  Respiratory: CTA b/l  Cardiovascular: +S1/S2; RRR  Abdomen: soft, NT/ND; +BS x4  Extremities: WWP, 2+ peripheral pulses b/l; no LE edema  Skin: normal color and turgor; no rash  Neurological:    MEDICATIONS:  MEDICATIONS  (STANDING):  albuterol/ipratropium for Nebulization 3 milliLiter(s) Nebulizer every 8 hours  ampicillin/sulbactam  IVPB      ampicillin/sulbactam  IVPB 3 Gram(s) IV Intermittent every 6 hours  chlorhexidine 0.12% Liquid 15 milliLiter(s) Oral Mucosa every 12 hours  chlorhexidine 2% Cloths 1 Application(s) Topical <User Schedule>  droxidopa 300 milliGRAM(s) Oral <User Schedule>  enoxaparin Injectable 40 milliGRAM(s) SubCutaneous every 12 hours  insulin lispro (ADMELOG) corrective regimen sliding scale   SubCutaneous every 6 hours  norepinephrine Infusion 0.02 MICROgram(s)/kG/Min (4.16 mL/Hr) IV Continuous <Continuous>  polyethylene glycol 3350 17 Gram(s) Oral every 12 hours  propofol Infusion 9.91 MICROgram(s)/kG/Min (6.6 mL/Hr) IV Continuous <Continuous>  senna Syrup 10 milliLiter(s) Oral at bedtime  sodium chloride 3%  Inhalation 4 milliLiter(s) Inhalation every 8 hours    MEDICATIONS  (PRN):  fentaNYL    Injectable 50 MICROGram(s) IV Push every 2 hours PRN Moderate Pain (4 - 6)  nystatin Powder 1 Application(s) Topical two times a day PRN skin irritation      ALLERGIES:  Allergies    No Known Allergies    Intolerances        LABS:                        8.1    175.19 )-----------( 276      ( 28 Apr 2024 00:31 )             25.5     04-28    139  |  103  |  10  ----------------------------<  141<H>  4.1   |  25  |  0.30<L>    Ca    8.5      28 Apr 2024 00:31  Phos  2.9     04-28  Mg     2.5     04-28    TPro  6.3  /  Alb  2.4<L>  /  TBili  0.5  /  DBili  x   /  AST  31  /  ALT  29  /  AlkPhos  74  04-28    PT/INR - ( 28 Apr 2024 00:31 )   PT: 13.4 sec;   INR: 1.23 ratio         PTT - ( 28 Apr 2024 00:31 )  PTT:30.7 sec  Urinalysis Basic - ( 28 Apr 2024 00:31 )    Color: x / Appearance: x / SG: x / pH: x  Gluc: 141 mg/dL / Ketone: x  / Bili: x / Urobili: x   Blood: x / Protein: x / Nitrite: x   Leuk Esterase: x / RBC: x / WBC x   Sq Epi: x / Non Sq Epi: x / Bacteria: x        RADIOLOGY & ADDITIONAL TESTS: Reviewed. INTERVAL HPI/OVERNIGHT EVENTS: Yesterday, Pigtail catheter removed. CTC was done. Weaning sedation as tolerated for CPAP trials. Several episodes of loose stools overnight. Tmax 100.9 overnight.  Maintained on stable dose of Levo.   Intubated settings: VC 14/1460/5/30%    SUBJECTIVE: Patient seen and examined at bedside.     ros unable to be obtained due to altered mentation.     OBJECTIVE:    VITAL SIGNS:  ICU Vital Signs Last 24 Hrs  T(C): 37.9 (28 Apr 2024 04:00), Max: 38.5 (27 Apr 2024 20:00)  T(F): 100.2 (28 Apr 2024 04:00), Max: 101.3 (27 Apr 2024 20:00)  HR: 106 (28 Apr 2024 07:00) (83 - 114)  BP: 102/51 (28 Apr 2024 07:00) (82/44 - 158/70)  BP(mean): 73 (28 Apr 2024 07:00) (57 - 100)  ABP: --  ABP(mean): --  RR: 22 (28 Apr 2024 07:00) (14 - 38)  SpO2: 100% (28 Apr 2024 07:00) (95% - 100%)    O2 Parameters below as of 28 Apr 2024 05:30  Patient On (Oxygen Delivery Method): ventilator          Mode: AC/ CMV (Assist Control/ Continuous Mandatory Ventilation), RR (machine): 14, TV (machine): 460, FiO2: 40, PEEP: 5, ITime: 1, MAP: 9, PIP: 24    04-27 @ 07:01  -  04-28 @ 07:00  --------------------------------------------------------  IN: 3411 mL / OUT: 1050 mL / NET: 2361 mL      CAPILLARY BLOOD GLUCOSE      POCT Blood Glucose.: 188 mg/dL (28 Apr 2024 05:14)      PHYSICAL EXAM:    General: NAD, large body habitus   HEENT: NC/AT; PERRL, clear conjunctiva  Respiratory: CTA b/l anterior, decrease breath sounds at bases   Cardiovascular: +S1/S2;   Abdomen: soft, NT/ND; +BS x4  Extremities: WWP, 2+ peripheral pulses b/l; B/L Le edema  Skin: normal color and turgor; no rash  Neurological: Awake, arouses to verbal stimuli, open eyes, doesn't follow commands    MEDICATIONS:  MEDICATIONS  (STANDING):  albuterol/ipratropium for Nebulization 3 milliLiter(s) Nebulizer every 8 hours  ampicillin/sulbactam  IVPB      ampicillin/sulbactam  IVPB 3 Gram(s) IV Intermittent every 6 hours  chlorhexidine 0.12% Liquid 15 milliLiter(s) Oral Mucosa every 12 hours  chlorhexidine 2% Cloths 1 Application(s) Topical <User Schedule>  droxidopa 300 milliGRAM(s) Oral <User Schedule>  enoxaparin Injectable 40 milliGRAM(s) SubCutaneous every 12 hours  insulin lispro (ADMELOG) corrective regimen sliding scale   SubCutaneous every 6 hours  norepinephrine Infusion 0.02 MICROgram(s)/kG/Min (4.16 mL/Hr) IV Continuous <Continuous>  polyethylene glycol 3350 17 Gram(s) Oral every 12 hours  propofol Infusion 9.91 MICROgram(s)/kG/Min (6.6 mL/Hr) IV Continuous <Continuous>  senna Syrup 10 milliLiter(s) Oral at bedtime  sodium chloride 3%  Inhalation 4 milliLiter(s) Inhalation every 8 hours    MEDICATIONS  (PRN):  fentaNYL    Injectable 50 MICROGram(s) IV Push every 2 hours PRN Moderate Pain (4 - 6)  nystatin Powder 1 Application(s) Topical two times a day PRN skin irritation      ALLERGIES:  Allergies    No Known Allergies    Intolerances        LABS:                        8.1    175.19 )-----------( 276      ( 28 Apr 2024 00:31 )             25.5     04-28    139  |  103  |  10  ----------------------------<  141<H>  4.1   |  25  |  0.30<L>    Ca    8.5      28 Apr 2024 00:31  Phos  2.9     04-28  Mg     2.5     04-28    TPro  6.3  /  Alb  2.4<L>  /  TBili  0.5  /  DBili  x   /  AST  31  /  ALT  29  /  AlkPhos  74  04-28    PT/INR - ( 28 Apr 2024 00:31 )   PT: 13.4 sec;   INR: 1.23 ratio         PTT - ( 28 Apr 2024 00:31 )  PTT:30.7 sec  Urinalysis Basic - ( 28 Apr 2024 00:31 )    Color: x / Appearance: x / SG: x / pH: x  Gluc: 141 mg/dL / Ketone: x  / Bili: x / Urobili: x   Blood: x / Protein: x / Nitrite: x   Leuk Esterase: x / RBC: x / WBC x   Sq Epi: x / Non Sq Epi: x / Bacteria: x        RADIOLOGY & ADDITIONAL TESTS: Reviewed.

## 2024-04-28 NOTE — PROGRESS NOTE ADULT - ASSESSMENT
69F h/o CVA (bedbound at baseline), COPD, CHF, HTN admitted to MICU 4/16 and intubated for Acute hypoxic respiratory failure with strep pneumonia, had chest tube 4/16-4/19. Remains on ventilator, failing SBTs. Continues to spike fevers. CT 4/22 shows small- moderate sized loculated effusion. Thoracic surgery consulted.   Pigtail placed 4/23 4/24 TPA for 6 doses initiated  4/25 Pigtail waterseal TPA 2/6 completed  4/26 TPA 4/6 doses Pigtail remains to suction after 1 hour clamp post TPA  4/27 TPA 5/6 doses> complete tonight Care as per MICU team  4/28 Pigtail drainage minimal  Recommend to remove  , no indication for surgical intervention   69F h/o CVA (bedbound at baseline), COPD, CHF, HTN admitted to MICU 4/16 and intubated for Acute hypoxic respiratory failure with strep pneumonia, had chest tube 4/16-4/19. Remains on ventilator, failing SBTs. Continues to spike fevers. CT 4/22 shows small- moderate sized loculated effusion. Thoracic surgery consulted.   Pigtail placed 4/23 4/24 TPA for 6 doses initiated  4/25 Pigtail waterseal TPA 2/6 completed  4/26 TPA 4/6 doses Pigtail remains to suction after 1 hour clamp post TPA  4/27 TPA 5/6 doses> complete tonight Care as per MICU team  4/28 Pigtail became obstructed last evening>removed by MICU team CXR reviewed

## 2024-04-28 NOTE — PROGRESS NOTE ADULT - NS ATTEND AMEND GEN_ALL_CORE FT
CT scan reviewed, minimal fluid and thickened pleura but expanded lung. tube removed by ICU team -   no indication for vats/decort at this time. reconsult prn

## 2024-04-28 NOTE — PROGRESS NOTE ADULT - ASSESSMENT
69F h/o CVA (bedbound at baseline), COPD, CHF, HTN presenting as transfer from [facility] for SOB iso leukocytosis to 233 c/f acute leukemia. Pt intubated and on pressors, transferred to MICU for further management and possible leukophoresis.    Neuro  #Encephalopathy; currently intubated and sedated on Propofol   -p/w lethargy likely sepsis; Per Heme, leukostasis unlikely as cells mature  -CTH showing old infarct, no acute process  - off Precedex, on propofol, following commands, aim for RASS score -1 to 0    CV  #Septic Shock   - Strep Pneumo Empyema  - Back on Levophed to wean off levo while on Droxidopa 300 TID     #Chest Pain, Resolved   -demand vs leukostasis vs non cardiac   -reported JOSEF in II, III, aVF at [facility] --> repeat EKG without ST changes or Q waves  -trops peaked    #CHF  -unclear hx but elevated pro-BNP to 2600s  -TTE: EF 54%, no significant abnormalities  - Diurese as appropriate    Resp  #Acute hypoxic respiratory failure  #Empyema Pansensitive Strep Pneumo  -intubated: 460/14/30/5; wean off as tolerated w/ daily CPAP trial  -s/p thora draining 1500cc fluid 4/16 with chest tube placement; c/w exudative effusion growing Strep Pneumo  -MRSA neg  -pleural fluid growing strep pneumo; BCx growing strep pneumo; Pansensitive  -was on vanc/zosyn/azithro (4/16)-->deescalated to CTX 2g qd (4/17 - ); c/w total 14-day course of Abx therapy   -CT chest 4/22 reveals persistent loculated effusion, s/p IR Pigtail placement 4/24 w/ Exudative effusion; Alteplase and Dornase through pigtal x6 doses; unable to finish 6th Alteplase+Dornase due to catheter occlusion   - CPAP Trial daily  - Intubated on 4/16, patient will need trach if not able to be extubated by 2-week jewell    Plan:  - CPAP trial daily   - CT Chest w/ Contrast evaluate degree of residual effusion   - Hold heparin until decision on whether to place another chest tube.    GI  #Diet: TF via OG tube    /Renal  -no active issues    Heme/Onc  #Leukocytosis  #CLL  - on presentation; improving to 130s  -no plan for leukophoresis at this time given mature lymphocytes  -stop trending TLS labs as they have been stable   - CT Chest A&P showing diffuse axillary, inguinal mediastinal RP lymphadenopathy.  - Appreciate Heme recs     #L Common Femoral Vein DVT   - Non-occlusive   - Prophylactic Lovenox until back on Heparin drip  - Plan to restart heparin drip after decision on L side chest tube 4/27    #DVT ppx: Prophylactic lovenox until back on heparin drip     ID  #Empyema 2/2 Pansensitive Strep Pneumo  #Strep Pneumo Bacteremia  -BCx + strep pneumo; fluid culture with strep pneumo  -repeat BCx 4/20 ngtd, ReCx 4/23 due to fever   -CT Chest A&P demonstrates LLL empyema not showing any other intraabdominal infectious source  -was on vanc/zosyn/azithro (4/16)-->deescalated to CTX (4/17 -4/23), Added on Vancomycin (4/23-) due to MRSE 1/2 Cx, expanded coverage to Cefepime (4/23 1x dose), back on CTX, switched to Unasyn 3g Q6 (4/25- )  - Fever curve trending down, but persistently febrile. If persistent fevers, MARIO ALBERTO to eval for Endocarditis   - ID Consulted for persistent fevers despite source control; appreciate recs   - IVIG 4/26 x1    #Positive MRSE BCx 1 of 2; contaminant   - Repeat Cx negative  - Vancomycin (4/16, 4/23-4/25)    Endo  -ISS q6h while NPO w/ tube feed     Ethics: Full Code  Only known family member is Aunt Mili Gordon ([phone], [phone]) who lives in [place]   69F h/o CVA (bedbound at baseline), COPD, CHF, HTN presenting as transfer from [facility] for SOB iso leukocytosis to 233 c/f acute leukemia. Pt intubated and on pressors, transferred to MICU for further management, course c/b empyema s/p chest tube placement (4/16) and removal, reaccumulation of loculated pleural effusion s/p L pigtail placement and removal on 4/27     Neuro  #Encephalopathy; currently intubated and sedated on Propofol   -p/w lethargy likely sepsis; Per Heme, leukostasis unlikely as cells mature  -CTH showing old infarct, no acute process  - off Precedex, on propofol, following commands, aim for RASS score -1 to 0  - CPAP trials as tolerated     CV  #Septic Shock   - Strep Pneumo Empyema  - Back on Levophed to wean off levo while on Droxidopa 300 TID     #Chest Pain, Resolved   -demand vs leukostasis vs non cardiac   -reported JOSEF in II, III, aVF at [facility] --> repeat EKG without ST changes or Q waves  -trops peaked    #CHF  -unclear hx but elevated pro-BNP to 2600s  -TTE: EF 54%, no significant abnormalities  - Diurese as appropriate    Resp  #Acute hypoxic respiratory failure  #Empyema Pansensitive Strep Pneumo  -intubated: 460/14/30/5; wean off as tolerated w/ daily CPAP trial  -s/p thora draining 1500cc fluid 4/16 with chest tube placement; c/w exudative effusion growing Strep Pneumo  -MRSA neg  -pleural fluid growing strep pneumo; BCx growing strep pneumo; Pansensitive  -was on vanc/zosyn/azithro (4/16)-->deescalated to CTX 2g qd (4/17 - ); c/w total 14-day course of Abx therapy   -CT chest 4/22 reveals persistent loculated effusion, s/p IR Pigtail placement 4/24-4/27 w/ Exudative effusion; Alteplase and Dornase through pigtal x6 doses; unable to finish 6th Alteplase+Dornase due to catheter occlusion   - CPAP Trial daily  - Intubated on 4/16, patient will need trach if not able to be extubated by 2-week jewell    Plan:  - CPAP trial daily   - CT Chest w/ Contrast evaluate degree of residual effusion   - Hold heparin until decision on whether to place another chest tube.    GI  #Diet: TF via OG tube    /Renal  -no active issues    Heme/Onc  #Leukocytosis  #CLL  - on presentation; improving to 130s  -no plan for leukophoresis at this time given mature lymphocytes  -stop trending TLS labs as they have been stable   - CT Chest A&P showing diffuse axillary, inguinal mediastinal RP lymphadenopathy.  - Appreciate Heme recs     #L Common Femoral Vein DVT   - Non-occlusive   - Prophylactic Lovenox until back on Heparin drip  - Plan to restart heparin drip after decision on L side chest tube 4/27    #DVT ppx: Prophylactic lovenox until back on heparin drip     ID  #Empyema 2/2 Pansensitive Strep Pneumo  #Strep Pneumo Bacteremia  -BCx + strep pneumo; fluid culture with strep pneumo  -repeat BCx 4/20 ngtd, ReCx 4/23 due to fever   -CT Chest A&P demonstrates LLL empyema not showing any other intraabdominal infectious source  -was on vanc/zosyn/azithro (4/16)-->deescalated to CTX (4/17 -4/23), Added on Vancomycin (4/23-) due to MRSE 1/2 Cx, expanded coverage to Cefepime (4/23 1x dose), back on CTX, switched to Unasyn 3g Q6 (4/25- )  - Fever curve trending down, but persistently febrile. If persistent fevers, MARIO ALBERTO to eval for Endocarditis   - ID Consulted for persistent fevers despite source control; appreciate recs   - IVIG 4/26 x1    #Positive MRSE BCx 1 of 2; contaminant   - Repeat Cx negative  - Vancomycin (4/16, 4/23-4/25)    Plan  c/w with Unasyn (4/25  -   )     Endo  -ISS q6h while NPO w/ tube feed     Ethics: Full Code  Only known family member is Aunt Mili Gordon ([phone], [phone]) who lives in [place]   69F h/o CVA (bedbound at baseline), COPD, CHF, HTN presenting as transfer from [facility] for SOB iso leukocytosis to 233 c/f acute leukemia. Pt intubated and on pressors, transferred to MICU for further management, course c/b empyema s/p chest tube placement (4/16) and removal, reaccumulation of loculated pleural effusion s/p L pigtail placement and removal on 4/27     Neuro  #Encephalopathy; currently intubated and sedated on Propofol   -p/w lethargy likely sepsis; Per Heme, leukostasis unlikely as cells mature  -CTH showing old infarct, no acute process  - off Precedex, on propofol, following commands, aim for RASS score -1 to 0  - CPAP trials as tolerated     CV  #Septic Shock   - Strep Pneumo Empyema  - Back on Levophed to wean off levo while on Droxidopa 300 TID     #Chest Pain, Resolved   -demand vs leukostasis vs non cardiac   -reported JOSEF in II, III, aVF at [facility] --> repeat EKG without ST changes or Q waves  -trops peaked    #CHF  -unclear hx but elevated pro-BNP to 2600s  -TTE: EF 54%, no significant abnormalities  - Diurese as appropriate    Resp  #Acute hypoxic respiratory failure  #Empyema Pansensitive Strep Pneumo  -intubated: 460/14/30/5; wean off as tolerated w/ daily CPAP trial  -s/p thora draining 1500cc fluid 4/16 with chest tube placement; c/w exudative effusion growing Strep Pneumo  -MRSA neg  -pleural fluid growing strep pneumo; BCx growing strep pneumo; Pansensitive  -was on vanc/zosyn/azithro (4/16)-->deescalated to CTX 2g qd (4/17 - ); c/w total 14-day course of Abx therapy   -CT chest 4/22 reveals persistent loculated effusion, s/p IR Pigtail placement 4/24-4/27 w/ Exudative effusion; Alteplase and Dornase through pigtal x6 doses; unable to finish 6th Alteplase+Dornase due to catheter occlusion   - Repeat CTC 4/27 - decreased small pleural effusions, thoracic LAD   - CPAP Trial daily     Plan:  - CPAP trial daily   - CT Chest w/ Contrast evaluate degree of residual effusion   - Therapeutic heparin held for consideration for LAD biopsy   - Intubated on 4/16, patient will need trach if not able to be extubated by 2-week jewell    GI  #Diet: TF via OG tube    /Renal  -no active issues    Heme/Onc  #Leukocytosis  #CLL  - on presentation; improving to 130s  -no plan for leukophoresis at this time given mature lymphocytes  -stop trending TLS labs as they have been stable   - CT Chest A&P showing diffuse axillary, inguinal mediastinal RP lymphadenopathy.  - Appreciate Heme recs   - Plan : Considering Supraclavicular lymph node biopsy    #L Common Femoral Vein DVT   - Non-occlusive   - Prophylactic Lovenox until back on Heparin drip  - Plan to restart heparin drip after decision for Lymph node biopsy    #DVT ppx: Prophylactic lovenox until back on heparin drip     ID  #Empyema 2/2 Pansensitive Strep Pneumo  #Strep Pneumo Bacteremia  -BCx + strep pneumo; fluid culture with strep pneumo  -repeat BCx 4/20 ngtd, ReCx 4/23 due to fever   -CT Chest A&P demonstrates LLL empyema not showing any other intraabdominal infectious source  -was on vanc/zosyn/azithro (4/16)-->deescalated to CTX (4/17 -4/23), Added on Vancomycin (4/23-) due to MRSE 1/2 Cx, expanded coverage to Cefepime (4/23 1x dose), back on CTX, switched to Unasyn 3g Q6 (4/25- )  - Fever curve trending down, but persistently febrile. If persistent fevers, MARIO ALBERTO to eval for Endocarditis   - ID Consulted for persistent fevers despite source control; appreciate recs   - IVIG 4/26 x1    #Positive MRSE BCx 1 of 2; contaminant   - Repeat Cx negative  - Vancomycin (4/16, 4/23-4/25)    Plan  c/w with Unasyn (4/25  -   )     Endo  -ISS q6h while NPO w/ tube feed     Ethics: Full Code  Only known family member is Aunt Mili Gordon ([phone], [phone]) who lives in [place]

## 2024-04-28 NOTE — PROGRESS NOTE ADULT - PROBLEM SELECTOR PLAN 1
S/P pigtail suction  TPA interpleural 6 doses completed  Minimal output  No surgical intervention>recommend removal  Care as per MICU  Daily CXR S/P pigtail suction  TPA interpleural 5 doses completed  Minimal output  No surgical intervention>Thoracic surgery to sign off  Care as per MICU  Daily CXR

## 2024-04-28 NOTE — CHART NOTE - NSCHARTNOTEFT_GEN_A_CORE
With the patient in a supine position, the patient had an indwelling 7.5 endotracheal tube through which bronchoscopy was performed for guiding the tracheostomy procedure.  We positioned the patient’s neck in hyperextension and inspected and examined the neck anatomy, and then we selected a spot for the tracheostomy between the1st and the 2nd tracheal rings. After the neck area was prepared in a sterile fashion, we injected lidocaine mixed with epinephrine in the four quadrants for subcutaneous analgesia. We then made a small horizontal incision and dissected the subcutaneous tissue till the level of the trachea. Then we advanced a needle perpendicular to the airway while the trachea was held in place then advanced the guide wire through the needle with bronchoscopic visualization. We then used the small white dilator available in the percutaneous tracheostomy kit firmly advancing it and rotating it over the guide wire. The dilator was then removed and then we inserted the larger white dilator over the white stiffening catheter and over the guide wire until the thick black line. At that point, the large white dilator was removed, and we inserted a number 8.0 non fenestrated Portex tracheostomy tube over the Purple dilator and over the guide wire with the stiffening catheter. Once the tracheostomy tube was properly placed inside the airway and noted bronchoscopically, we removed the dilator, the guide wire and the stiffening catheter. The inner cannula was placed inside. The trach was fastened to the neck with straps. There was no evidence of bleeding and the tip of the tube. INTERVENTIONAL PULMONOLOGY POST-TRACHEOSTOMY NOTE      Patient underwent uneventful percutaneous tracheostomy with size 8 portex. See procedure notes for further details.       Post-tracheostomy care instructions:  -Minimize tension on tracheostomy: Keep tracheostomy elevated on towels to maintain perpendicular to neck and keep enough slack on vent tubing to avoid tension  -Sedate/Restrain patient to ensure does not pull at tracheostomy  -Keep tracheostomy retention collar in place at all times  -Utilize tracheostomy specific in-line suction or red rubber suction tubing through the swivel valve  -First dressing change at 72 hours. If dressings are grossly saturated before that time, reenforce dressings and page pulmonary/critical care fellow  -Sutures for 310 days. Please do not remove before then. Sutures may be removed by the primary service. If feel there are issues with the sutures, please contact MICU/Pulmonary fellow  -First tracheostomy change to happen in 3 weeks. If patient still admitted at that time please page pulmonary service to arrange exchange      -If issues with bleeding call pulmonary/critical care fellow on call  -If tracheostomy becomes dislodged prior to initial tracheostomy exchange do NOT attempt to replace the tracheostomy. Call MICU or Anesthesia immediately to intubate the patient orally with an ETT and page pulmonary/critical care fellow on call    William Thompson MD.   Interventional Pulmonology.

## 2024-04-28 NOTE — PROCEDURE NOTE - NSBRONCHPROCDETAILS_GEN_A_CORE_FT
Indication: percutaneous tracheostomy guidance, surveillance bronchoscopy    Operators: William Motley    Pre-op Dx: Empyema, CLL, respiratory failure    Preop medications: 8mg Midazolam, 100mcg Fentanyl, Propofol gtt, 20mg Cisatracurium    Findings:  Bronchoscope inserted through ETT. ETT noted to be in good position. tracheostomy placement visualized. Following procedure, bronchoscope inserted through the trach. Small amounts of thin blood secretion in central airways, over main juan and main bronchi bilaterally, aspirated. Scattered distal mucoid secretions. Therapeutic aspiration performed. trach.    Specimens: n/a

## 2024-04-28 NOTE — PROGRESS NOTE ADULT - SUBJECTIVE AND OBJECTIVE BOX
Subjective: Intubated Sedated      V/S  T(C): 37.7 (04-28-24 @ 09:00), Max: 38.5 (04-27-24 @ 20:00)  HR: 98 (04-28-24 @ 10:30) (83 - 114)  BP: 97/46 (04-28-24 @ 10:30) (82/44 - 158/70)  RR: 17 (04-28-24 @ 10:30) (14 - 38)  SpO2: 100% (04-28-24 @ 10:30) (95% - 100%)      Mode: AC/ CMV (Assist Control/ Continuous Mandatory Ventilation)  RR (machine): 14  TV (machine): 460  FiO2: 30  PEEP: 5  ITime: 1  MAP: 10  PIP: 25    CHEST TUBES:    Right CT   [ x ]LWS  [  ]H2O seal    I&O's Detail    27 Apr 2024 07:01  -  28 Apr 2024 07:00  --------------------------------------------------------  IN:    Enteral Tube Flush: 220 mL    IV PiggyBack: 400 mL    Norepinephrine: 1421.8 mL    Propofol: 559.2 mL    Vital High Protein: 810 mL  Total IN: 3411 mL    OUT:    Chest Tube (mL): 0 mL    Voided (mL): 1050 mL  Total OUT: 1050 mL    Total NET: 2361 mL      28 Apr 2024 07:01  -  28 Apr 2024 12:09  --------------------------------------------------------  IN:    Norepinephrine: 208.2 mL    Propofol: 116.5 mL    Vital High Protein: 70 mL  Total IN: 394.7 mL    OUT:  Total OUT: 0 mL    Total NET: 394.7 mL          04-27-24 @ 07:01  -  04-28-24 @ 07:00  --------------------------------------------------------  IN: 3411 mL / OUT: 1050 mL / NET: 2361 mL    04-28-24 @ 07:01  -  04-28-24 @ 12:09  --------------------------------------------------------  IN: 394.7 mL / OUT: 0 mL / NET: 394.7 mL      MEDICATIONS  (STANDING):  albuterol/ipratropium for Nebulization 3 milliLiter(s) Nebulizer every 8 hours  ampicillin/sulbactam  IVPB      ampicillin/sulbactam  IVPB 3 Gram(s) IV Intermittent every 6 hours  chlorhexidine 0.12% Liquid 15 milliLiter(s) Oral Mucosa every 12 hours  chlorhexidine 2% Cloths 1 Application(s) Topical <User Schedule>  droxidopa 300 milliGRAM(s) Oral <User Schedule>  enoxaparin Injectable 40 milliGRAM(s) SubCutaneous every 12 hours  insulin lispro (ADMELOG) corrective regimen sliding scale   SubCutaneous every 6 hours  norepinephrine Infusion 0.02 MICROgram(s)/kG/Min (4.16 mL/Hr) IV Continuous <Continuous>  polyethylene glycol 3350 17 Gram(s) Oral every 12 hours  propofol Infusion 9.91 MICROgram(s)/kG/Min (6.6 mL/Hr) IV Continuous <Continuous>  senna Syrup 10 milliLiter(s) Oral at bedtime  sodium chloride 3%  Inhalation 4 milliLiter(s) Inhalation every 8 hours      04-28    139  |  103  |  10  ----------------------------<  141<H>  4.1   |  25  |  0.30<L>    Ca    8.5      28 Apr 2024 00:31  Phos  2.9     04-28  Mg     2.5     04-28    TPro  6.3  /  Alb  2.4<L>  /  TBili  0.5  /  DBili  x   /  AST  31  /  ALT  29  /  AlkPhos  74  04-28                               8.1    175.19 )-----------( 276      ( 28 Apr 2024 00:31 )             25.5        PT/INR - ( 28 Apr 2024 00:31 )   PT: 13.4 sec;   INR: 1.23 ratio         PTT - ( 28 Apr 2024 00:31 )  PTT:30.7 sec         CAPILLARY BLOOD GLUCOSE      POCT Blood Glucose.: 141 mg/dL (28 Apr 2024 11:38)  POCT Blood Glucose.: 188 mg/dL (28 Apr 2024 05:14)  POCT Blood Glucose.: 177 mg/dL (27 Apr 2024 17:22)             Physical Exam:    GENERAL: NAD,  intubated and sedated on propofol;  HEART: Regular rate and rhythm, no murmurs, rubs, or gallops  LUNGS: Unlabored respirations, triggering own respirations intermittently,   diminished lungs L pigtail in place>serosang fluid w/ stranding noted  ABDOMEN: Soft,obese abdomen  nontender, nondistended, +BS  EXTREMITIES Warm well perfused  No clubbing, cyanosis, or edema  NERVOUS SYSTEM:  Intubated sedated on Propofol, opens eyes and follows commands             PAST MEDICAL & SURGICAL HISTORY:  Acute CHF      COPD, severe               Subjective: Intubated Sedated      V/S  T(C): 37.7 (04-28-24 @ 09:00), Max: 38.5 (04-27-24 @ 20:00)  HR: 98 (04-28-24 @ 10:30) (83 - 114)  BP: 97/46 (04-28-24 @ 10:30) (82/44 - 158/70)  RR: 17 (04-28-24 @ 10:30) (14 - 38)  SpO2: 100% (04-28-24 @ 10:30) (95% - 100%)      Mode: AC/ CMV (Assist Control/ Continuous Mandatory Ventilation)  RR (machine): 14  TV (machine): 460  FiO2: 30  PEEP: 5  ITime: 1  MAP: 10  PIP: 25      I&O's Detail    27 Apr 2024 07:01  -  28 Apr 2024 07:00  --------------------------------------------------------  IN:    Enteral Tube Flush: 220 mL    IV PiggyBack: 400 mL    Norepinephrine: 1421.8 mL    Propofol: 559.2 mL    Vital High Protein: 810 mL  Total IN: 3411 mL    OUT:    Chest Tube (mL): 0 mL    Voided (mL): 1050 mL  Total OUT: 1050 mL    Total NET: 2361 mL      28 Apr 2024 07:01  -  28 Apr 2024 12:09  --------------------------------------------------------  IN:    Norepinephrine: 208.2 mL    Propofol: 116.5 mL    Vital High Protein: 70 mL  Total IN: 394.7 mL    OUT:  Total OUT: 0 mL    Total NET: 394.7 mL          04-27-24 @ 07:01  -  04-28-24 @ 07:00  --------------------------------------------------------  IN: 3411 mL / OUT: 1050 mL / NET: 2361 mL    04-28-24 @ 07:01  -  04-28-24 @ 12:09  --------------------------------------------------------  IN: 394.7 mL / OUT: 0 mL / NET: 394.7 mL      MEDICATIONS  (STANDING):  albuterol/ipratropium for Nebulization 3 milliLiter(s) Nebulizer every 8 hours  ampicillin/sulbactam  IVPB      ampicillin/sulbactam  IVPB 3 Gram(s) IV Intermittent every 6 hours  chlorhexidine 0.12% Liquid 15 milliLiter(s) Oral Mucosa every 12 hours  chlorhexidine 2% Cloths 1 Application(s) Topical <User Schedule>  droxidopa 300 milliGRAM(s) Oral <User Schedule>  enoxaparin Injectable 40 milliGRAM(s) SubCutaneous every 12 hours  insulin lispro (ADMELOG) corrective regimen sliding scale   SubCutaneous every 6 hours  norepinephrine Infusion 0.02 MICROgram(s)/kG/Min (4.16 mL/Hr) IV Continuous <Continuous>  polyethylene glycol 3350 17 Gram(s) Oral every 12 hours  propofol Infusion 9.91 MICROgram(s)/kG/Min (6.6 mL/Hr) IV Continuous <Continuous>  senna Syrup 10 milliLiter(s) Oral at bedtime  sodium chloride 3%  Inhalation 4 milliLiter(s) Inhalation every 8 hours      04-28    139  |  103  |  10  ----------------------------<  141<H>  4.1   |  25  |  0.30<L>    Ca    8.5      28 Apr 2024 00:31  Phos  2.9     04-28  Mg     2.5     04-28    TPro  6.3  /  Alb  2.4<L>  /  TBili  0.5  /  DBili  x   /  AST  31  /  ALT  29  /  AlkPhos  74  04-28                               8.1    175.19 )-----------( 276      ( 28 Apr 2024 00:31 )             25.5        PT/INR - ( 28 Apr 2024 00:31 )   PT: 13.4 sec;   INR: 1.23 ratio         PTT - ( 28 Apr 2024 00:31 )  PTT:30.7 sec         CAPILLARY BLOOD GLUCOSE      POCT Blood Glucose.: 141 mg/dL (28 Apr 2024 11:38)  POCT Blood Glucose.: 188 mg/dL (28 Apr 2024 05:14)  POCT Blood Glucose.: 177 mg/dL (27 Apr 2024 17:22)             Physical Exam:    GENERAL: NAD,  intubated and sedated on propofol;  HEART: Regular rate and rhythm, no murmurs, rubs, or gallops  LUNGS: Unlabored respirations, triggering own respirations intermittently,   ABDOMEN: Soft,obese abdomen  nontender, nondistended, +BS  EXTREMITIES Warm well perfused  No clubbing, cyanosis, or edema  NERVOUS SYSTEM:  Intubated sedated on Propofol, opens eyes and follows commands             PAST MEDICAL & SURGICAL HISTORY:  Acute CHF      COPD, severe

## 2024-04-28 NOTE — CHART NOTE - NSCHARTNOTEFT_GEN_A_CORE
Extensive discussion with aunt about failure to wean off the ventilator despite optimizing respiratory status, treating and draining underlying empyema. We discussed that given prolonged intubation, and no indication of immediate liberation of the ventilator, and after extensive goals of care discussion, the aunt wants to proceed with a tracheostomy. Will plan for bedside today.    20 additional minutes spent on this discussion

## 2024-04-29 LAB
ALBUMIN SERPL ELPH-MCNC: 2.6 G/DL — LOW (ref 3.3–5)
ALP SERPL-CCNC: 77 U/L — SIGNIFICANT CHANGE UP (ref 40–120)
ALT FLD-CCNC: 27 U/L — SIGNIFICANT CHANGE UP (ref 10–45)
ANION GAP SERPL CALC-SCNC: 9 MMOL/L — SIGNIFICANT CHANGE UP (ref 5–17)
APTT BLD: 101.7 SEC — HIGH (ref 24.5–35.6)
APTT BLD: 32.3 SEC — SIGNIFICANT CHANGE UP (ref 24.5–35.6)
APTT BLD: 54.9 SEC — HIGH (ref 24.5–35.6)
AST SERPL-CCNC: 30 U/L — SIGNIFICANT CHANGE UP (ref 10–40)
BASOPHILS # BLD AUTO: 0.09 K/UL — SIGNIFICANT CHANGE UP (ref 0–0.2)
BASOPHILS NFR BLD AUTO: 0.1 % — SIGNIFICANT CHANGE UP (ref 0–2)
BILIRUB SERPL-MCNC: 0.5 MG/DL — SIGNIFICANT CHANGE UP (ref 0.2–1.2)
BUN SERPL-MCNC: 7 MG/DL — SIGNIFICANT CHANGE UP (ref 7–23)
CALCIUM SERPL-MCNC: 8.5 MG/DL — SIGNIFICANT CHANGE UP (ref 8.4–10.5)
CHLORIDE SERPL-SCNC: 107 MMOL/L — SIGNIFICANT CHANGE UP (ref 96–108)
CO2 SERPL-SCNC: 26 MMOL/L — SIGNIFICANT CHANGE UP (ref 22–31)
CREAT SERPL-MCNC: <0.3 MG/DL — LOW (ref 0.5–1.3)
CULTURE RESULTS: SIGNIFICANT CHANGE UP
CULTURE RESULTS: SIGNIFICANT CHANGE UP
EGFR: 115 ML/MIN/1.73M2 — SIGNIFICANT CHANGE UP
EOSINOPHIL # BLD AUTO: 0.09 K/UL — SIGNIFICANT CHANGE UP (ref 0–0.5)
EOSINOPHIL NFR BLD AUTO: 0.1 % — SIGNIFICANT CHANGE UP (ref 0–6)
GAS PNL BLDA: SIGNIFICANT CHANGE UP
GLUCOSE BLDC GLUCOMTR-MCNC: 124 MG/DL — HIGH (ref 70–99)
GLUCOSE BLDC GLUCOMTR-MCNC: 163 MG/DL — HIGH (ref 70–99)
GLUCOSE BLDC GLUCOMTR-MCNC: 164 MG/DL — HIGH (ref 70–99)
GLUCOSE SERPL-MCNC: 183 MG/DL — HIGH (ref 70–99)
HCT VFR BLD CALC: 24.5 % — LOW (ref 34.5–45)
HCT VFR BLD CALC: 26.4 % — LOW (ref 34.5–45)
HGB BLD-MCNC: 7.5 G/DL — LOW (ref 11.5–15.5)
HGB BLD-MCNC: 8 G/DL — LOW (ref 11.5–15.5)
IMM GRANULOCYTES NFR BLD AUTO: 0.4 % — SIGNIFICANT CHANGE UP (ref 0–0.9)
INR BLD: 1.31 RATIO — HIGH (ref 0.85–1.18)
LYMPHOCYTES # BLD AUTO: 156.71 K/UL — HIGH (ref 1–3.3)
LYMPHOCYTES # BLD AUTO: 93.2 % — HIGH (ref 13–44)
MAGNESIUM SERPL-MCNC: 2.5 MG/DL — SIGNIFICANT CHANGE UP (ref 1.6–2.6)
MCHC RBC-ENTMCNC: 27.4 PG — SIGNIFICANT CHANGE UP (ref 27–34)
MCHC RBC-ENTMCNC: 27.5 PG — SIGNIFICANT CHANGE UP (ref 27–34)
MCHC RBC-ENTMCNC: 30.3 GM/DL — LOW (ref 32–36)
MCHC RBC-ENTMCNC: 30.6 GM/DL — LOW (ref 32–36)
MCV RBC AUTO: 89.4 FL — SIGNIFICANT CHANGE UP (ref 80–100)
MCV RBC AUTO: 90.7 FL — SIGNIFICANT CHANGE UP (ref 80–100)
MONOCYTES # BLD AUTO: 4.53 K/UL — HIGH (ref 0–0.9)
MONOCYTES NFR BLD AUTO: 2.7 % — SIGNIFICANT CHANGE UP (ref 2–14)
NEUTROPHILS # BLD AUTO: 6.1 K/UL — SIGNIFICANT CHANGE UP (ref 1.8–7.4)
NEUTROPHILS NFR BLD AUTO: 3.5 % — LOW (ref 43–77)
NRBC # BLD: 0 /100 WBCS — SIGNIFICANT CHANGE UP (ref 0–0)
NRBC # BLD: 0 /100 WBCS — SIGNIFICANT CHANGE UP (ref 0–0)
PHOSPHATE SERPL-MCNC: 2.7 MG/DL — SIGNIFICANT CHANGE UP (ref 2.5–4.5)
PLATELET # BLD AUTO: 232 K/UL — SIGNIFICANT CHANGE UP (ref 150–400)
PLATELET # BLD AUTO: 268 K/UL — SIGNIFICANT CHANGE UP (ref 150–400)
POTASSIUM SERPL-MCNC: 4 MMOL/L — SIGNIFICANT CHANGE UP (ref 3.5–5.3)
POTASSIUM SERPL-SCNC: 4 MMOL/L — SIGNIFICANT CHANGE UP (ref 3.5–5.3)
PROT SERPL-MCNC: 6.4 G/DL — SIGNIFICANT CHANGE UP (ref 6–8.3)
PROTHROM AB SERPL-ACNC: 13.6 SEC — HIGH (ref 9.5–13)
RBC # BLD: 2.74 M/UL — LOW (ref 3.8–5.2)
RBC # BLD: 2.91 M/UL — LOW (ref 3.8–5.2)
RBC # FLD: 15.9 % — HIGH (ref 10.3–14.5)
RBC # FLD: 16 % — HIGH (ref 10.3–14.5)
SODIUM SERPL-SCNC: 142 MMOL/L — SIGNIFICANT CHANGE UP (ref 135–145)
SPECIMEN SOURCE: SIGNIFICANT CHANGE UP
SPECIMEN SOURCE: SIGNIFICANT CHANGE UP
WBC # BLD: 147.15 K/UL — CRITICAL HIGH (ref 3.8–10.5)
WBC # BLD: 168.11 K/UL — CRITICAL HIGH (ref 3.8–10.5)
WBC # FLD AUTO: 147.15 K/UL — CRITICAL HIGH (ref 3.8–10.5)
WBC # FLD AUTO: 168.11 K/UL — CRITICAL HIGH (ref 3.8–10.5)

## 2024-04-29 PROCEDURE — 71045 X-RAY EXAM CHEST 1 VIEW: CPT | Mod: 26

## 2024-04-29 PROCEDURE — 99233 SBSQ HOSP IP/OBS HIGH 50: CPT | Mod: GC

## 2024-04-29 PROCEDURE — 99291 CRITICAL CARE FIRST HOUR: CPT | Mod: GC

## 2024-04-29 RX ORDER — QUETIAPINE FUMARATE 200 MG/1
25 TABLET, FILM COATED ORAL AT BEDTIME
Refills: 0 | Status: DISCONTINUED | OUTPATIENT
Start: 2024-04-29 | End: 2024-05-02

## 2024-04-29 RX ORDER — HEPARIN SODIUM 5000 [USP'U]/ML
INJECTION INTRAVENOUS; SUBCUTANEOUS
Qty: 25000 | Refills: 0 | Status: DISCONTINUED | OUTPATIENT
Start: 2024-04-29 | End: 2024-04-30

## 2024-04-29 RX ORDER — ACETAMINOPHEN 500 MG
650 TABLET ORAL EVERY 6 HOURS
Refills: 0 | Status: DISCONTINUED | OUTPATIENT
Start: 2024-04-29 | End: 2024-05-16

## 2024-04-29 RX ORDER — QUETIAPINE FUMARATE 200 MG/1
25 TABLET, FILM COATED ORAL DAILY
Refills: 0 | Status: DISCONTINUED | OUTPATIENT
Start: 2024-04-29 | End: 2024-04-29

## 2024-04-29 RX ORDER — ACETAMINOPHEN 500 MG
1000 TABLET ORAL ONCE
Refills: 0 | Status: COMPLETED | OUTPATIENT
Start: 2024-04-29 | End: 2024-04-29

## 2024-04-29 RX ADMIN — QUETIAPINE FUMARATE 25 MILLIGRAM(S): 200 TABLET, FILM COATED ORAL at 21:22

## 2024-04-29 RX ADMIN — AMPICILLIN SODIUM AND SULBACTAM SODIUM 200 GRAM(S): 250; 125 INJECTION, POWDER, FOR SUSPENSION INTRAMUSCULAR; INTRAVENOUS at 12:11

## 2024-04-29 RX ADMIN — SODIUM CHLORIDE 4 MILLILITER(S): 9 INJECTION INTRAMUSCULAR; INTRAVENOUS; SUBCUTANEOUS at 05:20

## 2024-04-29 RX ADMIN — DROXIDOPA 300 MILLIGRAM(S): 100 CAPSULE ORAL at 05:56

## 2024-04-29 RX ADMIN — Medication 1000 MILLIGRAM(S): at 14:08

## 2024-04-29 RX ADMIN — HEPARIN SODIUM 2000 UNIT(S)/HR: 5000 INJECTION INTRAVENOUS; SUBCUTANEOUS at 09:45

## 2024-04-29 RX ADMIN — DROXIDOPA 300 MILLIGRAM(S): 100 CAPSULE ORAL at 12:13

## 2024-04-29 RX ADMIN — Medication 3 MILLILITER(S): at 13:33

## 2024-04-29 RX ADMIN — Medication 4.16 MICROGRAM(S)/KG/MIN: at 09:49

## 2024-04-29 RX ADMIN — AMPICILLIN SODIUM AND SULBACTAM SODIUM 200 GRAM(S): 250; 125 INJECTION, POWDER, FOR SUSPENSION INTRAMUSCULAR; INTRAVENOUS at 00:23

## 2024-04-29 RX ADMIN — Medication 1: at 05:56

## 2024-04-29 RX ADMIN — AMPICILLIN SODIUM AND SULBACTAM SODIUM 200 GRAM(S): 250; 125 INJECTION, POWDER, FOR SUSPENSION INTRAMUSCULAR; INTRAVENOUS at 18:16

## 2024-04-29 RX ADMIN — Medication 400 MILLIGRAM(S): at 13:53

## 2024-04-29 RX ADMIN — Medication 3 MILLILITER(S): at 05:20

## 2024-04-29 RX ADMIN — Medication 1: at 18:17

## 2024-04-29 RX ADMIN — CHLORHEXIDINE GLUCONATE 1 APPLICATION(S): 213 SOLUTION TOPICAL at 05:59

## 2024-04-29 RX ADMIN — CHLORHEXIDINE GLUCONATE 15 MILLILITER(S): 213 SOLUTION TOPICAL at 05:56

## 2024-04-29 RX ADMIN — SODIUM CHLORIDE 4 MILLILITER(S): 9 INJECTION INTRAMUSCULAR; INTRAVENOUS; SUBCUTANEOUS at 13:33

## 2024-04-29 RX ADMIN — Medication 650 MILLIGRAM(S): at 23:00

## 2024-04-29 RX ADMIN — HEPARIN SODIUM 2000 UNIT(S)/HR: 5000 INJECTION INTRAVENOUS; SUBCUTANEOUS at 22:56

## 2024-04-29 RX ADMIN — AMPICILLIN SODIUM AND SULBACTAM SODIUM 200 GRAM(S): 250; 125 INJECTION, POWDER, FOR SUSPENSION INTRAMUSCULAR; INTRAVENOUS at 23:01

## 2024-04-29 RX ADMIN — DROXIDOPA 300 MILLIGRAM(S): 100 CAPSULE ORAL at 18:18

## 2024-04-29 RX ADMIN — CHLORHEXIDINE GLUCONATE 15 MILLILITER(S): 213 SOLUTION TOPICAL at 18:19

## 2024-04-29 RX ADMIN — Medication 1: at 00:23

## 2024-04-29 RX ADMIN — AMPICILLIN SODIUM AND SULBACTAM SODIUM 200 GRAM(S): 250; 125 INJECTION, POWDER, FOR SUSPENSION INTRAMUSCULAR; INTRAVENOUS at 05:59

## 2024-04-29 RX ADMIN — HEPARIN SODIUM 2200 UNIT(S)/HR: 5000 INJECTION INTRAVENOUS; SUBCUTANEOUS at 16:43

## 2024-04-29 NOTE — PROGRESS NOTE ADULT - ATTENDING COMMENTS
69 year old female with a history of CVA (bedbound at baseline) COPD, CHF, HTN p/w CLL,   strep pneumonia bacteremia, pna and empyema s/p chest tube and mist tx and now s/p trach.       - baseline L sided deficits, squeezing R hand   - minimize sedation as able, titrate to goal rass  - Septic shock, maintained on levophed, map>65  - s/p chest tube removal this weekend  - pocus w/ no significant residual effusion today  - still spiking fever, will repeat cx  - cont unasyn and f/u ID reccs  - cont a/c for dvt  - s/p trach, no bleeding  - cont PS trial as tolerated  - plan for PEG tube

## 2024-04-29 NOTE — PROGRESS NOTE ADULT - ASSESSMENT
69F h/o CVA (bedbound at baseline), COPD, CHF, HTN, CLL p/w acute shortness of breath to outside ED found to have acute pulmonary edema and PNA transferred to [facility] for possible leukophoresis. Patient found to have empyema 2/2 Pansensitive Strep Pneumo with associated bacteremia.  S/p intubation with inability to wean, s/p tracheostomy tube on 4/28 POD #1    - Continue trach care.  - Post trach care instructions as in previous note  - Sutures to be removed on Day 10.  - SLP eval  - Wean off ventilator as tolerated by ICU team.   - Titrate off pressors.  - First trach change at 3 weeks unless clinically indicated sooner.     IP team to follow for any trach related issues.

## 2024-04-29 NOTE — PROGRESS NOTE ADULT - ASSESSMENT
69F h/o CVA (bedbound at baseline), COPD, CHF, HTN presenting as transfer from [facility] for SOB iso leukocytosis to 233 c/f acute leukemia. Pt intubated and on pressors, transferred to MICU for further management, course c/b empyema s/p chest tube placement (4/16) and removal, reaccumulation of loculated pleural effusion s/p L pigtail placement and removal on 4/27     Neuro  #Encephalopathy; currently intubated and sedated on Propofol   -p/w lethargy likely sepsis; Per Heme, leukostasis unlikely as cells mature  -CTH showing old infarct, no acute process  - off Precedex, on propofol, following commands, aim for RASS score -1 to 0  - CPAP trials as tolerated     CV  #Septic Shock   - Strep Pneumo Empyema  - Back on Levophed to wean off levo while on Droxidopa 300 TID     #Chest Pain, Resolved   -demand vs leukostasis vs non cardiac   -reported JOSEF in II, III, aVF at [facility] --> repeat EKG without ST changes or Q waves  -trops peaked    #CHF  -unclear hx but elevated pro-BNP to 2600s  -TTE: EF 54%, no significant abnormalities  - Diurese as appropriate    Resp  #Acute hypoxic respiratory failure  #Empyema Pansensitive Strep Pneumo  -intubated: 460/14/30/5; wean off as tolerated w/ daily CPAP trial  -s/p thora draining 1500cc fluid 4/16 with chest tube placement; c/w exudative effusion growing Strep Pneumo  -MRSA neg  -pleural fluid growing strep pneumo; BCx growing strep pneumo; Pansensitive  -was on vanc/zosyn/azithro (4/16)-->deescalated to CTX 2g qd (4/17 - ); c/w total 14-day course of Abx therapy   -CT chest 4/22 reveals persistent loculated effusion, s/p IR Pigtail placement 4/24-4/27 w/ Exudative effusion; Alteplase and Dornase through pigtal x6 doses; unable to finish 6th Alteplase+Dornase due to catheter occlusion   - Repeat CTC 4/27 - decreased small pleural effusions, thoracic LAD   - CPAP Trial daily     Plan:  - CPAP trial daily   - CT Chest w/ Contrast evaluate degree of residual effusion   - Therapeutic heparin held for consideration for LAD biopsy   - Intubated on 4/16, patient will need trach if not able to be extubated by 2-week jewell    GI  #Diet: TF via OG tube    /Renal  -no active issues    Heme/Onc  #Leukocytosis  #CLL  - on presentation; improving to 130s  -no plan for leukophoresis at this time given mature lymphocytes  -stop trending TLS labs as they have been stable   - CT Chest A&P showing diffuse axillary, inguinal mediastinal RP lymphadenopathy.  - Appreciate Heme recs   - Plan : Considering Supraclavicular lymph node biopsy    #L Common Femoral Vein DVT   - Non-occlusive   - Prophylactic Lovenox until back on Heparin drip  - Plan to restart heparin drip after decision for Lymph node biopsy    #DVT ppx: Prophylactic lovenox until back on heparin drip     ID  #Empyema 2/2 Pansensitive Strep Pneumo  #Strep Pneumo Bacteremia  -BCx + strep pneumo; fluid culture with strep pneumo  -repeat BCx 4/20 ngtd, ReCx 4/23 due to fever   -CT Chest A&P demonstrates LLL empyema not showing any other intraabdominal infectious source  -was on vanc/zosyn/azithro (4/16)-->deescalated to CTX (4/17 -4/23), Added on Vancomycin (4/23-) due to MRSE 1/2 Cx, expanded coverage to Cefepime (4/23 1x dose), back on CTX, switched to Unasyn 3g Q6 (4/25- )  - Fever curve trending down, but persistently febrile. If persistent fevers, MARIO ALBERTO to eval for Endocarditis   - ID Consulted for persistent fevers despite source control; appreciate recs   - IVIG 4/26 x1    #Positive MRSE BCx 1 of 2; contaminant   - Repeat Cx negative  - Vancomycin (4/16, 4/23-4/25)    Plan  c/w with Unasyn (4/25  -   )     Endo  -ISS q6h while NPO w/ tube feed     Ethics: Full Code  Only known family member is Aunt Mili Gordon ([phone], [phone]) who lives in [place]   69F h/o CVA (bedbound at baseline), COPD, CHF, HTN presenting as transfer from [facility] for SOB iso leukocytosis to 233 c/f acute leukemia. Pt intubated and on pressors, transferred to MICU for further management, course c/b empyema s/p chest tube placement (4/16) and removal, reaccumulation of loculated pleural effusion s/p L pigtail placement and removal on 4/27     Neuro  #Encephalopathy; currently intubated and sedated on Propofol   -p/w lethargy likely sepsis; Per Heme, leukostasis unlikely as cells mature  -CTH showing old infarct, no acute process  - off Precedex and propofol, following commands  - Seroquel QHS as patient has been tugging at NG tube    CV  #Septic Shock   - Strep Pneumo Empyema  - Back on Levophed to wean off levo while on Droxidopa 300 TID     #Chest Pain, Resolved   -demand vs leukostasis vs non cardiac   -reported JOSEF in II, III, aVF at [facility] --> repeat EKG without ST changes or Q waves  -trops peaked    #CHF  -unclear hx but elevated pro-BNP to 2600s  -TTE: EF 54%, no significant abnormalities  - Diurese as appropriate    Resp  #Acute hypoxic respiratory failure  #Empyema Pansensitive Strep Pneumo  -intubated: 460/14/30/5; wean off as tolerated w/ daily CPAP trial  -s/p thora draining 1500cc fluid 4/16 with chest tube placement; c/w exudative effusion growing Strep Pneumo  -MRSA neg  -pleural fluid growing strep pneumo; BCx growing strep pneumo; Pansensitive  -was on vanc/zosyn/azithro (4/16)-->deescalated to CTX 2g qd (4/17 - ); c/w total 14-day course of Abx therapy   -CT chest 4/22 reveals persistent loculated effusion, s/p IR Pigtail placement 4/24-4/27 w/ Exudative effusion; Alteplase and Dornase through pigtal x6 doses; unable to finish 6th Alteplase+Dornase due to catheter occlusion   - Repeat CTC 4/27 - decreased small pleural effusions, thoracic LAD   - s/p Trach placement 4/28    Plan:  - CPAP trial daily     GI  #Diet: TF via OG tube  Patient will need PEG tube, discuss w/ Aunt in [place] next of kin    /Renal  -no active issues    Heme/Onc  #Leukocytosis  #CLL  - on presentation; improving to 130s  -no plan for leukophoresis at this time given mature lymphocytes  -stop trending TLS labs as they have been stable   - CT Chest A&P showing diffuse axillary, inguinal mediastinal RP lymphadenopathy.  - Appreciate Heme recs; no plans at this time for lymph node Bx    #L Common Femoral Vein DVT   - Non-occlusive   - Heparin Drip 4/29-    #DVT ppx: Heparin Drip     ID  #Empyema 2/2 Pansensitive Strep Pneumo  #Strep Pneumo Bacteremia  -BCx + strep pneumo; fluid culture with strep pneumo  -repeat BCx 4/20 ngtd, ReCx 4/23 due to fever   -CT Chest A&P demonstrates LLL empyema not showing any other intraabdominal infectious source  -was on vanc/zosyn/azithro (4/16)-->deescalated to CTX (4/17 -4/23), Added on Vancomycin (4/23-) due to MRSE 1/2 Cx, expanded coverage to Cefepime (4/23 1x dose), back on CTX, switched to Unasyn 3g Q6 (4/25- )  - Fever curve trending down, but persistently febrile. If persistent fevers, MARIO ALBERTO to eval for Endocarditis   - ID Consulted for persistent fevers despite source control; appreciate recs   - IVIG 4/26 x1    Plan    c/w with Unasyn (4/25  -   )   f/u repeat BCx 4/29-    #Positive MRSE BCx 1 of 2; contaminant   - Repeat Cx negative  - Vancomycin (4/16, 4/23-4/25)    Endo  -ISS q6h while NPO w/ tube feed     Ethics: Full Code  Only known family member is Aunt Mili Gordon ([phone], [phone]) who lives in [place]

## 2024-04-29 NOTE — PROGRESS NOTE ADULT - SUBJECTIVE AND OBJECTIVE BOX
INTERVAL HPI/OVERNIGHT EVENTS: Yesterday, Pigtail catheter removed. CTC was done. Weaning sedation as tolerated for CPAP trials. Several episodes of loose stools overnight. Tmax 100.9 overnight.  Maintained on stable dose of Levo.   Intubated settings: VC 14/1460/5/30%    SUBJECTIVE: Patient seen and examined at bedside.     ros unable to be obtained due to altered mentation.     OBJECTIVE:    VITAL SIGNS:  ICU Vital Signs Last 24 Hrs  T(C): 37.9 (28 Apr 2024 04:00), Max: 38.5 (27 Apr 2024 20:00)  T(F): 100.2 (28 Apr 2024 04:00), Max: 101.3 (27 Apr 2024 20:00)  HR: 106 (28 Apr 2024 07:00) (83 - 114)  BP: 102/51 (28 Apr 2024 07:00) (82/44 - 158/70)  BP(mean): 73 (28 Apr 2024 07:00) (57 - 100)  ABP: --  ABP(mean): --  RR: 22 (28 Apr 2024 07:00) (14 - 38)  SpO2: 100% (28 Apr 2024 07:00) (95% - 100%)    O2 Parameters below as of 28 Apr 2024 05:30  Patient On (Oxygen Delivery Method): ventilator          Mode: AC/ CMV (Assist Control/ Continuous Mandatory Ventilation), RR (machine): 14, TV (machine): 460, FiO2: 40, PEEP: 5, ITime: 1, MAP: 9, PIP: 24    04-27 @ 07:01  -  04-28 @ 07:00  --------------------------------------------------------  IN: 3411 mL / OUT: 1050 mL / NET: 2361 mL      CAPILLARY BLOOD GLUCOSE      POCT Blood Glucose.: 188 mg/dL (28 Apr 2024 05:14)      PHYSICAL EXAM:    General: NAD, large body habitus   HEENT: NC/AT; PERRL, clear conjunctiva  Respiratory: CTA b/l anterior, decrease breath sounds at bases   Cardiovascular: +S1/S2;   Abdomen: soft, NT/ND; +BS x4  Extremities: WWP, 2+ peripheral pulses b/l; B/L Le edema  Skin: normal color and turgor; no rash  Neurological: Awake, arouses to verbal stimuli, open eyes, doesn't follow commands    MEDICATIONS:  MEDICATIONS  (STANDING):  albuterol/ipratropium for Nebulization 3 milliLiter(s) Nebulizer every 8 hours  ampicillin/sulbactam  IVPB      ampicillin/sulbactam  IVPB 3 Gram(s) IV Intermittent every 6 hours  chlorhexidine 0.12% Liquid 15 milliLiter(s) Oral Mucosa every 12 hours  chlorhexidine 2% Cloths 1 Application(s) Topical <User Schedule>  droxidopa 300 milliGRAM(s) Oral <User Schedule>  enoxaparin Injectable 40 milliGRAM(s) SubCutaneous every 12 hours  insulin lispro (ADMELOG) corrective regimen sliding scale   SubCutaneous every 6 hours  norepinephrine Infusion 0.02 MICROgram(s)/kG/Min (4.16 mL/Hr) IV Continuous <Continuous>  polyethylene glycol 3350 17 Gram(s) Oral every 12 hours  propofol Infusion 9.91 MICROgram(s)/kG/Min (6.6 mL/Hr) IV Continuous <Continuous>  senna Syrup 10 milliLiter(s) Oral at bedtime  sodium chloride 3%  Inhalation 4 milliLiter(s) Inhalation every 8 hours    MEDICATIONS  (PRN):  fentaNYL    Injectable 50 MICROGram(s) IV Push every 2 hours PRN Moderate Pain (4 - 6)  nystatin Powder 1 Application(s) Topical two times a day PRN skin irritation      ALLERGIES:  Allergies    No Known Allergies    Intolerances        LABS:                        8.1    175.19 )-----------( 276      ( 28 Apr 2024 00:31 )             25.5     04-28    139  |  103  |  10  ----------------------------<  141<H>  4.1   |  25  |  0.30<L>    Ca    8.5      28 Apr 2024 00:31  Phos  2.9     04-28  Mg     2.5     04-28    TPro  6.3  /  Alb  2.4<L>  /  TBili  0.5  /  DBili  x   /  AST  31  /  ALT  29  /  AlkPhos  74  04-28    PT/INR - ( 28 Apr 2024 00:31 )   PT: 13.4 sec;   INR: 1.23 ratio         PTT - ( 28 Apr 2024 00:31 )  PTT:30.7 sec  Urinalysis Basic - ( 28 Apr 2024 00:31 )    Color: x / Appearance: x / SG: x / pH: x  Gluc: 141 mg/dL / Ketone: x  / Bili: x / Urobili: x   Blood: x / Protein: x / Nitrite: x   Leuk Esterase: x / RBC: x / WBC x   Sq Epi: x / Non Sq Epi: x / Bacteria: x        RADIOLOGY & ADDITIONAL TESTS: Reviewed.   INTERVAL HPI/OVERNIGHT EVENTS:    SUBJECTIVE: Patient seen and examined at bedside.     ROS: All negative except as listed above.    VITAL SIGNS:  ICU Vital Signs Last 24 Hrs  T(C): 39.1 (29 Apr 2024 14:00), Max: 39.1 (29 Apr 2024 14:00)  T(F): 102.4 (29 Apr 2024 14:00), Max: 102.4 (29 Apr 2024 14:00)  HR: 112 (29 Apr 2024 14:15) (83 - 116)  BP: 108/53 (29 Apr 2024 14:15) (63/39 - 173/72)  BP(mean): 77 (29 Apr 2024 14:15) (47 - 105)  ABP: --  ABP(mean): --  RR: 29 (29 Apr 2024 14:15) (14 - 35)  SpO2: 97% (29 Apr 2024 14:15) (89% - 100%)    O2 Parameters below as of 29 Apr 2024 13:33  Patient On (Oxygen Delivery Method): ventilator          Mode: CPAP with PS, FiO2: 30, PEEP: 5, PS: 15, MAP: 11  Plateau pressure:   P/F ratio:     04-28 @ 07:01  -  04-29 @ 07:00  --------------------------------------------------------  IN: 2927.2 mL / OUT: 1300 mL / NET: 1627.2 mL    04-29 @ 07:01 - 04-29 @ 14:38  --------------------------------------------------------  IN: 599.4 mL / OUT: 0 mL / NET: 599.4 mL      CAPILLARY BLOOD GLUCOSE      POCT Blood Glucose.: 124 mg/dL (29 Apr 2024 12:16)      ECG: reviewed.    PHYSICAL EXAM:    General: NAD, large body habitus   HEENT: NC/AT; PERRL, clear conjunctiva  Respiratory: CTA b/l anterior, decrease breath sounds at bases   Cardiovascular: +S1/S2;   Abdomen: soft, NT/ND; +BS x4  Extremities: WWP, 2+ peripheral pulses b/l; B/L Le edema  Skin: normal color and turgor; no rash  Neurological: Awake, arouses to verbal stimuli, open eyes, doesn't follow commands    MEDICATIONS:  MEDICATIONS  (STANDING):  albuterol/ipratropium for Nebulization 3 milliLiter(s) Nebulizer every 8 hours  ampicillin/sulbactam  IVPB      ampicillin/sulbactam  IVPB 3 Gram(s) IV Intermittent every 6 hours  chlorhexidine 0.12% Liquid 15 milliLiter(s) Oral Mucosa every 12 hours  chlorhexidine 2% Cloths 1 Application(s) Topical <User Schedule>  droxidopa 300 milliGRAM(s) Oral <User Schedule>  heparin  Infusion.  Unit(s)/Hr (20 mL/Hr) IV Continuous <Continuous>  insulin lispro (ADMELOG) corrective regimen sliding scale   SubCutaneous every 6 hours  norepinephrine Infusion 0.02 MICROgram(s)/kG/Min (4.16 mL/Hr) IV Continuous <Continuous>  polyethylene glycol 3350 17 Gram(s) Oral every 12 hours  propofol Infusion 9.91 MICROgram(s)/kG/Min (6.6 mL/Hr) IV Continuous <Continuous>  QUEtiapine 25 milliGRAM(s) Oral at bedtime  senna Syrup 10 milliLiter(s) Oral at bedtime  sodium chloride 3%  Inhalation 4 milliLiter(s) Inhalation every 8 hours    MEDICATIONS  (PRN):  fentaNYL    Injectable 50 MICROGram(s) IV Push every 2 hours PRN Moderate Pain (4 - 6)  nystatin Powder 1 Application(s) Topical two times a day PRN skin irritation      ALLERGIES:  Allergies    No Known Allergies    Intolerances        LABS:                        8.0    168.11 )-----------( 268      ( 29 Apr 2024 00:09 )             26.4     04-29    142  |  107  |  7   ----------------------------<  183<H>  4.0   |  26  |  <0.30<L>    Ca    8.5      29 Apr 2024 00:09  Phos  2.7     04-29  Mg     2.5     04-29    TPro  6.4  /  Alb  2.6<L>  /  TBili  0.5  /  DBili  x   /  AST  30  /  ALT  27  /  AlkPhos  77  04-29    PT/INR - ( 29 Apr 2024 00:09 )   PT: 13.6 sec;   INR: 1.31 ratio         PTT - ( 29 Apr 2024 00:09 )  PTT:32.3 sec  Urinalysis Basic - ( 29 Apr 2024 00:09 )    Color: x / Appearance: x / SG: x / pH: x  Gluc: 183 mg/dL / Ketone: x  / Bili: x / Urobili: x   Blood: x / Protein: x / Nitrite: x   Leuk Esterase: x / RBC: x / WBC x   Sq Epi: x / Non Sq Epi: x / Bacteria: x      ABG:  pH, Arterial: 7.43 (04-29-24 @ 00:00)  pCO2, Arterial: 41 mmHg (04-29-24 @ 00:00)  pO2, Arterial: 80 mmHg (04-29-24 @ 00:00)      vBG:    Micro:    Culture - Blood (collected 04-26-24 @ 13:30)  Source: .Blood Blood-Peripheral  Preliminary Report (04-28-24 @ 18:02):    No growth at 48 Hours    Culture - Blood (collected 04-26-24 @ 13:21)  Source: .Blood Blood-Venous  Preliminary Report (04-28-24 @ 18:02):    No growth at 48 Hours    Culture - Blood (collected 04-24-24 @ 05:53)  Source: .Blood Blood  Final Report (04-29-24 @ 10:01):    No growth at 5 days    Culture - Blood (collected 04-24-24 @ 05:53)  Source: .Blood Blood  Final Report (04-29-24 @ 10:01):    No growth at 5 days    Culture - Blood (collected 04-22-24 @ 21:37)  Source: .Blood Blood-Peripheral  Gram Stain (04-24-24 @ 00:09):    Growth in aerobic bottle: Gram Positive Cocci in Clusters  Final Report (04-24-24 @ 23:16):    Growth in aerobic bottle: Staphylococcus epidermidis    Isolation of Coagulase negative Staphylococcus from single blood culture    sets may represent    contamination. Contact the Microbiology Department at [phone] if    susceptibility testing is    clinically indicated.    Direct identification is available within approximately 3-5    hours either by Blood Panel Multiplexed PCR or Direct    MALDI-TOF. Details: [email or web address]  Organism: Blood Culture PCR (04-24-24 @ 23:16)  Organism: Blood Culture PCR (04-24-24 @ 23:16)      Method Type: PCR      -  Staphylococcus epidermidis, Methicillin resistant: Detec    Culture - Blood (collected 04-22-24 @ 19:30)  Source: .Blood Blood-Peripheral  Final Report (04-28-24 @ 01:00):    No growth at 5 days    Culture - Blood (collected 04-20-24 @ 12:15)  Source: .Blood Blood-Peripheral  Final Report (04-25-24 @ 19:00):    No growth at 5 days    Culture - Blood (collected 04-20-24 @ 12:00)  Source: .Blood Blood-Peripheral  Final Report (04-25-24 @ 19:00):    No growth at 5 days    Culture - Blood (collected 04-18-24 @ 11:00)  Source: .Blood Blood-Peripheral  Final Report (04-23-24 @ 17:01):    No growth at 5 days    Culture - Blood (collected 04-18-24 @ 10:50)  Source: .Blood Blood-Peripheral  Final Report (04-23-24 @ 17:01):    No growth at 5 days    Culture - Blood (collected 04-16-24 @ 15:40)  Source: .Blood Blood-Peripheral  Final Report (04-21-24 @ 21:01):    No growth at 5 days    Culture - Blood (collected 04-16-24 @ 13:17)  Source: .Blood Blood-Peripheral  Final Report (04-21-24 @ 18:01):    No growth at 5 days    Culture - Blood (collected 04-16-24 @ 06:42)  Source: .Blood Blood-Peripheral  Gram Stain (04-18-24 @ 11:52):    Growth in aerobic bottle: Gram Positive Cocci in Pairs and Chains    Growth in anaerobic bottle: Gram positive cocci in pairs  Final Report (04-18-24 @ 11:52):    Growth in aerobic and anaerobic bottles: Streptococcus pneumoniae    See previous culture [id number]    Culture - Blood (collected 04-16-24 @ 06:35)  Source: .Blood Blood-Peripheral  Gram Stain (04-18-24 @ 11:51):    Growth in aerobic bottle: Gram positive cocci in pairs    Growth in anaerobic bottle: Gram positive cocci in pairs  Final Report (04-18-24 @ 11:51):    Growth in aerobic and anaerobic bottles: Streptococcus pneumoniae    Direct identification is available within approximately 3-5    hours either by Blood Panel Multiplexed PCR or Direct    MALDI-TOF. Details: [email or web address]  Organism: Blood Culture PCR  Streptococcus pneumoniae (04-18-24 @ 11:51)  Organism: Streptococcus pneumoniae (04-18-24 @ 11:51)      Method Type: LESLY      -  Clindamycin: S <=0.06      -  Erythromycin: S <=0.06 Predicts results for azithromycin.      -  Levofloxacin: S 1      -  Tetracycline: S <=0.5      -  Trimethoprim/Sulfamethoxazole: S <=.25/4.75      -  Vancomycin: S 0.5      -  Ceftriaxone (meningitidis): S <=0.25      -  Ceftriaxone (non-meningitidis): S <=0.25      -  Penicillin (meningitidis): S 0.06      -  Penicillin (non-meningitidis): S 0.06      -  Penicillin (oral penicillin V): S 0.06  Organism: Blood Culture PCR (04-18-24 @ 11:51)      Method Type: PCR      -  Streptococcus pneumoniae: Detec        Culture - Sputum (collected 04-26-24 @ 13:44)  Source: .Sputum Sputum  Gram Stain (04-26-24 @ 20:33):    Rare polymorphonuclear leukocytes per low power field    No Squamous epithelial cells per low power field    No organisms seen per oil power field  Final Report (04-28-24 @ 08:42):    No growth    Culture - Sputum (collected 04-16-24 @ 15:44)  Source: ET Tube ET Tube  Gram Stain (04-16-24 @ 22:00):    Few polymorphonuclear leukocytes per low power field    No Squamous epithelial cells per low power field    No organisms seen per oil power field  Final Report (04-18-24 @ 09:40):    Normal Respiratory Kayla present        RADIOLOGY & ADDITIONAL TESTS: Reviewed.

## 2024-04-29 NOTE — CHART NOTE - NSCHARTNOTEFT_GEN_A_CORE
left chest tube removed by MICU team 2/2 becoming obstructed overnight on 4/27/24. IR will sign off.

## 2024-04-29 NOTE — CHART NOTE - NSCHARTNOTEFT_GEN_A_CORE
Nutrition Follow Up Note  Patient seen for: length of stay on MICU follow up.     Chart reviewed, events noted.    Source: [] Patient       [x] Medical Record        [x] RN        [] Family at bedside       [x] Other: Interdisciplinary Rounds     -If unable to interview patient: [x] Trach/Vent/BiPAP  [] Disoriented/confused/inappropriate to interview    Diet Order:  Diet, NPO with Tube Feed:   Tube Feeding Modality: Orogastric  Vital High Protein (VITALHP)  Total Volume for 24 Hours (mL): 1260  Continuous  Until Goal Tube Feed Rate (mL per Hour): 70  Tube Feed Duration (in Hours): 18  Tube Feed Start Time: 11:00 (24 @ 10:24)    Enteral Order Provides: total volume 1260 mL, 1260 kcals, 110 gm protein, and 1053 mL free water.   Current Pump Rate: 70 mL/hr    5-Day Enteral Average Provision per RN flowsheet: 1114 mL, 1114 kcals, and 97 gm protein  5-Day Propofol Average Provision per RN flowsheet: 541 kcals (+ enteral = 1655 kcals)    Is current diet order appropriate/adequate? See recommendations below    PO intake :   [] >75%  Adequate    [] 50-75%  Fair       [] <50%  Poor   [x] N/A    Nutrition-related concerns:  - Intubated  s/p trach .   - Elevated BG; On sliding scale of insulin   - Septic Shock. Pressor: norepinephrine at 0.18 micrograms/kG/min (trending down per flowsheet)    GI:  Last BM 4/29 x6.   Bowel Regimen? [x] Yes   [] No  -> On antibiotics     Weights:   Daily Weight in k.2 (), 106.8 (), 108.2 ()  No new wts to address. Slight wt fluctuations noted, likely fluid shifts.  RD will continue to trend as new wts available/able.     Drug Dosing Weight  Height (cm): 172.7 (2024 17:12)  Weight (kg): 111 (2024 17:12)  BMI (kg/m2): 37.2 (2024 17:12); based on daily wt 106.8 kg (): 35.8    Nutritionally Pertinent:   MEDICATIONS  (STANDING):  ampicillin/sulbactam  IVPB 3 Gram(s) IV Intermittent every 6 hours  ampicillin/sulbactam  IVPB      droxidopa 300 milliGRAM(s) Oral <User Schedule>  heparin  Infusion.  Unit(s)/Hr (20 mL/Hr) IV Continuous <Continuous>  insulin lispro (ADMELOG) corrective regimen sliding scale   SubCutaneous every 6 hours  norepinephrine Infusion 0.02 MICROgram(s)/kG/Min (4.16 mL/Hr) IV Continuous <Continuous>  polyethylene glycol 3350 17 Gram(s) Oral every 12 hours  propofol Infusion 9.91 MICROgram(s)/kG/Min (6.6 mL/Hr) IV Continuous <Continuous>  QUEtiapine 25 milliGRAM(s) Oral at bedtime  senna Syrup 10 milliLiter(s) Oral at bedtime    Pertinent Labs:  @ 00:09: Na 142, BUN 7, Cr <0.30<L>, <H>, K+ 4.0, Phos 2.7, Mg 2.5, Alk Phos 77, ALT/SGPT 27, AST/SGOT 30    Finger Sticks:  POCT Blood Glucose.: 124 mg/dL ( @ 12:16)  POCT Blood Glucose.: 163 mg/dL ( @ 05:54)  POCT Blood Glucose.: 153 mg/dL ( @ 17:25)    Triglycerides, Serum: 248 mg/dL (- @ 00:30)    Skin per nursing documentation: Suspected deep tissue injury sacrum, right heel  Edema per nursing documentation: 1+ generalized 2+ left arm; left hand; left ankle; right ankle; left foot; right foot    Estimated Energy Needs: (15-20 kcals/kg based on daily wt 106.8 kg ()) 2908-9436 kcals  Estimated Protein Needs: (1.5-2.0 gm/kg based on IBW 63.5 kg)  gm  Fluid needs deferred to provider.   Ge State Equation (based on daily wt 106.8 kg ()): 2035 kcals ()    Previous Nutrition Diagnosis: Increased protein-energy needs  Nutrition Diagnosis is: [x] ongoing  [] resolved [] not applicable     Nutrition Care Plan:  [x] In Progress  [] Achieved  [] Not applicable    New Nutrition Diagnosis: [x] Not applicable    Nutrition Interventions:     Education Provided:       [] Yes:  [x] No: Not applicable    Recommendations:      1) Glucerna 1.5 at goal rate 75 mL/hr x18 hours to provide total volume 1350 mL, 2025 kcals, 111 gm protein, and 1025 mL free water. Meets 19 kcals/kG based on daily wt 106.8 kg (04-18) and 1.7 gm protein/kG based on IBW 63.5 kg.  2) As medically feasible, provide multivitamin and Vitamin C for wound healing.     Monitoring and Evaluation:   Continue to monitor nutritional intake, tolerance to diet prescription, weights, labs, skin integrity    RD remains available upon request and will follow up per protocol  Taylor Aguero RD, MS, CDN, CNSC, CDC[facility] TEAMS

## 2024-04-29 NOTE — PROGRESS NOTE ADULT - SUBJECTIVE AND OBJECTIVE BOX
Patient is a 69y old  Female who presents with a chief complaint of Transfer for leukophoresis (29 Apr 2024 13:39)    Interval Events: Left chest tube removed 4/27. Repeat CT with minimal effusion. Tracheostomy placed at bedside 4/28. Minimally soaked dressing. No acute events.     MEDS:  MEDICATIONS  (STANDING):  albuterol/ipratropium for Nebulization 3 milliLiter(s) Nebulizer every 8 hours  ampicillin/sulbactam  IVPB      ampicillin/sulbactam  IVPB 3 Gram(s) IV Intermittent every 6 hours  chlorhexidine 0.12% Liquid 15 milliLiter(s) Oral Mucosa every 12 hours  chlorhexidine 2% Cloths 1 Application(s) Topical <User Schedule>  droxidopa 300 milliGRAM(s) Oral <User Schedule>  heparin  Infusion.  Unit(s)/Hr (20 mL/Hr) IV Continuous <Continuous>  insulin lispro (ADMELOG) corrective regimen sliding scale   SubCutaneous every 6 hours  norepinephrine Infusion 0.02 MICROgram(s)/kG/Min (4.16 mL/Hr) IV Continuous <Continuous>  polyethylene glycol 3350 17 Gram(s) Oral every 12 hours  propofol Infusion 9.91 MICROgram(s)/kG/Min (6.6 mL/Hr) IV Continuous <Continuous>  QUEtiapine 25 milliGRAM(s) Oral at bedtime  senna Syrup 10 milliLiter(s) Oral at bedtime  sodium chloride 3%  Inhalation 4 milliLiter(s) Inhalation every 8 hours    MEDICATIONS  (PRN):  acetaminophen   Oral Liquid .. 650 milliGRAM(s) Oral every 6 hours PRN Temp greater or equal to 38C (100.4F)  fentaNYL    Injectable 50 MICROGram(s) IV Push every 2 hours PRN Moderate Pain (4 - 6)  nystatin Powder 1 Application(s) Topical two times a day PRN skin irritation    Allergies    No Known Allergies    Intolerances          CONSTITUTIONAL:  No weight loss, fever, chills, weakness or fatigue.  HEENT:  Eyes:  No visual loss, blurred vision, double vision or yellow sclerae. + throat pain   SKIN:  No rash or itching.  CARDIOVASCULAR:  No chest pain, chest pressure or chest discomfort.  RESPIRATORY:  No shortness of breath, cough or sputum.  GASTROINTESTINAL:  SEE HPI  GENITOURINARY:  No dysuria, hematuria, urinary frequency  NEUROLOGICAL:  No headache, dizziness, syncope, paralysis.   MUSCULOSKELETAL:  No muscle, back pain, joint pain or stiffness.  ENDOCRINOLOGIC:  No reports of sweating, cold or heat intolerance.     ______________________________________________________________________  PHYSICAL EXAM:  T(C): 37.8 (04-30-24 @ 03:00), Max: 39.1 (04-29-24 @ 14:00)  HR: 102 (04-30-24 @ 05:55)  BP: 110/53 (04-30-24 @ 04:30)  RR: 19 (04-30-24 @ 04:30)  SpO2: 100% (04-30-24 @ 05:55)  Wt(kg): --    04-28 - 04-29  --------------------------------------------------------  IN:    Enteral Tube Flush: 50 mL    IV PiggyBack: 400 mL    Norepinephrine: 1257.3 mL    Propofol: 449.9 mL    Vital High Protein: 770 mL  Total IN: 2927.2 mL    OUT:    Voided (mL): 1300 mL  Total OUT: 1300 mL    Total NET: 1627.2 mL      04-29 - 04-30  --------------------------------------------------------  IN:    Enteral Tube Flush: 100 mL    Heparin Infusion: 392 mL    IV PiggyBack: 650 mL    Norepinephrine: 709.7 mL    Propofol: 16.7 mL    Vital High Protein: 1190 mL  Total IN: 3058.4 mL    OUT:    Voided (mL): 1000 mL  Total OUT: 1000 mL    Total NET: 2058.4 mL          GEN: NAD, normocephalic, obese   HEENT: +trach  CVS: S1S2+  CHEST: clear to auscultation  ABD: soft , nontender, nondistended, bowel sounds present  EXTR: no cyanosis, no clubbing, no edema  NEURO: A&OX3, is able to mouth responses   SKIN:  warm;  non icteric    ______________________________________________________________________  LABS:                        7.6    135.08 )-----------( 213      ( 30 Apr 2024 00:19 )             23.9     04-30    144  |  109<H>  |  5<L>  ----------------------------<  162<H>  3.6   |  25  |  <0.30<L>    Ca    8.5      30 Apr 2024 00:20  Phos  2.4     04-30  Mg     2.3     04-30    TPro  6.3  /  Alb  2.7<L>  /  TBili  0.4  /  DBili  x   /  AST  24  /  ALT  25  /  AlkPhos  73  04-30    LIVER FUNCTIONS - ( 30 Apr 2024 00:20 )  Alb: 2.7 g/dL / Pro: 6.3 g/dL / ALK PHOS: 73 U/L / ALT: 25 U/L / AST: 24 U/L / GGT: x           PT/INR - ( 29 Apr 2024 00:09 )   PT: 13.6 sec;   INR: 1.31 ratio         PTT - ( 30 Apr 2024 05:07 )  PTT:140.8 sec  ____________________________________________

## 2024-04-30 LAB
ALBUMIN SERPL ELPH-MCNC: 2.7 G/DL — LOW (ref 3.3–5)
ALP SERPL-CCNC: 73 U/L — SIGNIFICANT CHANGE UP (ref 40–120)
ALT FLD-CCNC: 25 U/L — SIGNIFICANT CHANGE UP (ref 10–45)
ANION GAP SERPL CALC-SCNC: 10 MMOL/L — SIGNIFICANT CHANGE UP (ref 5–17)
APTT BLD: 140.8 SEC — CRITICAL HIGH (ref 24.5–35.6)
APTT BLD: 40.7 SEC — HIGH (ref 24.5–35.6)
APTT BLD: 46.6 SEC — HIGH (ref 24.5–35.6)
AST SERPL-CCNC: 24 U/L — SIGNIFICANT CHANGE UP (ref 10–40)
BASOPHILS # BLD AUTO: 0 K/UL — SIGNIFICANT CHANGE UP (ref 0–0.2)
BASOPHILS NFR BLD AUTO: 0 % — SIGNIFICANT CHANGE UP (ref 0–2)
BILIRUB SERPL-MCNC: 0.4 MG/DL — SIGNIFICANT CHANGE UP (ref 0.2–1.2)
BUN SERPL-MCNC: 5 MG/DL — LOW (ref 7–23)
CALCIUM SERPL-MCNC: 8.5 MG/DL — SIGNIFICANT CHANGE UP (ref 8.4–10.5)
CHLORIDE SERPL-SCNC: 109 MMOL/L — HIGH (ref 96–108)
CHROM ANALY OVERALL INTERP SPEC-IMP: SIGNIFICANT CHANGE UP
CO2 SERPL-SCNC: 25 MMOL/L — SIGNIFICANT CHANGE UP (ref 22–31)
CREAT SERPL-MCNC: <0.3 MG/DL — LOW (ref 0.5–1.3)
EGFR: 115 ML/MIN/1.73M2 — SIGNIFICANT CHANGE UP
EOSINOPHIL # BLD AUTO: 0 K/UL — SIGNIFICANT CHANGE UP (ref 0–0.5)
EOSINOPHIL NFR BLD AUTO: 0 % — SIGNIFICANT CHANGE UP (ref 0–6)
FLUAV AG NPH QL: SIGNIFICANT CHANGE UP
FLUBV AG NPH QL: SIGNIFICANT CHANGE UP
GAS PNL BLDA: SIGNIFICANT CHANGE UP
GLUCOSE BLDC GLUCOMTR-MCNC: 102 MG/DL — HIGH (ref 70–99)
GLUCOSE BLDC GLUCOMTR-MCNC: 113 MG/DL — HIGH (ref 70–99)
GLUCOSE BLDC GLUCOMTR-MCNC: 119 MG/DL — HIGH (ref 70–99)
GLUCOSE BLDC GLUCOMTR-MCNC: 162 MG/DL — HIGH (ref 70–99)
GLUCOSE SERPL-MCNC: 162 MG/DL — HIGH (ref 70–99)
HCT VFR BLD CALC: 23.9 % — LOW (ref 34.5–45)
HGB BLD-MCNC: 7.6 G/DL — LOW (ref 11.5–15.5)
LYMPHOCYTES # BLD AUTO: 123.06 K/UL — HIGH (ref 1–3.3)
LYMPHOCYTES # BLD AUTO: 91.1 % — HIGH (ref 13–44)
LYMPHOCYTES # SPEC AUTO: 1.8 % — HIGH (ref 0–0)
MAGNESIUM SERPL-MCNC: 2.3 MG/DL — SIGNIFICANT CHANGE UP (ref 1.6–2.6)
MANUAL SMEAR VERIFICATION: SIGNIFICANT CHANGE UP
MCHC RBC-ENTMCNC: 28.3 PG — SIGNIFICANT CHANGE UP (ref 27–34)
MCHC RBC-ENTMCNC: 31.8 GM/DL — LOW (ref 32–36)
MCV RBC AUTO: 88.8 FL — SIGNIFICANT CHANGE UP (ref 80–100)
MONOCYTES # BLD AUTO: 1.22 K/UL — HIGH (ref 0–0.9)
MONOCYTES NFR BLD AUTO: 0.9 % — LOW (ref 2–14)
NEUTROPHILS # BLD AUTO: 8.37 K/UL — HIGH (ref 1.8–7.4)
NEUTROPHILS NFR BLD AUTO: 6.2 % — LOW (ref 43–77)
PHOSPHATE SERPL-MCNC: 2.4 MG/DL — LOW (ref 2.5–4.5)
PLAT MORPH BLD: NORMAL — SIGNIFICANT CHANGE UP
PLATELET # BLD AUTO: 213 K/UL — SIGNIFICANT CHANGE UP (ref 150–400)
POTASSIUM SERPL-MCNC: 3.6 MMOL/L — SIGNIFICANT CHANGE UP (ref 3.5–5.3)
POTASSIUM SERPL-SCNC: 3.6 MMOL/L — SIGNIFICANT CHANGE UP (ref 3.5–5.3)
PROT SERPL-MCNC: 6.3 G/DL — SIGNIFICANT CHANGE UP (ref 6–8.3)
RAPID RVP RESULT: SIGNIFICANT CHANGE UP
RBC # BLD: 2.69 M/UL — LOW (ref 3.8–5.2)
RBC # FLD: 16.1 % — HIGH (ref 10.3–14.5)
RBC BLD AUTO: SIGNIFICANT CHANGE UP
RSV RNA NPH QL NAA+NON-PROBE: SIGNIFICANT CHANGE UP
SARS-COV-2 RNA SPEC QL NAA+PROBE: SIGNIFICANT CHANGE UP
SARS-COV-2 RNA SPEC QL NAA+PROBE: SIGNIFICANT CHANGE UP
SODIUM SERPL-SCNC: 144 MMOL/L — SIGNIFICANT CHANGE UP (ref 135–145)
WBC # BLD: 135.08 K/UL — CRITICAL HIGH (ref 3.8–10.5)
WBC # FLD AUTO: 135.08 K/UL — CRITICAL HIGH (ref 3.8–10.5)

## 2024-04-30 PROCEDURE — 99232 SBSQ HOSP IP/OBS MODERATE 35: CPT

## 2024-04-30 PROCEDURE — 93321 DOPPLER ECHO F-UP/LMTD STD: CPT | Mod: 26

## 2024-04-30 PROCEDURE — 93308 TTE F-UP OR LMTD: CPT | Mod: 26

## 2024-04-30 PROCEDURE — 99291 CRITICAL CARE FIRST HOUR: CPT | Mod: GC

## 2024-04-30 PROCEDURE — 76376 3D RENDER W/INTRP POSTPROCES: CPT | Mod: 26

## 2024-04-30 PROCEDURE — 99223 1ST HOSP IP/OBS HIGH 75: CPT | Mod: GC

## 2024-04-30 RX ORDER — HEPARIN SODIUM 5000 [USP'U]/ML
1900 INJECTION INTRAVENOUS; SUBCUTANEOUS
Qty: 25000 | Refills: 0 | Status: DISCONTINUED | OUTPATIENT
Start: 2024-04-30 | End: 2024-05-01

## 2024-04-30 RX ORDER — HEPARIN SODIUM 5000 [USP'U]/ML
4500 INJECTION INTRAVENOUS; SUBCUTANEOUS EVERY 6 HOURS
Refills: 0 | Status: DISCONTINUED | OUTPATIENT
Start: 2024-04-30 | End: 2024-04-30

## 2024-04-30 RX ORDER — HEPARIN SODIUM 5000 [USP'U]/ML
9000 INJECTION INTRAVENOUS; SUBCUTANEOUS EVERY 6 HOURS
Refills: 0 | Status: DISCONTINUED | OUTPATIENT
Start: 2024-04-30 | End: 2024-04-30

## 2024-04-30 RX ORDER — POTASSIUM PHOSPHATE, MONOBASIC POTASSIUM PHOSPHATE, DIBASIC 236; 224 MG/ML; MG/ML
15 INJECTION, SOLUTION INTRAVENOUS ONCE
Refills: 0 | Status: COMPLETED | OUTPATIENT
Start: 2024-04-30 | End: 2024-04-30

## 2024-04-30 RX ORDER — HEPARIN SODIUM 5000 [USP'U]/ML
1700 INJECTION INTRAVENOUS; SUBCUTANEOUS
Qty: 25000 | Refills: 0 | Status: DISCONTINUED | OUTPATIENT
Start: 2024-04-30 | End: 2024-04-30

## 2024-04-30 RX ADMIN — AMPICILLIN SODIUM AND SULBACTAM SODIUM 200 GRAM(S): 250; 125 INJECTION, POWDER, FOR SUSPENSION INTRAMUSCULAR; INTRAVENOUS at 05:30

## 2024-04-30 RX ADMIN — Medication 650 MILLIGRAM(S): at 05:28

## 2024-04-30 RX ADMIN — CHLORHEXIDINE GLUCONATE 15 MILLILITER(S): 213 SOLUTION TOPICAL at 17:14

## 2024-04-30 RX ADMIN — Medication 3 MILLILITER(S): at 00:08

## 2024-04-30 RX ADMIN — CHLORHEXIDINE GLUCONATE 15 MILLILITER(S): 213 SOLUTION TOPICAL at 05:30

## 2024-04-30 RX ADMIN — SODIUM CHLORIDE 4 MILLILITER(S): 9 INJECTION INTRAMUSCULAR; INTRAVENOUS; SUBCUTANEOUS at 05:50

## 2024-04-30 RX ADMIN — DROXIDOPA 300 MILLIGRAM(S): 100 CAPSULE ORAL at 05:29

## 2024-04-30 RX ADMIN — SODIUM CHLORIDE 4 MILLILITER(S): 9 INJECTION INTRAMUSCULAR; INTRAVENOUS; SUBCUTANEOUS at 23:48

## 2024-04-30 RX ADMIN — Medication 650 MILLIGRAM(S): at 13:30

## 2024-04-30 RX ADMIN — Medication 1: at 05:28

## 2024-04-30 RX ADMIN — Medication 3 MILLILITER(S): at 14:02

## 2024-04-30 RX ADMIN — DROXIDOPA 300 MILLIGRAM(S): 100 CAPSULE ORAL at 12:52

## 2024-04-30 RX ADMIN — HEPARIN SODIUM 0 UNIT(S)/HR: 5000 INJECTION INTRAVENOUS; SUBCUTANEOUS at 05:56

## 2024-04-30 RX ADMIN — DROXIDOPA 300 MILLIGRAM(S): 100 CAPSULE ORAL at 18:40

## 2024-04-30 RX ADMIN — NYSTATIN CREAM 1 APPLICATION(S): 100000 CREAM TOPICAL at 12:52

## 2024-04-30 RX ADMIN — AMPICILLIN SODIUM AND SULBACTAM SODIUM 200 GRAM(S): 250; 125 INJECTION, POWDER, FOR SUSPENSION INTRAMUSCULAR; INTRAVENOUS at 17:14

## 2024-04-30 RX ADMIN — Medication 3 MILLILITER(S): at 05:50

## 2024-04-30 RX ADMIN — HEPARIN SODIUM 1900 UNIT(S)/HR: 5000 INJECTION INTRAVENOUS; SUBCUTANEOUS at 20:13

## 2024-04-30 RX ADMIN — Medication 650 MILLIGRAM(S): at 12:52

## 2024-04-30 RX ADMIN — SODIUM CHLORIDE 4 MILLILITER(S): 9 INJECTION INTRAMUSCULAR; INTRAVENOUS; SUBCUTANEOUS at 00:09

## 2024-04-30 RX ADMIN — Medication 4.16 MICROGRAM(S)/KG/MIN: at 20:12

## 2024-04-30 RX ADMIN — HEPARIN SODIUM 1700 UNIT(S)/HR: 5000 INJECTION INTRAVENOUS; SUBCUTANEOUS at 19:14

## 2024-04-30 RX ADMIN — Medication 1: at 00:59

## 2024-04-30 RX ADMIN — Medication 3 MILLILITER(S): at 23:48

## 2024-04-30 RX ADMIN — SODIUM CHLORIDE 4 MILLILITER(S): 9 INJECTION INTRAMUSCULAR; INTRAVENOUS; SUBCUTANEOUS at 14:02

## 2024-04-30 RX ADMIN — HEPARIN SODIUM 1600 UNIT(S)/HR: 5000 INJECTION INTRAVENOUS; SUBCUTANEOUS at 12:49

## 2024-04-30 RX ADMIN — QUETIAPINE FUMARATE 25 MILLIGRAM(S): 200 TABLET, FILM COATED ORAL at 21:59

## 2024-04-30 RX ADMIN — CHLORHEXIDINE GLUCONATE 1 APPLICATION(S): 213 SOLUTION TOPICAL at 05:29

## 2024-04-30 RX ADMIN — POTASSIUM PHOSPHATE, MONOBASIC POTASSIUM PHOSPHATE, DIBASIC 62.5 MILLIMOLE(S): 236; 224 INJECTION, SOLUTION INTRAVENOUS at 01:09

## 2024-04-30 RX ADMIN — AMPICILLIN SODIUM AND SULBACTAM SODIUM 200 GRAM(S): 250; 125 INJECTION, POWDER, FOR SUSPENSION INTRAMUSCULAR; INTRAVENOUS at 12:51

## 2024-04-30 RX ADMIN — AMPICILLIN SODIUM AND SULBACTAM SODIUM 200 GRAM(S): 250; 125 INJECTION, POWDER, FOR SUSPENSION INTRAMUSCULAR; INTRAVENOUS at 23:12

## 2024-04-30 NOTE — PROGRESS NOTE ADULT - ASSESSMENT
69-year-old female with a past medical history of CVA, bedbound at baseline, COPD, hypertension, CLL who initially had presented to an outside hospital due to acute shortness of breath.  Patient was found to have pulmonary edema and was managed with diuretics and BiPAP.  Patient also found to be febrile to 103 Fahrenheit, found to have pneumonia therefore started on antibiotics for this.  Labs were notable for a leukocytosis of 233 in setting of CLL and then was transferred to [facility] for leukopheresis.  Upon arrival to [place], intubated due to concern for respiratory compromise as well as worsening AMS.  Patient was started on levofloxacin due to hypotension.    Hospitalization course includes chest tube drainage.  Blood and pleural fluid cultures are positive growing Streptococcus pneumonia A.  Chest tube was removed on 4/19/2024 however patient remains febrile with a left-sided loculated effusion therefore left pigtail catheter was placed on 4/23/2024.  Patient's antibiotics were switched from ceftriaxone to cefepime and now is requiring pressors again.  Blood cultures were repeated on 4/22/2024 with Staphylococcus epidermidis growth, repeat pleural fluid cultures obtained on 4/23/2024 are negative to date, repeat blood cultures from 4/24/2024 are negative as well.    #Streptococcal bacteremia, likely source empyema  #Abnormal imaging of the lungs  #Streptococcal pneumonia and empyema  #Acute hypoxic respiratory failure  #CLL  #LLE DVT  s/p trach  REpeat blood cultures negative thus far  Remains febrile, no new culture data to guide antibiotic therapy  No plan for leukophoresis at this time per heme service      Recommendations  Patient continues to have persistent fevers, no evidence for uncontrolled source at this time  If fevers persist, would repeat imaging  Continue ampicillin/sulbactam  CT surgery eval - chest tube management per primary team  DVT can cause fevers as  well - management per primary team  Now with trach - no evidence for aspiration/tracheitis - can obtain repeat sputum culture  obtain expanded full RVP  Follow fever curve and WBC count    Abimael Jain MD  Division of Infectious Diseases

## 2024-04-30 NOTE — PROGRESS NOTE ADULT - SUBJECTIVE AND OBJECTIVE BOX
Follow Up:  fever    Interval History/ROS:  Interval events noted.  Bronchoscopy performed on 4/28/2024 with tracheostomy placement.  Patient remains febrile with a Tmax of 101.5 Fahrenheit past 24 hours.  Otherwise latest labs show leukocytosis of 135 in setting of CLL, anemia 7.6/23.9, BMP with renal function within normal limits, hepatic function within normal limits.  No further culture data available at this time, negative workup.  Latest chest x-ray on 4/29/2024 shows small left-sided pleural effusion.    Patient seen examined at bedside.  No new complaints.  Allergies  No Known Allergies        ANTIMICROBIALS:  ampicillin/sulbactam  IVPB    ampicillin/sulbactam  IVPB 3 every 6 hours      OTHER MEDS:  MEDICATIONS  (STANDING):  acetaminophen   Oral Liquid .. 650 every 6 hours PRN  albuterol/ipratropium for Nebulization 3 every 8 hours  droxidopa 300 <User Schedule>  fentaNYL    Injectable 50 every 2 hours PRN  heparin  Infusion.  <Continuous>  insulin lispro (ADMELOG) corrective regimen sliding scale  every 6 hours  norepinephrine Infusion 0.02 <Continuous>  polyethylene glycol 3350 17 every 12 hours  propofol Infusion 9.91 <Continuous>  QUEtiapine 25 at bedtime  senna Syrup 10 at bedtime  sodium chloride 3%  Inhalation 4 every 8 hours      Vital Signs Last 24 Hrs  T(C): 37.3 (30 Apr 2024 08:00), Max: 39.1 (29 Apr 2024 14:00)  T(F): 99.1 (30 Apr 2024 08:00), Max: 102.4 (29 Apr 2024 14:00)  HR: 83 (30 Apr 2024 12:30) (75 - 123)  BP: 88/57 (30 Apr 2024 12:30) (83/51 - 143/66)  BP(mean): 67 (30 Apr 2024 12:30) (59 - 95)  RR: 27 (30 Apr 2024 12:30) (16 - 38)  SpO2: 100% (30 Apr 2024 12:30) (94% - 100%)    Parameters below as of 30 Apr 2024 08:00  Patient On (Oxygen Delivery Method): 15/5 CPAP/PSV        PHYSICAL EXAM:  General: Patient in NAD, Intubated,   HEENT: NCAT, EOMI, PERRL, no oral lesions  CV: S1+S2, no m/r/g appreciated   Lungs: On MV, Left chest tube  Abd: Soft, nontender, no guarding, no rebound tenderness, + bowel sounds   : No suprapubic tenderness  Neuro: Awake, on propofol, following commands, left sided weakness  Ext: No cyanosis, Left extremities dependent edema  Skin: No rash, no phlebitis                                7.6    135.08 )-----------( 213      ( 30 Apr 2024 00:19 )             23.9       04-30    144  |  109<H>  |  5<L>  ----------------------------<  162<H>  3.6   |  25  |  <0.30<L>    Ca    8.5      30 Apr 2024 00:20  Phos  2.4     04-30  Mg     2.3     04-30    TPro  6.3  /  Alb  2.7<L>  /  TBili  0.4  /  DBili  x   /  AST  24  /  ALT  25  /  AlkPhos  73  04-30      Urinalysis Basic - ( 30 Apr 2024 00:20 )    Color: x / Appearance: x / SG: x / pH: x  Gluc: 162 mg/dL / Ketone: x  / Bili: x / Urobili: x   Blood: x / Protein: x / Nitrite: x   Leuk Esterase: x / RBC: x / WBC x   Sq Epi: x / Non Sq Epi: x / Bacteria: x        MICROBIOLOGY:  v    Culture - Sputum (collected 26 Apr 2024 13:44)  Source: .Sputum Sputum  Gram Stain (26 Apr 2024 20:33):    Rare polymorphonuclear leukocytes per low power field    No Squamous epithelial cells per low power field    No organisms seen per oil power field  Final Report (28 Apr 2024 08:42):    No growth    Culture - Blood (collected 26 Apr 2024 13:30)  Source: .Blood Blood-Peripheral  Preliminary Report (29 Apr 2024 18:01):    No growth at 72 Hours    Culture - Blood (collected 26 Apr 2024 13:21)  Source: .Blood Blood-Venous  Preliminary Report (29 Apr 2024 18:01):    No growth at 72 Hours                    RADIOLOGY:  Imaging reviewed

## 2024-04-30 NOTE — SPEECH LANGUAGE PATHOLOGY EVALUATION - COMMENTS
Pt reports he is a type 1 unregulated diabetic and his BS has been elevated for months, states he cannot remember when was the last time he even had insulin to administer himself.  States he has been dealing with insurance issues and cannot keep up with the Speech/swallow hx: Pt is new to this service This service will continue to follow; need for skilled SLP services upon discharge.

## 2024-04-30 NOTE — PROGRESS NOTE ADULT - ASSESSMENT
69F h/o CVA (bedbound at baseline), COPD, CHF, HTN presenting as transfer from [facility] for SOB iso leukocytosis to 233 c/f acute leukemia. Pt intubated and on pressors, transferred to MICU for further management, course c/b empyema s/p chest tube placement (4/16) and removal, reaccumulation of loculated pleural effusion s/p L pigtail placement and removal on 4/27     Neuro  #Encephalopathy; currently intubated and sedated on Propofol   -p/w lethargy likely sepsis; Per Heme, leukostasis unlikely as cells mature  -CTH showing old infarct, no acute process  - off Precedex and propofol, following commands  - Seroquel QHS as patient has been tugging at NG tube    CV  #Septic Shock   - Strep Pneumo Empyema  - Back on Levophed to wean off levo while on Droxidopa 300 TID     #Chest Pain, Resolved   -demand vs leukostasis vs non cardiac   -reported JOSEF in II, III, aVF at [facility] --> repeat EKG without ST changes or Q waves  -trops peaked    #CHF  -unclear hx but elevated pro-BNP to 2600s  -TTE: EF 54%, no significant abnormalities  - Diurese as appropriate    Resp  #Acute hypoxic respiratory failure  #Empyema Pansensitive Strep Pneumo  -intubated: 460/14/30/5; wean off as tolerated w/ daily CPAP trial  -s/p thora draining 1500cc fluid 4/16 with chest tube placement; c/w exudative effusion growing Strep Pneumo  -MRSA neg  -pleural fluid growing strep pneumo; BCx growing strep pneumo; Pansensitive  -was on vanc/zosyn/azithro (4/16)-->deescalated to CTX 2g qd (4/17 - ); c/w total 14-day course of Abx therapy   -CT chest 4/22 reveals persistent loculated effusion, s/p IR Pigtail placement 4/24-4/27 w/ Exudative effusion; Alteplase and Dornase through pigtal x6 doses; unable to finish 6th Alteplase+Dornase due to catheter occlusion   - Repeat CTC 4/27 - decreased small pleural effusions, thoracic LAD   - s/p Trach placement 4/28    Plan:  - CPAP trial daily     GI  #Diet: TF via OG tube  Patient will need PEG tube, discuss w/ Aunt in [place] next of kin    /Renal  -no active issues    Heme/Onc  #Leukocytosis  #CLL  - on presentation; improving to 130s  -no plan for leukophoresis at this time given mature lymphocytes  -stop trending TLS labs as they have been stable   - CT Chest A&P showing diffuse axillary, inguinal mediastinal RP lymphadenopathy.  - Appreciate Heme recs; no plans at this time for lymph node Bx    #L Common Femoral Vein DVT   - Non-occlusive   - Heparin Drip 4/29-    #DVT ppx: Heparin Drip     ID  #Empyema 2/2 Pansensitive Strep Pneumo  #Strep Pneumo Bacteremia  -BCx + strep pneumo; fluid culture with strep pneumo  -repeat BCx 4/20 ngtd, ReCx 4/23 due to fever   -CT Chest A&P demonstrates LLL empyema not showing any other intraabdominal infectious source  -was on vanc/zosyn/azithro (4/16)-->deescalated to CTX (4/17 -4/23), Added on Vancomycin (4/23-) due to MRSE 1/2 Cx, expanded coverage to Cefepime (4/23 1x dose), back on CTX, switched to Unasyn 3g Q6 (4/25- )  - Fever curve trending down, but persistently febrile. If persistent fevers, MARIO ALBERTO to eval for Endocarditis   - ID Consulted for persistent fevers despite source control; appreciate recs   - IVIG 4/26 x1    Plan    c/w with Unasyn (4/25  -   )   f/u repeat BCx 4/29-    #Positive MRSE BCx 1 of 2; contaminant   - Repeat Cx negative  - Vancomycin (4/16, 4/23-4/25)    Endo  -ISS q6h while NPO w/ tube feed     Ethics: Full Code  Only known family member is Aunt Mili Gordon ([phone], [phone]) who lives in [place]   69F h/o CVA (bedbound at baseline), COPD, CHF, HTN presenting as transfer from [facility] for SOB iso leukocytosis to 233 c/f acute leukemia. Pt intubated and on pressors, transferred to MICU for further management, course c/b empyema s/p chest tube placement (4/16) and removal, reaccumulation of loculated pleural effusion s/p L pigtail placement and removal on 4/27     Neuro  At Baseline AOx4 mental status   L upper and lower extremity motor deficits iso hx of stroke   Pain control w/ Tylenol    CV  #Septic Shock   - Strep Pneumo Empyema and Bacteremia s/p Pigtail catheter   - Weaning off Levophed while on Droxidopa 300 TID     #Chest Pain, Resolved   -demand vs leukostasis vs non cardiac   -reported JOSEF in II, III, aVF at [facility] --> repeat EKG without ST changes or Q waves  -trops peaked    #CHF  -unclear hx but elevated pro-BNP to 2600s  -TTE: EF 54%, no significant abnormalities  - Diurese as appropriate    Resp  #Acute hypoxic respiratory failure  #Empyema Pansensitive Strep Pneumo  -intubated: 460/14/30/5; wean off as tolerated w/ daily CPAP trial  -s/p thora draining 1500cc fluid 4/16 with chest tube placement; c/w exudative effusion growing Strep Pneumo  -MRSA neg  -pleural fluid growing strep pneumo; BCx growing strep pneumo; Pansensitive  -was on vanc/zosyn/azithro (4/16)-->deescalated to CTX 2g qd (4/17 - ); c/w total 14-day course of Abx therapy   -CT chest 4/22 reveals persistent loculated effusion, s/p IR Pigtail placement 4/24-4/27 w/ Exudative effusion; Alteplase and Dornase through pigtal x6 doses; unable to finish 6th Alteplase+Dornase due to catheter occlusion   - Repeat CTC 4/27 - decreased small pleural effusions, thoracic LAD   - s/p Trach placement 4/28    Plan:  - CPAP trial daily     GI  #Diet: Tube feed   PEG tube 5/1     /Renal  -no active issues    Heme/Onc  #Leukocytosis  #CLL  - on presentation; improving to 130s  -no plan for leukophoresis at this time given mature lymphocytes  -stop trending TLS labs as they have been stable   - CT Chest A&P showing diffuse axillary, inguinal mediastinal RP lymphadenopathy.  - Appreciate Heme recs; no plans at this time for lymph node Bx    #L Common Femoral Vein DVT   - Non-occlusive   - Heparin Drip 4/29-    #DVT ppx: Heparin Drip     ID  #Empyema 2/2 Pansensitive Strep Pneumo  #Strep Pneumo Bacteremia  -BCx + strep pneumo; fluid culture with strep pneumo  -repeat BCx 4/20 ngtd, ReCx 4/23 due to fever   -CT Chest A&P demonstrates LLL empyema not showing any other intraabdominal infectious source  -was on vanc/zosyn/azithro (4/16)-->deescalated to CTX (4/17 -4/23), Added on Vancomycin (4/23-) due to MRSE 1/2 Cx, expanded coverage to Cefepime (4/23 1x dose), back on CTX, switched to Unasyn 3g Q6 (4/25- )  - IVIG 4/26 x1  - ID Consulted for persistent fevers despite source control; appreciate recs   - Repeat TTE for eval of IE, repeat RVP, ESR.    Plan    c/w with Unasyn (4/25  -   )   f/u repeat BCx 4/29-      Endo  -ISS q6h while NPO w/ tube feed     Ethics: Full Code  Only known family member is Aunt Mili Gordon ([phone], [phone]) who lives in [place]

## 2024-04-30 NOTE — SPEECH LANGUAGE PATHOLOGY EVALUATION - SLP GENERAL OBSERVATIONS
Pt encountered in bed, awake/alert, trach to vent, Portex 8, +NGT, +R wrist restraint. Cooperative with exam.

## 2024-04-30 NOTE — CONSULT NOTE ADULT - ATTENDING COMMENTS
Agree with above. Patient with chronic respiratory failure now s/p trach, GI consulted for PEG placement for non-oral feeding. Patient currently still on Levophed and had fever overnight. Will await culture results and weaning off pressors. If infectious workup negative and off pressors, will plan for EGD/PEG later this week. Continue NGT in the meantime.

## 2024-04-30 NOTE — PROGRESS NOTE ADULT - ATTENDING COMMENTS
69 year old female with a history of CVA (bedbound at baseline) COPD, CHF, HTN p/w CLL,   strep pneumonia bacteremia, pna and empyema s/p chest tube and mist tx and now s/p trach.    Remains febrile and requiring pressors     - baseline L sided deficits, moving R side to command  - cont PT  as tolerated  - minimize sedation as able, titrate to goal rass  - Septic shock, maintained on levophed, map>65, pressor dose improving slowly  - s/p chest tube removal this weekend  - pocus w/ no significant residual effusion    - still spiking fever, cx repeated  - check rvp  - cont unasyn and f/u ID reccs  - cont a/c for dvt  - s/p trach, no bleeding  - cont PS trial as tolerated  - plan for PEG tube, f/u GI reccs 69 year old female with a history of CVA (bedbound at baseline) COPD, CHF, HTN p/w CLL,   strep pneumonia bacteremia, pna and empyema s/p chest tube and mist tx and now s/p trach.    Remains febrile and requiring pressors     - baseline L sided deficits, moving R side to command  - cont PT  as tolerated  - minimize sedation as able, titrate to goal rass  - Septic shock, maintained on levophed, map>65, pressor dose improving slowly  - s/p chest tube removal this weekend  - pocus w/ no significant residual effusion    - still spiking fever, cx repeated  - check rvp  - cont unasyn and f/u ID reccs  - cont a/c for dvt  - s/p trach, no bleeding  - cont PS trial as tolerated  - plan for PEG tube, f/u GI reccs  - f/u Heme reccs- s/p ivig and will need BM bx when stable

## 2024-04-30 NOTE — PROGRESS NOTE ADULT - SUBJECTIVE AND OBJECTIVE BOX
Hematology Follow-up    INTERVAL HPI/OVERNIGHT EVENTS:  Patient S&E at bedside. No o/n events, patient resting comfortably. Patient able to nod her head but cannot speak due to tracheostomy.     VITAL SIGNS:  T(F): 99.1 (04-30-24 @ 08:00)  HR: 83 (04-30-24 @ 12:30)  BP: 88/57 (04-30-24 @ 12:30)  RR: 27 (04-30-24 @ 12:30)  SpO2: 100% (04-30-24 @ 12:30)  Wt(kg): --    PHYSICAL EXAM:  Constitutional: NAD. Awake and alert   Eyes: PERRL, EOMI, sclera non-icteric  Neck: S/p tracheostomy. Supple, no masses, no JVD  Respiratory: CTA b/l, good air entry b/l, no wheezing, rhonchi, rales, with normal respiratory effort and no intercostal retractions  Cardiovascular: RRR, normal S1S2, no M/R/G  Gastrointestinal: soft, NTND, no masses palpable, BS normal in all four quadrants, no HSM  Extremities:  no c/c/e  Neurological: Unable to speak due to tracheostomy but nodding her head appropriately during interview  Skin: Normal temperature    MEDICATIONS  (STANDING):  albuterol/ipratropium for Nebulization 3 milliLiter(s) Nebulizer every 8 hours  ampicillin/sulbactam  IVPB      ampicillin/sulbactam  IVPB 3 Gram(s) IV Intermittent every 6 hours  chlorhexidine 0.12% Liquid 15 milliLiter(s) Oral Mucosa every 12 hours  chlorhexidine 2% Cloths 1 Application(s) Topical <User Schedule>  droxidopa 300 milliGRAM(s) Oral <User Schedule>  heparin  Infusion.  Unit(s)/Hr (20 mL/Hr) IV Continuous <Continuous>  insulin lispro (ADMELOG) corrective regimen sliding scale   SubCutaneous every 6 hours  norepinephrine Infusion 0.02 MICROgram(s)/kG/Min (4.16 mL/Hr) IV Continuous <Continuous>  polyethylene glycol 3350 17 Gram(s) Oral every 12 hours  propofol Infusion 9.91 MICROgram(s)/kG/Min (6.6 mL/Hr) IV Continuous <Continuous>  QUEtiapine 25 milliGRAM(s) Oral at bedtime  senna Syrup 10 milliLiter(s) Oral at bedtime  sodium chloride 3%  Inhalation 4 milliLiter(s) Inhalation every 8 hours    MEDICATIONS  (PRN):  acetaminophen   Oral Liquid .. 650 milliGRAM(s) Oral every 6 hours PRN Temp greater or equal to 38C (100.4F)  fentaNYL    Injectable 50 MICROGram(s) IV Push every 2 hours PRN Moderate Pain (4 - 6)  nystatin Powder 1 Application(s) Topical two times a day PRN skin irritation      No Known Allergies      LABS:                        7.6    135.08 )-----------( 213      ( 30 Apr 2024 00:19 )             23.9     04-30    144  |  109<H>  |  5<L>  ----------------------------<  162<H>  3.6   |  25  |  <0.30<L>    Ca    8.5      30 Apr 2024 00:20  Phos  2.4     04-30  Mg     2.3     04-30    TPro  6.3  /  Alb  2.7<L>  /  TBili  0.4  /  DBili  x   /  AST  24  /  ALT  25  /  AlkPhos  73  04-30    PT/INR - ( 29 Apr 2024 00:09 )   PT: 13.6 sec;   INR: 1.31 ratio         PTT - ( 30 Apr 2024 05:07 )  PTT:140.8 sec   Urinalysis Basic - ( 30 Apr 2024 00:20 )    Color: x / Appearance: x / SG: x / pH: x  Gluc: 162 mg/dL / Ketone: x  / Bili: x / Urobili: x   Blood: x / Protein: x / Nitrite: x   Leuk Esterase: x / RBC: x / WBC x   Sq Epi: x / Non Sq Epi: x / Bacteria: x        RADIOLOGY & ADDITIONAL TESTS:  < from: CT Chest w/ IV Cont (04.22.24 @ 20:21) >  FINDINGS:  CHEST:  LUNGS AND LARGE AIRWAYS: Patent central airways. Left lower lobe   compressive atelectasis. Endotracheal tube is in place above the juan.  PLEURA: Small left and trace right pleural effusions. A small left apical   pneumothorax.  VESSELS: Within normal limits.  HEART: Heart size is normal.  No pericardial effusion.  MEDIASTINUM AND HEIDY: No lymphadenopathy.  CHEST WALL AND LOWER NECK: Axillary and cervical lymphadenopathy. A 4.4 x   2.8 cm left lower cervical node series 3 image 7. A 5.1 x 2 cm left   axillary node image 51. Bilateral retropectoral lymph nodes.    ABDOMEN AND PELVIS:  LIVER: Within normal limits.  BILE DUCTS: Normal caliber.  GALLBLADDER: Within normal limits.  SPLEEN: Borderline enlarged.  PANCREAS: Within normal limits.  ADRENALS: Within normal limits.  KIDNEYS/URETERS: Cysts.    BLADDER: Within normal limits.  REPRODUCTIVE ORGANS: Within normal limits.    BOWEL: No bowel obstruction. Nasogastric tube terminates in the stomach.    Rectum mildly distended by stool.  PERITONEUM: No ascites.  VESSELS:  IVCfilter.  RETROPERITONEUM/LYMPH NODES: Extensive inguinal and retroperitoneal   lymphadenopathy. There is confluent retroperitoneal lymphadenopathy   encasing the aorta and IVC. There is a reference 4.7 x 3.6 cm left   external iliac lymph node image 262. A 4 x 3 cm right inguinal node image   299.  ABDOMINAL WALL: Within normal limits.  BONES: Within normal limits.    IMPRESSION:  Small left apical pneumothorax.    Small left pleural effusion with left lower lobe compressive atelectasis.    Extensive lymphadenopathy consistent with history of lymphoma.      --- End of Report ---     CARLEE RIZVI MD; Resident Radiologist  This document has been electronically signed.  DON BELL MD; Attending Radiologist  This document has been electronically signed. Apr 23 2024  8:59AM    < end of copied text >

## 2024-04-30 NOTE — CONSULT NOTE ADULT - SUBJECTIVE AND OBJECTIVE BOX
Initial GI Consult    Patient is a 69y old  Female who presents with a chief complaint of Transfer for leukophoresis (29 Apr 2024 13:39)    HPI:  69F h/o CVA (bedbound at baseline), COPD, CHF, HTN p/w acute shortness of breath to outside ED found to have acute pulmonary edema and PNA transferred to [facility] for possible leukophoresis. Patient found to have empyema 2/2 Pansensitive Strep Pneumo. She was intubated and unable to be weaned off vent, s/p Tracheostomy tube on 4/28. Patient has been on tube feeds via OG tube, now requiring a PEG tube placement. Hospital course complicated by L common femoral vein DVT, currently on a Heparin Drip.     PAST MEDICAL & SURGICAL HISTORY:  Acute CHF      COPD, severe        FAMILY HISTORY:      MEDS:  MEDICATIONS  (STANDING):  albuterol/ipratropium for Nebulization 3 milliLiter(s) Nebulizer every 8 hours  ampicillin/sulbactam  IVPB      ampicillin/sulbactam  IVPB 3 Gram(s) IV Intermittent every 6 hours  chlorhexidine 0.12% Liquid 15 milliLiter(s) Oral Mucosa every 12 hours  chlorhexidine 2% Cloths 1 Application(s) Topical <User Schedule>  droxidopa 300 milliGRAM(s) Oral <User Schedule>  heparin  Infusion.  Unit(s)/Hr (20 mL/Hr) IV Continuous <Continuous>  insulin lispro (ADMELOG) corrective regimen sliding scale   SubCutaneous every 6 hours  norepinephrine Infusion 0.02 MICROgram(s)/kG/Min (4.16 mL/Hr) IV Continuous <Continuous>  polyethylene glycol 3350 17 Gram(s) Oral every 12 hours  propofol Infusion 9.91 MICROgram(s)/kG/Min (6.6 mL/Hr) IV Continuous <Continuous>  QUEtiapine 25 milliGRAM(s) Oral at bedtime  senna Syrup 10 milliLiter(s) Oral at bedtime  sodium chloride 3%  Inhalation 4 milliLiter(s) Inhalation every 8 hours    MEDICATIONS  (PRN):  acetaminophen   Oral Liquid .. 650 milliGRAM(s) Oral every 6 hours PRN Temp greater or equal to 38C (100.4F)  fentaNYL    Injectable 50 MICROGram(s) IV Push every 2 hours PRN Moderate Pain (4 - 6)  nystatin Powder 1 Application(s) Topical two times a day PRN skin irritation    Allergies    No Known Allergies    Intolerances          CONSTITUTIONAL:  No weight loss, fever, chills, weakness or fatigue.  HEENT:  Eyes:  No visual loss, blurred vision, double vision or yellow sclerae.  SKIN:  No rash or itching.  CARDIOVASCULAR:  No chest pain, chest pressure or chest discomfort.  RESPIRATORY:  No shortness of breath, cough or sputum.  GASTROINTESTINAL:  SEE HPI  GENITOURINARY:  No dysuria, hematuria, urinary frequency  NEUROLOGICAL:  No headache, dizziness, syncope, paralysis, ataxia, numbness or tingling in the extremities.  MUSCULOSKELETAL:  No muscle, back pain, joint pain or stiffness.  ENDOCRINOLOGIC:  No reports of sweating, cold or heat intolerance.     ______________________________________________________________________  PHYSICAL EXAM:  T(C): 37.8 (04-30-24 @ 03:00), Max: 39.1 (04-29-24 @ 14:00)  HR: 102 (04-30-24 @ 05:55)  BP: 110/53 (04-30-24 @ 04:30)  RR: 19 (04-30-24 @ 04:30)  SpO2: 100% (04-30-24 @ 05:55)  Wt(kg): --    04-28 - 04-29  --------------------------------------------------------  IN:    Enteral Tube Flush: 50 mL    IV PiggyBack: 400 mL    Norepinephrine: 1257.3 mL    Propofol: 449.9 mL    Vital High Protein: 770 mL  Total IN: 2927.2 mL    OUT:    Voided (mL): 1300 mL  Total OUT: 1300 mL    Total NET: 1627.2 mL      04-29 - 04-30  --------------------------------------------------------  IN:    Enteral Tube Flush: 100 mL    Heparin Infusion: 392 mL    IV PiggyBack: 650 mL    Norepinephrine: 709.7 mL    Propofol: 16.7 mL    Vital High Protein: 1190 mL  Total IN: 3058.4 mL    OUT:    Voided (mL): 1000 mL  Total OUT: 1000 mL    Total NET: 2058.4 mL          GEN: NAD, normocephalic  CVS: S1S2+  CHEST: clear to auscultation  ABD: soft , nontender, nondistended, bowel sounds present  EXTR: no cyanosis, no clubbing, no edema  NEURO: A&OX  SKIN:  warm;  non icteric    ______________________________________________________________________  LABS:                        7.6    135.08 )-----------( 213      ( 30 Apr 2024 00:19 )             23.9     04-30    144  |  109<H>  |  5<L>  ----------------------------<  162<H>  3.6   |  25  |  <0.30<L>    Ca    8.5      30 Apr 2024 00:20  Phos  2.4     04-30  Mg     2.3     04-30    TPro  6.3  /  Alb  2.7<L>  /  TBili  0.4  /  DBili  x   /  AST  24  /  ALT  25  /  AlkPhos  73  04-30    LIVER FUNCTIONS - ( 30 Apr 2024 00:20 )  Alb: 2.7 g/dL / Pro: 6.3 g/dL / ALK PHOS: 73 U/L / ALT: 25 U/L / AST: 24 U/L / GGT: x           PT/INR - ( 29 Apr 2024 00:09 )   PT: 13.6 sec;   INR: 1.31 ratio         PTT - ( 30 Apr 2024 05:07 )  PTT:140.8 sec  ____________________________________________         Initial GI Consult    Patient is a 69y old  Female who presents with a chief complaint of Transfer for leukophoresis (29 Apr 2024 13:39)    HPI:  69F h/o CVA (bedbound at baseline), COPD, CHF, HTN, CLL p/w acute shortness of breath to outside ED found to have acute pulmonary edema and PNA transferred to [facility] for possible leukophoresis. Patient found to have empyema 2/2 Pansensitive Strep Pneumo with associated bacteremia. She was intubated and unable to be weaned off vent, s/p Tracheostomy tube on 4/28. Patient has been on tube feeds via OG tube, now requiring a PEG tube placement. Hospital course complicated by L common femoral vein DVT, currently on a Heparin Drip.     PAST MEDICAL & SURGICAL HISTORY:  Acute CHF      COPD, severe        FAMILY HISTORY:      MEDS:  MEDICATIONS  (STANDING):  albuterol/ipratropium for Nebulization 3 milliLiter(s) Nebulizer every 8 hours  ampicillin/sulbactam  IVPB      ampicillin/sulbactam  IVPB 3 Gram(s) IV Intermittent every 6 hours  chlorhexidine 0.12% Liquid 15 milliLiter(s) Oral Mucosa every 12 hours  chlorhexidine 2% Cloths 1 Application(s) Topical <User Schedule>  droxidopa 300 milliGRAM(s) Oral <User Schedule>  heparin  Infusion.  Unit(s)/Hr (20 mL/Hr) IV Continuous <Continuous>  insulin lispro (ADMELOG) corrective regimen sliding scale   SubCutaneous every 6 hours  norepinephrine Infusion 0.02 MICROgram(s)/kG/Min (4.16 mL/Hr) IV Continuous <Continuous>  polyethylene glycol 3350 17 Gram(s) Oral every 12 hours  propofol Infusion 9.91 MICROgram(s)/kG/Min (6.6 mL/Hr) IV Continuous <Continuous>  QUEtiapine 25 milliGRAM(s) Oral at bedtime  senna Syrup 10 milliLiter(s) Oral at bedtime  sodium chloride 3%  Inhalation 4 milliLiter(s) Inhalation every 8 hours    MEDICATIONS  (PRN):  acetaminophen   Oral Liquid .. 650 milliGRAM(s) Oral every 6 hours PRN Temp greater or equal to 38C (100.4F)  fentaNYL    Injectable 50 MICROGram(s) IV Push every 2 hours PRN Moderate Pain (4 - 6)  nystatin Powder 1 Application(s) Topical two times a day PRN skin irritation    Allergies    No Known Allergies    Intolerances          CONSTITUTIONAL:  No weight loss, fever, chills, weakness or fatigue.  HEENT:  Eyes:  No visual loss, blurred vision, double vision or yellow sclerae.  SKIN:  No rash or itching.  CARDIOVASCULAR:  No chest pain, chest pressure or chest discomfort.  RESPIRATORY:  No shortness of breath, cough or sputum.  GASTROINTESTINAL:  SEE HPI  GENITOURINARY:  No dysuria, hematuria, urinary frequency  NEUROLOGICAL:  No headache, dizziness, syncope, paralysis, ataxia, numbness or tingling in the extremities.  MUSCULOSKELETAL:  No muscle, back pain, joint pain or stiffness.  ENDOCRINOLOGIC:  No reports of sweating, cold or heat intolerance.     ______________________________________________________________________  PHYSICAL EXAM:  T(C): 37.8 (04-30-24 @ 03:00), Max: 39.1 (04-29-24 @ 14:00)  HR: 102 (04-30-24 @ 05:55)  BP: 110/53 (04-30-24 @ 04:30)  RR: 19 (04-30-24 @ 04:30)  SpO2: 100% (04-30-24 @ 05:55)  Wt(kg): --    04-28 - 04-29  --------------------------------------------------------  IN:    Enteral Tube Flush: 50 mL    IV PiggyBack: 400 mL    Norepinephrine: 1257.3 mL    Propofol: 449.9 mL    Vital High Protein: 770 mL  Total IN: 2927.2 mL    OUT:    Voided (mL): 1300 mL  Total OUT: 1300 mL    Total NET: 1627.2 mL      04-29 - 04-30  --------------------------------------------------------  IN:    Enteral Tube Flush: 100 mL    Heparin Infusion: 392 mL    IV PiggyBack: 650 mL    Norepinephrine: 709.7 mL    Propofol: 16.7 mL    Vital High Protein: 1190 mL  Total IN: 3058.4 mL    OUT:    Voided (mL): 1000 mL  Total OUT: 1000 mL    Total NET: 2058.4 mL          GEN: NAD, normocephalic  CVS: S1S2+  CHEST: clear to auscultation  ABD: soft , nontender, nondistended, bowel sounds present  EXTR: no cyanosis, no clubbing, no edema  NEURO: A&OX  SKIN:  warm;  non icteric    ______________________________________________________________________  LABS:                        7.6    135.08 )-----------( 213      ( 30 Apr 2024 00:19 )             23.9     04-30    144  |  109<H>  |  5<L>  ----------------------------<  162<H>  3.6   |  25  |  <0.30<L>    Ca    8.5      30 Apr 2024 00:20  Phos  2.4     04-30  Mg     2.3     04-30    TPro  6.3  /  Alb  2.7<L>  /  TBili  0.4  /  DBili  x   /  AST  24  /  ALT  25  /  AlkPhos  73  04-30    LIVER FUNCTIONS - ( 30 Apr 2024 00:20 )  Alb: 2.7 g/dL / Pro: 6.3 g/dL / ALK PHOS: 73 U/L / ALT: 25 U/L / AST: 24 U/L / GGT: x           PT/INR - ( 29 Apr 2024 00:09 )   PT: 13.6 sec;   INR: 1.31 ratio         PTT - ( 30 Apr 2024 05:07 )  PTT:140.8 sec  ____________________________________________         Initial GI Consult    Patient is a 69y old  Female who presents with a chief complaint of Transfer for leukophoresis (29 Apr 2024 13:39)    HPI:  69F h/o CVA (bedbound at baseline), COPD, CHF, HTN, CLL p/w acute shortness of breath to outside ED found to have acute pulmonary edema and PNA transferred to [facility] for possible leukophoresis. Patient found to have empyema 2/2 Pansensitive Strep Pneumo with associated bacteremia. She was intubated and unable to be weaned off vent, s/p Tracheostomy tube on 4/28. Patient has been on tube feeds via OG tube, now requiring a PEG tube placement. Hospital course complicated by L common femoral vein DVT, currently on a Heparin Drip.     PAST MEDICAL & SURGICAL HISTORY:  Acute CHF    COPD, severe        FAMILY HISTORY:      MEDS:  MEDICATIONS  (STANDING):  albuterol/ipratropium for Nebulization 3 milliLiter(s) Nebulizer every 8 hours  ampicillin/sulbactam  IVPB      ampicillin/sulbactam  IVPB 3 Gram(s) IV Intermittent every 6 hours  chlorhexidine 0.12% Liquid 15 milliLiter(s) Oral Mucosa every 12 hours  chlorhexidine 2% Cloths 1 Application(s) Topical <User Schedule>  droxidopa 300 milliGRAM(s) Oral <User Schedule>  heparin  Infusion.  Unit(s)/Hr (20 mL/Hr) IV Continuous <Continuous>  insulin lispro (ADMELOG) corrective regimen sliding scale   SubCutaneous every 6 hours  norepinephrine Infusion 0.02 MICROgram(s)/kG/Min (4.16 mL/Hr) IV Continuous <Continuous>  polyethylene glycol 3350 17 Gram(s) Oral every 12 hours  propofol Infusion 9.91 MICROgram(s)/kG/Min (6.6 mL/Hr) IV Continuous <Continuous>  QUEtiapine 25 milliGRAM(s) Oral at bedtime  senna Syrup 10 milliLiter(s) Oral at bedtime  sodium chloride 3%  Inhalation 4 milliLiter(s) Inhalation every 8 hours    MEDICATIONS  (PRN):  acetaminophen   Oral Liquid .. 650 milliGRAM(s) Oral every 6 hours PRN Temp greater or equal to 38C (100.4F)  fentaNYL    Injectable 50 MICROGram(s) IV Push every 2 hours PRN Moderate Pain (4 - 6)  nystatin Powder 1 Application(s) Topical two times a day PRN skin irritation    Allergies    No Known Allergies    Intolerances          CONSTITUTIONAL:  No weight loss, fever, chills, weakness or fatigue.  HEENT:  Eyes:  No visual loss, blurred vision, double vision or yellow sclerae. + throat pain   SKIN:  No rash or itching.  CARDIOVASCULAR:  No chest pain, chest pressure or chest discomfort.  RESPIRATORY:  No shortness of breath, cough or sputum.  GASTROINTESTINAL:  SEE HPI  GENITOURINARY:  No dysuria, hematuria, urinary frequency  NEUROLOGICAL:  No headache, dizziness, syncope, paralysis.   MUSCULOSKELETAL:  No muscle, back pain, joint pain or stiffness.  ENDOCRINOLOGIC:  No reports of sweating, cold or heat intolerance.     ______________________________________________________________________  PHYSICAL EXAM:  T(C): 37.8 (04-30-24 @ 03:00), Max: 39.1 (04-29-24 @ 14:00)  HR: 102 (04-30-24 @ 05:55)  BP: 110/53 (04-30-24 @ 04:30)  RR: 19 (04-30-24 @ 04:30)  SpO2: 100% (04-30-24 @ 05:55)  Wt(kg): --    04-28 - 04-29  --------------------------------------------------------  IN:    Enteral Tube Flush: 50 mL    IV PiggyBack: 400 mL    Norepinephrine: 1257.3 mL    Propofol: 449.9 mL    Vital High Protein: 770 mL  Total IN: 2927.2 mL    OUT:    Voided (mL): 1300 mL  Total OUT: 1300 mL    Total NET: 1627.2 mL      04-29 - 04-30  --------------------------------------------------------  IN:    Enteral Tube Flush: 100 mL    Heparin Infusion: 392 mL    IV PiggyBack: 650 mL    Norepinephrine: 709.7 mL    Propofol: 16.7 mL    Vital High Protein: 1190 mL  Total IN: 3058.4 mL    OUT:    Voided (mL): 1000 mL  Total OUT: 1000 mL    Total NET: 2058.4 mL          GEN: NAD, normocephalic, obese   CVS: S1S2+  CHEST: clear to auscultation  ABD: soft , nontender, nondistended, bowel sounds present  EXTR: no cyanosis, no clubbing, no edema  NEURO: A&OX3, is able to mouth responses   SKIN:  warm;  non icteric    ______________________________________________________________________  LABS:                        7.6    135.08 )-----------( 213      ( 30 Apr 2024 00:19 )             23.9     04-30    144  |  109<H>  |  5<L>  ----------------------------<  162<H>  3.6   |  25  |  <0.30<L>    Ca    8.5      30 Apr 2024 00:20  Phos  2.4     04-30  Mg     2.3     04-30    TPro  6.3  /  Alb  2.7<L>  /  TBili  0.4  /  DBili  x   /  AST  24  /  ALT  25  /  AlkPhos  73  04-30    LIVER FUNCTIONS - ( 30 Apr 2024 00:20 )  Alb: 2.7 g/dL / Pro: 6.3 g/dL / ALK PHOS: 73 U/L / ALT: 25 U/L / AST: 24 U/L / GGT: x           PT/INR - ( 29 Apr 2024 00:09 )   PT: 13.6 sec;   INR: 1.31 ratio         PTT - ( 30 Apr 2024 05:07 )  PTT:140.8 sec  ____________________________________________         Initial GI Consult    Patient is a 69y old  Female who presents with a chief complaint of Transfer for leukophoresis (29 Apr 2024 13:39)    HPI:  69F h/o CVA (bedbound at baseline), COPD, CHF, HTN, CLL p/w acute shortness of breath to outside ED found to have acute pulmonary edema and PNA transferred to [facility] for possible leukophoresis. Patient found to have empyema 2/2 Pansensitive Strep Pneumo with associated bacteremia. She was intubated and unable to be weaned off vent, s/p Tracheostomy tube on 4/28. Patient has been on tube feeds via OG tube, now requiring a PEG tube placement. Hospital course complicated by L common femoral vein DVT, currently on a Heparin Drip.     PAST MEDICAL & SURGICAL HISTORY:  Acute CHF    COPD, severe        FAMILY HISTORY:      MEDS:  MEDICATIONS  (STANDING):  albuterol/ipratropium for Nebulization 3 milliLiter(s) Nebulizer every 8 hours  ampicillin/sulbactam  IVPB      ampicillin/sulbactam  IVPB 3 Gram(s) IV Intermittent every 6 hours  chlorhexidine 0.12% Liquid 15 milliLiter(s) Oral Mucosa every 12 hours  chlorhexidine 2% Cloths 1 Application(s) Topical <User Schedule>  droxidopa 300 milliGRAM(s) Oral <User Schedule>  heparin  Infusion.  Unit(s)/Hr (20 mL/Hr) IV Continuous <Continuous>  insulin lispro (ADMELOG) corrective regimen sliding scale   SubCutaneous every 6 hours  norepinephrine Infusion 0.02 MICROgram(s)/kG/Min (4.16 mL/Hr) IV Continuous <Continuous>  polyethylene glycol 3350 17 Gram(s) Oral every 12 hours  propofol Infusion 9.91 MICROgram(s)/kG/Min (6.6 mL/Hr) IV Continuous <Continuous>  QUEtiapine 25 milliGRAM(s) Oral at bedtime  senna Syrup 10 milliLiter(s) Oral at bedtime  sodium chloride 3%  Inhalation 4 milliLiter(s) Inhalation every 8 hours    MEDICATIONS  (PRN):  acetaminophen   Oral Liquid .. 650 milliGRAM(s) Oral every 6 hours PRN Temp greater or equal to 38C (100.4F)  fentaNYL    Injectable 50 MICROGram(s) IV Push every 2 hours PRN Moderate Pain (4 - 6)  nystatin Powder 1 Application(s) Topical two times a day PRN skin irritation    Allergies    No Known Allergies    Intolerances          CONSTITUTIONAL:  No weight loss, fever, chills, weakness or fatigue.  HEENT:  Eyes:  No visual loss, blurred vision, double vision or yellow sclerae. + throat pain   SKIN:  No rash or itching.  CARDIOVASCULAR:  No chest pain, chest pressure or chest discomfort.  RESPIRATORY:  No shortness of breath, cough or sputum.  GASTROINTESTINAL:  SEE HPI  GENITOURINARY:  No dysuria, hematuria, urinary frequency  NEUROLOGICAL:  No headache, dizziness, syncope, paralysis.   MUSCULOSKELETAL:  No muscle, back pain, joint pain or stiffness.  ENDOCRINOLOGIC:  No reports of sweating, cold or heat intolerance.     ______________________________________________________________________  PHYSICAL EXAM:  T(C): 37.8 (04-30-24 @ 03:00), Max: 39.1 (04-29-24 @ 14:00)  HR: 102 (04-30-24 @ 05:55)  BP: 110/53 (04-30-24 @ 04:30)  RR: 19 (04-30-24 @ 04:30)  SpO2: 100% (04-30-24 @ 05:55)  Wt(kg): --    04-28 - 04-29  --------------------------------------------------------  IN:    Enteral Tube Flush: 50 mL    IV PiggyBack: 400 mL    Norepinephrine: 1257.3 mL    Propofol: 449.9 mL    Vital High Protein: 770 mL  Total IN: 2927.2 mL    OUT:    Voided (mL): 1300 mL  Total OUT: 1300 mL    Total NET: 1627.2 mL      04-29 - 04-30  --------------------------------------------------------  IN:    Enteral Tube Flush: 100 mL    Heparin Infusion: 392 mL    IV PiggyBack: 650 mL    Norepinephrine: 709.7 mL    Propofol: 16.7 mL    Vital High Protein: 1190 mL  Total IN: 3058.4 mL    OUT:    Voided (mL): 1000 mL  Total OUT: 1000 mL    Total NET: 2058.4 mL          GEN: NAD, normocephalic, obese   HEENT: +trach  CVS: S1S2+  CHEST: clear to auscultation  ABD: soft , nontender, nondistended, bowel sounds present  EXTR: no cyanosis, no clubbing, no edema  NEURO: A&OX3, is able to mouth responses   SKIN:  warm;  non icteric    ______________________________________________________________________  LABS:                        7.6    135.08 )-----------( 213      ( 30 Apr 2024 00:19 )             23.9     04-30    144  |  109<H>  |  5<L>  ----------------------------<  162<H>  3.6   |  25  |  <0.30<L>    Ca    8.5      30 Apr 2024 00:20  Phos  2.4     04-30  Mg     2.3     04-30    TPro  6.3  /  Alb  2.7<L>  /  TBili  0.4  /  DBili  x   /  AST  24  /  ALT  25  /  AlkPhos  73  04-30    LIVER FUNCTIONS - ( 30 Apr 2024 00:20 )  Alb: 2.7 g/dL / Pro: 6.3 g/dL / ALK PHOS: 73 U/L / ALT: 25 U/L / AST: 24 U/L / GGT: x           PT/INR - ( 29 Apr 2024 00:09 )   PT: 13.6 sec;   INR: 1.31 ratio         PTT - ( 30 Apr 2024 05:07 )  PTT:140.8 sec  ____________________________________________

## 2024-04-30 NOTE — PROGRESS NOTE ADULT - ATTENDING COMMENTS
69F h/o CVA (bedbound at baseline), COPD, CHF, HTN presenting as transfer from [facility] for SOB iso leukocytosis to 233 c/f acute leukemia. Pt intubated and on pressors in setting of strep pneumo pneumonia. Hematology consulted for leukocytosis, confirmed underlying CLL with reactive lymphocytosis 2/2 sepsis.   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 Recommend performing inpatient with IR (due to body habitus) once clinically stable.  will follow

## 2024-04-30 NOTE — CONSULT NOTE ADULT - ASSESSMENT
69F h/o CVA (bedbound at baseline), COPD, CHF, HTN p/w acute shortness of breath to outside ED found to have acute pulmonary edema and PNA transferred to [facility] for possible leukophoresis. Patient found to have empyema 2/2 Pansensitive Strep Pneumo.  S/p intubation with inability to wean, s/p tracheostomy tube on 4/28. Now requiring a PEG tube placement. Hospital course complicated by L common femoral vein DVT, currently on a Heparin Drip.     #PEG eval s/p trach   #AHRF req intubation; unable to wean req trach 4/28  #Empyema   #DVT on heparin drip     Recommendations:  -  -  -    Geovanna Ashby MD  Gastroenterology/Hepatology Fellow, PGY-4  Please contact via TEAMS    NON-URGENT CONSULTS:  Please email [email or web address]   69F h/o CVA (bedbound at baseline), COPD, CHF, HTN, CLL p/w acute shortness of breath to outside ED found to have acute pulmonary edema and PNA transferred to [facility] for possible leukophoresis. Patient found to have empyema 2/2 Pansensitive Strep Pneumo with associated bacteremia.  S/p intubation with inability to wean, s/p tracheostomy tube on 4/28. Now requiring a PEG tube placement. Hospital course complicated by L common femoral vein DVT, currently on a Heparin Drip.     #PEG eval s/p trach   #AHRF req intubation; unable to wean req trach 4/28  #Empyema   #S. pneumo bacteremia with last + growth 4/16  #DVT on heparin drip     Recommendations:  -  -  -    Geovanna Ashby MD  Gastroenterology/Hepatology Fellow, PGY-4  Please contact via TEAMS    NON-URGENT CONSULTS:  Please email [email or web address]   69F h/o CVA (bedbound at baseline), COPD, CHF, HTN, CLL p/w acute shortness of breath to outside ED found to have acute pulmonary edema and PNA transferred to [facility] for possible leukophoresis. Patient found to have empyema 2/2 Pansensitive Strep Pneumo with associated bacteremia.  S/p intubation with inability to wean, s/p tracheostomy tube on 4/28. Now requiring a PEG tube placement. Hospital course complicated by L common femoral vein DVT, currently on a Heparin Drip.     #PEG eval s/p trach   #AHRF req intubation; unable to wean req trach 4/28  #Empyema   #S. pneumo bacteremia with last + growth 4/16  #DVT on heparin drip     Recommendations:  - Can plan for PEG placement tomorrow   - Please get AM labs with electrolyte correction   - NPO at [place]   - Hold AC for 6 hrs prior to procedure    Note not finalized until signed by attending.     Geovanna Ashby MD  Gastroenterology/Hepatology Fellow, PGY-4  Please contact via TEAMS    NON-URGENT CONSULTS:  Please email [email or web address]   69F h/o CVA (bedbound at baseline), COPD, CHF, HTN, CLL p/w acute shortness of breath to outside ED found to have acute pulmonary edema and PNA transferred to [facility] for possible leukophoresis. Patient found to have empyema 2/2 Pansensitive Strep Pneumo with associated bacteremia.  S/p intubation with inability to wean, s/p tracheostomy tube on 4/28. Now requiring a PEG tube placement. Hospital course complicated by L common femoral vein DVT, currently on a Heparin Drip.     #PEG eval s/p trach   #AHRF req intubation; unable to wean req trach 4/28  #Empyema   #S. pneumo bacteremia with last + growth 4/16  #DVT on heparin drip     Recommendations:  - Can plan for PEG placement once off pressors and fever workup is negative  -NGT feeds in the meantime  - Will need to hold A/C 6 hours before eventual EGD/PEG  - If off pressors/fever workup negative, will plan for EGD/PEG later this week after discussion with family    Note not finalized until signed by attending.     Geovanna Ashby MD  Gastroenterology/Hepatology Fellow, PGY-4  Please contact via TEAMS    NON-URGENT CONSULTS:  Please email [email or web address]

## 2024-04-30 NOTE — SPEECH LANGUAGE PATHOLOGY EVALUATION - SLP PERTINENT HISTORY OF CURRENT PROBLEM
69F h/o CVA (bedbound at baseline), COPD, CHF, HTN presenting as transfer from [facility] for SOB iso leukocytosis to 233 c/f acute leukemia. Pt intubated and on pressors, transferred to MICU for further management, course c/b empyema s/p chest tube placement (4/16) and removal, reaccumulation of loculated pleural effusion s/p L pigtail placement and removal on 4/27 69F h/o CVA (bedbound at baseline), COPD, CHF, HTN presenting as transfer from [facility] for SOB iso leukocytosis to 233 c/f acute leukemia. Pt intubated and on pressors, transferred to MICU for further management, course c/b empyema s/p chest tube placement (4/16) and removal, reaccumulation of loculated pleural effusion s/p L pigtail placement and removal on 4/27. Pt intubated and unable to be weaned off vent, s/p Tracheostomy tube on 4/28. Patient has been on tube feeds via OG tube, now requiring a PEG tube placement -> GI on board. Hospital course complicated by L common femoral vein DVT, currently on a Heparin Drip.

## 2024-04-30 NOTE — PROGRESS NOTE ADULT - SUBJECTIVE AND OBJECTIVE BOX
INTERVAL HPI/OVERNIGHT EVENTS:    SUBJECTIVE: Patient seen and examined at bedside.     ROS: All negative except as listed above.    VITAL SIGNS:  ICU Vital Signs Last 24 Hrs  T(C): 37.8 (30 Apr 2024 06:00), Max: 39.1 (29 Apr 2024 14:00)  T(F): 100 (30 Apr 2024 06:00), Max: 102.4 (29 Apr 2024 14:00)  HR: 80 (30 Apr 2024 07:15) (79 - 123)  BP: 100/54 (30 Apr 2024 07:15) (84/46 - 152/67)  BP(mean): 76 (30 Apr 2024 07:00) (59 - 98)  ABP: --  ABP(mean): --  RR: 16 (30 Apr 2024 07:15) (16 - 38)  SpO2: 100% (30 Apr 2024 07:15) (94% - 100%)    O2 Parameters below as of 30 Apr 2024 05:55  Patient On (Oxygen Delivery Method): ventilator          Mode: AC/ CMV (Assist Control/ Continuous Mandatory Ventilation), RR (machine): 14, TV (machine): 450, FiO2: 30, PEEP: 5, ITime: 1, MAP: 10, PIP: 25  Plateau pressure:   P/F ratio:     04-29 @ 07:01  -  04-30 @ 07:00  --------------------------------------------------------  IN: 3394.9 mL / OUT: 1200 mL / NET: 2194.9 mL      CAPILLARY BLOOD GLUCOSE      POCT Blood Glucose.: 162 mg/dL (30 Apr 2024 05:25)      ECG: reviewed.    PHYSICAL EXAM:    GENERAL: NAD, lying in bed comfortably,   HEAD:  Atraumatic, normocephalic  EYES: EOMI, PERRLA, conjunctiva and sclera clear  NECK: Supple, trachea midline, no JVD, Tracheostomy tube in place   HEART: Regular rate and rhythm, no murmurs, rubs, or gallops  LUNGS: Unlabored respirations.  Clear to auscultation bilaterally, no crackles, wheezing, or rhonchi  ABDOMEN: Soft, nontender, nondistended, +BS  EXTREMITIES: 2+ peripheral pulses bilaterally, cap refill<2 secs. No clubbing, cyanosis, L leg and L arm pitting edema   NERVOUS SYSTEM: following commands, moving all extremities, no focal deficits   SKIN: No rashes or lesions    MEDICATIONS:  MEDICATIONS  (STANDING):  albuterol/ipratropium for Nebulization 3 milliLiter(s) Nebulizer every 8 hours  ampicillin/sulbactam  IVPB      ampicillin/sulbactam  IVPB 3 Gram(s) IV Intermittent every 6 hours  chlorhexidine 0.12% Liquid 15 milliLiter(s) Oral Mucosa every 12 hours  chlorhexidine 2% Cloths 1 Application(s) Topical <User Schedule>  droxidopa 300 milliGRAM(s) Oral <User Schedule>  heparin  Infusion.  Unit(s)/Hr (20 mL/Hr) IV Continuous <Continuous>  insulin lispro (ADMELOG) corrective regimen sliding scale   SubCutaneous every 6 hours  norepinephrine Infusion 0.02 MICROgram(s)/kG/Min (4.16 mL/Hr) IV Continuous <Continuous>  polyethylene glycol 3350 17 Gram(s) Oral every 12 hours  propofol Infusion 9.91 MICROgram(s)/kG/Min (6.6 mL/Hr) IV Continuous <Continuous>  QUEtiapine 25 milliGRAM(s) Oral at bedtime  senna Syrup 10 milliLiter(s) Oral at bedtime  sodium chloride 3%  Inhalation 4 milliLiter(s) Inhalation every 8 hours    MEDICATIONS  (PRN):  acetaminophen   Oral Liquid .. 650 milliGRAM(s) Oral every 6 hours PRN Temp greater or equal to 38C (100.4F)  fentaNYL    Injectable 50 MICROGram(s) IV Push every 2 hours PRN Moderate Pain (4 - 6)  nystatin Powder 1 Application(s) Topical two times a day PRN skin irritation      ALLERGIES:  Allergies    No Known Allergies    Intolerances        LABS:                        7.6    135.08 )-----------( 213      ( 30 Apr 2024 00:19 )             23.9     04-30    144  |  109<H>  |  5<L>  ----------------------------<  162<H>  3.6   |  25  |  <0.30<L>    Ca    8.5      30 Apr 2024 00:20  Phos  2.4     04-30  Mg     2.3     04-30    TPro  6.3  /  Alb  2.7<L>  /  TBili  0.4  /  DBili  x   /  AST  24  /  ALT  25  /  AlkPhos  73  04-30    PT/INR - ( 29 Apr 2024 00:09 )   PT: 13.6 sec;   INR: 1.31 ratio         PTT - ( 30 Apr 2024 05:07 )  PTT:140.8 sec  Urinalysis Basic - ( 30 Apr 2024 00:20 )    Color: x / Appearance: x / SG: x / pH: x  Gluc: 162 mg/dL / Ketone: x  / Bili: x / Urobili: x   Blood: x / Protein: x / Nitrite: x   Leuk Esterase: x / RBC: x / WBC x   Sq Epi: x / Non Sq Epi: x / Bacteria: x      ABG:  pH, Arterial: 7.46 (04-30-24 @ 00:04)  pCO2, Arterial: 39 mmHg (04-30-24 @ 00:04)  pO2, Arterial: 114 mmHg (04-30-24 @ 00:04)      vBG:    Micro:    Culture - Blood (collected 04-26-24 @ 13:30)  Source: .Blood Blood-Peripheral  Preliminary Report (04-29-24 @ 18:01):    No growth at 72 Hours    Culture - Blood (collected 04-26-24 @ 13:21)  Source: .Blood Blood-Venous  Preliminary Report (04-29-24 @ 18:01):    No growth at 72 Hours    Culture - Blood (collected 04-24-24 @ 05:53)  Source: .Blood Blood  Final Report (04-29-24 @ 10:01):    No growth at 5 days    Culture - Blood (collected 04-24-24 @ 05:53)  Source: .Blood Blood  Final Report (04-29-24 @ 10:01):    No growth at 5 days    Culture - Blood (collected 04-22-24 @ 21:37)  Source: .Blood Blood-Peripheral  Gram Stain (04-24-24 @ 00:09):    Growth in aerobic bottle: Gram Positive Cocci in Clusters  Final Report (04-24-24 @ 23:16):    Growth in aerobic bottle: Staphylococcus epidermidis    Isolation of Coagulase negative Staphylococcus from single blood culture    sets may represent    contamination. Contact the Microbiology Department at [phone] if    susceptibility testing is    clinically indicated.    Direct identification is available within approximately 3-5    hours either by Blood Panel Multiplexed PCR or Direct    MALDI-TOF. Details: [email or web address]  Organism: Blood Culture PCR (04-24-24 @ 23:16)  Organism: Blood Culture PCR (04-24-24 @ 23:16)      Method Type: PCR      -  Staphylococcus epidermidis, Methicillin resistant: Detec    Culture - Blood (collected 04-22-24 @ 19:30)  Source: .Blood Blood-Peripheral  Final Report (04-28-24 @ 01:00):    No growth at 5 days    Culture - Blood (collected 04-20-24 @ 12:15)  Source: .Blood Blood-Peripheral  Final Report (04-25-24 @ 19:00):    No growth at 5 days    Culture - Blood (collected 04-20-24 @ 12:00)  Source: .Blood Blood-Peripheral  Final Report (04-25-24 @ 19:00):    No growth at 5 days    Culture - Blood (collected 04-18-24 @ 11:00)  Source: .Blood Blood-Peripheral  Final Report (04-23-24 @ 17:01):    No growth at 5 days    Culture - Blood (collected 04-18-24 @ 10:50)  Source: .Blood Blood-Peripheral  Final Report (04-23-24 @ 17:01):    No growth at 5 days    Culture - Blood (collected 04-16-24 @ 15:40)  Source: .Blood Blood-Peripheral  Final Report (04-21-24 @ 21:01):    No growth at 5 days    Culture - Blood (collected 04-16-24 @ 13:17)  Source: .Blood Blood-Peripheral  Final Report (04-21-24 @ 18:01):    No growth at 5 days    Culture - Blood (collected 04-16-24 @ 06:42)  Source: .Blood Blood-Peripheral  Gram Stain (04-18-24 @ 11:52):    Growth in aerobic bottle: Gram Positive Cocci in Pairs and Chains    Growth in anaerobic bottle: Gram positive cocci in pairs  Final Report (04-18-24 @ 11:52):    Growth in aerobic and anaerobic bottles: Streptococcus pneumoniae    See previous culture [id number]    Culture - Blood (collected 04-16-24 @ 06:35)  Source: .Blood Blood-Peripheral  Gram Stain (04-18-24 @ 11:51):    Growth in aerobic bottle: Gram positive cocci in pairs    Growth in anaerobic bottle: Gram positive cocci in pairs  Final Report (04-18-24 @ 11:51):    Growth in aerobic and anaerobic bottles: Streptococcus pneumoniae    Direct identification is available within approximately 3-5    hours either by Blood Panel Multiplexed PCR or Direct    MALDI-TOF. Details: [email or web address]  Organism: Blood Culture PCR  Streptococcus pneumoniae (04-18-24 @ 11:51)  Organism: Streptococcus pneumoniae (04-18-24 @ 11:51)      Method Type: LESLY      -  Clindamycin: S <=0.06      -  Erythromycin: S <=0.06 Predicts results for azithromycin.      -  Levofloxacin: S 1      -  Tetracycline: S <=0.5      -  Trimethoprim/Sulfamethoxazole: S <=.25/4.75      -  Vancomycin: S 0.5      -  Ceftriaxone (meningitidis): S <=0.25      -  Ceftriaxone (non-meningitidis): S <=0.25      -  Penicillin (meningitidis): S 0.06      -  Penicillin (non-meningitidis): S 0.06      -  Penicillin (oral penicillin V): S 0.06  Organism: Blood Culture PCR (04-18-24 @ 11:51)      Method Type: PCR      -  Streptococcus pneumoniae: Detec        Culture - Sputum (collected 04-26-24 @ 13:44)  Source: .Sputum Sputum  Gram Stain (04-26-24 @ 20:33):    Rare polymorphonuclear leukocytes per low power field    No Squamous epithelial cells per low power field    No organisms seen per oil power field  Final Report (04-28-24 @ 08:42):    No growth    Culture - Sputum (collected 04-16-24 @ 15:44)  Source: ET Tube ET Tube  Gram Stain (04-16-24 @ 22:00):    Few polymorphonuclear leukocytes per low power field    No Squamous epithelial cells per low power field    No organisms seen per oil power field  Final Report (04-18-24 @ 09:40):    Normal Respiratory Kayla present        RADIOLOGY & ADDITIONAL TESTS: Reviewed.       INTERVAL HPI/OVERNIGHT EVENTS:    SUBJECTIVE: Patient seen and examined at bedside. Endorses throat soreness around trach site, but no other pain.     ROS: All negative except as listed above.    VITAL SIGNS:  ICU Vital Signs Last 24 Hrs  T(C): 37.8 (30 Apr 2024 06:00), Max: 39.1 (29 Apr 2024 14:00)  T(F): 100 (30 Apr 2024 06:00), Max: 102.4 (29 Apr 2024 14:00)  HR: 80 (30 Apr 2024 07:15) (79 - 123)  BP: 100/54 (30 Apr 2024 07:15) (84/46 - 152/67)  BP(mean): 76 (30 Apr 2024 07:00) (59 - 98)  ABP: --  ABP(mean): --  RR: 16 (30 Apr 2024 07:15) (16 - 38)  SpO2: 100% (30 Apr 2024 07:15) (94% - 100%)    O2 Parameters below as of 30 Apr 2024 05:55  Patient On (Oxygen Delivery Method): ventilator          Mode: AC/ CMV (Assist Control/ Continuous Mandatory Ventilation), RR (machine): 14, TV (machine): 450, FiO2: 30, PEEP: 5, ITime: 1, MAP: 10, PIP: 25  Plateau pressure:   P/F ratio:     04-29 @ 07:01  -  04-30 @ 07:00  --------------------------------------------------------  IN: 3394.9 mL / OUT: 1200 mL / NET: 2194.9 mL      CAPILLARY BLOOD GLUCOSE      POCT Blood Glucose.: 162 mg/dL (30 Apr 2024 05:25)      ECG: reviewed.    PHYSICAL EXAM:    GENERAL: NAD, lying in bed comfortably,   HEAD:  Atraumatic, normocephalic  EYES: EOMI, PERRLA, conjunctiva and sclera clear  NECK: Supple, trachea midline, no JVD, Tracheostomy tube in place   HEART: Regular rate and rhythm, no murmurs, rubs, or gallops  LUNGS: Unlabored respirations.  Clear to auscultation bilaterally, no crackles, wheezing, or rhonchi  ABDOMEN: Soft, nontender, nondistended, +BS  EXTREMITIES: 2+ peripheral pulses bilaterally, cap refill<2 secs. No clubbing, cyanosis, L leg and L arm pitting edema   NERVOUS SYSTEM: AOx4 following commands, moving all extremities, no focal deficits   SKIN: No rashes or lesions    MEDICATIONS:  MEDICATIONS  (STANDING):  albuterol/ipratropium for Nebulization 3 milliLiter(s) Nebulizer every 8 hours  ampicillin/sulbactam  IVPB      ampicillin/sulbactam  IVPB 3 Gram(s) IV Intermittent every 6 hours  chlorhexidine 0.12% Liquid 15 milliLiter(s) Oral Mucosa every 12 hours  chlorhexidine 2% Cloths 1 Application(s) Topical <User Schedule>  droxidopa 300 milliGRAM(s) Oral <User Schedule>  heparin  Infusion.  Unit(s)/Hr (20 mL/Hr) IV Continuous <Continuous>  insulin lispro (ADMELOG) corrective regimen sliding scale   SubCutaneous every 6 hours  norepinephrine Infusion 0.02 MICROgram(s)/kG/Min (4.16 mL/Hr) IV Continuous <Continuous>  polyethylene glycol 3350 17 Gram(s) Oral every 12 hours  propofol Infusion 9.91 MICROgram(s)/kG/Min (6.6 mL/Hr) IV Continuous <Continuous>  QUEtiapine 25 milliGRAM(s) Oral at bedtime  senna Syrup 10 milliLiter(s) Oral at bedtime  sodium chloride 3%  Inhalation 4 milliLiter(s) Inhalation every 8 hours    MEDICATIONS  (PRN):  acetaminophen   Oral Liquid .. 650 milliGRAM(s) Oral every 6 hours PRN Temp greater or equal to 38C (100.4F)  fentaNYL    Injectable 50 MICROGram(s) IV Push every 2 hours PRN Moderate Pain (4 - 6)  nystatin Powder 1 Application(s) Topical two times a day PRN skin irritation      ALLERGIES:  Allergies    No Known Allergies    Intolerances        LABS:                        7.6    135.08 )-----------( 213      ( 30 Apr 2024 00:19 )             23.9     04-30    144  |  109<H>  |  5<L>  ----------------------------<  162<H>  3.6   |  25  |  <0.30<L>    Ca    8.5      30 Apr 2024 00:20  Phos  2.4     04-30  Mg     2.3     04-30    TPro  6.3  /  Alb  2.7<L>  /  TBili  0.4  /  DBili  x   /  AST  24  /  ALT  25  /  AlkPhos  73  04-30    PT/INR - ( 29 Apr 2024 00:09 )   PT: 13.6 sec;   INR: 1.31 ratio         PTT - ( 30 Apr 2024 05:07 )  PTT:140.8 sec  Urinalysis Basic - ( 30 Apr 2024 00:20 )    Color: x / Appearance: x / SG: x / pH: x  Gluc: 162 mg/dL / Ketone: x  / Bili: x / Urobili: x   Blood: x / Protein: x / Nitrite: x   Leuk Esterase: x / RBC: x / WBC x   Sq Epi: x / Non Sq Epi: x / Bacteria: x      ABG:  pH, Arterial: 7.46 (04-30-24 @ 00:04)  pCO2, Arterial: 39 mmHg (04-30-24 @ 00:04)  pO2, Arterial: 114 mmHg (04-30-24 @ 00:04)      vBG:    Micro:    Culture - Blood (collected 04-26-24 @ 13:30)  Source: .Blood Blood-Peripheral  Preliminary Report (04-29-24 @ 18:01):    No growth at 72 Hours    Culture - Blood (collected 04-26-24 @ 13:21)  Source: .Blood Blood-Venous  Preliminary Report (04-29-24 @ 18:01):    No growth at 72 Hours    Culture - Blood (collected 04-24-24 @ 05:53)  Source: .Blood Blood  Final Report (04-29-24 @ 10:01):    No growth at 5 days    Culture - Blood (collected 04-24-24 @ 05:53)  Source: .Blood Blood  Final Report (04-29-24 @ 10:01):    No growth at 5 days    Culture - Blood (collected 04-22-24 @ 21:37)  Source: .Blood Blood-Peripheral  Gram Stain (04-24-24 @ 00:09):    Growth in aerobic bottle: Gram Positive Cocci in Clusters  Final Report (04-24-24 @ 23:16):    Growth in aerobic bottle: Staphylococcus epidermidis    Isolation of Coagulase negative Staphylococcus from single blood culture    sets may represent    contamination. Contact the Microbiology Department at [phone] if    susceptibility testing is    clinically indicated.    Direct identification is available within approximately 3-5    hours either by Blood Panel Multiplexed PCR or Direct    MALDI-TOF. Details: [email or web address]  Organism: Blood Culture PCR (04-24-24 @ 23:16)  Organism: Blood Culture PCR (04-24-24 @ 23:16)      Method Type: PCR      -  Staphylococcus epidermidis, Methicillin resistant: Detec    Culture - Blood (collected 04-22-24 @ 19:30)  Source: .Blood Blood-Peripheral  Final Report (04-28-24 @ 01:00):    No growth at 5 days    Culture - Blood (collected 04-20-24 @ 12:15)  Source: .Blood Blood-Peripheral  Final Report (04-25-24 @ 19:00):    No growth at 5 days    Culture - Blood (collected 04-20-24 @ 12:00)  Source: .Blood Blood-Peripheral  Final Report (04-25-24 @ 19:00):    No growth at 5 days    Culture - Blood (collected 04-18-24 @ 11:00)  Source: .Blood Blood-Peripheral  Final Report (04-23-24 @ 17:01):    No growth at 5 days    Culture - Blood (collected 04-18-24 @ 10:50)  Source: .Blood Blood-Peripheral  Final Report (04-23-24 @ 17:01):    No growth at 5 days    Culture - Blood (collected 04-16-24 @ 15:40)  Source: .Blood Blood-Peripheral  Final Report (04-21-24 @ 21:01):    No growth at 5 days    Culture - Blood (collected 04-16-24 @ 13:17)  Source: .Blood Blood-Peripheral  Final Report (04-21-24 @ 18:01):    No growth at 5 days    Culture - Blood (collected 04-16-24 @ 06:42)  Source: .Blood Blood-Peripheral  Gram Stain (04-18-24 @ 11:52):    Growth in aerobic bottle: Gram Positive Cocci in Pairs and Chains    Growth in anaerobic bottle: Gram positive cocci in pairs  Final Report (04-18-24 @ 11:52):    Growth in aerobic and anaerobic bottles: Streptococcus pneumoniae    See previous culture [id number]    Culture - Blood (collected 04-16-24 @ 06:35)  Source: .Blood Blood-Peripheral  Gram Stain (04-18-24 @ 11:51):    Growth in aerobic bottle: Gram positive cocci in pairs    Growth in anaerobic bottle: Gram positive cocci in pairs  Final Report (04-18-24 @ 11:51):    Growth in aerobic and anaerobic bottles: Streptococcus pneumoniae    Direct identification is available within approximately 3-5    hours either by Blood Panel Multiplexed PCR or Direct    MALDI-TOF. Details: [email or web address]  Organism: Blood Culture PCR  Streptococcus pneumoniae (04-18-24 @ 11:51)  Organism: Streptococcus pneumoniae (04-18-24 @ 11:51)      Method Type: LESLY      -  Clindamycin: S <=0.06      -  Erythromycin: S <=0.06 Predicts results for azithromycin.      -  Levofloxacin: S 1      -  Tetracycline: S <=0.5      -  Trimethoprim/Sulfamethoxazole: S <=.25/4.75      -  Vancomycin: S 0.5      -  Ceftriaxone (meningitidis): S <=0.25      -  Ceftriaxone (non-meningitidis): S <=0.25      -  Penicillin (meningitidis): S 0.06      -  Penicillin (non-meningitidis): S 0.06      -  Penicillin (oral penicillin V): S 0.06  Organism: Blood Culture PCR (04-18-24 @ 11:51)      Method Type: PCR      -  Streptococcus pneumoniae: Detec        Culture - Sputum (collected 04-26-24 @ 13:44)  Source: .Sputum Sputum  Gram Stain (04-26-24 @ 20:33):    Rare polymorphonuclear leukocytes per low power field    No Squamous epithelial cells per low power field    No organisms seen per oil power field  Final Report (04-28-24 @ 08:42):    No growth    Culture - Sputum (collected 04-16-24 @ 15:44)  Source: ET Tube ET Tube  Gram Stain (04-16-24 @ 22:00):    Few polymorphonuclear leukocytes per low power field    No Squamous epithelial cells per low power field    No organisms seen per oil power field  Final Report (04-18-24 @ 09:40):    Normal Respiratory Kayla present        RADIOLOGY & ADDITIONAL TESTS: Reviewed.

## 2024-04-30 NOTE — PROGRESS NOTE ADULT - ASSESSMENT
69F h/o CVA (bedbound at baseline), COPD, CHF, HTN presenting as transfer from [facility] for SOB iso leukocytosis to 233 c/f acute leukemia. Pt intubated and on pressors in setting of strep pneumo pneumonia. Hematology consulted for leukocytosis, confirmed underlying CLL with reactive lymphocytosis 2/2 sepsis.     - Patient Labs at admission: , Hgb 13.4, PLT 96, 96% lymphocytes, 2% neutrophils.   - On peripheral smear (4/16/24): heterogeneous population of lymphocytes, smudge cells present, rare blasts, rare target cells, polychromasia, giant platelets present, reactive lymphocytes   - CMV negative, EBV serologies indicate recent or past   - There was no indication for leukophoresis at admission, no blasts seen on peripheral smear at admission. Now, WBCs are downtrending as infection is being treated.   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%)  - 4/18/24: Flow Cytometry c/w CLL/SLL 88.8% Monotypic B-cells positive for CD5 (dim), CD19, CD20 (dimmer), CD23 (partial), , surface kappa light chain; negative for CD10, CD11c, CD38, FMC7, lambda surface light chain    PLAN  # CLL  - CT chest/abd/pelvis w/ IV contrast 4/23/24 showed extensive lymphadenopathy consistent with CLL  - f/u Cytogenetics from peripheral blood (collected 4/16/24)  - IgG 535, given IVIg on 4/26/24 as she was not clearing her infection  - Please trend TLS labs (uric acid, LDH, CMP, Mg, Phos) and CBC w/ diff and retic daily  - If uric acid > 8, start allopurinol and give 3 mg IV rasburicase, and if uric acid > 12 give 6 mg rasburicase  - Confirmed with patient that she has had no prior bone marrow biopsy. Recommend performing inpatient with IR (due to body habitus) once clinically stable.  - She had prior workup at [facility] (prior flow cytometry from October 2023 is in the HIE). She did not see an outside hematologist.  - Will arrange for follow up at the [facility] when patient is closer to discharge    Note is not finalized until signed by attending.     Ke Roy MD  Hematology/Oncology Fellow PGY-5  Pager: [facility] [phone] / BERNICEJ [phone]  After 5pm and on weekends please page on-call fellow

## 2024-04-30 NOTE — SPEECH LANGUAGE PATHOLOGY EVALUATION - SLP DIAGNOSIS
70 y/o female hx notable for CVA, COPD, CHF, CLL, found w/ acute pulmonary edema and PNA, transferred to [facility] for possible leukophoresis, found w/ empyema, now trach to vent given inability to be weaned off of mechanical ventilation. Pt seen today for assessment of communication skills i/s/o new trach. Pt fully alert, oriented x3, and able to follow directives. Able to mouth words, utilize writing to communicate (despite wrist restraint), and utilize picture communication/ alphabet board. Communication board and boogie board left at bedside - would encourage staff to utilize with patient to prevent pt frustration and/or miscommunication when lip-reading. This service will continue to follow to ensure communication needs are met/ to determine candidacy for trials of speaking valve.

## 2024-05-01 LAB
ALBUMIN SERPL ELPH-MCNC: 2.7 G/DL — LOW (ref 3.3–5)
ALP SERPL-CCNC: 73 U/L — SIGNIFICANT CHANGE UP (ref 40–120)
ALT FLD-CCNC: 20 U/L — SIGNIFICANT CHANGE UP (ref 10–45)
ANION GAP SERPL CALC-SCNC: 10 MMOL/L — SIGNIFICANT CHANGE UP (ref 5–17)
APTT BLD: 51 SEC — HIGH (ref 24.5–35.6)
APTT BLD: 61.3 SEC — HIGH (ref 24.5–35.6)
AST SERPL-CCNC: 22 U/L — SIGNIFICANT CHANGE UP (ref 10–40)
BASOPHILS # BLD AUTO: 0.1 K/UL — SIGNIFICANT CHANGE UP (ref 0–0.2)
BASOPHILS NFR BLD AUTO: 0.1 % — SIGNIFICANT CHANGE UP (ref 0–2)
BILIRUB SERPL-MCNC: 0.4 MG/DL — SIGNIFICANT CHANGE UP (ref 0.2–1.2)
BUN SERPL-MCNC: 7 MG/DL — SIGNIFICANT CHANGE UP (ref 7–23)
CALCIUM SERPL-MCNC: 8.6 MG/DL — SIGNIFICANT CHANGE UP (ref 8.4–10.5)
CHLORIDE SERPL-SCNC: 107 MMOL/L — SIGNIFICANT CHANGE UP (ref 96–108)
CO2 SERPL-SCNC: 25 MMOL/L — SIGNIFICANT CHANGE UP (ref 22–31)
CREAT SERPL-MCNC: <0.3 MG/DL — LOW (ref 0.5–1.3)
CULTURE RESULTS: SIGNIFICANT CHANGE UP
CULTURE RESULTS: SIGNIFICANT CHANGE UP
EGFR: 115 ML/MIN/1.73M2 — SIGNIFICANT CHANGE UP
EOSINOPHIL # BLD AUTO: 0.1 K/UL — SIGNIFICANT CHANGE UP (ref 0–0.5)
EOSINOPHIL NFR BLD AUTO: 0.1 % — SIGNIFICANT CHANGE UP (ref 0–6)
ERYTHROCYTE [SEDIMENTATION RATE] IN BLOOD: 119 MM/HR — HIGH (ref 0–20)
GAS PNL BLDA: SIGNIFICANT CHANGE UP
GAS PNL BLDA: SIGNIFICANT CHANGE UP
GLUCOSE BLDC GLUCOMTR-MCNC: 111 MG/DL — HIGH (ref 70–99)
GLUCOSE BLDC GLUCOMTR-MCNC: 114 MG/DL — HIGH (ref 70–99)
GLUCOSE BLDC GLUCOMTR-MCNC: 90 MG/DL — SIGNIFICANT CHANGE UP (ref 70–99)
GLUCOSE BLDC GLUCOMTR-MCNC: 92 MG/DL — SIGNIFICANT CHANGE UP (ref 70–99)
GLUCOSE SERPL-MCNC: 120 MG/DL — HIGH (ref 70–99)
HCT VFR BLD CALC: 24.7 % — LOW (ref 34.5–45)
HCT VFR BLD CALC: 26 % — LOW (ref 34.5–45)
HGB BLD-MCNC: 7.5 G/DL — LOW (ref 11.5–15.5)
HGB BLD-MCNC: 7.9 G/DL — LOW (ref 11.5–15.5)
IMM GRANULOCYTES NFR BLD AUTO: 0.3 % — SIGNIFICANT CHANGE UP (ref 0–0.9)
INR BLD: 1.17 RATIO — SIGNIFICANT CHANGE UP (ref 0.85–1.18)
LYMPHOCYTES # BLD AUTO: 122.53 K/UL — HIGH (ref 1–3.3)
LYMPHOCYTES # BLD AUTO: 92.6 % — HIGH (ref 13–44)
MAGNESIUM SERPL-MCNC: 2.3 MG/DL — SIGNIFICANT CHANGE UP (ref 1.6–2.6)
MCHC RBC-ENTMCNC: 27.4 PG — SIGNIFICANT CHANGE UP (ref 27–34)
MCHC RBC-ENTMCNC: 27.7 PG — SIGNIFICANT CHANGE UP (ref 27–34)
MCHC RBC-ENTMCNC: 30.4 GM/DL — LOW (ref 32–36)
MCHC RBC-ENTMCNC: 30.4 GM/DL — LOW (ref 32–36)
MCV RBC AUTO: 90.1 FL — SIGNIFICANT CHANGE UP (ref 80–100)
MCV RBC AUTO: 91.2 FL — SIGNIFICANT CHANGE UP (ref 80–100)
MONOCYTES # BLD AUTO: 2.85 K/UL — HIGH (ref 0–0.9)
MONOCYTES NFR BLD AUTO: 2.2 % — SIGNIFICANT CHANGE UP (ref 2–14)
NEUTROPHILS # BLD AUTO: 6.39 K/UL — SIGNIFICANT CHANGE UP (ref 1.8–7.4)
NEUTROPHILS NFR BLD AUTO: 4.7 % — LOW (ref 43–77)
NRBC # BLD: 0 /100 WBCS — SIGNIFICANT CHANGE UP (ref 0–0)
NRBC # BLD: 0 /100 WBCS — SIGNIFICANT CHANGE UP (ref 0–0)
PHOSPHATE SERPL-MCNC: 2.9 MG/DL — SIGNIFICANT CHANGE UP (ref 2.5–4.5)
PLATELET # BLD AUTO: 198 K/UL — SIGNIFICANT CHANGE UP (ref 150–400)
PLATELET # BLD AUTO: 240 K/UL — SIGNIFICANT CHANGE UP (ref 150–400)
POTASSIUM SERPL-MCNC: 4 MMOL/L — SIGNIFICANT CHANGE UP (ref 3.5–5.3)
POTASSIUM SERPL-SCNC: 4 MMOL/L — SIGNIFICANT CHANGE UP (ref 3.5–5.3)
PROT SERPL-MCNC: 6.2 G/DL — SIGNIFICANT CHANGE UP (ref 6–8.3)
PROTHROM AB SERPL-ACNC: 12.8 SEC — SIGNIFICANT CHANGE UP (ref 9.5–13)
RBC # BLD: 2.74 M/UL — LOW (ref 3.8–5.2)
RBC # BLD: 2.85 M/UL — LOW (ref 3.8–5.2)
RBC # FLD: 16.4 % — HIGH (ref 10.3–14.5)
RBC # FLD: 17 % — HIGH (ref 10.3–14.5)
SODIUM SERPL-SCNC: 142 MMOL/L — SIGNIFICANT CHANGE UP (ref 135–145)
SPECIMEN SOURCE: SIGNIFICANT CHANGE UP
SPECIMEN SOURCE: SIGNIFICANT CHANGE UP
WBC # BLD: 132.34 K/UL — CRITICAL HIGH (ref 3.8–10.5)
WBC # BLD: 176.56 K/UL — CRITICAL HIGH (ref 3.8–10.5)
WBC # FLD AUTO: 132.34 K/UL — CRITICAL HIGH (ref 3.8–10.5)
WBC # FLD AUTO: 176.56 K/UL — CRITICAL HIGH (ref 3.8–10.5)

## 2024-05-01 PROCEDURE — 71260 CT THORAX DX C+: CPT | Mod: 26

## 2024-05-01 PROCEDURE — 76604 US EXAM CHEST: CPT | Mod: 26,GC

## 2024-05-01 PROCEDURE — 93308 TTE F-UP OR LMTD: CPT | Mod: 26,GC

## 2024-05-01 PROCEDURE — 99291 CRITICAL CARE FIRST HOUR: CPT | Mod: GC,25

## 2024-05-01 PROCEDURE — 99232 SBSQ HOSP IP/OBS MODERATE 35: CPT

## 2024-05-01 PROCEDURE — 74177 CT ABD & PELVIS W/CONTRAST: CPT | Mod: 26

## 2024-05-01 PROCEDURE — 99233 SBSQ HOSP IP/OBS HIGH 50: CPT | Mod: GC

## 2024-05-01 PROCEDURE — 71045 X-RAY EXAM CHEST 1 VIEW: CPT | Mod: 26

## 2024-05-01 RX ORDER — HEPARIN SODIUM 5000 [USP'U]/ML
INJECTION INTRAVENOUS; SUBCUTANEOUS
Qty: 25000 | Refills: 0 | Status: DISCONTINUED | OUTPATIENT
Start: 2024-05-01 | End: 2024-05-05

## 2024-05-01 RX ORDER — PHENYLEPHRINE HYDROCHLORIDE 10 MG/ML
0.3 INJECTION INTRAVENOUS
Qty: 40 | Refills: 0 | Status: DISCONTINUED | OUTPATIENT
Start: 2024-05-01 | End: 2024-05-05

## 2024-05-01 RX ADMIN — CHLORHEXIDINE GLUCONATE 1 APPLICATION(S): 213 SOLUTION TOPICAL at 06:03

## 2024-05-01 RX ADMIN — HEPARIN SODIUM 2000 UNIT(S)/HR: 5000 INJECTION INTRAVENOUS; SUBCUTANEOUS at 10:58

## 2024-05-01 RX ADMIN — DROXIDOPA 300 MILLIGRAM(S): 100 CAPSULE ORAL at 22:32

## 2024-05-01 RX ADMIN — AMPICILLIN SODIUM AND SULBACTAM SODIUM 200 GRAM(S): 250; 125 INJECTION, POWDER, FOR SUSPENSION INTRAMUSCULAR; INTRAVENOUS at 05:07

## 2024-05-01 RX ADMIN — DROXIDOPA 300 MILLIGRAM(S): 100 CAPSULE ORAL at 12:49

## 2024-05-01 RX ADMIN — Medication 3 MILLILITER(S): at 05:38

## 2024-05-01 RX ADMIN — PHENYLEPHRINE HYDROCHLORIDE 12.5 MICROGRAM(S)/KG/MIN: 10 INJECTION INTRAVENOUS at 10:59

## 2024-05-01 RX ADMIN — PHENYLEPHRINE HYDROCHLORIDE 12.5 MICROGRAM(S)/KG/MIN: 10 INJECTION INTRAVENOUS at 21:54

## 2024-05-01 RX ADMIN — Medication 650 MILLIGRAM(S): at 01:20

## 2024-05-01 RX ADMIN — QUETIAPINE FUMARATE 25 MILLIGRAM(S): 200 TABLET, FILM COATED ORAL at 22:32

## 2024-05-01 RX ADMIN — AMPICILLIN SODIUM AND SULBACTAM SODIUM 200 GRAM(S): 250; 125 INJECTION, POWDER, FOR SUSPENSION INTRAMUSCULAR; INTRAVENOUS at 23:20

## 2024-05-01 RX ADMIN — Medication 3 MILLILITER(S): at 13:05

## 2024-05-01 RX ADMIN — FENTANYL CITRATE 50 MICROGRAM(S): 50 INJECTION INTRAVENOUS at 19:28

## 2024-05-01 RX ADMIN — HEPARIN SODIUM 2200 UNIT(S)/HR: 5000 INJECTION INTRAVENOUS; SUBCUTANEOUS at 17:00

## 2024-05-01 RX ADMIN — Medication 650 MILLIGRAM(S): at 23:20

## 2024-05-01 RX ADMIN — AMPICILLIN SODIUM AND SULBACTAM SODIUM 200 GRAM(S): 250; 125 INJECTION, POWDER, FOR SUSPENSION INTRAMUSCULAR; INTRAVENOUS at 11:54

## 2024-05-01 RX ADMIN — Medication 3 MILLILITER(S): at 21:21

## 2024-05-01 RX ADMIN — HEPARIN SODIUM 2200 UNIT(S)/HR: 5000 INJECTION INTRAVENOUS; SUBCUTANEOUS at 21:55

## 2024-05-01 RX ADMIN — CHLORHEXIDINE GLUCONATE 15 MILLILITER(S): 213 SOLUTION TOPICAL at 05:07

## 2024-05-01 RX ADMIN — SODIUM CHLORIDE 4 MILLILITER(S): 9 INJECTION INTRAMUSCULAR; INTRAVENOUS; SUBCUTANEOUS at 05:37

## 2024-05-01 RX ADMIN — DROXIDOPA 300 MILLIGRAM(S): 100 CAPSULE ORAL at 05:06

## 2024-05-01 RX ADMIN — CHLORHEXIDINE GLUCONATE 15 MILLILITER(S): 213 SOLUTION TOPICAL at 18:02

## 2024-05-01 RX ADMIN — Medication 650 MILLIGRAM(S): at 00:13

## 2024-05-01 RX ADMIN — FENTANYL CITRATE 50 MICROGRAM(S): 50 INJECTION INTRAVENOUS at 19:50

## 2024-05-01 RX ADMIN — AMPICILLIN SODIUM AND SULBACTAM SODIUM 200 GRAM(S): 250; 125 INJECTION, POWDER, FOR SUSPENSION INTRAMUSCULAR; INTRAVENOUS at 18:02

## 2024-05-01 NOTE — PROGRESS NOTE ADULT - SUBJECTIVE AND OBJECTIVE BOX
INTERVAL HPI/OVERNIGHT EVENTS:    SUBJECTIVE: Patient seen and examined at bedside.     ROS: All negative except as listed above.    VITAL SIGNS:  ICU Vital Signs Last 24 Hrs  T(C): 37.3 (01 May 2024 08:00), Max: 38.1 (30 Apr 2024 22:00)  T(F): 99.1 (01 May 2024 08:00), Max: 100.6 (30 Apr 2024 22:00)  HR: 86 (01 May 2024 07:15) (75 - 107)  BP: 98/52 (01 May 2024 07:15) (74/47 - 132/72)  BP(mean): 73 (01 May 2024 07:15) (56 - 91)  ABP: --  ABP(mean): --  RR: 24 (01 May 2024 07:15) (15 - 36)  SpO2: 99% (01 May 2024 07:15) (94% - 100%)    O2 Parameters below as of 01 May 2024 07:15  Patient On (Oxygen Delivery Method): ventilator    O2 Concentration (%): 30      Mode: AC/ CMV (Assist Control/ Continuous Mandatory Ventilation), RR (machine): 14, TV (machine): 460, FiO2: 30, PEEP: 5, ITime: 1, MAP: 11, PIP: 25  Plateau pressure:   P/F ratio:     04-30 @ 07:01  -  05-01 @ 07:00  --------------------------------------------------------  IN: 2092.9 mL / OUT: 950 mL / NET: 1142.9 mL    05-01 @ 07:01  -  05-01 @ 07:27  --------------------------------------------------------  IN: 31.2 mL / OUT: 0 mL / NET: 31.2 mL      CAPILLARY BLOOD GLUCOSE      POCT Blood Glucose.: 111 mg/dL (01 May 2024 05:05)      ECG: reviewed.    PHYSICAL EXAM:    General: NAD, large body habitus   HEENT: NC/AT; PERRL, clear conjunctiva  Respiratory: CTA b/l anterior, decrease breath sounds at bases   Cardiovascular: +S1/S2;   Abdomen: soft, NT/ND; +BS x4  Extremities: WWP, 2+ peripheral pulses b/l; B/L Le edema  Skin: normal color and turgor; no rash  Neurological: Awake, arouses to verbal stimuli, open eyes, doesn't follow commands    MEDICATIONS:  MEDICATIONS  (STANDING):  albuterol/ipratropium for Nebulization 3 milliLiter(s) Nebulizer every 8 hours  ampicillin/sulbactam  IVPB      ampicillin/sulbactam  IVPB 3 Gram(s) IV Intermittent every 6 hours  chlorhexidine 0.12% Liquid 15 milliLiter(s) Oral Mucosa every 12 hours  chlorhexidine 2% Cloths 1 Application(s) Topical <User Schedule>  droxidopa 300 milliGRAM(s) Oral <User Schedule>  heparin  Infusion. 1900 Unit(s)/Hr (19 mL/Hr) IV Continuous <Continuous>  insulin lispro (ADMELOG) corrective regimen sliding scale   SubCutaneous every 6 hours  norepinephrine Infusion 0.02 MICROgram(s)/kG/Min (4.16 mL/Hr) IV Continuous <Continuous>  polyethylene glycol 3350 17 Gram(s) Oral every 12 hours  propofol Infusion 9.91 MICROgram(s)/kG/Min (6.6 mL/Hr) IV Continuous <Continuous>  QUEtiapine 25 milliGRAM(s) Oral at bedtime  senna Syrup 10 milliLiter(s) Oral at bedtime  sodium chloride 3%  Inhalation 4 milliLiter(s) Inhalation every 8 hours    MEDICATIONS  (PRN):  acetaminophen   Oral Liquid .. 650 milliGRAM(s) Oral every 6 hours PRN Temp greater or equal to 38C (100.4F)  fentaNYL    Injectable 50 MICROGram(s) IV Push every 2 hours PRN Moderate Pain (4 - 6)  nystatin Powder 1 Application(s) Topical two times a day PRN skin irritation      ALLERGIES:  Allergies    No Known Allergies    Intolerances        LABS:                        7.5    132.34 )-----------( 198      ( 01 May 2024 00:24 )             24.7     05-01    142  |  107  |  7   ----------------------------<  120<H>  4.0   |  25  |  <0.30<L>    Ca    8.6      01 May 2024 00:24  Phos  2.9     05-01  Mg     2.3     05-01    TPro  6.2  /  Alb  2.7<L>  /  TBili  0.4  /  DBili  x   /  AST  22  /  ALT  20  /  AlkPhos  73  05-01    PTT - ( 01 May 2024 01:16 )  PTT:61.3 sec  Urinalysis Basic - ( 01 May 2024 00:24 )    Color: x / Appearance: x / SG: x / pH: x  Gluc: 120 mg/dL / Ketone: x  / Bili: x / Urobili: x   Blood: x / Protein: x / Nitrite: x   Leuk Esterase: x / RBC: x / WBC x   Sq Epi: x / Non Sq Epi: x / Bacteria: x      ABG:  pH, Arterial: 7.47 (05-01-24 @ 00:10)  pCO2, Arterial: 40 mmHg (05-01-24 @ 00:10)  pO2, Arterial: 98 mmHg (05-01-24 @ 00:10)      vBG:    Micro:    Culture - Blood (collected 04-29-24 @ 14:00)  Source: .Blood Blood  Preliminary Report (04-30-24 @ 17:02):    No growth at 24 hours    Culture - Blood (collected 04-29-24 @ 13:50)  Source: .Blood Blood  Preliminary Report (04-30-24 @ 17:02):    No growth at 24 hours    Culture - Blood (collected 04-26-24 @ 13:30)  Source: .Blood Blood-Peripheral  Preliminary Report (04-30-24 @ 18:01):    No growth at 4 days    Culture - Blood (collected 04-26-24 @ 13:21)  Source: .Blood Blood-Venous  Preliminary Report (04-30-24 @ 18:01):    No growth at 4 days    Culture - Blood (collected 04-24-24 @ 05:53)  Source: .Blood Blood  Final Report (04-29-24 @ 10:01):    No growth at 5 days    Culture - Blood (collected 04-24-24 @ 05:53)  Source: .Blood Blood  Final Report (04-29-24 @ 10:01):    No growth at 5 days    Culture - Blood (collected 04-22-24 @ 21:37)  Source: .Blood Blood-Peripheral  Gram Stain (04-24-24 @ 00:09):    Growth in aerobic bottle: Gram Positive Cocci in Clusters  Final Report (04-24-24 @ 23:16):    Growth in aerobic bottle: Staphylococcus epidermidis    Isolation of Coagulase negative Staphylococcus from single blood culture    sets may represent    contamination. Contact the Microbiology Department at [phone] if    susceptibility testing is    clinically indicated.    Direct identification is available within approximately 3-5    hours either by Blood Panel Multiplexed PCR or Direct    MALDI-TOF. Details: [email or web address]  Organism: Blood Culture PCR (04-24-24 @ 23:16)  Organism: Blood Culture PCR (04-24-24 @ 23:16)      Method Type: PCR      -  Staphylococcus epidermidis, Methicillin resistant: Detec    Culture - Blood (collected 04-22-24 @ 19:30)  Source: .Blood Blood-Peripheral  Final Report (04-28-24 @ 01:00):    No growth at 5 days    Culture - Blood (collected 04-20-24 @ 12:15)  Source: .Blood Blood-Peripheral  Final Report (04-25-24 @ 19:00):    No growth at 5 days    Culture - Blood (collected 04-20-24 @ 12:00)  Source: .Blood Blood-Peripheral  Final Report (04-25-24 @ 19:00):    No growth at 5 days    Culture - Blood (collected 04-18-24 @ 11:00)  Source: .Blood Blood-Peripheral  Final Report (04-23-24 @ 17:01):    No growth at 5 days    Culture - Blood (collected 04-18-24 @ 10:50)  Source: .Blood Blood-Peripheral  Final Report (04-23-24 @ 17:01):    No growth at 5 days    Culture - Blood (collected 04-16-24 @ 15:40)  Source: .Blood Blood-Peripheral  Final Report (04-21-24 @ 21:01):    No growth at 5 days    Culture - Blood (collected 04-16-24 @ 13:17)  Source: .Blood Blood-Peripheral  Final Report (04-21-24 @ 18:01):    No growth at 5 days    Culture - Blood (collected 04-16-24 @ 06:42)  Source: .Blood Blood-Peripheral  Gram Stain (04-18-24 @ 11:52):    Growth in aerobic bottle: Gram Positive Cocci in Pairs and Chains    Growth in anaerobic bottle: Gram positive cocci in pairs  Final Report (04-18-24 @ 11:52):    Growth in aerobic and anaerobic bottles: Streptococcus pneumoniae    See previous culture [id number]    Culture - Blood (collected 04-16-24 @ 06:35)  Source: .Blood Blood-Peripheral  Gram Stain (04-18-24 @ 11:51):    Growth in aerobic bottle: Gram positive cocci in pairs    Growth in anaerobic bottle: Gram positive cocci in pairs  Final Report (04-18-24 @ 11:51):    Growth in aerobic and anaerobic bottles: Streptococcus pneumoniae    Direct identification is available within approximately 3-5    hours either by Blood Panel Multiplexed PCR or Direct    MALDI-TOF. Details: [email or web address]  Organism: Blood Culture PCR  Streptococcus pneumoniae (04-18-24 @ 11:51)  Organism: Streptococcus pneumoniae (04-18-24 @ 11:51)      Method Type: LESLY      -  Clindamycin: S <=0.06      -  Erythromycin: S <=0.06 Predicts results for azithromycin.      -  Levofloxacin: S 1      -  Tetracycline: S <=0.5      -  Trimethoprim/Sulfamethoxazole: S <=.25/4.75      -  Vancomycin: S 0.5      -  Ceftriaxone (meningitidis): S <=0.25      -  Ceftriaxone (non-meningitidis): S <=0.25      -  Penicillin (meningitidis): S 0.06      -  Penicillin (non-meningitidis): S 0.06      -  Penicillin (oral penicillin V): S 0.06  Organism: Blood Culture PCR (04-18-24 @ 11:51)      Method Type: PCR      -  Streptococcus pneumoniae: Detec        Culture - Sputum (collected 04-26-24 @ 13:44)  Source: .Sputum Sputum  Gram Stain (04-26-24 @ 20:33):    Rare polymorphonuclear leukocytes per low power field    No Squamous epithelial cells per low power field    No organisms seen per oil power field  Final Report (04-28-24 @ 08:42):    No growth    Culture - Sputum (collected 04-16-24 @ 15:44)  Source: ET Tube ET Tube  Gram Stain (04-16-24 @ 22:00):    Few polymorphonuclear leukocytes per low power field    No Squamous epithelial cells per low power field    No organisms seen per oil power field  Final Report (04-18-24 @ 09:40):    Normal Respiratory Kayla present        RADIOLOGY & ADDITIONAL TESTS: Reviewed.

## 2024-05-01 NOTE — PROGRESS NOTE ADULT - ASSESSMENT
69-year-old female with a past medical history of CVA, bedbound at baseline, COPD, hypertension, CLL who initially had presented to an outside hospital due to acute shortness of breath.  Patient was found to have pulmonary edema and was managed with diuretics and BiPAP.  Patient also found to be febrile to 103 Fahrenheit, found to have pneumonia therefore started on antibiotics for this.  Labs were notable for a leukocytosis of 233 in setting of CLL and then was transferred to [facility] for leukopheresis.  Upon arrival to [place], intubated due to concern for respiratory compromise as well as worsening AMS.  Patient was started on levofloxacin due to hypotension.    Hospitalization course includes chest tube drainage.  Blood and pleural fluid cultures are positive growing Streptococcus pneumonia A.  Chest tube was removed on 4/19/2024 however patient remains febrile with a left-sided loculated effusion therefore left pigtail catheter was placed on 4/23/2024.  Patient's antibiotics were switched from ceftriaxone to cefepime and now is requiring pressors again.  Blood cultures were repeated on 4/22/2024 with Staphylococcus epidermidis growth, repeat pleural fluid cultures obtained on 4/23/2024 are negative to date, repeat blood cultures from 4/24/2024 are negative as well.    #Streptococcal bacteremia, likely source empyema  #Abnormal imaging of the lungs  #Streptococcal pneumonia and empyema  #Acute hypoxic respiratory failure  #CLL  #LLE DVT  s/p trach  REpeat blood cultures negative thus far  Remains febrile, no new culture data to guide antibiotic therapy  No plan for leukophoresis at this time per heme service  MRSE more likely contamination, as 1/4 bottles and no repeat growth    Recommendations  Patient continues to have persistent fevers, no evidence for uncontrolled source at this time  Due to persistent fever, would repeat chest imaging  Continue ampicillin/sulbactam, should be adequate coverage given previous strep growth and no new species  CT surgery eval - chest tube management per primary team  DVT can cause fevers as  well - management per primary team  Now with trach - no evidence for aspiration/tracheitis - can obtain repeat sputum culture  obtain expanded full RVP  Follow fever curve and WBC count    Abimael Jain MD  Division of Infectious Diseases

## 2024-05-01 NOTE — PROGRESS NOTE ADULT - ASSESSMENT
69F h/o CVA (bedbound at baseline), COPD, CHF, HTN, CLL p/w acute shortness of breath to outside ED found to have acute pulmonary edema and PNA transferred to [facility] for possible leukophoresis. Patient found to have empyema 2/2 Pansensitive Strep Pneumo with associated bacteremia.  S/p intubation with inability to wean, s/p tracheostomy tube on 4/28 POD #3    - Continue trach care.  - Post trach care instructions as in previous note  - Sutures to be removed on Day 10.  - SLP eval  - Wean off ventilator as tolerated by ICU team.   - Titrate off pressors.  - First trach change at 3 weeks unless clinically indicated sooner.     IP team to follow for any trach related issues.

## 2024-05-01 NOTE — PROGRESS NOTE ADULT - SUBJECTIVE AND OBJECTIVE BOX
Follow Up:      Interval History/ROS:    Allergies  No Known Allergies        ANTIMICROBIALS:  ampicillin/sulbactam  IVPB    ampicillin/sulbactam  IVPB 3 every 6 hours      OTHER MEDS:  MEDICATIONS  (STANDING):  acetaminophen   Oral Liquid .. 650 every 6 hours PRN  albuterol/ipratropium for Nebulization 3 every 8 hours  droxidopa 300 <User Schedule>  fentaNYL    Injectable 50 every 2 hours PRN  heparin  Infusion.  <Continuous>  insulin lispro (ADMELOG) corrective regimen sliding scale  every 6 hours  norepinephrine Infusion 0.02 <Continuous>  phenylephrine    Infusion 0.3 <Continuous>  polyethylene glycol 3350 17 every 12 hours  propofol Infusion 9.91 <Continuous>  QUEtiapine 25 at bedtime  senna Syrup 10 at bedtime  sodium chloride 3%  Inhalation 4 every 8 hours      Vital Signs Last 24 Hrs  T(C): 37.3 (01 May 2024 08:00), Max: 38.1 (30 Apr 2024 22:00)  T(F): 99.1 (01 May 2024 08:00), Max: 100.6 (30 Apr 2024 22:00)  HR: 106 (01 May 2024 09:15) (75 - 117)  BP: 96/51 (01 May 2024 09:15) (74/47 - 132/72)  BP(mean): 70 (01 May 2024 09:15) (56 - 91)  RR: 29 (01 May 2024 09:15) (15 - 36)  SpO2: 97% (01 May 2024 09:15) (94% - 100%)    Parameters below as of 01 May 2024 09:12  Patient On (Oxygen Delivery Method): ventilator        PHYSICAL EXAM:  Constitutional: non-toxic, no distress  HEAD/EYES: anicteric, no conjunctival injection  ENT:  supple, no thrush  Cardiovascular:   normal S1, S2, no murmur, no edema  Respiratory:  clear BS bilaterally, no wheezes, no rales  GI:  soft, non-tender, normal bowel sounds  :  no villa, no CVA tenderness  Musculoskeletal:  no synovitis, normal ROM  Neurologic: awake and alert, normal strength, no focal findings  Skin:  no rash, no erythema, no phlebitis  Heme/Onc: no lymphadenopathy   Psychiatric:  awake, alert, appropriate mood                                7.5    132.34 )-----------( 198      ( 01 May 2024 00:24 )             24.7       05-01    142  |  107  |  7   ----------------------------<  120<H>  4.0   |  25  |  <0.30<L>    Ca    8.6      01 May 2024 00:24  Phos  2.9     05-01  Mg     2.3     05-01    TPro  6.2  /  Alb  2.7<L>  /  TBili  0.4  /  DBili  x   /  AST  22  /  ALT  20  /  AlkPhos  73  05-01      Urinalysis Basic - ( 01 May 2024 00:24 )    Color: x / Appearance: x / SG: x / pH: x  Gluc: 120 mg/dL / Ketone: x  / Bili: x / Urobili: x   Blood: x / Protein: x / Nitrite: x   Leuk Esterase: x / RBC: x / WBC x   Sq Epi: x / Non Sq Epi: x / Bacteria: x        MICROBIOLOGY:  v    Culture - Blood (collected 29 Apr 2024 14:00)  Source: .Blood Blood  Preliminary Report (30 Apr 2024 17:02):    No growth at 24 hours    Culture - Blood (collected 29 Apr 2024 13:50)  Source: .Blood Blood  Preliminary Report (30 Apr 2024 17:02):    No growth at 24 hours    Culture - Sputum (collected 26 Apr 2024 13:44)  Source: .Sputum Sputum  Gram Stain (26 Apr 2024 20:33):    Rare polymorphonuclear leukocytes per low power field    No Squamous epithelial cells per low power field    No organisms seen per oil power field  Final Report (28 Apr 2024 08:42):    No growth    Culture - Blood (collected 26 Apr 2024 13:30)  Source: .Blood Blood-Peripheral  Preliminary Report (30 Apr 2024 18:01):    No growth at 4 days    Culture - Blood (collected 26 Apr 2024 13:21)  Source: .Blood Blood-Venous  Preliminary Report (30 Apr 2024 18:01):    No growth at 4 days              Rapid RVP Result: NotDetec (04-30 @ 15:49)        RADIOLOGY:   Follow Up:  abscess, leukocytosis    Interval History/ROS:  Overnight: no acute events.     Seen  and examined at bedside - no complaints. Able to gesture despite trach    Allergies  No Known Allergies        ANTIMICROBIALS:  ampicillin/sulbactam  IVPB    ampicillin/sulbactam  IVPB 3 every 6 hours      OTHER MEDS:  MEDICATIONS  (STANDING):  acetaminophen   Oral Liquid .. 650 every 6 hours PRN  albuterol/ipratropium for Nebulization 3 every 8 hours  droxidopa 300 <User Schedule>  fentaNYL    Injectable 50 every 2 hours PRN  heparin  Infusion.  <Continuous>  insulin lispro (ADMELOG) corrective regimen sliding scale  every 6 hours  norepinephrine Infusion 0.02 <Continuous>  phenylephrine    Infusion 0.3 <Continuous>  polyethylene glycol 3350 17 every 12 hours  propofol Infusion 9.91 <Continuous>  QUEtiapine 25 at bedtime  senna Syrup 10 at bedtime  sodium chloride 3%  Inhalation 4 every 8 hours      Vital Signs Last 24 Hrs  T(C): 37.3 (01 May 2024 08:00), Max: 38.1 (30 Apr 2024 22:00)  T(F): 99.1 (01 May 2024 08:00), Max: 100.6 (30 Apr 2024 22:00)  HR: 106 (01 May 2024 09:15) (75 - 117)  BP: 96/51 (01 May 2024 09:15) (74/47 - 132/72)  BP(mean): 70 (01 May 2024 09:15) (56 - 91)  RR: 29 (01 May 2024 09:15) (15 - 36)  SpO2: 97% (01 May 2024 09:15) (94% - 100%)    Parameters below as of 01 May 2024 09:12  Patient On (Oxygen Delivery Method): ventilator        PHYSICAL EXAM:  General: Patient in NAD, Intubated,   HEENT: NCAT, EOMI, PERRL, no oral lesions  CV: S1+S2, no m/r/g appreciated   Lungs: On MV, Left chest tube  Abd: Soft, nontender, no guarding, no rebound tenderness, + bowel sounds   : No suprapubic tenderness  Neuro: Awake, on propofol, following commands, left sided weakness  Ext: No cyanosis, Left extremities dependent edema  Skin: No rash, no phlebitis                              7.5    132.34 )-----------( 198      ( 01 May 2024 00:24 )             24.7       05-01    142  |  107  |  7   ----------------------------<  120<H>  4.0   |  25  |  <0.30<L>    Ca    8.6      01 May 2024 00:24  Phos  2.9     05-01  Mg     2.3     05-01    TPro  6.2  /  Alb  2.7<L>  /  TBili  0.4  /  DBili  x   /  AST  22  /  ALT  20  /  AlkPhos  73  05-01      Urinalysis Basic - ( 01 May 2024 00:24 )    Color: x / Appearance: x / SG: x / pH: x  Gluc: 120 mg/dL / Ketone: x  / Bili: x / Urobili: x   Blood: x / Protein: x / Nitrite: x   Leuk Esterase: x / RBC: x / WBC x   Sq Epi: x / Non Sq Epi: x / Bacteria: x        MICROBIOLOGY:  v    Culture - Blood (collected 29 Apr 2024 14:00)  Source: .Blood Blood  Preliminary Report (30 Apr 2024 17:02):    No growth at 24 hours    Culture - Blood (collected 29 Apr 2024 13:50)  Source: .Blood Blood  Preliminary Report (30 Apr 2024 17:02):    No growth at 24 hours    Culture - Sputum (collected 26 Apr 2024 13:44)  Source: .Sputum Sputum  Gram Stain (26 Apr 2024 20:33):    Rare polymorphonuclear leukocytes per low power field    No Squamous epithelial cells per low power field    No organisms seen per oil power field  Final Report (28 Apr 2024 08:42):    No growth    Culture - Blood (collected 26 Apr 2024 13:30)  Source: .Blood Blood-Peripheral  Preliminary Report (30 Apr 2024 18:01):    No growth at 4 days    Culture - Blood (collected 26 Apr 2024 13:21)  Source: .Blood Blood-Venous  Preliminary Report (30 Apr 2024 18:01):    No growth at 4 days              Rapid RVP Result: NotDetec (04-30 @ 15:49)        RADIOLOGY:  Imaging reviewed

## 2024-05-01 NOTE — PROGRESS NOTE ADULT - ATTENDING COMMENTS
69 year old female with a history of CVA (bedbound at baseline) COPD, CHF, HTN p/w CLL,   strep pneumonia bacteremia, pna and empyema s/p chest tube and mist tx and now s/p trach.    Remains febrile and requiring pressors (increased pressor requirements today)  Denies SOB, CP, abd and back pain now     - baseline L sided deficits, moving R side to command  - cont PT  as tolerated  - off sedation  - Septic shock, maintained on pressors, map>65   - s/p chest tube removal    - pocus w/ no significant residual effusion    - still spiking fever, cx repeated, rvp negative, consider CT A/P  - cont unasyn, f/u ID reccs  - cont a/c for dvt  - s/p trach, no bleeding  - cont PS trial as tolerated- now comfortable on TC trial  - plan for PEG tube, f/u GI reccs  - f/u Heme reccs- s/p ivig and will need BM bx when stable

## 2024-05-01 NOTE — PROGRESS NOTE ADULT - ASSESSMENT
69F h/o CVA (bedbound at baseline), COPD, CHF, HTN presenting as transfer from [facility] for SOB iso leukocytosis to 233 c/f acute leukemia. Pt intubated and on pressors, transferred to MICU for further management, course c/b empyema s/p chest tube placement (4/16) and removal, reaccumulation of loculated pleural effusion s/p L pigtail placement and removal on 4/27     Neuro  At Baseline AOx4 mental status   L upper and lower extremity motor deficits iso hx of stroke   Pain control w/ Tylenol    CV  #Septic Shock   - Strep Pneumo Empyema and Bacteremia s/p Pigtail catheter   - Weaning off Levophed while on Droxidopa 300 TID     #Chest Pain, Resolved   -demand vs leukostasis vs non cardiac   -reported JOSEF in II, III, aVF at [facility] --> repeat EKG without ST changes or Q waves  -trops peaked    #CHF  -unclear hx but elevated pro-BNP to 2600s  -TTE: EF 54%, no significant abnormalities  - Diurese as appropriate    Resp  #Acute hypoxic respiratory failure  #Empyema Pansensitive Strep Pneumo  -intubated: 460/14/30/5; wean off as tolerated w/ daily CPAP trial  -s/p thora draining 1500cc fluid 4/16 with chest tube placement; c/w exudative effusion growing Strep Pneumo  -MRSA neg  -pleural fluid growing strep pneumo; BCx growing strep pneumo; Pansensitive  -was on vanc/zosyn/azithro (4/16)-->deescalated to CTX 2g qd (4/17 - ); c/w total 14-day course of Abx therapy   -CT chest 4/22 reveals persistent loculated effusion, s/p IR Pigtail placement 4/24-4/27 w/ Exudative effusion; Alteplase and Dornase through pigtal x6 doses; unable to finish 6th Alteplase+Dornase due to catheter occlusion   - Repeat CTC 4/27 - decreased small pleural effusions, thoracic LAD   - s/p Trach placement 4/28    Plan:  - CPAP trial daily     GI  #Diet: Tube feed   PEG tube 5/1     /Renal  -no active issues    Heme/Onc  #Leukocytosis  #CLL  - on presentation; improving to 130s  -no plan for leukophoresis at this time given mature lymphocytes  -stop trending TLS labs as they have been stable   - CT Chest A&P showing diffuse axillary, inguinal mediastinal RP lymphadenopathy.  - Appreciate Heme recs;   - Plan for Inpatient Bone Marrow Bx per Heme     #L Common Femoral Vein DVT   - Non-occlusive   - Heparin Drip 4/29-    #DVT ppx: Heparin Drip     ID  #Empyema 2/2 Pansensitive Strep Pneumo  #Strep Pneumo Bacteremia  -BCx + strep pneumo; fluid culture with strep pneumo  -repeat BCx 4/20 ngtd, ReCx 4/23 due to fever   -CT Chest A&P demonstrates LLL empyema not showing any other intraabdominal infectious source  -was on vanc/zosyn/azithro (4/16)-->deescalated to CTX (4/17 -4/23), Added on Vancomycin (4/23-) due to MRSE 1/2 Cx, expanded coverage to Cefepime (4/23 1x dose), back on CTX, switched to Unasyn 3g Q6 (4/25- )  - IVIG 4/26 x1  - ID Consulted for persistent fevers despite source control; appreciate recs   - Repeat TTE for eval of IE, repeat RVP, ESR.    Plan    c/w with Unasyn (4/25  -   )   f/u repeat BCx 4/29-      Endo  -ISS q6h while NPO w/ tube feed     Ethics: Full Code  Only known family member is Aunt Mili Gordon ([phone], [phone]) who lives in [place]   69F h/o CVA (bedbound at baseline), COPD, CHF, HTN presenting as transfer from [facility] for SOB iso leukocytosis to 233 c/f acute leukemia. Pt intubated and on pressors, transferred to MICU for further management, course c/b empyema s/p chest tube placement (4/16) and removal, reaccumulation of loculated pleural effusion s/p L pigtail placement and removal on 4/27     Neuro  At Baseline AOx4 mental status   L upper and lower extremity motor deficits iso hx of stroke   Pain control w/ Tylenol    CV  #Septic Shock   - Strep Pneumo Empyema and Bacteremia s/p Pigtail catheter   - Levo changed to Dawit  - Dawit and  Droxidopa 300 TID     #Chest Pain, Resolved   -demand vs leukostasis vs non cardiac   -reported JOSEF in II, III, aVF at [facility] --> repeat EKG without ST changes or Q waves  -trops peaked    #CHF  -unclear hx but elevated pro-BNP to 2600s  -TTE: EF 54%, no significant abnormalities  - Diurese as appropriate    Resp  #Acute hypoxic respiratory failure  #Empyema Pansensitive Strep Pneumo  -intubated: 460/14/30/5; wean off as tolerated w/ daily CPAP trial  -s/p thora draining 1500cc fluid 4/16 with chest tube placement; c/w exudative effusion growing Strep Pneumo  -MRSA neg  -pleural fluid growing strep pneumo; BCx growing strep pneumo; Pansensitive  -was on vanc/zosyn/azithro (4/16)-->deescalated to CTX 2g qd (4/17 - ); c/w total 14-day course of Abx therapy   -CT chest 4/22 reveals persistent loculated effusion, s/p IR Pigtail placement 4/24-4/27 w/ Exudative effusion; Alteplase and Dornase through pigtal x6 doses; unable to finish 6th Alteplase+Dornase due to catheter occlusion   - Repeat CTC 4/27 - decreased small pleural effusions, thoracic LAD   - s/p Trach placement 4/28; now on Trach collar (Pressure support at night)      Plan:  - CPAP trial daily     GI  #Diet: Tube feed   PEG tube planned once off Pressors     /Renal  -no active issues    Heme/Onc  #Leukocytosis  #CLL  - on presentation; improving to 130s  -no plan for leukophoresis at this time given mature lymphocytes  -stop trending TLS labs as they have been stable   - CT Chest A&P showing diffuse axillary, inguinal mediastinal RP lymphadenopathy.  - Appreciate Heme recs;   - Plan for Inpatient Bone Marrow Bx per Heme     #L Common Femoral Vein DVT   - Non-occlusive   - Heparin Drip 4/29-    #DVT ppx: Heparin Drip     ID  #Empyema 2/2 Pansensitive Strep Pneumo  #Strep Pneumo Bacteremia  -BCx + strep pneumo; fluid culture with strep pneumo  -repeat BCx 4/20 ngtd, ReCx 4/23 due to fever   -CT Chest A&P demonstrates LLL empyema not showing any other intraabdominal infectious source  -was on vanc/zosyn/azithro (4/16)-->deescalated to CTX (4/17 -4/23), Added on Vancomycin (4/23-) due to MRSE 1/2 Cx, expanded coverage to Cefepime (4/23 1x dose), back on CTX, switched to Unasyn 3g Q6 (4/25- )  - IVIG 4/26 x1  - ID Consulted for persistent fevers despite source control; appreciate recs   - Repeat TTE for eval of IE, repeat RVP, ESR.    Plan    c/w with Unasyn (4/25  -   )   f/u repeat BCx 4/29-      Endo  -ISS q6h while NPO w/ tube feed     Ethics: Full Code  Only known family member is Aunt Mili Gordon ([phone], [phone]) who lives in [place]

## 2024-05-01 NOTE — PROGRESS NOTE ADULT - SUBJECTIVE AND OBJECTIVE BOX
Patient is a 69y old  Female who presents with a chief complaint of Transfer for leukophoresis (29 Apr 2024 13:39)    Interval Events: POD# 3 from trach, no issues with trach. No recurrence of effusion per ICU. Still having difficulty with trach collar/pressure support. Still having fevers.     MEDS:  MEDICATIONS  (STANDING):  albuterol/ipratropium for Nebulization 3 milliLiter(s) Nebulizer every 8 hours  ampicillin/sulbactam  IVPB      ampicillin/sulbactam  IVPB 3 Gram(s) IV Intermittent every 6 hours  chlorhexidine 0.12% Liquid 15 milliLiter(s) Oral Mucosa every 12 hours  chlorhexidine 2% Cloths 1 Application(s) Topical <User Schedule>  droxidopa 300 milliGRAM(s) Oral <User Schedule>  heparin  Infusion.  Unit(s)/Hr (20 mL/Hr) IV Continuous <Continuous>  insulin lispro (ADMELOG) corrective regimen sliding scale   SubCutaneous every 6 hours  norepinephrine Infusion 0.02 MICROgram(s)/kG/Min (4.16 mL/Hr) IV Continuous <Continuous>  phenylephrine    Infusion 0.3 MICROgram(s)/kG/Min (12.5 mL/Hr) IV Continuous <Continuous>  polyethylene glycol 3350 17 Gram(s) Oral every 12 hours  propofol Infusion 9.91 MICROgram(s)/kG/Min (6.6 mL/Hr) IV Continuous <Continuous>  QUEtiapine 25 milliGRAM(s) Oral at bedtime  senna Syrup 10 milliLiter(s) Oral at bedtime    MEDICATIONS  (PRN):  acetaminophen   Oral Liquid .. 650 milliGRAM(s) Oral every 6 hours PRN Temp greater or equal to 38C (100.4F)  fentaNYL    Injectable 50 MICROGram(s) IV Push every 2 hours PRN Moderate Pain (4 - 6)  nystatin Powder 1 Application(s) Topical two times a day PRN skin irritation      Allergies    No Known Allergies    Intolerances          CONSTITUTIONAL:  No weight loss, fever, chills, weakness or fatigue.  HEENT:  Eyes:  No visual loss, blurred vision, double vision or yellow sclerae. + throat pain   SKIN:  No rash or itching.  CARDIOVASCULAR:  No chest pain, chest pressure or chest discomfort.  RESPIRATORY:  No shortness of breath, cough or sputum.  GASTROINTESTINAL:  SEE HPI  GENITOURINARY:  No dysuria, hematuria, urinary frequency  NEUROLOGICAL:  No headache, dizziness, syncope, paralysis.   MUSCULOSKELETAL:  No muscle, back pain, joint pain or stiffness.  ENDOCRINOLOGIC:  No reports of sweating, cold or heat intolerance.     ______________________________________________________________________  PHYSICAL EXAM:  T(C): 37.8 (04-30-24 @ 03:00), Max: 39.1 (04-29-24 @ 14:00)  HR: 102 (04-30-24 @ 05:55)  BP: 110/53 (04-30-24 @ 04:30)  RR: 19 (04-30-24 @ 04:30)  SpO2: 100% (04-30-24 @ 05:55)  Wt(kg): --    04-28 - 04-29  --------------------------------------------------------  IN:    Enteral Tube Flush: 50 mL    IV PiggyBack: 400 mL    Norepinephrine: 1257.3 mL    Propofol: 449.9 mL    Vital High Protein: 770 mL  Total IN: 2927.2 mL    OUT:    Voided (mL): 1300 mL  Total OUT: 1300 mL    Total NET: 1627.2 mL      04-29 - 04-30  --------------------------------------------------------  IN:    Enteral Tube Flush: 100 mL    Heparin Infusion: 392 mL    IV PiggyBack: 650 mL    Norepinephrine: 709.7 mL    Propofol: 16.7 mL    Vital High Protein: 1190 mL  Total IN: 3058.4 mL    OUT:    Voided (mL): 1000 mL  Total OUT: 1000 mL    Total NET: 2058.4 mL          GEN: NAD, normocephalic, obese   HEENT: +trach  CVS: S1S2+  CHEST: clear to auscultation  ABD: soft , nontender, nondistended, bowel sounds present  EXTR: no cyanosis, no clubbing, no edema  NEURO: A&OX3, is able to mouth responses   SKIN:  warm;  non icteric    ______________________________________________________________________  LABS:                                     7.9    176.56 )-----------( 240      ( 01 May 2024 16:48 )             26.0     ____________________________________________

## 2024-05-01 NOTE — CHART NOTE - NSCHARTNOTEFT_GEN_A_CORE
: Blue Bauman    INDICATION: POCUS    PROCEDURE:  [x] LIMITED ECHO  [ ] LIMITED CHEST  [ ] LIMITED RETROPERITONEAL  [ ] LIMITED ABDOMINAL  [ ] LIMITED DVT  [ ] NEEDLE GUIDANCE VASCULAR  [ ] NEEDLE GUIDANCE THORACENTESIS  [ ] NEEDLE GUIDANCE PARACENTESIS  [ ] NEEDLE GUIDANCE PERICARDIOCENTESIS  [ ] OTHER    FINDINGS:  Grossly normal systolic function w/ no gross wall motion abnormalities.     INTERPRETATION:  No severe cardiac limitations.

## 2024-05-02 LAB
ALBUMIN SERPL ELPH-MCNC: 2.6 G/DL — LOW (ref 3.3–5)
ALP SERPL-CCNC: 65 U/L — SIGNIFICANT CHANGE UP (ref 40–120)
ALT FLD-CCNC: 15 U/L — SIGNIFICANT CHANGE UP (ref 10–45)
ANION GAP SERPL CALC-SCNC: 12 MMOL/L — SIGNIFICANT CHANGE UP (ref 5–17)
ANISOCYTOSIS BLD QL: SIGNIFICANT CHANGE UP
APTT BLD: 63.2 SEC — HIGH (ref 24.5–35.6)
APTT BLD: 81 SEC — HIGH (ref 24.5–35.6)
AST SERPL-CCNC: 17 U/L — SIGNIFICANT CHANGE UP (ref 10–40)
BASOPHILS # BLD AUTO: 0 K/UL — SIGNIFICANT CHANGE UP (ref 0–0.2)
BASOPHILS NFR BLD AUTO: 0 % — SIGNIFICANT CHANGE UP (ref 0–2)
BILIRUB SERPL-MCNC: 0.5 MG/DL — SIGNIFICANT CHANGE UP (ref 0.2–1.2)
BUN SERPL-MCNC: 7 MG/DL — SIGNIFICANT CHANGE UP (ref 7–23)
CALCIUM SERPL-MCNC: 8.4 MG/DL — SIGNIFICANT CHANGE UP (ref 8.4–10.5)
CHLORIDE SERPL-SCNC: 107 MMOL/L — SIGNIFICANT CHANGE UP (ref 96–108)
CO2 SERPL-SCNC: 24 MMOL/L — SIGNIFICANT CHANGE UP (ref 22–31)
CREAT SERPL-MCNC: 0.32 MG/DL — LOW (ref 0.5–1.3)
EGFR: 114 ML/MIN/1.73M2 — SIGNIFICANT CHANGE UP
EOSINOPHIL # BLD AUTO: 0 K/UL — SIGNIFICANT CHANGE UP (ref 0–0.5)
EOSINOPHIL NFR BLD AUTO: 0 % — SIGNIFICANT CHANGE UP (ref 0–6)
GLUCOSE BLDC GLUCOMTR-MCNC: 100 MG/DL — HIGH (ref 70–99)
GLUCOSE BLDC GLUCOMTR-MCNC: 121 MG/DL — HIGH (ref 70–99)
GLUCOSE BLDC GLUCOMTR-MCNC: 86 MG/DL — SIGNIFICANT CHANGE UP (ref 70–99)
GLUCOSE BLDC GLUCOMTR-MCNC: 95 MG/DL — SIGNIFICANT CHANGE UP (ref 70–99)
GLUCOSE SERPL-MCNC: 85 MG/DL — SIGNIFICANT CHANGE UP (ref 70–99)
HCT VFR BLD CALC: 23.6 % — LOW (ref 34.5–45)
HGB BLD-MCNC: 7.3 G/DL — LOW (ref 11.5–15.5)
INR BLD: 1.23 RATIO — HIGH (ref 0.85–1.18)
LYMPHOCYTES # BLD AUTO: 145 K/UL — HIGH (ref 1–3.3)
LYMPHOCYTES # BLD AUTO: 93.2 % — HIGH (ref 13–44)
MAGNESIUM SERPL-MCNC: 2.3 MG/DL — SIGNIFICANT CHANGE UP (ref 1.6–2.6)
MANUAL SMEAR VERIFICATION: SIGNIFICANT CHANGE UP
MCHC RBC-ENTMCNC: 28.2 PG — SIGNIFICANT CHANGE UP (ref 27–34)
MCHC RBC-ENTMCNC: 30.9 GM/DL — LOW (ref 32–36)
MCV RBC AUTO: 91.1 FL — SIGNIFICANT CHANGE UP (ref 80–100)
MONOCYTES # BLD AUTO: 2.64 K/UL — HIGH (ref 0–0.9)
MONOCYTES NFR BLD AUTO: 1.7 % — LOW (ref 2–14)
NEUTROPHILS # BLD AUTO: 4.05 K/UL — SIGNIFICANT CHANGE UP (ref 1.8–7.4)
NEUTROPHILS NFR BLD AUTO: 2.6 % — LOW (ref 43–77)
PHOSPHATE SERPL-MCNC: 3.2 MG/DL — SIGNIFICANT CHANGE UP (ref 2.5–4.5)
PLAT MORPH BLD: NORMAL — SIGNIFICANT CHANGE UP
PLATELET # BLD AUTO: 224 K/UL — SIGNIFICANT CHANGE UP (ref 150–400)
POIKILOCYTOSIS BLD QL AUTO: SLIGHT — SIGNIFICANT CHANGE UP
POLYCHROMASIA BLD QL SMEAR: SLIGHT — SIGNIFICANT CHANGE UP
POTASSIUM SERPL-MCNC: 3.9 MMOL/L — SIGNIFICANT CHANGE UP (ref 3.5–5.3)
POTASSIUM SERPL-SCNC: 3.9 MMOL/L — SIGNIFICANT CHANGE UP (ref 3.5–5.3)
PROT SERPL-MCNC: 5.8 G/DL — LOW (ref 6–8.3)
PROTHROM AB SERPL-ACNC: 13.4 SEC — HIGH (ref 9.5–13)
RBC # BLD: 2.59 M/UL — LOW (ref 3.8–5.2)
RBC # FLD: 16.7 % — HIGH (ref 10.3–14.5)
RBC BLD AUTO: SIGNIFICANT CHANGE UP
SODIUM SERPL-SCNC: 143 MMOL/L — SIGNIFICANT CHANGE UP (ref 135–145)
VARIANT LYMPHS # BLD: 2.5 % — SIGNIFICANT CHANGE UP (ref 0–6)
WBC # BLD: 155.58 K/UL — CRITICAL HIGH (ref 3.8–10.5)
WBC # FLD AUTO: 155.58 K/UL — CRITICAL HIGH (ref 3.8–10.5)

## 2024-05-02 PROCEDURE — 99233 SBSQ HOSP IP/OBS HIGH 50: CPT | Mod: GC

## 2024-05-02 PROCEDURE — 99291 CRITICAL CARE FIRST HOUR: CPT | Mod: GC,25

## 2024-05-02 PROCEDURE — 76604 US EXAM CHEST: CPT | Mod: 26,GC

## 2024-05-02 PROCEDURE — 99232 SBSQ HOSP IP/OBS MODERATE 35: CPT

## 2024-05-02 RX ORDER — QUETIAPINE FUMARATE 200 MG/1
25 TABLET, FILM COATED ORAL ONCE
Refills: 0 | Status: COMPLETED | OUTPATIENT
Start: 2024-05-02 | End: 2024-05-02

## 2024-05-02 RX ORDER — QUETIAPINE FUMARATE 200 MG/1
50 TABLET, FILM COATED ORAL EVERY 12 HOURS
Refills: 0 | Status: DISCONTINUED | OUTPATIENT
Start: 2024-05-02 | End: 2024-05-04

## 2024-05-02 RX ORDER — DEXMEDETOMIDINE HYDROCHLORIDE IN 0.9% SODIUM CHLORIDE 4 UG/ML
0.5 INJECTION INTRAVENOUS
Qty: 200 | Refills: 0 | Status: DISCONTINUED | OUTPATIENT
Start: 2024-05-02 | End: 2024-05-04

## 2024-05-02 RX ORDER — MIDODRINE HYDROCHLORIDE 2.5 MG/1
15 TABLET ORAL THREE TIMES A DAY
Refills: 0 | Status: DISCONTINUED | OUTPATIENT
Start: 2024-05-02 | End: 2024-05-04

## 2024-05-02 RX ADMIN — Medication 650 MILLIGRAM(S): at 00:09

## 2024-05-02 RX ADMIN — QUETIAPINE FUMARATE 50 MILLIGRAM(S): 200 TABLET, FILM COATED ORAL at 17:28

## 2024-05-02 RX ADMIN — HEPARIN SODIUM 2200 UNIT(S)/HR: 5000 INJECTION INTRAVENOUS; SUBCUTANEOUS at 11:31

## 2024-05-02 RX ADMIN — PHENYLEPHRINE HYDROCHLORIDE 12.5 MICROGRAM(S)/KG/MIN: 10 INJECTION INTRAVENOUS at 19:55

## 2024-05-02 RX ADMIN — QUETIAPINE FUMARATE 25 MILLIGRAM(S): 200 TABLET, FILM COATED ORAL at 02:20

## 2024-05-02 RX ADMIN — Medication 3 MILLILITER(S): at 05:17

## 2024-05-02 RX ADMIN — Medication 3 MILLILITER(S): at 21:11

## 2024-05-02 RX ADMIN — Medication 650 MILLIGRAM(S): at 21:56

## 2024-05-02 RX ADMIN — CHLORHEXIDINE GLUCONATE 15 MILLILITER(S): 213 SOLUTION TOPICAL at 17:22

## 2024-05-02 RX ADMIN — Medication 3 MILLILITER(S): at 14:41

## 2024-05-02 RX ADMIN — PHENYLEPHRINE HYDROCHLORIDE 12.5 MICROGRAM(S)/KG/MIN: 10 INJECTION INTRAVENOUS at 11:32

## 2024-05-02 RX ADMIN — FENTANYL CITRATE 50 MICROGRAM(S): 50 INJECTION INTRAVENOUS at 01:30

## 2024-05-02 RX ADMIN — QUETIAPINE FUMARATE 50 MILLIGRAM(S): 200 TABLET, FILM COATED ORAL at 05:14

## 2024-05-02 RX ADMIN — MIDODRINE HYDROCHLORIDE 15 MILLIGRAM(S): 2.5 TABLET ORAL at 21:06

## 2024-05-02 RX ADMIN — FENTANYL CITRATE 50 MICROGRAM(S): 50 INJECTION INTRAVENOUS at 01:15

## 2024-05-02 RX ADMIN — CHLORHEXIDINE GLUCONATE 1 APPLICATION(S): 213 SOLUTION TOPICAL at 05:15

## 2024-05-02 RX ADMIN — MIDODRINE HYDROCHLORIDE 15 MILLIGRAM(S): 2.5 TABLET ORAL at 13:13

## 2024-05-02 RX ADMIN — CHLORHEXIDINE GLUCONATE 15 MILLILITER(S): 213 SOLUTION TOPICAL at 05:15

## 2024-05-02 RX ADMIN — AMPICILLIN SODIUM AND SULBACTAM SODIUM 200 GRAM(S): 250; 125 INJECTION, POWDER, FOR SUSPENSION INTRAMUSCULAR; INTRAVENOUS at 05:14

## 2024-05-02 RX ADMIN — Medication 650 MILLIGRAM(S): at 22:44

## 2024-05-02 RX ADMIN — DEXMEDETOMIDINE HYDROCHLORIDE IN 0.9% SODIUM CHLORIDE 13.9 MICROGRAM(S)/KG/HR: 4 INJECTION INTRAVENOUS at 02:19

## 2024-05-02 RX ADMIN — HEPARIN SODIUM 2200 UNIT(S)/HR: 5000 INJECTION INTRAVENOUS; SUBCUTANEOUS at 22:01

## 2024-05-02 RX ADMIN — DROXIDOPA 300 MILLIGRAM(S): 100 CAPSULE ORAL at 05:14

## 2024-05-02 RX ADMIN — AMPICILLIN SODIUM AND SULBACTAM SODIUM 200 GRAM(S): 250; 125 INJECTION, POWDER, FOR SUSPENSION INTRAMUSCULAR; INTRAVENOUS at 11:32

## 2024-05-02 RX ADMIN — DEXMEDETOMIDINE HYDROCHLORIDE IN 0.9% SODIUM CHLORIDE 13.9 MICROGRAM(S)/KG/HR: 4 INJECTION INTRAVENOUS at 11:31

## 2024-05-02 RX ADMIN — DEXMEDETOMIDINE HYDROCHLORIDE IN 0.9% SODIUM CHLORIDE 13.9 MICROGRAM(S)/KG/HR: 4 INJECTION INTRAVENOUS at 19:56

## 2024-05-02 RX ADMIN — AMPICILLIN SODIUM AND SULBACTAM SODIUM 200 GRAM(S): 250; 125 INJECTION, POWDER, FOR SUSPENSION INTRAMUSCULAR; INTRAVENOUS at 17:22

## 2024-05-02 NOTE — CHART NOTE - NSCHARTNOTEFT_GEN_A_CORE
: Ozzy Dueñas    INDICATION: POCUS    PROCEDURE:  [ ] LIMITED ECHO  [x] LIMITED CHEST  [ ] LIMITED RETROPERITONEAL  [ ] LIMITED ABDOMINAL  [ ] LIMITED DVT  [ ] NEEDLE GUIDANCE VASCULAR  [ ] NEEDLE GUIDANCE THORACENTESIS  [ ] NEEDLE GUIDANCE PARACENTESIS  [ ] NEEDLE GUIDANCE PERICARDIOCENTESIS  [ ] OTHER    FINDINGS:  A-line predominant b/l, trace pleural effusion R>L.     INTERPRETATION:  Trace pleural effusions b/l.

## 2024-05-02 NOTE — CHART NOTE - NSCHARTNOTEFT_GEN_A_CORE
Case discussed with MICU fellow. Patient underwent tracheostomy procedure 4 days ago as per consent from surrogate decision maker. As goals appear established, recommend primary goals of care by primary team. Will discontinue palliative consult, please reconsult as needed. Can be reached by TEAMS M-F 9-5 Sammie Armendariz Any other time please page [phone] if needed

## 2024-05-02 NOTE — PROGRESS NOTE ADULT - SUBJECTIVE AND OBJECTIVE BOX
Patient is a 69y old  Female who presents with a chief complaint of Transfer for leukophoresis (29 Apr 2024 13:39)    Interval Events: POD# 4 from trach, no issues with trach. No recurrence of effusion per ICU. Still having difficulty with trach collar/pressure support. Still having fevers.     MEDS:  MEDICATIONS  (STANDING):  albuterol/ipratropium for Nebulization 3 milliLiter(s) Nebulizer every 8 hours  ampicillin/sulbactam  IVPB      ampicillin/sulbactam  IVPB 3 Gram(s) IV Intermittent every 6 hours  chlorhexidine 0.12% Liquid 15 milliLiter(s) Oral Mucosa every 12 hours  chlorhexidine 2% Cloths 1 Application(s) Topical <User Schedule>  droxidopa 300 milliGRAM(s) Oral <User Schedule>  heparin  Infusion.  Unit(s)/Hr (20 mL/Hr) IV Continuous <Continuous>  insulin lispro (ADMELOG) corrective regimen sliding scale   SubCutaneous every 6 hours  norepinephrine Infusion 0.02 MICROgram(s)/kG/Min (4.16 mL/Hr) IV Continuous <Continuous>  phenylephrine    Infusion 0.3 MICROgram(s)/kG/Min (12.5 mL/Hr) IV Continuous <Continuous>  polyethylene glycol 3350 17 Gram(s) Oral every 12 hours  propofol Infusion 9.91 MICROgram(s)/kG/Min (6.6 mL/Hr) IV Continuous <Continuous>  QUEtiapine 25 milliGRAM(s) Oral at bedtime  senna Syrup 10 milliLiter(s) Oral at bedtime    MEDICATIONS  (PRN):  acetaminophen   Oral Liquid .. 650 milliGRAM(s) Oral every 6 hours PRN Temp greater or equal to 38C (100.4F)  fentaNYL    Injectable 50 MICROGram(s) IV Push every 2 hours PRN Moderate Pain (4 - 6)  nystatin Powder 1 Application(s) Topical two times a day PRN skin irritation      Allergies    No Known Allergies    Intolerances          CONSTITUTIONAL:  No weight loss, fever, chills, weakness or fatigue.  HEENT:  Eyes:  No visual loss, blurred vision, double vision or yellow sclerae. + throat pain   SKIN:  No rash or itching.  CARDIOVASCULAR:  No chest pain, chest pressure or chest discomfort.  RESPIRATORY:  No shortness of breath, cough or sputum.  GASTROINTESTINAL:  SEE HPI  GENITOURINARY:  No dysuria, hematuria, urinary frequency  NEUROLOGICAL:  No headache, dizziness, syncope, paralysis.   MUSCULOSKELETAL:  No muscle, back pain, joint pain or stiffness.  ENDOCRINOLOGIC:  No reports of sweating, cold or heat intolerance.     ______________________________________________________________________  PHYSICAL EXAM:  T(C): 37.8 (04-30-24 @ 03:00), Max: 39.1 (04-29-24 @ 14:00)  HR: 102 (04-30-24 @ 05:55)  BP: 110/53 (04-30-24 @ 04:30)  RR: 19 (04-30-24 @ 04:30)  SpO2: 100% (04-30-24 @ 05:55)  Wt(kg): --    04-28 - 04-29  --------------------------------------------------------  IN:    Enteral Tube Flush: 50 mL    IV PiggyBack: 400 mL    Norepinephrine: 1257.3 mL    Propofol: 449.9 mL    Vital High Protein: 770 mL  Total IN: 2927.2 mL    OUT:    Voided (mL): 1300 mL  Total OUT: 1300 mL    Total NET: 1627.2 mL      04-29 - 04-30  --------------------------------------------------------  IN:    Enteral Tube Flush: 100 mL    Heparin Infusion: 392 mL    IV PiggyBack: 650 mL    Norepinephrine: 709.7 mL    Propofol: 16.7 mL    Vital High Protein: 1190 mL  Total IN: 3058.4 mL    OUT:    Voided (mL): 1000 mL  Total OUT: 1000 mL    Total NET: 2058.4 mL          GEN: NAD, normocephalic, obese   HEENT: +trach  CVS: S1S2+  CHEST: clear to auscultation  ABD: soft , nontender, nondistended, bowel sounds present  EXTR: no cyanosis, no clubbing, no edema  NEURO: A&OX3, is able to mouth responses   SKIN:  warm;  non icteric    ______________________________________________________________________  LABS:                                     7.9    176.56 )-----------( 240      ( 01 May 2024 16:48 )             26.0     ____________________________________________

## 2024-05-02 NOTE — PROGRESS NOTE ADULT - ASSESSMENT
69-year-old female with a past medical history of CVA, bedbound at baseline, COPD, hypertension, CLL who initially had presented to an outside hospital due to acute shortness of breath.  Patient was found to have pulmonary edema and was managed with diuretics and BiPAP.  Patient also found to be febrile to 103 Fahrenheit, found to have pneumonia therefore started on antibiotics for this.  Labs were notable for a leukocytosis of 233 in setting of CLL and then was transferred to [facility] for leukopheresis.  Upon arrival to [place], intubated due to concern for respiratory compromise as well as worsening AMS.  Patient was started on levofloxacin due to hypotension.    Hospitalization course includes chest tube drainage.  Blood and pleural fluid cultures are positive growing Streptococcus pneumonia A.  Chest tube was removed on 4/19/2024 however patient remains febrile with a left-sided loculated effusion therefore left pigtail catheter was placed on 4/23/2024.  Patient's antibiotics were switched from ceftriaxone to cefepime and now is requiring pressors again.  Blood cultures were repeated on 4/22/2024 with Staphylococcus epidermidis growth, repeat pleural fluid cultures obtained on 4/23/2024 are negative to date, repeat blood cultures from 4/24/2024 are negative as well.    #Streptococcal bacteremia, likely source empyema  #Abnormal imaging of the lungs  #Streptococcal pneumonia and empyema  #Acute hypoxic respiratory failure  #CLL  #LLE DVT  s/p trach  Repeat blood cultures negative thus far  Remains febrile, no new culture data to guide antibiotic therapy  No plan for leukophoresis at this time per heme service  MRSE more likely contamination, as 1/4 bottles and no repeat growth  Full RVP on 5/1/24 is negative  Repeat CT C/A/P with increasing R pleural effusion, HSM and LAD noted     Recommendations  Patient continues to have persistent fevers, increaseing right sided pleural effusion on imaging - evaluate for drainage  Continue ampicillin/sulbactam, should be adequate coverage given previous strep growth and no new species  CT surgery eval  DVT can cause fevers as  well - management per primary team  Now with trach - no evidence for aspiration/tracheitis - can obtain repeat sputum culture  Fever related to CLL possible - heme input  Follow fever curve and WBC count    Abimael Jain MD  Division of Infectious Diseases

## 2024-05-02 NOTE — PROGRESS NOTE ADULT - ASSESSMENT
69F h/o CVA (bedbound at baseline), COPD, CHF, HTN, CLL p/w acute shortness of breath to outside ED found to have acute pulmonary edema and PNA transferred to [facility] for possible leukophoresis. Patient found to have empyema 2/2 Pansensitive Strep Pneumo with associated bacteremia.  S/p intubation with inability to wean, s/p tracheostomy tube on 4/28 POD #4    - Continue trach care.  - Post trach care instructions as in previous note  - Sutures to be removed on Day 10.  - SLP eval  - Wean off ventilator as tolerated by ICU team.   - Titrate off pressors.  - First trach change at 3 weeks unless clinically indicated sooner.     IP team to follow for any trach related issues.

## 2024-05-02 NOTE — PROGRESS NOTE ADULT - ATTENDING COMMENTS
69 year old female with a history of CVA (bedbound at baseline) COPD, CHF, HTN p/w CLL,   strep pneumonia bacteremia, pna and empyema s/p chest tube and mist tx and now s/p trach.    Remains febrile and requiring pressors    Denies SOB, CP, abd and back pain       - baseline L sided deficits, moving R side to command  - cont PT  as tolerated  - Septic shock, maintained on pressors, map>65   - s/p chest tube removal    - pocus today w/ no effusion on L and trace effusion on R (not enough to tap), does have basal consolidations  - still spiking fever, cx repeated, rvp negative  - repeat CT showing no clear source of infection (very small R effusion and basal consolidation)   - cont unasyn, f/u ID reccs  - cont a/c for dvt  - s/p trach, no bleeding  - cont PS trial as tolerated, TC trial tolerated only briefly  - plan for PEG tube, f/u GI reccs  - f/u Heme reccs- s/p ivig and will need BM bx when stable.  - persistent fevers from CLL vs DVT vs other source of infection tho bcx remains negative    prognosis remains guarded

## 2024-05-02 NOTE — PROGRESS NOTE ADULT - SUBJECTIVE AND OBJECTIVE BOX
INTERVAL HPI/OVERNIGHT EVENTS:    SUBJECTIVE: Patient seen and examined at bedside.     ROS: All negative except as listed above.    VITAL SIGNS:  ICU Vital Signs Last 24 Hrs  T(C): 38 (02 May 2024 08:00), Max: 38.2 (02 May 2024 00:00)  T(F): 100.4 (02 May 2024 08:00), Max: 100.8 (02 May 2024 00:00)  HR: 65 (02 May 2024 07:45) (65 - 117)  BP: 122/57 (02 May 2024 07:30) (78/46 - 164/68)  BP(mean): 82 (02 May 2024 07:30) (56 - 116)  ABP: --  ABP(mean): --  RR: 17 (02 May 2024 07:45) (14 - 42)  SpO2: 100% (02 May 2024 07:45) (95% - 100%)    O2 Parameters below as of 02 May 2024 08:00  Patient On (Oxygen Delivery Method): ventilator    O2 Concentration (%): 40      Mode: AC/ CMV (Assist Control/ Continuous Mandatory Ventilation), RR (machine): 14, TV (machine): 460, FiO2: 40, PEEP: 5, ITime: 1, MAP: 11, PIP: 26  Plateau pressure:   P/F ratio:     05-01 @ 07:01  -  05-02 @ 07:00  --------------------------------------------------------  IN: 2971.5 mL / OUT: 650 mL / NET: 2321.5 mL    05-02 @ 07:01  -  05-02 @ 07:49  --------------------------------------------------------  IN: 84.4 mL / OUT: 0 mL / NET: 84.4 mL      CAPILLARY BLOOD GLUCOSE      POCT Blood Glucose.: 95 mg/dL (02 May 2024 05:12)      ECG: reviewed.    PHYSICAL EXAM:  GENERAL: NAD, lying in bed comfortably,   HEAD:  Atraumatic, normocephalic  EYES: EOMI, PERRLA, conjunctiva and sclera clear  NECK: Supple, trachea midline, no JVD, Tracheostomy tube in place   HEART: Regular rate and rhythm, no murmurs, rubs, or gallops  LUNGS: Unlabored respirations.  Clear to auscultation bilaterally, no crackles, wheezing, or rhonchi  ABDOMEN: Soft, nontender, nondistended, +BS  EXTREMITIES: 2+ peripheral pulses bilaterally, cap refill<2 secs. No clubbing, cyanosis, L leg and L arm pitting edema   NERVOUS SYSTEM: AOx4 following commands, moving all extremities, no focal deficits   SKIN: No rashes or lesions      MEDICATIONS:  MEDICATIONS  (STANDING):  albuterol/ipratropium for Nebulization 3 milliLiter(s) Nebulizer every 8 hours  ampicillin/sulbactam  IVPB      ampicillin/sulbactam  IVPB 3 Gram(s) IV Intermittent every 6 hours  chlorhexidine 0.12% Liquid 15 milliLiter(s) Oral Mucosa every 12 hours  chlorhexidine 2% Cloths 1 Application(s) Topical <User Schedule>  dexMEDEtomidine Infusion 0.5 MICROgram(s)/kG/Hr (13.9 mL/Hr) IV Continuous <Continuous>  droxidopa 300 milliGRAM(s) Oral <User Schedule>  heparin  Infusion.  Unit(s)/Hr (20 mL/Hr) IV Continuous <Continuous>  insulin lispro (ADMELOG) corrective regimen sliding scale   SubCutaneous every 6 hours  norepinephrine Infusion 0.02 MICROgram(s)/kG/Min (4.16 mL/Hr) IV Continuous <Continuous>  phenylephrine    Infusion 0.3 MICROgram(s)/kG/Min (12.5 mL/Hr) IV Continuous <Continuous>  polyethylene glycol 3350 17 Gram(s) Oral every 12 hours  propofol Infusion 9.91 MICROgram(s)/kG/Min (6.6 mL/Hr) IV Continuous <Continuous>  QUEtiapine 50 milliGRAM(s) Oral every 12 hours  senna Syrup 10 milliLiter(s) Oral at bedtime    MEDICATIONS  (PRN):  acetaminophen   Oral Liquid .. 650 milliGRAM(s) Oral every 6 hours PRN Temp greater or equal to 38C (100.4F)  fentaNYL    Injectable 50 MICROGram(s) IV Push every 2 hours PRN Moderate Pain (4 - 6)  nystatin Powder 1 Application(s) Topical two times a day PRN skin irritation      ALLERGIES:  Allergies    No Known Allergies    Intolerances        LABS:                        7.3    155.58 )-----------( 224      ( 02 May 2024 00:10 )             23.6     05-02    143  |  107  |  7   ----------------------------<  85  3.9   |  24  |  0.32<L>    Ca    8.4      02 May 2024 00:10  Phos  3.2     05-02  Mg     2.3     05-02    TPro  5.8<L>  /  Alb  2.6<L>  /  TBili  0.5  /  DBili  x   /  AST  17  /  ALT  15  /  AlkPhos  65  05-02    PT/INR - ( 02 May 2024 06:18 )   PT: 13.4 sec;   INR: 1.23 ratio         PTT - ( 02 May 2024 06:18 )  PTT:81.0 sec  Urinalysis Basic - ( 02 May 2024 00:10 )    Color: x / Appearance: x / SG: x / pH: x  Gluc: 85 mg/dL / Ketone: x  / Bili: x / Urobili: x   Blood: x / Protein: x / Nitrite: x   Leuk Esterase: x / RBC: x / WBC x   Sq Epi: x / Non Sq Epi: x / Bacteria: x      ABG:  pH, Arterial: 7.44 (05-01-24 @ 23:51)  pCO2, Arterial: 41 mmHg (05-01-24 @ 23:51)  pO2, Arterial: 78 mmHg (05-01-24 @ 23:51)      vBG:    Micro:    Culture - Blood (collected 04-29-24 @ 14:00)  Source: .Blood Blood  Preliminary Report (05-01-24 @ 17:02):    No growth at 48 Hours    Culture - Blood (collected 04-29-24 @ 13:50)  Source: .Blood Blood  Preliminary Report (05-01-24 @ 17:02):    No growth at 48 Hours    Culture - Blood (collected 04-26-24 @ 13:30)  Source: .Blood Blood-Peripheral  Final Report (05-01-24 @ 18:00):    No growth at 5 days    Culture - Blood (collected 04-26-24 @ 13:21)  Source: .Blood Blood-Venous  Final Report (05-01-24 @ 18:00):    No growth at 5 days    Culture - Blood (collected 04-24-24 @ 05:53)  Source: .Blood Blood  Final Report (04-29-24 @ 10:01):    No growth at 5 days    Culture - Blood (collected 04-24-24 @ 05:53)  Source: .Blood Blood  Final Report (04-29-24 @ 10:01):    No growth at 5 days    Culture - Blood (collected 04-22-24 @ 21:37)  Source: .Blood Blood-Peripheral  Gram Stain (04-24-24 @ 00:09):    Growth in aerobic bottle: Gram Positive Cocci in Clusters  Final Report (04-24-24 @ 23:16):    Growth in aerobic bottle: Staphylococcus epidermidis    Isolation of Coagulase negative Staphylococcus from single blood culture    sets may represent    contamination. Contact the Microbiology Department at [phone] if    susceptibility testing is    clinically indicated.    Direct identification is available within approximately 3-5    hours either by Blood Panel Multiplexed PCR or Direct    MALDI-TOF. Details: [email or web address]  Organism: Blood Culture PCR (04-24-24 @ 23:16)  Organism: Blood Culture PCR (04-24-24 @ 23:16)      Method Type: PCR      -  Staphylococcus epidermidis, Methicillin resistant: Detec    Culture - Blood (collected 04-22-24 @ 19:30)  Source: .Blood Blood-Peripheral  Final Report (04-28-24 @ 01:00):    No growth at 5 days    Culture - Blood (collected 04-20-24 @ 12:15)  Source: .Blood Blood-Peripheral  Final Report (04-25-24 @ 19:00):    No growth at 5 days    Culture - Blood (collected 04-20-24 @ 12:00)  Source: .Blood Blood-Peripheral  Final Report (04-25-24 @ 19:00):    No growth at 5 days    Culture - Blood (collected 04-18-24 @ 11:00)  Source: .Blood Blood-Peripheral  Final Report (04-23-24 @ 17:01):    No growth at 5 days    Culture - Blood (collected 04-18-24 @ 10:50)  Source: .Blood Blood-Peripheral  Final Report (04-23-24 @ 17:01):    No growth at 5 days    Culture - Blood (collected 04-16-24 @ 15:40)  Source: .Blood Blood-Peripheral  Final Report (04-21-24 @ 21:01):    No growth at 5 days    Culture - Blood (collected 04-16-24 @ 13:17)  Source: .Blood Blood-Peripheral  Final Report (04-21-24 @ 18:01):    No growth at 5 days    Culture - Blood (collected 04-16-24 @ 06:42)  Source: .Blood Blood-Peripheral  Gram Stain (04-18-24 @ 11:52):    Growth in aerobic bottle: Gram Positive Cocci in Pairs and Chains    Growth in anaerobic bottle: Gram positive cocci in pairs  Final Report (04-18-24 @ 11:52):    Growth in aerobic and anaerobic bottles: Streptococcus pneumoniae    See previous culture [id number]    Culture - Blood (collected 04-16-24 @ 06:35)  Source: .Blood Blood-Peripheral  Gram Stain (04-18-24 @ 11:51):    Growth in aerobic bottle: Gram positive cocci in pairs    Growth in anaerobic bottle: Gram positive cocci in pairs  Final Report (04-18-24 @ 11:51):    Growth in aerobic and anaerobic bottles: Streptococcus pneumoniae    Direct identification is available within approximately 3-5    hours either by Blood Panel Multiplexed PCR or Direct    MALDI-TOF. Details: [email or web address]  Organism: Blood Culture PCR  Streptococcus pneumoniae (04-18-24 @ 11:51)  Organism: Streptococcus pneumoniae (04-18-24 @ 11:51)      Method Type: LESLY      -  Clindamycin: S <=0.06      -  Erythromycin: S <=0.06 Predicts results for azithromycin.      -  Levofloxacin: S 1      -  Tetracycline: S <=0.5      -  Trimethoprim/Sulfamethoxazole: S <=.25/4.75      -  Vancomycin: S 0.5      -  Ceftriaxone (meningitidis): S <=0.25      -  Ceftriaxone (non-meningitidis): S <=0.25      -  Penicillin (meningitidis): S 0.06      -  Penicillin (non-meningitidis): S 0.06      -  Penicillin (oral penicillin V): S 0.06  Organism: Blood Culture PCR (04-18-24 @ 11:51)      Method Type: PCR      -  Streptococcus pneumoniae: Detec        Culture - Sputum (collected 04-26-24 @ 13:44)  Source: .Sputum Sputum  Gram Stain (04-26-24 @ 20:33):    Rare polymorphonuclear leukocytes per low power field    No Squamous epithelial cells per low power field    No organisms seen per oil power field  Final Report (04-28-24 @ 08:42):    No growth    Culture - Sputum (collected 04-16-24 @ 15:44)  Source: ET Tube ET Tube  Gram Stain (04-16-24 @ 22:00):    Few polymorphonuclear leukocytes per low power field    No Squamous epithelial cells per low power field    No organisms seen per oil power field  Final Report (04-18-24 @ 09:40):    Normal Respiratory Kayla present        RADIOLOGY & ADDITIONAL TESTS: Reviewed.

## 2024-05-02 NOTE — PROGRESS NOTE ADULT - ASSESSMENT
69F h/o CVA (bedbound at baseline), COPD, CHF, HTN presenting as transfer from [facility] for SOB iso leukocytosis to 233 c/f acute leukemia. Pt intubated and on pressors, transferred to MICU for further management, course c/b empyema s/p chest tube placement (4/16) and removal, reaccumulation of loculated pleural effusion s/p L pigtail placement and removal on 4/27     Neuro  At Baseline AOx4 mental status   L upper and lower extremity motor deficits iso hx of stroke   Pain control w/ Tylenol  Seroquel 50 QHS for agitation     CV  #Septic Shock   - Strep Pneumo Empyema and Bacteremia s/p Pigtail catheter   - Levo changed to Dawit  - Dawit and  Droxidopa 300 TID     #Chest Pain, Resolved   -demand vs leukostasis vs non cardiac   -reported JOSEF in II, III, aVF at [facility] --> repeat EKG without ST changes or Q waves  -trops peaked    #CHF  -unclear hx but elevated pro-BNP to 2600s  -TTE: EF 54%, no significant abnormalities  - Diurese as appropriate    Resp  #Acute hypoxic respiratory failure  #Empyema Pansensitive Strep Pneumo  -intubated: 460/14/30/5; wean off as tolerated w/ daily CPAP trial  -s/p thora draining 1500cc fluid 4/16 with chest tube placement; c/w exudative effusion growing Strep Pneumo  -MRSA neg  -pleural fluid growing strep pneumo; BCx growing strep pneumo; Pansensitive  -was on vanc/zosyn/azithro (4/16)-->deescalated to CTX 2g qd (4/17 - ); c/w total 14-day course of Abx therapy   -CT chest 4/22 reveals persistent loculated effusion, s/p IR Pigtail placement 4/24-4/27 w/ Exudative effusion; Alteplase and Dornase through pigtal x6 doses; unable to finish 6th Alteplase+Dornase due to catheter occlusion   - Repeat CTC 4/27 - decreased small pleural effusions, thoracic LAD   - s/p Trach placement 4/28; now on Trach collar (Pressure support at night)      Plan:  - CPAP trial daily     GI  #Diet: Tube feed   PEG tube planned once off Pressors     /Renal  -no active issues    Heme/Onc  #Leukocytosis  #CLL  - on presentation; improving to 130s  -no plan for leukophoresis at this time given mature lymphocytes  -stop trending TLS labs as they have been stable   - CT Chest A&P showing diffuse axillary, inguinal mediastinal RP lymphadenopathy.  - Appreciate Heme recs;   - Plan for Inpatient Bone Marrow Bx per Heme     #L Common Femoral Vein DVT   - Non-occlusive   - Heparin Drip 4/29-    #DVT ppx: Heparin Drip     ID  #Empyema 2/2 Pansensitive Strep Pneumo  #Strep Pneumo Bacteremia  -BCx + strep pneumo; fluid culture with strep pneumo  -repeat BCx 4/20 ngtd, ReCx 4/23 due to fever   -CT Chest A&P demonstrates LLL empyema not showing any other intraabdominal infectious source  -was on vanc/zosyn/azithro (4/16)-->deescalated to CTX (4/17 -4/23), Added on Vancomycin (4/23-) due to MRSE 1/2 Cx, expanded coverage to Cefepime (4/23 1x dose), back on CTX, switched to Unasyn 3g Q6 (4/25- )  - IVIG 4/26 x1  - ID Consulted for persistent fevers despite source control; appreciate recs   - Repeat TTE for eval of IE, repeat RVP, ESR.    Plan    c/w with Unasyn (4/25  -   )   f/u repeat BCx 4/29-      Endo  -ISS q6h while NPO w/ tube feed     Ethics: Full Code  Only known family member is Aunt Mili Gordon ([phone], [phone]) who lives in [place]   69F h/o CVA (bedbound at baseline), COPD, CHF, HTN presenting as transfer from [facility] for SOB iso leukocytosis to 233 c/f acute leukemia. Pt intubated and on pressors, transferred to MICU for further management, course c/b empyema s/p chest tube placement (4/16) and removal, reaccumulation of loculated pleural effusion s/p L pigtail placement and removal on 4/27     Neuro  At Baseline AOx4 mental status   L upper and lower extremity motor deficits iso hx of stroke   Pain control w/ Tylenol  Seroquel 50 QHS for agitation     CV  #Septic Shock   - Strep Pneumo Empyema and Bacteremia s/p Pigtail catheter   - Levo changed to Dawit; Droxidopa changed to Midodrine  - Dawit and  Midodrine 15 TID     #Chest Pain, Resolved   -demand vs leukostasis vs non cardiac   -reported JOSEF in II, III, aVF at [facility] --> repeat EKG without ST changes or Q waves  -trops peaked    #CHF  -unclear hx but elevated pro-BNP to 2600s  -TTE: EF 54%, no significant abnormalities  - Diurese as appropriate    Resp  #Acute hypoxic respiratory failure  #Empyema Pansensitive Strep Pneumo  -s/p thora draining 1500cc fluid 4/16 with chest tube placement; c/w exudative effusion growing Strep Pneumo  -MRSA neg  -pleural fluid growing strep pneumo; BCx growing strep pneumo; Pansensitive  -was on vanc/zosyn/azithro (4/16)-->deescalated to CTX (4/17 -4/23), Added on Vancomycin (4/23-) due to MRSE 1/2 Cx, expanded coverage to Cefepime (4/23 1x dose), back on CTX, switched to Unasyn 3g Q6 (4/25- )  -CT chest 4/22 reveals persistent loculated effusion, s/p IR Pigtail placement 4/24-4/27 w/ Exudative effusion; Alteplase and Dornase through pigtal x6 doses; unable to finish 6th Alteplase+Dornase due to catheter occlusion   - Repeat CTC 4/27 - decreased small pleural effusions, thoracic LAD   - s/p Trach placement 4/28; now on Trach collar (Pressure support at night)        GI  #Diet: Tube feed   PEG tube planned once off Pressors     /Renal  -no active issues    Heme/Onc  #Leukocytosis  #CLL  - on presentation; improving to 130s  -no plan for leukophoresis at this time given mature lymphocytes  -stop trending TLS labs as they have been stable   - CT Chest A&P showing diffuse axillary, inguinal mediastinal RP lymphadenopathy.  - Appreciate Heme recs;   - Plan for Inpatient Bone Marrow Bx per Heme     #L Common Femoral Vein DVT   - Non-occlusive   - Heparin Drip 4/29-    #DVT ppx: Heparin Drip     ID  #Empyema 2/2 Pansensitive Strep Pneumo  #Strep Pneumo Bacteremia  -BCx + strep pneumo; fluid culture with strep pneumo  -repeat BCx 4/20 ngtd, ReCx 4/23 due to fever   -CT Chest A&P demonstrates LLL empyema not showing any other intraabdominal infectious source  -was on vanc/zosyn/azithro (4/16)-->deescalated to CTX (4/17 -4/23), Added on Vancomycin (4/23-) due to MRSE 1/2 Cx, expanded coverage to Cefepime (4/23 1x dose), back on CTX, switched to Unasyn 3g Q6 (4/25- )  - IVIG 4/26 x1  - ID Consulted for persistent fevers despite source control; appreciate recs   - ESR elevated to 119    Plan    c/w with Unasyn (4/25  -   )       Endo  -ISS q6h while NPO w/ tube feed     Ethics: Full Code  Only known family member is Aunt Mili Gordon ([phone], [phone]) who lives in [place]

## 2024-05-02 NOTE — PROGRESS NOTE ADULT - SUBJECTIVE AND OBJECTIVE BOX
Follow Up:  fever    Interval History/ROS:  Overnight: No acute events.  Patient remains febrile with a Tmax of 100.8.  Otherwise hemodynamically stable.  Latest labs show leukocytosis of 155 in setting of CLL, H/H 7.3/23.6, BMP with renal function within normal limits, hepatic function within normal limits.  Full expanded RVP is negative on 4/30/2024.  Latest chest x-ray on 5/1/2024 shows a small left-sided pleural effusion.  CT chest/abdomen/pelvis obtained on 5/1/2024 shows a increasing right pleural effusion, redemonstration of hepatosplenomegaly and extensive lymphadenopathy.  No intra-abdominal evidence for infection.    Patient seen examined at bedside.  Denies any new pain or discomfort.      Allergies  No Known Allergies        ANTIMICROBIALS:  ampicillin/sulbactam  IVPB    ampicillin/sulbactam  IVPB 3 every 6 hours      OTHER MEDS:  MEDICATIONS  (STANDING):  acetaminophen   Oral Liquid .. 650 every 6 hours PRN  albuterol/ipratropium for Nebulization 3 every 8 hours  dexMEDEtomidine Infusion 0.5 <Continuous>  droxidopa 300 <User Schedule>  fentaNYL    Injectable 50 every 2 hours PRN  heparin  Infusion.  <Continuous>  insulin lispro (ADMELOG) corrective regimen sliding scale  every 6 hours  norepinephrine Infusion 0.02 <Continuous>  phenylephrine    Infusion 0.3 <Continuous>  polyethylene glycol 3350 17 every 12 hours  propofol Infusion 9.91 <Continuous>  QUEtiapine 50 every 12 hours  senna Syrup 10 at bedtime      Vital Signs Last 24 Hrs  T(C): 38 (02 May 2024 08:00), Max: 38.2 (02 May 2024 00:00)  T(F): 100.4 (02 May 2024 08:00), Max: 100.8 (02 May 2024 00:00)  HR: 66 (02 May 2024 09:01) (65 - 113)  BP: 122/57 (02 May 2024 07:30) (78/46 - 164/68)  BP(mean): 82 (02 May 2024 07:30) (56 - 116)  RR: 17 (02 May 2024 07:45) (14 - 42)  SpO2: 100% (02 May 2024 09:01) (96% - 100%)    Parameters below as of 02 May 2024 08:00  Patient On (Oxygen Delivery Method): ventilator    O2 Concentration (%): 40    PHYSICAL EXAM:  General: Patient in NAD, trach  HEENT: NCAT, EOMI, PERRL, no oral lesions  CV: S1+S2, no m/r/g appreciated   Lungs: rales diffuse  Abd: Soft, nontender, no guarding, no rebound tenderness, + bowel sounds   : No suprapubic tenderness  Neuro: Awake, on propofol, following commands, left sided weakness  Ext: No cyanosis, Left extremities dependent edema  Skin: No rash, no phlebitis                                  7.3    155.58 )-----------( 224      ( 02 May 2024 00:10 )             23.6       05-02    143  |  107  |  7   ----------------------------<  85  3.9   |  24  |  0.32<L>    Ca    8.4      02 May 2024 00:10  Phos  3.2     05-02  Mg     2.3     05-02    TPro  5.8<L>  /  Alb  2.6<L>  /  TBili  0.5  /  DBili  x   /  AST  17  /  ALT  15  /  AlkPhos  65  05-02      Urinalysis Basic - ( 02 May 2024 00:10 )    Color: x / Appearance: x / SG: x / pH: x  Gluc: 85 mg/dL / Ketone: x  / Bili: x / Urobili: x   Blood: x / Protein: x / Nitrite: x   Leuk Esterase: x / RBC: x / WBC x   Sq Epi: x / Non Sq Epi: x / Bacteria: x        MICROBIOLOGY:  v    Culture - Blood (collected 29 Apr 2024 14:00)  Source: .Blood Blood  Preliminary Report (01 May 2024 17:02):    No growth at 48 Hours    Culture - Blood (collected 29 Apr 2024 13:50)  Source: .Blood Blood  Preliminary Report (01 May 2024 17:02):    No growth at 48 Hours              Rapid RVP Result: NotDetec (04-30 @ 15:49)        RADIOLOGY:  Imaging reviewed

## 2024-05-03 LAB
ALBUMIN SERPL ELPH-MCNC: 2.6 G/DL — LOW (ref 3.3–5)
ALP SERPL-CCNC: 80 U/L — SIGNIFICANT CHANGE UP (ref 40–120)
ALT FLD-CCNC: 11 U/L — SIGNIFICANT CHANGE UP (ref 10–45)
ANION GAP SERPL CALC-SCNC: 10 MMOL/L — SIGNIFICANT CHANGE UP (ref 5–17)
APTT BLD: 89.7 SEC — HIGH (ref 24.5–35.6)
AST SERPL-CCNC: 15 U/L — SIGNIFICANT CHANGE UP (ref 10–40)
BASE EXCESS BLDV CALC-SCNC: 3.6 MMOL/L — HIGH (ref -2–3)
BASOPHILS # BLD AUTO: 0.16 K/UL — SIGNIFICANT CHANGE UP (ref 0–0.2)
BASOPHILS NFR BLD AUTO: 0.1 % — SIGNIFICANT CHANGE UP (ref 0–2)
BILIRUB SERPL-MCNC: 0.3 MG/DL — SIGNIFICANT CHANGE UP (ref 0.2–1.2)
BUN SERPL-MCNC: 9 MG/DL — SIGNIFICANT CHANGE UP (ref 7–23)
CA-I SERPL-SCNC: 1.22 MMOL/L — SIGNIFICANT CHANGE UP (ref 1.15–1.33)
CALCIUM SERPL-MCNC: 8.3 MG/DL — LOW (ref 8.4–10.5)
CHLORIDE BLDV-SCNC: 109 MMOL/L — HIGH (ref 96–108)
CHLORIDE SERPL-SCNC: 107 MMOL/L — SIGNIFICANT CHANGE UP (ref 96–108)
CO2 BLDV-SCNC: 31 MMOL/L — HIGH (ref 22–26)
CO2 SERPL-SCNC: 24 MMOL/L — SIGNIFICANT CHANGE UP (ref 22–31)
CORTIS AM PEAK SERPL-MCNC: 7.2 UG/DL — SIGNIFICANT CHANGE UP (ref 6–18.4)
CREAT SERPL-MCNC: 0.43 MG/DL — LOW (ref 0.5–1.3)
EGFR: 106 ML/MIN/1.73M2 — SIGNIFICANT CHANGE UP
EOSINOPHIL # BLD AUTO: 0.15 K/UL — SIGNIFICANT CHANGE UP (ref 0–0.5)
EOSINOPHIL NFR BLD AUTO: 0.1 % — SIGNIFICANT CHANGE UP (ref 0–6)
GAS PNL BLDA: SIGNIFICANT CHANGE UP
GAS PNL BLDV: 138 MMOL/L — SIGNIFICANT CHANGE UP (ref 136–145)
GAS PNL BLDV: SIGNIFICANT CHANGE UP
GAS PNL BLDV: SIGNIFICANT CHANGE UP
GLUCOSE BLDC GLUCOMTR-MCNC: 106 MG/DL — HIGH (ref 70–99)
GLUCOSE BLDC GLUCOMTR-MCNC: 113 MG/DL — HIGH (ref 70–99)
GLUCOSE BLDC GLUCOMTR-MCNC: 121 MG/DL — HIGH (ref 70–99)
GLUCOSE BLDV-MCNC: 113 MG/DL — HIGH (ref 70–99)
GLUCOSE SERPL-MCNC: 122 MG/DL — HIGH (ref 70–99)
HCO3 BLDV-SCNC: 29 MMOL/L — SIGNIFICANT CHANGE UP (ref 22–29)
HCT VFR BLD CALC: 26.1 % — LOW (ref 34.5–45)
HCT VFR BLDA CALC: 24 % — LOW (ref 34.5–46.5)
HGB BLD CALC-MCNC: 8.1 G/DL — LOW (ref 11.7–16.1)
HGB BLD-MCNC: 8.1 G/DL — LOW (ref 11.5–15.5)
HOROWITZ INDEX BLDV+IHG-RTO: 40 — SIGNIFICANT CHANGE UP
IMM GRANULOCYTES NFR BLD AUTO: 0.3 % — SIGNIFICANT CHANGE UP (ref 0–0.9)
INR BLD: 1.29 RATIO — HIGH (ref 0.85–1.18)
LACTATE BLDV-MCNC: 1 MMOL/L — SIGNIFICANT CHANGE UP (ref 0.5–2)
LYMPHOCYTES # BLD AUTO: 133.83 K/UL — HIGH (ref 1–3.3)
LYMPHOCYTES # BLD AUTO: 91.7 % — HIGH (ref 13–44)
MAGNESIUM SERPL-MCNC: 2.5 MG/DL — SIGNIFICANT CHANGE UP (ref 1.6–2.6)
MCHC RBC-ENTMCNC: 28 PG — SIGNIFICANT CHANGE UP (ref 27–34)
MCHC RBC-ENTMCNC: 31 GM/DL — LOW (ref 32–36)
MCV RBC AUTO: 90.3 FL — SIGNIFICANT CHANGE UP (ref 80–100)
MONOCYTES # BLD AUTO: 5.53 K/UL — HIGH (ref 0–0.9)
MONOCYTES NFR BLD AUTO: 3.8 % — SIGNIFICANT CHANGE UP (ref 2–14)
NEUTROPHILS # BLD AUTO: 5.81 K/UL — SIGNIFICANT CHANGE UP (ref 1.8–7.4)
NEUTROPHILS NFR BLD AUTO: 4 % — LOW (ref 43–77)
NRBC # BLD: 0 /100 WBCS — SIGNIFICANT CHANGE UP (ref 0–0)
PCO2 BLDV: 48 MMHG — HIGH (ref 39–42)
PH BLDV: 7.39 — SIGNIFICANT CHANGE UP (ref 7.32–7.43)
PHOSPHATE SERPL-MCNC: 3.5 MG/DL — SIGNIFICANT CHANGE UP (ref 2.5–4.5)
PLATELET # BLD AUTO: 220 K/UL — SIGNIFICANT CHANGE UP (ref 150–400)
PO2 BLDV: 46 MMHG — HIGH (ref 25–45)
POTASSIUM BLDV-SCNC: 4.4 MMOL/L — SIGNIFICANT CHANGE UP (ref 3.5–5.1)
POTASSIUM SERPL-MCNC: 4.4 MMOL/L — SIGNIFICANT CHANGE UP (ref 3.5–5.3)
POTASSIUM SERPL-SCNC: 4.4 MMOL/L — SIGNIFICANT CHANGE UP (ref 3.5–5.3)
PROT SERPL-MCNC: 5.8 G/DL — LOW (ref 6–8.3)
PROTHROM AB SERPL-ACNC: 13.4 SEC — HIGH (ref 9.5–13)
RBC # BLD: 2.89 M/UL — LOW (ref 3.8–5.2)
RBC # FLD: 16.8 % — HIGH (ref 10.3–14.5)
SAO2 % BLDV: 76.6 % — SIGNIFICANT CHANGE UP (ref 67–88)
SODIUM SERPL-SCNC: 141 MMOL/L — SIGNIFICANT CHANGE UP (ref 135–145)
WBC # BLD: 145.96 K/UL — CRITICAL HIGH (ref 3.8–10.5)
WBC # FLD AUTO: 145.96 K/UL — CRITICAL HIGH (ref 3.8–10.5)

## 2024-05-03 PROCEDURE — 99232 SBSQ HOSP IP/OBS MODERATE 35: CPT

## 2024-05-03 PROCEDURE — 71045 X-RAY EXAM CHEST 1 VIEW: CPT | Mod: 26

## 2024-05-03 PROCEDURE — 99232 SBSQ HOSP IP/OBS MODERATE 35: CPT | Mod: GC

## 2024-05-03 PROCEDURE — 99291 CRITICAL CARE FIRST HOUR: CPT | Mod: GC

## 2024-05-03 PROCEDURE — 99233 SBSQ HOSP IP/OBS HIGH 50: CPT | Mod: GC

## 2024-05-03 RX ORDER — CLONAZEPAM 1 MG
0.5 TABLET ORAL EVERY 12 HOURS
Refills: 0 | Status: DISCONTINUED | OUTPATIENT
Start: 2024-05-03 | End: 2024-05-06

## 2024-05-03 RX ORDER — HYDROCORTISONE 20 MG
50 TABLET ORAL EVERY 8 HOURS
Refills: 0 | Status: DISCONTINUED | OUTPATIENT
Start: 2024-05-03 | End: 2024-05-04

## 2024-05-03 RX ADMIN — Medication 3 MILLILITER(S): at 13:47

## 2024-05-03 RX ADMIN — QUETIAPINE FUMARATE 50 MILLIGRAM(S): 200 TABLET, FILM COATED ORAL at 05:01

## 2024-05-03 RX ADMIN — Medication 650 MILLIGRAM(S): at 17:14

## 2024-05-03 RX ADMIN — Medication 50 MILLIGRAM(S): at 21:48

## 2024-05-03 RX ADMIN — Medication 650 MILLIGRAM(S): at 17:44

## 2024-05-03 RX ADMIN — AMPICILLIN SODIUM AND SULBACTAM SODIUM 200 GRAM(S): 250; 125 INJECTION, POWDER, FOR SUSPENSION INTRAMUSCULAR; INTRAVENOUS at 05:01

## 2024-05-03 RX ADMIN — CHLORHEXIDINE GLUCONATE 1 APPLICATION(S): 213 SOLUTION TOPICAL at 05:49

## 2024-05-03 RX ADMIN — DEXMEDETOMIDINE HYDROCHLORIDE IN 0.9% SODIUM CHLORIDE 13.9 MICROGRAM(S)/KG/HR: 4 INJECTION INTRAVENOUS at 19:15

## 2024-05-03 RX ADMIN — MIDODRINE HYDROCHLORIDE 15 MILLIGRAM(S): 2.5 TABLET ORAL at 13:21

## 2024-05-03 RX ADMIN — QUETIAPINE FUMARATE 50 MILLIGRAM(S): 200 TABLET, FILM COATED ORAL at 17:10

## 2024-05-03 RX ADMIN — Medication 0.5 MILLIGRAM(S): at 05:01

## 2024-05-03 RX ADMIN — MIDODRINE HYDROCHLORIDE 15 MILLIGRAM(S): 2.5 TABLET ORAL at 05:01

## 2024-05-03 RX ADMIN — AMPICILLIN SODIUM AND SULBACTAM SODIUM 200 GRAM(S): 250; 125 INJECTION, POWDER, FOR SUSPENSION INTRAMUSCULAR; INTRAVENOUS at 17:11

## 2024-05-03 RX ADMIN — Medication 3 MILLILITER(S): at 06:22

## 2024-05-03 RX ADMIN — CHLORHEXIDINE GLUCONATE 15 MILLILITER(S): 213 SOLUTION TOPICAL at 05:01

## 2024-05-03 RX ADMIN — AMPICILLIN SODIUM AND SULBACTAM SODIUM 200 GRAM(S): 250; 125 INJECTION, POWDER, FOR SUSPENSION INTRAMUSCULAR; INTRAVENOUS at 11:54

## 2024-05-03 RX ADMIN — AMPICILLIN SODIUM AND SULBACTAM SODIUM 200 GRAM(S): 250; 125 INJECTION, POWDER, FOR SUSPENSION INTRAMUSCULAR; INTRAVENOUS at 00:21

## 2024-05-03 RX ADMIN — NYSTATIN CREAM 1 APPLICATION(S): 100000 CREAM TOPICAL at 17:11

## 2024-05-03 RX ADMIN — DEXMEDETOMIDINE HYDROCHLORIDE IN 0.9% SODIUM CHLORIDE 13.9 MICROGRAM(S)/KG/HR: 4 INJECTION INTRAVENOUS at 13:21

## 2024-05-03 RX ADMIN — HEPARIN SODIUM 2200 UNIT(S)/HR: 5000 INJECTION INTRAVENOUS; SUBCUTANEOUS at 19:15

## 2024-05-03 RX ADMIN — Medication 0.5 MILLIGRAM(S): at 17:10

## 2024-05-03 RX ADMIN — POLYETHYLENE GLYCOL 3350 17 GRAM(S): 17 POWDER, FOR SOLUTION ORAL at 17:10

## 2024-05-03 RX ADMIN — MIDODRINE HYDROCHLORIDE 15 MILLIGRAM(S): 2.5 TABLET ORAL at 21:49

## 2024-05-03 RX ADMIN — PHENYLEPHRINE HYDROCHLORIDE 12.5 MICROGRAM(S)/KG/MIN: 10 INJECTION INTRAVENOUS at 13:21

## 2024-05-03 RX ADMIN — SENNA PLUS 10 MILLILITER(S): 8.6 TABLET ORAL at 21:47

## 2024-05-03 RX ADMIN — CHLORHEXIDINE GLUCONATE 15 MILLILITER(S): 213 SOLUTION TOPICAL at 17:11

## 2024-05-03 RX ADMIN — PHENYLEPHRINE HYDROCHLORIDE 12.5 MICROGRAM(S)/KG/MIN: 10 INJECTION INTRAVENOUS at 19:15

## 2024-05-03 NOTE — PROGRESS NOTE ADULT - ATTENDING COMMENTS
69 year old female with a history of CVA (bedbound at baseline) COPD, CHF, HTN p/w CLL,   strep pneumonia bacteremia, pna and empyema s/p chest tube and mist tx and now s/p trach.    Remains febrile and requiring pressors    Denies SOB, CP, abd and back pain       - baseline L sided deficits, moving R side to command  - cont PT  as tolerated  - Septic shock, maintained on pressors, map>65 however today pressors requirements significantly improved with change to pure vasoconstrictive rather than inotropic effect based on echo  - s/p chest tube removal    - pocus today w/ no effusion on L and trace effusion on R (not enough to tap), does have basal consolidations  - still spiking fever, cx repeated and negative, rvp negative  - repeat CT showing no clear source of infection (very small R effusion and basal consolidation)   - cont unasyn, f/u ID reccs  - cont a/c for dvt  - s/p trach, no bleeding  - cont PS trial as tolerated, not doing well on TC   - plan for PEG tube, f/u GI reccs  - f/u Heme reccs- s/p ivig and will need BM bx when stable.  - persistent fevers from CLL vs DVT vs other source of infection tho bcx remains negative    prognosis remains guarded

## 2024-05-03 NOTE — CHART NOTE - NSCHARTNOTEFT_GEN_A_CORE
Nutrition Follow Up Note  Patient seen for: length of stay on MICU follow up.     Chart reviewed, events noted.    Source: [] Patient       [x] Medical Record        [x] RN        [] Family at bedside       [x] Other: Interdisciplinary Rounds     -If unable to interview patient: [x] Trach/Vent/BiPAP  [] Disoriented/confused/inappropriate to interview    Diet Order:  Diet, NPO with Tube Feed:   Tube Feeding Modality: Orogastric  Vital High Protein (VITALHP)  Total Volume for 24 Hours (mL): 1260  Continuous  Until Goal Tube Feed Rate (mL per Hour): 70  Tube Feed Duration (in Hours): 18  Tube Feed Start Time: 11:00 (24 @ 10:24)    Enteral Order Provides: total volume 1260 mL, 1260 kcals, 110 gm protein, and 1053 mL free water.   Current Pump Rate: 70 mL/hr    5-Day Enteral Average Provision per RN flowsheet: 1114 mL, 1114 kcals, and 97 gm protein  5-Day Propofol Average Provision per RN flowsheet: 541 kcals (+ enteral = 1655 kcals)    Is current diet order appropriate/adequate? See recommendations below    PO intake :   [] >75%  Adequate    [] 50-75%  Fair       [] <50%  Poor   [x] N/A    Nutrition-related concerns:  - Intubated    GI:  Last BM 4/29 x6.   Bowel Regimen? [x] Yes   [] No  -> On antibiotics     Weights:   Daily Weight in k.2 (), 106.8 (), 108.2 ()  No new wts to address. Slight wt fluctuations noted, likely fluid shifts.  RD will continue to trend as new wts available/able.     Drug Dosing Weight  Height (cm): 172.7 (2024 17:12)  Weight (kg): 111 (2024 17:12)  BMI (kg/m2): 37.2 (2024 17:12); based on daily wt 106.8 kg (18): 35.8    Nutritionally Pertinent:   MEDICATIONS  (STANDING):  ampicillin/sulbactam  IVPB 3 Gram(s) IV Intermittent every 6 hours  ampicillin/sulbactam  IVPB      droxidopa 300 milliGRAM(s) Oral <User Schedule>  heparin  Infusion.  Unit(s)/Hr (20 mL/Hr) IV Continuous <Continuous>  insulin lispro (ADMELOG) corrective regimen sliding scale   SubCutaneous every 6 hours  norepinephrine Infusion 0.02 MICROgram(s)/kG/Min (4.16 mL/Hr) IV Continuous <Continuous>  polyethylene glycol 3350 17 Gram(s) Oral every 12 hours  propofol Infusion 9.91 MICROgram(s)/kG/Min (6.6 mL/Hr) IV Continuous <Continuous>  QUEtiapine 25 milliGRAM(s) Oral at bedtime  senna Syrup 10 milliLiter(s) Oral at bedtime    Pertinent Labs:  @ 00:09: Na 142, BUN 7, Cr <0.30<L>, <H>, K+ 4.0, Phos 2.7, Mg 2.5, Alk Phos 77, ALT/SGPT 27, AST/SGOT 30    Finger Sticks:  POCT Blood Glucose.: 124 mg/dL ( @ 12:16)  POCT Blood Glucose.: 163 mg/dL ( @ 05:54)  POCT Blood Glucose.: 153 mg/dL ( @ 17:25)    Triglycerides, Serum: 248 mg/dL (- @ 00:30)    Skin per nursing documentation: Suspected deep tissue injury sacrum, right heel  Edema per nursing documentation: 1+ generalized 2+ left arm; left hand; left ankle; right ankle; left foot; right foot    Estimated Energy Needs: (15-20 kcals/kg based on daily wt 106.8 kg ()) 2389-2971 kcals  Estimated Protein Needs: (1.5-2.0 gm/kg based on IBW 63.5 kg)  gm  Fluid needs deferred to provider.   Ge State Equation (based on daily wt 106.8 kg ()): 2035 kcals ()    Previous Nutrition Diagnosis: Increased protein-energy needs  Nutrition Diagnosis is: [x] ongoing  [] resolved [] not applicable     Nutrition Care Plan:  [x] In Progress  [] Achieved  [] Not applicable    New Nutrition Diagnosis: [x] Not applicable    Nutrition Interventions:     Education Provided:       [] Yes:  [x] No: Not applicable    Recommendations:      1) Glucerna 1.5 at goal rate 75 mL/hr x18 hours to provide total volume 1350 mL, 2025 kcals, 111 gm protein, and 1025 mL free water. Meets 19 kcals/kG based on daily wt 106.8 kg (18) and 1.7 gm protein/kG based on IBW 63.5 kg.  2) As medically feasible, provide multivitamin and Vitamin C for wound healing.     Monitoring and Evaluation:   Continue to monitor nutritional intake, tolerance to diet prescription, weights, labs, skin integrity    RD remains available upon request and will follow up per protocol  Taylor Aguero RD, MS, CDN, CNSC, [facility]    NUTRITION FOLLOW UP NOTE    PATIENT SEEN FOR: length of stay follow up.     SOURCE: [] Patient  [x] Current Medical Record  [x] RN  [] Family/support person at bedside  [] Patient unavailable/inappropriate  [x] Other: Interdisciplinary Rounds     CHART REVIEWED/EVENTS NOTED.  [] No changes to nutrition care plan to note  [x] Nutrition Status:  - s/p trach ; per notes on trach collar with pressure support at nights  - Elevated BG; On sliding scale of insulin   - Septic Shock. Pressor: norepinephrine at 0.18 micrograms/kG/min (trending down per flowsheet)    DIET ORDER:   Diet, NPO with Tube Feed:   Tube Feeding Modality: Orogastric  Glucerna 1.5 Emre (GLUCERNA1.5RTH)  Total Volume for 24 Hours (mL): 1260  Continuous  Starting Tube Feed Rate {mL per Hour}: 30  Increase Tube Feed Rate by (mL): 10     Every 4 hours  Until Goal Tube Feed Rate (mL per Hour): 70  Tube Feed Duration (in Hours): 18  Tube Feed Start Time: 11:00 (24)    CURRENT DIET ORDER IS:  [] Appropriate:  [] Inadequate:  [x] Other: See recommendations below    NUTRITION INTAKE/PROVISION:  [] PO:  [x] Enteral Nutrition: ENTERAL NUTRITION  EN Order Provides:  1260 ml formula, 1890 kcal, xx g protein, xxxx ml free water; meets xx kcal/kg and xx g protein/kg, based on wt:  Current Pump Rate: 70 mL/hr  5-Day Average Enteral provision (based on Glucerna 1.5): total volume 980 mL, 1470 kcals, 81 gm protein   *Feeds intermittently held to work with PT/Pt pulled out NGT  [] Parenteral Nutrition:    ANTHROPOMETRICS:  Drug Dosing Weight  Height (cm): 172.7 (2024 17:12)  Weight (kg): 111 (2024 17:12)  BMI (kg/m2): 37.2 (2024 17:12)  BSA (m2): 2.23 (2024 17:12)  Weights:   Daily      NUTRITIONALLY PERTINENT MEDICATIONS:  MEDICATIONS  (STANDING):  ampicillin/sulbactam  IVPB      ampicillin/sulbactam  IVPB 3 Gram(s) IV Intermittent every 6 hours  clonazePAM  Tablet 0.5 milliGRAM(s) Oral every 12 hours  dexMEDEtomidine Infusion 0.5 MICROgram(s)/kG/Hr (13.9 mL/Hr) IV Continuous <Continuous>  heparin  Infusion.  Unit(s)/Hr (20 mL/Hr) IV Continuous <Continuous>  insulin lispro (ADMELOG) corrective regimen sliding scale   SubCutaneous every 6 hours  midodrine 15 milliGRAM(s) Oral three times a day  norepinephrine Infusion 0.02 MICROgram(s)/kG/Min (4.16 mL/Hr) IV Continuous <Continuous>  phenylephrine    Infusion 0.3 MICROgram(s)/kG/Min (12.5 mL/Hr) IV Continuous <Continuous>  polyethylene glycol 3350 17 Gram(s) Oral every 12 hours  propofol Infusion 9.91 MICROgram(s)/kG/Min (6.6 mL/Hr) IV Continuous <Continuous>  QUEtiapine 50 milliGRAM(s) Oral every 12 hours  senna Syrup 10 milliLiter(s) Oral at bedtime    NUTRITIONALLY PERTINENT LABS:   Na141 mmol/L Glu 122 mg/dL<H> K+ 4.4 mmol/L Cr  0.43 mg/dL<L> BUN 9 mg/dL  Phos 3.5 mg/dL  Alb 2.6 g/dL<L>    Trig 248 mg/dL<H> ALT 11 U/L AST 15 U/L Alkaline Phosphatase 80 U/L    Finger Sticks:  POCT Blood Glucose.: 121 mg/dL ( @ 05:00)  POCT Blood Glucose.: 121 mg/dL ( @ 23:42)  POCT Blood Glucose.: 100 mg/dL ( @ 17:20)  POCT Blood Glucose.: 86 mg/dL ( @ 11:28)      NUTRITIONALLY PERTINENT MEDICATIONS/LABS:  [x] Reviewed  [] Relevant notes on medications/labs:    EDEMA:  [x] Reviewed  [] Relevant notes:    GI/ I&O:  [x] Reviewed  [] Relevant notes:  [] Other:    SKIN:   [] No pressure injuries documented, per nursing flowsheet  [] Pressure injury previously noted  [] Change in pressure injury documentation:  [] Other:    ESTIMATED NEEDS:  [] No change:  [] Updated:  Energy:   kcal/day (xx-xx kcal/kg)  Protein:   g/day (xx-xx g/kg)  Fluid:   ml/day or [] defer to team  Based on:    NUTRITION DIAGNOSIS:  [] Prior Dx:  [] New Dx:    EDUCATION:  [] Yes:  [] Not appropriate/warranted    NUTRITION CARE PLAN:  1. Diet:  2. Supplements:  3. Multivitamin/mineral supplementation:  4:     [] Achieved - Continue current nutrition intervention(s)  [] Current medical condition precludes nutrition intervention at this time.    MONITORING AND EVALUATION:   RD remains available upon request and will follow up per protocol.    KS  Available on [facility] TEAMS Recommendations:      1) Glucerna 1.5 at goal rate 75 mL/hr x18 hours to provide total volume 1350 mL, 2025 kcals, 111 gm protein, and 1025 mL free water. Meets 19 kcals/kG based on daily wt 106.8 kg () and 1.7 gm protein/kG based on IBW 63.5 kg.  2) As medically feasible, provide multivitamin and Vitamin C for wound healing.     NUTRITION FOLLOW UP NOTE    PATIENT SEEN FOR: length of stay follow up.     SOURCE: [] Patient  [x] Current Medical Record  [x] RN  [] Family/support person at bedside  [] Patient unavailable/inappropriate  [x] Other: Interdisciplinary Rounds     CHART REVIEWED/EVENTS NOTED.  [] No changes to nutrition care plan to note  [x] Nutrition Status:  - s/p trach ; per notes on trach collar with pressure support at nights  - Septic Shock. Pressor: norepinephrine now off, phenylephrine at 0.8 micrograms/kG/min     DIET ORDER:   Diet, NPO with Tube Feed:   Tube Feeding Modality: Orogastric  Glucerna 1.5 Emre (GLUCERNA1.5RTH)  Total Volume for 24 Hours (mL): 1260  Continuous  Starting Tube Feed Rate {mL per Hour}: 30  Increase Tube Feed Rate by (mL): 10     Every 4 hours  Until Goal Tube Feed Rate (mL per Hour): 70  Tube Feed Duration (in Hours): 18  Tube Feed Start Time: 11:00 (24)    CURRENT DIET ORDER IS:  [] Appropriate:  [] Inadequate:  [x] Other: See recommendations below    NUTRITION INTAKE/PROVISION:  [] PO:  [x] Enteral Nutrition:   EN Order Provides:  1260 ml formula, 1890 kcal, 104 g protein, 956 ml free water  Current Pump Rate: 70 mL/hr  5-Day Average Enteral provision (based on Glucerna 1.5): total volume 980 mL, 1470 kcals, 81 gm protein   *Feeds intermittently held to work with PT/Pt pulled out NGT  [] Parenteral Nutrition:    ANTHROPOMETRICS:  Drug Dosing Weight  Height (cm): 172.7 (2024 17:12)  Weight (kg): 111 (2024 17:12)  BMI (kg/m2): 37.2 (2024 17:12)  BSA (m2): 2.23 (2024 17:12); based on daily wt 106.8 kg (): 35.8  Weights:   Daily Weight in k.2 (), 106.8 (), 108.2 ()    NUTRITIONALLY PERTINENT MEDICATIONS:  MEDICATIONS  (STANDING):  ampicillin/sulbactam  IVPB      ampicillin/sulbactam  IVPB 3 Gram(s) IV Intermittent every 6 hours  clonazePAM  Tablet 0.5 milliGRAM(s) Oral every 12 hours  dexMEDEtomidine Infusion 0.5 MICROgram(s)/kG/Hr (13.9 mL/Hr) IV Continuous <Continuous>  heparin  Infusion.  Unit(s)/Hr (20 mL/Hr) IV Continuous <Continuous>  insulin lispro (ADMELOG) corrective regimen sliding scale   SubCutaneous every 6 hours  midodrine 15 milliGRAM(s) Oral three times a day  norepinephrine Infusion 0.02 MICROgram(s)/kG/Min (4.16 mL/Hr) IV Continuous <Continuous>  phenylephrine    Infusion 0.3 MICROgram(s)/kG/Min (12.5 mL/Hr) IV Continuous <Continuous>  polyethylene glycol 3350 17 Gram(s) Oral every 12 hours  propofol Infusion 9.91 MICROgram(s)/kG/Min (6.6 mL/Hr) IV Continuous <Continuous>  QUEtiapine 50 milliGRAM(s) Oral every 12 hours  senna Syrup 10 milliLiter(s) Oral at bedtime    NUTRITIONALLY PERTINENT LABS:   Na141 mmol/L Glu 122 mg/dL<H> K+ 4.4 mmol/L Cr  0.43 mg/dL<L> BUN 9 mg/dL  Phos 3.5 mg/dL  Alb 2.6 g/dL<L>    Trig 248 mg/dL<H> ALT 11 U/L AST 15 U/L Alkaline Phosphatase 80 U/L    Finger Sticks:  POCT Blood Glucose.: 121 mg/dL ( @ 05:00)  POCT Blood Glucose.: 121 mg/dL ( @ 23:42)  POCT Blood Glucose.: 100 mg/dL ( @ 17:20)  POCT Blood Glucose.: 86 mg/dL ( @ 11:28)    NUTRITIONALLY PERTINENT MEDICATIONS/LABS:  [x] Reviewed  [x] Relevant notes on medications/labs: Elevated BG; On sliding scale of insulin     EDEMA:  [x] Reviewed  [] Relevant notes:    GI/ I&O:  [x] Reviewed  [x] Relevant notes: Having large loose bowel movements   [] Other:    SKIN:   [] No pressure injuries documented, per nursing flowsheet  [x] Pressure injury previously noted; Suspected deep tissue injury right heel, Bilateral buttocks  sacrum  [] Change in pressure injury documentation:  [] Other:    ESTIMATED NEEDS:  Estimated Energy Needs: (15-20 kcals/kg based on daily wt 106.8 kg ()) 6637-6259 kcals  Estimated Protein Needs: (1.5-2.0 gm/kg based on IBW 63.5 kg)  gm  Fluid needs deferred to provider.   [employer] State Equation (based on daily wt 106.8 kg ()): 2035 kcals ()    NUTRITION DIAGNOSIS:  [x] Prior Dx: Increased protein-energy needs   [] New Dx:    EDUCATION:  [] Yes:  [x] Not appropriate/warranted    NUTRITION CARE PLAN:  1. Vital AF at 10 mL/hr increasing only as tolerated and electrolytes WNL to goal rate  mL/hr x18 hours to provide total volume  mL,  kcals,  gm protein, and  mL free water. Meets  kcals/kG and gm protein/kG based on  kG.   2. Consider multivitamin and Vitamin C to aid in wound healing    [] Achieved - Continue current nutrition intervention(s)  [] Current medical condition precludes nutrition intervention at this time.    MONITORING AND EVALUATION:   RD remains available upon request and will follow up per protocol.    Taylor Aguero, MS, RD, CDN, CNSC, [facility] TEAMS   Available on [facility] TEAMS NUTRITION FOLLOW UP NOTE    PATIENT SEEN FOR: length of stay follow up.     SOURCE: [] Patient  [x] Current Medical Record  [x] RN  [] Family/support person at bedside  [] Patient unavailable/inappropriate  [x] Other: Interdisciplinary Rounds     CHART REVIEWED/EVENTS NOTED.  [] No changes to nutrition care plan to note  [x] Nutrition Status:  - s/p trach ; per notes on trach collar with pressure support at nights  - Septic Shock. Pressor: norepinephrine now off, phenylephrine at 0.8 micrograms/kG/min     DIET ORDER:   Diet, NPO with Tube Feed:   Tube Feeding Modality: Orogastric  Glucerna 1.5 Emre (GLUCERNA1.5RTH)  Total Volume for 24 Hours (mL): 1260  Continuous  Starting Tube Feed Rate {mL per Hour}: 30  Increase Tube Feed Rate by (mL): 10     Every 4 hours  Until Goal Tube Feed Rate (mL per Hour): 70  Tube Feed Duration (in Hours): 18  Tube Feed Start Time: 11:00 (24)    CURRENT DIET ORDER IS:  [] Appropriate:  [] Inadequate:  [x] Other: See recommendations below    NUTRITION INTAKE/PROVISION:  [] PO:  [x] Enteral Nutrition:   EN Order Provides:  1260 ml formula, 1890 kcal, 104 g protein, 956 ml free water  Current Pump Rate: 70 mL/hr  5-Day Average Enteral provision (based on Glucerna 1.5): total volume 980 mL, 1470 kcals, 81 gm protein   *Feeds intermittently held to work with PT/Pt pulled out NGT  [] Parenteral Nutrition:    ANTHROPOMETRICS:  Drug Dosing Weight  Height (cm): 172.7 (2024 17:12)  Weight (kg): 111 (2024 17:12)  BMI (kg/m2): 37.2 (2024 17:12); based on daily wt 106.8 kg (): 35.8  Daily Weight in k.2 (), 106.8 (), 108.2 ()  RD obtained wt: 128.6 kg () ? accuracy  RD will continue to trend as new wts available/able.     NUTRITIONALLY PERTINENT MEDICATIONS:  MEDICATIONS  (STANDING):  ampicillin/sulbactam  IVPB      ampicillin/sulbactam  IVPB 3 Gram(s) IV Intermittent every 6 hours  clonazePAM  Tablet 0.5 milliGRAM(s) Oral every 12 hours  dexMEDEtomidine Infusion 0.5 MICROgram(s)/kG/Hr (13.9 mL/Hr) IV Continuous <Continuous>  heparin  Infusion.  Unit(s)/Hr (20 mL/Hr) IV Continuous <Continuous>  insulin lispro (ADMELOG) corrective regimen sliding scale   SubCutaneous every 6 hours  midodrine 15 milliGRAM(s) Oral three times a day  norepinephrine Infusion 0.02 MICROgram(s)/kG/Min (4.16 mL/Hr) IV Continuous <Continuous>  phenylephrine    Infusion 0.3 MICROgram(s)/kG/Min (12.5 mL/Hr) IV Continuous <Continuous>  polyethylene glycol 3350 17 Gram(s) Oral every 12 hours  propofol Infusion 9.91 MICROgram(s)/kG/Min (6.6 mL/Hr) IV Continuous <Continuous>  QUEtiapine 50 milliGRAM(s) Oral every 12 hours  senna Syrup 10 milliLiter(s) Oral at bedtime    NUTRITIONALLY PERTINENT LABS:   Na141 mmol/L Glu 122 mg/dL<H> K+ 4.4 mmol/L Cr  0.43 mg/dL<L> BUN 9 mg/dL  Phos 3.5 mg/dL  Alb 2.6 g/dL<L>   0424 Trig 248 mg/dL<H> ALT 11 U/L AST 15 U/L Alkaline Phosphatase 80 U/L    Finger Sticks:  POCT Blood Glucose.: 121 mg/dL ( @ 05:00)  POCT Blood Glucose.: 121 mg/dL ( @ 23:42)  POCT Blood Glucose.: 100 mg/dL ( @ 17:20)  POCT Blood Glucose.: 86 mg/dL ( @ 11:28)    NUTRITIONALLY PERTINENT MEDICATIONS/LABS:  [x] Reviewed  [x] Relevant notes on medications/labs: Elevated BG; On sliding scale of insulin     EDEMA:  [x] Reviewed  [] Relevant notes:    GI/ I&O:  [x] Reviewed  [x] Relevant notes: Having large loose bowel movements   [] Other:    SKIN:   [] No pressure injuries documented, per nursing flowsheet  [x] Pressure injury previously noted; Suspected deep tissue injury right heel, Bilateral buttocks sacrum  [] Change in pressure injury documentation:  [] Other:    ESTIMATED NEEDS:  Estimated Energy Needs: (15-20 kcals/kg based on daily wt 106.8 kg (-18)) 7781-1913 kcals  Estimated Protein Needs: (1.5-2.0 gm/kg based on IBW 63.5 kg)  gm  Fluid needs deferred to provider.   Ge State Equation (based on daily wt 106.8 kg ()): 2026 kcals (5/3)    NUTRITION DIAGNOSIS:  [x] Prior Dx: Increased protein-energy needs   [] New Dx:    EDUCATION:  [] Yes:  [x] Not appropriate/warranted    NUTRITION CARE PLAN:  1. Vital 1.5 at goal rate 75 mL/hr x18 hours with ProSource TF Free x1 daily to provide total volume 1350 mL, 2115 kcals, 106 gm protein, and 1031mL free water. Meets 20 kcals/kG based on daily wt 106.8 kg (18) and 1.7 gm protein/kG based on IBW 63.5 kg.  2. Consider multivitamin and Vitamin C to aid in wound healing    [] Achieved - Continue current nutrition intervention(s)  [] Current medical condition precludes nutrition intervention at this time.    MONITORING AND EVALUATION:   RD remains available upon request and will follow up per protocol.    Taylor Aguero, MS, RD, CDN, CNSC, [facility] TEAMS   Available on [facility] TEAMS

## 2024-05-03 NOTE — PROGRESS NOTE ADULT - ASSESSMENT
69F h/o CVA (bedbound at baseline), COPD, CHF, HTN, CLL p/w acute shortness of breath to outside ED found to have acute pulmonary edema and PNA transferred to [facility] for possible leukophoresis. Patient found to have empyema 2/2 Pansensitive Strep Pneumo with associated bacteremia.  S/p intubation with inability to wean, s/p tracheostomy tube on 4/28 POD #5    - Continue trach care.  - Post trach care instructions as in previous note  - Sutures to be removed on Day 10.  - SLP eval  - Wean off ventilator as tolerated by ICU team.   - Titrate off pressors.  - First trach change at 3 weeks unless clinically indicated sooner.     IP team to follow for any trach related issues.

## 2024-05-03 NOTE — PROGRESS NOTE ADULT - SUBJECTIVE AND OBJECTIVE BOX
Follow Up:  fever    Interval History/ROS:  Overnight: No acute events.  Patient remains febrile with a Tmax of 101.3 Fahrenheit past 24 hours.  Otherwise hemodynamically stable.  Latest labs show leukocytosis 145 in setting of CLL, anemia 8.1/26.1, BMP with creatinine within normal limits, hepatic function within normal limits.  Blood cultures from 4/29/2024 negative to date.  Full expanded RVP is - 4/30/2024.    Patient seen examined at bedside.  Sedated.  Unable to obtain ROS.    Allergies  No Known Allergies        ANTIMICROBIALS:  ampicillin/sulbactam  IVPB    ampicillin/sulbactam  IVPB 3 every 6 hours      OTHER MEDS:  MEDICATIONS  (STANDING):  acetaminophen   Oral Liquid .. 650 every 6 hours PRN  albuterol/ipratropium for Nebulization 3 every 8 hours  clonazePAM  Tablet 0.5 every 12 hours  dexMEDEtomidine Infusion 0.5 <Continuous>  heparin  Infusion.  <Continuous>  insulin lispro (ADMELOG) corrective regimen sliding scale  every 6 hours  midodrine 15 three times a day  phenylephrine    Infusion 0.3 <Continuous>  polyethylene glycol 3350 17 every 12 hours  propofol Infusion 9.91 <Continuous>  QUEtiapine 50 every 12 hours  senna Syrup 10 at bedtime      Vital Signs Last 24 Hrs  T(C): 38.4 (03 May 2024 10:00), Max: 38.6 (02 May 2024 20:00)  T(F): 101.1 (03 May 2024 10:00), Max: 101.5 (02 May 2024 20:00)  HR: 64 (03 May 2024 10:30) (62 - 91)  BP: 128/61 (03 May 2024 10:30) (69/36 - 142/68)  BP(mean): 88 (03 May 2024 10:30) (47 - 106)  RR: 31 (03 May 2024 10:30) (14 - 41)  SpO2: 100% (03 May 2024 10:30) (97% - 100%)    Parameters below as of 03 May 2024 08:00  Patient On (Oxygen Delivery Method): ventilator    O2 Concentration (%): 40    PHYSICAL EXAM:  General: Patient in NAD, trach  HEENT: NCAT, EOMI, PERRL, no oral lesions  CV: S1+S2, no m/r/g appreciated   Lungs: rales diffuse  Abd: Soft, nontender, no guarding, no rebound tenderness, + bowel sounds   : No suprapubic tenderness  Neuro: Awake, on propofol, following commands, left sided weakness  Ext: No cyanosis, Left extremities dependent edema  Skin: No rash, no phlebitis                                    8.1    145.96 )-----------( 220      ( 03 May 2024 00:24 )             26.1       05-03    141  |  107  |  9   ----------------------------<  122<H>  4.4   |  24  |  0.43<L>    Ca    8.3<L>      03 May 2024 00:25  Phos  3.5     05-03  Mg     2.5     05-03    TPro  5.8<L>  /  Alb  2.6<L>  /  TBili  0.3  /  DBili  x   /  AST  15  /  ALT  11  /  AlkPhos  80  05-03      Urinalysis Basic - ( 03 May 2024 00:25 )    Color: x / Appearance: x / SG: x / pH: x  Gluc: 122 mg/dL / Ketone: x  / Bili: x / Urobili: x   Blood: x / Protein: x / Nitrite: x   Leuk Esterase: x / RBC: x / WBC x   Sq Epi: x / Non Sq Epi: x / Bacteria: x        MICROBIOLOGY:  v    Culture - Blood (collected 29 Apr 2024 14:00)  Source: .Blood Blood  Preliminary Report (02 May 2024 17:02):    No growth at 72 Hours    Culture - Blood (collected 29 Apr 2024 13:50)  Source: .Blood Blood  Preliminary Report (02 May 2024 17:02):    No growth at 72 Hours              Rapid RVP Result: NotDetec (04-30 @ 15:49)        RADIOLOGY:  Imaging reviewed

## 2024-05-03 NOTE — PROGRESS NOTE ADULT - ATTENDING COMMENTS
Agree with above. Patient still febrile and on pressors, infectious workup in progress. Continue NGT feeds in the meantime. Please let us know when patient is ready medically for PEG placement.

## 2024-05-03 NOTE — PROGRESS NOTE ADULT - SUBJECTIVE AND OBJECTIVE BOX
Patient is a 69y old  Female who presents with a chief complaint of Transfer for leukophoresis (29 Apr 2024 13:39)    Interval Events: POD# 5 from trach, no issues with trach. No recurrence of effusion per ICU. Still having difficulty with trach collar/pressure support. Still having fevers.     MEDS:  MEDICATIONS  (STANDING):  albuterol/ipratropium for Nebulization 3 milliLiter(s) Nebulizer every 8 hours  ampicillin/sulbactam  IVPB      ampicillin/sulbactam  IVPB 3 Gram(s) IV Intermittent every 6 hours  chlorhexidine 0.12% Liquid 15 milliLiter(s) Oral Mucosa every 12 hours  chlorhexidine 2% Cloths 1 Application(s) Topical <User Schedule>  clonazePAM  Tablet 0.5 milliGRAM(s) Oral every 12 hours  dexMEDEtomidine Infusion 0.5 MICROgram(s)/kG/Hr (13.9 mL/Hr) IV Continuous <Continuous>  heparin  Infusion.  Unit(s)/Hr (20 mL/Hr) IV Continuous <Continuous>  insulin lispro (ADMELOG) corrective regimen sliding scale   SubCutaneous every 6 hours  midodrine 15 milliGRAM(s) Oral three times a day  phenylephrine    Infusion 0.3 MICROgram(s)/kG/Min (12.5 mL/Hr) IV Continuous <Continuous>  polyethylene glycol 3350 17 Gram(s) Oral every 12 hours  propofol Infusion 9.91 MICROgram(s)/kG/Min (6.6 mL/Hr) IV Continuous <Continuous>  QUEtiapine 50 milliGRAM(s) Oral every 12 hours  senna Syrup 10 milliLiter(s) Oral at bedtime    MEDICATIONS  (PRN):  acetaminophen   Oral Liquid .. 650 milliGRAM(s) Oral every 6 hours PRN Temp greater or equal to 38C (100.4F)  nystatin Powder 1 Application(s) Topical two times a day PRN skin irritation      Allergies    No Known Allergies    Intolerances          CONSTITUTIONAL:  No weight loss, fever, chills, weakness or fatigue.  HEENT:  Eyes:  No visual loss, blurred vision, double vision or yellow sclerae. + throat pain   SKIN:  No rash or itching.  CARDIOVASCULAR:  No chest pain, chest pressure or chest discomfort.  RESPIRATORY:  No shortness of breath, cough or sputum.  GASTROINTESTINAL:  SEE HPI  GENITOURINARY:  No dysuria, hematuria, urinary frequency  NEUROLOGICAL:  No headache, dizziness, syncope, paralysis.   MUSCULOSKELETAL:  No muscle, back pain, joint pain or stiffness.  ENDOCRINOLOGIC:  No reports of sweating, cold or heat intolerance.     ______________________________________________________________________  PHYSICAL EXAM:    ICU Vital Signs Last 24 Hrs  T(C): 38.4 (03 May 2024 08:00), Max: 38.6 (02 May 2024 20:00)  T(F): 101.1 (03 May 2024 08:00), Max: 101.5 (02 May 2024 20:00)  HR: 73 (03 May 2024 09:15) (62 - 91)  BP: 118/56 (03 May 2024 09:15) (69/36 - 142/68)  BP(mean): 81 (03 May 2024 09:15) (47 - 106)  RR: 17 (03 May 2024 09:15) (14 - 41)  SpO2: 100% (03 May 2024 09:15) (97% - 100%)    O2 Parameters below as of 03 May 2024 08:00  Patient On (Oxygen Delivery Method): ventilator    O2 Concentration (%): 40        GEN: NAD, normocephalic, obese   HEENT: +trach  CVS: S1S2+  CHEST: clear to auscultation  ABD: soft , nontender, nondistended, bowel sounds present  EXTR: no cyanosis, no clubbing, no edema  NEURO: A&OX3, is able to mouth responses   SKIN:  warm;  non icteric    ______________________________________________________________________  LABS:                           8.1    145.96 )-----------( 220      ( 03 May 2024 00:24 )             26.1       05-03    141  |  107  |  9   ----------------------------<  122<H>  4.4   |  24  |  0.43<L>    Ca    8.3<L>      03 May 2024 00:25  Phos  3.5     05-03  Mg     2.5     05-03    TPro  5.8<L>  /  Alb  2.6<L>  /  TBili  0.3  /  DBili  x   /  AST  15  /  ALT  11  /  AlkPhos  80  05-03

## 2024-05-03 NOTE — PROGRESS NOTE ADULT - PROVIDER SPECIALTY LIST ADULT
Continued Stay Note  [facility]     Patient Name: Maryjane Amaral  MRN: [id number]  Today's Date: 4/7/2017    Admit Date: 3/26/2017          Discharge Plan       04/07/17 0816    Case Management/Social Work Plan    Additional Comments Awaiting response from the nursing home on when the patient can discharge due to inability to get Trilogy Vent last night. Avoidable days documented. CCP to arrange transport when the building responds to time they can accept the patient.                Discharge Codes     None        Expected Discharge Date and Time     Expected Discharge Date Expected Discharge Time    Apr 6, 2017             Shy Eugene RN     Intervent Pulmonology

## 2024-05-03 NOTE — PROGRESS NOTE ADULT - SUBJECTIVE AND OBJECTIVE BOX
Progress Note   Geovanna Nelson PGY4- GI/Hep    SUBJECTIVE: Patient seen and examined at bedside.     OBJECTIVE:    VITAL SIGNS:  ICU Vital Signs Last 24 Hrs  T(C): 38.4 (03 May 2024 10:00), Max: 38.6 (02 May 2024 20:00)  T(F): 101.1 (03 May 2024 10:00), Max: 101.5 (02 May 2024 20:00)  HR: 64 (03 May 2024 10:30) (62 - 91)  BP: 128/61 (03 May 2024 10:30) (69/36 - 142/68)  BP(mean): 88 (03 May 2024 10:30) (47 - 106)  ABP: --  ABP(mean): --  RR: 31 (03 May 2024 10:30) (14 - 41)  SpO2: 100% (03 May 2024 10:30) (97% - 100%)    O2 Parameters below as of 03 May 2024 08:00  Patient On (Oxygen Delivery Method): ventilator    O2 Concentration (%): 40      Mode: CPAP with PS, FiO2: 40, PEEP: 5, PS: 15, MAP: 10, PIP: 21    05-02 @ 07:01  -  05-03 @ 07:00  --------------------------------------------------------  IN: 3962 mL / OUT: 1150 mL / NET: 2812 mL        PHYSICAL EXAM:    General: NAD  HEENT: NC/AT  Neck: supple  Respiratory: Regular RR, no increase in WOB  Cardiovascular: RRR  Abdomen: soft, NT/ND; +BS x4  Extremities: WWP, 2+ peripheral pulses b/l  Skin: normal color and turgor; no rash  Neurological: A&OX    MEDICATIONS:  MEDICATIONS  (STANDING):  albuterol/ipratropium for Nebulization 3 milliLiter(s) Nebulizer every 8 hours  ampicillin/sulbactam  IVPB      ampicillin/sulbactam  IVPB 3 Gram(s) IV Intermittent every 6 hours  chlorhexidine 0.12% Liquid 15 milliLiter(s) Oral Mucosa every 12 hours  chlorhexidine 2% Cloths 1 Application(s) Topical <User Schedule>  clonazePAM  Tablet 0.5 milliGRAM(s) Oral every 12 hours  dexMEDEtomidine Infusion 0.5 MICROgram(s)/kG/Hr (13.9 mL/Hr) IV Continuous <Continuous>  heparin  Infusion.  Unit(s)/Hr (20 mL/Hr) IV Continuous <Continuous>  insulin lispro (ADMELOG) corrective regimen sliding scale   SubCutaneous every 6 hours  midodrine 15 milliGRAM(s) Oral three times a day  phenylephrine    Infusion 0.3 MICROgram(s)/kG/Min (12.5 mL/Hr) IV Continuous <Continuous>  polyethylene glycol 3350 17 Gram(s) Oral every 12 hours  propofol Infusion 9.91 MICROgram(s)/kG/Min (6.6 mL/Hr) IV Continuous <Continuous>  QUEtiapine 50 milliGRAM(s) Oral every 12 hours  senna Syrup 10 milliLiter(s) Oral at bedtime    MEDICATIONS  (PRN):  acetaminophen   Oral Liquid .. 650 milliGRAM(s) Oral every 6 hours PRN Temp greater or equal to 38C (100.4F)  nystatin Powder 1 Application(s) Topical two times a day PRN skin irritation      ALLERGIES:  Allergies    No Known Allergies    Intolerances        LABS:                        8.1    145.96 )-----------( 220      ( 03 May 2024 00:24 )             26.1     05-03    141  |  107  |  9   ----------------------------<  122<H>  4.4   |  24  |  0.43<L>    Ca    8.3<L>      03 May 2024 00:25  Phos  3.5     05-03  Mg     2.5     05-03    TPro  5.8<L>  /  Alb  2.6<L>  /  TBili  0.3  /  DBili  x   /  AST  15  /  ALT  11  /  AlkPhos  80  05-03    PT/INR - ( 03 May 2024 00:24 )   PT: 13.4 sec;   INR: 1.29 ratio         PTT - ( 03 May 2024 00:24 )  PTT:89.7 sec  Urinalysis Basic - ( 03 May 2024 00:25 )    Color: x / Appearance: x / SG: x / pH: x  Gluc: 122 mg/dL / Ketone: x  / Bili: x / Urobili: x   Blood: x / Protein: x / Nitrite: x   Leuk Esterase: x / RBC: x / WBC x   Sq Epi: x / Non Sq Epi: x / Bacteria: x         Progress Note   Geovanna Nelson PGY4- GI/Hep    SUBJECTIVE: Patient seen and examined at bedside.  - still requiring pressors  - fevers      OBJECTIVE:    VITAL SIGNS:  ICU Vital Signs Last 24 Hrs  T(C): 38.4 (03 May 2024 10:00), Max: 38.6 (02 May 2024 20:00)  T(F): 101.1 (03 May 2024 10:00), Max: 101.5 (02 May 2024 20:00)  HR: 64 (03 May 2024 10:30) (62 - 91)  BP: 128/61 (03 May 2024 10:30) (69/36 - 142/68)  BP(mean): 88 (03 May 2024 10:30) (47 - 106)  ABP: --  ABP(mean): --  RR: 31 (03 May 2024 10:30) (14 - 41)  SpO2: 100% (03 May 2024 10:30) (97% - 100%)    O2 Parameters below as of 03 May 2024 08:00  Patient On (Oxygen Delivery Method): ventilator    O2 Concentration (%): 40      Mode: CPAP with PS, FiO2: 40, PEEP: 5, PS: 15, MAP: 10, PIP: 21    05-02 @ 07:01  -  05-03 @ 07:00  --------------------------------------------------------  IN: 3962 mL / OUT: 1150 mL / NET: 2812 mL        PHYSICAL EXAM:    General: NAD  HEENT: NC/AT, NGT  Neck: supple  Respiratory: Regular RR, no increase in WOB, trach   Cardiovascular: RRR  Abdomen: soft, NT/ND; +BS x4  Extremities: WWP, 2+ peripheral pulses b/l  Skin: normal color and turgor; no rash  Neurological: A&OX2-3    MEDICATIONS:  MEDICATIONS  (STANDING):  albuterol/ipratropium for Nebulization 3 milliLiter(s) Nebulizer every 8 hours  ampicillin/sulbactam  IVPB      ampicillin/sulbactam  IVPB 3 Gram(s) IV Intermittent every 6 hours  chlorhexidine 0.12% Liquid 15 milliLiter(s) Oral Mucosa every 12 hours  chlorhexidine 2% Cloths 1 Application(s) Topical <User Schedule>  clonazePAM  Tablet 0.5 milliGRAM(s) Oral every 12 hours  dexMEDEtomidine Infusion 0.5 MICROgram(s)/kG/Hr (13.9 mL/Hr) IV Continuous <Continuous>  heparin  Infusion.  Unit(s)/Hr (20 mL/Hr) IV Continuous <Continuous>  insulin lispro (ADMELOG) corrective regimen sliding scale   SubCutaneous every 6 hours  midodrine 15 milliGRAM(s) Oral three times a day  phenylephrine    Infusion 0.3 MICROgram(s)/kG/Min (12.5 mL/Hr) IV Continuous <Continuous>  polyethylene glycol 3350 17 Gram(s) Oral every 12 hours  propofol Infusion 9.91 MICROgram(s)/kG/Min (6.6 mL/Hr) IV Continuous <Continuous>  QUEtiapine 50 milliGRAM(s) Oral every 12 hours  senna Syrup 10 milliLiter(s) Oral at bedtime    MEDICATIONS  (PRN):  acetaminophen   Oral Liquid .. 650 milliGRAM(s) Oral every 6 hours PRN Temp greater or equal to 38C (100.4F)  nystatin Powder 1 Application(s) Topical two times a day PRN skin irritation      ALLERGIES:  Allergies    No Known Allergies    Intolerances        LABS:                        8.1    145.96 )-----------( 220      ( 03 May 2024 00:24 )             26.1     05-03    141  |  107  |  9   ----------------------------<  122<H>  4.4   |  24  |  0.43<L>    Ca    8.3<L>      03 May 2024 00:25  Phos  3.5     05-03  Mg     2.5     05-03    TPro  5.8<L>  /  Alb  2.6<L>  /  TBili  0.3  /  DBili  x   /  AST  15  /  ALT  11  /  AlkPhos  80  05-03    PT/INR - ( 03 May 2024 00:24 )   PT: 13.4 sec;   INR: 1.29 ratio         PTT - ( 03 May 2024 00:24 )  PTT:89.7 sec  Urinalysis Basic - ( 03 May 2024 00:25 )    Color: x / Appearance: x / SG: x / pH: x  Gluc: 122 mg/dL / Ketone: x  / Bili: x / Urobili: x   Blood: x / Protein: x / Nitrite: x   Leuk Esterase: x / RBC: x / WBC x   Sq Epi: x / Non Sq Epi: x / Bacteria: x

## 2024-05-03 NOTE — PROGRESS NOTE ADULT - SUBJECTIVE AND OBJECTIVE BOX
INTERVAL HPI/OVERNIGHT EVENTS:    SUBJECTIVE: Patient seen and examined at bedside.     ROS: All negative except as listed above.    VITAL SIGNS:  ICU Vital Signs Last 24 Hrs  T(C): 38.4 (03 May 2024 05:00), Max: 38.6 (02 May 2024 20:00)  T(F): 101.1 (03 May 2024 05:00), Max: 101.5 (02 May 2024 20:00)  HR: 70 (03 May 2024 06:45) (62 - 91)  BP: 136/62 (03 May 2024 06:45) (69/36 - 142/68)  BP(mean): 89 (03 May 2024 06:45) (47 - 106)  ABP: --  ABP(mean): --  RR: 25 (03 May 2024 06:45) (14 - 41)  SpO2: 100% (03 May 2024 06:45) (97% - 100%)    O2 Parameters below as of 03 May 2024 06:22  Patient On (Oxygen Delivery Method): ventilator          Mode: AC/ CMV (Assist Control/ Continuous Mandatory Ventilation), RR (machine): 14, TV (machine): 460, FiO2: 40, PEEP: 5, PS: 15, ITime: 1, MAP: 12, PIP: 29  Plateau pressure:   P/F ratio:     05-02 @ 07:01  -  05-03 @ 07:00  --------------------------------------------------------  IN: 3962 mL / OUT: 1150 mL / NET: 2812 mL  GENERAL: NAD, lying in bed comfortably,   HEAD:  Atraumatic, normocephalic  EYES: EOMI, PERRLA, conjunctiva and sclera clear  NECK: Supple, trachea midline, no JVD, Tracheostomy tube in place   HEART: Regular rate and rhythm, no murmurs, rubs, or gallops  LUNGS: Unlabored respirations.  Clear to auscultation bilaterally, no crackles, wheezing, or rhonchi  ABDOMEN: Soft, nontender, nondistended, +BS  EXTREMITIES: 2+ peripheral pulses bilaterally, cap refill<2 secs. No clubbing, cyanosis, L leg and L arm pitting edema   NERVOUS SYSTEM: AOx4 following commands, moving all extremities, no focal deficits   SKIN: No rashes or lesions    CAPILLARY BLOOD GLUCOSE      POCT Blood Glucose.: 121 mg/dL (03 May 2024 05:00)      ECG: reviewed.    PHYSICAL EXAM:        MEDICATIONS:  MEDICATIONS  (STANDING):  albuterol/ipratropium for Nebulization 3 milliLiter(s) Nebulizer every 8 hours  ampicillin/sulbactam  IVPB      ampicillin/sulbactam  IVPB 3 Gram(s) IV Intermittent every 6 hours  chlorhexidine 0.12% Liquid 15 milliLiter(s) Oral Mucosa every 12 hours  chlorhexidine 2% Cloths 1 Application(s) Topical <User Schedule>  clonazePAM  Tablet 0.5 milliGRAM(s) Oral every 12 hours  dexMEDEtomidine Infusion 0.5 MICROgram(s)/kG/Hr (13.9 mL/Hr) IV Continuous <Continuous>  heparin  Infusion.  Unit(s)/Hr (20 mL/Hr) IV Continuous <Continuous>  insulin lispro (ADMELOG) corrective regimen sliding scale   SubCutaneous every 6 hours  midodrine 15 milliGRAM(s) Oral three times a day  phenylephrine    Infusion 0.3 MICROgram(s)/kG/Min (12.5 mL/Hr) IV Continuous <Continuous>  polyethylene glycol 3350 17 Gram(s) Oral every 12 hours  propofol Infusion 9.91 MICROgram(s)/kG/Min (6.6 mL/Hr) IV Continuous <Continuous>  QUEtiapine 50 milliGRAM(s) Oral every 12 hours  senna Syrup 10 milliLiter(s) Oral at bedtime    MEDICATIONS  (PRN):  acetaminophen   Oral Liquid .. 650 milliGRAM(s) Oral every 6 hours PRN Temp greater or equal to 38C (100.4F)  nystatin Powder 1 Application(s) Topical two times a day PRN skin irritation      ALLERGIES:  Allergies    No Known Allergies    Intolerances        LABS:                        8.1    145.96 )-----------( 220      ( 03 May 2024 00:24 )             26.1     05-03    141  |  107  |  9   ----------------------------<  122<H>  4.4   |  24  |  0.43<L>    Ca    8.3<L>      03 May 2024 00:25  Phos  3.5     05-03  Mg     2.5     05-03    TPro  5.8<L>  /  Alb  2.6<L>  /  TBili  0.3  /  DBili  x   /  AST  15  /  ALT  11  /  AlkPhos  80  05-03    PT/INR - ( 03 May 2024 00:24 )   PT: 13.4 sec;   INR: 1.29 ratio         PTT - ( 03 May 2024 00:24 )  PTT:89.7 sec  Urinalysis Basic - ( 03 May 2024 00:25 )    Color: x / Appearance: x / SG: x / pH: x  Gluc: 122 mg/dL / Ketone: x  / Bili: x / Urobili: x   Blood: x / Protein: x / Nitrite: x   Leuk Esterase: x / RBC: x / WBC x   Sq Epi: x / Non Sq Epi: x / Bacteria: x      ABG:  pH, Arterial: 7.44 (05-03-24 @ 00:20)  pCO2, Arterial: 40 mmHg (05-03-24 @ 00:20)  pO2, Arterial: 103 mmHg (05-03-24 @ 00:20)      vBG:    Micro:    Culture - Blood (collected 04-29-24 @ 14:00)  Source: .Blood Blood  Preliminary Report (05-02-24 @ 17:02):    No growth at 72 Hours    Culture - Blood (collected 04-29-24 @ 13:50)  Source: .Blood Blood  Preliminary Report (05-02-24 @ 17:02):    No growth at 72 Hours    Culture - Blood (collected 04-26-24 @ 13:30)  Source: .Blood Blood-Peripheral  Final Report (05-01-24 @ 18:00):    No growth at 5 days    Culture - Blood (collected 04-26-24 @ 13:21)  Source: .Blood Blood-Venous  Final Report (05-01-24 @ 18:00):    No growth at 5 days    Culture - Blood (collected 04-24-24 @ 05:53)  Source: .Blood Blood  Final Report (04-29-24 @ 10:01):    No growth at 5 days    Culture - Blood (collected 04-24-24 @ 05:53)  Source: .Blood Blood  Final Report (04-29-24 @ 10:01):    No growth at 5 days    Culture - Blood (collected 04-22-24 @ 21:37)  Source: .Blood Blood-Peripheral  Gram Stain (04-24-24 @ 00:09):    Growth in aerobic bottle: Gram Positive Cocci in Clusters  Final Report (04-24-24 @ 23:16):    Growth in aerobic bottle: Staphylococcus epidermidis    Isolation of Coagulase negative Staphylococcus from single blood culture    sets may represent    contamination. Contact the Microbiology Department at [phone] if    susceptibility testing is    clinically indicated.    Direct identification is available within approximately 3-5    hours either by Blood Panel Multiplexed PCR or Direct    MALDI-TOF. Details: [email or web address]  Organism: Blood Culture PCR (04-24-24 @ 23:16)  Organism: Blood Culture PCR (04-24-24 @ 23:16)      -  Staphylococcus epidermidis, Methicillin resistant: Detec      Method Type: PCR    Culture - Blood (collected 04-22-24 @ 19:30)  Source: .Blood Blood-Peripheral  Final Report (04-28-24 @ 01:00):    No growth at 5 days    Culture - Blood (collected 04-20-24 @ 12:15)  Source: .Blood Blood-Peripheral  Final Report (04-25-24 @ 19:00):    No growth at 5 days    Culture - Blood (collected 04-20-24 @ 12:00)  Source: .Blood Blood-Peripheral  Final Report (04-25-24 @ 19:00):    No growth at 5 days    Culture - Blood (collected 04-18-24 @ 11:00)  Source: .Blood Blood-Peripheral  Final Report (04-23-24 @ 17:01):    No growth at 5 days    Culture - Blood (collected 04-18-24 @ 10:50)  Source: .Blood Blood-Peripheral  Final Report (04-23-24 @ 17:01):    No growth at 5 days    Culture - Blood (collected 04-16-24 @ 15:40)  Source: .Blood Blood-Peripheral  Final Report (04-21-24 @ 21:01):    No growth at 5 days    Culture - Blood (collected 04-16-24 @ 13:17)  Source: .Blood Blood-Peripheral  Final Report (04-21-24 @ 18:01):    No growth at 5 days    Culture - Blood (collected 04-16-24 @ 06:42)  Source: .Blood Blood-Peripheral  Gram Stain (04-18-24 @ 11:52):    Growth in aerobic bottle: Gram Positive Cocci in Pairs and Chains    Growth in anaerobic bottle: Gram positive cocci in pairs  Final Report (04-18-24 @ 11:52):    Growth in aerobic and anaerobic bottles: Streptococcus pneumoniae    See previous culture [id number]    Culture - Blood (collected 04-16-24 @ 06:35)  Source: .Blood Blood-Peripheral  Gram Stain (04-18-24 @ 11:51):    Growth in aerobic bottle: Gram positive cocci in pairs    Growth in anaerobic bottle: Gram positive cocci in pairs  Final Report (04-18-24 @ 11:51):    Growth in aerobic and anaerobic bottles: Streptococcus pneumoniae    Direct identification is available within approximately 3-5    hours either by Blood Panel Multiplexed PCR or Direct    MALDI-TOF. Details: [email or web address]  Organism: Blood Culture PCR  Streptococcus pneumoniae (04-18-24 @ 11:51)  Organism: Streptococcus pneumoniae (04-18-24 @ 11:51)      -  Levofloxacin: S 1      -  Clindamycin: S <=0.06      -  Penicillin (oral penicillin V): S 0.06      -  Vancomycin: S 0.5      -  Tetracycline: S <=0.5      Method Type: LESLY      -  Ceftriaxone (non-meningitidis): S <=0.25      -  Ceftriaxone (meningitidis): S <=0.25      -  Erythromycin: S <=0.06 Predicts results for azithromycin.      -  Penicillin (non-meningitidis): S 0.06      -  Penicillin (meningitidis): S 0.06      -  Trimethoprim/Sulfamethoxazole: S <=.25/4.75  Organism: Blood Culture PCR (04-18-24 @ 11:51)      -  Streptococcus pneumoniae: Detec      Method Type: PCR        Culture - Sputum (collected 04-26-24 @ 13:44)  Source: .Sputum Sputum  Gram Stain (04-26-24 @ 20:33):    Rare polymorphonuclear leukocytes per low power field    No Squamous epithelial cells per low power field    No organisms seen per oil power field  Final Report (04-28-24 @ 08:42):    No growth    Culture - Sputum (collected 04-16-24 @ 15:44)  Source: ET Tube ET Tube  Gram Stain (04-16-24 @ 22:00):    Few polymorphonuclear leukocytes per low power field    No Squamous epithelial cells per low power field    No organisms seen per oil power field  Final Report (04-18-24 @ 09:40):    Normal Respiratory Kayla present        RADIOLOGY & ADDITIONAL TESTS: Reviewed.

## 2024-05-03 NOTE — PROGRESS NOTE ADULT - ASSESSMENT
69F h/o CVA (bedbound at baseline), COPD, CHF, HTN, CLL p/w acute shortness of breath to outside ED found to have acute pulmonary edema and PNA transferred to [facility] for possible leukophoresis. Patient found to have empyema 2/2 Pansensitive Strep Pneumo with associated bacteremia.  S/p intubation with inability to wean, s/p tracheostomy tube on 4/28. Now requiring a PEG tube placement. Hospital course complicated by L common femoral vein DVT, currently on a Heparin Drip.     #PEG eval s/p trach   #AHRF req intubation; unable to wean req trach 4/28  #Empyema   #S. pneumo bacteremia with last + growth 4/16  #DVT on heparin drip     Recommendations:  - Can plan for PEG placement once off pressors and fever workup is negative  - NGT feeds in the meantime  - Will need to hold A/C 6 hours before eventual EGD/PEG  - If off pressors/fever workup negative, will plan for EGD/PEG later this week after discussion with family    Note not finalized until signed by attending.     Geovanna Ashby MD  Gastroenterology/Hepatology Fellow, PGY-4  Please contact via TEAMS    NON-URGENT CONSULTS:  Please email [email or web address]   69F h/o CVA (bedbound at baseline), COPD, CHF, HTN, CLL p/w acute shortness of breath to outside ED found to have acute pulmonary edema and PNA transferred to [facility] for possible leukophoresis. Patient found to have empyema 2/2 Pansensitive Strep Pneumo with associated bacteremia.  S/p intubation with inability to wean, s/p tracheostomy tube on 4/28. Now requiring a PEG tube placement. Hospital course complicated by L common femoral vein DVT, currently on a Heparin Drip.     #PEG eval s/p trach   #AHRF req intubation; unable to wean req trach 4/28  #Empyema   #S. pneumo bacteremia with last + growth 4/16  #DVT on heparin drip     Recommendations:  - Can plan for PEG placement once off pressors and fever workup is negative  - NGT feeds in the meantime  - Will need to hold A/C 6 hours before eventual EGD/PEG  - If off pressors/fever workup negative, will plan for EGD/PEG later this week after discussion with family    Note not finalized until signed by attending.     GI to sogn off at this time. Please reconsult when patient medically optimized for EGD     Geovanna Ashby MD  Gastroenterology/Hepatology Fellow, PGY-4  Please contact via TEAMS    NON-URGENT CONSULTS:  Please email [email or web address]

## 2024-05-03 NOTE — PROGRESS NOTE ADULT - ASSESSMENT
69-year-old female with a past medical history of CVA, bedbound at baseline, COPD, hypertension, CLL who initially had presented to an outside hospital due to acute shortness of breath.  Patient was found to have pulmonary edema and was managed with diuretics and BiPAP.  Patient also found to be febrile to 103 Fahrenheit, found to have pneumonia therefore started on antibiotics for this.  Labs were notable for a leukocytosis of 233 in setting of CLL and then was transferred to [facility] for leukopheresis.  Upon arrival to [place], intubated due to concern for respiratory compromise as well as worsening AMS.  Patient was started on levofloxacin due to hypotension.    Hospitalization course includes chest tube drainage.  Blood and pleural fluid cultures are positive growing Streptococcus pneumonia A.  Chest tube was removed on 4/19/2024 however patient remains febrile with a left-sided loculated effusion therefore left pigtail catheter was placed on 4/23/2024.  Patient's antibiotics were switched from ceftriaxone to cefepime and now is requiring pressors again.  Blood cultures were repeated on 4/22/2024 with Staphylococcus epidermidis growth, repeat pleural fluid cultures obtained on 4/23/2024 are negative to date, repeat blood cultures from 4/24/2024 are negative as well.    #Streptococcal bacteremia, likely source empyema  #Abnormal imaging of the lungs  #Streptococcal pneumonia and empyema  #Acute hypoxic respiratory failure  #CLL  #LLE DVT  s/p trach  Repeat blood cultures negative thus far  Remains febrile, no new culture data to guide antibiotic therapy  No plan for leukophoresis at this time per heme service  MRSE more likely contamination, as 1/4 bottles and no repeat growth  Full RVP on 5/1/24 is negative  Repeat CT C/A/P with increasing R pleural effusion, HSM and LAD noted     Recommendations  Patient continues to have persistent fevers, increasing right sided pleural effusion on imaging - evaluate for further drainage  Continue ampicillin/sulbactam, should be adequate coverage given previous strep growth and no new species  CT surgery eval  DVT can cause fevers as  well - management per primary team  Now with trach - no evidence for aspiration/tracheitis - can obtain repeat sputum culture  Fever related to CLL possible - heme input  Follow fever curve and WBC count    Abimael Jain MD  Division of Infectious Diseases

## 2024-05-03 NOTE — PROGRESS NOTE ADULT - ASSESSMENT
69F h/o CVA (bedbound at baseline), COPD, CHF, HTN presenting as transfer from [facility] for SOB iso leukocytosis to 233 c/f acute leukemia. Pt intubated and on pressors, transferred to MICU for further management, course c/b empyema s/p chest tube placement (4/16) and removal, reaccumulation of loculated pleural effusion s/p L pigtail placement and removal on 4/27     Neuro  At Baseline AOx4 mental status   L upper and lower extremity motor deficits iso hx of stroke   Pain control w/ Tylenol  Seroquel 50 BID and Clonazepam 0.5 BID for agitation     CV  #Septic Shock   - Strep Pneumo Empyema and Bacteremia s/p Pigtail catheter   - Levo changed to Dawit; Droxidopa changed to Midodrine  - Dawit and  Midodrine 15 TID     #Chest Pain, Resolved   -demand vs leukostasis vs non cardiac   -reported JOSEF in II, III, aVF at [facility] --> repeat EKG without ST changes or Q waves  -trops peaked    #CHF  -unclear hx but elevated pro-BNP to 2600s  -TTE: EF 54%, no significant abnormalities  - Diurese as appropriate    Resp  #Acute hypoxic respiratory failure  #Empyema Pansensitive Strep Pneumo  -s/p thora draining 1500cc fluid 4/16 with chest tube placement; c/w exudative effusion growing Strep Pneumo  -MRSA neg  -pleural fluid growing strep pneumo; BCx growing strep pneumo; Pansensitive  -was on vanc/zosyn/azithro (4/16)-->deescalated to CTX (4/17 -4/23), Added on Vancomycin (4/23-) due to MRSE 1/2 Cx, expanded coverage to Cefepime (4/23 1x dose), back on CTX, switched to Unasyn 3g Q6 (4/25- )  -CT chest 4/22 reveals persistent loculated effusion, s/p IR Pigtail placement 4/24-4/27 w/ Exudative effusion; Alteplase and Dornase through pigtal x6 doses; unable to finish 6th Alteplase+Dornase due to catheter occlusion   - Repeat CTC 4/27 - decreased small pleural effusions, thoracic LAD   - s/p Trach placement 4/28; now on Trach collar (Pressure support at night)        GI  #Diet: Tube feed   PEG tube planned once off Pressors     /Renal  -no active issues    Heme/Onc  #Leukocytosis  #CLL  - on presentation; improving to 130s  -no plan for leukophoresis at this time given mature lymphocytes  -stop trending TLS labs as they have been stable   - CT Chest A&P showing diffuse axillary, inguinal mediastinal RP lymphadenopathy.  - Appreciate Heme recs;   - Plan for Inpatient Bone Marrow Bx per Heme     #L Common Femoral Vein DVT   - Non-occlusive   - Heparin Drip 4/29-    #DVT ppx: Heparin Drip     ID  #Empyema 2/2 Pansensitive Strep Pneumo  #Strep Pneumo Bacteremia  -BCx + strep pneumo; fluid culture with strep pneumo  -repeat BCx 4/20 ngtd, ReCx 4/23 due to fever   -CT Chest A&P demonstrates LLL empyema not showing any other intraabdominal infectious source  -was on vanc/zosyn/azithro (4/16)-->deescalated to CTX (4/17 -4/23), Added on Vancomycin (4/23-) due to MRSE 1/2 Cx, expanded coverage to Cefepime (4/23 1x dose), back on CTX, switched to Unasyn 3g Q6 (4/25- )  - IVIG 4/26 x1  - ID Consulted for persistent fevers despite source control; appreciate recs   - ESR elevated to 119    Plan    c/w with Unasyn (4/25  -   )       Endo  -ISS q6h while NPO w/ tube feed     Ethics: Full Code  Only known family member is Aunt Mili Gordon ([phone], [phone]) who lives in [place]   69F h/o CVA (bedbound at baseline), COPD, CHF, HTN presenting as transfer from [facility] for SOB iso leukocytosis to 233 c/f acute leukemia. Pt intubated and on pressors, transferred to MICU for further management, course c/b empyema s/p chest tube placement (4/16) and removal, reaccumulation of loculated pleural effusion s/p L pigtail placement and removal on 4/27     Neuro  At Baseline AOx4 mental status   L upper and lower extremity motor deficits iso hx of stroke   Pain control w/ Tylenol  Seroquel 50 BID and Clonazepam 0.5 BID for agitation     CV  #Septic Shock   - Strep Pneumo Empyema and Bacteremia s/p Pigtail catheter   - Levo changed to Dawit; Droxidopa changed to Midodrine  - Coming down on Dawit and  Midodrine 15 TID     #Chest Pain, Resolved   -demand vs leukostasis vs non cardiac   -reported JOSEF in II, III, aVF at [facility] --> repeat EKG without ST changes or Q waves  -trops peaked    #CHF  -unclear hx but elevated pro-BNP to 2600s  -TTE: EF 54%, no significant abnormalities  - Diurese as appropriate    Resp  #Acute hypoxic respiratory failure  #Empyema Pansensitive Strep Pneumo  -s/p thora draining 1500cc fluid 4/16 with chest tube placement; c/w exudative effusion growing Strep Pneumo  -MRSA neg  -pleural fluid growing strep pneumo; BCx growing strep pneumo; Pansensitive  -was on vanc/zosyn/azithro (4/16)-->deescalated to CTX (4/17 -4/23), Added on Vancomycin (4/23-) due to MRSE 1/2 Cx, expanded coverage to Cefepime (4/23 1x dose), back on CTX, switched to Unasyn 3g Q6 (4/25- )  -CT chest 4/22 reveals persistent loculated effusion, s/p IR Pigtail placement 4/24-4/27 w/ Exudative effusion; Alteplase and Dornase through pigtal x6 doses; unable to finish 6th Alteplase+Dornase due to catheter occlusion   - Repeat CTC 4/27 - decreased small pleural effusions, thoracic LAD   - s/p Trach placement 4/28; now on Trach collar (Pressure support at night)        GI  #Diet: Tube feed   PEG tube planned once off Pressors     /Renal  -no active issues    Heme/Onc  #Leukocytosis  #CLL  - on presentation; improving to 130s  -no plan for leukophoresis at this time given mature lymphocytes  -stop trending TLS labs as they have been stable   - CT Chest A&P showing diffuse axillary, inguinal mediastinal RP lymphadenopathy.  - Appreciate Heme recs;   - Plan for Inpatient Bone Marrow Bx per Heme     #L Common Femoral Vein DVT   - Non-occlusive   - Heparin Drip 4/29-    #DVT ppx: Heparin Drip     ID  #Empyema 2/2 Pansensitive Strep Pneumo  #Strep Pneumo Bacteremia  -BCx + strep pneumo; fluid culture with strep pneumo  -repeat BCx 4/20 ngtd, ReCx 4/23 due to fever   -CT Chest A&P demonstrates LLL empyema not showing any other intraabdominal infectious source  -was on vanc/zosyn/azithro (4/16)-->deescalated to CTX (4/17 -4/23), Added on Vancomycin (4/23-) due to MRSE 1/2 Cx, expanded coverage to Cefepime (4/23 1x dose), back on CTX, switched to Unasyn 3g Q6 (4/25- )  - IVIG 4/26 x1  - ID Consulted for persistent fevers despite source control; appreciate recs   - ESR elevated to 119    Plan    c/w with Unasyn (4/25  -   )       Endo  -ISS q6h while NPO w/ tube feed     Ethics: Full Code  Only known family member is Aunt Mili Gordon ([phone], [phone]) who lives in [place]

## 2024-05-04 LAB
ALBUMIN SERPL ELPH-MCNC: 2.8 G/DL — LOW (ref 3.3–5)
ALP SERPL-CCNC: 73 U/L — SIGNIFICANT CHANGE UP (ref 40–120)
ALT FLD-CCNC: 10 U/L — SIGNIFICANT CHANGE UP (ref 10–45)
ANION GAP SERPL CALC-SCNC: 9 MMOL/L — SIGNIFICANT CHANGE UP (ref 5–17)
APTT BLD: 103.7 SEC — HIGH (ref 24.5–35.6)
APTT BLD: 122.8 SEC — CRITICAL HIGH (ref 24.5–35.6)
APTT BLD: 82.8 SEC — HIGH (ref 24.5–35.6)
APTT BLD: 86.9 SEC — HIGH (ref 24.5–35.6)
AST SERPL-CCNC: 15 U/L — SIGNIFICANT CHANGE UP (ref 10–40)
BASOPHILS # BLD AUTO: 0.15 K/UL — SIGNIFICANT CHANGE UP (ref 0–0.2)
BASOPHILS NFR BLD AUTO: 0.1 % — SIGNIFICANT CHANGE UP (ref 0–2)
BILIRUB SERPL-MCNC: 0.4 MG/DL — SIGNIFICANT CHANGE UP (ref 0.2–1.2)
BUN SERPL-MCNC: 9 MG/DL — SIGNIFICANT CHANGE UP (ref 7–23)
CALCIUM SERPL-MCNC: 8.1 MG/DL — LOW (ref 8.4–10.5)
CHLORIDE SERPL-SCNC: 108 MMOL/L — SIGNIFICANT CHANGE UP (ref 96–108)
CO2 SERPL-SCNC: 26 MMOL/L — SIGNIFICANT CHANGE UP (ref 22–31)
CREAT SERPL-MCNC: 0.43 MG/DL — LOW (ref 0.5–1.3)
CULTURE RESULTS: SIGNIFICANT CHANGE UP
CULTURE RESULTS: SIGNIFICANT CHANGE UP
EGFR: 106 ML/MIN/1.73M2 — SIGNIFICANT CHANGE UP
EOSINOPHIL # BLD AUTO: 0.2 K/UL — SIGNIFICANT CHANGE UP (ref 0–0.5)
EOSINOPHIL NFR BLD AUTO: 0.2 % — SIGNIFICANT CHANGE UP (ref 0–6)
FERRITIN SERPL-MCNC: 472 NG/ML — HIGH (ref 13–330)
GLUCOSE BLDC GLUCOMTR-MCNC: 122 MG/DL — HIGH (ref 70–99)
GLUCOSE BLDC GLUCOMTR-MCNC: 159 MG/DL — HIGH (ref 70–99)
GLUCOSE BLDC GLUCOMTR-MCNC: 72 MG/DL — SIGNIFICANT CHANGE UP (ref 70–99)
GLUCOSE BLDC GLUCOMTR-MCNC: 97 MG/DL — SIGNIFICANT CHANGE UP (ref 70–99)
GLUCOSE BLDC GLUCOMTR-MCNC: 99 MG/DL — SIGNIFICANT CHANGE UP (ref 70–99)
GLUCOSE SERPL-MCNC: 120 MG/DL — HIGH (ref 70–99)
HCT VFR BLD CALC: 25 % — LOW (ref 34.5–45)
HGB BLD-MCNC: 7.5 G/DL — LOW (ref 11.5–15.5)
IMM GRANULOCYTES NFR BLD AUTO: 0.3 % — SIGNIFICANT CHANGE UP (ref 0–0.9)
INR BLD: 1.19 RATIO — HIGH (ref 0.85–1.18)
INR BLD: 1.24 RATIO — HIGH (ref 0.85–1.18)
LYMPHOCYTES # BLD AUTO: 116.31 K/UL — HIGH (ref 1–3.3)
LYMPHOCYTES # BLD AUTO: 92.3 % — HIGH (ref 13–44)
MAGNESIUM SERPL-MCNC: 2.4 MG/DL — SIGNIFICANT CHANGE UP (ref 1.6–2.6)
MCHC RBC-ENTMCNC: 27.7 PG — SIGNIFICANT CHANGE UP (ref 27–34)
MCHC RBC-ENTMCNC: 30 GM/DL — LOW (ref 32–36)
MCV RBC AUTO: 92.3 FL — SIGNIFICANT CHANGE UP (ref 80–100)
MONOCYTES # BLD AUTO: 2.7 K/UL — HIGH (ref 0–0.9)
MONOCYTES NFR BLD AUTO: 2.1 % — SIGNIFICANT CHANGE UP (ref 2–14)
NEUTROPHILS # BLD AUTO: 6.2 K/UL — SIGNIFICANT CHANGE UP (ref 1.8–7.4)
NEUTROPHILS NFR BLD AUTO: 5 % — LOW (ref 43–77)
NRBC # BLD: 0 /100 WBCS — SIGNIFICANT CHANGE UP (ref 0–0)
PHOSPHATE SERPL-MCNC: 3.8 MG/DL — SIGNIFICANT CHANGE UP (ref 2.5–4.5)
PLATELET # BLD AUTO: 196 K/UL — SIGNIFICANT CHANGE UP (ref 150–400)
POTASSIUM SERPL-MCNC: 4.6 MMOL/L — SIGNIFICANT CHANGE UP (ref 3.5–5.3)
POTASSIUM SERPL-SCNC: 4.6 MMOL/L — SIGNIFICANT CHANGE UP (ref 3.5–5.3)
PROT SERPL-MCNC: 5.8 G/DL — LOW (ref 6–8.3)
PROTHROM AB SERPL-ACNC: 13 SEC — SIGNIFICANT CHANGE UP (ref 9.5–13)
PROTHROM AB SERPL-ACNC: 13.6 SEC — HIGH (ref 9.5–13)
RBC # BLD: 2.71 M/UL — LOW (ref 3.8–5.2)
RBC # FLD: 17.4 % — HIGH (ref 10.3–14.5)
SODIUM SERPL-SCNC: 143 MMOL/L — SIGNIFICANT CHANGE UP (ref 135–145)
SPECIMEN SOURCE: SIGNIFICANT CHANGE UP
SPECIMEN SOURCE: SIGNIFICANT CHANGE UP
WBC # BLD: 125.99 K/UL — CRITICAL HIGH (ref 3.8–10.5)
WBC # FLD AUTO: 125.99 K/UL — CRITICAL HIGH (ref 3.8–10.5)

## 2024-05-04 PROCEDURE — 71045 X-RAY EXAM CHEST 1 VIEW: CPT | Mod: 26

## 2024-05-04 PROCEDURE — 99291 CRITICAL CARE FIRST HOUR: CPT | Mod: GC

## 2024-05-04 PROCEDURE — 99233 SBSQ HOSP IP/OBS HIGH 50: CPT

## 2024-05-04 RX ORDER — QUETIAPINE FUMARATE 200 MG/1
100 TABLET, FILM COATED ORAL AT BEDTIME
Refills: 0 | Status: DISCONTINUED | OUTPATIENT
Start: 2024-05-04 | End: 2024-05-10

## 2024-05-04 RX ORDER — MIDODRINE HYDROCHLORIDE 2.5 MG/1
30 TABLET ORAL THREE TIMES A DAY
Refills: 0 | Status: DISCONTINUED | OUTPATIENT
Start: 2024-05-04 | End: 2024-05-07

## 2024-05-04 RX ORDER — ATOVAQUONE 750 MG/5ML
1500 SUSPENSION ORAL DAILY
Refills: 0 | Status: COMPLETED | OUTPATIENT
Start: 2024-05-04 | End: 2024-05-08

## 2024-05-04 RX ORDER — MIRTAZAPINE 45 MG/1
15 TABLET, ORALLY DISINTEGRATING ORAL AT BEDTIME
Refills: 0 | Status: DISCONTINUED | OUTPATIENT
Start: 2024-05-04 | End: 2024-05-04

## 2024-05-04 RX ORDER — ACETAMINOPHEN 500 MG
1000 TABLET ORAL ONCE
Refills: 0 | Status: COMPLETED | OUTPATIENT
Start: 2024-05-04 | End: 2024-05-04

## 2024-05-04 RX ORDER — MIDODRINE HYDROCHLORIDE 2.5 MG/1
20 TABLET ORAL THREE TIMES A DAY
Refills: 0 | Status: DISCONTINUED | OUTPATIENT
Start: 2024-05-04 | End: 2024-05-04

## 2024-05-04 RX ORDER — QUETIAPINE FUMARATE 200 MG/1
25 TABLET, FILM COATED ORAL THREE TIMES A DAY
Refills: 0 | Status: DISCONTINUED | OUTPATIENT
Start: 2024-05-04 | End: 2024-05-18

## 2024-05-04 RX ORDER — HYDROCORTISONE 20 MG
50 TABLET ORAL EVERY 12 HOURS
Refills: 0 | Status: COMPLETED | OUTPATIENT
Start: 2024-05-04 | End: 2024-05-05

## 2024-05-04 RX ORDER — QUETIAPINE FUMARATE 200 MG/1
100 TABLET, FILM COATED ORAL EVERY 12 HOURS
Refills: 0 | Status: DISCONTINUED | OUTPATIENT
Start: 2024-05-04 | End: 2024-05-04

## 2024-05-04 RX ADMIN — Medication 3 MILLILITER(S): at 14:38

## 2024-05-04 RX ADMIN — AMPICILLIN SODIUM AND SULBACTAM SODIUM 200 GRAM(S): 250; 125 INJECTION, POWDER, FOR SUSPENSION INTRAMUSCULAR; INTRAVENOUS at 05:34

## 2024-05-04 RX ADMIN — HEPARIN SODIUM 1800 UNIT(S)/HR: 5000 INJECTION INTRAVENOUS; SUBCUTANEOUS at 19:49

## 2024-05-04 RX ADMIN — PHENYLEPHRINE HYDROCHLORIDE 12.5 MICROGRAM(S)/KG/MIN: 10 INJECTION INTRAVENOUS at 19:48

## 2024-05-04 RX ADMIN — HEPARIN SODIUM 2000 UNIT(S)/HR: 5000 INJECTION INTRAVENOUS; SUBCUTANEOUS at 07:17

## 2024-05-04 RX ADMIN — MIRTAZAPINE 15 MILLIGRAM(S): 45 TABLET, ORALLY DISINTEGRATING ORAL at 21:16

## 2024-05-04 RX ADMIN — MIDODRINE HYDROCHLORIDE 30 MILLIGRAM(S): 2.5 TABLET ORAL at 17:10

## 2024-05-04 RX ADMIN — AMPICILLIN SODIUM AND SULBACTAM SODIUM 200 GRAM(S): 250; 125 INJECTION, POWDER, FOR SUSPENSION INTRAMUSCULAR; INTRAVENOUS at 00:14

## 2024-05-04 RX ADMIN — HEPARIN SODIUM 1800 UNIT(S)/HR: 5000 INJECTION INTRAVENOUS; SUBCUTANEOUS at 20:28

## 2024-05-04 RX ADMIN — CHLORHEXIDINE GLUCONATE 15 MILLILITER(S): 213 SOLUTION TOPICAL at 05:20

## 2024-05-04 RX ADMIN — Medication 0.5 MILLIGRAM(S): at 17:10

## 2024-05-04 RX ADMIN — Medication 400 MILLIGRAM(S): at 11:20

## 2024-05-04 RX ADMIN — AMPICILLIN SODIUM AND SULBACTAM SODIUM 200 GRAM(S): 250; 125 INJECTION, POWDER, FOR SUSPENSION INTRAMUSCULAR; INTRAVENOUS at 23:18

## 2024-05-04 RX ADMIN — MIDODRINE HYDROCHLORIDE 15 MILLIGRAM(S): 2.5 TABLET ORAL at 05:19

## 2024-05-04 RX ADMIN — PHENYLEPHRINE HYDROCHLORIDE 12.5 MICROGRAM(S)/KG/MIN: 10 INJECTION INTRAVENOUS at 07:19

## 2024-05-04 RX ADMIN — Medication 50 MILLIGRAM(S): at 05:32

## 2024-05-04 RX ADMIN — CHLORHEXIDINE GLUCONATE 15 MILLILITER(S): 213 SOLUTION TOPICAL at 17:10

## 2024-05-04 RX ADMIN — Medication 400 MILLIGRAM(S): at 16:17

## 2024-05-04 RX ADMIN — Medication 1000 MILLIGRAM(S): at 16:45

## 2024-05-04 RX ADMIN — AMPICILLIN SODIUM AND SULBACTAM SODIUM 200 GRAM(S): 250; 125 INJECTION, POWDER, FOR SUSPENSION INTRAMUSCULAR; INTRAVENOUS at 11:22

## 2024-05-04 RX ADMIN — QUETIAPINE FUMARATE 50 MILLIGRAM(S): 200 TABLET, FILM COATED ORAL at 17:11

## 2024-05-04 RX ADMIN — Medication 1000 MILLIGRAM(S): at 12:00

## 2024-05-04 RX ADMIN — Medication 1: at 05:24

## 2024-05-04 RX ADMIN — Medication 3 MILLILITER(S): at 05:23

## 2024-05-04 RX ADMIN — MIDODRINE HYDROCHLORIDE 30 MILLIGRAM(S): 2.5 TABLET ORAL at 21:16

## 2024-05-04 RX ADMIN — Medication 50 MILLIGRAM(S): at 17:11

## 2024-05-04 RX ADMIN — HEPARIN SODIUM 1800 UNIT(S)/HR: 5000 INJECTION INTRAVENOUS; SUBCUTANEOUS at 14:16

## 2024-05-04 RX ADMIN — QUETIAPINE FUMARATE 50 MILLIGRAM(S): 200 TABLET, FILM COATED ORAL at 05:19

## 2024-05-04 RX ADMIN — Medication 0.5 MILLIGRAM(S): at 05:20

## 2024-05-04 RX ADMIN — CHLORHEXIDINE GLUCONATE 1 APPLICATION(S): 213 SOLUTION TOPICAL at 05:20

## 2024-05-04 RX ADMIN — POLYETHYLENE GLYCOL 3350 17 GRAM(S): 17 POWDER, FOR SOLUTION ORAL at 05:20

## 2024-05-04 RX ADMIN — AMPICILLIN SODIUM AND SULBACTAM SODIUM 200 GRAM(S): 250; 125 INJECTION, POWDER, FOR SUSPENSION INTRAMUSCULAR; INTRAVENOUS at 18:25

## 2024-05-04 RX ADMIN — Medication 3 MILLILITER(S): at 21:58

## 2024-05-04 NOTE — PROGRESS NOTE ADULT - ASSESSMENT
69-year-old female with a past medical history of CVA, bedbound at baseline, COPD, hypertension, CLL who initially had presented to an outside hospital due to acute shortness of breath.  Patient was found to have pulmonary edema and was managed with diuretics and BiPAP.  Patient also found to be febrile to 103 Fahrenheit, found to have pneumonia therefore started on antibiotics for this.  Labs were notable for a leukocytosis of 233 in setting of CLL and then was transferred to [facility] for leukopheresis.  Upon arrival to [place], intubated due to concern for respiratory compromise as well as worsening AMS.  Patient was started on levofloxacin due to hypotension.    Hospitalization course includes chest tube drainage.  Blood and pleural fluid cultures are positive growing Streptococcus pneumonia A.  Chest tube was removed on 4/19/2024 however patient remains febrile with a left-sided loculated effusion therefore left pigtail catheter was placed on 4/23/2024.  Patient's antibiotics were switched from ceftriaxone to cefepime and now is requiring pressors again.  Blood cultures were repeated on 4/22/2024 with Staphylococcus epidermidis growth, repeat pleural fluid cultures obtained on 4/23/2024 are negative to date, repeat blood cultures from 4/24/2024 are negative as well.    #Streptococcal bacteremia, likely source empyema  #Abnormal imaging of the lungs  #Streptococcal pneumonia and empyema  #Acute hypoxic respiratory failure  #CLL  #LLE DVT  s/p trach  Repeat blood cultures negative thus far  Remains febrile, no new culture data to guide antibiotic therapy  No plan for leukophoresis at this time per heme service  MRSE more likely contamination, as 1/4 bottles and no repeat growth  Full RVP on 5/1/24 is negative  Repeat CT C/A/P with increasing R pleural effusion, HSM and LAD noted     Recommendations  Patient continues to have persistent fevers,  Continue ampicillin/sulbactam, should be adequate coverage given previous strep growth and no new species  CT surgery eval  DVT can cause fevers as  well - management per primary team  Now with trach - no evidence for aspiration/tracheitis - can obtain repeat sputum culture  Fever related to CLL possible - heme input, could this now be lymphoma?  Follow fever curve and WBC count, reculture periodically      Christi Mcclure M.D. ,   please reach via teams   If no answer, or after 5PM/ weekends,  then please call  [phone]    Assessment and plan discussed with the primary team .    ID service will be covering over the weekend. Please call for acute issues or questions. [phone]       69-year-old female with a past medical history of CVA, bedbound at baseline, COPD, hypertension, CLL who initially had presented to an outside hospital due to acute shortness of breath.  Patient was found to have pulmonary edema and was managed with diuretics and BiPAP.  Patient also found to be febrile to 103 Fahrenheit, found to have pneumonia therefore started on antibiotics for this.  Labs were notable for a leukocytosis of 233 in setting of CLL and then was transferred to [facility] for leukopheresis.  Upon arrival to [facility], intubated due to concern for respiratory compromise as well as worsening AMS.  Patient was started on levofloxacin due to hypotension.    Hospitalization course includes chest tube drainage.  Blood and pleural fluid cultures are positive growing Streptococcus pneumonia A.  Chest tube was removed on 4/19/2024 however patient remains febrile with a left-sided loculated effusion therefore left pigtail catheter was placed on 4/23/2024.  Patient's antibiotics were switched from ceftriaxone to cefepime and now is requiring pressors again.  Blood cultures were repeated on 4/22/2024 with Staphylococcus epidermidis growth, repeat pleural fluid cultures obtained on 4/23/2024 are negative to date, repeat blood cultures from 4/24/2024 are negative as well.    #Streptococcal bacteremia, likely source empyema  #Abnormal imaging of the lungs  #Streptococcal pneumonia and empyema  #Acute hypoxic respiratory failure  #CLL  #LLE DVT  s/p trach  Repeat blood cultures negative thus far  Remains febrile, no new culture data to guide antibiotic therapy  No plan for leukophoresis at this time per heme service  MRSE more likely contamination, as 1/4 bottles and no repeat growth  Full RVP on 5/1/24 is negative  Repeat CT C/A/P with increasing R pleural effusion, HSM and LAD noted     Recommendations  Patient continues to have persistent fevers,  Continue ampicillin/sulbactam, should be adequate coverage given previous strep growth and no new species  CT surgery eval  DVT can cause fevers as  well - management per primary team  Now with trach - no evidence for aspiration/tracheitis - can obtain repeat sputum culture  Fever related to CLL possible - heme input, could this now be lymphoma?  Follow fever curve and WBC count, reculture periodically  consdier repeat cardiac echo  check sinus films    Christi Mcclure M.D. ,   please reach via teams   If no answer, or after 5PM/ weekends,  then please call  [phone]    Assessment and plan discussed with the primary team .    ID service will be covering over the weekend. Please call for acute issues or questions. [phone]       69-year-old female with a past medical history of CVA, bedbound at baseline, COPD, hypertension, CLL who initially had presented to an outside hospital due to acute shortness of breath.  Patient was found to have pulmonary edema and was managed with diuretics and BiPAP.  Patient also found to be febrile to 103 Fahrenheit, found to have pneumonia therefore started on antibiotics for this.  Labs were notable for a leukocytosis of 233 in setting of CLL and then was transferred to [facility] for leukopheresis.  Upon arrival to [place], intubated due to concern for respiratory compromise as well as worsening AMS.  Patient was started on levofloxacin due to hypotension.    Hospitalization course includes chest tube drainage.  Blood and pleural fluid cultures are positive growing Streptococcus pneumonia A.  Chest tube was removed on 4/19/2024 however patient remains febrile with a left-sided loculated effusion therefore left pigtail catheter was placed on 4/23/2024.  Patient's antibiotics were switched from ceftriaxone to cefepime and now is requiring pressors again.  Blood cultures were repeated on 4/22/2024 with Staphylococcus epidermidis growth, repeat pleural fluid cultures obtained on 4/23/2024 are negative to date, repeat blood cultures from 4/24/2024 are negative as well.    #Streptococcal bacteremia, likely source empyema  #Abnormal imaging of the lungs  #Streptococcal pneumonia and empyema  #Acute hypoxic respiratory failure  #CLL  #LLE DVT  s/p trach  Repeat blood cultures negative thus far  Remains febrile, no new culture data to guide antibiotic therapy  No plan for leukophoresis at this time per heme service  MRSE more likely contamination, as 1/4 bottles and no repeat growth  Full RVP on 5/1/24 is negative  Repeat CT C/A/P with increasing R pleural effusion, HSM and LAD noted     Recommendations  Patient continues to have persistent fevers,  Continue ampicillin/sulbactam, should be adequate coverage given previous strep growth and no new species  DVT can cause fevers as  well - management per primary team  Now with trach - no evidence for aspiration/tracheitis - can obtain repeat sputum culture  Fever related to CLL possible - heme input, could this now be lymphoma?  Follow fever curve and WBC count, reculture periodically  consider repeat cardiac echo  check sinus films  check fungitel, start PCP prophylaxis while on high dose steroids ( mepron)   check CMV PCR  check crypt ag     Christi Mcclure M.D. ,   please reach via teams   If no answer, or after 5PM/ weekends,  then please call  [phone]    Assessment and plan discussed with the primary team .    ID service will be covering over the weekend. Please call for acute issues or questions. [phone]

## 2024-05-04 NOTE — PROGRESS NOTE ADULT - ATTENDING COMMENTS
69 year old female with a history of CVA (bedbound at baseline) COPD, CHF, HTN presented initially as a transfer with hyperleukocytosis with concerning for acute leukemia (no blasts, more consistent with CLL and leukemoid reaction in the setting of acute infection) and acute hypoxic respiratory failure eventually found to have strep pneumonia bacteremia, pneumonia c/b empyema s/p chest tube (4/16-4/19), c/b persistent fevers, CT replaced (4/23/24-4/27/24) s/p intrapleural fibrinolytic therapy, and now s/p tracheostomy    Lines:   PIV  Trach 4/28/24    N: Bedbound at baseline  History of CVA, with left sided residual deficit  AAOx4  - At baseline mental status  - Seroquel 50 BID, Clonazepam 0.5 BID  - Delirium precautions  CV:   Septic shock, vasoplegia; increased LVOT gradient requiring low dose neosynephrine  TTE negative for vegetation  Elevated pro-BNP >2k, with evidence of volume overload on exam  - On low dose Dawit  - Midodrine 30 TID  - Will start to space stress dose steroids  - MAP >65  P: Acute hypoxic respiratory failure  Strep pneumonia  Empyema s/p chest tube (4/16-14/19)- did not do intrapleural fibrinolytic therapy; s/p CT 4/23/24-4/27 s/p fibrinolytic therapy  Intubated 4/16, prolonged resp failure trach 4/28/24  - Diuresis as tolerated  - Continue vent weaning as able   - Lung protective settings  - Trend BG  - Suctioning, duonebs PRN, oral care  GI:   - Tube feeds  Renal:   - Trend cr, lytes  Heme onc:   CLL  Leukamoid reaction  Nonocclusive left common femoral vein DVT  - Appreciate heme recs: Planned for BM biopsy;   - ? if fevers related to underlying malignancy  - On heparin drip   ID: Pneumonia, empyema s/p chest tube without lytics, with persistent fevers and fluid, s/p repeat CT placement 4/23/24-4/27/24 with lytes and improvement in effusion.   Strep bacteremia  Persistently febrile, ? if related to uncontrolled infection v. thrombus v. malignancy   - Unasyn for now  - Repeat sputum culture  - Appreciate ID input: Check sinus films, fungitell, CMV, crypt  - Will start to taper steroids, will confirm with ID if they still want PJP ppx  Endo: Avoid hypoglycemia,   BG <210    DVT: A/C as above  Full code  HCP- Presumably Aunt (only known family member)

## 2024-05-04 NOTE — PROGRESS NOTE ADULT - ASSESSMENT
69F h/o CVA (bedbound at baseline), COPD, CHF, HTN presenting as transfer from [facility] for SOB iso leukocytosis to 233 c/f acute leukemia. Pt intubated and on pressors, transferred to MICU for further management, course c/b empyema s/p chest tube placement (4/16) and removal, reaccumulation of loculated pleural effusion s/p L pigtail placement and removal on 4/27     Neuro  At Baseline AOx4 mental status   L upper and lower extremity motor deficits iso hx of stroke   Pain control w/ Tylenol  Seroquel 50 BID and Clonazepam 0.5 BID for agitation     CV  #Septic Shock   - Strep Pneumo Empyema and Bacteremia s/p Pigtail catheter   - Levo changed to Dawit; Droxidopa changed to Midodrine  - Coming down on Dawit and  Midodrine 15 TID     #Chest Pain, Resolved   -demand vs leukostasis vs non cardiac   -reported JOSEF in II, III, aVF at [facility] --> repeat EKG without ST changes or Q waves  -trops peaked    #CHF  -unclear hx but elevated pro-BNP to 2600s  -TTE: EF 54%, no significant abnormalities  - Diurese as appropriate    Resp  #Acute hypoxic respiratory failure  #Empyema Pansensitive Strep Pneumo  -s/p thora draining 1500cc fluid 4/16 with chest tube placement; c/w exudative effusion growing Strep Pneumo  -MRSA neg  -pleural fluid growing strep pneumo; BCx growing strep pneumo; Pansensitive  -was on vanc/zosyn/azithro (4/16)-->deescalated to CTX (4/17 -4/23), Added on Vancomycin (4/23-) due to MRSE 1/2 Cx, expanded coverage to Cefepime (4/23 1x dose), back on CTX, switched to Unasyn 3g Q6 (4/25- )  -CT chest 4/22 reveals persistent loculated effusion, s/p IR Pigtail placement 4/24-4/27 w/ Exudative effusion; Alteplase and Dornase through pigtal x6 doses; unable to finish 6th Alteplase+Dornase due to catheter occlusion   - Repeat CTC 4/27 - decreased small pleural effusions, thoracic LAD   - s/p Trach placement 4/28; now on Trach collar (Pressure support at night)        GI  #Diet: Tube feed   PEG tube planned once off Pressors     /Renal  -no active issues    Heme/Onc  #Leukocytosis  #CLL  - on presentation; improving to 130s  -no plan for leukophoresis at this time given mature lymphocytes  -stop trending TLS labs as they have been stable   - CT Chest A&P showing diffuse axillary, inguinal mediastinal RP lymphadenopathy.  - Appreciate Heme recs;   - Plan for Inpatient Bone Marrow Bx per Heme     #L Common Femoral Vein DVT   - Non-occlusive   - Heparin Drip 4/29-    #HLH Ruleout  - Persistent fevers, Anemic, elevated triglycerides (iso recent propofol use)   - f/u IL2R, NK,Ferritin     #DVT ppx: Heparin Drip     ID  #Empyema 2/2 Pansensitive Strep Pneumo  #Strep Pneumo Bacteremia  -BCx + strep pneumo; fluid culture with strep pneumo  -repeat BCx 4/20 ngtd, ReCx 4/23 due to fever   -CT Chest A&P demonstrates LLL empyema not showing any other intraabdominal infectious source  -was on vanc/zosyn/azithro (4/16)-->deescalated to CTX (4/17 -4/23), Added on Vancomycin (4/23-) due to MRSE 1/2 Cx, expanded coverage to Cefepime (4/23 1x dose), back on CTX, switched to Unasyn 3g Q6 (4/25- )  - IVIG 4/26 x1  - ID Consulted for persistent fevers despite source control; appreciate recs   - ESR elevated to 119    Plan    c/w with Unasyn (4/25  -   )       Endo  -ISS q6h while NPO w/ tube feed     Ethics: Full Code  Only known family member is Aunt Mili Gordon ([phone], [phone]) who lives in [place]   69F h/o CVA (bedbound at baseline), COPD, CHF, HTN presenting as transfer from [facility] for SOB iso leukocytosis to 233 c/f acute leukemia. Pt intubated and on pressors, transferred to MICU for further management, course c/b empyema s/p chest tube placement (4/16) and removal, reaccumulation of loculated pleural effusion s/p L pigtail placement and removal on 4/27     Neuro  At Baseline AOx4 mental status   L upper and lower extremity motor deficits iso hx of stroke   Pain control w/ Tylenol  Seroquel 50 BID and Clonazepam 0.5 BID for agitation     CV  #Septic Shock   - Strep Pneumo Empyema and Bacteremia s/p Pigtail catheter   - Levo changed to Dawit; Droxidopa changed to Midodrine  - Coming down on Dawit and escalate Midodrine to 30 TID     #Chest Pain, Resolved   -demand vs leukostasis vs non cardiac   -reported JOSEF in II, III, aVF at [facility] --> repeat EKG without ST changes or Q waves  -trops peaked    #CHF  -unclear hx but elevated pro-BNP to 2600s  -TTE: EF 54%, no significant abnormalities  - Diurese as appropriate    Resp  #Acute hypoxic respiratory failure  #Empyema Pansensitive Strep Pneumo  -s/p thora draining 1500cc fluid 4/16 with chest tube placement; c/w exudative effusion growing Strep Pneumo  -MRSA neg  -pleural fluid growing strep pneumo; BCx growing strep pneumo; Pansensitive  -was on vanc/zosyn/azithro (4/16)-->deescalated to CTX (4/17 -4/23), Added on Vancomycin (4/23-) due to MRSE 1/2 Cx, expanded coverage to Cefepime (4/23 1x dose), back on CTX, switched to Unasyn 3g Q6 (4/25- )  -CT chest 4/22 reveals persistent loculated effusion, s/p IR Pigtail placement 4/24-4/27 w/ Exudative effusion; Alteplase and Dornase through pigtal x6 doses; unable to finish 6th Alteplase+Dornase due to catheter occlusion   - Repeat CTC 4/27 - decreased small pleural effusions, thoracic LAD   - s/p Trach placement 4/28; Attempt Trach collar during day and Pressure support at night     GI  #Diet: Tube feed   PEG tube planned once off Pressors     /Renal  -no active issues    Heme/Onc  #Leukocytosis  #CLL  - on presentation; improving to 130s  -no plan for leukophoresis at this time given mature lymphocytes  -stop trending TLS labs as they have been stable   - CT Chest A&P showing diffuse axillary, inguinal mediastinal RP lymphadenopathy.  - Appreciate Heme recs;   - Plan for Inpatient Bone Marrow Bx per Heme     #L Common Femoral Vein DVT   - Non-occlusive   - Heparin Drip 4/29-   - Plan to switch to full dose lovenox once procedure scheduled finalized     #HLH Ruleout  - Persistent fevers, Anemic, elevated triglycerides (iso recent propofol use)   - f/u IL2R, NK,Ferritin     #DVT ppx: Heparin Drip     ID  #Empyema 2/2 Pansensitive Strep Pneumo  #Strep Pneumo Bacteremia  -BCx + strep pneumo; fluid culture with strep pneumo  -repeat BCx 4/20 ngtd, ReCx 4/23 due to fever   -CT Chest A&P demonstrates LLL empyema not showing any other intraabdominal infectious source  -was on vanc/zosyn/azithro (4/16)-->deescalated to CTX (4/17 -4/23), Added on Vancomycin (4/23-) due to MRSE 1/2 Cx, expanded coverage to Cefepime (4/23 1x dose), back on CTX, switched to Unasyn 3g Q6 (4/25- )  - IVIG 4/26 x1  - ID Consulted for persistent fevers despite source control; appreciate recs   - ESR elevated to 119    Plan    c/w with Unasyn (4/25  -   )   PCP PPX w/ Atovaquone   Fungitell, Cryptococcus antigen, CMV PCR,   Taper off Stress steroids Solucortef 50Q12       Endo  -ISS q6h while NPO w/ tube feed     Ethics: Full Code  Only known family member is Aunt Mili Gordon ([phone], [phone]) who lives in [place]

## 2024-05-04 NOTE — PROGRESS NOTE ADULT - SUBJECTIVE AND OBJECTIVE BOX
Patient is a 69y old  Female who presents with a chief complaint of Transfer for leukophoresis (04 May 2024 09:06)    Being followed by ID for        Interval history:  no BM so far today  minimal secretions  prifit  No other acute events      ROS:  No cough,SOB,CP  No N/V/D  No abd pain  No urinary complaints  No HA  No joint or limb pain  No other complaints    PAST MEDICAL & SURGICAL HISTORY:  Acute CHF      COPD, severe        Allergies    No Known Allergies    Intolerances      Antimicrobials:    ampicillin/sulbactam  IVPB      ampicillin/sulbactam  IVPB 3 Gram(s) IV Intermittent every 6 hours    MEDICATIONS  (STANDING):  albuterol/ipratropium for Nebulization 3 milliLiter(s) Nebulizer every 8 hours  ampicillin/sulbactam  IVPB      ampicillin/sulbactam  IVPB 3 Gram(s) IV Intermittent every 6 hours  chlorhexidine 0.12% Liquid 15 milliLiter(s) Oral Mucosa every 12 hours  chlorhexidine 2% Cloths 1 Application(s) Topical <User Schedule>  clonazePAM  Tablet 0.5 milliGRAM(s) Oral every 12 hours  dexMEDEtomidine Infusion 0.5 MICROgram(s)/kG/Hr (13.9 mL/Hr) IV Continuous <Continuous>  heparin  Infusion.  Unit(s)/Hr (20 mL/Hr) IV Continuous <Continuous>  hydrocortisone sodium succinate Injectable 50 milliGRAM(s) IV Push every 8 hours  insulin lispro (ADMELOG) corrective regimen sliding scale   SubCutaneous every 6 hours  midodrine 20 milliGRAM(s) Oral three times a day  phenylephrine    Infusion 0.3 MICROgram(s)/kG/Min (12.5 mL/Hr) IV Continuous <Continuous>  polyethylene glycol 3350 17 Gram(s) Oral every 12 hours  propofol Infusion 9.91 MICROgram(s)/kG/Min (6.6 mL/Hr) IV Continuous <Continuous>  QUEtiapine 50 milliGRAM(s) Oral every 12 hours  senna Syrup 10 milliLiter(s) Oral at bedtime      Vital Signs Last 24 Hrs  T(C): 37.1 (05-04-24 @ 08:00), Max: 38.9 (05-03-24 @ 17:00)  T(F): 98.8 (05-04-24 @ 08:00), Max: 102 (05-03-24 @ 17:00)  HR: 69 (05-04-24 @ 09:15) (58 - 113)  BP: 103/51 (05-04-24 @ 09:15) (74/36 - 149/75)  BP(mean): 73 (05-04-24 @ 09:15) (52 - 106)  RR: 26 (05-04-24 @ 09:15) (14 - 53)  SpO2: 99% (05-04-24 @ 09:15) (99% - 100%)    Physical Exam:    Constitutional well preserved,comfortable,pleasant    HEENT PERRLA EOMI,No pallor or icterus    No oral exudate or erythema    Neck supple no JVD or LN    Chest Good AE,CTA    CVS RRR S1 S2 WNl No murmur or rub or gallop    Abd soft BS normal No tenderness no masses    Ext No cyanosis clubbing or edema    IV site no erythema tenderness or discharge    Joints no swelling or LOM    CNS AAO X 3 no focal    Lab Data:                          7.5    125.99 )-----------( 196      ( 03 May 2024 23:51 )             25.0       05-03    143  |  108  |  9   ----------------------------<  120<H>  4.6   |  26  |  0.43<L>    Ca    8.1<L>      03 May 2024 23:51  Phos  3.8     05-03  Mg     2.4     05-03    TPro  5.8<L>  /  Alb  2.8<L>  /  TBili  0.4  /  DBili  x   /  AST  15  /  ALT  10  /  AlkPhos  73  05-03      Urinalysis (04.26.24 @ 13:30)    Blood, Urine: Negative   pH Urine: 5.5   Glucose Qualitative, Urine: Negative mg/dL   Color: Yellow   Urine Appearance: Clear   Bilirubin: Negative   Ketone - Urine: Negative mg/dL   Specific Gravity: 1.011   Protein, Urine: Negative mg/dL   Urobilinogen: 0.2 mg/dL   Nitrite: Negative   Leukocyte Esterase Concentration: Small  Urine Microscopic-Add On (NC) (04.26.24 @ 13:30)    Epithelial Cells: 3 /HPF   Review: Reviewed   Cast: 5 /LPF   Bacteria: Negative /HPF   Red Blood Cell - Urine: 2 /HPF   White Blood Cell - Urine: 2 /HPF        .Blood Blood  04-29-24   No growth at 4 days  --  --      .Blood Blood  04-29-24   No growth at 4 days  --  --        < from: [facility] Duplex Lower Ext Vein Scan, Left (04.24.24 @ 22:09) >  IMPRESSION:    Nonocclusive left common femoral vein DVT. Findings discussed with Dr. Sylvester by the sonographic technologist.  Abnormal bilateral inguinal adenopathy.    --- End of Report ---      < end of copied text >              WBC Count: 125.99 (05-03-24 @ 23:51)  WBC Count: 145.96 (05-03-24 @ 00:24)  WBC Count: 155.58 (05-02-24 @ 00:10)  WBC Count: 176.56 (05-01-24 @ 16:48)  WBC Count: 132.34 (05-01-24 @ 00:24)  WBC Count: 135.08 (04-30-24 @ 00:19)  WBC Count: 147.15 (04-29-24 @ 16:13)  WBC Count: 168.11 (04-29-24 @ 00:09)  WBC Count: 175.19 (04-28-24 @ 00:31)       Bilirubin Total: 0.4 mg/dL (05-03-24 @ 23:51)  Aspartate Aminotransferase (AST/SGOT): 15 U/L (05-03-24 @ 23:51)  Alanine Aminotransferase (ALT/SGPT): 10 U/L (05-03-24 @ 23:51)  Alkaline Phosphatase: 73 U/L (05-03-24 @ 23:51)    Bilirubin Total: 0.3 mg/dL (05-03-24 @ 00:25)  Aspartate Aminotransferase (AST/SGOT): 15 U/L (05-03-24 @ 00:25)  Alanine Aminotransferase (ALT/SGPT): 11 U/L (05-03-24 @ 00:25)  Alkaline Phosphatase: 80 U/L (05-03-24 @ 00:25)    Bilirubin Total: 0.5 mg/dL (05-02-24 @ 00:10)  Aspartate Aminotransferase (AST/SGOT): 17 U/L (05-02-24 @ 00:10)  Alanine Aminotransferase (ALT/SGPT): 15 U/L (05-02-24 @ 00:10)  Alkaline Phosphatase: 65 U/L (05-02-24 @ 00:10)      Culture - Body Fluid with Gram Stain (04.16.24 @ 17:04)    -  Ceftriaxone (non-meningitidis): S <=0.25   -  Penicillin (non-meningitidis): S 0.06   -  Vancomycin: S 0.5   -  Clindamycin: S <=0.06   -  Erythromycin: S <=0.06 Predicts results for azithromycin.   -  Levofloxacin: S 1   -  Tetracycline: S <=0.5   -  Trimethoprim/Sulfamethoxazole: S <=.25/4.75   Gram Stain:   polymorphonuclear leukocytes seen  Gram positive cocci in pairs seen  by cytocentrifuge   Specimen Source: Pleural Fl Pleural Fluid   Culture Results:   Rare Streptococcus pneumoniae   Organism Identification: Streptococcus pneumoniae   Organism: Streptococcus pneumoniae   Method Type: LESLY           Patient is a 69y old  Female who presents with a chief complaint of Transfer for leukophoresis (04 May 2024 09:06)    Being followed by ID for        Interval history:  no BM so far today  minimal secretions  primfit  pt pulled out NG tube - to be replaced today   No other acute events      PAST MEDICAL & SURGICAL HISTORY:  Acute CHF      COPD, severe        Allergies    No Known Allergies    Intolerances      Antimicrobials:    ampicillin/sulbactam  IVPB      ampicillin/sulbactam  IVPB 3 Gram(s) IV Intermittent every 6 hours    MEDICATIONS  (STANDING):  albuterol/ipratropium for Nebulization 3 milliLiter(s) Nebulizer every 8 hours  ampicillin/sulbactam  IVPB      ampicillin/sulbactam  IVPB 3 Gram(s) IV Intermittent every 6 hours  chlorhexidine 0.12% Liquid 15 milliLiter(s) Oral Mucosa every 12 hours  chlorhexidine 2% Cloths 1 Application(s) Topical <User Schedule>  clonazePAM  Tablet 0.5 milliGRAM(s) Oral every 12 hours  dexMEDEtomidine Infusion 0.5 MICROgram(s)/kG/Hr (13.9 mL/Hr) IV Continuous <Continuous>  heparin  Infusion.  Unit(s)/Hr (20 mL/Hr) IV Continuous <Continuous>  hydrocortisone sodium succinate Injectable 50 milliGRAM(s) IV Push every 8 hours  insulin lispro (ADMELOG) corrective regimen sliding scale   SubCutaneous every 6 hours  midodrine 20 milliGRAM(s) Oral three times a day  phenylephrine    Infusion 0.3 MICROgram(s)/kG/Min (12.5 mL/Hr) IV Continuous <Continuous>  polyethylene glycol 3350 17 Gram(s) Oral every 12 hours  propofol Infusion 9.91 MICROgram(s)/kG/Min (6.6 mL/Hr) IV Continuous <Continuous>  QUEtiapine 50 milliGRAM(s) Oral every 12 hours  senna Syrup 10 milliLiter(s) Oral at bedtime      Vital Signs Last 24 Hrs  T(C): 37.1 (24 @ 08:00), Max: 38.9 (24 @ 17:00)  T(F): 98.8 (24 @ 08:00), Max: 102 (24 @ 17:00)  HR: 69 (24 @ 09:15) (58 - 113)  BP: 103/51 (24 @ 09:15) (74/36 - 149/75)  BP(mean): 73 (24 @ 09:15) (52 - 106)  RR: 26 (24 @ 09:15) (14 - 53)  SpO2: 99% (24 @ 09:15) (99% - 100%)    Physical Exam:    Constitutional  intubated but awake and interactive     HEENT PERRLA EOMI,No pallor or icterus    No oral exudate or erythema    Neck supple no JVD or LN    Chest Good AE,CTA    CVS  S1 S2     Abd soft BS normal No tenderness     Ext edema    IV site no erythema tenderness or discharge    Joints no swelling or LOM    CNS LUE plegia    Lab Data:                          7.5    125.99 )-----------( 196      ( 03 May 2024 23:51 )             25.0       05-    143  |  108  |  9   ----------------------------<  120<H>  4.6   |  26  |  0.43<L>    Ca    8.1<L>      03 May 2024 23:51  Phos  3.8     05-  Mg     2.4     -    TPro  5.8<L>  /  Alb  2.8<L>  /  TBili  0.4  /  DBili  x   /  AST  15  /  ALT  10  /  AlkPhos  73  05-03      Urinalysis (24 @ 13:30)    Blood, Urine: Negative   pH Urine: 5.5   Glucose Qualitative, Urine: Negative mg/dL   Color: Yellow   Urine Appearance: Clear   Bilirubin: Negative   Ketone - Urine: Negative mg/dL   Specific Gravity: 1.011   Protein, Urine: Negative mg/dL   Urobilinogen: 0.2 mg/dL   Nitrite: Negative   Leukocyte Esterase Concentration: Small  Urine Microscopic-Add On (NC) (24 @ 13:30)    Epithelial Cells: 3 /HPF   Review: Reviewed   Cast: 5 /LPF   Bacteria: Negative /HPF   Red Blood Cell - Urine: 2 /HPF   White Blood Cell - Urine: 2 /HPF        .Blood Blood  24   No growth at 4 days  --  --      .Blood Blood  24   No growth at 4 days  --  --        < from: [facility] Duplex Lower Ext Vein Scan, Left (24 @ 22:09) >  IMPRESSION:    Nonocclusive left common femoral vein DVT. Findings discussed with Dr. Sylvester by the sonographic technologist.  Abnormal bilateral inguinal adenopathy.    --- End of Report ---      < end of copied text >      Flow Cytometry (24 @ 11:45)    TM Interpretation:   Flow Cytometry Final Report  ________________________________________________________________________  Specimen: Peripheral Blood  Date Collected: 2024  Date Received: 2024  Date Processed: 2024  Date Reported: 2024 12:04  Accession #: [id number]  Surgical Pathology Number:  ________________________________________________________________________  CLINICAL DATA: History of chronic lymphoblastic leukemia   WBC: 278K AK  ________________________________________________________________________  CD45/Side Scatter Differential  Granulocytes: 3 % ;    Lymphocytes: 79 % ;    Monocytes: 13 % ;    Dim CD45 and/or blast gate: 2 %  ;    Erythroid/debris: 0 %  ________________________________________________________________________  DIAGNOSIS:   _  Peripheral blood:       - Consistent with chronic lymphocytic leukemia/small lymhpocytic leukemia  Please see interpretation.    INTERPRETATION:  IMMUNOPHENOTYPE: _  Lymphocytes (79% of cells): Monotypic B cells (88.8%of total, 98% of lymphocytes) positive for CD5  (dim), CD19, CD20 (dimmer), CD23 (partial), , surface kappa light chain; negative for CD10,  CD11c, CD38, FMC7, lambda surface light chain  T cell populatio is markedly decreased (0.14% of total)  Myeloid and monocytic populations are markedly decreased  _  ______________________________________________________________________    Viability ................. 99 %  _            WBC Count: 125.99 (24 @ 23:51)  WBC Count: 145.96 (24 @ 00:24)  WBC Count: 155.58 (24 @ 00:10)  WBC Count: 176.56 (24 @ 16:48)  WBC Count: 132.34 (24 @ 00:24)  WBC Count: 135.08 (24 @ 00:19)  WBC Count: 147.15 (24 @ 16:13)  WBC Count: 168.11 (24 @ 00:09)  WBC Count: 175.19 (24 @ 00:31)       Bilirubin Total: 0.4 mg/dL (24 @ 23:51)  Aspartate Aminotransferase (AST/SGOT): 15 U/L (24 @ 23:51)  Alanine Aminotransferase (ALT/SGPT): 10 U/L (24 @ 23:51)  Alkaline Phosphatase: 73 U/L (24 @ 23:51)    Bilirubin Total: 0.3 mg/dL (24 @ 00:25)  Aspartate Aminotransferase (AST/SGOT): 15 U/L (24 @ 00:25)  Alanine Aminotransferase (ALT/SGPT): 11 U/L (24 @ 00:25)  Alkaline Phosphatase: 80 U/L (24 @ 00:25)    Bilirubin Total: 0.5 mg/dL (24 @ 00:10)  Aspartate Aminotransferase (AST/SGOT): 17 U/L (24 @ 00:10)  Alanine Aminotransferase (ALT/SGPT): 15 U/L (24 @ 00:10)  Alkaline Phosphatase: 65 U/L (24 @ 00:10)      Culture - Body Fluid with Gram Stain (24 @ 17:04)    -  Ceftriaxone (non-meningitidis): S <=0.25   -  Penicillin (non-meningitidis): S 0.06   -  Vancomycin: S 0.5   -  Clindamycin: S <=0.06   -  Erythromycin: S <=0.06 Predicts results for azithromycin.   -  Levofloxacin: S 1   -  Tetracycline: S <=0.5   -  Trimethoprim/Sulfamethoxazole: S <=.25/4.75   Gram Stain:   polymorphonuclear leukocytes seen  Gram positive cocci in pairs seen  by cytocentrifuge   Specimen Source: Pleural Fl Pleural Fluid   Culture Results:   Rare Streptococcus pneumoniae   Organism Identification: Streptococcus pneumoniae   Organism: Streptococcus pneumoniae   Method Type: LESLY      < from: CT Abdomen and Pelvis w/ IV Cont (24 @ 13:51) >  IMPRESSION:  Patchy left lower lobe opacity, atelectasis versus pneumonia.    Increasing right pleural effusion.    Redemonstration of hepatosplenomegaly and extensive lymphadenopathy   consistent with history of CLL.        --- End of Report ---    < end of copied text >

## 2024-05-04 NOTE — PROGRESS NOTE ADULT - SUBJECTIVE AND OBJECTIVE BOX
INTERVAL HPI/OVERNIGHT EVENTS:    SUBJECTIVE: Patient seen and examined at bedside.   Pulled out NG tube overnight. Apologetic this AM states she was confused and won't do it again. She would like NG tube placed again.    ROS: All negative except as listed above.    VITAL SIGNS:    ICU Vital Signs Last 24 Hrs  T(C): 37.1 (04 May 2024 08:00), Max: 38.9 (03 May 2024 17:00)  T(F): 98.8 (04 May 2024 08:00), Max: 102 (03 May 2024 17:00)  HR: 81 (04 May 2024 09:04) (58 - 113)  BP: 115/61 (04 May 2024 08:45) (74/36 - 149/75)  BP(mean): 80 (04 May 2024 08:45) (52 - 106)  ABP: --  ABP(mean): --  RR: 25 (04 May 2024 08:45) (14 - 53)  SpO2: 100% (04 May 2024 09:04) (99% - 100%)    O2 Parameters below as of 04 May 2024 07:00  Patient On (Oxygen Delivery Method): ventilator    O2 Concentration (%): 40        --------------------------------------------------------  IN: 3962 mL / OUT: 1150 mL / NET: 2812 mL  GENERAL: NAD, lying in bed comfortably,   HEAD:  Atraumatic, normocephalic  EYES: EOMI, PERRLA, conjunctiva and sclera clear  NECK: Supple, trachea midline, no JVD, Tracheostomy tube in place   HEART: Regular rate and rhythm, no murmurs, rubs, or gallops  LUNGS: Unlabored respirations.  Clear to auscultation bilaterally, no crackles, wheezing, or rhonchi  ABDOMEN: Soft, nontender, nondistended, +BS  EXTREMITIES: 2+ peripheral pulses bilaterally, cap refill<2 secs. No clubbing, cyanosis, L leg and L arm pitting edema, improving   NERVOUS SYSTEM: AOx4 following commands, moving all extremities, no focal deficits   SKIN: No rashes or lesions    CAPILLARY BLOOD GLUCOSE      POCT Blood Glucose.: 121 mg/dL (03 May 2024 05:00)      ECG: reviewed.    PHYSICAL EXAM:        MEDICATIONS:  MEDICATIONS  (STANDING):  albuterol/ipratropium for Nebulization 3 milliLiter(s) Nebulizer every 8 hours  ampicillin/sulbactam  IVPB 3 Gram(s) IV Intermittent every 6 hours  ampicillin/sulbactam  IVPB      chlorhexidine 0.12% Liquid 15 milliLiter(s) Oral Mucosa every 12 hours  chlorhexidine 2% Cloths 1 Application(s) Topical <User Schedule>  clonazePAM  Tablet 0.5 milliGRAM(s) Oral every 12 hours  dexMEDEtomidine Infusion 0.5 MICROgram(s)/kG/Hr (13.9 mL/Hr) IV Continuous <Continuous>  heparin  Infusion.  Unit(s)/Hr (20 mL/Hr) IV Continuous <Continuous>  hydrocortisone sodium succinate Injectable 50 milliGRAM(s) IV Push every 8 hours  insulin lispro (ADMELOG) corrective regimen sliding scale   SubCutaneous every 6 hours  midodrine 20 milliGRAM(s) Oral three times a day  phenylephrine    Infusion 0.3 MICROgram(s)/kG/Min (12.5 mL/Hr) IV Continuous <Continuous>  polyethylene glycol 3350 17 Gram(s) Oral every 12 hours  propofol Infusion 9.91 MICROgram(s)/kG/Min (6.6 mL/Hr) IV Continuous <Continuous>  QUEtiapine 50 milliGRAM(s) Oral every 12 hours  senna Syrup 10 milliLiter(s) Oral at bedtime    MEDICATIONS  (PRN):  acetaminophen   Oral Liquid .. 650 milliGRAM(s) Oral every 6 hours PRN Temp greater or equal to 38C (100.4F)  nystatin Powder 1 Application(s) Topical two times a day PRN skin irritation        ALLERGIES:  Allergies    No Known Allergies    Intolerances        LABS:  LABS:                        7.5    125.99 )-----------( 196      ( 03 May 2024 23:51 )             25.0     05-03    143  |  108  |  9   ----------------------------<  120<H>  4.6   |  26  |  0.43<L>    Ca    8.1<L>      03 May 2024 23:51  Phos  3.8     05-03  Mg     2.4     05-03    TPro  5.8<L>  /  Alb  2.8<L>  /  TBili  0.4  /  DBili  x   /  AST  15  /  ALT  10  /  AlkPhos  73  05-03    PT/INR - ( 04 May 2024 07:45 )   PT: 13.0 sec;   INR: 1.19 ratio         PTT - ( 04 May 2024 07:45 )  PTT:82.8 sec          Urinalysis Basic - ( 03 May 2024 23:51 )    Color: x / Appearance: x / SG: x / pH: x  Gluc: 120 mg/dL / Ketone: x  / Bili: x / Urobili: x   Blood: x / Protein: x / Nitrite: x   Leuk Esterase: x / RBC: x / WBC x   Sq Epi: x / Non Sq Epi: x / Bacteria: x            RADIOLOGY:

## 2024-05-05 LAB
ALBUMIN SERPL ELPH-MCNC: 2.1 G/DL — LOW (ref 3.3–5)
ALP SERPL-CCNC: 69 U/L — SIGNIFICANT CHANGE UP (ref 40–120)
ALT FLD-CCNC: 9 U/L — LOW (ref 10–45)
ANION GAP SERPL CALC-SCNC: 10 MMOL/L — SIGNIFICANT CHANGE UP (ref 5–17)
APTT BLD: 90.8 SEC — HIGH (ref 24.5–35.6)
AST SERPL-CCNC: 20 U/L — SIGNIFICANT CHANGE UP (ref 10–40)
BASE EXCESS BLDV CALC-SCNC: 3.1 MMOL/L — HIGH (ref -2–3)
BASOPHILS # BLD AUTO: 0 K/UL — SIGNIFICANT CHANGE UP (ref 0–0.2)
BASOPHILS NFR BLD AUTO: 0 % — SIGNIFICANT CHANGE UP (ref 0–2)
BILIRUB SERPL-MCNC: 0.4 MG/DL — SIGNIFICANT CHANGE UP (ref 0.2–1.2)
BUN SERPL-MCNC: 12 MG/DL — SIGNIFICANT CHANGE UP (ref 7–23)
CA-I SERPL-SCNC: 1.19 MMOL/L — SIGNIFICANT CHANGE UP (ref 1.15–1.33)
CALCIUM SERPL-MCNC: 7.9 MG/DL — LOW (ref 8.4–10.5)
CHLORIDE BLDV-SCNC: 107 MMOL/L — SIGNIFICANT CHANGE UP (ref 96–108)
CHLORIDE SERPL-SCNC: 108 MMOL/L — SIGNIFICANT CHANGE UP (ref 96–108)
CHOLEST SERPL-MCNC: 124 MG/DL — SIGNIFICANT CHANGE UP
CO2 BLDV-SCNC: 30 MMOL/L — HIGH (ref 22–26)
CO2 SERPL-SCNC: 24 MMOL/L — SIGNIFICANT CHANGE UP (ref 22–31)
CREAT SERPL-MCNC: 0.44 MG/DL — LOW (ref 0.5–1.3)
CRYPTOC AG FLD QL: NEGATIVE — SIGNIFICANT CHANGE UP
EGFR: 105 ML/MIN/1.73M2 — SIGNIFICANT CHANGE UP
EOSINOPHIL # BLD AUTO: 1.52 K/UL — HIGH (ref 0–0.5)
EOSINOPHIL NFR BLD AUTO: 1 % — SIGNIFICANT CHANGE UP (ref 0–6)
GAS PNL BLDV: 140 MMOL/L — SIGNIFICANT CHANGE UP (ref 136–145)
GAS PNL BLDV: SIGNIFICANT CHANGE UP
GLUCOSE BLDC GLUCOMTR-MCNC: 103 MG/DL — HIGH (ref 70–99)
GLUCOSE BLDC GLUCOMTR-MCNC: 106 MG/DL — HIGH (ref 70–99)
GLUCOSE BLDC GLUCOMTR-MCNC: 114 MG/DL — HIGH (ref 70–99)
GLUCOSE BLDV-MCNC: 82 MG/DL — SIGNIFICANT CHANGE UP (ref 70–99)
GLUCOSE SERPL-MCNC: 98 MG/DL — SIGNIFICANT CHANGE UP (ref 70–99)
HCO3 BLDV-SCNC: 28 MMOL/L — SIGNIFICANT CHANGE UP (ref 22–29)
HCT VFR BLD CALC: 23 % — LOW (ref 34.5–45)
HCT VFR BLDA CALC: 25 % — LOW (ref 34.5–46.5)
HDLC SERPL-MCNC: 23 MG/DL — LOW
HGB BLD CALC-MCNC: 8.3 G/DL — LOW (ref 11.7–16.1)
HGB BLD-MCNC: 7 G/DL — CRITICAL LOW (ref 11.5–15.5)
LACTATE BLDV-MCNC: 1 MMOL/L — SIGNIFICANT CHANGE UP (ref 0.5–2)
LG PLATELETS BLD QL AUTO: SLIGHT — SIGNIFICANT CHANGE UP
LIPID PNL WITH DIRECT LDL SERPL: 69 MG/DL — SIGNIFICANT CHANGE UP
LYMPHOCYTES # BLD AUTO: 124.45 K/UL — HIGH (ref 1–3.3)
LYMPHOCYTES # BLD AUTO: 82 % — HIGH (ref 13–44)
MAGNESIUM SERPL-MCNC: 2.4 MG/DL — SIGNIFICANT CHANGE UP (ref 1.6–2.6)
MANUAL SMEAR VERIFICATION: SIGNIFICANT CHANGE UP
MCHC RBC-ENTMCNC: 28.2 PG — SIGNIFICANT CHANGE UP (ref 27–34)
MCHC RBC-ENTMCNC: 30.4 GM/DL — LOW (ref 32–36)
MCV RBC AUTO: 92.7 FL — SIGNIFICANT CHANGE UP (ref 80–100)
MICROCYTES BLD QL: SLIGHT — SIGNIFICANT CHANGE UP
MONOCYTES # BLD AUTO: 3.04 K/UL — HIGH (ref 0–0.9)
MONOCYTES NFR BLD AUTO: 2 % — SIGNIFICANT CHANGE UP (ref 2–14)
NEUTROPHILS # BLD AUTO: 22.77 K/UL — HIGH (ref 1.8–7.4)
NEUTROPHILS NFR BLD AUTO: 15 % — LOW (ref 43–77)
NON HDL CHOLESTEROL: 101 MG/DL — SIGNIFICANT CHANGE UP
NRBC # BLD: 0 /100 WBCS — SIGNIFICANT CHANGE UP (ref 0–0)
PCO2 BLDV: 47 MMHG — HIGH (ref 39–42)
PH BLDV: 7.39 — SIGNIFICANT CHANGE UP (ref 7.32–7.43)
PHOSPHATE SERPL-MCNC: 3.7 MG/DL — SIGNIFICANT CHANGE UP (ref 2.5–4.5)
PLAT MORPH BLD: ABNORMAL
PLATELET # BLD AUTO: 212 K/UL — SIGNIFICANT CHANGE UP (ref 150–400)
PO2 BLDV: 87 MMHG — HIGH (ref 25–45)
POTASSIUM BLDV-SCNC: 3.8 MMOL/L — SIGNIFICANT CHANGE UP (ref 3.5–5.1)
POTASSIUM SERPL-MCNC: 4.6 MMOL/L — SIGNIFICANT CHANGE UP (ref 3.5–5.3)
POTASSIUM SERPL-SCNC: 4.6 MMOL/L — SIGNIFICANT CHANGE UP (ref 3.5–5.3)
PROT SERPL-MCNC: 5.4 G/DL — LOW (ref 6–8.3)
RBC # BLD: 2.48 M/UL — LOW (ref 3.8–5.2)
RBC # FLD: 17.7 % — HIGH (ref 10.3–14.5)
RBC BLD AUTO: SIGNIFICANT CHANGE UP
SAO2 % BLDV: 97.6 % — HIGH (ref 67–88)
SMUDGE CELLS # BLD: PRESENT — SIGNIFICANT CHANGE UP
SODIUM SERPL-SCNC: 142 MMOL/L — SIGNIFICANT CHANGE UP (ref 135–145)
TRIGL SERPL-MCNC: 187 MG/DL — HIGH
WBC # BLD: 151.77 K/UL — CRITICAL HIGH (ref 3.8–10.5)
WBC # FLD AUTO: 151.77 K/UL — CRITICAL HIGH (ref 3.8–10.5)

## 2024-05-05 PROCEDURE — 99291 CRITICAL CARE FIRST HOUR: CPT | Mod: GC

## 2024-05-05 PROCEDURE — 70487 CT MAXILLOFACIAL W/DYE: CPT | Mod: 26

## 2024-05-05 RX ORDER — ENOXAPARIN SODIUM 100 MG/ML
110 INJECTION SUBCUTANEOUS EVERY 12 HOURS
Refills: 0 | Status: DISCONTINUED | OUTPATIENT
Start: 2024-05-05 | End: 2024-05-06

## 2024-05-05 RX ORDER — DROXIDOPA 100 MG/1
100 CAPSULE ORAL THREE TIMES A DAY
Refills: 0 | Status: DISCONTINUED | OUTPATIENT
Start: 2024-05-05 | End: 2024-05-13

## 2024-05-05 RX ORDER — HYDROCORTISONE 20 MG
50 TABLET ORAL DAILY
Refills: 0 | Status: COMPLETED | OUTPATIENT
Start: 2024-05-06 | End: 2024-05-08

## 2024-05-05 RX ORDER — PHENYLEPHRINE HYDROCHLORIDE 10 MG/ML
0.3 INJECTION INTRAVENOUS
Qty: 40 | Refills: 0 | Status: DISCONTINUED | OUTPATIENT
Start: 2024-05-05 | End: 2024-05-06

## 2024-05-05 RX ADMIN — Medication 650 MILLIGRAM(S): at 06:03

## 2024-05-05 RX ADMIN — MIDODRINE HYDROCHLORIDE 30 MILLIGRAM(S): 2.5 TABLET ORAL at 21:15

## 2024-05-05 RX ADMIN — Medication 3 MILLILITER(S): at 13:24

## 2024-05-05 RX ADMIN — Medication 0.5 MILLIGRAM(S): at 06:00

## 2024-05-05 RX ADMIN — ENOXAPARIN SODIUM 110 MILLIGRAM(S): 100 INJECTION SUBCUTANEOUS at 13:09

## 2024-05-05 RX ADMIN — HEPARIN SODIUM 1800 UNIT(S)/HR: 5000 INJECTION INTRAVENOUS; SUBCUTANEOUS at 02:53

## 2024-05-05 RX ADMIN — QUETIAPINE FUMARATE 25 MILLIGRAM(S): 200 TABLET, FILM COATED ORAL at 13:10

## 2024-05-05 RX ADMIN — AMPICILLIN SODIUM AND SULBACTAM SODIUM 200 GRAM(S): 250; 125 INJECTION, POWDER, FOR SUSPENSION INTRAMUSCULAR; INTRAVENOUS at 06:00

## 2024-05-05 RX ADMIN — Medication 3 MILLILITER(S): at 23:12

## 2024-05-05 RX ADMIN — Medication 3 MILLILITER(S): at 06:01

## 2024-05-05 RX ADMIN — Medication 50 MILLIGRAM(S): at 17:11

## 2024-05-05 RX ADMIN — Medication 0.5 MILLIGRAM(S): at 17:11

## 2024-05-05 RX ADMIN — ATOVAQUONE 1500 MILLIGRAM(S): 750 SUSPENSION ORAL at 13:08

## 2024-05-05 RX ADMIN — DROXIDOPA 100 MILLIGRAM(S): 100 CAPSULE ORAL at 13:09

## 2024-05-05 RX ADMIN — MIDODRINE HYDROCHLORIDE 30 MILLIGRAM(S): 2.5 TABLET ORAL at 13:08

## 2024-05-05 RX ADMIN — QUETIAPINE FUMARATE 25 MILLIGRAM(S): 200 TABLET, FILM COATED ORAL at 21:27

## 2024-05-05 RX ADMIN — AMPICILLIN SODIUM AND SULBACTAM SODIUM 200 GRAM(S): 250; 125 INJECTION, POWDER, FOR SUSPENSION INTRAMUSCULAR; INTRAVENOUS at 17:10

## 2024-05-05 RX ADMIN — QUETIAPINE FUMARATE 25 MILLIGRAM(S): 200 TABLET, FILM COATED ORAL at 06:00

## 2024-05-05 RX ADMIN — QUETIAPINE FUMARATE 100 MILLIGRAM(S): 200 TABLET, FILM COATED ORAL at 21:22

## 2024-05-05 RX ADMIN — Medication 50 MILLIGRAM(S): at 06:01

## 2024-05-05 RX ADMIN — AMPICILLIN SODIUM AND SULBACTAM SODIUM 200 GRAM(S): 250; 125 INJECTION, POWDER, FOR SUSPENSION INTRAMUSCULAR; INTRAVENOUS at 23:41

## 2024-05-05 RX ADMIN — AMPICILLIN SODIUM AND SULBACTAM SODIUM 200 GRAM(S): 250; 125 INJECTION, POWDER, FOR SUSPENSION INTRAMUSCULAR; INTRAVENOUS at 13:08

## 2024-05-05 RX ADMIN — CHLORHEXIDINE GLUCONATE 1 APPLICATION(S): 213 SOLUTION TOPICAL at 06:44

## 2024-05-05 RX ADMIN — CHLORHEXIDINE GLUCONATE 15 MILLILITER(S): 213 SOLUTION TOPICAL at 17:11

## 2024-05-05 RX ADMIN — Medication 650 MILLIGRAM(S): at 07:00

## 2024-05-05 RX ADMIN — DROXIDOPA 100 MILLIGRAM(S): 100 CAPSULE ORAL at 17:11

## 2024-05-05 RX ADMIN — MIDODRINE HYDROCHLORIDE 30 MILLIGRAM(S): 2.5 TABLET ORAL at 06:00

## 2024-05-05 RX ADMIN — CHLORHEXIDINE GLUCONATE 15 MILLILITER(S): 213 SOLUTION TOPICAL at 06:01

## 2024-05-05 NOTE — PROGRESS NOTE ADULT - ASSESSMENT
69F h/o CVA (bedbound at baseline), COPD, CHF, HTN presenting as transfer from [facility] for SOB iso leukocytosis to 233 c/f acute leukemia. Pt intubated and on pressors, transferred to MICU for further management, course c/b empyema s/p chest tube placement (4/16) and removal, reaccumulation of loculated pleural effusion s/p L pigtail placement and removal on 4/27     Neuro  At Baseline AOx4 mental status   L upper and lower extremity motor deficits iso hx of stroke   Pain control w/ Tylenol  Seroquel 50 BID and Clonazepam 0.5 BID for agitation     CV  #Septic Shock   - Strep Pneumo Empyema and Bacteremia s/p Pigtail catheter   - Levo changed to Dawit; Droxidopa changed to Midodrine  - Coming down on Dawit and escalate Midodrine to 30 TID     #Chest Pain, Resolved   -demand vs leukostasis vs non cardiac   -reported JOSEF in II, III, aVF at [facility] --> repeat EKG without ST changes or Q waves  -trops peaked    #CHF  -unclear hx but elevated pro-BNP to 2600s  -TTE: EF 54%, no significant abnormalities  - Diurese as appropriate    Resp  #Acute hypoxic respiratory failure  #Empyema Pansensitive Strep Pneumo  -s/p thora draining 1500cc fluid 4/16 with chest tube placement; c/w exudative effusion growing Strep Pneumo  -MRSA neg  -pleural fluid growing strep pneumo; BCx growing strep pneumo; Pansensitive  -was on vanc/zosyn/azithro (4/16)-->deescalated to CTX (4/17 -4/23), Added on Vancomycin (4/23-) due to MRSE 1/2 Cx, expanded coverage to Cefepime (4/23 1x dose), back on CTX, switched to Unasyn 3g Q6 (4/25- )  -CT chest 4/22 reveals persistent loculated effusion, s/p IR Pigtail placement 4/24-4/27 w/ Exudative effusion; Alteplase and Dornase through pigtal x6 doses; unable to finish 6th Alteplase+Dornase due to catheter occlusion   - Repeat CTC 4/27 - decreased small pleural effusions, thoracic LAD   - s/p Trach placement 4/28; Attempt Trach collar during day and Pressure support at night     GI  #Diet: Tube feed   PEG tube planned once off Pressors     /Renal  -no active issues    Heme/Onc  #Leukocytosis  #CLL  - on presentation; improving to 130s  -no plan for leukophoresis at this time given mature lymphocytes  -stop trending TLS labs as they have been stable   - CT Chest A&P showing diffuse axillary, inguinal mediastinal RP lymphadenopathy.  - Appreciate Heme recs;   - Plan for Inpatient Bone Marrow Bx per Heme     #L Common Femoral Vein DVT   - Non-occlusive   - Heparin Drip 4/29-   - Plan to switch to full dose lovenox once procedure scheduled finalized     #[facility]H Ruleout  - Persistent fevers, Anemic, elevated triglycerides (iso recent propofol use)   - f/u IL2R, NK,Ferritin     #DVT ppx: Heparin Drip     ID  #Empyema 2/2 Pansensitive Strep Pneumo  #Strep Pneumo Bacteremia  -BCx + strep pneumo; fluid culture with strep pneumo  -repeat BCx 4/20 ngtd, ReCx 4/23 due to fever   -CT Chest A&P demonstrates LLL empyema not showing any other intraabdominal infectious source  -was on vanc/zosyn/azithro (4/16)-->deescalated to CTX (4/17 -4/23), Added on Vancomycin (4/23-) due to MRSE 1/2 Cx, expanded coverage to Cefepime (4/23 1x dose), back on CTX, switched to Unasyn 3g Q6 (4/25- )  - IVIG 4/26 x1  - ID Consulted for persistent fevers despite source control; appreciate recs   - ESR elevated to 119    Plan    c/w with Unasyn (4/25  -   )   PCP PPX w/ Atovaquone   Fungitell, Cryptococcus antigen, CMV PCR,   Taper off Stress steroids Solucortef 50Q12       Endo  -ISS q6h while NPO w/ tube feed     Ethics: Full Code  Only known family member is Aunt Mili Gordon ([phone], [phone]) who lives in [place]   69F h/o CVA (bedbound at baseline), COPD, CHF, HTN presenting as transfer from [facility] for SOB iso leukocytosis to 233 c/f acute leukemia. Pt intubated and on pressors, transferred to MICU for further management, course c/b empyema s/p chest tube placement (4/16) and removal, reaccumulation of loculated pleural effusion s/p L pigtail placement and removal on 4/27     Neuro  At Baseline AOx4 mental status   L upper and lower extremity motor deficits iso hx of stroke   Pain control w/ Tylenol  Seroquel 25 tid and seroquel 100qhs for agitation     CV  #Septic Shock   - Strep Pneumo Empyema and Bacteremia s/p Pigtail catheter   - Levo changed to Dawit;  - Coming down on Dawit and escalate Midodrine to 30 TID   - added on droxidopa 100mg tid   - stress steroids to q12 today, daily tomorrow   - MAP goal > 60     #Chest Pain, Resolved   -demand vs leukostasis vs non cardiac   -reported JOSEF in II, III, aVF at [facility] --> repeat EKG without ST changes or Q waves  -trops peaked    #CHF  -unclear hx but elevated pro-BNP to 2600s  -TTE: EF 54%, no significant abnormalities  - Diurese as appropriate    Resp  #Acute hypoxic respiratory failure  #Empyema Pansensitive Strep Pneumo  -s/p thora draining 1500cc fluid 4/16 with chest tube placement; c/w exudative effusion growing Strep Pneumo  -MRSA neg  -pleural fluid growing strep pneumo; BCx growing strep pneumo; Pansensitive  -was on vanc/zosyn/azithro (4/16)-->deescalated to CTX (4/17 -4/23), Added on Vancomycin (4/23-) due to MRSE 1/2 Cx, expanded coverage to Cefepime (4/23 1x dose), back on CTX, switched to Unasyn 3g Q6 (4/25- )  -CT chest 4/22 reveals persistent loculated effusion, s/p IR Pigtail placement 4/24-4/27 w/ Exudative effusion; Alteplase and Dornase through pigtal x6 doses; unable to finish 6th Alteplase+Dornase due to catheter occlusion   - Repeat CTC 4/27 - decreased small pleural effusions, thoracic LAD   - s/p Trach placement 4/28; Attempt Trach collar during day and Pressure support at night   - continue vent weaning as tolerated, repeat sputum culture     GI  #Diet: Tube feed   PEG tube planned once off Pressors     /Renal  -no active issues    Heme/Onc  #Leukocytosis  #CLL  - on presentation; improving to 130s  -no plan for leukophoresis at this time given mature lymphocytes  -stop trending TLS labs as they have been stable   - CT Chest A&P showing diffuse axillary, inguinal mediastinal RP lymphadenopathy.  - Appreciate Heme recs;   - Plan for Inpatient Bone Marrow Bx per Heme     #L Common Femoral Vein DVT   - Non-occlusive   - Heparin Drip 4/29-   - Plan to switch to full dose lovenox once procedure scheduled finalized     #HLH Ruleout  - Persistent fevers, Anemic, elevated triglycerides (iso recent propofol use)   - f/u IL2R, NK,Ferritin     #DVT ppx: Heparin Drip     ID  #Empyema 2/2 Pansensitive Strep Pneumo  #Strep Pneumo Bacteremia  -BCx + strep pneumo; fluid culture with strep pneumo  -repeat BCx 4/20 ngtd, ReCx 4/23 due to fever   -CT Chest A&P demonstrates LLL empyema not showing any other intraabdominal infectious source  -was on vanc/zosyn/azithro (4/16)-->deescalated to CTX (4/17 -4/23), Added on Vancomycin (4/23-) due to MRSE 1/2 Cx, expanded coverage to Cefepime (4/23 1x dose), back on CTX, switched to Unasyn 3g Q6 (4/25- )  - IVIG 4/26 x1  - ID Consulted for persistent fevers despite source control; appreciate recs   - ESR elevated to 119  - will check sinus films, follow up fungitell, CMV, crypt     Plan    c/w with Unasyn (4/25  -   )   PCP PPX w/ Atovaquone   Fungitell, Cryptococcus antigen, CMV PCR,   Taper off Stress steroids Solucortef 50Q12       Endo  -ISS q6h while NPO w/ tube feed     Ethics: Full Code  Only known family member is Aunt Mili Gordon ([phone], [phone]) who lives in [place]   69F h/o CVA (bedbound at baseline), COPD, CHF, HTN presenting as transfer from [facility] for SOB iso leukocytosis to 233 c/f acute leukemia. Pt intubated and on pressors, transferred to MICU for further management, course c/b empyema s/p chest tube placement (4/16) and removal, reaccumulation of loculated pleural effusion s/p L pigtail placement and removal on 4/27     Neuro  At Baseline AOx4 mental status   L upper and lower extremity motor deficits iso hx of stroke   Pain control w/ Tylenol  Seroquel 25 tid and seroquel 100qhs for agitation     CV  #Septic Shock   - Strep Pneumo Empyema and Bacteremia s/p Pigtail catheter   - Levo changed to Dawit;  - Coming down on Dawit and escalate Midodrine to 30 TID   - added on droxidopa 100mg tid   - stress steroids to q12 today, daily tomorrow   - MAP goal > 60     #Chest Pain, Resolved   -demand vs leukostasis vs non cardiac   -reported JOSEF in II, III, aVF at [facility] --> repeat EKG without ST changes or Q waves  -trops peaked    #CHF  -unclear hx but elevated pro-BNP to 2600s  -TTE: EF 54%, no significant abnormalities  - Diurese as appropriate    Resp  #Acute hypoxic respiratory failure  #Empyema Pansensitive Strep Pneumo  -s/p thora draining 1500cc fluid 4/16 with chest tube placement; c/w exudative effusion growing Strep Pneumo  -MRSA neg  -pleural fluid growing strep pneumo; BCx growing strep pneumo; Pansensitive  -was on vanc/zosyn/azithro (4/16)-->deescalated to CTX (4/17 -4/23), Added on Vancomycin (4/23-) due to MRSE 1/2 Cx, expanded coverage to Cefepime (4/23 1x dose), back on CTX, switched to Unasyn 3g Q6 (4/25- )  -CT chest 4/22 reveals persistent loculated effusion, s/p IR Pigtail placement 4/24-4/27 w/ Exudative effusion; Alteplase and Dornase through pigtal x6 doses; unable to finish 6th Alteplase+Dornase due to catheter occlusion   - Repeat CTC 4/27 - decreased small pleural effusions, thoracic LAD   - s/p Trach placement 4/28; Attempt Trach collar during day and Pressure support at night   - continue vent weaning as tolerated, repeat sputum culture     GI  #Diet: Tube feed   PEG tube planned once off Pressors     /Renal  -no active issues    Heme/Onc  #Leukocytosis  #CLL  - on presentation; improving to 130s  -no plan for leukophoresis at this time given mature lymphocytes  -stop trending TLS labs as they have been stable   - CT Chest A&P showing diffuse axillary, inguinal mediastinal RP lymphadenopathy.  - Appreciate Heme recs;   - Plan for Inpatient Bone Marrow Bx per Heme     #L Common Femoral Vein DVT   - Non-occlusive   - Heparin Drip 4/29-   - Plan to switch to full dose lovenox once procedure scheduled finalized     #HLH Ruleout  - Persistent fevers, Anemic, elevated triglycerides (iso recent propofol use)   - f/u IL2R, NK,Ferritin     #DVT ppx: Heparin Drip --> switch to lovenox today 1g/kg bid dosing     ID  #Empyema 2/2 Pansensitive Strep Pneumo  #Strep Pneumo Bacteremia  -BCx + strep pneumo; fluid culture with strep pneumo  -repeat BCx 4/20 ngtd, ReCx 4/23 due to fever   -CT Chest A&P demonstrates LLL empyema not showing any other intraabdominal infectious source  -was on vanc/zosyn/azithro (4/16)-->deescalated to CTX (4/17 -4/23), Added on Vancomycin (4/23-) due to MRSE 1/2 Cx, expanded coverage to Cefepime (4/23 1x dose), back on CTX, switched to Unasyn 3g Q6 (4/25- )  - IVIG 4/26 x1  - ID Consulted for persistent fevers despite source control; appreciate recs   - ESR elevated to 119  - will check sinus films, follow up fungitell, CMV, crypt     Plan    c/w with Unasyn (4/25  -   )   PCP PPX w/ Atovaquone   Fungitell, Cryptococcus antigen, CMV PCR,   Taper off Stress steroids Solucortef 50Q12       Endo  -ISS q6h while NPO w/ tube feed     Ethics: Full Code  Only known family member is Aunt Mili Gordon ([phone], [phone]) who lives in [place]

## 2024-05-05 NOTE — PROGRESS NOTE ADULT - SUBJECTIVE AND OBJECTIVE BOX
INTERVAL HPI/OVERNIGHT EVENTS:    SUBJECTIVE: Patient seen and examined at bedside.     ROS: All negative except as listed above.    VITAL SIGNS:  ICU Vital Signs Last 24 Hrs  T(C): 38.1 (05 May 2024 07:00), Max: 38.1 (05 May 2024 04:00)  T(F): 100.6 (05 May 2024 07:00), Max: 100.6 (05 May 2024 04:00)  HR: 80 (05 May 2024 07:00) (65 - 112)  BP: 127/60 (05 May 2024 07:00) (78/44 - 156/58)  BP(mean): 86 (05 May 2024 07:00) (55 - 86)  ABP: --  ABP(mean): --  RR: 20 (05 May 2024 07:00) (14 - 44)  SpO2: 99% (05 May 2024 07:00) (94% - 100%)    O2 Parameters below as of 05 May 2024 07:00  Patient On (Oxygen Delivery Method): ventilator    O2 Concentration (%): 30      Mode: AC/ CMV (Assist Control/ Continuous Mandatory Ventilation), RR (machine): 14, TV (machine): 460, FiO2: 40, PEEP: 5, ITime: 1, MAP: 14, PIP: 30  Plateau pressure:   P/F ratio:     05-04 @ 07:01  -  05-05 @ 07:00  --------------------------------------------------------  IN: 2376.5 mL / OUT: 500 mL / NET: 1876.5 mL    05-05 @ 07:01  -  05-05 @ 07:58  --------------------------------------------------------  IN: 53.4 mL / OUT: 0 mL / NET: 53.4 mL      CAPILLARY BLOOD GLUCOSE      POCT Blood Glucose.: 106 mg/dL (05 May 2024 06:07)      ECG: reviewed.    PHYSICAL EXAM:    GENERAL: NAD, lying in bed comfortably  HEAD:  Atraumatic, normocephalic  EYES: EOMI, PERRLA, conjunctiva and sclera clear  NECK: Supple, trachea midline, no JVD  HEART: Regular rate and rhythm, no murmurs, rubs, or gallops  LUNGS: Unlabored respirations.  Clear to auscultation bilaterally, no crackles, wheezing, or rhonchi  ABDOMEN: Soft, nontender, nondistended, +BS  EXTREMITIES: 2+ peripheral pulses bilaterally, cap refill<2 secs. No clubbing, cyanosis, or edema  NERVOUS SYSTEM:  A&Ox3, following commands, moving all extremities, no focal deficits   SKIN: No rashes or lesions    MEDICATIONS:  MEDICATIONS  (STANDING):  albuterol/ipratropium for Nebulization 3 milliLiter(s) Nebulizer every 8 hours  ampicillin/sulbactam  IVPB      ampicillin/sulbactam  IVPB 3 Gram(s) IV Intermittent every 6 hours  atovaquone  Suspension 1500 milliGRAM(s) Oral daily  chlorhexidine 0.12% Liquid 15 milliLiter(s) Oral Mucosa every 12 hours  chlorhexidine 2% Cloths 1 Application(s) Topical <User Schedule>  clonazePAM  Tablet 0.5 milliGRAM(s) Oral every 12 hours  heparin  Infusion.  Unit(s)/Hr (20 mL/Hr) IV Continuous <Continuous>  hydrocortisone sodium succinate Injectable 50 milliGRAM(s) IV Push every 12 hours  insulin lispro (ADMELOG) corrective regimen sliding scale   SubCutaneous every 6 hours  midodrine 30 milliGRAM(s) Oral three times a day  phenylephrine    Infusion 0.3 MICROgram(s)/kG/Min (12.5 mL/Hr) IV Continuous <Continuous>  polyethylene glycol 3350 17 Gram(s) Oral every 12 hours  QUEtiapine 100 milliGRAM(s) Oral at bedtime  QUEtiapine 25 milliGRAM(s) Oral three times a day  senna Syrup 10 milliLiter(s) Oral at bedtime    MEDICATIONS  (PRN):  acetaminophen   Oral Liquid .. 650 milliGRAM(s) Oral every 6 hours PRN Temp greater or equal to 38C (100.4F)  nystatin Powder 1 Application(s) Topical two times a day PRN skin irritation      ALLERGIES:  Allergies    No Known Allergies    Intolerances        LABS:                        7.0    151.77 )-----------( 212      ( 05 May 2024 00:00 )             23.0     05-05    142  |  108  |  12  ----------------------------<  98  4.6   |  24  |  0.44<L>    Ca    7.9<L>      05 May 2024 00:00  Phos  3.7     05-05  Mg     2.4     05-05    TPro  5.4<L>  /  Alb  2.1<L>  /  TBili  0.4  /  DBili  x   /  AST  20  /  ALT  9<L>  /  AlkPhos  69  05-05    PT/INR - ( 04 May 2024 07:45 )   PT: 13.0 sec;   INR: 1.19 ratio         PTT - ( 05 May 2024 02:05 )  PTT:90.8 sec  Urinalysis Basic - ( 05 May 2024 00:00 )    Color: x / Appearance: x / SG: x / pH: x  Gluc: 98 mg/dL / Ketone: x  / Bili: x / Urobili: x   Blood: x / Protein: x / Nitrite: x   Leuk Esterase: x / RBC: x / WBC x   Sq Epi: x / Non Sq Epi: x / Bacteria: x      ABG:      vBG:  pH, Venous: 7.39 (05-05-24 @ 00:00)  pCO2, Venous: 47 mmHg (05-05-24 @ 00:00)  pO2, Venous: 87 mmHg (05-05-24 @ 00:00)  HCO3, Venous: 28 mmol/L (05-05-24 @ 00:00)    Micro:    Culture - Blood (collected 05-03-24 @ 08:30)  Source: .Blood Blood-Peripheral  Preliminary Report (05-04-24 @ 16:02):    No growth at 24 hours    Culture - Blood (collected 05-03-24 @ 08:15)  Source: .Blood Blood-Peripheral  Preliminary Report (05-04-24 @ 16:02):    No growth at 24 hours    Culture - Blood (collected 04-29-24 @ 14:00)  Source: .Blood Blood  Final Report (05-04-24 @ 17:01):    No growth at 5 days    Culture - Blood (collected 04-29-24 @ 13:50)  Source: .Blood Blood  Final Report (05-04-24 @ 17:01):    No growth at 5 days    Culture - Blood (collected 04-26-24 @ 13:30)  Source: .Blood Blood-Peripheral  Final Report (05-01-24 @ 18:00):    No growth at 5 days    Culture - Blood (collected 04-26-24 @ 13:21)  Source: .Blood Blood-Venous  Final Report (05-01-24 @ 18:00):    No growth at 5 days    Culture - Blood (collected 04-24-24 @ 05:53)  Source: .Blood Blood  Final Report (04-29-24 @ 10:01):    No growth at 5 days    Culture - Blood (collected 04-24-24 @ 05:53)  Source: .Blood Blood  Final Report (04-29-24 @ 10:01):    No growth at 5 days    Culture - Blood (collected 04-22-24 @ 21:37)  Source: .Blood Blood-Peripheral  Gram Stain (04-24-24 @ 00:09):    Growth in aerobic bottle: Gram Positive Cocci in Clusters  Final Report (04-24-24 @ 23:16):    Growth in aerobic bottle: Staphylococcus epidermidis    Isolation of Coagulase negative Staphylococcus from single blood culture    sets may represent    contamination. Contact the Microbiology Department at [phone] if    susceptibility testing is    clinically indicated.    Direct identification is available within approximately 3-5    hours either by Blood Panel Multiplexed PCR or Direct    MALDI-TOF. Details: [email or web address]  Organism: Blood Culture PCR (04-24-24 @ 23:16)  Organism: Blood Culture PCR (04-24-24 @ 23:16)      Method Type: PCR      -  Staphylococcus epidermidis, Methicillin resistant: Detec    Culture - Blood (collected 04-22-24 @ 19:30)  Source: .Blood Blood-Peripheral  Final Report (04-28-24 @ 01:00):    No growth at 5 days    Culture - Blood (collected 04-20-24 @ 12:15)  Source: .Blood Blood-Peripheral  Final Report (04-25-24 @ 19:00):    No growth at 5 days    Culture - Blood (collected 04-20-24 @ 12:00)  Source: .Blood Blood-Peripheral  Final Report (04-25-24 @ 19:00):    No growth at 5 days    Culture - Blood (collected 04-18-24 @ 11:00)  Source: .Blood Blood-Peripheral  Final Report (04-23-24 @ 17:01):    No growth at 5 days    Culture - Blood (collected 04-18-24 @ 10:50)  Source: .Blood Blood-Peripheral  Final Report (04-23-24 @ 17:01):    No growth at 5 days    Culture - Blood (collected 04-16-24 @ 15:40)  Source: .Blood Blood-Peripheral  Final Report (04-21-24 @ 21:01):    No growth at 5 days    Culture - Blood (collected 04-16-24 @ 13:17)  Source: .Blood Blood-Peripheral  Final Report (04-21-24 @ 18:01):    No growth at 5 days    Culture - Blood (collected 04-16-24 @ 06:42)  Source: .Blood Blood-Peripheral  Gram Stain (04-18-24 @ 11:52):    Growth in aerobic bottle: Gram Positive Cocci in Pairs and Chains    Growth in anaerobic bottle: Gram positive cocci in pairs  Final Report (04-18-24 @ 11:52):    Growth in aerobic and anaerobic bottles: Streptococcus pneumoniae    See previous culture [id number]    Culture - Blood (collected 04-16-24 @ 06:35)  Source: .Blood Blood-Peripheral  Gram Stain (04-18-24 @ 11:51):    Growth in aerobic bottle: Gram positive cocci in pairs    Growth in anaerobic bottle: Gram positive cocci in pairs  Final Report (04-18-24 @ 11:51):    Growth in aerobic and anaerobic bottles: Streptococcus pneumoniae    Direct identification is available within approximately 3-5    hours either by Blood Panel Multiplexed PCR or Direct    MALDI-TOF. Details: [email or web address]  Organism: Blood Culture PCR  Streptococcus pneumoniae (04-18-24 @ 11:51)  Organism: Streptococcus pneumoniae (04-18-24 @ 11:51)      Method Type: LESLY      -  Clindamycin: S <=0.06      -  Erythromycin: S <=0.06 Predicts results for azithromycin.      -  Levofloxacin: S 1      -  Tetracycline: S <=0.5      -  Trimethoprim/Sulfamethoxazole: S <=.25/4.75      -  Vancomycin: S 0.5      -  Ceftriaxone (meningitidis): S <=0.25      -  Ceftriaxone (non-meningitidis): S <=0.25      -  Penicillin (meningitidis): S 0.06      -  Penicillin (non-meningitidis): S 0.06      -  Penicillin (oral penicillin V): S 0.06  Organism: Blood Culture PCR (04-18-24 @ 11:51)      Method Type: PCR      -  Streptococcus pneumoniae: Detec        Culture - Sputum (collected 04-26-24 @ 13:44)  Source: .Sputum Sputum  Gram Stain (04-26-24 @ 20:33):    Rare polymorphonuclear leukocytes per low power field    No Squamous epithelial cells per low power field    No organisms seen per oil power field  Final Report (04-28-24 @ 08:42):    No growth    Culture - Sputum (collected 04-16-24 @ 15:44)  Source: ET Tube ET Tube  Gram Stain (04-16-24 @ 22:00):    Few polymorphonuclear leukocytes per low power field    No Squamous epithelial cells per low power field    No organisms seen per oil power field  Final Report (04-18-24 @ 09:40):    Normal Respiratory Kayla present        RADIOLOGY & ADDITIONAL TESTS: Reviewed.   INTERVAL HPI/OVERNIGHT EVENTS:    SUBJECTIVE: Patient seen and examined at bedside. No overnight events, remains on phenylephrine 0.9. Otherwise, patient receiving seroquel for agitation.     ROS: All negative except as listed above.    VITAL SIGNS:  ICU Vital Signs Last 24 Hrs  T(C): 38.1 (05 May 2024 07:00), Max: 38.1 (05 May 2024 04:00)  T(F): 100.6 (05 May 2024 07:00), Max: 100.6 (05 May 2024 04:00)  HR: 80 (05 May 2024 07:00) (65 - 112)  BP: 127/60 (05 May 2024 07:00) (78/44 - 156/58)  BP(mean): 86 (05 May 2024 07:00) (55 - 86)  ABP: --  ABP(mean): --  RR: 20 (05 May 2024 07:00) (14 - 44)  SpO2: 99% (05 May 2024 07:00) (94% - 100%)    O2 Parameters below as of 05 May 2024 07:00  Patient On (Oxygen Delivery Method): ventilator    O2 Concentration (%): 30      Mode: AC/ CMV (Assist Control/ Continuous Mandatory Ventilation), RR (machine): 14, TV (machine): 460, FiO2: 40, PEEP: 5, ITime: 1, MAP: 14, PIP: 30  Plateau pressure:   P/F ratio:     05-04 @ 07:01  -  05-05 @ 07:00  --------------------------------------------------------  IN: 2376.5 mL / OUT: 500 mL / NET: 1876.5 mL    05-05 @ 07:01  -  05-05 @ 07:58  --------------------------------------------------------  IN: 53.4 mL / OUT: 0 mL / NET: 53.4 mL      CAPILLARY BLOOD GLUCOSE      POCT Blood Glucose.: 106 mg/dL (05 May 2024 06:07)      ECG: reviewed.    PHYSICAL EXAM:    GENERAL: NAD, lying in bed comfortably  HEAD:  Atraumatic, normocephalic  EYES: EOMI, PERRLA, conjunctiva and sclera clear  NECK: Supple, trachea midline, no JVD. tracheostomy tube in place   HEART: Regular rate and rhythm, no murmurs, rubs, or gallops  LUNGS: Unlabored respirations.  Clear to auscultation bilaterally, no crackles, wheezing, or rhonchi  ABDOMEN: Soft, nontender, nondistended, +BS  EXTREMITIES: 2+ peripheral pulses bilaterally, cap refill<2 secs. No clubbing, cyanosis, L leg and L arm pitting edema   NERVOUS SYSTEM:  A&Ox3, following commands, moving all extremities, no focal deficits   SKIN: No rashes or lesions    MEDICATIONS:  MEDICATIONS  (STANDING):  albuterol/ipratropium for Nebulization 3 milliLiter(s) Nebulizer every 8 hours  ampicillin/sulbactam  IVPB      ampicillin/sulbactam  IVPB 3 Gram(s) IV Intermittent every 6 hours  atovaquone  Suspension 1500 milliGRAM(s) Oral daily  chlorhexidine 0.12% Liquid 15 milliLiter(s) Oral Mucosa every 12 hours  chlorhexidine 2% Cloths 1 Application(s) Topical <User Schedule>  clonazePAM  Tablet 0.5 milliGRAM(s) Oral every 12 hours  heparin  Infusion.  Unit(s)/Hr (20 mL/Hr) IV Continuous <Continuous>  hydrocortisone sodium succinate Injectable 50 milliGRAM(s) IV Push every 12 hours  insulin lispro (ADMELOG) corrective regimen sliding scale   SubCutaneous every 6 hours  midodrine 30 milliGRAM(s) Oral three times a day  phenylephrine    Infusion 0.3 MICROgram(s)/kG/Min (12.5 mL/Hr) IV Continuous <Continuous>  polyethylene glycol 3350 17 Gram(s) Oral every 12 hours  QUEtiapine 100 milliGRAM(s) Oral at bedtime  QUEtiapine 25 milliGRAM(s) Oral three times a day  senna Syrup 10 milliLiter(s) Oral at bedtime    MEDICATIONS  (PRN):  acetaminophen   Oral Liquid .. 650 milliGRAM(s) Oral every 6 hours PRN Temp greater or equal to 38C (100.4F)  nystatin Powder 1 Application(s) Topical two times a day PRN skin irritation      ALLERGIES:  Allergies    No Known Allergies    Intolerances        LABS:                        7.0    151.77 )-----------( 212      ( 05 May 2024 00:00 )             23.0     05-05    142  |  108  |  12  ----------------------------<  98  4.6   |  24  |  0.44<L>    Ca    7.9<L>      05 May 2024 00:00  Phos  3.7     05-05  Mg     2.4     05-05    TPro  5.4<L>  /  Alb  2.1<L>  /  TBili  0.4  /  DBili  x   /  AST  20  /  ALT  9<L>  /  AlkPhos  69  05-05    PT/INR - ( 04 May 2024 07:45 )   PT: 13.0 sec;   INR: 1.19 ratio         PTT - ( 05 May 2024 02:05 )  PTT:90.8 sec  Urinalysis Basic - ( 05 May 2024 00:00 )    Color: x / Appearance: x / SG: x / pH: x  Gluc: 98 mg/dL / Ketone: x  / Bili: x / Urobili: x   Blood: x / Protein: x / Nitrite: x   Leuk Esterase: x / RBC: x / WBC x   Sq Epi: x / Non Sq Epi: x / Bacteria: x      ABG:      vBG:  pH, Venous: 7.39 (05-05-24 @ 00:00)  pCO2, Venous: 47 mmHg (05-05-24 @ 00:00)  pO2, Venous: 87 mmHg (05-05-24 @ 00:00)  HCO3, Venous: 28 mmol/L (05-05-24 @ 00:00)    Micro:    Culture - Blood (collected 05-03-24 @ 08:30)  Source: .Blood Blood-Peripheral  Preliminary Report (05-04-24 @ 16:02):    No growth at 24 hours    Culture - Blood (collected 05-03-24 @ 08:15)  Source: .Blood Blood-Peripheral  Preliminary Report (05-04-24 @ 16:02):    No growth at 24 hours    Culture - Blood (collected 04-29-24 @ 14:00)  Source: .Blood Blood  Final Report (05-04-24 @ 17:01):    No growth at 5 days    Culture - Blood (collected 04-29-24 @ 13:50)  Source: .Blood Blood  Final Report (05-04-24 @ 17:01):    No growth at 5 days    Culture - Blood (collected 04-26-24 @ 13:30)  Source: .Blood Blood-Peripheral  Final Report (05-01-24 @ 18:00):    No growth at 5 days    Culture - Blood (collected 04-26-24 @ 13:21)  Source: .Blood Blood-Venous  Final Report (05-01-24 @ 18:00):    No growth at 5 days    Culture - Blood (collected 04-24-24 @ 05:53)  Source: .Blood Blood  Final Report (04-29-24 @ 10:01):    No growth at 5 days    Culture - Blood (collected 04-24-24 @ 05:53)  Source: .Blood Blood  Final Report (04-29-24 @ 10:01):    No growth at 5 days    Culture - Blood (collected 04-22-24 @ 21:37)  Source: .Blood Blood-Peripheral  Gram Stain (04-24-24 @ 00:09):    Growth in aerobic bottle: Gram Positive Cocci in Clusters  Final Report (04-24-24 @ 23:16):    Growth in aerobic bottle: Staphylococcus epidermidis    Isolation of Coagulase negative Staphylococcus from single blood culture    sets may represent    contamination. Contact the Microbiology Department at [phone] if    susceptibility testing is    clinically indicated.    Direct identification is available within approximately 3-5    hours either by Blood Panel Multiplexed PCR or Direct    MALDI-TOF. Details: [email or web address]  Organism: Blood Culture PCR (04-24-24 @ 23:16)  Organism: Blood Culture PCR (04-24-24 @ 23:16)      Method Type: PCR      -  Staphylococcus epidermidis, Methicillin resistant: Detec    Culture - Blood (collected 04-22-24 @ 19:30)  Source: .Blood Blood-Peripheral  Final Report (04-28-24 @ 01:00):    No growth at 5 days    Culture - Blood (collected 04-20-24 @ 12:15)  Source: .Blood Blood-Peripheral  Final Report (04-25-24 @ 19:00):    No growth at 5 days    Culture - Blood (collected 04-20-24 @ 12:00)  Source: .Blood Blood-Peripheral  Final Report (04-25-24 @ 19:00):    No growth at 5 days    Culture - Blood (collected 04-18-24 @ 11:00)  Source: .Blood Blood-Peripheral  Final Report (04-23-24 @ 17:01):    No growth at 5 days    Culture - Blood (collected 04-18-24 @ 10:50)  Source: .Blood Blood-Peripheral  Final Report (04-23-24 @ 17:01):    No growth at 5 days    Culture - Blood (collected 04-16-24 @ 15:40)  Source: .Blood Blood-Peripheral  Final Report (04-21-24 @ 21:01):    No growth at 5 days    Culture - Blood (collected 04-16-24 @ 13:17)  Source: .Blood Blood-Peripheral  Final Report (04-21-24 @ 18:01):    No growth at 5 days    Culture - Blood (collected 04-16-24 @ 06:42)  Source: .Blood Blood-Peripheral  Gram Stain (04-18-24 @ 11:52):    Growth in aerobic bottle: Gram Positive Cocci in Pairs and Chains    Growth in anaerobic bottle: Gram positive cocci in pairs  Final Report (04-18-24 @ 11:52):    Growth in aerobic and anaerobic bottles: Streptococcus pneumoniae    See previous culture [id number]    Culture - Blood (collected 04-16-24 @ 06:35)  Source: .Blood Blood-Peripheral  Gram Stain (04-18-24 @ 11:51):    Growth in aerobic bottle: Gram positive cocci in pairs    Growth in anaerobic bottle: Gram positive cocci in pairs  Final Report (04-18-24 @ 11:51):    Growth in aerobic and anaerobic bottles: Streptococcus pneumoniae    Direct identification is available within approximately 3-5    hours either by Blood Panel Multiplexed PCR or Direct    MALDI-TOF. Details: [email or web address]  Organism: Blood Culture PCR  Streptococcus pneumoniae (04-18-24 @ 11:51)  Organism: Streptococcus pneumoniae (04-18-24 @ 11:51)      Method Type: LESLY      -  Clindamycin: S <=0.06      -  Erythromycin: S <=0.06 Predicts results for azithromycin.      -  Levofloxacin: S 1      -  Tetracycline: S <=0.5      -  Trimethoprim/Sulfamethoxazole: S <=.25/4.75      -  Vancomycin: S 0.5      -  Ceftriaxone (meningitidis): S <=0.25      -  Ceftriaxone (non-meningitidis): S <=0.25      -  Penicillin (meningitidis): S 0.06      -  Penicillin (non-meningitidis): S 0.06      -  Penicillin (oral penicillin V): S 0.06  Organism: Blood Culture PCR (04-18-24 @ 11:51)      Method Type: PCR      -  Streptococcus pneumoniae: Detec        Culture - Sputum (collected 04-26-24 @ 13:44)  Source: .Sputum Sputum  Gram Stain (04-26-24 @ 20:33):    Rare polymorphonuclear leukocytes per low power field    No Squamous epithelial cells per low power field    No organisms seen per oil power field  Final Report (04-28-24 @ 08:42):    No growth    Culture - Sputum (collected 04-16-24 @ 15:44)  Source: ET Tube ET Tube  Gram Stain (04-16-24 @ 22:00):    Few polymorphonuclear leukocytes per low power field    No Squamous epithelial cells per low power field    No organisms seen per oil power field  Final Report (04-18-24 @ 09:40):    Normal Respiratory Akyla present        RADIOLOGY & ADDITIONAL TESTS: Reviewed.

## 2024-05-05 NOTE — PROGRESS NOTE ADULT - ATTENDING COMMENTS
69 year old female with a history of CVA (bedbound at baseline) COPD, CHF, HTN presented initially as a transfer with hyperleukocytosis with concerning for acute leukemia (no blasts, more consistent with CLL and leukemoid reaction in the setting of acute infection) and acute hypoxic respiratory failure eventually found to have strep pneumonia bacteremia, pneumonia c/b empyema s/p chest tube (4/16-4/19), c/b persistent fevers, CT replaced (4/23/24-4/27/24) s/p intrapleural fibrinolytic therapy, and now s/p tracheostomy    Lines:   PIV  Trach 4/28/24    N: Bedbound at baseline  History of CVA, with left sided residual deficit  AAOx4  Agitation  - At baseline mental status  - Seroquel 50 TID and 100 mg at night  - Delirium precautions  CV:   Septic shock, vasoplegia; increased LVOT gradient requiring low dose neosynephrine  TTE negative for vegetation  Elevated pro-BNP >2k, with evidence of volume overload on exam  - On low dose Dawit  - Midodrine 30 TID, Will add droxidopa  - Will start to space stress dose steroids  - MAP >60  P: Acute hypoxic respiratory failure  Strep pneumonia  Empyema s/p chest tube (4/16-14/19)- did not do intrapleural fibrinolytic therapy; s/p CT 4/23/24-4/27 s/p fibrinolytic therapy  Intubated 4/16, prolonged resp failure trach 4/28/24  - Diuresis as tolerated  - Continue vent weaning as able   - Lung protective settings  - Trend BG  - Suctioning, duonebs PRN, oral care  GI:   - Tube feeds  Renal:   - Trend cr, lytes  Heme onc:   CLL  Leukamoid reaction  Nonocclusive left common femoral vein DVT  - Appreciate heme recs: Planned for BM biopsy;   - ? if fevers related to underlying malignancy  - On heparin drip   ID: Pneumonia, empyema s/p chest tube without lytics, with persistent fevers and fluid, s/p repeat CT placement 4/23/24-4/27/24 with lytes and improvement in effusion.   Strep bacteremia  Persistently febrile, ? if related to uncontrolled infection v. thrombus v. malignancy   - Unasyn for now  - Repeat sputum culture  - Appreciate ID input: Check sinus films, fungitell, CMV, crypt  - Will start to taper steroids, will confirm with ID if they still want PJP ppx  Endo: Avoid hypoglycemia,   BG <210    DVT: A/C as above  Full code  HCP- Presumably Aunt (only known family member) 69 year old female with a history of CVA (bedbound at baseline) COPD, CHF, HTN presented initially as a transfer with hyperleukocytosis with concerning for acute leukemia (no blasts, more consistent with CLL and leukemoid reaction in the setting of acute infection) and acute hypoxic respiratory failure eventually found to have strep pneumonia bacteremia, pneumonia c/b empyema s/p chest tube (4/16-4/19), c/b persistent fevers, CT replaced (4/23/24-4/27/24) s/p intrapleural fibrinolytic therapy, and now s/p tracheostomy    Lines:   PIV  Trach 4/28/24    N: Bedbound at baseline  History of CVA, with left sided residual deficit  AAOx4  Agitation  - At baseline mental status  - Seroquel 50 TID and 100 mg at night  - Delirium precautions  CV:   Septic shock, vasoplegia; increased LVOT gradient requiring low dose neosynephrine  TTE negative for vegetation  Elevated pro-BNP >2k, with evidence of volume overload on exam  - On low dose Dawit  - Midodrine 30 TID, Will add droxidopa  - Will start to space stress dose steroids, Q12 for today, daily tomorrow  - MAP >60  P: Acute hypoxic respiratory failure  Strep pneumonia  Empyema s/p chest tube (4/16-14/19)- did not do intrapleural fibrinolytic therapy; s/p CT 4/23/24-4/27 s/p fibrinolytic therapy  Intubated 4/16, prolonged resp failure trach 4/28/24  - Diuresis as tolerated  - Continue vent weaning as able   - Lung protective settings  - Trend BG  - Suctioning, duonebs PRN, oral care  GI:   - Tube feeds  - PEG with GI if off pressor  Renal:   - Trend cr, lytes  Heme onc:   CLL  Nonocclusive left common femoral vein DVT  - Appreciate heme recs: Planned for BM biopsy next week  - ? if fevers related to underlying malignancy  - On heparin drip, will switch to lovenox  ID: Pneumonia, empyema s/p chest tube without lytics, with persistent fevers and fluid, s/p repeat CT placement 4/23/24-4/27/24 with lytes and improvement in effusion.   Strep bacteremia  Persistently febrile, ? if related to uncontrolled infection v. thrombus v. malignancy   - Unasyn for now  - Repeat sputum culture  - Appreciate ID input: Check sinus films, fungitell, CMV, crypt  - Will start to taper steroids, will confirm with ID if they still want PJP ppx  Endo: Avoid hypoglycemia,   BG <210    DVT: A/C as above  Full code  HCP- Presumably Aunt (only known family member), plan to come next week

## 2024-05-06 LAB
ALBUMIN SERPL ELPH-MCNC: 2.5 G/DL — LOW (ref 3.3–5)
ALBUMIN SERPL ELPH-MCNC: 2.6 G/DL — LOW (ref 3.3–5)
ALP SERPL-CCNC: 70 U/L — SIGNIFICANT CHANGE UP (ref 40–120)
ALP SERPL-CCNC: 74 U/L — SIGNIFICANT CHANGE UP (ref 40–120)
ALT FLD-CCNC: 10 U/L — SIGNIFICANT CHANGE UP (ref 10–45)
ALT FLD-CCNC: 10 U/L — SIGNIFICANT CHANGE UP (ref 10–45)
ANION GAP SERPL CALC-SCNC: 11 MMOL/L — SIGNIFICANT CHANGE UP (ref 5–17)
ANION GAP SERPL CALC-SCNC: 9 MMOL/L — SIGNIFICANT CHANGE UP (ref 5–17)
APTT BLD: 30 SEC — SIGNIFICANT CHANGE UP (ref 24.5–35.6)
APTT BLD: 32 SEC — SIGNIFICANT CHANGE UP (ref 24.5–35.6)
AST SERPL-CCNC: 16 U/L — SIGNIFICANT CHANGE UP (ref 10–40)
AST SERPL-CCNC: 18 U/L — SIGNIFICANT CHANGE UP (ref 10–40)
BASE EXCESS BLDV CALC-SCNC: 2.7 MMOL/L — SIGNIFICANT CHANGE UP (ref -2–3)
BASE EXCESS BLDV CALC-SCNC: 4 MMOL/L — HIGH (ref -2–3)
BASOPHILS # BLD AUTO: 0.07 K/UL — SIGNIFICANT CHANGE UP (ref 0–0.2)
BASOPHILS NFR BLD AUTO: 0.1 % — SIGNIFICANT CHANGE UP (ref 0–2)
BILIRUB SERPL-MCNC: 0.2 MG/DL — SIGNIFICANT CHANGE UP (ref 0.2–1.2)
BILIRUB SERPL-MCNC: 0.4 MG/DL — SIGNIFICANT CHANGE UP (ref 0.2–1.2)
BUN SERPL-MCNC: 10 MG/DL — SIGNIFICANT CHANGE UP (ref 7–23)
BUN SERPL-MCNC: 12 MG/DL — SIGNIFICANT CHANGE UP (ref 7–23)
CA-I SERPL-SCNC: 1.21 MMOL/L — SIGNIFICANT CHANGE UP (ref 1.15–1.33)
CA-I SERPL-SCNC: 1.21 MMOL/L — SIGNIFICANT CHANGE UP (ref 1.15–1.33)
CALCIUM SERPL-MCNC: 8.2 MG/DL — LOW (ref 8.4–10.5)
CALCIUM SERPL-MCNC: 8.5 MG/DL — SIGNIFICANT CHANGE UP (ref 8.4–10.5)
CHLORIDE BLDV-SCNC: 106 MMOL/L — SIGNIFICANT CHANGE UP (ref 96–108)
CHLORIDE BLDV-SCNC: 107 MMOL/L — SIGNIFICANT CHANGE UP (ref 96–108)
CHLORIDE SERPL-SCNC: 106 MMOL/L — SIGNIFICANT CHANGE UP (ref 96–108)
CHLORIDE SERPL-SCNC: 107 MMOL/L — SIGNIFICANT CHANGE UP (ref 96–108)
CMV DNA CSF QL NAA+PROBE: ABNORMAL IU/ML
CMV DNA SPEC NAA+PROBE-LOG#: ABNORMAL LOG10IU/ML
CO2 BLDV-SCNC: 29 MMOL/L — HIGH (ref 22–26)
CO2 BLDV-SCNC: 30 MMOL/L — HIGH (ref 22–26)
CO2 SERPL-SCNC: 24 MMOL/L — SIGNIFICANT CHANGE UP (ref 22–31)
CO2 SERPL-SCNC: 25 MMOL/L — SIGNIFICANT CHANGE UP (ref 22–31)
CREAT SERPL-MCNC: 0.45 MG/DL — LOW (ref 0.5–1.3)
CREAT SERPL-MCNC: 0.48 MG/DL — LOW (ref 0.5–1.3)
EGFR: 103 ML/MIN/1.73M2 — SIGNIFICANT CHANGE UP
EGFR: 105 ML/MIN/1.73M2 — SIGNIFICANT CHANGE UP
EOSINOPHIL # BLD AUTO: 0.23 K/UL — SIGNIFICANT CHANGE UP (ref 0–0.5)
EOSINOPHIL NFR BLD AUTO: 0.2 % — SIGNIFICANT CHANGE UP (ref 0–6)
FUNGITELL: 78 PG/ML — SIGNIFICANT CHANGE UP
GAS PNL BLDV: 140 MMOL/L — SIGNIFICANT CHANGE UP (ref 136–145)
GAS PNL BLDV: 140 MMOL/L — SIGNIFICANT CHANGE UP (ref 136–145)
GAS PNL BLDV: SIGNIFICANT CHANGE UP
GLUCOSE BLDC GLUCOMTR-MCNC: 106 MG/DL — HIGH (ref 70–99)
GLUCOSE BLDC GLUCOMTR-MCNC: 111 MG/DL — HIGH (ref 70–99)
GLUCOSE BLDC GLUCOMTR-MCNC: 94 MG/DL — SIGNIFICANT CHANGE UP (ref 70–99)
GLUCOSE BLDV-MCNC: 69 MG/DL — LOW (ref 70–99)
GLUCOSE BLDV-MCNC: 90 MG/DL — SIGNIFICANT CHANGE UP (ref 70–99)
GLUCOSE SERPL-MCNC: 80 MG/DL — SIGNIFICANT CHANGE UP (ref 70–99)
GLUCOSE SERPL-MCNC: 92 MG/DL — SIGNIFICANT CHANGE UP (ref 70–99)
GRAM STN FLD: SIGNIFICANT CHANGE UP
HCO3 BLDV-SCNC: 28 MMOL/L — SIGNIFICANT CHANGE UP (ref 22–29)
HCO3 BLDV-SCNC: 28 MMOL/L — SIGNIFICANT CHANGE UP (ref 22–29)
HCT VFR BLD CALC: 21.2 % — LOW (ref 34.5–45)
HCT VFR BLD CALC: 21.9 % — LOW (ref 34.5–45)
HCT VFR BLDA CALC: 23 % — LOW (ref 34.5–46.5)
HCT VFR BLDA CALC: 26 % — LOW (ref 34.5–46.5)
HGB BLD CALC-MCNC: 7.7 G/DL — LOW (ref 11.7–16.1)
HGB BLD CALC-MCNC: 8.6 G/DL — LOW (ref 11.7–16.1)
HGB BLD-MCNC: 6.5 G/DL — CRITICAL LOW (ref 11.5–15.5)
HGB BLD-MCNC: 6.6 G/DL — CRITICAL LOW (ref 11.5–15.5)
HOROWITZ INDEX BLDV+IHG-RTO: 40 — SIGNIFICANT CHANGE UP
HOROWITZ INDEX BLDV+IHG-RTO: 40 — SIGNIFICANT CHANGE UP
IMM GRANULOCYTES NFR BLD AUTO: 0.3 % — SIGNIFICANT CHANGE UP (ref 0–0.9)
INR BLD: 1.15 RATIO — SIGNIFICANT CHANGE UP (ref 0.85–1.18)
INR BLD: 1.17 RATIO — SIGNIFICANT CHANGE UP (ref 0.85–1.18)
LACTATE BLDV-MCNC: 0.6 MMOL/L — SIGNIFICANT CHANGE UP (ref 0.5–2)
LACTATE BLDV-MCNC: 1.4 MMOL/L — SIGNIFICANT CHANGE UP (ref 0.5–2)
LYMPHOCYTES # BLD AUTO: 93 % — HIGH (ref 13–44)
LYMPHOCYTES # BLD AUTO: 98.54 K/UL — HIGH (ref 1–3.3)
MAGNESIUM SERPL-MCNC: 2.5 MG/DL — SIGNIFICANT CHANGE UP (ref 1.6–2.6)
MAGNESIUM SERPL-MCNC: 2.5 MG/DL — SIGNIFICANT CHANGE UP (ref 1.6–2.6)
MCHC RBC-ENTMCNC: 28.1 PG — SIGNIFICANT CHANGE UP (ref 27–34)
MCHC RBC-ENTMCNC: 28.6 PG — SIGNIFICANT CHANGE UP (ref 27–34)
MCHC RBC-ENTMCNC: 29.7 GM/DL — LOW (ref 32–36)
MCHC RBC-ENTMCNC: 31.1 GM/DL — LOW (ref 32–36)
MCV RBC AUTO: 91.8 FL — SIGNIFICANT CHANGE UP (ref 80–100)
MCV RBC AUTO: 94.8 FL — SIGNIFICANT CHANGE UP (ref 80–100)
MONOCYTES # BLD AUTO: 2.49 K/UL — HIGH (ref 0–0.9)
MONOCYTES NFR BLD AUTO: 2.4 % — SIGNIFICANT CHANGE UP (ref 2–14)
NEUTROPHILS # BLD AUTO: 4.23 K/UL — SIGNIFICANT CHANGE UP (ref 1.8–7.4)
NEUTROPHILS NFR BLD AUTO: 4 % — LOW (ref 43–77)
NRBC # BLD: 0 /100 WBCS — SIGNIFICANT CHANGE UP (ref 0–0)
NRBC # BLD: 0 /100 WBCS — SIGNIFICANT CHANGE UP (ref 0–0)
PCO2 BLDV: 42 MMHG — SIGNIFICANT CHANGE UP (ref 39–42)
PCO2 BLDV: 45 MMHG — HIGH (ref 39–42)
PH BLDV: 7.4 — SIGNIFICANT CHANGE UP (ref 7.32–7.43)
PH BLDV: 7.44 — HIGH (ref 7.32–7.43)
PHOSPHATE SERPL-MCNC: 3.5 MG/DL — SIGNIFICANT CHANGE UP (ref 2.5–4.5)
PHOSPHATE SERPL-MCNC: 3.6 MG/DL — SIGNIFICANT CHANGE UP (ref 2.5–4.5)
PLATELET # BLD AUTO: 189 K/UL — SIGNIFICANT CHANGE UP (ref 150–400)
PLATELET # BLD AUTO: 214 K/UL — SIGNIFICANT CHANGE UP (ref 150–400)
PO2 BLDV: 47 MMHG — HIGH (ref 25–45)
PO2 BLDV: 92 MMHG — HIGH (ref 25–45)
POTASSIUM BLDV-SCNC: 3.9 MMOL/L — SIGNIFICANT CHANGE UP (ref 3.5–5.1)
POTASSIUM BLDV-SCNC: 4.3 MMOL/L — SIGNIFICANT CHANGE UP (ref 3.5–5.1)
POTASSIUM SERPL-MCNC: 4.4 MMOL/L — SIGNIFICANT CHANGE UP (ref 3.5–5.3)
POTASSIUM SERPL-MCNC: 4.4 MMOL/L — SIGNIFICANT CHANGE UP (ref 3.5–5.3)
POTASSIUM SERPL-SCNC: 4.4 MMOL/L — SIGNIFICANT CHANGE UP (ref 3.5–5.3)
POTASSIUM SERPL-SCNC: 4.4 MMOL/L — SIGNIFICANT CHANGE UP (ref 3.5–5.3)
PROT SERPL-MCNC: 5.6 G/DL — LOW (ref 6–8.3)
PROT SERPL-MCNC: 5.9 G/DL — LOW (ref 6–8.3)
PROTHROM AB SERPL-ACNC: 12 SEC — SIGNIFICANT CHANGE UP (ref 9.5–13)
PROTHROM AB SERPL-ACNC: 12.8 SEC — SIGNIFICANT CHANGE UP (ref 9.5–13)
RBC # BLD: 2.31 M/UL — LOW (ref 3.8–5.2)
RBC # BLD: 2.31 M/UL — LOW (ref 3.8–5.2)
RBC # FLD: 18.4 % — HIGH (ref 10.3–14.5)
RBC # FLD: 18.4 % — HIGH (ref 10.3–14.5)
SAO2 % BLDV: 79.9 % — SIGNIFICANT CHANGE UP (ref 67–88)
SAO2 % BLDV: 99.4 % — HIGH (ref 67–88)
SODIUM SERPL-SCNC: 141 MMOL/L — SIGNIFICANT CHANGE UP (ref 135–145)
SODIUM SERPL-SCNC: 141 MMOL/L — SIGNIFICANT CHANGE UP (ref 135–145)
SPECIMEN SOURCE: SIGNIFICANT CHANGE UP
WBC # BLD: 105.93 K/UL — CRITICAL HIGH (ref 3.8–10.5)
WBC # BLD: 93.82 K/UL — CRITICAL HIGH (ref 3.8–10.5)
WBC # FLD AUTO: 105.93 K/UL — CRITICAL HIGH (ref 3.8–10.5)
WBC # FLD AUTO: 93.82 K/UL — CRITICAL HIGH (ref 3.8–10.5)

## 2024-05-06 PROCEDURE — 99291 CRITICAL CARE FIRST HOUR: CPT | Mod: GC

## 2024-05-06 PROCEDURE — 71045 X-RAY EXAM CHEST 1 VIEW: CPT | Mod: 26

## 2024-05-06 PROCEDURE — 99232 SBSQ HOSP IP/OBS MODERATE 35: CPT | Mod: GC

## 2024-05-06 RX ORDER — CLONAZEPAM 1 MG
1 TABLET ORAL EVERY 12 HOURS
Refills: 0 | Status: DISCONTINUED | OUTPATIENT
Start: 2024-05-06 | End: 2024-05-06

## 2024-05-06 RX ORDER — CLONAZEPAM 1 MG
0.5 TABLET ORAL ONCE
Refills: 0 | Status: DISCONTINUED | OUTPATIENT
Start: 2024-05-06 | End: 2024-05-06

## 2024-05-06 RX ORDER — SODIUM CHLORIDE 9 MG/ML
1000 INJECTION, SOLUTION INTRAVENOUS
Refills: 0 | Status: DISCONTINUED | OUTPATIENT
Start: 2024-05-06 | End: 2024-05-08

## 2024-05-06 RX ORDER — CLONAZEPAM 1 MG
1 TABLET ORAL EVERY 12 HOURS
Refills: 0 | Status: DISCONTINUED | OUTPATIENT
Start: 2024-05-06 | End: 2024-05-11

## 2024-05-06 RX ADMIN — Medication 0.5 MILLIGRAM(S): at 06:55

## 2024-05-06 RX ADMIN — CHLORHEXIDINE GLUCONATE 1 APPLICATION(S): 213 SOLUTION TOPICAL at 07:24

## 2024-05-06 RX ADMIN — AMPICILLIN SODIUM AND SULBACTAM SODIUM 200 GRAM(S): 250; 125 INJECTION, POWDER, FOR SUSPENSION INTRAMUSCULAR; INTRAVENOUS at 06:55

## 2024-05-06 RX ADMIN — CHLORHEXIDINE GLUCONATE 15 MILLILITER(S): 213 SOLUTION TOPICAL at 07:24

## 2024-05-06 RX ADMIN — CHLORHEXIDINE GLUCONATE 15 MILLILITER(S): 213 SOLUTION TOPICAL at 17:30

## 2024-05-06 RX ADMIN — AMPICILLIN SODIUM AND SULBACTAM SODIUM 200 GRAM(S): 250; 125 INJECTION, POWDER, FOR SUSPENSION INTRAMUSCULAR; INTRAVENOUS at 11:46

## 2024-05-06 RX ADMIN — Medication 3 MILLILITER(S): at 05:24

## 2024-05-06 RX ADMIN — Medication 50 MILLIGRAM(S): at 05:43

## 2024-05-06 RX ADMIN — DROXIDOPA 100 MILLIGRAM(S): 100 CAPSULE ORAL at 11:46

## 2024-05-06 RX ADMIN — QUETIAPINE FUMARATE 25 MILLIGRAM(S): 200 TABLET, FILM COATED ORAL at 06:54

## 2024-05-06 RX ADMIN — MIDODRINE HYDROCHLORIDE 30 MILLIGRAM(S): 2.5 TABLET ORAL at 06:55

## 2024-05-06 RX ADMIN — Medication 650 MILLIGRAM(S): at 09:41

## 2024-05-06 RX ADMIN — Medication 3 MILLILITER(S): at 22:18

## 2024-05-06 RX ADMIN — Medication 650 MILLIGRAM(S): at 09:38

## 2024-05-06 RX ADMIN — AMPICILLIN SODIUM AND SULBACTAM SODIUM 200 GRAM(S): 250; 125 INJECTION, POWDER, FOR SUSPENSION INTRAMUSCULAR; INTRAVENOUS at 17:30

## 2024-05-06 RX ADMIN — ENOXAPARIN SODIUM 110 MILLIGRAM(S): 100 INJECTION SUBCUTANEOUS at 05:44

## 2024-05-06 RX ADMIN — AMPICILLIN SODIUM AND SULBACTAM SODIUM 200 GRAM(S): 250; 125 INJECTION, POWDER, FOR SUSPENSION INTRAMUSCULAR; INTRAVENOUS at 23:20

## 2024-05-06 RX ADMIN — DROXIDOPA 100 MILLIGRAM(S): 100 CAPSULE ORAL at 07:26

## 2024-05-06 RX ADMIN — ATOVAQUONE 1500 MILLIGRAM(S): 750 SUSPENSION ORAL at 11:46

## 2024-05-06 RX ADMIN — Medication 0.5 MILLIGRAM(S): at 11:46

## 2024-05-06 NOTE — PROGRESS NOTE ADULT - ATTENDING COMMENTS
69F w/ CVA (bedbound at baseline), COPD, CHF, HTN. Initially presented as transfer for hyperleukocytosis concerning for acute leukemia. Course complicated by acute hypoxemic respiratory failure found to have strep pneumonia bacteremia and pneumonia w/ empyema s/p chest tube x2 s/p intra-pleural fibrinolytic therapy. Failed weaning and now s/p trach. Pt has had prolonged shock state w/ difficulty weaning off pressors, daily low grade fevers found to have LLE DVT.     Mental status likely at baseline, able to communicate; continue seroquel and klonipin for agitation- increase klonipin due to continued agitation. Shock state has now resolved - continue droxydopa and midodrine, maintain MAP >/65. S/P trach for failure to wean, PSV weaning as tolerated; s/p chest tube x2 w/ intrapleural fibrinolytic therapy w/ resolution of empyema on imaging. Continue amp/sulbactam for strept pneumonia bacteremia, pneumonia and empyema; pt continues to have low grade fever daily to 100.4 - is this due to DVT vs underlying CLL vs something else; repeat blood cx pending. Renal function stable, monitor I/Os and electrolytes. Tolerating NGT feeds, pt needs PEG tube - she has been pulling out NGT today twice, which will make it difficult to get midodrine and droxydopa- f/u GI. New CLL w/ leukocytosis - workup and treatment on hold until pt more stable; 1 pRBC today for hgb <7 - no active bleeding; continue full dose lovenox for LLE DVT. FS w/ ISS; slowly weaning off stress dose steroids. Prognosis guarded. Full code. Palliative following.

## 2024-05-06 NOTE — PROCEDURE NOTE - ADDITIONAL PROCEDURE DETAILS
69 yr old female with PMHx CVA, COPD, CHF, HTN who presented with acute SOB, febrile, tx for pneum, septic shock requiring vasopressor therapy. Rt IJ TLC placed for vasopressor therapy and IVF therapy.
9 year old female with a history of CVA (bedbound at baseline) COPD, CHF, HTN presented initially as a transfer with hyperleukocytosis with concerning for acute leukemia (no blasts, more consistent with CLL and leukemoid reaction in the setting of acute infection) and acute hypoxic respiratory failure eventually found to have strep pneumonia bacteremia, pneumonia c/b empyema s/p chest tube (4/16-4/19), c/b persistent fevers, CT replaced (4/23/24-4/27/24) s/p intrapleural fibrinolytic therapy, and now s/p tracheostomy.       Pt self d/c'ed NGT, MARI feed tube replaced for meds and nutrition therapy
Successful 10Fr Pigtail catheter placement in L. pleural space

## 2024-05-06 NOTE — CONSULT NOTE ADULT - SUBJECTIVE AND OBJECTIVE BOX
Date of Service: 05-06-24 @ 16:44    HPI:  69F h/o CVA (bedbound at baseline), COPD, CHF, HTN p/w acute shortness of breath to outside ED. Initially treated for acute pulmonary edema with sublingual nitro x 2, Lasix, and BiPAP. Pt was also found to be febrile to 103, so treated for PNA. BIPAP led to improvement in O2 to 89-90. Pt now transferred to [place] for white count of 233 and plan for possible leukophoresis. In ED, pt intubated iso respiratory tiring and decreasing mental status. Also started on levo for hypotn.  (16 Apr 2024 12:59)    PERTINENT PM/SXH:   Acute CHF    COPD, severe        FAMILY HISTORY:      ITEMS NOT CHECKED ARE NOT PRESENT    SOCIAL HISTORY:   Significant other/partner[ ]  Children[ ]  Taoism/Spirituality:  Substance hx:  [ ]   Tobacco hx:  [ ]   Alcohol hx: [ ]   Home Opioid hx:  [ ] I-Stop Reference No:  Living Situation: [ ]Home  [ ]Long term care  [ ]Rehab [ ]Other    ADVANCE DIRECTIVES:    DNR/MOLST  [ ]  Living Will  [ ]   DECISION MAKER(s):  [ ] Health Care Proxy(s)  [ ] Surrogate(s)  [ ] Guardian           Name(s): Phone Number(s):    BASELINE (I)ADL(s) (prior to admission):  [place]: [ ]Total  [ ] Moderate [ ]Dependent    Allergies    No Known Allergies    Intolerances    MEDICATIONS  (STANDING):  albuterol/ipratropium for Nebulization 3 milliLiter(s) Nebulizer every 8 hours  ampicillin/sulbactam  IVPB      ampicillin/sulbactam  IVPB 3 Gram(s) IV Intermittent every 6 hours  atovaquone  Suspension 1500 milliGRAM(s) Oral daily  chlorhexidine 0.12% Liquid 15 milliLiter(s) Oral Mucosa every 12 hours  chlorhexidine 2% Cloths 1 Application(s) Topical <User Schedule>  clonazePAM  Tablet 1 milliGRAM(s) Oral every 12 hours  droxidopa 100 milliGRAM(s) Oral three times a day  hydrocortisone sodium succinate Injectable 50 milliGRAM(s) IV Push daily  insulin lispro (ADMELOG) corrective regimen sliding scale   SubCutaneous every 6 hours  midodrine 30 milliGRAM(s) Oral three times a day  polyethylene glycol 3350 17 Gram(s) Oral every 12 hours  QUEtiapine 25 milliGRAM(s) Oral three times a day  QUEtiapine 100 milliGRAM(s) Oral at bedtime  senna Syrup 10 milliLiter(s) Oral at bedtime    MEDICATIONS  (PRN):  acetaminophen   Oral Liquid .. 650 milliGRAM(s) Oral every 6 hours PRN Temp greater or equal to 38C (100.4F)  nystatin Powder 1 Application(s) Topical two times a day PRN skin irritation    PRESENT SYMPTOMS: [ ]Unable to self-report see CPOT, PAINADs, RDOS  Source if other than patient:  [ ]Family   [ ]Team     Pain: [ ]yes [ ]no  QOL impact -   Location -                    Aggravating factors -  Quality -  Radiation -  Timing-  Severity (0-10 scale):  Minimal acceptable level (0-10 scale):       Dyspnea:                           [ ]Mild [ ]Moderate [ ]Severe  Anxiety:                             [ ]Mild [ ]Moderate [ ]Severe  Fatigue:                             [ ]Mild [ ]Moderate [ ]Severe  Nausea:                             [ ]Mild [ ]Moderate [ ]Severe  Loss of appetite:              [ ]Mild [ ]Moderate [ ]Severe  Constipation:                    [ ]Mild [ ]Moderate [ ]Severe    PCSSQ [Palliative Care Spiritual Screening Question]   Severity (0-10):  Score of 4 or > indicate consideration of Chaplaincy referral.  Chaplaincy Referral: [ ] yes [ ] refused [ ] following    Caregiver [place]? : [ ] yes [ ] no [ ] deferred:  Social work referral [ ] Patient & Family Centered Care Referral [ ]     Anticipatory Grief Present?: [ ] yes [ ] no  [ ] deferred: Palliative Social work referral [ ]  Patient & Family Centered Care Referral [ ]       Other Symptoms:  [ ]All other review of systems negative   [ ] Unable to obtain due to poor mentation    PHYSICAL EXAM:  Vital Signs Last 24 Hrs  T(C): 37.5 (06 May 2024 15:00), Max: 38 (05 May 2024 20:00)  T(F): 99.5 (06 May 2024 15:00), Max: 100.4 (05 May 2024 20:00)  HR: 80 (06 May 2024 15:57) (77 - 112)  BP: 124/59 (06 May 2024 15:00) (82/45 - 129/58)  BP(mean): 85 (06 May 2024 15:00) (60 - 88)  RR: 36 (06 May 2024 15:00) (14 - 36)  SpO2: 100% (06 May 2024 15:57) (97% - 100%)    Parameters below as of 06 May 2024 15:00  Patient On (Oxygen Delivery Method): ventilator    O2 Concentration (%): 30 I&O's Summary    05 May 2024 07:01  -  06 May 2024 07:00  --------------------------------------------------------  IN: 2344.9 mL / OUT: 1450 mL / NET: 894.9 mL    06 May 2024 07:01  -  06 May 2024 16:44  --------------------------------------------------------  IN: 575 mL / OUT: 0 mL / NET: 575 mL        GENERAL:  [x]Alert  [x]Oriented x 3  [ ]Lethargic  [ ]Cachexia  [ ]Unarousable  [x]Verbal  [ ]Non-Verbal  Behavioral:   [ ]Anxiety  [ ]Delirium [ ]Agitation [ ]Other  HEENT:  [x]Normal   [ ]Dry mouth   [ ]ET Tube/Trach  [ ]Oral lesions  PULMONARY:   [x]Clear [ ]Tachypnea  [ ]Audible excessive secretions   [ ]Rhonchi        [ ]Right [ ]Left [ ]Bilateral  [ ]Crackles        [ ]Right [ ]Left [ ]Bilateral  [ ]Wheezing     [ ]Right [ ]Left [ ]Bilateral  [ ]Diminished BS [ ] Right [ ]Left [ ]Bilateral  CARDIOVASCULAR:    [x]Regular [ ]Irregular [ ]Tachy  [ ]Rehan [ ]Murmur [ ]Other  GASTROINTESTINAL:  [x]Soft  [ ]Distended   [x]+BS  [x]Non tender [ ]Tender  [ ]PEG [ ]OGT/ NGT   Last BM:    GENITOURINARY:  [x]Normal [ ]Incontinent   [ ]Oliguria/Anuria   [ ]Andino  MUSCULOSKELETAL:   [ ]Normal   [x]Weakness  [ ]Bed/Wheelchair bound [ ]Edema  NEUROLOGIC:   [x]No focal deficits  [ ] Cognitive impairment  [ ] Dysphagia [ ]Dysarthria [ ] Paresis [ ]Other   SKIN:   [x]Normal  [ ]Rash   [ ]Pressure ulcer(s) [ ]y [ ]n present on admission    CRITICAL CARE:  [ ] Shock Present  [ ]Septic [ ]Cardiogenic [ ]Neurologic [ ]Hypovolemic  [ ]  Vasopressors [ ]  Inotropes   [ ]Respiratory failure present [ ]Mechanical ventilation [ ]Non-invasive ventilatory support [ ]High flow  Mode: CPAP with PS, FiO2: 40, PEEP: 5, PS: 12, MAP: 10, PIP: 18  [ ]Acute  [ ]Chronic [ ]Hypoxic  [ ]Hypercarbic [ ]Other  [ ]Other organ failure     LABS:                        6.6    93.82 )-----------( 189      ( 06 May 2024 01:50 )             21.2   05-06    141  |  106  |  12  ----------------------------<  92  4.4   |  24  |  0.45<L>    Ca    8.2<L>      06 May 2024 00:37  Phos  3.5     05-06  Mg     2.5     05-06    TPro  5.6<L>  /  Alb  2.5<L>  /  TBili  0.2  /  DBili  x   /  AST  18  /  ALT  10  /  AlkPhos  74  05-06  PT/INR - ( 06 May 2024 00:37 )   PT: 12.8 sec;   INR: 1.17 ratio         PTT - ( 06 May 2024 00:37 )  PTT:32.0 sec    Urinalysis Basic - ( 06 May 2024 00:37 )    Color: x / Appearance: x / SG: x / pH: x  Gluc: 92 mg/dL / Ketone: x  / Bili: x / Urobili: x   Blood: x / Protein: x / Nitrite: x   Leuk Esterase: x / RBC: x / WBC x   Sq Epi: x / Non Sq Epi: x / Bacteria: x      RADIOLOGY & ADDITIONAL STUDIES:    PROTEIN CALORIE MALNUTRITION PRESENT: [ ]mild [ ]moderate [ ]severe [ ]underweight [ ]morbid obesity  https://www.andeal.org/vault/3730/web/files/ONC/Table_Clinical%20Characteristics%20to%20Document%20Malnutrition-White%20JV%20et%20al%202012.pdf    Height (cm): 172.7 (04-23-24 @ 17:12), 167.6 (04-16-24 @ 04:53)  Weight (kg): 111 (04-23-24 @ 17:12), 113.4 (04-16-24 @ 04:53)  BMI (kg/m2): 37.2 (04-23-24 @ 17:12), 40.4 (04-16-24 @ 04:53)    [ ]PPSV2 < or = to 30% [ ]significant weight loss  [ ]poor nutritional intake  [ ]anasarca[ ]Artificial Nutrition      Other REFERRALS:  [ ]Hospice  [ ]Child Life  [ ]Social Work  [ ]Case management [ ]Holistic Therapy     Care Coordination Assessment 201 [C. Provider] (04-22-24 @ 13:51)      Palliative Performance Scale:  http://npcrc.org/files/news/palliative_performance_scale_ppsv2.pdf  (Ctrl +  left click to view)  Respiratory Distress Observation Tool:  https://homecareinformation.net/handouts/hen/Respiratory_Distress_Observation_Scale.pdf (Ctrl +  left click to view)  PAINAD Score:  http://[email or web address]/painad.pdf (Ctrl +  left click to view)   Date of Service: 05-06-24 @ 16:44    HPI:  69F h/o CVA (bedbound at baseline), COPD, CHF, HTN presenting as transfer from [facility] for SOB iso leukocytosis to 233 c/f acute leukemia. Pt intubated and on pressors, transferred to MICU for further management, course c/b empyema s/p chest tube placement (4/16) and removal, reaccumulation of loculated pleural effusion s/p L pigtail placement and removal on 4/27. Now off of IV pressors, pending EGD/PEG with GI today and likely to be transferred to RCU following this procedure. (Taken from MICU). Palliative consulted for assistance with GOC.   PERTINENT PM/SXH:   Acute CHF    COPD, severe        FAMILY HISTORY:      ITEMS NOT CHECKED ARE NOT PRESENT    SOCIAL HISTORY:   Significant other/partner[ ]  Children[ ]  Hinduism/Spirituality:  Substance hx:  [ ]   Tobacco hx:  [ ]   Alcohol hx: [ ]   Home Opioid hx:  [ ] I-Stop Reference No:  Living Situation: [ x]Home  [ ]Long term care  [ ]Rehab [ ]Other    ADVANCE DIRECTIVES:    DNR/MOLST  [ ]  Living Will  [ ]   DECISION MAKER(s):  [ ] Health Care Proxy(s)  [ x] Surrogate(s)  [ ] Guardian           Name(s): Phone Number(s): Mili [facility] (aunt)     BASELINE (I)ADL(s) (prior to admission):  [place]: [ ]Total  [x ] Moderate [ ]Dependent    Allergies    No Known Allergies    Intolerances    MEDICATIONS  (STANDING):  albuterol/ipratropium for Nebulization 3 milliLiter(s) Nebulizer every 8 hours  ampicillin/sulbactam  IVPB      ampicillin/sulbactam  IVPB 3 Gram(s) IV Intermittent every 6 hours  atovaquone  Suspension 1500 milliGRAM(s) Oral daily  chlorhexidine 0.12% Liquid 15 milliLiter(s) Oral Mucosa every 12 hours  chlorhexidine 2% Cloths 1 Application(s) Topical <User Schedule>  clonazePAM  Tablet 1 milliGRAM(s) Oral every 12 hours  droxidopa 100 milliGRAM(s) Oral three times a day  hydrocortisone sodium succinate Injectable 50 milliGRAM(s) IV Push daily  insulin lispro (ADMELOG) corrective regimen sliding scale   SubCutaneous every 6 hours  midodrine 30 milliGRAM(s) Oral three times a day  polyethylene glycol 3350 17 Gram(s) Oral every 12 hours  QUEtiapine 25 milliGRAM(s) Oral three times a day  QUEtiapine 100 milliGRAM(s) Oral at bedtime  senna Syrup 10 milliLiter(s) Oral at bedtime    MEDICATIONS  (PRN):  acetaminophen   Oral Liquid .. 650 milliGRAM(s) Oral every 6 hours PRN Temp greater or equal to 38C (100.4F)  nystatin Powder 1 Application(s) Topical two times a day PRN skin irritation    PRESENT SYMPTOMS: [ ]Unable to self-report see CPOT, PAINADs, RDOS  Source if other than patient:  [ ]Family   [ ]Team     Pain: [ ]yes [ x]no  QOL impact -   Location -                    Aggravating factors -  Quality -  Radiation -  Timing-  Severity (0-10 scale):  Minimal acceptable level (0-10 scale):       Dyspnea:                           [ ]Mild [ ]Moderate [ ]Severe  Anxiety:                             [ ]Mild [ ]Moderate [ ]Severe  Fatigue:                             [ ]Mild [ ]Moderate [ ]Severe  Nausea:                             [ ]Mild [ ]Moderate [ ]Severe  Loss of appetite:              [ ]Mild [ ]Moderate [ ]Severe  Constipation:                    [ ]Mild [ ]Moderate [ ]Severe    PCSSQ [Palliative Care Spiritual Screening Question]   Severity (0-10):  Score of 4 or > indicate consideration of Chaplaincy referral.  Chaplaincy Referral: [ ] yes [ ] refused [ ] following    Caregiver [place]? : [ ] yes [ ] no [ ] deferred:  Social work referral [ ] Patient & Family Centered Care Referral [ ]     Anticipatory Grief Present?: [ ] yes [ ] no  [ ] deferred: Palliative Social work referral [ ]  Patient & Family Centered Care Referral [ ]       Other Symptoms:  [ ]All other review of systems negative   [ x] Unable to obtain due to poor mentation    PHYSICAL EXAM:  Vital Signs Last 24 Hrs  T(C): 37.5 (06 May 2024 15:00), Max: 38 (05 May 2024 20:00)  T(F): 99.5 (06 May 2024 15:00), Max: 100.4 (05 May 2024 20:00)  HR: 80 (06 May 2024 15:57) (77 - 112)  BP: 124/59 (06 May 2024 15:00) (82/45 - 129/58)  BP(mean): 85 (06 May 2024 15:00) (60 - 88)  RR: 36 (06 May 2024 15:00) (14 - 36)  SpO2: 100% (06 May 2024 15:57) (97% - 100%)    Parameters below as of 06 May 2024 15:00  Patient On (Oxygen Delivery Method): ventilator    O2 Concentration (%): 30 I&O's Summary    05 May 2024 07:01  -  06 May 2024 07:00  --------------------------------------------------------  IN: 2344.9 mL / OUT: 1450 mL / NET: 894.9 mL    06 May 2024 07:01  -  06 May 2024 16:44  --------------------------------------------------------  IN: 575 mL / OUT: 0 mL / NET: 575 mL        GENERAL:  [x]Alert  [x]Oriented x 3  [ ]Lethargic  [ ]Cachexia  [ ]Unarousable  [x]Verbal  [ ]Non-Verbal  Behavioral:   [ ]Anxiety  [ x]Delirium [ ]Agitation [ ]Other  HEENT:  [ ]Normal   [ ]Dry mouth   [ xET Tube/Trach  [ ]Oral lesions  PULMONARY:   [ ]Clear [ ]Tachypnea  [ ]Audible excessive secretions   [ ]Rhonchi        [ ]Right [ ]Left [ ]Bilateral  [ ]Crackles        [ ]Right [ ]Left [ ]Bilateral  [ ]Wheezing     [ ]Right [ ]Left [ ]Bilateral  [ x]Diminished BS [ ] Right [ ]Left [x ]Bilateral  CARDIOVASCULAR:    [x]Regular [ ]Irregular [ ]Tachy  [ ]Rehan [ ]Murmur [ ]Other  GASTROINTESTINAL:  [x]Soft  [ ]Distended   [x]+BS  [x]Non tender [ ]Tender  [ ]PEG [ x]OGT/ NGT   Last BM:    GENITOURINARY:  [ ]Normal [ ]Incontinent   [ ]Oliguria/Anuria   [x ]Andino  MUSCULOSKELETAL:   [ ]Normal   [x]Weakness  [ x]Bed/Wheelchair bound [ ]Edema  NEUROLOGIC:   [ x]No focal deficits  [ ] Cognitive impairment  [ ] Dysphagia [ ]Dysarthria [ ] Paresis [ ]Other   SKIN:   [x]Normal  [ ]Rash   [ ]Pressure ulcer(s) [ ]y [ ]n present on admission    CRITICAL CARE:  [ ] Shock Present  [ ]Septic [ ]Cardiogenic [ ]Neurologic [ ]Hypovolemic  [ ]  Vasopressors [ ]  Inotropes   [ ]Respiratory failure present [ ]Mechanical ventilation [ ]Non-invasive ventilatory support [ ]High flow  Mode: CPAP with PS, FiO2: 40, PEEP: 5, PS: 12, MAP: 10, PIP: 18  [ ]Acute  [ ]Chronic [ ]Hypoxic  [ ]Hypercarbic [ ]Other  [ ]Other organ failure     LABS:                        6.6    93.82 )-----------( 189      ( 06 May 2024 01:50 )             21.2   05-06    141  |  106  |  12  ----------------------------<  92  4.4   |  24  |  0.45<L>    Ca    8.2<L>      06 May 2024 00:37  Phos  3.5     05-06  Mg     2.5     05-06    TPro  5.6<L>  /  Alb  2.5<L>  /  TBili  0.2  /  DBili  x   /  AST  18  /  ALT  10  /  AlkPhos  74  05-06  PT/INR - ( 06 May 2024 00:37 )   PT: 12.8 sec;   INR: 1.17 ratio         PTT - ( 06 May 2024 00:37 )  PTT:32.0 sec    Urinalysis Basic - ( 06 May 2024 00:37 )    Color: x / Appearance: x / SG: x / pH: x  Gluc: 92 mg/dL / Ketone: x  / Bili: x / Urobili: x   Blood: x / Protein: x / Nitrite: x   Leuk Esterase: x / RBC: x / WBC x   Sq Epi: x / Non Sq Epi: x / Bacteria: x      RADIOLOGY & ADDITIONAL STUDIES:  < from: Xray Chest 1 View- PORTABLE-Urgent (Xray Chest 1 View- PORTABLE-Urgent .) (05.06.24 @ 06:53) >  ACC: [id number] EXAM:  XR CHEST PORTABLE URGENT 1V   ORDERED BY: JHON RUDOLPH     PROCEDURE DATE:  05/06/2024          INTERPRETATION:  EXAMINATION: XR CHEST URGENT    CLINICAL INDICATION: Enteric tube placement    TECHNIQUE: Frontal portable view of the chest was obtained.    COMPARISON: Chest x-ray 5/4/2024    FINDINGS:  Enteric tube descends below level of diaphragm and out of the   field-of-view.  Tracheostomy.  The heart is not accurately assessed in this AP projection.  Small bilateral pleural effusions and bibasilar atelectasis.  There is no pneumothorax.  No acute bony abnormality.    IMPRESSION:  Small bilateral pleural effusions and bibasilar atelectasis.  Enteric tube descends below level of diaphragm and out of the   field-of-view.    --- End of Report ---          HORACIO WILLARD MD; Resident Radiologist  This document has been electronically signed.  KALYN BERNARD MD; Attending Radiologist  This document has been electronically signed. May  6 2024  2:50PM    < end of copied text >    PROTEIN CALORIE MALNUTRITION PRESENT: [ ]mild [ ]moderate [ ]severe [ ]underweight [ ]morbid obesity  https://www.andeal.org/vault/2440/web/files/ONC/Table_Clinical%20Characteristics%20to%20Document%20Malnutrition-White%20JV%20et%20al%590752.pdf    Height (cm): 172.7 (04-23-24 @ 17:12), 167.6 (04-16-24 @ 04:53)  Weight (kg): 111 (04-23-24 @ 17:12), 113.4 (04-16-24 @ 04:53)  BMI (kg/m2): 37.2 (04-23-24 @ 17:12), 40.4 (04-16-24 @ 04:53)    [x ]PPSV2 < or = to 30% [ ]significant weight loss  [ ]poor nutritional intake  [ ]anasarca[ ]Artificial Nutrition      Other REFERRALS:  [ ]Hospice  [ ]Child Life  [ ]Social Work  [ ]Case management [ ]Holistic Therapy     Care Coordination Assessment 201 [C. Provider] (04-22-24 @ 13:51)      Palliative Performance Scale:  http://npcrc.org/files/news/palliative_performance_scale_ppsv2.pdf  (Ctrl +  left click to view)  Respiratory Distress Observation Tool:  https://homecareinformation.net/handouts/hen/Respiratory_Distress_Observation_Scale.pdf (Ctrl +  left click to view)  PAINAD Score:  http://geriatrictoolkit.[email or web address]/painad.pdf (Ctrl +  left click to view)

## 2024-05-06 NOTE — CONSULT NOTE ADULT - PROBLEM SELECTOR RECOMMENDATION 3
-  on presentation; stable in 90s  - No plan for leukophoresis at this time given mature lymphocytes  - CT Chest A&P showing diffuse axillary, inguinal mediastinal RP lymphadenopathy.  - Appreciate Heme recs; bone marrow bx not being considered at this time  defer to heme/onc for ongoing care

## 2024-05-06 NOTE — PROGRESS NOTE ADULT - ASSESSMENT
69F h/o CVA (bedbound at baseline), COPD, CHF, HTN presenting as transfer from [facility] for SOB iso leukocytosis to 233 c/f acute leukemia. Pt intubated and on pressors, transferred to MICU for further management, course c/b empyema s/p chest tube placement (4/16) and removal, reaccumulation of loculated pleural effusion s/p L pigtail placement and removal on 4/27.     =====Neuro=====  At Baseline AOx4 mental status   L upper and lower extremity motor deficits iso hx of stroke   Pain control w/ Tylenol  Seroquel 25 tid and seroquel 100qhs for agitation     =====CV=====  #Septic Shock   - Strep Pneumo Empyema and Bacteremia s/p Pigtail catheter   - Levo changed to Dawit;  - Coming down on Dawit and escalate Midodrine to 30 TID   - added on droxidopa 100mg tid   - stress steroids to q12 today, daily tomorrow   - MAP goal > 60     #Chest Pain, Resolved   -demand vs leukostasis vs non cardiac   -reported JOSEF in II, III, aVF at [facility] --> repeat EKG without ST changes or Q waves  -trops peaked    #CHF  -unclear hx but elevated pro-BNP to 2600s  -TTE: EF 54%, no significant abnormalities  - Diurese as appropriate    =====Resp======  #Acute hypoxic respiratory failure  #Empyema Pansensitive Strep Pneumo  -s/p thora draining 1500cc fluid 4/16 with chest tube placement; c/w exudative effusion growing Strep Pneumo  -MRSA neg  -pleural fluid growing strep pneumo; BCx growing strep pneumo; Pansensitive  -was on vanc/zosyn/azithro (4/16)-->deescalated to CTX (4/17 -4/23), Added on Vancomycin (4/23-) due to MRSE 1/2 Cx, expanded coverage to Cefepime (4/23 1x dose), back on CTX, switched to Unasyn 3g Q6 (4/25- )  -CT chest 4/22 reveals persistent loculated effusion, s/p IR Pigtail placement 4/24-4/27 w/ Exudative effusion; Alteplase and Dornase through pigtal x6 doses; unable to finish 6th Alteplase+Dornase due to catheter occlusion   - Repeat CTC 4/27 - decreased small pleural effusions, thoracic LAD   - s/p Trach placement 4/28; Attempt Trach collar during day and Pressure support at night   - continue vent weaning as tolerated, repeat sputum culture     =====GI=====  #Diet: Tube feed   PEG tube planned once off Pressors     =====/Renal=====  -no active issues    =====Heme/Onc=====  #Leukocytosis  #CLL  - on presentation; improving to 130s  -no plan for leukophoresis at this time given mature lymphocytes  -stop trending TLS labs as they have been stable   - CT Chest A&P showing diffuse axillary, inguinal mediastinal RP lymphadenopathy.  - Appreciate Heme recs;   - Plan for Inpatient Bone Marrow Bx per Heme     #L Common Femoral Vein DVT   - Non-occlusive   - Heparin Drip 4/29-   - Plan to switch to full dose lovenox once procedure scheduled finalized     #HLH Ruleout  - Persistent fevers, Anemic, elevated triglycerides (iso recent propofol use)   - f/u IL2R, NK,Ferritin     #DVT ppx: Heparin Drip --> switch to lovenox today 1g/kg bid dosing     =====ID=====  #Empyema 2/2 Pansensitive Strep Pneumo  #Strep Pneumo Bacteremia  -BCx + strep pneumo; fluid culture with strep pneumo  -repeat BCx 4/20 ngtd, ReCx 4/23 due to fever   -CT Chest A&P demonstrates LLL empyema not showing any other intraabdominal infectious source  -was on vanc/zosyn/azithro (4/16)-->deescalated to CTX (4/17 -4/23), Added on Vancomycin (4/23-) due to MRSE 1/2 Cx, expanded coverage to Cefepime (4/23 1x dose), back on CTX, switched to Unasyn 3g Q6 (4/25- )  - IVIG 4/26 x1  - ID Consulted for persistent fevers despite source control; appreciate recs   - ESR elevated to 119  - will check sinus films, follow up fungitell, CMV, crypt     Plan    c/w with Unasyn (4/25  -   )   PCP PPX w/ Atovaquone   Fungitell, Cryptococcus antigen, CMV PCR,   Taper off Stress steroids Solucortef 50Q12       =====Endo=====  -ISS q6h while NPO w/ tube feed     =====Ethics:=====  Full Code  Only known family member is Aunt Mili Gordon ([phone], [phone]) who lives in [place]   69F h/o CVA (bedbound at baseline), COPD, CHF, HTN presenting as transfer from [facility] for SOB iso leukocytosis to 233 c/f acute leukemia. Pt intubated and on pressors, transferred to MICU for further management, course c/b empyema s/p chest tube placement (4/16) and removal, reaccumulation of loculated pleural effusion s/p L pigtail placement and removal on 4/27.     =====Neuro=====  #At Baseline, AOx4 mental status   #Anxiety  - L upper and lower extremity motor deficits iso hx of stroke   - Pain control w/ Tylenol  - Seroquel 25 tid and seroquel 100qhs for agitation   - Klonopin increased to 1mg q12     =====CV=====  #Septic Shock, Resolving   - 2/2 Strep Pneumo Empyema and Bacteremia s/p Pigtail catheter   - Currently off IV pressors  - C/w Midodrine to 30 TID   - C/w droxidopa 100mg tid   - Solu-cortef 50 mg IV qd 5/6-5/8  - MAP goal > 60     #CHF  -unclear hx but elevated pro-BNP to 2600s  - TTE: EF 54%, no significant abnormalities  - Diurese as needed    =====Resp======  #Acute hypoxic respiratory failure  #Empyema Pansensitive Strep Pneumo  -s/p thora draining 1500cc fluid 4/16 with chest tube placement; c/w exudative effusion growing Strep Pneumo  -MRSA neg  -pleural fluid growing strep pneumo; BCx growing strep pneumo; Pansensitive  -was on vanc/zosyn/azithro (4/16)-->deescalated to CTX (4/17 -4/23), Added on Vancomycin (4/23-) due to MRSE 1/2 Cx, expanded coverage to Cefepime (4/23 1x dose), back on CTX, switched to Unasyn 3g Q6 (4/25- )  -CT chest 4/22 reveals persistent loculated effusion, s/p IR Pigtail placement 4/24-4/27 w/ Exudative effusion; Alteplase and Dornase through pigtal x6 doses; unable to finish 6th Alteplase+Dornase due to catheter occlusion   - Repeat CTC 4/27 - decreased small pleural effusions, thoracic LAD   - s/p Trach placement 4/28; Attempt Trach collar during day and Pressure support at night   - continue vent weaning as tolerated, repeat sputum culture     =====GI=====  #Diet: Tube feed   - GI re-consulted for PEG tube   - Pt repeatedly pulling out NGT    =====/Renal=====  #No active issues    =====Heme/Onc=====  #Leukocytosis  #CLL  - on presentation; improving to 90s  - No plan for leukophoresis at this time given mature lymphocytes  - CT Chest A&P showing diffuse axillary, inguinal mediastinal RP lymphadenopathy.  - Appreciate Heme recs; bone marrow bx not being considered at this time    #L Common Femoral Vein DVT   - Non-occlusive   - C/w Lovenox 110mg q12    =====ID=====  #Empyema 2/2 Pansensitive Strep Pneumo, resolved  #Strep Pneumo Bacteremia, resolved  -BCx + strep pneumo; fluid culture with strep pneumo  -repeat BCx 4/20 ngtd, ReCx 4/23 due to fever   -CT Chest A&P demonstrates LLL empyema not showing any other intraabdominal infectious source   Was on vanc/zosyn/azithro (4/16)-->deescalated to CTX (4/17 -4/23), Added on Vancomycin (4/23-) due to MRSE 1/2 Cx, expanded coverage to Cefepime (4/23 1x dose), back on CTX, switched to Unasyn 3g Q6 (4/25- )  - IVIG 4/26 x1  - ID Consulted for persistent fevers despite source control; appreciate recs   - CMV detc below threshold range  - Crypt, fungitell pending     Plan    - Per ID, c/w with Unasyn (4/25  -   )   - PCP PPX w/ Atovaquone   - Taper off Stress steroids; currently on Solu-Cortef QD      =====Endo=====  - ISS q6h while NPO w/ tube feed   - Pulled out NGT, currently NPO, awaiting plan from GI regarding PEG     =====Ethics:=====  Full Code  Only known family member is Aunt Mili Gordon ([phone], [phone]) who lives in [facility] appreciated; per discussion at bedside with pt, pt would not want to "live on machines forever" and wants to eat and be comfortable. This was discussed with Mili Gordon, who would like to try to wean pt off ventilator, but if this is not possible, would like to respect patient's wishes.

## 2024-05-06 NOTE — PROGRESS NOTE ADULT - SUBJECTIVE AND OBJECTIVE BOX
Interval Events:   GI was reconsulted for PEG placement.  Patient pulled out NGT again.  Per discussion with team, patient runs low grade fever, which may be due to underlying CLL and DVT. Low suspicion for acute infection.     Hospital Medications:  acetaminophen   Oral Liquid .. 650 milliGRAM(s) Oral every 6 hours PRN  albuterol/ipratropium for Nebulization 3 milliLiter(s) Nebulizer every 8 hours  ampicillin/sulbactam  IVPB      ampicillin/sulbactam  IVPB 3 Gram(s) IV Intermittent every 6 hours  atovaquone  Suspension 1500 milliGRAM(s) Oral daily  chlorhexidine 0.12% Liquid 15 milliLiter(s) Oral Mucosa every 12 hours  chlorhexidine 2% Cloths 1 Application(s) Topical <User Schedule>  clonazePAM  Tablet 1 milliGRAM(s) Oral every 12 hours  droxidopa 100 milliGRAM(s) Oral three times a day  hydrocortisone sodium succinate Injectable 50 milliGRAM(s) IV Push daily  insulin lispro (ADMELOG) corrective regimen sliding scale   SubCutaneous every 6 hours  midodrine 30 milliGRAM(s) Oral three times a day  nystatin Powder 1 Application(s) Topical two times a day PRN  polyethylene glycol 3350 17 Gram(s) Oral every 12 hours  QUEtiapine 25 milliGRAM(s) Oral three times a day  QUEtiapine 100 milliGRAM(s) Oral at bedtime  senna Syrup 10 milliLiter(s) Oral at bedtime      PHYSICAL EXAM:   Vital Signs:  Vital Signs Last 24 Hrs  T(C): 37.5 (06 May 2024 15:00), Max: 38 (05 May 2024 20:00)  T(F): 99.5 (06 May 2024 15:00), Max: 100.4 (05 May 2024 20:00)  HR: 80 (06 May 2024 15:57) (77 - 112)  BP: 124/59 (06 May 2024 15:00) (82/45 - 129/58)  BP(mean): 85 (06 May 2024 15:00) (60 - 88)  RR: 36 (06 May 2024 15:00) (14 - 36)  SpO2: 100% (06 May 2024 15:57) (97% - 100%)    Parameters below as of 06 May 2024 15:00  Patient On (Oxygen Delivery Method): ventilator    O2 Concentration (%): 30  Daily     Daily     GENERAL: no acute distress  NEURO: alert and difficulty to talk with trach  HEENT: NCAT, anicteric sclera  CHEST: no respiratory distress with trach, no accessory muscle use  CARDIAC: regular rate, +S1/S2  ABDOMEN: soft, nontender, no rebound or guarding; no abdominal scars  EXTREMITIES: warm, well perfused  SKIN: no active lesions noted    LABS: reviewed                        6.6    93.82 )-----------( 189      ( 06 May 2024 01:50 )             21.2     05-06    141  |  106  |  12  ----------------------------<  92  4.4   |  24  |  0.45<L>    Ca    8.2<L>      06 May 2024 00:37  Phos  3.5     05-06  Mg     2.5     05-06    TPro  5.6<L>  /  Alb  2.5<L>  /  TBili  0.2  /  DBili  x   /  AST  18  /  ALT  10  /  AlkPhos  74  05-06

## 2024-05-06 NOTE — CONSULT NOTE ADULT - PROBLEM SELECTOR RECOMMENDATION 4
see GOC note above  goals remain for patient to continue attempts to be weaned from ventilator, goc are not in line with long term ventilatory support  surrogate is pt's aunt Yenni Gordon

## 2024-05-06 NOTE — CONSULT NOTE ADULT - CONVERSATION DETAILS
Initially met with patient, introduced myself and the role of palliative care. Throughout conversation patient repeatedly agitated expressing concern that a family member had "stolen her checks." Able to be redirected back to conversation throughout. Patient trached to vent but able to mouth words clearly/write. She shared she has cancer and was unable to be weaned off the ventilator. Shared that she would not want to spend the rest of her life on machines but would like to try and continue to be weaned from the ventilator.   Spoke with patient's aunt Mili Gordon. She shared from previous conversations with Batsheva she had expressed she would want everything to be done. Discussed that goals of care can change with circumstance and discussed the above conversation. Mili would like to remain honoring Batsheva's wishes to see if she can be weaned from the ventilator, and verbalized understanding that if unable to be weaned prolonging life on a ventilator long term may greatly impact Batsheva's quality of life. She is amenable to ongoing palliative follow up to discuss next steps in the event patient cannot be weaned from the ventilator. Emotional support provided.

## 2024-05-06 NOTE — CONSULT NOTE ADULT - ASSESSMENT
69F h/o CVA (bedbound at baseline), COPD, CHF, HTN presenting as transfer from [facility] for SOB iso leukocytosis to 233 c/f acute leukemia. Pt intubated and on pressors, transferred to MICU for further management, course c/b empyema s/p chest tube placement (4/16) and removal, reaccumulation of loculated pleural effusion s/p L pigtail placement and removal on 4/27. Now off of IV pressors, pending EGD/PEG with GI today and likely to be transferred to RCU following this procedure. (Taken from MICU). Palliative consulted for assistance with GOC.

## 2024-05-06 NOTE — PROGRESS NOTE ADULT - ASSESSMENT
69F h/o CVA (bedbound at baseline), COPD, CHF, HTN, CLL p/w acute shortness of breath to outside ED found to have acute pulmonary edema and PNA transferred to [facility] for possible leukophoresis. Patient found to have empyema 2/2 Pansensitive Strep Pneumo with associated bacteremia.  S/p intubation with inability to wean, s/p tracheostomy tube on 4/28. Now requiring a PEG tube placement. Hospital course complicated by L common femoral vein DVT, currently on Lovenox.       #PEG eval s/p trach   #AHRF req intubation; unable to wean req trach on 4/28  #Empyema   #S. pneumo bacteremia with last + growth 4/16; Bcx 5/3 NGTD - off pressors  #DVT on Lovenox    Recommendations:  - Will plan for PEG 5/7 if no status change otherwise  - NPO after midnight  - Please hold Lovenox 5/6 night and 5/7 AM prior to PEG, and likely will need to be off A/C 24-48 hours post PEG placement if no medical contraindications.   - Please transfuse for Hct > 24      Note incomplete until finalized by attending signature/attestation.    Daysi Jacobs  GI/Hepatology Fellow    MONDAY-FRIDAY 8AM-5PM:  Pager# [phone] ([facility]) or [phone] ([facility])    NON-URGENT CONSULTS:  Please email [email or web address]  AT NIGHT AND ON WEEKENDS:  Contact on-call GI fellow via AMION by paging or hospital  from 5pm-8am and on weekends/holidays

## 2024-05-06 NOTE — CONSULT NOTE ADULT - PROBLEM SELECTOR RECOMMENDATION 5
will continue to follow for goc  case discussed with MICU team  Can be reached by TEAMS M-F 9-5 Sammie Armendariz Any other time please page [phone] if needed
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
## 2024-05-07 DIAGNOSIS — J96.01 ACUTE RESPIRATORY FAILURE WITH HYPOXIA: ICD-10-CM

## 2024-05-07 DIAGNOSIS — R53.2 FUNCTIONAL QUADRIPLEGIA: ICD-10-CM

## 2024-05-07 DIAGNOSIS — Z71.89 OTHER SPECIFIED COUNSELING: ICD-10-CM

## 2024-05-07 DIAGNOSIS — C91.10 CHRONIC LYMPHOCYTIC LEUKEMIA OF B-CELL TYPE NOT HAVING ACHIEVED REMISSION: ICD-10-CM

## 2024-05-07 DIAGNOSIS — Z51.5 ENCOUNTER FOR PALLIATIVE CARE: ICD-10-CM

## 2024-05-07 LAB
ANISOCYTOSIS BLD QL: SLIGHT — SIGNIFICANT CHANGE UP
BASOPHILS # BLD AUTO: 0 K/UL — SIGNIFICANT CHANGE UP (ref 0–0.2)
BASOPHILS NFR BLD AUTO: 0 % — SIGNIFICANT CHANGE UP (ref 0–2)
CULTURE RESULTS: SIGNIFICANT CHANGE UP
EOSINOPHIL # BLD AUTO: 0 K/UL — SIGNIFICANT CHANGE UP (ref 0–0.5)
EOSINOPHIL NFR BLD AUTO: 0 % — SIGNIFICANT CHANGE UP (ref 0–6)
GAS PNL BLDA: SIGNIFICANT CHANGE UP
GLUCOSE BLDC GLUCOMTR-MCNC: 103 MG/DL — HIGH (ref 70–99)
GLUCOSE BLDC GLUCOMTR-MCNC: 79 MG/DL — SIGNIFICANT CHANGE UP (ref 70–99)
GLUCOSE BLDC GLUCOMTR-MCNC: 90 MG/DL — SIGNIFICANT CHANGE UP (ref 70–99)
GLUCOSE BLDC GLUCOMTR-MCNC: 90 MG/DL — SIGNIFICANT CHANGE UP (ref 70–99)
GLUCOSE BLDC GLUCOMTR-MCNC: 92 MG/DL — SIGNIFICANT CHANGE UP (ref 70–99)
HCT VFR BLD CALC: 26.3 % — LOW (ref 34.5–45)
HGB BLD-MCNC: 8 G/DL — LOW (ref 11.5–15.5)
LYMPHOCYTES # BLD AUTO: 94.17 K/UL — HIGH (ref 1–3.3)
LYMPHOCYTES # BLD AUTO: 96 % — HIGH (ref 13–44)
MANUAL SMEAR VERIFICATION: SIGNIFICANT CHANGE UP
MCHC RBC-ENTMCNC: 28 PG — SIGNIFICANT CHANGE UP (ref 27–34)
MCHC RBC-ENTMCNC: 30.4 GM/DL — LOW (ref 32–36)
MCV RBC AUTO: 92 FL — SIGNIFICANT CHANGE UP (ref 80–100)
MONOCYTES # BLD AUTO: 0.98 K/UL — HIGH (ref 0–0.9)
MONOCYTES NFR BLD AUTO: 1 % — LOW (ref 2–14)
NEUTROPHILS # BLD AUTO: 2.94 K/UL — SIGNIFICANT CHANGE UP (ref 1.8–7.4)
NEUTROPHILS NFR BLD AUTO: 3 % — LOW (ref 43–77)
NRBC # BLD: 0 /100 WBCS — SIGNIFICANT CHANGE UP (ref 0–0)
OVALOCYTES BLD QL SMEAR: SLIGHT — SIGNIFICANT CHANGE UP
PLAT MORPH BLD: NORMAL — SIGNIFICANT CHANGE UP
PLATELET # BLD AUTO: 194 K/UL — SIGNIFICANT CHANGE UP (ref 150–400)
POIKILOCYTOSIS BLD QL AUTO: SLIGHT — SIGNIFICANT CHANGE UP
RBC # BLD: 2.86 M/UL — LOW (ref 3.8–5.2)
RBC # FLD: 18.3 % — HIGH (ref 10.3–14.5)
RBC BLD AUTO: ABNORMAL
SMUDGE CELLS # BLD: PRESENT — SIGNIFICANT CHANGE UP
SPECIMEN SOURCE: SIGNIFICANT CHANGE UP
WBC # BLD: 98.09 K/UL — CRITICAL HIGH (ref 3.8–10.5)
WBC # FLD AUTO: 98.09 K/UL — CRITICAL HIGH (ref 3.8–10.5)

## 2024-05-07 PROCEDURE — 99223 1ST HOSP IP/OBS HIGH 75: CPT | Mod: 25

## 2024-05-07 PROCEDURE — 99498 ADVNCD CARE PLAN ADDL 30 MIN: CPT | Mod: 25

## 2024-05-07 PROCEDURE — 99232 SBSQ HOSP IP/OBS MODERATE 35: CPT

## 2024-05-07 PROCEDURE — 43246 EGD PLACE GASTROSTOMY TUBE: CPT | Mod: GC

## 2024-05-07 PROCEDURE — 99497 ADVNCD CARE PLAN 30 MIN: CPT | Mod: 25

## 2024-05-07 PROCEDURE — 99291 CRITICAL CARE FIRST HOUR: CPT | Mod: GC

## 2024-05-07 PROCEDURE — 99233 SBSQ HOSP IP/OBS HIGH 50: CPT | Mod: GC

## 2024-05-07 DEVICE — PEG KT SAFETY 20FR: Type: IMPLANTABLE DEVICE | Status: FUNCTIONAL

## 2024-05-07 RX ORDER — FENTANYL CITRATE 50 UG/ML
100 INJECTION INTRAVENOUS ONCE
Refills: 0 | Status: DISCONTINUED | OUTPATIENT
Start: 2024-05-07 | End: 2024-05-07

## 2024-05-07 RX ORDER — FUROSEMIDE 40 MG
40 TABLET ORAL ONCE
Refills: 0 | Status: COMPLETED | OUTPATIENT
Start: 2024-05-07 | End: 2024-05-07

## 2024-05-07 RX ORDER — MIDAZOLAM HYDROCHLORIDE 1 MG/ML
4 INJECTION, SOLUTION INTRAMUSCULAR; INTRAVENOUS ONCE
Refills: 0 | Status: DISCONTINUED | OUTPATIENT
Start: 2024-05-07 | End: 2024-05-07

## 2024-05-07 RX ORDER — KETAMINE HYDROCHLORIDE 100 MG/ML
100 INJECTION INTRAMUSCULAR; INTRAVENOUS ONCE
Refills: 0 | Status: DISCONTINUED | OUTPATIENT
Start: 2024-05-07 | End: 2024-05-07

## 2024-05-07 RX ORDER — MIDODRINE HYDROCHLORIDE 2.5 MG/1
30 TABLET ORAL THREE TIMES A DAY
Refills: 0 | Status: DISCONTINUED | OUTPATIENT
Start: 2024-05-07 | End: 2024-05-20

## 2024-05-07 RX ADMIN — Medication 40 MILLIGRAM(S): at 09:22

## 2024-05-07 RX ADMIN — CHLORHEXIDINE GLUCONATE 15 MILLILITER(S): 213 SOLUTION TOPICAL at 17:13

## 2024-05-07 RX ADMIN — QUETIAPINE FUMARATE 25 MILLIGRAM(S): 200 TABLET, FILM COATED ORAL at 14:55

## 2024-05-07 RX ADMIN — SENNA PLUS 10 MILLILITER(S): 8.6 TABLET ORAL at 21:32

## 2024-05-07 RX ADMIN — DROXIDOPA 100 MILLIGRAM(S): 100 CAPSULE ORAL at 17:13

## 2024-05-07 RX ADMIN — AMPICILLIN SODIUM AND SULBACTAM SODIUM 200 GRAM(S): 250; 125 INJECTION, POWDER, FOR SUSPENSION INTRAMUSCULAR; INTRAVENOUS at 05:44

## 2024-05-07 RX ADMIN — SODIUM CHLORIDE 75 MILLILITER(S): 9 INJECTION, SOLUTION INTRAVENOUS at 23:57

## 2024-05-07 RX ADMIN — MIDODRINE HYDROCHLORIDE 30 MILLIGRAM(S): 2.5 TABLET ORAL at 14:54

## 2024-05-07 RX ADMIN — SODIUM CHLORIDE 75 MILLILITER(S): 9 INJECTION, SOLUTION INTRAVENOUS at 09:22

## 2024-05-07 RX ADMIN — Medication 3 MILLILITER(S): at 05:02

## 2024-05-07 RX ADMIN — AMPICILLIN SODIUM AND SULBACTAM SODIUM 200 GRAM(S): 250; 125 INJECTION, POWDER, FOR SUSPENSION INTRAMUSCULAR; INTRAVENOUS at 23:57

## 2024-05-07 RX ADMIN — KETAMINE HYDROCHLORIDE 100 MILLIGRAM(S): 100 INJECTION INTRAMUSCULAR; INTRAVENOUS at 10:31

## 2024-05-07 RX ADMIN — ATOVAQUONE 1500 MILLIGRAM(S): 750 SUSPENSION ORAL at 12:11

## 2024-05-07 RX ADMIN — Medication 1 MILLIGRAM(S): at 17:13

## 2024-05-07 RX ADMIN — MIDAZOLAM HYDROCHLORIDE 4 MILLIGRAM(S): 1 INJECTION, SOLUTION INTRAMUSCULAR; INTRAVENOUS at 11:08

## 2024-05-07 RX ADMIN — SODIUM CHLORIDE 75 MILLILITER(S): 9 INJECTION, SOLUTION INTRAVENOUS at 21:30

## 2024-05-07 RX ADMIN — AMPICILLIN SODIUM AND SULBACTAM SODIUM 200 GRAM(S): 250; 125 INJECTION, POWDER, FOR SUSPENSION INTRAMUSCULAR; INTRAVENOUS at 17:13

## 2024-05-07 RX ADMIN — DROXIDOPA 100 MILLIGRAM(S): 100 CAPSULE ORAL at 12:11

## 2024-05-07 RX ADMIN — Medication 50 MILLIGRAM(S): at 05:44

## 2024-05-07 RX ADMIN — Medication 3 MILLILITER(S): at 22:36

## 2024-05-07 RX ADMIN — MIDODRINE HYDROCHLORIDE 30 MILLIGRAM(S): 2.5 TABLET ORAL at 21:31

## 2024-05-07 RX ADMIN — MIDAZOLAM HYDROCHLORIDE 4 MILLIGRAM(S): 1 INJECTION, SOLUTION INTRAMUSCULAR; INTRAVENOUS at 10:21

## 2024-05-07 RX ADMIN — FENTANYL CITRATE 100 MICROGRAM(S): 50 INJECTION INTRAVENOUS at 11:07

## 2024-05-07 RX ADMIN — Medication 3 MILLILITER(S): at 15:05

## 2024-05-07 RX ADMIN — NYSTATIN CREAM 1 APPLICATION(S): 100000 CREAM TOPICAL at 09:23

## 2024-05-07 RX ADMIN — CHLORHEXIDINE GLUCONATE 15 MILLILITER(S): 213 SOLUTION TOPICAL at 06:19

## 2024-05-07 RX ADMIN — AMPICILLIN SODIUM AND SULBACTAM SODIUM 200 GRAM(S): 250; 125 INJECTION, POWDER, FOR SUSPENSION INTRAMUSCULAR; INTRAVENOUS at 11:12

## 2024-05-07 RX ADMIN — QUETIAPINE FUMARATE 25 MILLIGRAM(S): 200 TABLET, FILM COATED ORAL at 21:31

## 2024-05-07 RX ADMIN — CHLORHEXIDINE GLUCONATE 1 APPLICATION(S): 213 SOLUTION TOPICAL at 06:19

## 2024-05-07 RX ADMIN — QUETIAPINE FUMARATE 100 MILLIGRAM(S): 200 TABLET, FILM COATED ORAL at 21:31

## 2024-05-07 NOTE — CHART NOTE - NSCHARTNOTEFT_GEN_A_CORE
NUTRITION FOLLOW UP NOTE    PATIENT SEEN FOR: length of stay follow up.     SOURCE: [] Patient  [x] Current Medical Record  [x] RN  [] Family/support person at bedside  [] Patient unavailable/inappropriate  [x] Other: Interdisciplinary Rounds     CHART REVIEWED/EVENTS NOTED.  [] No changes to nutrition care plan to note  [x] Nutrition Status:  - s/p trach     DIET ORDER:   Diet, NPO:   Except Medications (24)    CURRENT DIET ORDER IS:  [] Appropriate:  [] Inadequate:  [x] Other: See recommendations below    NUTRITION INTAKE/PROVISION:  [] PO:  [x] Enteral Nutrition: Pt previously ordered for Glucerna 1.5 at goal rate 75 ml/hr x18 hrs (- discontinued after pt pulled out NGT)   5-Day Average Enteral provision (based on Glucerna 1.5): total volume 924 mL, 1386 kcals, 76 gm protein   *Feeds intermittently held to work with PT/Pt pulled out NGT  [] Parenteral Nutrition:    ANTHROPOMETRICS:  Drug Dosing Weight  Height (cm): 172.7 (2024 17:12)  Weight (kg): 111 (2024 17:12)  BMI (kg/m2): 37.2 (2024 17:12)  BSA (m2): 2.23 (2024 17:12)  Daily Weight in k.2 (-), 106.8 (-18), 108.2 (-)  RD obtained wt: 128.6 kg (-03) ? accuracy  RD will continue to trend as new wts available/able.     NUTRITIONALLY PERTINENT MEDICATIONS:  MEDICATIONS  (STANDING):  ampicillin/sulbactam  IVPB      ampicillin/sulbactam  IVPB 3 Gram(s) IV Intermittent every 6 hours  atovaquone  Suspension 1500 milliGRAM(s) Oral daily  clonazePAM  Tablet 1 milliGRAM(s) Oral every 12 hours  dextrose 5%. 1000 milliLiter(s) (75 mL/Hr) IV Continuous <Continuous>  droxidopa 100 milliGRAM(s) Oral three times a day  fentaNYL    Injectable 100 MICROGram(s) IV Push once  hydrocortisone sodium succinate Injectable 50 milliGRAM(s) IV Push daily  insulin lispro (ADMELOG) corrective regimen sliding scale   SubCutaneous every 6 hours  midodrine 30 milliGRAM(s) Oral three times a day  polyethylene glycol 3350 17 Gram(s) Oral every 12 hours  QUEtiapine 100 milliGRAM(s) Oral at bedtime  QUEtiapine 25 milliGRAM(s) Oral three times a day  senna Syrup 10 milliLiter(s) Oral at bedtime    NUTRITIONALLY PERTINENT LABS:   Na141 mmol/L Glu 80 mg/dL K+ 4.4 mmol/L Cr  0.48 mg/dL<L> BUN 10 mg/dL  Phos 3.6 mg/dL  Alb 2.6 g/dL<L>  Chol 124 mg/dL   HDL 23 mg/dL<L> Trig 187 mg/dL<H> ALT 10 U/L AST 16 U/L Alkaline Phosphatase 70 U/L    Finger Sticks:  POCT Blood Glucose.: 103 mg/dL ( @ 11:11)  POCT Blood Glucose.: 90 mg/dL ( @ 05:52)  POCT Blood Glucose.: 79 mg/dL ( @ 00:23)  POCT Blood Glucose.: 94 mg/dL ( @ 17:26)  POCT Blood Glucose.: 111 mg/dL ( @ 11:42)    NUTRITIONALLY PERTINENT MEDICATIONS/LABS:  [x] Reviewed  [x] Relevant notes on medications/labs: Elevated BG, on steroid which can elevate BG, on sliding scale of insulin     EDEMA:  [x] Reviewed  [] Relevant notes:    GI/ I&O:  [x] Reviewed  [] Relevant notes:  [] Other:    SKIN:   [] No pressure injuries documented, per nursing flowsheet  [x] Pressure injury previously noted; Suspected deep tissue injury right heel, Bilateral buttocks sacrum   [] Change in pressure injury documentation:  [] Other:    ESTIMATED NEEDS:  Estimated Energy Needs: (15-20 kcals/kg based on daily wt 106.8 kg ()) 5004-5071 kcals  Estimated Protein Needs: (1.5-2.0 gm/kg based on IBW 63.5 kg)  gm  Fluid needs deferred to provider.   [place] State Equation (based on daily wt 106.8 kg ()): 1824 kcals ()    NUTRITION DIAGNOSIS:  [x] Prior Dx: Increased protein-energy needs   [] New Dx:    EDUCATION:  [] Yes:  [x] Not appropriate/warranted    NUTRITION CARE PLAN:  1. When able to feed pt, Glucerna 1.5 at goal rate 70 mL/hr x18 hours to provide total volume 1260 mL, 1890 kcals, 104 gm protein, and 956 mL free water. Meets 18 kcals/kG based on daily wt 106.8 kg (04-18) and 1.6 gm protein/kG based on IBW 63.5 kg.  2. Consider multivitamin and Vitamin C to aid in wound healing    [] Achieved - Continue current nutrition intervention(s)  [] Current medical condition precludes nutrition intervention at this time.    MONITORING AND EVALUATION:   RD remains available upon request and will follow up per protocol.    Taylor Aguero, MS, RD, CDN, CNSC, CDCES TEAMS   Available on [employer] TEAMS NUTRITION FOLLOW UP NOTE    PATIENT SEEN FOR: length of stay follow up.     SOURCE: [] Patient  [x] Current Medical Record  [x] RN  [] Family/support person at bedside  [] Patient unavailable/inappropriate  [x] Other: Interdisciplinary Rounds     CHART REVIEWED/EVENTS NOTED.  [] No changes to nutrition care plan to note  [x] Nutrition Status:  - s/p trach   - s/p PEG  per RN    DIET ORDER:   Diet, NPO:   Except Medications (24)    CURRENT DIET ORDER IS:  [] Appropriate:  [] Inadequate:  [x] Other: See recommendations below    NUTRITION INTAKE/PROVISION:  [] PO:  [x] Enteral Nutrition: Pt previously ordered for Glucerna 1.5 at goal rate 75 ml/hr x18 hrs (- discontinued after pt pulled out NGT)   5-Day Average Enteral provision (based on Glucerna 1.5): total volume 924 mL, 1386 kcals, 76 gm protein   *Feeds intermittently held to work with PT/Pt pulled out NGT  [] Parenteral Nutrition:    ANTHROPOMETRICS:  Drug Dosing Weight  Height (cm): 172.7 (2024 17:12)  Weight (kg): 111 (2024 17:12)  BMI (kg/m2): 37.2 (2024 17:12)  BSA (m2): 2.23 (2024 17:12)  Daily Weight in k.2 (-), 106.8 (-), 108.2 ()  RD obtained wt: 122.4 (), 128.6 kg () ? accuracy  RD will continue to trend as new wts available/able.     NUTRITIONALLY PERTINENT MEDICATIONS:  MEDICATIONS  (STANDING):  ampicillin/sulbactam  IVPB      ampicillin/sulbactam  IVPB 3 Gram(s) IV Intermittent every 6 hours  atovaquone  Suspension 1500 milliGRAM(s) Oral daily  clonazePAM  Tablet 1 milliGRAM(s) Oral every 12 hours  dextrose 5%. 1000 milliLiter(s) (75 mL/Hr) IV Continuous <Continuous>  droxidopa 100 milliGRAM(s) Oral three times a day  fentaNYL    Injectable 100 MICROGram(s) IV Push once  hydrocortisone sodium succinate Injectable 50 milliGRAM(s) IV Push daily  insulin lispro (ADMELOG) corrective regimen sliding scale   SubCutaneous every 6 hours  midodrine 30 milliGRAM(s) Oral three times a day  polyethylene glycol 3350 17 Gram(s) Oral every 12 hours  QUEtiapine 100 milliGRAM(s) Oral at bedtime  QUEtiapine 25 milliGRAM(s) Oral three times a day  senna Syrup 10 milliLiter(s) Oral at bedtime    NUTRITIONALLY PERTINENT LABS:   Na141 mmol/L Glu 80 mg/dL K+ 4.4 mmol/L Cr  0.48 mg/dL<L> BUN 10 mg/dL  Phos 3.6 mg/dL  Alb 2.6 g/dL<L>  Chol 124 mg/dL   HDL 23 mg/dL<L> Trig 187 mg/dL<H> ALT 10 U/L AST 16 U/L Alkaline Phosphatase 70 U/L    Finger Sticks:  POCT Blood Glucose.: 103 mg/dL ( @ 11:11)  POCT Blood Glucose.: 90 mg/dL ( @ 05:52)  POCT Blood Glucose.: 79 mg/dL ( @ 00:23)  POCT Blood Glucose.: 94 mg/dL ( @ 17:26)  POCT Blood Glucose.: 111 mg/dL ( @ 11:42)    NUTRITIONALLY PERTINENT MEDICATIONS/LABS:  [x] Reviewed  [x] Relevant notes on medications/labs: Elevated BG, on steroid which can elevate BG, on sliding scale of insulin     EDEMA:  [x] Reviewed  [] Relevant notes:    GI/ I&O:  [x] Reviewed  [] Relevant notes:  [] Other:    SKIN:   [] No pressure injuries documented, per nursing flowsheet  [x] Pressure injury previously noted; Suspected deep tissue injury right heel, Bilateral buttocks sacrum   [] Change in pressure injury documentation:  [] Other:    ESTIMATED NEEDS:  Estimated Energy Needs: (15-20 kcals/kg based on daily wt 106.8 kg ()) 7684-8517 kcals  Estimated Protein Needs: (1.5-2.0 gm/kg based on IBW 63.5 kg)  gm  Fluid needs deferred to provider.   [place] State Equation (based on daily wt 106.8 kg ()): 1824 kcals (5/7)    NUTRITION DIAGNOSIS:  [x] Prior Dx: Increased protein-energy needs   [] New Dx:    EDUCATION:  [] Yes:  [x] Not appropriate/warranted    NUTRITION CARE PLAN:  1. When able to feed pt, Glucerna 1.5 at goal rate 70 mL/hr x18 hours to provide total volume 1260 mL, 1890 kcals, 104 gm protein, and 956 mL free water. Meets 18 kcals/kG based on daily wt 106.8 kg (04-18) and 1.6 gm protein/kG based on IBW 63.5 kg.  2. Consider multivitamin and Vitamin C to aid in wound healing    [] Achieved - Continue current nutrition intervention(s)  [] Current medical condition precludes nutrition intervention at this time.    MONITORING AND EVALUATION:   RD remains available upon request and will follow up per protocol.    Taylor Aguero, MS, RD, CDN, CNSC, CDCES TEAMS   Available on [facility] TEAMS

## 2024-05-07 NOTE — CHART NOTE - NSCHARTNOTEFT_GEN_A_CORE
MICU Transfer Note    Transfer from: MICU  Transfer to:  (  ) Medicine    (  ) Telemetry    (X) RCU    (  ) Palliative    (  ) Stroke Unit    (  ) _______________  Accepting Physician:      MICU COURSE: Pt is a 69F h/o CVA (bedbound at baseline), COPD, CHF, HTN who initially p/w acute shortness of breath to outside ED and was treated for acute pulmonary edema with sublingual nitro x 2, Lasix, and BiPAP. Pt was also found to be febrile to 103, so treated for PNA. BIPAP led to improvement in O2 to 89-90. Pt transferred to [place] on 4/16/24 for white count of 233 and possible leukophoresis. In the ED, pt intubated iso respiratory tiring and decreasing mental status, and also was started on levophed for hypotension. Following intubation and initiation of pressors, she was admitted to the MICU for AHRF and septic shock.     In the MICU, heme was consulted for hyperleukocytosis ISO new CLL, however due to mature lymphocytes leukophoresis was not performed. Chest tube and Shiley placed. Pt was found to have S. pneumo bacteremia and empyema and treated with CTX (4/18-4/25). On CT Chest performed on 4/22, a persistent loculated effusion was noted. IR performed Pigtail placement 4/24-4/27.    Pt intermittent low-grade fevers despite clearing her cultures; no additional infectious source or explanation of her fevers was identified. Due to inability to be extubated, tracheostomy was performed on 4/28, although pt remained on ventilator. On 4/25, CTX was switched to Unasyn per ID recs. Seroquel and Klonopin were administered for anxiety. GI was consulted for PEG placement due to dysphagia, however placement was deferred due to patient's persistent pressor needs. As of 5/5, pt has no longer needed IV pressors to maintain her MAP > 60; she was started on midodrine and droxidopa in order to wean off vasopressors, as well as stress dose steroids. GI was re-consulted and performed EGD and PEG placement on 5/7, which pt tolerated well.     At this time pt is clinically stable and remains on ventilator. She can appropriately be transferred to the RCU.    ASSESSMENT & PLAN:     69F h/o CVA (bedbound at baseline), COPD, CHF, HTN presenting as transfer from [facility] for SOB iso leukocytosis to 233 c/f acute leukemia. Pt intubated and on pressors, transferred to MICU for further management, course c/b empyema s/p chest tube placement (4/16) and removal, reaccumulation of loculated pleural effusion s/p L pigtail placement and removal on 4/27. Now off of IV pressors, pending EGD/PEG with GI today and likely to be transferred to RCU following this procedure.     =====Neuro=====  #At Baseline, AOx4 mental status   #Anxiety  - L upper and lower extremity motor deficits iso hx of stroke   - Pain control w/ Tylenol  - Seroquel 25 tid and seroquel 100qhs for agitation   - Klonopin 1mg q12     =====CV=====  #Septic Shock, Resolving   - 2/2 Strep Pneumo Empyema and Bacteremia s/p Pigtail catheter   - Currently off IV pressors  - C/w Midodrine to 30 TID   - C/w droxidopa 100mg tid   - Solu-cortef 50 mg IV qd 5/6-5/8  - MAP goal > 60     #CHF  - Unclear hx but elevated pro-BNP to 2600s  - TTE: EF 54%, no significant abnormalities  - Diurese as needed    =====Resp======  #Acute hypoxic respiratory failure  #Empyema Pansensitive Strep Pneumo, Resolved   -s/p thora draining 1500cc fluid 4/16 with chest tube placement; c/w exudative effusion growing Strep Pneumo  -MRSA neg  -pleural fluid growing strep pneumo; BCx growing strep pneumo; Pansensitive  -was on vanc/zosyn/azithro (4/16)-->deescalated to CTX (4/17 -4/23), Added on Vancomycin (4/23-) due to MRSE 1/2 Cx, expanded coverage to Cefepime (4/23 1x dose), back on CTX, switched to Unasyn 3g Q6 (4/25- )  -CT chest 4/22 reveals persistent loculated effusion, s/p IR Pigtail placement 4/24-4/27 w/ Exudative effusion; Alteplase and Dornase through pigtal x6 doses; unable to finish 6th Alteplase+Dornase due to catheter occlusion   - Repeat CTC 4/27 - decreased small pleural effusions, thoracic LAD   - s/p Trach placement 4/28; Attempt Trach collar during day and Pressure support at night   - continue vent weaning as tolerated    =====GI=====  #Diet: NPO (repeatedly pulling out NGT)  - Tentatively planned for EGD/PEG with GI today 5/7    =====/Renal=====  #No active issues    =====Heme/Onc=====  #Leukocytosis  #CLL  -  on presentation; stable in 90s  - No plan for leukophoresis at this time given mature lymphocytes  - CT Chest A&P showing diffuse axillary, inguinal mediastinal RP lymphadenopathy.  - Appreciate Heme recs; bone marrow bx not being considered at this time    #L Common Femoral Vein DVT   - Non-occlusive   - C/w Lovenox 110mg q12; being held in preparation for PEG placement. Will restart 5/8 AM    =====ID=====  #Empyema 2/2 Pansensitive Strep Pneumo, resolved  #Strep Pneumo Bacteremia, resolved  -BCx + strep pneumo; fluid culture with strep pneumo  -repeat BCx 4/20 ngtd, ReCx 4/23 due to fever   -CT Chest A&P demonstrates LLL empyema not showing any other intraabdominal infectious source  - Was on vanc/zosyn/azithro (4/16)-->deescalated to CTX (4/17 -4/23), Added on Vancomycin (4/23-) due to MRSE 1/2 Cx, expanded coverage to Cefepime (4/23 1x dose), back on CTX, switched to Unasyn 3g Q6 (4/25- )  - IVIG 4/26 x1  - ID Consulted for persistent fevers despite source control; appreciate recs   - CMV detc below threshold range  - Crypt, fungitell pending     Plan    - Per ID, c/w with Unasyn (4/25  -   ). Will follow up with ID today for elucidation of antibiotic course duration.   - PCP PPX w/ Atovaquone   - Taper off Stress steroids; currently on Solu-Cortef QD through 5/8    =====Endo=====  - ISS q6h while NPO   - Pulled out NGT, currently NPO, PEG placement with GI tentatively 5/7  - C/w D5 at 75 cc/hr while NPO    =====Ethics:=====  Full Code  Only known family member is Aunt Mili Gordon ([phone], [phone]) [place]; per discussion at bedside with pt, pt would not want to "live on machines forever" and wants to eat and be comfortable. This was discussed with Mili Gordon, who would like to try to wean pt off ventilator, but if this is not possible, would like to respect patient's wishes.         For Follow-Up:  [ ] Attempt to wean from ventilator   [ ] F/u ID recs regarding Unasyn course   [ ] Start tube feeds via PEG on 5/8  [ ] C/w Solu-Cortef 50 mg IVP qd; last dose 5/8. D/c Atovaquone once stress dose steroid course is complete       Vital Signs Last 24 Hrs  T(C): 37.6 (07 May 2024 08:00), Max: 37.6 (07 May 2024 08:00)  T(F): 99.7 (07 May 2024 08:00), Max: 99.7 (07 May 2024 08:00)  HR: 103 (07 May 2024 11:00) (64 - 103)  BP: 133/62 (07 May 2024 11:00) (86/42 - 144/65)  BP(mean): 89 (07 May 2024 11:00) (61 - 89)  RR: 30 (07 May 2024 11:00) (20 - 36)  SpO2: 98% (07 May 2024 11:00) (97% - 100%)    Parameters below as of 07 May 2024 05:02  Patient On (Oxygen Delivery Method): ventilator      I&O's Summary    06 May 2024 07:01  -  07 May 2024 07:00  --------------------------------------------------------  IN: 1475 mL / OUT: 1000 mL / NET: 475 mL    07 May 2024 07:01  -  07 May 2024 11:15  --------------------------------------------------------  IN: 400 mL / OUT: 1500 mL / NET: -1100 mL          MEDICATIONS  (STANDING):  albuterol/ipratropium for Nebulization 3 milliLiter(s) Nebulizer every 8 hours  ampicillin/sulbactam  IVPB      ampicillin/sulbactam  IVPB 3 Gram(s) IV Intermittent every 6 hours  atovaquone  Suspension 1500 milliGRAM(s) Oral daily  chlorhexidine 0.12% Liquid 15 milliLiter(s) Oral Mucosa every 12 hours  chlorhexidine 2% Cloths 1 Application(s) Topical <User Schedule>  clonazePAM  Tablet 1 milliGRAM(s) Oral every 12 hours  dextrose 5%. 1000 milliLiter(s) (75 mL/Hr) IV Continuous <Continuous>  droxidopa 100 milliGRAM(s) Oral three times a day  fentaNYL    Injectable 100 MICROGram(s) IV Push once  hydrocortisone sodium succinate Injectable 50 milliGRAM(s) IV Push daily  insulin lispro (ADMELOG) corrective regimen sliding scale   SubCutaneous every 6 hours  midodrine 30 milliGRAM(s) Oral three times a day  polyethylene glycol 3350 17 Gram(s) Oral every 12 hours  QUEtiapine 25 milliGRAM(s) Oral three times a day  QUEtiapine 100 milliGRAM(s) Oral at bedtime  senna Syrup 10 milliLiter(s) Oral at bedtime    MEDICATIONS  (PRN):  acetaminophen   Oral Liquid .. 650 milliGRAM(s) Oral every 6 hours PRN Temp greater or equal to 38C (100.4F)  nystatin Powder 1 Application(s) Topical two times a day PRN skin irritation        LABS                                            8.0                   Neurophils% (auto):   3.0    (05-06 @ 23:18):    98.09)-----------(194          Lymphocytes% (auto):  96.0                                          26.3                   Eosinphils% (auto):   0.0      Manual%: Neutrophils x    ; Lymphocytes x    ; Eosinophils x    ; Bands%: x    ; Blasts x                                    141    |  107    |  10                  Calcium: 8.5   / iCa: x      (05-06 @ 23:18)    ----------------------------<  80        Magnesium: 2.5                              4.4     |  25     |  0.48             Phosphorous: 3.6      TPro  5.9    /  Alb  2.6    /  TBili  0.4    /  DBili  x      /  AST  16     /  ALT  10     /  AlkPhos  70     06 May 2024 23:18    ( 05-06 @ 23:18 )   PT: 12.0 sec;   INR: 1.15 ratio  aPTT: 30.0 sec MICU Transfer Note    Transfer from: MICU  Transfer to:  (  ) Medicine    (  ) Telemetry    (X) RCU    (  ) Palliative    (  ) Stroke Unit    (  ) _______________  Accepting Physician:      MICU COURSE: Pt is a 69F h/o CVA (bedbound at baseline), COPD, CHF, HTN who initially p/w acute shortness of breath to outside ED and was treated for acute pulmonary edema with sublingual nitro x 2, Lasix, and BiPAP. Pt was also found to be febrile to 103, so treated for PNA. BIPAP led to improvement in O2 to 89-90. Pt transferred to [place] on 4/16/24 for white count of 233 and possible leukophoresis. In the ED, pt intubated iso respiratory tiring and decreasing mental status, and also was started on levophed for hypotension. Following intubation and initiation of pressors, she was admitted to the MICU for AHRF and septic shock.     In the MICU, heme was consulted for hyperleukocytosis ISO new CLL, however due to mature lymphocytes leukophoresis was not performed. Chest tube and Shiley placed. Pt was found to have S. pneumo bacteremia and empyema and treated with CTX (4/18-4/25). On CT Chest performed on 4/22, a persistent loculated effusion was noted. IR performed Pigtail placement 4/24-4/27.    Pt intermittent low-grade fevers despite clearing her cultures; no additional infectious source or explanation of her fevers was identified. Due to inability to be extubated, tracheostomy was performed on 4/28, although pt remained on ventilator. On 4/25, CTX was switched to Unasyn per ID recs. Seroquel and Klonopin were administered for anxiety. GI was consulted for PEG placement due to dysphagia, however placement was deferred due to patient's persistent pressor needs. As of 5/5, pt has no longer needed IV pressors to maintain her MAP > 60; she was started on midodrine and droxidopa in order to wean off vasopressors, as well as stress dose steroids. GI was re-consulted and performed EGD and PEG placement on 5/7, which pt tolerated well. Tube feeds were started through her gastrostomy tube on 5/8.    At this time pt is clinically stable and remains on ventilator. She can appropriately be transferred to the RCU.    ASSESSMENT & PLAN:     =====Neuro=====  #At Baseline, AOx4 mental status   #Anxiety  - L upper and lower extremity motor deficits iso hx of stroke   - Pain control w/ Tylenol  - Seroquel 25 tid and seroquel 100qhs for agitation   - Klonopin 1mg q12     =====CV=====  #Septic Shock, Resolved  - 2/2 Strep Pneumo Empyema and Bacteremia s/p Pigtail catheter   - Remains off IV pressors  - C/w Midodrine to 30 TID   - C/w droxidopa 100mg tid   - Solu-cortef 50 mg IV qd 5/6-5/8  - MAP goal > 60     #CHF  - Unclear hx but elevated pro-BNP to 2600s  - TTE: EF 54%, no significant abnormalities  - Diurese as needed    =====Resp======  #Acute hypoxic respiratory failure  #Empyema Pansensitive Strep Pneumo, Resolved   - S/p thora draining 1500cc fluid 4/16 with chest tube placement; c/w exudative effusion growing Strep Pneumo  - Was on vanc/zosyn/azithro (4/16)-->deescalated to CTX (4/17 -4/23), Added on Vancomycin (4/23-) due to MRSE 1/2 Cx, expanded coverage to Cefepime (4/23 1x dose), back on CTX, switched to Unasyn 3g Q6 (4/25- )  - CT chest 4/22 reveals persistent loculated effusion, s/p IR Pigtail placement 4/24-4/27 w/ Exudative effusion; Alteplase and Dornase through pigtal x6 doses; unable to finish 6th Alteplase+Dornase due to catheter occlusion   - Repeat CTC 4/27 - decreased small pleural effusions, thoracic LAD   - S/p Trach placement 4/28; Attempt Trach collar during day and Pressure support at night   - Continue vent weaning as tolerated    =====GI=====  #Diet: NPO with tube feeds through gastrostomy tube  - S/p PEG placement with GI 5/7    =====/Renal=====  #No active issues    =====Heme/Onc=====  #Leukocytosis  #CLL  -  on presentation; stable at 101K  - No plan for leukophoresis at this time given mature lymphocytes per heme  - CT Chest A&P showing diffuse axillary, inguinal mediastinal RP lymphadenopathy.  - Appreciate Heme recs; can plan for bone marrow bx w/ IR if in line with GOC    #L Common Femoral Vein DVT   - Non-occlusive   - C/w Lovenox 110mg q12, restarting this AM 5/8 after being held for PEG    =====ID=====  #Empyema 2/2 Pansensitive Strep Pneumo, resolved  #Strep Pneumo Bacteremia, resolved  -BCx + strep pneumo; fluid culture with strep pneumo  -CT Chest A&P demonstrates LLL empyema not showing any other intraabdominal infectious source  - Was on vanc/zosyn/azithro (4/16)-->deescalated to CTX (4/17 -4/23), Added on Vancomycin (4/23-) due to MRSE 1/2 Cx, expanded coverage to Cefepime (4/23 1x dose), back on CTX, switched to Unasyn 3g Q6 (4/25- )  - IVIG 4/26 x1  - ID Consulted for persistent fevers despite source control; appreciate recs   - CMV detc below threshold range  - Fungitell 78, wnl    Plan    - Per ID, c/w with Unasyn (4/25  - 5/14). Per ID, if d/c before this date, can send home on Augmentin 875 mg BID.  - PCP PPX w/ Atovaquone through 5/8 while on Solu-cortef   - Taper off Stress steroids; currently on Solu-Cortef QD through 5/8    =====Endo=====  - ISS q6h while receiving Tube feeds  - D/c D5 now that tube feeds will be restarted    =====Ethics:=====  Full Code  Only known family member is Aunt Mili Gordon ([phone], [phone]) who lives in [place]  Palliative recs appreciated; per discussion at bedside with pt, pt would not want to "live on machines forever" and wants to eat and be comfortable. This was discussed with Mili Gordon, who would like to try to wean pt off ventilator, but if this is not possible, would like to respect patient's wishes.     Plan to transfer to RCU once bed becomes available.      For Follow-Up:  [ ] Attempt to wean from ventilator   [ ] C/w Unasyn until 5/14/24 (or d/c on Augmentin 875 mg po BID if d/c before this)   [ ] C/w Solu-Cortef 50 mg IVP qd; last dose 5/8. D/c Atovaquone once stress dose steroid course is complete       ICU Vital Signs Last 24 Hrs  T(C): 37.3 (08 May 2024 04:00), Max: 37.8 (08 May 2024 00:00)  T(F): 99.1 (08 May 2024 04:00), Max: 100 (08 May 2024 00:00)  HR: 83 (08 May 2024 07:00) (68 - 103)  BP: 133/61 (08 May 2024 07:00) (88/53 - 140/63)  BP(mean): 88 (08 May 2024 07:00) (65 - 91)  RR: 22 (08 May 2024 07:00) (14 - 33)  SpO2: 100% (08 May 2024 07:00) (98% - 100%)    O2 Parameters below as of 07 May 2024 22:36  Patient On (Oxygen Delivery Method): ventilator      I&O's Detail    07 May 2024 07:01  -  08 May 2024 07:00  --------------------------------------------------------  IN:    dextrose 5%: 1800 mL    Enteral Tube Flush: 270 mL    IV PiggyBack: 400 mL  Total IN: 2470 mL    OUT:    Voided (mL): 3000 mL  Total OUT: 3000 mL    Total NET: -530 mL      MEDICATIONS  (STANDING):  albuterol/ipratropium for Nebulization 3 milliLiter(s) Nebulizer every 8 hours  ampicillin/sulbactam  IVPB      ampicillin/sulbactam  IVPB 3 Gram(s) IV Intermittent every 6 hours  atovaquone  Suspension 1500 milliGRAM(s) Oral daily  chlorhexidine 0.12% Liquid 15 milliLiter(s) Oral Mucosa every 12 hours  chlorhexidine 2% Cloths 1 Application(s) Topical <User Schedule>  clonazePAM  Tablet 1 milliGRAM(s) Oral every 12 hours  droxidopa 100 milliGRAM(s) Oral three times a day  enoxaparin Injectable 110 milliGRAM(s) SubCutaneous every 12 hours  insulin lispro (ADMELOG) corrective regimen sliding scale   SubCutaneous every 6 hours  midodrine 30 milliGRAM(s) Oral three times a day  polyethylene glycol 3350 17 Gram(s) Oral every 12 hours  QUEtiapine 25 milliGRAM(s) Oral three times a day  QUEtiapine 100 milliGRAM(s) Oral at bedtime  senna Syrup 10 milliLiter(s) Oral at bedtime    MEDICATIONS  (PRN):  acetaminophen   Oral Liquid .. 650 milliGRAM(s) Oral every 6 hours PRN Moderate Pain (4 - 6)  acetaminophen   Oral Liquid .. 650 milliGRAM(s) Oral every 6 hours PRN Temp greater or equal to 38C (100.4F)  nystatin Powder 1 Application(s) Topical two times a day PRN skin irritation          LABS                          8.0    101.18 )-----------( 185      ( 07 May 2024 23:59 )             25.5     05-07    138  |  104  |  7   ----------------------------<  89  4.6   |  26  |  0.48<L>    Ca    8.2<L>      07 May 2024 23:59  Phos  3.6     05-07  Mg     2.2     05-07    TPro  5.4<L>  /  Alb  2.2<L>  /  TBili  0.3  /  DBili  x   /  AST  19  /  ALT  8<L>  /  AlkPhos  68  05-07    PT/INR - ( 07 May 2024 23:59 )   PT: 12.4 sec;   INR: 1.13 ratio    PTT - ( 07 May 2024 23:59 )  PTT:28.9 sec MICU Transfer Note    Transfer from: MICU  Transfer to:  (  ) Medicine    (  ) Telemetry    (X) RCU    (  ) Palliative    (  ) Stroke Unit    (  ) _______________  Accepting Physician:      MICU COURSE: Pt is a 69F h/o CVA (bedbound at baseline), COPD, CHF, HTN who initially p/w acute shortness of breath to outside ED and was treated for acute pulmonary edema with sublingual nitro x 2, Lasix, and BiPAP. Pt was also found to be febrile to 103, so treated for PNA. BIPAP led to improvement in O2 to 89-90. Pt transferred to [place] on 4/16/24 for white count of 233 and possible leukophoresis. In the ED, pt intubated iso respiratory tiring and decreasing mental status, and also was started on levophed for hypotension. Following intubation and initiation of pressors, she was admitted to the MICU for AHRF and septic shock.     In the MICU, heme was consulted for hyperleukocytosis ISO new CLL, however due to mature lymphocytes leukophoresis was not performed. Chest tube and Shiley placed. Pt was found to have S. pneumo bacteremia and empyema and treated with CTX (4/18-4/25). On CT Chest performed on 4/22, a persistent loculated effusion was noted. IR performed Pigtail placement 4/24-4/27.    Pt intermittent low-grade fevers despite clearing her cultures; no additional infectious source or explanation of her fevers was identified. Due to inability to be extubated, tracheostomy was performed on 4/28, although pt remained on ventilator. On 4/25, CTX was switched to Unasyn per ID recs. Seroquel and Klonopin were administered for anxiety. GI was consulted for PEG placement due to dysphagia, however placement was deferred due to patient's persistent pressor needs. As of 5/5, pt has no longer needed IV pressors to maintain her MAP > 60; she was started on midodrine and droxidopa in order to wean off vasopressors, as well as stress dose steroids. GI was re-consulted and performed EGD and PEG placement on 5/7, which pt tolerated well. Tube feeds were started through her gastrostomy tube on 5/8.    Psych was consulted on 5/8 due to passive SI and depression.    At this time pt is clinically stable and remains on ventilator. She can appropriately be transferred to the RCU.    ASSESSMENT & PLAN:     69F h/o CVA (bedbound at baseline), COPD, CHF, HTN presenting as transfer from [facility] for SOB iso leukocytosis to 233 c/f acute leukemia. Pt intubated and on pressors, transferred to MICU for further management, course c/b empyema s/p chest tube placement (4/16) and removal, reaccumulation of loculated pleural effusion s/p L pigtail placement and removal on 4/27. Now off of IV pressors, s/p EGD/PEG with GI yesterday, pending transfer to the RCU.    =====Neuro=====  #At Baseline, AOx4 mental status   #Anxiety, Depression   - L upper and lower extremity motor deficits iso hx of stroke   - Pain control w/ Tylenol  - Seroquel 25 tid and seroquel 100qhs for agitation   - Klonopin 1mg q12   - Psych consulted for long-standing depression and passive SI, recs appreciated     =====CV=====  #Septic Shock, Resolved  - 2/2 Strep Pneumo Empyema and Bacteremia s/p Pigtail catheter   - Remains off IV pressors  - C/w Midodrine to 30 TID   - C/w droxidopa 100mg tid   - Solu-cortef 50 mg IV qd 5/6-5/8; received last dose this AM prior to rounds   - MAP goal > 60     #CHF  - Unclear hx but elevated pro-BNP to 2600s  - TTE: EF 54%, no significant abnormalities  - Diurese as needed; Lasix 40 IVP x 1 given 5/8    =====Resp======  #Acute hypoxic respiratory failure  #Empyema Pansensitive Strep Pneumo, Resolved   - S/p thora draining 1500cc fluid 4/16 with chest tube placement; c/w exudative effusion growing Strep Pneumo  - Was on vanc/zosyn/azithro (4/16)-->deescalated to CTX (4/17 -4/23), Added on Vancomycin (4/23-) due to MRSE 1/2 Cx, expanded coverage to Cefepime (4/23 1x dose), back on CTX, switched to Unasyn 3g Q6 (4/25- )  - CT chest 4/22 reveals persistent loculated effusion, s/p IR Pigtail placement 4/24-4/27 w/ Exudative effusion; Alteplase and Dornase through pigtal x6 doses; unable to finish 6th Alteplase+Dornase due to catheter occlusion   - Repeat CTC 4/27 - decreased small pleural effusions, thoracic LAD   - S/p Trach placement 4/28;  will continue to attempt Trach collar during day and Pressure support at night while pt in RCU     =====GI=====  #Diet: NPO with tube feeds through gastrostomy tube  - S/p PEG placement with GI 5/7    =====/Renal=====  #No active issues    =====Heme/Onc=====  #Leukocytosis  #CLL  -  on presentation; stable at 101K  - No plan for leukophoresis at this time given mature lymphocytes per heme  - CT Chest A&P showing diffuse axillary, inguinal mediastinal RP lymphadenopathy.  - Appreciate Heme recs; can plan for bone marrow bx w/ IR if in line with GOC    #L Common Femoral Vein DVT   - Non-occlusive   - C/w Lovenox 110mg q12, restarting this AM 5/8 after being held for PEG    =====ID=====  #Empyema 2/2 Pansensitive Strep Pneumo, resolved  #Strep Pneumo Bacteremia, resolved  -BCx + strep pneumo; fluid culture with strep pneumo  -CT Chest A&P demonstrates LLL empyema not showing any other intraabdominal infectious source  - Was on vanc/zosyn/azithro (4/16)-->deescalated to CTX (4/17 -4/23), Added on Vancomycin (4/23-) due to MRSE 1/2 Cx, expanded coverage to Cefepime (4/23 1x dose), back on CTX, switched to Unasyn 3g Q6 (4/25- )  - IVIG 4/26 x1  - ID Consulted  - CMV detc below threshold range  - Fungitell 78, wnl    Plan    - Per ID, c/w with Unasyn (4/25 - ) for 4 week course, with end date 4/14. Will switch to Augmentin 875 mg BID tomorrow 5/9 as pt has ability to take oral meds through PEG.  - PCP PPX w/ Atovaquone today with last Solu-Cortef dose, then d/c. Solu-cortef taper to be completed today 5/8    =====Endo=====  - ISS q6h while receiving Tube feeds  - D/c D5 now that tube feeds will be restarted    =====Ethics:=====  Full Code  Only known family member is Aunt Mili Gordon ([phone], [phone]) who lives in [place]; also has cousin/caretaker but medical team has not been able to get in contact with him.    Palliative recs appreciated; per discussion at bedside with pt, pt would not want to "live on machines forever" and wants to eat and be comfortable. This was discussed with Mili Gordon, who would like to try to wean pt off ventilator, but if this is not possible, would like to respect patient's wishes.     Plan to transfer to RCU once bed becomes available.        For Follow-Up:  [ ] Trach collar during the day and pressure support at night, continue to wean   [ ] C/w Augmentin 875 mg po BID (5/9-5/14)  [ ] Heme-Onc recs; ?Bone marrow biopsy  [ ] Psych recs for depression      ICU Vital Signs Last 24 Hrs  T(C): 37.3 (08 May 2024 04:00), Max: 37.8 (08 May 2024 00:00)  T(F): 99.1 (08 May 2024 04:00), Max: 100 (08 May 2024 00:00)  HR: 83 (08 May 2024 07:00) (68 - 103)  BP: 133/61 (08 May 2024 07:00) (88/53 - 140/63)  BP(mean): 88 (08 May 2024 07:00) (65 - 91)  RR: 22 (08 May 2024 07:00) (14 - 33)  SpO2: 100% (08 May 2024 07:00) (98% - 100%)    O2 Parameters below as of 07 May 2024 22:36  Patient On (Oxygen Delivery Method): ventilator      I&O's Detail    07 May 2024 07:01  -  08 May 2024 07:00  --------------------------------------------------------  IN:    dextrose 5%: 1800 mL    Enteral Tube Flush: 270 mL    IV PiggyBack: 400 mL  Total IN: 2470 mL    OUT:    Voided (mL): 3000 mL  Total OUT: 3000 mL    Total NET: -530 mL      MEDICATIONS  (STANDING):  albuterol/ipratropium for Nebulization 3 milliLiter(s) Nebulizer every 8 hours  ampicillin/sulbactam  IVPB      ampicillin/sulbactam  IVPB 3 Gram(s) IV Intermittent every 6 hours  atovaquone  Suspension 1500 milliGRAM(s) Oral daily  chlorhexidine 0.12% Liquid 15 milliLiter(s) Oral Mucosa every 12 hours  chlorhexidine 2% Cloths 1 Application(s) Topical <User Schedule>  clonazePAM  Tablet 1 milliGRAM(s) Oral every 12 hours  droxidopa 100 milliGRAM(s) Oral three times a day  enoxaparin Injectable 110 milliGRAM(s) SubCutaneous every 12 hours  insulin lispro (ADMELOG) corrective regimen sliding scale   SubCutaneous every 6 hours  midodrine 30 milliGRAM(s) Oral three times a day  polyethylene glycol 3350 17 Gram(s) Oral every 12 hours  QUEtiapine 25 milliGRAM(s) Oral three times a day  QUEtiapine 100 milliGRAM(s) Oral at bedtime  senna Syrup 10 milliLiter(s) Oral at bedtime    MEDICATIONS  (PRN):  acetaminophen   Oral Liquid .. 650 milliGRAM(s) Oral every 6 hours PRN Moderate Pain (4 - 6)  acetaminophen   Oral Liquid .. 650 milliGRAM(s) Oral every 6 hours PRN Temp greater or equal to 38C (100.4F)  nystatin Powder 1 Application(s) Topical two times a day PRN skin irritation          LABS                          8.0    101.18 )-----------( 185      ( 07 May 2024 23:59 )             25.5     05-07    138  |  104  |  7   ----------------------------<  89  4.6   |  26  |  0.48<L>    Ca    8.2<L>      07 May 2024 23:59  Phos  3.6     05-07  Mg     2.2     05-07    TPro  5.4<L>  /  Alb  2.2<L>  /  TBili  0.3  /  DBili  x   /  AST  19  /  ALT  8<L>  /  AlkPhos  68  05-07    PT/INR - ( 07 May 2024 23:59 )   PT: 12.4 sec;   INR: 1.13 ratio    PTT - ( 07 May 2024 23:59 )  PTT:28.9 sec MICU Transfer Note    Transfer from: MICU  Transfer to:  (  ) Medicine    (  ) Telemetry    (X) RCU    (  ) Palliative    (  ) Stroke Unit    (  ) _______________  Accepting Physician: Robert      MICU COURSE: Pt is a 69F h/o CVA (bedbound at baseline), COPD, CHF, HTN who initially p/w acute shortness of breath to outside ED and was treated for acute pulmonary edema with sublingual nitro x 2, Lasix, and BiPAP. Pt was also found to be febrile to 103, so treated for PNA. BIPAP led to improvement in O2 to 89-90. Pt transferred to [place] on 4/16/24 for white count of 233 and possible leukophoresis. In the ED, pt intubated iso respiratory tiring and decreasing mental status, and also was started on levophed for hypotension. Following intubation and initiation of pressors, she was admitted to the MICU for AHRF and septic shock.     In the MICU, heme was consulted for hyperleukocytosis ISO new CLL, however due to mature lymphocytes leukophoresis was not performed. Chest tube and Shiley placed. Pt was found to have S. pneumo bacteremia and empyema and treated with CTX (4/18-4/25). On CT Chest performed on 4/22, a persistent loculated effusion was noted. IR performed Pigtail placement 4/24-4/27.    Pt intermittent low-grade fevers despite clearing her cultures; no additional infectious source or explanation of her fevers was identified. Due to inability to be extubated, tracheostomy was performed on 4/28, although pt remained on ventilator. On 4/25, CTX was switched to Unasyn per ID recs. Seroquel and Klonopin were administered for anxiety. GI was consulted for PEG placement due to dysphagia, however placement was deferred due to patient's persistent pressor needs. As of 5/5, pt has no longer needed IV pressors to maintain her MAP > 60; she was started on midodrine and droxidopa in order to wean off vasopressors, as well as stress dose steroids. GI was re-consulted and performed EGD and PEG placement on 5/7, which pt tolerated well. Tube feeds were started through her gastrostomy tube on 5/8.    Psych was consulted on 5/8 due to passive SI and depression.    At this time pt is clinically stable and remains on ventilator. She can appropriately be transferred to the RCU.    ASSESSMENT & PLAN:     69F h/o CVA (bedbound at baseline), COPD, CHF, HTN presenting as transfer from [facility] for SOB iso leukocytosis to 233 c/f acute leukemia. Pt intubated and on pressors, transferred to MICU for further management, course c/b empyema s/p chest tube placement (4/16) and removal, reaccumulation of loculated pleural effusion s/p L pigtail placement and removal on 4/27. Now off of IV pressors, s/p EGD/PEG with GI yesterday, pending transfer to the RCU.    =====Neuro=====  #At Baseline, AOx4 mental status   #Anxiety, Depression   - L upper and lower extremity motor deficits iso hx of stroke   - Pain control w/ Tylenol  - Seroquel 25 tid and seroquel 100qhs for agitation   - Klonopin 1mg q12   - Psych consulted for long-standing depression and passive SI, recs appreciated     =====CV=====  #Septic Shock, Resolved  - 2/2 Strep Pneumo Empyema and Bacteremia s/p Pigtail catheter   - Remains off IV pressors  - C/w Midodrine to 30 TID   - C/w droxidopa 100mg tid   - Solu-cortef 50 mg IV qd 5/6-5/8; received last dose this AM prior to rounds   - MAP goal > 60     #CHF  - Unclear hx but elevated pro-BNP to 2600s  - TTE: EF 54%, no significant abnormalities  - Diurese as needed; Lasix 40 IVP x 1 given 5/8    =====Resp======  #Acute hypoxic respiratory failure  #Empyema Pansensitive Strep Pneumo, Resolved   - S/p thora draining 1500cc fluid 4/16 with chest tube placement; c/w exudative effusion growing Strep Pneumo  - Was on vanc/zosyn/azithro (4/16)-->deescalated to CTX (4/17 -4/23), Added on Vancomycin (4/23-) due to MRSE 1/2 Cx, expanded coverage to Cefepime (4/23 1x dose), back on CTX, switched to Unasyn 3g Q6 (4/25- )  - CT chest 4/22 reveals persistent loculated effusion, s/p IR Pigtail placement 4/24-4/27 w/ Exudative effusion; Alteplase and Dornase through pigtal x6 doses; unable to finish 6th Alteplase+Dornase due to catheter occlusion   - Repeat CTC 4/27 - decreased small pleural effusions, thoracic LAD   - S/p Trach placement 4/28;  will continue to attempt Trach collar during day and Pressure support at night while pt in RCU     =====GI=====  #Diet: NPO with tube feeds through gastrostomy tube  - S/p PEG placement with GI 5/7    =====/Renal=====  #No active issues    =====Heme/Onc=====  #Leukocytosis  #CLL  -  on presentation; stable at 101K  - No plan for leukophoresis at this time given mature lymphocytes per heme  - CT Chest A&P showing diffuse axillary, inguinal mediastinal RP lymphadenopathy.  - Appreciate Heme recs; can plan for bone marrow bx w/ IR if in line with GOC    #L Common Femoral Vein DVT   - Non-occlusive   - C/w Lovenox 110mg q12, restarting this AM 5/8 after being held for PEG    =====ID=====  #Empyema 2/2 Pansensitive Strep Pneumo, resolved  #Strep Pneumo Bacteremia, resolved  -BCx + strep pneumo; fluid culture with strep pneumo  -CT Chest A&P demonstrates LLL empyema not showing any other intraabdominal infectious source  - Was on vanc/zosyn/azithro (4/16)-->deescalated to CTX (4/17 -4/23), Added on Vancomycin (4/23-) due to MRSE 1/2 Cx, expanded coverage to Cefepime (4/23 1x dose), back on CTX, switched to Unasyn 3g Q6 (4/25- )  - IVIG 4/26 x1  - ID Consulted  - CMV detc below threshold range  - Fungitell 78, wnl    Plan    - Per ID, c/w with Unasyn (4/25 - ) for 4 week course, with end date 4/14. Will switch to Augmentin 875 mg BID tomorrow 5/9 as pt has ability to take oral meds through PEG.  - PCP PPX w/ Atovaquone today with last Solu-Cortef dose, then d/c. Solu-cortef taper to be completed today 5/8    =====Endo=====  - ISS q6h while receiving Tube feeds  - D/c D5 now that tube feeds will be restarted    =====Ethics:=====  Full Code  Only known family member is Aunt Mili Gordon ([phone], [phone]) who lives in [place]; also has cousin/caretaker but medical team has not been able to get in contact with him.    Palliative recs appreciated; per discussion at bedside with pt, pt would not want to "live on machines forever" and wants to eat and be comfortable. This was discussed with Mili Gordon, who would like to try to wean pt off ventilator, but if this is not possible, would like to respect patient's wishes.     Plan to transfer to RCU once bed becomes available.        For Follow-Up:  [ ] Trach collar during the day and pressure support at night, continue to wean   [ ] C/w Augmentin 875 mg po BID (5/9-5/14)  [ ] Heme-Onc recs; ?Bone marrow biopsy  [ ] Psych recs for depression      ICU Vital Signs Last 24 Hrs  T(C): 37.3 (08 May 2024 04:00), Max: 37.8 (08 May 2024 00:00)  T(F): 99.1 (08 May 2024 04:00), Max: 100 (08 May 2024 00:00)  HR: 83 (08 May 2024 07:00) (68 - 103)  BP: 133/61 (08 May 2024 07:00) (88/53 - 140/63)  BP(mean): 88 (08 May 2024 07:00) (65 - 91)  RR: 22 (08 May 2024 07:00) (14 - 33)  SpO2: 100% (08 May 2024 07:00) (98% - 100%)    O2 Parameters below as of 07 May 2024 22:36  Patient On (Oxygen Delivery Method): ventilator      I&O's Detail    07 May 2024 07:01  -  08 May 2024 07:00  --------------------------------------------------------  IN:    dextrose 5%: 1800 mL    Enteral Tube Flush: 270 mL    IV PiggyBack: 400 mL  Total IN: 2470 mL    OUT:    Voided (mL): 3000 mL  Total OUT: 3000 mL    Total NET: -530 mL      MEDICATIONS  (STANDING):  albuterol/ipratropium for Nebulization 3 milliLiter(s) Nebulizer every 8 hours  ampicillin/sulbactam  IVPB      ampicillin/sulbactam  IVPB 3 Gram(s) IV Intermittent every 6 hours  atovaquone  Suspension 1500 milliGRAM(s) Oral daily  chlorhexidine 0.12% Liquid 15 milliLiter(s) Oral Mucosa every 12 hours  chlorhexidine 2% Cloths 1 Application(s) Topical <User Schedule>  clonazePAM  Tablet 1 milliGRAM(s) Oral every 12 hours  droxidopa 100 milliGRAM(s) Oral three times a day  enoxaparin Injectable 110 milliGRAM(s) SubCutaneous every 12 hours  insulin lispro (ADMELOG) corrective regimen sliding scale   SubCutaneous every 6 hours  midodrine 30 milliGRAM(s) Oral three times a day  polyethylene glycol 3350 17 Gram(s) Oral every 12 hours  QUEtiapine 25 milliGRAM(s) Oral three times a day  QUEtiapine 100 milliGRAM(s) Oral at bedtime  senna Syrup 10 milliLiter(s) Oral at bedtime    MEDICATIONS  (PRN):  acetaminophen   Oral Liquid .. 650 milliGRAM(s) Oral every 6 hours PRN Moderate Pain (4 - 6)  acetaminophen   Oral Liquid .. 650 milliGRAM(s) Oral every 6 hours PRN Temp greater or equal to 38C (100.4F)  nystatin Powder 1 Application(s) Topical two times a day PRN skin irritation          LABS                          8.0    101.18 )-----------( 185      ( 07 May 2024 23:59 )             25.5     05-07    138  |  104  |  7   ----------------------------<  89  4.6   |  26  |  0.48<L>    Ca    8.2<L>      07 May 2024 23:59  Phos  3.6     05-07  Mg     2.2     05-07    TPro  5.4<L>  /  Alb  2.2<L>  /  TBili  0.3  /  DBili  x   /  AST  19  /  ALT  8<L>  /  AlkPhos  68  05-07    PT/INR - ( 07 May 2024 23:59 )   PT: 12.4 sec;   INR: 1.13 ratio    PTT - ( 07 May 2024 23:59 )  PTT:28.9 sec

## 2024-05-07 NOTE — PROGRESS NOTE ADULT - ASSESSMENT
69-year-old female with a past medical history of CVA, bedbound at baseline, COPD, hypertension, CLL who initially had presented to an outside hospital due to acute shortness of breath.  Patient was found to have pulmonary edema and was managed with diuretics and BiPAP.  Patient also found to be febrile to 103 Fahrenheit, found to have pneumonia therefore started on antibiotics for this.  Labs were notable for a leukocytosis of 233 in setting of CLL and then was transferred to [facility] for leukopheresis.  Upon arrival to [place], intubated due to concern for respiratory compromise as well as worsening AMS.  Patient was started on levofloxacin due to hypotension.    Hospitalization course includes chest tube drainage.  Blood and pleural fluid cultures are positive growing Streptococcus pneumonia A.  Chest tube was removed on 4/19/2024 however patient remains febrile with a left-sided loculated effusion therefore left pigtail catheter was placed on 4/23/2024.  Patient's antibiotics were switched from ceftriaxone to cefepime and now is requiring pressors again.  Blood cultures were repeated on 4/22/2024 with Staphylococcus epidermidis growth, repeat pleural fluid cultures obtained on 4/23/2024 are negative to date, repeat blood cultures from 4/24/2024 are negative as well.    #Streptococcal bacteremia, likely source empyema  #Abnormal imaging of the lungs  #Streptococcal pneumonia and empyema  #Acute hypoxic respiratory failure  #CLL  #LLE DVT  s/p trach  Repeat blood cultures negative thus far  Remains febrile, no new culture data to guide antibiotic therapy  No plan for leukophoresis at this time per heme service  MRSE more likely contamination, as 1/4 bottles and no repeat growth  Full RVP on 5/1/24 is negative  Repeat CT C/A/P with increasing R pleural effusion, HSM and LAD noted   Afebrile since 5/5/24   Bcx 5/6/24 negativet o date  Sputum culture 5/4/24 with normal respiratory fidel  CT maxillofacial with no infectious findings on 5/6/24  Fungitell 78  CMV PCR not detected  negative cryptococcal antigen    Recommendations  Continue ampicillin/sulbactam, should be adequate coverage given previous strep growth and no new species  DVT can cause fevers as  well - management per primary team  Now with trach - no evidence for aspiration/tracheitis - sputum culture normal respiratory  fidel  Fever related to CLL possible - heme input, could this now be lymphoma?  Follow fever curve and WBC count, reculture periodically  Continue PCP prophylaxis while on high dose steroids ( mepron)   Will plan to treat for 4 weeks given empyema - can plan for PO transition at discharge with amox/clav 875 BID  End date : 5/14/24    ID to sign off. Please contact as issues arise.    Abimael Jain MD  please reach via teams   If no answer, or after 5PM/ weekends,  then please call  [phone]

## 2024-05-07 NOTE — PROGRESS NOTE ADULT - ASSESSMENT
69F h/o CVA (bedbound at baseline), COPD, CHF, HTN presenting as transfer from [facility] for SOB iso leukocytosis to 233 c/f acute leukemia. Pt intubated and on pressors in setting of strep pneumo pneumonia. Hematology consulted for leukocytosis, confirmed underlying CLL with reactive lymphocytosis 2/2 sepsis.     - Patient Labs at admission: , Hgb 13.4, PLT 96, 96% lymphocytes, 2% neutrophils.   - On peripheral smear (4/16/24): heterogeneous population of lymphocytes, smudge cells present, rare blasts, rare target cells, polychromasia, giant platelets present, reactive lymphocytes   - CMV negative, EBV serologies indicate recent or past   - There was no indication for leukophoresis at admission, no blasts seen on peripheral smear at admission. Now, WBCs are downtrending as infection is being treated.   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%)  - 4/18/24: Flow Cytometry c/w CLL/SLL 88.8% Monotypic B-cells positive for CD5 (dim), CD19, CD20 (dimmer), CD23 (partial), , surface kappa light chain; negative for CD10, CD11c, CD38, FMC7, lambda surface light chain    PLAN  # CLL  - CT chest/abd/pelvis w/ IV contrast 4/23/24 showed extensive lymphadenopathy consistent with CLL  - f/u Cytogenetics from peripheral blood (collected 4/16/24)  - IgG 535, given IVIg on 4/26/24 as she was not clearing her infection  - Please trend TLS labs (uric acid, LDH, CMP, Mg, Phos) and CBC w/ diff and retic daily  - If uric acid > 8, start allopurinol and give 3 mg IV rasburicase, and if uric acid > 12 give 6 mg rasburicase  - Confirmed with patient that she has had no prior bone marrow biopsy. Recommend performing inpatient with IR (due to body habitus) once clinically stable.  - She had prior workup at [facility] (prior flow cytometry from October 2023 is in the HIE). She did not see an outside hematologist.  - Will arrange for follow up at the [facility] when patient is closer to discharge       69F h/o CVA (bedbound at baseline), COPD, CHF, HTN presenting as transfer from [facility] for SOB iso leukocytosis to 233 c/f acute leukemia. Pt intubated and on pressors in setting of strep pneumo pneumonia. Hematology consulted for leukocytosis, confirmed underlying CLL with reactive lymphocytosis 2/2 sepsis.     - Patient Labs at admission: , Hgb 13.4, PLT 96, 96% lymphocytes, 2% neutrophils.   - On peripheral smear (4/16/24): heterogeneous population of lymphocytes, smudge cells present, rare blasts, rare target cells, polychromasia, giant platelets present, reactive lymphocytes   - CMV negative, EBV serologies indicate recent or past   - There was no indication for leukophoresis at admission, no blasts seen on peripheral smear at admission. Now, WBCs are downtrending as infection is being treated.   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%)  - 4/18/24: Flow Cytometry c/w CLL/SLL 88.8% Monotypic B-cells positive for CD5 (dim), CD19, CD20 (dimmer), CD23 (partial), , surface kappa light chain; negative for CD10, CD11c, CD38, FMC7, lambda surface light chain    # CLL  - CT chest/abd/pelvis w/ IV contrast 4/23/24 showed extensive lymphadenopathy consistent with CLL  - f/u Cytogenetics from peripheral blood (collected 4/16/24)  - IgG 535, given IVIg on 4/26/24 as she was not clearing her infection  - Please trend TLS labs (uric acid, LDH, CMP, Mg, Phos) and CBC w/ diff and retic daily  - If uric acid > 8, start allopurinol and give 3 mg IV rasburicase, and if uric acid > 12 give 6 mg rasburicase  - Confirmed with patient that she has had no prior bone marrow biopsy. Recommend performing inpatient with IR (due to body habitus) once clinically stable.  - She had prior workup at [facility] (prior flow cytometry from October 2023 is in the HIE). She did not see an outside hematologist.  - Will arrange for follow up at the [facility] when patient is closer to discharge    Case discussed with Dr. Keri Betts, PGY-4  Fellow Hematology/Oncology  pager [phone]  Available on TEAMS  After 5pm or on weekends please contact  to page on-call fellow      69F h/o CVA (bedbound at baseline), COPD, CHF, HTN presenting as transfer from [facility] for SOB iso leukocytosis to 233 c/f acute leukemia. Pt intubated and on pressors in setting of strep pneumo pneumonia. Hematology consulted for leukocytosis, confirmed underlying CLL with reactive lymphocytosis 2/2 sepsis.     - Patient Labs at admission: , Hgb 13.4, PLT 96, 96% lymphocytes, 2% neutrophils.   - On peripheral smear (4/16/24): heterogeneous population of lymphocytes, smudge cells present, rare blasts, rare target cells, polychromasia, giant platelets present, reactive lymphocytes   - CMV negative, EBV serologies indicate recent or past   - There was no indication for leukophoresis at admission, no blasts seen on peripheral smear at admission. Now, WBCs are downtrending as infection is being treated.   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 She did not see an outside hematologist.  - Will arrange for follow up at the [facility] [place] when patient is closer to discharge    Case discussed with Dr. Keri Betts, PGY-4  Fellow Hematology/Oncology  pager [phone]  Available on TEAMS  After 5pm or on weekends please contact  to page on-call fellow

## 2024-05-07 NOTE — PHARMACOTHERAPY INTERVENTION NOTE - COMMENTS
NITA FRIEDMAN, 69y Female h/o CVA, COPD, CHF, HTN presenting as transfer from [facility] for SOB iso leukocytosis to 233 c/f acute leukemia. Pt intubated and on pressors, transferred to MICU for further management and possible leukophoresis     Creatinine: 0.52 mg/dL (04-18-24 @ 00:48)  Creatinine: 0.59 mg/dL (04-17-24 @ 12:11)  Creatinine: 0.69 mg/dL (04-17-24 @ 00:19)    Height (cm): 172.7 (04-16-24 @ 13:01), 167.6 (04-16-24 @ 04:53)  Weight (kg): 111 (04-16-24 @ 13:01), 113.4 (04-16-24 @ 04:53)  BMI (kg/m2): 37.2 (04-16-24 @ 13:01), 40.4 (04-16-24 @ 04:53)     -s/p thora drain 4/16  -Pleura fluid grew GPC pair  -Blood cx grew strep pneumoniae    MEDICATIONS  (STANDING):  cefTRIAXone   IVPB 2000 milliGRAM(s) IV Intermittent every 24 hours  dexMEDEtomidine Infusion 0.2 MICROgram(s)/kG/Hr (5.55 mL/Hr) IV Continuous <Continuous>  enoxaparin Injectable 40 milliGRAM(s) SubCutaneous every 24 hours  insulin lispro (ADMELOG) corrective regimen sliding scale   SubCutaneous every 6 hours  mupirocin 2% Nasal 1 Application(s) Both Nostrils two times a day  norepinephrine Infusion 0.05 MICROgram(s)/kG/Min (10.3 mL/Hr) IV Continuous <Continuous>  polyethylene glycol 3350 17 Gram(s) Oral every 12 hours  propofol Infusion 10 MICROgram(s)/kG/Min (6.6 mL/Hr) IV Continuous <Continuous>  senna Syrup 10 milliLiter(s) Oral at bedtime    Recommendation(s):  1) Increase enoxaparin dose 40mg SQ q12h for VTE ppx    Dana Elkins, PharmD, BCCCP  Clinical Pharmacist, Critical Care  Available via [employer] Teams      
Reviewed appropriate route of med administration, changed oral med to via PEG    albuterol/ipratropium for Nebulization 3 milliLiter(s) Nebulizer every 8 hours  ampicillin/sulbactam  IVPB      ampicillin/sulbactam  IVPB 3 Gram(s) IV Intermittent every 6 hours  atovaquone  Suspension 1500 milliGRAM(s) Oral daily  chlorhexidine 0.12% Liquid 15 milliLiter(s) Oral Mucosa every 12 hours  chlorhexidine 2% Cloths 1 Application(s) Topical <User Schedule>  clonazePAM  Tablet 1 milliGRAM(s) Oral every 12 hours  dextrose 5%. 1000 milliLiter(s) IV Continuous <Continuous>  droxidopa 100 milliGRAM(s) Oral three times a day  hydrocortisone sodium succinate Injectable 50 milliGRAM(s) IV Push daily  insulin lispro (ADMELOG) corrective regimen sliding scale   SubCutaneous every 6 hours  midodrine 30 milliGRAM(s) Oral three times a day  polyethylene glycol 3350 17 Gram(s) Oral every 12 hours  QUEtiapine 25 milliGRAM(s) Oral three times a day  QUEtiapine 100 milliGRAM(s) Oral at bedtime  senna Syrup 10 milliLiter(s) Oral at bedtime

## 2024-05-07 NOTE — PROGRESS NOTE ADULT - ATTENDING COMMENTS
69F w/ CVA (bedbound at baseline), COPD, CHF, HTN. Initially presented as transfer for hyperleukocytosis concerning for acute leukemia. Course complicated by acute hypoxemic respiratory failure found to have strep pneumonia bacteremia and pneumonia w/ empyema s/p chest tube x2 s/p intra-pleural fibrinolytic therapy. Failed weaning and now s/p trach. Pt has had prolonged shock state w/ difficulty weaning off pressors, daily low grade fevers found to have LLE DVT.     Mental status likely at baseline, able to communicate; continue seroquel and klonipin for agitation when have oral access. Pt has not been receiving droxydopa or midodrine due to lack of PO access - BP has maintained and has not required reinitation of pressors, maintain MAP >/65. S/P trach for failure to wean, PSV weaning as tolerated, lasix x1 today for weaning; s/p chest tube x2 w/ intrapleural fibrinolytic therapy w/ resolution of empyema on imaging. Continue amp/sulbactam for strept pneumonia bacteremia, pneumonia and empyema - discuss w/ ID duration of treatment; pt continues to have low grade fever daily to 100.4 - is this due to DVT vs underlying CLL vs something else; repeat blood cx pending. Renal function stable, monitor I/Os and electrolytes. No PO access since pt removed NGT, GI to place PEG today. New CLL w/ leukocytosis - workup and treatment on hold until pt more stable; s/p 1 pRBC yesterday for hgb <7 w/ good response- no active bleeding; full dose lovenox for LLE DVT on hold for procedure, resume tomorrow morning. FS w/ ISS; slowly weaning off stress dose steroids. Prognosis guarded. Full code. Palliative following. 69F w/ CVA (bedbound at baseline), COPD, CHF, HTN. Initially presented as transfer for hyperleukocytosis concerning for acute leukemia. Course complicated by acute hypoxemic respiratory failure found to have strep pneumonia bacteremia and pneumonia w/ empyema s/p chest tube x2 s/p intra-pleural fibrinolytic therapy. Failed weaning and now s/p trach. Pt has had prolonged shock state w/ difficulty weaning off pressors, daily low grade fevers found to have LLE DVT.     Mental status likely at baseline, able to communicate; continue seroquel and klonipin for agitation when have oral access. Pt has not been receiving droxydopa or midodrine due to lack of PO access - BP has maintained and has not required reinitation of pressors, maintain MAP >/65. S/P trach for failure to wean, PSV weaning as tolerated; s/p chest tube x2 w/ intrapleural fibrinolytic therapy w/ resolution of empyema on imaging. Continue amp/sulbactam for strept pneumonia bacteremia, pneumonia and empyema - discuss w/ ID duration of treatment; pt continues to have low grade fever daily to 100.4 - is this due to DVT vs underlying CLL vs something else; repeat blood cx pending. Renal function stable, monitor I/Os and electrolytes; lasix x1 today. No PO access since pt removed NGT, GI to place PEG today. New CLL w/ leukocytosis - workup and treatment on hold until pt more stable; s/p 1 pRBC yesterday for hgb <7 w/ good response- no active bleeding; full dose lovenox for LLE DVT on hold for procedure, resume tomorrow morning. FS w/ ISS; slowly weaning off stress dose steroids. Prognosis guarded. Full code. Palliative following.

## 2024-05-07 NOTE — PROGRESS NOTE ADULT - SUBJECTIVE AND OBJECTIVE BOX
INTERVAL HPI/OVERNIGHT EVENTS:  Patient S&E at bedside. No o/n events, patient appears comfortable    REVIEW OF SYSTEMS  General: No fevers, no chills, no fatigue, no weight loss  Skin: No rash  ENMT: No sore throat, no dysphagia, no mouth sores	  Respiratory and Thorax: No dyspnea, no cough, no wheezing  Cardiovascular: No chest pain, no palpitations  Gastrointestinal:	No N/V/D, no abdominal pain  Genitourinary: No dysuria  Musculoskeletal:	No joint pain, no muscle pain  Neurological:	No dizziness, no neuropathy  Psychiatric: No depression or anxiety  Hematology/Lymphatics: No easy bleeding or bruising, no swollen nodes noticed.       VITAL SIGNS:  T(F): 99.9 (05-07-24 @ 12:00)  HR: 91 (05-07-24 @ 13:00)  BP: 93/51 (05-07-24 @ 13:00)  RR: 22 (05-07-24 @ 13:00)  SpO2: 100% (05-07-24 @ 13:00)  Wt(kg): --    PHYSICAL EXAM:    Constitutional: NAD  Eyes: EOMI, sclera non-icteric  Neck: supple, no masses, no JVD  Respiratory: CTA b/l, good air entry b/l  Cardiovascular: RRR, no M/R/G  Gastrointestinal: soft, NTND, no masses palpable, + BS, no hepatosplenomegaly  Extremities: no c/c/e  Neurological: AAOx3      MEDICATIONS  (STANDING):  albuterol/ipratropium for Nebulization 3 milliLiter(s) Nebulizer every 8 hours  ampicillin/sulbactam  IVPB      ampicillin/sulbactam  IVPB 3 Gram(s) IV Intermittent every 6 hours  atovaquone  Suspension 1500 milliGRAM(s) Oral daily  chlorhexidine 0.12% Liquid 15 milliLiter(s) Oral Mucosa every 12 hours  chlorhexidine 2% Cloths 1 Application(s) Topical <User Schedule>  clonazePAM  Tablet 1 milliGRAM(s) Oral every 12 hours  dextrose 5%. 1000 milliLiter(s) (75 mL/Hr) IV Continuous <Continuous>  droxidopa 100 milliGRAM(s) Oral three times a day  hydrocortisone sodium succinate Injectable 50 milliGRAM(s) IV Push daily  insulin lispro (ADMELOG) corrective regimen sliding scale   SubCutaneous every 6 hours  midodrine 30 milliGRAM(s) Oral three times a day  polyethylene glycol 3350 17 Gram(s) Oral every 12 hours  QUEtiapine 25 milliGRAM(s) Oral three times a day  QUEtiapine 100 milliGRAM(s) Oral at bedtime  senna Syrup 10 milliLiter(s) Oral at bedtime    MEDICATIONS  (PRN):  acetaminophen   Oral Liquid .. 650 milliGRAM(s) Oral every 6 hours PRN Temp greater or equal to 38C (100.4F)  nystatin Powder 1 Application(s) Topical two times a day PRN skin irritation      Allergies    No Known Allergies    Intolerances        LABS:                        8.0    98.09 )-----------( 194      ( 06 May 2024 23:18 )             26.3     05-06    141  |  107  |  10  ----------------------------<  80  4.4   |  25  |  0.48<L>    Ca    8.5      06 May 2024 23:18  Phos  3.6     05-06  Mg     2.5     05-06    TPro  5.9<L>  /  Alb  2.6<L>  /  TBili  0.4  /  DBili  x   /  AST  16  /  ALT  10  /  AlkPhos  70  05-06    PT/INR - ( 06 May 2024 23:18 )   PT: 12.0 sec;   INR: 1.15 ratio         PTT - ( 06 May 2024 23:18 )  PTT:30.0 sec  Urinalysis Basic - ( 06 May 2024 23:18 )    Color: x / Appearance: x / SG: x / pH: x  Gluc: 80 mg/dL / Ketone: x  / Bili: x / Urobili: x   Blood: x / Protein: x / Nitrite: x   Leuk Esterase: x / RBC: x / WBC x   Sq Epi: x / Non Sq Epi: x / Bacteria: x        RADIOLOGY & ADDITIONAL TESTS:  Studies reviewed.   INTERVAL HPI/OVERNIGHT EVENTS:  Patient S&E at bedside. No o/n events, patient appears comfortable    REVIEW OF SYSTEMS  General: No fevers, no chills, no fatigue, no weight loss  Skin: No rash  ENMT: No sore throat, no dysphagia, no mouth sores	  Respiratory and Thorax: No dyspnea, no cough, no wheezing  Cardiovascular: No chest pain, no palpitations  Gastrointestinal:	No N/V/D, no abdominal pain  Genitourinary: No dysuria  Musculoskeletal:	No joint pain, no muscle pain  Neurological:	No dizziness, no neuropathy  Psychiatric: No depression or anxiety  Hematology/Lymphatics: No easy bleeding or bruising, no swollen nodes noticed.       VITAL SIGNS:  T(F): 99.9 (05-07-24 @ 12:00)  HR: 91 (05-07-24 @ 13:00)  BP: 93/51 (05-07-24 @ 13:00)  RR: 22 (05-07-24 @ 13:00)  SpO2: 100% (05-07-24 @ 13:00)  Wt(kg): --    PHYSICAL EXAM:  Constitutional: NAD, resting in   Eyes: EOMI, sclera non-icteric  Neck: +trach in place to vent  Respiratory: CTA b/l, good air entry b/l  Cardiovascular: RRR, no M/R/G  Gastrointestinal: soft, NTND, no masses palpable  Extremities: no LE edema  Neurological: AAOx3      MEDICATIONS  (STANDING):  albuterol/ipratropium for Nebulization 3 milliLiter(s) Nebulizer every 8 hours  ampicillin/sulbactam  IVPB      ampicillin/sulbactam  IVPB 3 Gram(s) IV Intermittent every 6 hours  atovaquone  Suspension 1500 milliGRAM(s) Oral daily  chlorhexidine 0.12% Liquid 15 milliLiter(s) Oral Mucosa every 12 hours  chlorhexidine 2% Cloths 1 Application(s) Topical <User Schedule>  clonazePAM  Tablet 1 milliGRAM(s) Oral every 12 hours  dextrose 5%. 1000 milliLiter(s) (75 mL/Hr) IV Continuous <Continuous>  droxidopa 100 milliGRAM(s) Oral three times a day  hydrocortisone sodium succinate Injectable 50 milliGRAM(s) IV Push daily  insulin lispro (ADMELOG) corrective regimen sliding scale   SubCutaneous every 6 hours  midodrine 30 milliGRAM(s) Oral three times a day  polyethylene glycol 3350 17 Gram(s) Oral every 12 hours  QUEtiapine 25 milliGRAM(s) Oral three times a day  QUEtiapine 100 milliGRAM(s) Oral at bedtime  senna Syrup 10 milliLiter(s) Oral at bedtime    MEDICATIONS  (PRN):  acetaminophen   Oral Liquid .. 650 milliGRAM(s) Oral every 6 hours PRN Temp greater or equal to 38C (100.4F)  nystatin Powder 1 Application(s) Topical two times a day PRN skin irritation      Allergies    No Known Allergies    Intolerances        LABS:                        8.0    98.09 )-----------( 194      ( 06 May 2024 23:18 )             26.3     05-06    141  |  107  |  10  ----------------------------<  80  4.4   |  25  |  0.48<L>    Ca    8.5      06 May 2024 23:18  Phos  3.6     05-06  Mg     2.5     05-06    TPro  5.9<L>  /  Alb  2.6<L>  /  TBili  0.4  /  DBili  x   /  AST  16  /  ALT  10  /  AlkPhos  70  05-06    PT/INR - ( 06 May 2024 23:18 )   PT: 12.0 sec;   INR: 1.15 ratio         PTT - ( 06 May 2024 23:18 )  PTT:30.0 sec  Urinalysis Basic - ( 06 May 2024 23:18 )    Color: x / Appearance: x / SG: x / pH: x  Gluc: 80 mg/dL / Ketone: x  / Bili: x / Urobili: x   Blood: x / Protein: x / Nitrite: x   Leuk Esterase: x / RBC: x / WBC x   Sq Epi: x / Non Sq Epi: x / Bacteria: x        RADIOLOGY & ADDITIONAL TESTS:  Studies reviewed.   INTERVAL HPI/OVERNIGHT EVENTS:  Patient S&E at bedside. No o/n events, patient appears comfortable    REVIEW OF SYSTEMS  General: No fevers, no chills, no fatigue, no weight loss  Skin: No rash  ENMT: No sore throat, no dysphagia, no mouth sores	  Respiratory and Thorax: No dyspnea, no cough, no wheezing  Cardiovascular: No chest pain, no palpitations  Gastrointestinal:	No N/V/D, no abdominal pain  Genitourinary: No dysuria  Musculoskeletal:	No joint pain, no muscle pain  Neurological:	No dizziness, no neuropathy  Psychiatric: No depression or anxiety  Hematology/Lymphatics: No easy bleeding or bruising, no swollen nodes noticed.       VITAL SIGNS:  T(F): 99.9 (05-07-24 @ 12:00)  HR: 91 (05-07-24 @ 13:00)  BP: 93/51 (05-07-24 @ 13:00)  RR: 22 (05-07-24 @ 13:00)  SpO2: 100% (05-07-24 @ 13:00)  Wt(kg): --    PHYSICAL EXAM:  Constitutional: NAD, resting in bed  Eyes: EOMI, sclera non-icteric  Neck: +trach in place  Respiratory: CTA b/l, good air entry b/l  Cardiovascular: RRR, no M/R/G  Gastrointestinal: soft, NTND, no masses palpable  Extremities: +LE edema bilaterally  Neurological: AAOx3      MEDICATIONS  (STANDING):  albuterol/ipratropium for Nebulization 3 milliLiter(s) Nebulizer every 8 hours  ampicillin/sulbactam  IVPB      ampicillin/sulbactam  IVPB 3 Gram(s) IV Intermittent every 6 hours  atovaquone  Suspension 1500 milliGRAM(s) Oral daily  chlorhexidine 0.12% Liquid 15 milliLiter(s) Oral Mucosa every 12 hours  chlorhexidine 2% Cloths 1 Application(s) Topical <User Schedule>  clonazePAM  Tablet 1 milliGRAM(s) Oral every 12 hours  dextrose 5%. 1000 milliLiter(s) (75 mL/Hr) IV Continuous <Continuous>  droxidopa 100 milliGRAM(s) Oral three times a day  hydrocortisone sodium succinate Injectable 50 milliGRAM(s) IV Push daily  insulin lispro (ADMELOG) corrective regimen sliding scale   SubCutaneous every 6 hours  midodrine 30 milliGRAM(s) Oral three times a day  polyethylene glycol 3350 17 Gram(s) Oral every 12 hours  QUEtiapine 25 milliGRAM(s) Oral three times a day  QUEtiapine 100 milliGRAM(s) Oral at bedtime  senna Syrup 10 milliLiter(s) Oral at bedtime    MEDICATIONS  (PRN):  acetaminophen   Oral Liquid .. 650 milliGRAM(s) Oral every 6 hours PRN Temp greater or equal to 38C (100.4F)  nystatin Powder 1 Application(s) Topical two times a day PRN skin irritation      Allergies    No Known Allergies    Intolerances        LABS:                        8.0    98.09 )-----------( 194      ( 06 May 2024 23:18 )             26.3     05-06    141  |  107  |  10  ----------------------------<  80  4.4   |  25  |  0.48<L>    Ca    8.5      06 May 2024 23:18  Phos  3.6     05-06  Mg     2.5     05-06    TPro  5.9<L>  /  Alb  2.6<L>  /  TBili  0.4  /  DBili  x   /  AST  16  /  ALT  10  /  AlkPhos  70  05-06    PT/INR - ( 06 May 2024 23:18 )   PT: 12.0 sec;   INR: 1.15 ratio         PTT - ( 06 May 2024 23:18 )  PTT:30.0 sec  Urinalysis Basic - ( 06 May 2024 23:18 )    Color: x / Appearance: x / SG: x / pH: x  Gluc: 80 mg/dL / Ketone: x  / Bili: x / Urobili: x   Blood: x / Protein: x / Nitrite: x   Leuk Esterase: x / RBC: x / WBC x   Sq Epi: x / Non Sq Epi: x / Bacteria: x        RADIOLOGY & ADDITIONAL TESTS:  Studies reviewed.

## 2024-05-07 NOTE — PROGRESS NOTE ADULT - SUBJECTIVE AND OBJECTIVE BOX
Follow Up:  empyema, fever    Interval History/ROS:  Overnight: No acute events.  Patient remains afebrile now since 5/5/2024.  Patient otherwise remained stable.  Latest labs show leukocytosis of 90 in setting of CLL, anemia 8.0/26.3, BMP with renal function within normal limits, hepatic function within normal limits.  Blood cultures from 5/6/2024 remain negative to date, sputum culture from 5/6/2024 with normal respiratory fidel.  No evidence for sinusitis on CT maxillofacial obtained on 5/5/2024.  Latest chest x-ray on 5/6/2024 without new findings.    Patient seen examined at bedside.  No distress, trach in place    Allergies  No Known Allergies        ANTIMICROBIALS:  ampicillin/sulbactam  IVPB    ampicillin/sulbactam  IVPB 3 every 6 hours  atovaquone  Suspension 1500 daily      OTHER MEDS:  MEDICATIONS  (STANDING):  acetaminophen   Oral Liquid .. 650 every 6 hours PRN  albuterol/ipratropium for Nebulization 3 every 8 hours  clonazePAM  Tablet 1 every 12 hours  droxidopa 100 three times a day  hydrocortisone sodium succinate Injectable 50 daily  insulin lispro (ADMELOG) corrective regimen sliding scale  every 6 hours  midodrine 30 three times a day  polyethylene glycol 3350 17 every 12 hours  QUEtiapine 25 three times a day  QUEtiapine 100 at bedtime  senna Syrup 10 at bedtime      Vital Signs Last 24 Hrs  T(C): 37.3 (07 May 2024 16:00), Max: 37.7 (07 May 2024 12:00)  T(F): 99.1 (07 May 2024 16:00), Max: 99.9 (07 May 2024 12:00)  HR: 68 (07 May 2024 17:00) (68 - 103)  BP: 135/62 (07 May 2024 17:00) (86/42 - 140/63)  BP(mean): 89 (07 May 2024 17:00) (61 - 91)  RR: 25 (07 May 2024 17:00) (20 - 36)  SpO2: 100% (07 May 2024 17:00) (97% - 100%)    Parameters below as of 07 May 2024 05:02  Patient On (Oxygen Delivery Method): ventilator        PHYSICAL EXAM:  Constitutional  intubated but awake and interactive     HEENT PERRLA EOMI,No pallor or icterus    No oral exudate or erythema    Neck supple no JVD or LN    Chest Good AE,CTA    CVS  S1 S2     Abd soft BS normal No tenderness     Ext edema    IV site no erythema tenderness or discharge    Joints no swelling or LOM    CNS LUE plegia                              8.0    98.09 )-----------( 194      ( 06 May 2024 23:18 )             26.3       05-06    141  |  107  |  10  ----------------------------<  80  4.4   |  25  |  0.48<L>    Ca    8.5      06 May 2024 23:18  Phos  3.6     05-06  Mg     2.5     05-06    TPro  5.9<L>  /  Alb  2.6<L>  /  TBili  0.4  /  DBili  x   /  AST  16  /  ALT  10  /  AlkPhos  70  05-06      Urinalysis Basic - ( 06 May 2024 23:18 )    Color: x / Appearance: x / SG: x / pH: x  Gluc: 80 mg/dL / Ketone: x  / Bili: x / Urobili: x   Blood: x / Protein: x / Nitrite: x   Leuk Esterase: x / RBC: x / WBC x   Sq Epi: x / Non Sq Epi: x / Bacteria: x        MICROBIOLOGY:  v    Culture - Blood (collected 06 May 2024 09:33)  Source: .Blood Blood-Peripheral  Preliminary Report (07 May 2024 16:02):    No growth at 24 hours    Culture - Blood (collected 06 May 2024 09:25)  Source: .Blood Blood-Peripheral  Preliminary Report (07 May 2024 16:02):    No growth at 24 hours    Culture - Sputum (collected 06 May 2024 00:37)  Source: .Sputum Sputum  Gram Stain (06 May 2024 11:48):    Moderate polymorphonuclear leukocytes per low power field    No Squamous epithelial cells per low power field    No organisms seen per oil power field  Final Report (07 May 2024 15:46):    Normal Respiratory Fidel present    Culture - Blood (collected 03 May 2024 08:30)  Source: .Blood Blood-Peripheral  Preliminary Report (07 May 2024 16:01):    No growth at 4 days    Culture - Blood (collected 03 May 2024 08:15)  Source: .Blood Blood-Peripheral  Preliminary Report (07 May 2024 16:01):    No growth at 4 days              CMVPCR Log: Det <1.54 Assay Dynamic Range: 34.5 to 1.0E+07 IU/mL (1.54 to 7.00 Log10 IU/mL)  Assay lower limit of quantification (LLOQ) is 34.5 IU/mL (1.54 Log10  IU/mL)  CMV DNA detected below the LLOQ will be reported as Detected < 34.5 IU/mL  (<1.54 Log10 IU/mL)  [facility] Cytomegalovirus (CMV) is an FDA-cleared quantitative test that  enables the detection and quantitation of CMV DNA in EDTA plasma of  infected transplant patients on the [employer] 8800 system. This test was  verified by [employer]. Results should be interpreted  with consideration of all clinical findings and laboratory findings and  should not form the sole basis for a diagnosis or treatment decision. Ehx08TK/mL (05-04 @ 13:27)        RADIOLOGY:  Imaging reviewed

## 2024-05-07 NOTE — PRE PROCEDURE NOTE - PRE PROCEDURE EVALUATION
Pre-Endoscopy Evaluation    Attending Physician:  Dr. Lewis    Procedure: EGD/PEG    Indication for Procedure: Dysphagia    PAST MEDICAL & SURGICAL HISTORY:  Acute CHF      COPD, severe          Allergies    No Known Allergies    Intolerances        Medications: MEDICATIONS  (STANDING):  albuterol/ipratropium for Nebulization 3 milliLiter(s) Nebulizer every 8 hours  ampicillin/sulbactam  IVPB      ampicillin/sulbactam  IVPB 3 Gram(s) IV Intermittent every 6 hours  atovaquone  Suspension 1500 milliGRAM(s) Oral daily  chlorhexidine 0.12% Liquid 15 milliLiter(s) Oral Mucosa every 12 hours  chlorhexidine 2% Cloths 1 Application(s) Topical <User Schedule>  clonazePAM  Tablet 1 milliGRAM(s) Oral every 12 hours  dextrose 5%. 1000 milliLiter(s) (75 mL/Hr) IV Continuous <Continuous>  droxidopa 100 milliGRAM(s) Oral three times a day  fentaNYL    Injectable 100 MICROGram(s) IV Push once  hydrocortisone sodium succinate Injectable 50 milliGRAM(s) IV Push daily  insulin lispro (ADMELOG) corrective regimen sliding scale   SubCutaneous every 6 hours  midodrine 30 milliGRAM(s) Oral three times a day  polyethylene glycol 3350 17 Gram(s) Oral every 12 hours  QUEtiapine 25 milliGRAM(s) Oral three times a day  QUEtiapine 100 milliGRAM(s) Oral at bedtime  senna Syrup 10 milliLiter(s) Oral at bedtime    MEDICATIONS  (PRN):  acetaminophen   Oral Liquid .. 650 milliGRAM(s) Oral every 6 hours PRN Temp greater or equal to 38C (100.4F)  nystatin Powder 1 Application(s) Topical two times a day PRN skin irritation      Pertinent lab data:                        8.0    98.09 )-----------( 194      ( 06 May 2024 23:18 )             26.3     05-06    141  |  107  |  10  ----------------------------<  80  4.4   |  25  |  0.48<L>    Ca    8.5      06 May 2024 23:18  Phos  3.6     05-06  Mg     2.5     05-06    TPro  5.9<L>  /  Alb  2.6<L>  /  TBili  0.4  /  DBili  x   /  AST  16  /  ALT  10  /  AlkPhos  70  05-06    PT/INR - ( 06 May 2024 23:18 )   PT: 12.0 sec;   INR: 1.15 ratio         PTT - ( 06 May 2024 23:18 )  PTT:30.0 sec            Physical Examination:  Daily     Daily   Vital Signs Last 24 Hrs  T(C): 37.6 (07 May 2024 08:00), Max: 37.6 (07 May 2024 08:00)  T(F): 99.7 (07 May 2024 08:00), Max: 99.7 (07 May 2024 08:00)  HR: 103 (07 May 2024 11:00) (64 - 103)  BP: 133/62 (07 May 2024 11:00) (86/42 - 144/65)  BP(mean): 89 (07 May 2024 11:00) (61 - 89)  RR: 30 (07 May 2024 11:00) (20 - 36)  SpO2: 98% (07 May 2024 11:00) (97% - 100%)    Parameters below as of 07 May 2024 05:02  Patient On (Oxygen Delivery Method): ventilator      GENERAL: no acute distress  NEURO: alert  HEENT: NCAT, no conjunctival pallor appreciated  CHEST: no respiratory distress, no accessory muscle use  CARDIAC: regular rate, +S1/S2  ABDOMEN: soft, nontender, no rebound or guarding  EXTREMITIES: warm, well perfused  SKIN: no lesions noted    Comments:    ASA Class: I []  II []  III []  IV []    The patient is a suitable candidate for the planned procedure unless box checked [ ]  No, explain:

## 2024-05-07 NOTE — PROGRESS NOTE ADULT - SUBJECTIVE AND OBJECTIVE BOX
*******************************  Brenda Marie MD (PGY-1)  Internal Medicine  Contact via [employer] TEAMS    *******************************    INTERVAL HPI/OVERNIGHT EVENTS: Pt hypoglycemic ON, started on D5 at 75 cc/hr.     SUBJECTIVE: Patient seen and examined at bedside. Pending EGD/PEG with GI today.    MEDICATIONS  (STANDING):  albuterol/ipratropium for Nebulization 3 milliLiter(s) Nebulizer every 8 hours  ampicillin/sulbactam  IVPB      ampicillin/sulbactam  IVPB 3 Gram(s) IV Intermittent every 6 hours  atovaquone  Suspension 1500 milliGRAM(s) Oral daily  chlorhexidine 0.12% Liquid 15 milliLiter(s) Oral Mucosa every 12 hours  chlorhexidine 2% Cloths 1 Application(s) Topical <User Schedule>  clonazePAM  Tablet 1 milliGRAM(s) Oral every 12 hours  dextrose 5%. 1000 milliLiter(s) (75 mL/Hr) IV Continuous <Continuous>  droxidopa 100 milliGRAM(s) Oral three times a day  hydrocortisone sodium succinate Injectable 50 milliGRAM(s) IV Push daily  insulin lispro (ADMELOG) corrective regimen sliding scale   SubCutaneous every 6 hours  midodrine 30 milliGRAM(s) Oral three times a day  polyethylene glycol 3350 17 Gram(s) Oral every 12 hours  QUEtiapine 100 milliGRAM(s) Oral at bedtime  QUEtiapine 25 milliGRAM(s) Oral three times a day  senna Syrup 10 milliLiter(s) Oral at bedtime    MEDICATIONS  (PRN):  acetaminophen   Oral Liquid .. 650 milliGRAM(s) Oral every 6 hours PRN Temp greater or equal to 38C (100.4F)  nystatin Powder 1 Application(s) Topical two times a day PRN skin irritation      ALLERGIES:  Allergies    No Known Allergies    Intolerances        VITAL SIGNS:  ICU Vital Signs Last 24 Hrs  T(C): 37.5 (07 May 2024 04:00), Max: 37.6 (06 May 2024 11:00)  T(F): 99.5 (07 May 2024 04:00), Max: 99.7 (06 May 2024 11:00)  HR: 91 (07 May 2024 06:00) (64 - 98)  BP: 103/57 (07 May 2024 06:00) (86/42 - 144/65)  BP(mean): 76 (07 May 2024 06:00) (61 - 88)  RR: 32 (07 May 2024 06:00) (14 - 36)  SpO2: 97% (07 May 2024 06:00) (97% - 100%)    O2 Parameters below as of 07 May 2024 05:02  Patient On (Oxygen Delivery Method): ventilator        Mode: CPAP with PS, FiO2: 40, PEEP: 5, PS: 12, MAP: 10, PIP: 18  Plateau pressure:   P/F ratio:     05-06 @ 07:01  -  05-07 @ 07:00  --------------------------------------------------------  IN: 1475 mL / OUT: 1000 mL / NET: 475 mL      CAPILLARY BLOOD GLUCOSE      POCT Blood Glucose.: 90 mg/dL (07 May 2024 05:52)    ECG:    PHYSICAL EXAM:    GENERAL: Anxious  HEAD:  Atraumatic, normocephalic  EYES: EOMI, PERRLA, conjunctiva and sclera clear  ENT: Moist mucous membranes  NECK: Supple, no JVD. Trach in place   HEART: S1, S2, regular rate and rhythm, no murmurs, rubs, or gallops  LUNGS: Unlabored respirations.  Clear to auscultation bilaterally, no crackles, wheezing, or rhonchi  ABDOMEN: Soft, nontender, nondistended, +BS  EXTREMITIES: 2+ peripheral pulses bilaterally. No clubbing, cyanosis, or edema  NERVOUS SYSTEM:  A&Ox3, no focal deficits   SKIN: No rashes or lesions  LINES:     LABS:                        8.0    98.09 )-----------( 194      ( 06 May 2024 23:18 )             26.3     05-06    141  |  107  |  10  ----------------------------<  80  4.4   |  25  |  0.48<L>    Ca    8.5      06 May 2024 23:18  Phos  3.6     05-06  Mg     2.5     05-06    TPro  5.9<L>  /  Alb  2.6<L>  /  TBili  0.4  /  DBili  x   /  AST  16  /  ALT  10  /  AlkPhos  70  05-06    PT/INR - ( 06 May 2024 23:18 )   PT: 12.0 sec;   INR: 1.15 ratio         PTT - ( 06 May 2024 23:18 )  PTT:30.0 sec  Urinalysis Basic - ( 06 May 2024 23:18 )    Color: x / Appearance: x / SG: x / pH: x  Gluc: 80 mg/dL / Ketone: x  / Bili: x / Urobili: x   Blood: x / Protein: x / Nitrite: x   Leuk Esterase: x / RBC: x / WBC x   Sq Epi: x / Non Sq Epi: x / Bacteria: x        RADIOLOGY & ADDITIONAL TESTS:  *******************************  Brenda Marie MD (PGY-1)  Internal Medicine  Contact via [employer] TEAMS    *******************************    INTERVAL HPI/OVERNIGHT EVENTS: Pt hypoglycemic ON, started on D5 at 75 cc/hr.     SUBJECTIVE: Patient seen and examined at bedside. Feeling well this AM, but generally "tired". Pending EGD/PEG with GI today.    MEDICATIONS  (STANDING):  albuterol/ipratropium for Nebulization 3 milliLiter(s) Nebulizer every 8 hours  ampicillin/sulbactam  IVPB      ampicillin/sulbactam  IVPB 3 Gram(s) IV Intermittent every 6 hours  atovaquone  Suspension 1500 milliGRAM(s) Oral daily  chlorhexidine 0.12% Liquid 15 milliLiter(s) Oral Mucosa every 12 hours  chlorhexidine 2% Cloths 1 Application(s) Topical <User Schedule>  clonazePAM  Tablet 1 milliGRAM(s) Oral every 12 hours  dextrose 5%. 1000 milliLiter(s) (75 mL/Hr) IV Continuous <Continuous>  droxidopa 100 milliGRAM(s) Oral three times a day  hydrocortisone sodium succinate Injectable 50 milliGRAM(s) IV Push daily  insulin lispro (ADMELOG) corrective regimen sliding scale   SubCutaneous every 6 hours  midodrine 30 milliGRAM(s) Oral three times a day  polyethylene glycol 3350 17 Gram(s) Oral every 12 hours  QUEtiapine 100 milliGRAM(s) Oral at bedtime  QUEtiapine 25 milliGRAM(s) Oral three times a day  senna Syrup 10 milliLiter(s) Oral at bedtime    MEDICATIONS  (PRN):  acetaminophen   Oral Liquid .. 650 milliGRAM(s) Oral every 6 hours PRN Temp greater or equal to 38C (100.4F)  nystatin Powder 1 Application(s) Topical two times a day PRN skin irritation      ALLERGIES:  Allergies    No Known Allergies    Intolerances      VITAL SIGNS:  ICU Vital Signs Last 24 Hrs  T(C): 37.5 (07 May 2024 04:00), Max: 37.6 (06 May 2024 11:00)  T(F): 99.5 (07 May 2024 04:00), Max: 99.7 (06 May 2024 11:00)  HR: 91 (07 May 2024 06:00) (64 - 98)  BP: 103/57 (07 May 2024 06:00) (86/42 - 144/65)  BP(mean): 76 (07 May 2024 06:00) (61 - 88)  RR: 32 (07 May 2024 06:00) (14 - 36)  SpO2: 97% (07 May 2024 06:00) (97% - 100%)    O2 Parameters below as of 07 May 2024 05:02  Patient On (Oxygen Delivery Method): ventilator        Mode: CPAP with PS, FiO2: 40, PEEP: 5, PS: 12, MAP: 10, PIP: 18  Plateau pressure:   P/F ratio:     05-06 @ 07:01  -  05-07 @ 07:00  --------------------------------------------------------  IN: 1475 mL / OUT: 1000 mL / NET: 475 mL      CAPILLARY BLOOD GLUCOSE      POCT Blood Glucose.: 90 mg/dL (07 May 2024 05:52)    ECG:    PHYSICAL EXAM:    GENERAL: Anxious  HEAD:  Atraumatic, normocephalic  EYES: EOMI, PERRLA, conjunctiva and sclera clear  ENT: Moist mucous membranes  NECK: Supple, no JVD. Trach in place   HEART: S1, S2, regular rate and rhythm, no murmurs, rubs, or gallops  LUNGS: Unlabored respirations.  Clear to auscultation bilaterally, no crackles, wheezing, or rhonchi  ABDOMEN: Soft, nontender, nondistended, +BS  EXTREMITIES: 2+ peripheral pulses bilaterally. No clubbing, cyanosis, or edema  NERVOUS SYSTEM:  A&Ox3, no focal deficits   SKIN: No rashes or lesions  LINES:     LABS:                        8.0    98.09 )-----------( 194      ( 06 May 2024 23:18 )             26.3     05-06    141  |  107  |  10  ----------------------------<  80  4.4   |  25  |  0.48<L>    Ca    8.5      06 May 2024 23:18  Phos  3.6     05-06  Mg     2.5     05-06    TPro  5.9<L>  /  Alb  2.6<L>  /  TBili  0.4  /  DBili  x   /  AST  16  /  ALT  10  /  AlkPhos  70  05-06    PT/INR - ( 06 May 2024 23:18 )   PT: 12.0 sec;   INR: 1.15 ratio         PTT - ( 06 May 2024 23:18 )  PTT:30.0 sec  Urinalysis Basic - ( 06 May 2024 23:18 )    Color: x / Appearance: x / SG: x / pH: x  Gluc: 80 mg/dL / Ketone: x  / Bili: x / Urobili: x   Blood: x / Protein: x / Nitrite: x   Leuk Esterase: x / RBC: x / WBC x   Sq Epi: x / Non Sq Epi: x / Bacteria: x        RADIOLOGY & ADDITIONAL TESTS:

## 2024-05-07 NOTE — PROGRESS NOTE ADULT - ATTENDING COMMENTS
Patient has tracheostomy and spelling point bord was used in attempt to communicate. Patient seemed to lack concentration and focus to make statements; She is fully dependant in staff with all activity of daily living and she has significant anasarca; CLL appears under control and she was given IV gamma globulin within the past three weeks in management of pneumonia. Overall condition remains guarded with her infection immobility and need for ongoing low term care.

## 2024-05-07 NOTE — PROGRESS NOTE ADULT - ASSESSMENT
69F h/o CVA (bedbound at baseline), COPD, CHF, HTN presenting as transfer from [facility] for SOB iso leukocytosis to 233 c/f acute leukemia. Pt intubated and on pressors, transferred to MICU for further management, course c/b empyema s/p chest tube placement (4/16) and removal, reaccumulation of loculated pleural effusion s/p L pigtail placement and removal on 4/27. Now off of IV pressors, pending EGD/PEG with GI today and likely to be transferred to RCU following this procedure.     =====Neuro=====  #At Baseline, AOx4 mental status   #Anxiety  - L upper and lower extremity motor deficits iso hx of stroke   - Pain control w/ Tylenol  - Seroquel 25 tid and seroquel 100qhs for agitation   - Klonopin 1mg q12     =====CV=====  #Septic Shock, Resolving   - 2/2 Strep Pneumo Empyema and Bacteremia s/p Pigtail catheter   - Currently off IV pressors  - C/w Midodrine to 30 TID   - C/w droxidopa 100mg tid   - Solu-cortef 50 mg IV qd 5/6-5/8  - MAP goal > 60     #CHF  - Unclear hx but elevated pro-BNP to 2600s  - TTE: EF 54%, no significant abnormalities  - Diurese as needed    =====Resp======  #Acute hypoxic respiratory failure  #Empyema Pansensitive Strep Pneumo, Resolved   -s/p thora draining 1500cc fluid 4/16 with chest tube placement; c/w exudative effusion growing Strep Pneumo  -MRSA neg  -pleural fluid growing strep pneumo; BCx growing strep pneumo; Pansensitive  -was on vanc/zosyn/azithro (4/16)-->deescalated to CTX (4/17 -4/23), Added on Vancomycin (4/23-) due to MRSE 1/2 Cx, expanded coverage to Cefepime (4/23 1x dose), back on CTX, switched to Unasyn 3g Q6 (4/25- )  -CT chest 4/22 reveals persistent loculated effusion, s/p IR Pigtail placement 4/24-4/27 w/ Exudative effusion; Alteplase and Dornase through pigtal x6 doses; unable to finish 6th Alteplase+Dornase due to catheter occlusion   - Repeat CTC 4/27 - decreased small pleural effusions, thoracic LAD   - s/p Trach placement 4/28; Attempt Trach collar during day and Pressure support at night   - continue vent weaning as tolerated    =====GI=====  #Diet: NPO (repeatedly pulling out NGT)  - Tentatively planned for EGD/PEG with GI today 5/7    =====/Renal=====  #No active issues    =====Heme/Onc=====  #Leukocytosis  #CLL  -  on presentation; stable in 90s  - No plan for leukophoresis at this time given mature lymphocytes  - CT Chest A&P showing diffuse axillary, inguinal mediastinal RP lymphadenopathy.  - Appreciate Heme recs; bone marrow bx not being considered at this time    #L Common Femoral Vein DVT   - Non-occlusive   - C/w Lovenox 110mg q12; being held in preparation for PEG placement     =====ID=====  #Empyema 2/2 Pansensitive Strep Pneumo, resolved  #Strep Pneumo Bacteremia, resolved  -BCx + strep pneumo; fluid culture with strep pneumo  -repeat BCx 4/20 ngtd, ReCx 4/23 due to fever   -CT Chest A&P demonstrates LLL empyema not showing any other intraabdominal infectious source  - Was on vanc/zosyn/azithro (4/16)-->deescalated to CTX (4/17 -4/23), Added on Vancomycin (4/23-) due to MRSE 1/2 Cx, expanded coverage to Cefepime (4/23 1x dose), back on CTX, switched to Unasyn 3g Q6 (4/25- )  - IVIG 4/26 x1  - ID Consulted for persistent fevers despite source control; appreciate recs   - CMV detc below threshold range  - Crypt, fungitell pending     Plan    - Per ID, c/w with Unasyn (4/25  -   )   - PCP PPX w/ Atovaquone   - Taper off Stress steroids; currently on Solu-Cortef QD through 5/8    =====Endo=====  - ISS q6h while NPO   - Pulled out NGT, currently NPO, PEG placement with GI tentatively 5/7    =====Ethics:=====  Full Code  Only known family member is Aunt Mili Gordon ([phone], [phone]) who lives in [facility] appreciated; per discussion at bedside with pt, pt would not want to "live on machines forever" and wants to eat and be comfortable. This was discussed with Mili Gordon, who would like to try to wean pt off ventilator, but if this is not possible, would like to respect patient's wishes.      69F h/o CVA (bedbound at baseline), COPD, CHF, HTN presenting as transfer from [facility] for SOB iso leukocytosis to 233 c/f acute leukemia. Pt intubated and on pressors, transferred to MICU for further management, course c/b empyema s/p chest tube placement (4/16) and removal, reaccumulation of loculated pleural effusion s/p L pigtail placement and removal on 4/27. Now off of IV pressors, pending EGD/PEG with GI today and likely to be transferred to RCU following this procedure.     =====Neuro=====  #At Baseline, AOx4 mental status   #Anxiety  - L upper and lower extremity motor deficits iso hx of stroke   - Pain control w/ Tylenol  - Seroquel 25 tid and seroquel 100qhs for agitation   - Klonopin 1mg q12     =====CV=====  #Septic Shock, Resolving   - 2/2 Strep Pneumo Empyema and Bacteremia s/p Pigtail catheter   - Currently off IV pressors  - C/w Midodrine to 30 TID   - C/w droxidopa 100mg tid   - Solu-cortef 50 mg IV qd 5/6-5/8  - MAP goal > 60     #CHF  - Unclear hx but elevated pro-BNP to 2600s  - TTE: EF 54%, no significant abnormalities  - Diurese as needed    =====Resp======  #Acute hypoxic respiratory failure  #Empyema Pansensitive Strep Pneumo, Resolved   -s/p thora draining 1500cc fluid 4/16 with chest tube placement; c/w exudative effusion growing Strep Pneumo  -MRSA neg  -pleural fluid growing strep pneumo; BCx growing strep pneumo; Pansensitive  -was on vanc/zosyn/azithro (4/16)-->deescalated to CTX (4/17 -4/23), Added on Vancomycin (4/23-) due to MRSE 1/2 Cx, expanded coverage to Cefepime (4/23 1x dose), back on CTX, switched to Unasyn 3g Q6 (4/25- )  -CT chest 4/22 reveals persistent loculated effusion, s/p IR Pigtail placement 4/24-4/27 w/ Exudative effusion; Alteplase and Dornase through pigtal x6 doses; unable to finish 6th Alteplase+Dornase due to catheter occlusion   - Repeat CTC 4/27 - decreased small pleural effusions, thoracic LAD   - s/p Trach placement 4/28; Attempt Trach collar during day and Pressure support at night   - continue vent weaning as tolerated    =====GI=====  #Diet: NPO (repeatedly pulling out NGT)  - Tentatively planned for EGD/PEG with GI today 5/7    =====/Renal=====  #No active issues    =====Heme/Onc=====  #Leukocytosis  #CLL  -  on presentation; stable in 90s  - No plan for leukophoresis at this time given mature lymphocytes  - CT Chest A&P showing diffuse axillary, inguinal mediastinal RP lymphadenopathy.  - Appreciate Heme recs; bone marrow bx not being considered at this time    #L Common Femoral Vein DVT   - Non-occlusive   - C/w Lovenox 110mg q12; being held in preparation for PEG placement. Will restart 5/8 AM    =====ID=====  #Empyema 2/2 Pansensitive Strep Pneumo, resolved  #Strep Pneumo Bacteremia, resolved  -BCx + strep pneumo; fluid culture with strep pneumo  -repeat BCx 4/20 ngtd, ReCx 4/23 due to fever   -CT Chest A&P demonstrates LLL empyema not showing any other intraabdominal infectious source  - Was on vanc/zosyn/azithro (4/16)-->deescalated to CTX (4/17 -4/23), Added on Vancomycin (4/23-) due to MRSE 1/2 Cx, expanded coverage to Cefepime (4/23 1x dose), back on CTX, switched to Unasyn 3g Q6 (4/25- )  - IVIG 4/26 x1  - ID Consulted for persistent fevers despite source control; appreciate recs   - CMV detc below threshold range  - Crypt, fungitell pending     Plan    - Per ID, c/w with Unasyn (4/25  -   ). Will follow up with ID today for elucidation of antibiotic course duration.   - PCP PPX w/ Atovaquone   - Taper off Stress steroids; currently on Solu-Cortef QD through 5/8    =====Endo=====  - ISS q6h while NPO   - Pulled out NGT, currently NPO, PEG placement with GI tentatively 5/7  - C/w D5 at 75 cc/hr while NPO    =====Ethics:=====  Full Code  Only known family member is Aunt Mili Gordon ([phone], [phone]) who lives in [facility] appreciated; per discussion at bedside with pt, pt would not want to "live on machines forever" and wants to eat and be comfortable. This was discussed with Mili Gordon, who would like to try to wean pt off ventilator, but if this is not possible, would like to respect patient's wishes.

## 2024-05-08 LAB
ALBUMIN SERPL ELPH-MCNC: 2.2 G/DL — LOW (ref 3.3–5)
ALP SERPL-CCNC: 68 U/L — SIGNIFICANT CHANGE UP (ref 40–120)
ALT FLD-CCNC: 8 U/L — LOW (ref 10–45)
ANION GAP SERPL CALC-SCNC: 8 MMOL/L — SIGNIFICANT CHANGE UP (ref 5–17)
APTT BLD: 28.9 SEC — SIGNIFICANT CHANGE UP (ref 24.5–35.6)
AST SERPL-CCNC: 19 U/L — SIGNIFICANT CHANGE UP (ref 10–40)
BASE EXCESS BLDV CALC-SCNC: 4.1 MMOL/L — HIGH (ref -2–3)
BASOPHILS # BLD AUTO: 0.19 K/UL — SIGNIFICANT CHANGE UP (ref 0–0.2)
BASOPHILS NFR BLD AUTO: 0.2 % — SIGNIFICANT CHANGE UP (ref 0–2)
BILIRUB SERPL-MCNC: 0.3 MG/DL — SIGNIFICANT CHANGE UP (ref 0.2–1.2)
BLD GP AB SCN SERPL QL: NEGATIVE — SIGNIFICANT CHANGE UP
BUN SERPL-MCNC: 7 MG/DL — SIGNIFICANT CHANGE UP (ref 7–23)
CA-I SERPL-SCNC: 1.18 MMOL/L — SIGNIFICANT CHANGE UP (ref 1.15–1.33)
CALCIUM SERPL-MCNC: 8.2 MG/DL — LOW (ref 8.4–10.5)
CD3-/CD16+CD56+(%): 0 % — LOW (ref 4–25)
CD3-CD16+CD56+(ABS): 94 CELLS/UL — SIGNIFICANT CHANGE UP (ref 70–760)
CHLORIDE BLDV-SCNC: 104 MMOL/L — SIGNIFICANT CHANGE UP (ref 96–108)
CHLORIDE SERPL-SCNC: 104 MMOL/L — SIGNIFICANT CHANGE UP (ref 96–108)
CO2 BLDV-SCNC: 31 MMOL/L — HIGH (ref 22–26)
CO2 SERPL-SCNC: 26 MMOL/L — SIGNIFICANT CHANGE UP (ref 22–31)
CREAT SERPL-MCNC: 0.48 MG/DL — LOW (ref 0.5–1.3)
CULTURE RESULTS: SIGNIFICANT CHANGE UP
CULTURE RESULTS: SIGNIFICANT CHANGE UP
EGFR: 103 ML/MIN/1.73M2 — SIGNIFICANT CHANGE UP
EOSINOPHIL # BLD AUTO: 0.22 K/UL — SIGNIFICANT CHANGE UP (ref 0–0.5)
EOSINOPHIL NFR BLD AUTO: 0.2 % — SIGNIFICANT CHANGE UP (ref 0–6)
GAS PNL BLDV: 137 MMOL/L — SIGNIFICANT CHANGE UP (ref 136–145)
GAS PNL BLDV: SIGNIFICANT CHANGE UP
GLUCOSE BLDC GLUCOMTR-MCNC: 106 MG/DL — HIGH (ref 70–99)
GLUCOSE BLDC GLUCOMTR-MCNC: 91 MG/DL — SIGNIFICANT CHANGE UP (ref 70–99)
GLUCOSE BLDC GLUCOMTR-MCNC: 93 MG/DL — SIGNIFICANT CHANGE UP (ref 70–99)
GLUCOSE BLDC GLUCOMTR-MCNC: 95 MG/DL — SIGNIFICANT CHANGE UP (ref 70–99)
GLUCOSE BLDC GLUCOMTR-MCNC: 96 MG/DL — SIGNIFICANT CHANGE UP (ref 70–99)
GLUCOSE BLDV-MCNC: 74 MG/DL — SIGNIFICANT CHANGE UP (ref 70–99)
GLUCOSE SERPL-MCNC: 89 MG/DL — SIGNIFICANT CHANGE UP (ref 70–99)
HCO3 BLDV-SCNC: 29 MMOL/L — SIGNIFICANT CHANGE UP (ref 22–29)
HCT VFR BLD CALC: 25.5 % — LOW (ref 34.5–45)
HCT VFR BLDA CALC: 25 % — LOW (ref 34.5–46.5)
HGB BLD CALC-MCNC: 8.4 G/DL — LOW (ref 11.7–16.1)
HGB BLD-MCNC: 8 G/DL — LOW (ref 11.5–15.5)
IL2 SERPL-MCNC: HIGH PG/ML (ref 175.3–858.2)
IMM GRANULOCYTES NFR BLD AUTO: 0.2 % — SIGNIFICANT CHANGE UP (ref 0–0.9)
INR BLD: 1.13 RATIO — SIGNIFICANT CHANGE UP (ref 0.85–1.18)
LACTATE BLDV-MCNC: 0.5 MMOL/L — SIGNIFICANT CHANGE UP (ref 0.5–2)
LDH SERPL L TO P-CCNC: 249 U/L — HIGH (ref 50–242)
LYMPHOCYTES # BLD AUTO: 94.3 % — HIGH (ref 13–44)
LYMPHOCYTES # BLD AUTO: 95.42 K/UL — HIGH (ref 1–3.3)
LYMPHOCYTES, ABSOLUTE: HIGH CELLS/UL (ref 850–3900)
MAGNESIUM SERPL-MCNC: 2.2 MG/DL — SIGNIFICANT CHANGE UP (ref 1.6–2.6)
MCHC RBC-ENTMCNC: 28.7 PG — SIGNIFICANT CHANGE UP (ref 27–34)
MCHC RBC-ENTMCNC: 31.4 GM/DL — LOW (ref 32–36)
MCV RBC AUTO: 91.4 FL — SIGNIFICANT CHANGE UP (ref 80–100)
MONOCYTES # BLD AUTO: 3.36 K/UL — HIGH (ref 0–0.9)
MONOCYTES NFR BLD AUTO: 3.3 % — SIGNIFICANT CHANGE UP (ref 2–14)
NEUTROPHILS # BLD AUTO: 1.79 K/UL — LOW (ref 1.8–7.4)
NEUTROPHILS NFR BLD AUTO: 1.8 % — LOW (ref 43–77)
NRBC # BLD: 0 /100 WBCS — SIGNIFICANT CHANGE UP (ref 0–0)
PCO2 BLDV: 46 MMHG — HIGH (ref 39–42)
PH BLDV: 7.41 — SIGNIFICANT CHANGE UP (ref 7.32–7.43)
PHOSPHATE SERPL-MCNC: 3.6 MG/DL — SIGNIFICANT CHANGE UP (ref 2.5–4.5)
PLATELET # BLD AUTO: 185 K/UL — SIGNIFICANT CHANGE UP (ref 150–400)
PO2 BLDV: 80 MMHG — HIGH (ref 25–45)
POTASSIUM BLDV-SCNC: 3.5 MMOL/L — SIGNIFICANT CHANGE UP (ref 3.5–5.1)
POTASSIUM SERPL-MCNC: 4.6 MMOL/L — SIGNIFICANT CHANGE UP (ref 3.5–5.3)
POTASSIUM SERPL-SCNC: 4.6 MMOL/L — SIGNIFICANT CHANGE UP (ref 3.5–5.3)
PROT SERPL-MCNC: 5.4 G/DL — LOW (ref 6–8.3)
PROTHROM AB SERPL-ACNC: 12.4 SEC — SIGNIFICANT CHANGE UP (ref 9.5–13)
RBC # BLD: 2.79 M/UL — LOW (ref 3.8–5.2)
RBC # BLD: 2.79 M/UL — LOW (ref 3.8–5.2)
RBC # FLD: 18.8 % — HIGH (ref 10.3–14.5)
RETICS #: 118.6 K/UL — SIGNIFICANT CHANGE UP (ref 25–125)
RETICS/RBC NFR: 4.3 % — HIGH (ref 0.5–2.5)
RH IG SCN BLD-IMP: POSITIVE — SIGNIFICANT CHANGE UP
SAO2 % BLDV: 96.9 % — HIGH (ref 67–88)
SODIUM SERPL-SCNC: 138 MMOL/L — SIGNIFICANT CHANGE UP (ref 135–145)
SPECIMEN SOURCE: SIGNIFICANT CHANGE UP
SPECIMEN SOURCE: SIGNIFICANT CHANGE UP
URATE SERPL-MCNC: 3.8 MG/DL — SIGNIFICANT CHANGE UP (ref 2.5–7)
WBC # BLD: 101.18 K/UL — CRITICAL HIGH (ref 3.8–10.5)
WBC # FLD AUTO: 101.18 K/UL — CRITICAL HIGH (ref 3.8–10.5)

## 2024-05-08 PROCEDURE — 99232 SBSQ HOSP IP/OBS MODERATE 35: CPT | Mod: GC

## 2024-05-08 PROCEDURE — 99222 1ST HOSP IP/OBS MODERATE 55: CPT

## 2024-05-08 PROCEDURE — 99233 SBSQ HOSP IP/OBS HIGH 50: CPT

## 2024-05-08 PROCEDURE — 99291 CRITICAL CARE FIRST HOUR: CPT | Mod: GC

## 2024-05-08 RX ORDER — ENOXAPARIN SODIUM 100 MG/ML
110 INJECTION SUBCUTANEOUS EVERY 12 HOURS
Refills: 0 | Status: DISCONTINUED | OUTPATIENT
Start: 2024-05-08 | End: 2024-05-19

## 2024-05-08 RX ORDER — ACETAMINOPHEN 500 MG
650 TABLET ORAL EVERY 6 HOURS
Refills: 0 | Status: DISCONTINUED | OUTPATIENT
Start: 2024-05-08 | End: 2024-05-16

## 2024-05-08 RX ORDER — FUROSEMIDE 40 MG
40 TABLET ORAL ONCE
Refills: 0 | Status: COMPLETED | OUTPATIENT
Start: 2024-05-08 | End: 2024-05-08

## 2024-05-08 RX ADMIN — AMPICILLIN SODIUM AND SULBACTAM SODIUM 200 GRAM(S): 250; 125 INJECTION, POWDER, FOR SUSPENSION INTRAMUSCULAR; INTRAVENOUS at 05:22

## 2024-05-08 RX ADMIN — POLYETHYLENE GLYCOL 3350 17 GRAM(S): 17 POWDER, FOR SOLUTION ORAL at 05:21

## 2024-05-08 RX ADMIN — ENOXAPARIN SODIUM 110 MILLIGRAM(S): 100 INJECTION SUBCUTANEOUS at 23:35

## 2024-05-08 RX ADMIN — Medication 1 MILLIGRAM(S): at 05:21

## 2024-05-08 RX ADMIN — DROXIDOPA 100 MILLIGRAM(S): 100 CAPSULE ORAL at 05:22

## 2024-05-08 RX ADMIN — AMPICILLIN SODIUM AND SULBACTAM SODIUM 200 GRAM(S): 250; 125 INJECTION, POWDER, FOR SUSPENSION INTRAMUSCULAR; INTRAVENOUS at 11:43

## 2024-05-08 RX ADMIN — Medication 3 MILLILITER(S): at 13:09

## 2024-05-08 RX ADMIN — MIDODRINE HYDROCHLORIDE 30 MILLIGRAM(S): 2.5 TABLET ORAL at 14:14

## 2024-05-08 RX ADMIN — Medication 650 MILLIGRAM(S): at 02:00

## 2024-05-08 RX ADMIN — QUETIAPINE FUMARATE 25 MILLIGRAM(S): 200 TABLET, FILM COATED ORAL at 21:24

## 2024-05-08 RX ADMIN — Medication 50 MILLIGRAM(S): at 05:21

## 2024-05-08 RX ADMIN — CHLORHEXIDINE GLUCONATE 1 APPLICATION(S): 213 SOLUTION TOPICAL at 05:22

## 2024-05-08 RX ADMIN — Medication 3 MILLILITER(S): at 05:32

## 2024-05-08 RX ADMIN — QUETIAPINE FUMARATE 100 MILLIGRAM(S): 200 TABLET, FILM COATED ORAL at 21:24

## 2024-05-08 RX ADMIN — AMPICILLIN SODIUM AND SULBACTAM SODIUM 200 GRAM(S): 250; 125 INJECTION, POWDER, FOR SUSPENSION INTRAMUSCULAR; INTRAVENOUS at 18:02

## 2024-05-08 RX ADMIN — DROXIDOPA 100 MILLIGRAM(S): 100 CAPSULE ORAL at 11:44

## 2024-05-08 RX ADMIN — DROXIDOPA 100 MILLIGRAM(S): 100 CAPSULE ORAL at 18:02

## 2024-05-08 RX ADMIN — Medication 40 MILLIGRAM(S): at 11:43

## 2024-05-08 RX ADMIN — QUETIAPINE FUMARATE 25 MILLIGRAM(S): 200 TABLET, FILM COATED ORAL at 14:14

## 2024-05-08 RX ADMIN — Medication 650 MILLIGRAM(S): at 02:45

## 2024-05-08 RX ADMIN — ENOXAPARIN SODIUM 110 MILLIGRAM(S): 100 INJECTION SUBCUTANEOUS at 11:42

## 2024-05-08 RX ADMIN — MIDODRINE HYDROCHLORIDE 30 MILLIGRAM(S): 2.5 TABLET ORAL at 05:21

## 2024-05-08 RX ADMIN — ATOVAQUONE 1500 MILLIGRAM(S): 750 SUSPENSION ORAL at 11:48

## 2024-05-08 RX ADMIN — MIDODRINE HYDROCHLORIDE 30 MILLIGRAM(S): 2.5 TABLET ORAL at 21:24

## 2024-05-08 RX ADMIN — Medication 1 MILLIGRAM(S): at 21:24

## 2024-05-08 RX ADMIN — CHLORHEXIDINE GLUCONATE 15 MILLILITER(S): 213 SOLUTION TOPICAL at 05:21

## 2024-05-08 RX ADMIN — CHLORHEXIDINE GLUCONATE 15 MILLILITER(S): 213 SOLUTION TOPICAL at 18:02

## 2024-05-08 RX ADMIN — Medication 650 MILLIGRAM(S): at 21:24

## 2024-05-08 RX ADMIN — SODIUM CHLORIDE 75 MILLILITER(S): 9 INJECTION, SOLUTION INTRAVENOUS at 05:22

## 2024-05-08 RX ADMIN — Medication 650 MILLIGRAM(S): at 21:54

## 2024-05-08 RX ADMIN — QUETIAPINE FUMARATE 25 MILLIGRAM(S): 200 TABLET, FILM COATED ORAL at 05:22
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
## 2024-05-08 NOTE — PROGRESS NOTE ADULT - ATTENDING COMMENTS
s/p trach. Dysphagia. s/p PEG placement. Ok to use PEG for feedings. Local wound care. Please reconsult GI as needed.

## 2024-05-08 NOTE — PROGRESS NOTE ADULT - PROBLEM SELECTOR PLAN 4
spoke with pt who confirmed previous GOC conversation: does not want to live prolonged on machines, would like to consider trialing weaning down of oxygen in order for her to eat and communicate, her goal is to get stronger. It is not in line with her GOC to live her life artificially on machines.   VMx2 left for pt's aunt Mili
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
## 2024-05-08 NOTE — PROGRESS NOTE ADULT - ASSESSMENT
69F h/o CVA (bedbound at baseline), COPD, CHF, HTN presenting as transfer from [facility] for SOB iso leukocytosis to 233 c/f acute leukemia. Pt intubated and on pressors, transferred to MICU for further management, course c/b empyema s/p chest tube placement (4/16) and removal, reaccumulation of loculated pleural effusion s/p L pigtail placement and removal on 4/27. Now off of IV pressors, pending EGD/PEG with GI today and likely to be transferred to RCU following this procedure. (Taken from MICU). Palliative consulted for assistance with GOC.

## 2024-05-08 NOTE — PROGRESS NOTE ADULT - SUBJECTIVE AND OBJECTIVE BOX
Chief Complaint:  Patient is a 69y old  Female who presents with a chief complaint of Transfer for leukophoresis (08 May 2024 07:20)    Interval Events:   s/p EGD/PEG placement  afebrile    Allergies:  No Known Allergies        Hospital Medications:  acetaminophen   Oral Liquid .. 650 milliGRAM(s) Oral every 6 hours PRN  acetaminophen   Oral Liquid .. 650 milliGRAM(s) Oral every 6 hours PRN  albuterol/ipratropium for Nebulization 3 milliLiter(s) Nebulizer every 8 hours  ampicillin/sulbactam  IVPB      ampicillin/sulbactam  IVPB 3 Gram(s) IV Intermittent every 6 hours  atovaquone  Suspension 1500 milliGRAM(s) Oral daily  chlorhexidine 0.12% Liquid 15 milliLiter(s) Oral Mucosa every 12 hours  chlorhexidine 2% Cloths 1 Application(s) Topical <User Schedule>  clonazePAM  Tablet 1 milliGRAM(s) Oral every 12 hours  droxidopa 100 milliGRAM(s) Oral three times a day  enoxaparin Injectable 110 milliGRAM(s) SubCutaneous every 12 hours  furosemide   Injectable 40 milliGRAM(s) IV Push once  insulin lispro (ADMELOG) corrective regimen sliding scale   SubCutaneous every 6 hours  midodrine 30 milliGRAM(s) Oral three times a day  nystatin Powder 1 Application(s) Topical two times a day PRN  polyethylene glycol 3350 17 Gram(s) Oral every 12 hours  QUEtiapine 25 milliGRAM(s) Oral three times a day  QUEtiapine 100 milliGRAM(s) Oral at bedtime  senna Syrup 10 milliLiter(s) Oral at bedtime      PMHX/PSHX:  Acute CHF    COPD, severe        Family history:      PHYSICAL EXAM:   Vital Signs:  Vital Signs Last 24 Hrs  T(C): 37.1 (08 May 2024 07:00), Max: 37.8 (08 May 2024 00:00)  T(F): 98.8 (08 May 2024 07:00), Max: 100 (08 May 2024 00:00)  HR: 83 (08 May 2024 10:00) (68 - 103)  BP: 138/64 (08 May 2024 10:00) (88/53 - 140/63)  BP(mean): 92 (08 May 2024 10:00) (65 - 92)  RR: 37 (08 May 2024 10:00) (14 - 37)  SpO2: 100% (08 May 2024 10:00) (97% - 100%)    Parameters below as of 08 May 2024 05:50  Patient On (Oxygen Delivery Method): ventilator      Daily     Daily     GENERAL: critically ill  NEURO: awake  HEENT: anicteric sclera, no conjunctival pallor appreciated  CHEST: trach  CARDIAC: regular rate, +S1/S2  ABDOMEN: soft, nondistended, nontender, no rebound or guarding; PEG at 3.5cm skin marking; no erythema or drainage; itzel noted and removed  EXTREMITIES: warm, well perfused  SKIN: no lesions noted    LABS:                        8.0    101.18 )-----------( 185      ( 07 May 2024 23:59 )             25.5     Mean Cell Volume: 91.4 fl (05-07-24 @ 23:59)    05-07    138  |  104  |  7   ----------------------------<  89  4.6   |  26  |  0.48<L>    Ca    8.2<L>      07 May 2024 23:59  Phos  3.6     05-07  Mg     2.2     05-07    TPro  5.4<L>  /  Alb  2.2<L>  /  TBili  0.3  /  DBili  x   /  AST  19  /  ALT  8<L>  /  AlkPhos  68  05-07    LIVER FUNCTIONS - ( 07 May 2024 23:59 )  Alb: 2.2 g/dL / Pro: 5.4 g/dL / ALK PHOS: 68 U/L / ALT: 8 U/L / AST: 19 U/L / GGT: x           PT/INR - ( 07 May 2024 23:59 )   PT: 12.4 sec;   INR: 1.13 ratio         PTT - ( 07 May 2024 23:59 )  PTT:28.9 sec  Urinalysis Basic - ( 07 May 2024 23:59 )    Color: x / Appearance: x / SG: x / pH: x  Gluc: 89 mg/dL / Ketone: x  / Bili: x / Urobili: x   Blood: x / Protein: x / Nitrite: x   Leuk Esterase: x / RBC: x / WBC x   Sq Epi: x / Non Sq Epi: x / Bacteria: x                              8.0    101.18 )-----------( 185      ( 07 May 2024 23:59 )             25.5                         8.0    98.09 )-----------( 194      ( 06 May 2024 23:18 )             26.3                         6.6    93.82 )-----------( 189      ( 06 May 2024 01:50 )             21.2                         6.5    105.93 )-----------( 214      ( 06 May 2024 00:37 )             21.9       < from: Upper Endoscopy (05.07.24 @ 09:06) >  Impression:          - No gross lesions in esophagus.                       - No gross lesions in the stomach.                       - No gross lesions in the duodenal bulb and in the second portion of the                        duodenum.                       - An externally removable PEG placement was successfully completed.                       - No specimens collected.    < end of copied text >

## 2024-05-08 NOTE — BH CONSULTATION LIAISON ASSESSMENT NOTE - NSBHCHARTREVIEWLAB_PSY_A_CORE FT
8.0    101.18 )-----------( 185      ( 07 May 2024 23:59 )             25.5     05-07    138  |  104  |  7   ----------------------------<  89  4.6   |  26  |  0.48<L>    Ca    8.2<L>      07 May 2024 23:59  Phos  3.6     05-07  Mg     2.2     05-07    TPro  5.4<L>  /  Alb  2.2<L>  /  TBili  0.3  /  DBili  x   /  AST  19  /  ALT  8<L>  /  AlkPhos  68  05-07

## 2024-05-08 NOTE — BH CONSULTATION LIAISON ASSESSMENT NOTE - HPI (INCLUDE ILLNESS QUALITY, SEVERITY, DURATION, TIMING, CONTEXT, MODIFYING FACTORS, ASSOCIATED SIGNS AND SYMPTOMS)
70 y/o female, hx depression, h/o CVA (bedbound at baseline) COPD, CHF, HTN, /w acute shortness of breath to outside ED, Initially treated for acute pulmonary edema with sublingual nitro x 2, Lasix, and BiPAP. Pt was also found to be febrile to 103, so treated for PNA, psych consulted for depression, SI.   Pt states she has been feeling depressed for some time, worse since her mother  in April. Pt reports poor sleep, dec energy, denies active si/hi. Pt stated she didn't want to be around  70 y/o female, hx depression, no prior psych admissions, not in current psych tx, h/o CVA (bedbound at baseline) COPD, CHF, HTN, /w acute shortness of breath to outside ED, Initially treated for acute pulmonary edema with sublingual nitro x 2, Lasix, and BiPAP. Pt was also found to be febrile to 103, so treated for PNA, psych consulted for depression, SI.   Pt s/p trach, difficult to understand speech at times, pt is able to mouth some words. Pt states she has been feeling depressed for some time, worse since her mother  in April. Pt reports poor sleep, dec energy, denies active si/hi. Pt stated she didn't want to live sometimes, feels tired of living. pt denies active si/i/p, no hx suicide attempts.  70 y/o female, hx depression, no prior psych admissions, not in current psych tx, h/o CVA (bedbound at baseline) COPD, CHF, HTN, /w acute shortness of breath to outside ED, Initially treated for acute pulmonary edema with sublingual nitro x 2, Lasix, and BiPAP. Pt was also found to be febrile to 103, so treated for PNA, psych consulted for depression, SI.   Pt s/p trach, difficult to understand speech at times, pt is able to mouth some words. Pt states she has been feeling depressed for some time, worse since her mother  in April. Pt reports poor sleep, dec energy, denies active si/hi. Pt stated she didn't want to live sometimes, feels tired of living. pt overwhlemed with her medical issues. pt denies active si/i/p, no hx suicide attempts. denies psychosis, kimmie. pt taking seroquel which has been helpful. no agitation.

## 2024-05-08 NOTE — BH CONSULTATION LIAISON ASSESSMENT NOTE - NSBHCONSULTRECOMMENDOTHER_PSY_A_CORE FT
can inc seroquel to 150mg qhs, cont 25mg tid, seroquel 25mg po q6hr prn agitation , cont klonopin for now

## 2024-05-08 NOTE — BH CONSULTATION LIAISON ASSESSMENT NOTE - CURRENT MEDICATION
MEDICATIONS  (STANDING):  albuterol/ipratropium for Nebulization 3 milliLiter(s) Nebulizer every 8 hours  ampicillin/sulbactam  IVPB      ampicillin/sulbactam  IVPB 3 Gram(s) IV Intermittent every 6 hours  chlorhexidine 0.12% Liquid 15 milliLiter(s) Oral Mucosa every 12 hours  chlorhexidine 2% Cloths 1 Application(s) Topical <User Schedule>  clonazePAM  Tablet 1 milliGRAM(s) Oral every 12 hours  droxidopa 100 milliGRAM(s) Oral three times a day  enoxaparin Injectable 110 milliGRAM(s) SubCutaneous every 12 hours  insulin lispro (ADMELOG) corrective regimen sliding scale   SubCutaneous every 6 hours  midodrine 30 milliGRAM(s) Oral three times a day  polyethylene glycol 3350 17 Gram(s) Oral every 12 hours  QUEtiapine 25 milliGRAM(s) Oral three times a day  QUEtiapine 100 milliGRAM(s) Oral at bedtime  senna Syrup 10 milliLiter(s) Oral at bedtime    MEDICATIONS  (PRN):  acetaminophen   Oral Liquid .. 650 milliGRAM(s) Oral every 6 hours PRN Moderate Pain (4 - 6)  acetaminophen   Oral Liquid .. 650 milliGRAM(s) Oral every 6 hours PRN Temp greater or equal to 38C (100.4F)  nystatin Powder 1 Application(s) Topical two times a day PRN skin irritation

## 2024-05-08 NOTE — PROGRESS NOTE ADULT - ASSESSMENT
69F h/o CVA (bedbound at baseline), COPD, CHF, HTN presenting as transfer from [facility] for SOB iso leukocytosis to 233 c/f acute leukemia. Pt intubated and on pressors, transferred to MICU for further management, course c/b empyema s/p chest tube placement (4/16) and removal, reaccumulation of loculated pleural effusion s/p L pigtail placement and removal on 4/27. Now off of IV pressors, s/p EGD/PEG with GI yesterday, pending transfer to the RCU.    =====Neuro=====  #At Baseline, AOx4 mental status   #Anxiety  - L upper and lower extremity motor deficits iso hx of stroke   - Pain control w/ Tylenol  - Seroquel 25 tid and seroquel 100qhs for agitation   - Klonopin 1mg q12     =====CV=====  #Septic Shock, Resolved  - 2/2 Strep Pneumo Empyema and Bacteremia s/p Pigtail catheter   - Remains off IV pressors  - C/w Midodrine to 30 TID   - C/w droxidopa 100mg tid   - Solu-cortef 50 mg IV qd 5/6-5/8  - MAP goal > 60     #CHF  - Unclear hx but elevated pro-BNP to 2600s  - TTE: EF 54%, no significant abnormalities  - Diurese as needed    =====Resp======  #Acute hypoxic respiratory failure  #Empyema Pansensitive Strep Pneumo, Resolved   - S/p thora draining 1500cc fluid 4/16 with chest tube placement; c/w exudative effusion growing Strep Pneumo  - Was on vanc/zosyn/azithro (4/16)-->deescalated to CTX (4/17 -4/23), Added on Vancomycin (4/23-) due to MRSE 1/2 Cx, expanded coverage to Cefepime (4/23 1x dose), back on CTX, switched to Unasyn 3g Q6 (4/25- )  - CT chest 4/22 reveals persistent loculated effusion, s/p IR Pigtail placement 4/24-4/27 w/ Exudative effusion; Alteplase and Dornase through pigtal x6 doses; unable to finish 6th Alteplase+Dornase due to catheter occlusion   - Repeat CTC 4/27 - decreased small pleural effusions, thoracic LAD   - S/p Trach placement 4/28; Attempt Trach collar during day and Pressure support at night   - Continue vent weaning as tolerated    =====GI=====  #Diet: NPO with tube feeds through gastrostomy tube  - S/p PEG placement with GI 5/7    =====/Renal=====  #No active issues    =====Heme/Onc=====  #Leukocytosis  #CLL  -  on presentation; stable at 101K  - No plan for leukophoresis at this time given mature lymphocytes per heme  - CT Chest A&P showing diffuse axillary, inguinal mediastinal RP lymphadenopathy.  - Appreciate Heme recs; can plan for bone marrow bx w/ IR if in line with GOC    #L Common Femoral Vein DVT   - Non-occlusive   - C/w Lovenox 110mg q12, restarting this AM 5/8 after being held for PEG    =====ID=====  #Empyema 2/2 Pansensitive Strep Pneumo, resolved  #Strep Pneumo Bacteremia, resolved  -BCx + strep pneumo; fluid culture with strep pneumo  -CT Chest A&P demonstrates LLL empyema not showing any other intraabdominal infectious source  - Was on vanc/zosyn/azithro (4/16)-->deescalated to CTX (4/17 -4/23), Added on Vancomycin (4/23-) due to MRSE 1/2 Cx, expanded coverage to Cefepime (4/23 1x dose), back on CTX, switched to Unasyn 3g Q6 (4/25- )  - IVIG 4/26 x1  - ID Consulted for persistent fevers despite source control; appreciate recs   - CMV detc below threshold range  - Fungitell 78, wnl    Plan    - Per ID, c/w with Unasyn (4/25  - 5/14). Per ID, if d/c before this date, can send home on Augmentin 875 mg BID.  - PCP PPX w/ Atovaquone through 5/8 while on Solu-cortef   - Taper off Stress steroids; currently on Solu-Cortef QD through 5/8    =====Endo=====  - ISS q6h while receiving Tube feeds  - D/c D5 now that tube feeds will be restarted    =====Ethics:=====  Full Code  Only known family member is Aunt Mili Gordon ([phone], [phone]) who lives in [facility] [place]; per discussion at bedside with pt, pt would not want to "live on machines forever" and wants to eat and be comfortable. This was discussed with Mili Evan, who would like to try to wean pt off ventilator, but if this is not possible, would like to respect patient's wishes.     Plan to transfer to RCU once bed becomes available. 69F h/o CVA (bedbound at baseline), COPD, CHF, HTN presenting as transfer from [facility] for SOB iso leukocytosis to 233 c/f acute leukemia. Pt intubated and on pressors, transferred to MICU for further management, course c/b empyema s/p chest tube placement (4/16) and removal, reaccumulation of loculated pleural effusion s/p L pigtail placement and removal on 4/27. Now off of IV pressors, s/p EGD/PEG with GI yesterday, pending transfer to the RCU.    =====Neuro=====  #At Baseline, AOx4 mental status   #Anxiety, Depression   - L upper and lower extremity motor deficits iso hx of stroke   - Pain control w/ Tylenol  - Seroquel 25 tid and seroquel 100qhs for agitation   - Klonopin 1mg q12   - Psych consulted for long-standing depression and passive SI, recs appreciated     =====CV=====  #Septic Shock, Resolved  - 2/2 Strep Pneumo Empyema and Bacteremia s/p Pigtail catheter   - Remains off IV pressors  - C/w Midodrine to 30 TID   - C/w droxidopa 100mg tid   - Solu-cortef 50 mg IV qd 5/6-5/8; received last dose this AM prior to rounds   - MAP goal > 60     #CHF  - Unclear hx but elevated pro-BNP to 2600s  - TTE: EF 54%, no significant abnormalities  - Diurese as needed; Lasix 40 IVP x 1 given 5/8    =====Resp======  #Acute hypoxic respiratory failure  #Empyema Pansensitive Strep Pneumo, Resolved   - S/p thora draining 1500cc fluid 4/16 with chest tube placement; c/w exudative effusion growing Strep Pneumo  - Was on vanc/zosyn/azithro (4/16)-->deescalated to CTX (4/17 -4/23), Added on Vancomycin (4/23-) due to MRSE 1/2 Cx, expanded coverage to Cefepime (4/23 1x dose), back on CTX, switched to Unasyn 3g Q6 (4/25- )  - CT chest 4/22 reveals persistent loculated effusion, s/p IR Pigtail placement 4/24-4/27 w/ Exudative effusion; Alteplase and Dornase through pigtal x6 doses; unable to finish 6th Alteplase+Dornase due to catheter occlusion   - Repeat CTC 4/27 - decreased small pleural effusions, thoracic LAD   - S/p Trach placement 4/28;  will continue to attempt Trach collar during day and Pressure support at night while pt in RCU     =====GI=====  #Diet: NPO with tube feeds through gastrostomy tube  - S/p PEG placement with GI 5/7    =====/Renal=====  #No active issues    =====Heme/Onc=====  #Leukocytosis  #CLL  -  on presentation; stable at 101K  - No plan for leukophoresis at this time given mature lymphocytes per heme  - CT Chest A&P showing diffuse axillary, inguinal mediastinal RP lymphadenopathy.  - Appreciate Heme recs; can plan for bone marrow bx w/ IR if in line with GOC    #L Common Femoral Vein DVT   - Non-occlusive   - C/w Lovenox 110mg q12, restarting this AM 5/8 after being held for PEG    =====ID=====  #Empyema 2/2 Pansensitive Strep Pneumo, resolved  #Strep Pneumo Bacteremia, resolved  -BCx + strep pneumo; fluid culture with strep pneumo  -CT Chest A&P demonstrates LLL empyema not showing any other intraabdominal infectious source  - Was on vanc/zosyn/azithro (4/16)-->deescalated to CTX (4/17 -4/23), Added on Vancomycin (4/23-) due to MRSE 1/2 Cx, expanded coverage to Cefepime (4/23 1x dose), back on CTX, switched to Unasyn 3g Q6 (4/25- )  - IVIG 4/26 x1  - ID Consulted  - CMV detc below threshold range  - Fungitell 78, wnl    Plan    - Per ID, c/w with Unasyn (4/25 - ) for 4 week course, with end date 4/14. Will switch to Augmentin 875 mg BID tomorrow 5/9 as pt has ability to take oral meds through PEG.  - PCP PPX w/ Atovaquone today with last Solu-Cortef dose, then d/c. Solu-cortef taper to be completed today 5/8    =====Endo=====  - ISS q6h while receiving Tube feeds  - D/c D5 now that tube feeds will be restarted    =====Ethics:=====  Full Code  Only known family member is Aunt Mili Gordon ([phone], [phone]) who lives in [place]; also has cousin/caretaker but medical team has not been able to get in contact with him.    Palliative recs appreciated; per discussion at bedside with pt, pt would not want to "live on machines forever" and wants to eat and be comfortable. This was discussed with Mili Evan, who would like to try to wean pt off ventilator, but if this is not possible, would like to respect patient's wishes.     Plan to transfer to RCU once bed becomes available.

## 2024-05-08 NOTE — PROGRESS NOTE ADULT - ASSESSMENT
69F h/o CVA (bedbound at baseline), COPD, CHF, HTN, CLL p/w acute shortness of breath to outside ED found to have acute pulmonary edema and PNA transferred to [facility] for possible leukophoresis. Patient found to have empyema 2/2 Pansensitive Strep Pneumo with associated bacteremia.  S/p intubation with inability to wean, s/p tracheostomy tube on 4/28. Now requiring a PEG tube placement. Hospital course complicated by L common femoral vein DVT, currently on Lovenox.       #PEG eval s/p trach; now s/p 20 Fr PEG 5/7; skin marking at 3.5cm  #AHRF req intubation; unable to wean req trach on 4/28  #Empyema   #S. pneumo bacteremia with last + growth 4/16; Bcx 5/3 NGTD  #DVT on Lovenox    Recommendations:  -may administer enteral feeds via PEG today   -keep bumper clean and dry, would avoid gauze between the PEG bumper and skin  -maintain aspiration precautions and elevate head of bed during feeds  -close observation to prevent PEG dislodgement, would recommend abdominal binder for protection/prevention of PEG dislodgment  -will sign off at this time, however please call back with questions or should any worsening/persistent issues arise. Follow up in Gastroenterology Clinic: [phone] ([facility] at [address] [place] [address]) or [phone] ([facility] at [address] [place] [address]) or [phone] ([facility] at [address] [place] [address])  or [place] Office: [phone] ([address] [place] [address]. [address] [place], [place], [address])    Note and recommendations are incomplete until reviewed and attested by attending.    Rach Art MD  GI/Hepatology Fellow, PGY4  [place] Pager [phone] or [facility] Pager [phone]  Teams preferred (7AM to 5PM); after 5PM, call GI fellow on call    On Weekends/Holidays (All Day) and Weekdays after 5PM to 8AM  For non-urgent consults, please email [email or web address] and [email or web address]  For urgent consults, please contact on call GI team. See Amion schedule ([facility]), [facility] paging system ([facility]), or call hospital  ([facility])

## 2024-05-08 NOTE — BH CONSULTATION LIAISON ASSESSMENT NOTE - NSBHCHARTREVIEWVS_PSY_A_CORE FT
Vital Signs Last 24 Hrs  T(C): 37.1 (08 May 2024 07:00), Max: 37.8 (08 May 2024 00:00)  T(F): 98.8 (08 May 2024 07:00), Max: 100 (08 May 2024 00:00)  HR: 83 (08 May 2024 10:00) (68 - 95)  BP: 138/64 (08 May 2024 10:00) (88/53 - 140/63)  BP(mean): 92 (08 May 2024 10:00) (65 - 92)  RR: 37 (08 May 2024 10:00) (14 - 37)  SpO2: 100% (08 May 2024 10:00) (97% - 100%)    Parameters below as of 08 May 2024 05:50  Patient On (Oxygen Delivery Method): ventilator

## 2024-05-08 NOTE — PROGRESS NOTE ADULT - PROBLEM SELECTOR PLAN 5
will sign off as goals are established  please reconsult as needed  case discussed with MICU   Can be reached by TEAMS M-F 9-5 Sammie Armendariz Any other time please page [phone] if needed

## 2024-05-08 NOTE — BH CONSULTATION LIAISON ASSESSMENT NOTE - SUMMARY
70 y/o female, hx depression, no prior psych admissions, not in current psych tx, h/o CVA (bedbound at baseline) COPD, CHF, HTN, /w acute shortness of breath to outside ED, Initially treated for acute pulmonary edema with sublingual nitro x 2, Lasix, and BiPAP. Pt was also found to be febrile to 103, so treated for PNA, psych consulted for depression, SI.   Pt s/p trach, difficult to understand speech at times, pt is able to mouth some words. Pt states she has been feeling depressed for some time, worse since her mother  in April. Pt reports poor sleep, dec energy, denies active si/hi. Pt stated she didn't want to live sometimes, feels tired of living. pt overwhlemed with her medical issues. pt denies active si/i/p, no hx suicide attempts. denies psychosis, kimmie. pt taking seroquel which has been helpful. no agitation.

## 2024-05-08 NOTE — PROGRESS NOTE ADULT - ATTENDING COMMENTS
69F w/ CVA (bedbound at baseline), COPD, CHF, HTN. Initially presented as transfer for hyperleukocytosis concerning for acute leukemia. Course complicated by acute hypoxemic respiratory failure found to have strep pneumonia bacteremia and pneumonia w/ empyema s/p chest tube x2 s/p intra-pleural fibrinolytic therapy. Failed weaning and now s/p trach. Pt has had prolonged shock state w/ difficulty weaning off pressors, daily low grade fevers found to have LLE DVT.     Mental status likely at baseline, able to communicate; continue seroquel and klonipin for agitation; psych consult for episodes of tearfullness and passive SI. Remains HD stable on droxydopa and midodrine, maintain MAP >/65. S/P trach for failure to wean, PSV weaning as tolerated, placed on TC this morning; s/p chest tube x2 w/ intrapleural fibrinolytic therapy w/ resolution of empyema on imaging. Continue amp/sulbactam for strept pneumonia bacteremia, pneumonia and empyema - 4 week course to complete on 5/14; low grade fevers are improved; all cx remain NGTD. Renal function stable, monitor I/Os and electrolytes; lasix x1 today. S/P PEG tube w/ GI yesterday, start TF today. New CLL w/ leukocytosis - workup and treatment per heme recs; hgb remains stable, transfuse for hgb <7; resume full dose lovenox for LLE DVT. FS w/ ISS; last day of stress steroids today. Prognosis guarded. Full code. Palliative following. Stable for transfer to RCU today.

## 2024-05-09 DIAGNOSIS — I10 ESSENTIAL (PRIMARY) HYPERTENSION: ICD-10-CM

## 2024-05-09 DIAGNOSIS — I82.409 ACUTE EMBOLISM AND THROMBOSIS OF UNSPECIFIED DEEP VEINS OF UNSPECIFIED LOWER EXTREMITY: ICD-10-CM

## 2024-05-09 LAB
ALBUMIN SERPL ELPH-MCNC: 2.3 G/DL — LOW (ref 3.3–5)
ALP SERPL-CCNC: 75 U/L — SIGNIFICANT CHANGE UP (ref 40–120)
ALT FLD-CCNC: 8 U/L — LOW (ref 10–45)
ANION GAP SERPL CALC-SCNC: 10 MMOL/L — SIGNIFICANT CHANGE UP (ref 5–17)
AST SERPL-CCNC: 22 U/L — SIGNIFICANT CHANGE UP (ref 10–40)
BASOPHILS # BLD AUTO: 0 K/UL — SIGNIFICANT CHANGE UP (ref 0–0.2)
BASOPHILS NFR BLD AUTO: 0 % — SIGNIFICANT CHANGE UP (ref 0–2)
BILIRUB SERPL-MCNC: 0.3 MG/DL — SIGNIFICANT CHANGE UP (ref 0.2–1.2)
BUN SERPL-MCNC: 7 MG/DL — SIGNIFICANT CHANGE UP (ref 7–23)
CALCIUM SERPL-MCNC: 8.3 MG/DL — LOW (ref 8.4–10.5)
CHLORIDE SERPL-SCNC: 105 MMOL/L — SIGNIFICANT CHANGE UP (ref 96–108)
CO2 SERPL-SCNC: 27 MMOL/L — SIGNIFICANT CHANGE UP (ref 22–31)
CREAT SERPL-MCNC: 0.45 MG/DL — LOW (ref 0.5–1.3)
EGFR: 105 ML/MIN/1.73M2 — SIGNIFICANT CHANGE UP
EOSINOPHIL # BLD AUTO: 0 K/UL — SIGNIFICANT CHANGE UP (ref 0–0.5)
EOSINOPHIL NFR BLD AUTO: 0 % — SIGNIFICANT CHANGE UP (ref 0–6)
GLUCOSE BLDC GLUCOMTR-MCNC: 101 MG/DL — HIGH (ref 70–99)
GLUCOSE BLDC GLUCOMTR-MCNC: 105 MG/DL — HIGH (ref 70–99)
GLUCOSE BLDC GLUCOMTR-MCNC: 110 MG/DL — HIGH (ref 70–99)
GLUCOSE BLDC GLUCOMTR-MCNC: 114 MG/DL — HIGH (ref 70–99)
GLUCOSE SERPL-MCNC: 102 MG/DL — HIGH (ref 70–99)
HCT VFR BLD CALC: 27.4 % — LOW (ref 34.5–45)
HGB BLD-MCNC: 8.3 G/DL — LOW (ref 11.5–15.5)
LYMPHOCYTES # BLD AUTO: 80.3 K/UL — HIGH (ref 1–3.3)
LYMPHOCYTES # BLD AUTO: 93.1 % — HIGH (ref 13–44)
MAGNESIUM SERPL-MCNC: 2.2 MG/DL — SIGNIFICANT CHANGE UP (ref 1.6–2.6)
MANUAL SMEAR VERIFICATION: SIGNIFICANT CHANGE UP
MCHC RBC-ENTMCNC: 28.9 PG — SIGNIFICANT CHANGE UP (ref 27–34)
MCHC RBC-ENTMCNC: 30.3 GM/DL — LOW (ref 32–36)
MCV RBC AUTO: 95.5 FL — SIGNIFICANT CHANGE UP (ref 80–100)
MONOCYTES # BLD AUTO: 0.86 K/UL — SIGNIFICANT CHANGE UP (ref 0–0.9)
MONOCYTES NFR BLD AUTO: 1 % — LOW (ref 2–14)
NEUTROPHILS # BLD AUTO: 5.09 K/UL — SIGNIFICANT CHANGE UP (ref 1.8–7.4)
NEUTROPHILS NFR BLD AUTO: 5.9 % — LOW (ref 43–77)
PHOSPHATE SERPL-MCNC: 3.8 MG/DL — SIGNIFICANT CHANGE UP (ref 2.5–4.5)
PLAT MORPH BLD: NORMAL — SIGNIFICANT CHANGE UP
PLATELET # BLD AUTO: 192 K/UL — SIGNIFICANT CHANGE UP (ref 150–400)
POTASSIUM SERPL-MCNC: 4.3 MMOL/L — SIGNIFICANT CHANGE UP (ref 3.5–5.3)
POTASSIUM SERPL-SCNC: 4.3 MMOL/L — SIGNIFICANT CHANGE UP (ref 3.5–5.3)
PROT SERPL-MCNC: 5.6 G/DL — LOW (ref 6–8.3)
RBC # BLD: 2.87 M/UL — LOW (ref 3.8–5.2)
RBC # FLD: 19.7 % — HIGH (ref 10.3–14.5)
RBC BLD AUTO: SIGNIFICANT CHANGE UP
SMUDGE CELLS # BLD: PRESENT — SIGNIFICANT CHANGE UP
SODIUM SERPL-SCNC: 142 MMOL/L — SIGNIFICANT CHANGE UP (ref 135–145)
WBC # BLD: 86.25 K/UL — CRITICAL HIGH (ref 3.8–10.5)
WBC # FLD AUTO: 86.25 K/UL — CRITICAL HIGH (ref 3.8–10.5)

## 2024-05-09 PROCEDURE — 99233 SBSQ HOSP IP/OBS HIGH 50: CPT

## 2024-05-09 RX ORDER — CHLORHEXIDINE GLUCONATE 213 G/1000ML
1 SOLUTION TOPICAL EVERY 12 HOURS
Refills: 0 | Status: DISCONTINUED | OUTPATIENT
Start: 2024-05-09 | End: 2024-05-20

## 2024-05-09 RX ADMIN — DROXIDOPA 100 MILLIGRAM(S): 100 CAPSULE ORAL at 05:13

## 2024-05-09 RX ADMIN — Medication 3 MILLILITER(S): at 05:51

## 2024-05-09 RX ADMIN — ENOXAPARIN SODIUM 110 MILLIGRAM(S): 100 INJECTION SUBCUTANEOUS at 21:51

## 2024-05-09 RX ADMIN — POLYETHYLENE GLYCOL 3350 17 GRAM(S): 17 POWDER, FOR SOLUTION ORAL at 17:50

## 2024-05-09 RX ADMIN — ENOXAPARIN SODIUM 110 MILLIGRAM(S): 100 INJECTION SUBCUTANEOUS at 10:54

## 2024-05-09 RX ADMIN — Medication 3 MILLILITER(S): at 00:59

## 2024-05-09 RX ADMIN — Medication 1 TABLET(S): at 05:14

## 2024-05-09 RX ADMIN — CHLORHEXIDINE GLUCONATE 15 MILLILITER(S): 213 SOLUTION TOPICAL at 17:50

## 2024-05-09 RX ADMIN — Medication 3 MILLILITER(S): at 13:28

## 2024-05-09 RX ADMIN — Medication 1 MILLIGRAM(S): at 05:14

## 2024-05-09 RX ADMIN — DROXIDOPA 100 MILLIGRAM(S): 100 CAPSULE ORAL at 12:10

## 2024-05-09 RX ADMIN — Medication 3 MILLILITER(S): at 22:48

## 2024-05-09 RX ADMIN — Medication 1 TABLET(S): at 17:50

## 2024-05-09 RX ADMIN — QUETIAPINE FUMARATE 100 MILLIGRAM(S): 200 TABLET, FILM COATED ORAL at 21:51

## 2024-05-09 RX ADMIN — DROXIDOPA 100 MILLIGRAM(S): 100 CAPSULE ORAL at 17:50

## 2024-05-09 RX ADMIN — Medication 1 MILLIGRAM(S): at 17:50

## 2024-05-09 RX ADMIN — CHLORHEXIDINE GLUCONATE 1 APPLICATION(S): 213 SOLUTION TOPICAL at 05:15

## 2024-05-09 RX ADMIN — MIDODRINE HYDROCHLORIDE 30 MILLIGRAM(S): 2.5 TABLET ORAL at 21:52

## 2024-05-09 RX ADMIN — MIDODRINE HYDROCHLORIDE 30 MILLIGRAM(S): 2.5 TABLET ORAL at 14:42

## 2024-05-09 RX ADMIN — QUETIAPINE FUMARATE 25 MILLIGRAM(S): 200 TABLET, FILM COATED ORAL at 14:42

## 2024-05-09 RX ADMIN — CHLORHEXIDINE GLUCONATE 15 MILLILITER(S): 213 SOLUTION TOPICAL at 05:14

## 2024-05-09 RX ADMIN — QUETIAPINE FUMARATE 25 MILLIGRAM(S): 200 TABLET, FILM COATED ORAL at 05:13

## 2024-05-09 RX ADMIN — CHLORHEXIDINE GLUCONATE 1 APPLICATION(S): 213 SOLUTION TOPICAL at 17:51

## 2024-05-09 NOTE — PROGRESS NOTE ADULT - PROBLEM SELECTOR PLAN 2
on admission WBC count 233  - transferred to [facility] for possible leukophoresis  - as per heme/onc - underlying CLL (Oct 2023) with reactive lymphocytosis 2/2 sepsis  - heme/onc deffered leukophoresis due to mature lymphocytes  - IVIG given 4/26/24  - CLL under control and plan for outpatient follow up at [facility] when patient closer to d/c

## 2024-05-09 NOTE — PROGRESS NOTE ADULT - NS ATTEND AMEND GEN_ALL_CORE FT
Pt is a 69F with MHx CVA, COPD, CHF, and HTN initially presenting to [facility] on 4/16/24 from OSH for hyperleukocytosis concerning for acute leukemia c/b acute hypoxemic respiratory failure and septic shock 2/2 Strep pneumonia bacteremia i/s/o pneumonia with empyema s/p chest tube placement x2 with MISTII with failure to extubation from MV s/p tracheostomy placement now transferred to RCU on 5/8 for further medical management.     Pt now at mental status baseline, she is able to communicate spontaneously and express needs/concerns. Pt with physical debility and functional dependence i/s/o critical illness with hx CVA and wheelchair/cane dependence. PT/OT consulted. Pt further with hx anxiety/MDD with passive SI. [facility] Psych Team consulted on this admission, initiated on seroquel +clonazepam. Will continue and titrate as per  Team. No further SI thoughts.     Pt p/w acute hypoxemic respiratory failure 2/2 strep pneumonia empyema c/b bacteremia s/p chest tube x2 with MIST therapy with appropriate evacuation of empyema. Pt with failure to wean from ventilator s/p tracheostomy placement. Now tolerating SBTs and TC QD x3 days. Plan to complete 4 week course of abx (through 5/14). Airway clearance therapy in place. Trach care and suctioning. Oxygen supplementation for goal O2 saturation 90-95%.     Pt with oropharyngeal dysphagia s/p PEG placement (5/7, GI now tolerating feeds at goal rate. Bowel regimen prn. No acute renal or endocrine issues. Stress hyperglycemia i/s/o DMII better controlled now. Will c/w ISS with BGFS monitoring.     Pt found with new CLL c/b leukocytosis likely reactive. Hematology following, no indication for acute exchange therapy. Will CTM with daily CBCs, now downtrending. Pt will likely need further evaluation/treatment following resolution of acute infectious period. Full dose Lovenox in place for LLL DVT.    Dispo pending medical optimization. Pt full code. Palliative consulted and recs appreciated. Pt amenable to trial of slow weaning but would not want life long dependence on life support. Pt expressing interest in chaplaincy today, will place referral. Pt is a 69F with MHx CVA, COPD, CHF, and HTN initially presenting to [facility] on 4/16/24 from OSH for hyperleukocytosis concerning for acute leukemia c/b acute hypoxemic respiratory failure and septic shock 2/2 Strep pneumonia bacteremia i/s/o pneumonia with empyema s/p chest tube placement x2 with MISTII with failure to extubation from MV s/p tracheostomy placement now transferred to RCU on 5/8 for further medical management.     Pt now at mental status baseline, she is able to communicate spontaneously and express needs/concerns. Pt with physical debility and functional dependence i/s/o critical illness with hx CVA and wheelchair/cane dependence. PT/OT consulted. Pt further with hx anxiety/MDD with passive SI. [facility] Psych Team consulted on this admission, initiated on seroquel +clonazepam. Will continue and titrate as per  Team. No further SI thoughts.     Pt p/w acute hypoxemic respiratory failure 2/2 strep pneumonia empyema c/b bacteremia s/p chest tube x2 with MIST therapy with appropriate evacuation of empyema. Pt with failure to wean from ventilator s/p tracheostomy placement. Now tolerating SBTs and TC QD x2 days. Plan to complete 4 week course of abx (through 5/14). Airway clearance therapy in place. Trach care and suctioning. Oxygen supplementation for goal O2 saturation 90-95%.     Pt with oropharyngeal dysphagia s/p PEG placement (5/7, GI now tolerating feeds at goal rate. Bowel regimen prn. No acute renal or endocrine issues. Stress hyperglycemia i/s/o DMII better controlled now. Will c/w ISS with BGFS monitoring.     Pt found with new CLL c/b leukocytosis likely reactive. Hematology following, no indication for acute exchange therapy. Will CTM with daily CBCs, now downtrending. Pt will likely need further evaluation/treatment following resolution of acute infectious period. Full dose Lovenox in place for LLL DVT.    Dispo pending medical optimization. Pt full code. Palliative consulted and recs appreciated. Pt amenable to trial of slow weaning but would not want life long dependence on life support. Pt expressing interest in chaplaincy today, will place referral.

## 2024-05-09 NOTE — PROGRESS NOTE ADULT - PROBLEM SELECTOR PLAN 3
intubated on arrival to [facility] ED for respiratory tiring and AMS  - unable to be extubated  - s/p trach 4/28  - mechanical vent: Volume Ctrl 14/460/5/40%  - weaning as tolerated  - aggressive airway management with duonebs, chest PT and suctioning PRN

## 2024-05-09 NOTE — PROGRESS NOTE ADULT - ASSESSMENT
69F h/o CVA (bedbound at baseline), COPD, CHF, HTN who initially p/w acute shortness of breath to outside ED and was treated for acute pulmonary edema with sublingual nitro x 2, Lasix, and BiPAP. Pt was also found to be febrile to 103, so treated for PNA. BIPAP led to improvement in O2 to 89-90. Pt transferred to [facility] on 4/16/24 for white count of 233 and possible leukophoresis. In the ED, pt intubated iso respiratory tiring and decreasing mental status, and also was started on levophed for hypotension. Following intubation and initiation of pressors, she was admitted to the MICU for AHRF and septic shock.  In MICU, heme was consulted for hyperleukocytosis, however due to mature lymphocytic leukophoresis was not performed.  S/p chest tube and pigtail (4/24-4/27) for persistent loculated effusion.  S/p treatment with ceftriaxone (4/18-4/25) for s. pneumo bacteremia and empyema, continuing with augmentin PO x4 weeks with completion on 5/14.  S/p PEG 5/7.  Transitioned off IV pressors and on midodrine and droxidopa.  Transferred to RCU 5/9.  Weaning as tolerated.  Aggressive airway management with duonebs, chest PT and suctioning PRN.  69F h/o CVA (bedbound at baseline), COPD, CHF, HTN who initially p/w acute shortness of breath to outside ED and was treated for acute pulmonary edema with sublingual nitro x 2, Lasix, and BiPAP. Pt was also found to be febrile to 103, so treated for PNA. BIPAP led to improvement in O2 to 89-90. Pt transferred to [facility] on 4/16/24 for white count of 233 and possible leukophoresis. In the ED, pt intubated iso respiratory tiring and decreasing mental status, and also was started on levophed for hypotension. Following intubation and initiation of pressors, she was admitted to the MICU for AHRF and septic shock.  In MICU, heme was consulted for hyperleukocytosis, however due to mature lymphocytic leukophoresis was not performed.  Course c/b empyema s/p chest tube placement (4/16) and removal, reaccumulation of loculated pleural effusion s/p L pigtail placement and removal on 4/27.   S/p treatment with ceftriaxone (4/18-4/25) for s. pneumo bacteremia and empyema, continuing with augmentin PO x4 weeks with completion on 5/14.  S/p PEG 5/7.  Transitioned off IV pressors and on midodrine and droxidopa.  Transferred to RCU 5/9.  Weaning as tolerated.  Aggressive airway management with duonebs, chest PT and suctioning PRN.

## 2024-05-09 NOTE — PROGRESS NOTE ADULT - PROBLEM SELECTOR PLAN 1
presented with shortness of breath   - treated for acute pulm edema with lasix, SL nitro x2 and bipap  - POCUS - dense LLL consolidation that contains dynamic air bronchograms consistent with PNA with moderate to large pleural effusion  - s/p chest tube and pigtail 4/24-4/27  - treated with ceftriaxone 4/18-4/25 for s. pneumo and empyema  - continuing with augmentin PO x4 weeks (completion date 5/14) as per ID

## 2024-05-09 NOTE — PROGRESS NOTE ADULT - PROBLEM SELECTOR PLAN 4
hypotensive on admission   - required pressors in ICU  - now on midodrine and droxidopa  - wean as tolerated, add antiHTN as needed  - monitor BP

## 2024-05-09 NOTE — PROVIDER CONTACT NOTE (OTHER) - ASSESSMENT
patient noted to be lethargic with shallow breathing. patient endorses some difficulty breathing. patient mildly tachycardic to 105, currently on tc 30%. vss

## 2024-05-09 NOTE — PROGRESS NOTE ADULT - SUBJECTIVE AND OBJECTIVE BOX
Patient is a 69y old  Female who presents with a chief complaint of Transfer for leukophoresis (08 May 2024 15:01)      Interval Events:    REVIEW OF SYSTEMS:  [ ] Positive  [ ] All other systems negative  [ ] Unable to assess ROS because ________    Vital Signs Last 24 Hrs  T(C): 37.1 (05-09-24 @ 05:33), Max: 37.9 (05-08-24 @ 15:00)  T(F): 98.7 (05-09-24 @ 05:33), Max: 100.2 (05-08-24 @ 15:00)  HR: 93 (05-09-24 @ 05:33) (75 - 102)  BP: 146/86 (05-09-24 @ 05:33) (94/44 - 151/64)  RR: 18 (05-09-24 @ 05:33) (16 - 37)  SpO2: 97% (05-09-24 @ 05:33) (96% - 100%)    PHYSICAL EXAM:  HEENT:   [ ]Tracheostomy:  [ ]Pupils equal  [ ]No oral lesions  [ ]Abnormal    SKIN  [ ]No Rash  [ ] Abnormal  [ ] pressure    CARDIAC  [ ]Regular  [ ]Abnormal    PULMONARY  [ ]Bilateral Clear Breath Sounds  [ ]Normal Excursion  [ ]Abnormal    GI  [ ]PEG      [ ] +BS		              [ ]Soft, nondistended, nontender	  [ ]Abnormal    MUSCULOSKELETAL                                   [ ]Bedbound                 [ ]Abnormal    [ ]Ambulatory/OOB to chair                           EXTREMITIES                                         [ ]Normal  [ ]Edema                           NEUROLOGIC  [ ] Normal, non focal  [ ] Focal findings:    PSYCHIATRIC  [ ]Alert and appropriate  [ ] Sedated	 [ ]Agitated    :  Andino: [ ] Yes, if yes: Date of Placement:                   [  ] No    LINES: Central Lines [ ] Yes, if yes: Date of Placement                                     [  ] No    HOSPITAL MEDICATIONS:  MEDICATIONS  (STANDING):  albuterol/ipratropium for Nebulization 3 milliLiter(s) Nebulizer every 8 hours  amoxicillin  875 milliGRAM(s)/clavulanate 1 Tablet(s) Oral every 12 hours  chlorhexidine 0.12% Liquid 15 milliLiter(s) Oral Mucosa every 12 hours  chlorhexidine 4% Liquid 1 Application(s) Topical every 12 hours  clonazePAM  Tablet 1 milliGRAM(s) Oral every 12 hours  droxidopa 100 milliGRAM(s) Oral three times a day  enoxaparin Injectable 110 milliGRAM(s) SubCutaneous every 12 hours  insulin lispro (ADMELOG) corrective regimen sliding scale   SubCutaneous every 6 hours  midodrine 30 milliGRAM(s) Oral three times a day  polyethylene glycol 3350 17 Gram(s) Oral every 12 hours  QUEtiapine 100 milliGRAM(s) Oral at bedtime  QUEtiapine 25 milliGRAM(s) Oral three times a day  senna Syrup 10 milliLiter(s) Oral at bedtime    MEDICATIONS  (PRN):  acetaminophen   Oral Liquid .. 650 milliGRAM(s) Oral every 6 hours PRN Moderate Pain (4 - 6)  acetaminophen   Oral Liquid .. 650 milliGRAM(s) Oral every 6 hours PRN Temp greater or equal to 38C (100.4F)  nystatin Powder 1 Application(s) Topical two times a day PRN skin irritation      LABS:                        8.3    86.25 )-----------( 192      ( 09 May 2024 06:39 )             27.4     05-09    142  |  105  |  7   ----------------------------<  102<H>  4.3   |  27  |  0.45<L>    Ca    8.3<L>      09 May 2024 06:38  Phos  3.8     05-09  Mg     2.2     05-09    TPro  5.6<L>  /  Alb  2.3<L>  /  TBili  0.3  /  DBili  x   /  AST  22  /  ALT  8<L>  /  AlkPhos  75  05-09    PT/INR - ( 07 May 2024 23:59 )   PT: 12.4 sec;   INR: 1.13 ratio         PTT - ( 07 May 2024 23:59 )  PTT:28.9 sec  Urinalysis Basic - ( 09 May 2024 06:38 )    Color: x / Appearance: x / SG: x / pH: x  Gluc: 102 mg/dL / Ketone: x  / Bili: x / Urobili: x   Blood: x / Protein: x / Nitrite: x   Leuk Esterase: x / RBC: x / WBC x   Sq Epi: x / Non Sq Epi: x / Bacteria: x      Arterial Blood Gas:  05-07 @ 23:55  7.44/43/93/29/99.4/4.6  ABG lactate: --    Venous Blood Gas:  05-08 @ 00:07  7.41/46/80/29/96.9  VBG Lactate: 0.5    CAPILLARY BLOOD GLUCOSE    MICROBIOLOGY:     RADIOLOGY:  [ ] Reviewed and interpreted by me    Mode: AC/ CMV (Assist Control/ Continuous Mandatory Ventilation)  RR (machine): 14  TV (machine): 460  FiO2: 40  PEEP: 5  ITime: 0.98  MAP: 12  PIP: 26   Patient is a 69y old  Female who presents with a chief complaint of Transfer for leukophoresis (08 May 2024 15:01)      Interval Events:  Received pt overnight from MICU.                  No acute events overnight.     REVIEW OF SYSTEMS:  [ ] Positive  [X] All other systems negative  [ ] Unable to assess ROS because ________    Vital Signs Last 24 Hrs  T(C): 37.1 (05-09-24 @ 05:33), Max: 37.9 (05-08-24 @ 15:00)  T(F): 98.7 (05-09-24 @ 05:33), Max: 100.2 (05-08-24 @ 15:00)  HR: 93 (05-09-24 @ 05:33) (75 - 102)  BP: 146/86 (05-09-24 @ 05:33) (94/44 - 151/64)  RR: 18 (05-09-24 @ 05:33) (16 - 37)  SpO2: 97% (05-09-24 @ 05:33) (96% - 100%)    PHYSICAL EXAM:  HEENT:   [X]Tracheostomy: #8 cuffed portex  [X]Pupils equal  [ ]No oral lesions  [ ]Abnormal    SKIN  [ ]No Rash  [X] Abnormal: perineal IAD, BL breast ITD  [ ] pressure - BL buttocks/sacral DTI, right heel DTI    CARDIAC  [X]Regular  [ ]Abnormal    PULMONARY  [X]Bilateral Clear Breath Sounds  [ ]Normal Excursion  [ ]Abnormal    GI  [X]PEG      [X] +BS		              [X]Soft, nondistended, nontender	  [ ]Abnormal    MUSCULOSKELETAL                                   [X]Bedbound                 [ ]Abnormal    [ ]Ambulatory/OOB to chair                           EXTREMITIES                                         [X]Normal  [ ]Edema                           NEUROLOGIC  [ ] Normal, non focal  [X] Focal findings: awake and alert, mouthing words, able to write, follows commands on all extremities L>R    PSYCHIATRIC  [X]Alert and appropriate  [ ] Sedated	 [ ]Agitated    :  Andino: [ ] Yes, if yes: Date of Placement:                   [X] No    LINES: Central Lines [ ] Yes, if yes: Date of Placement                                     [X] No    HOSPITAL MEDICATIONS:  MEDICATIONS  (STANDING):  albuterol/ipratropium for Nebulization 3 milliLiter(s) Nebulizer every 8 hours  amoxicillin  875 milliGRAM(s)/clavulanate 1 Tablet(s) Oral every 12 hours  chlorhexidine 0.12% Liquid 15 milliLiter(s) Oral Mucosa every 12 hours  chlorhexidine 4% Liquid 1 Application(s) Topical every 12 hours  clonazePAM  Tablet 1 milliGRAM(s) Oral every 12 hours  droxidopa 100 milliGRAM(s) Oral three times a day  enoxaparin Injectable 110 milliGRAM(s) SubCutaneous every 12 hours  insulin lispro (ADMELOG) corrective regimen sliding scale   SubCutaneous every 6 hours  midodrine 30 milliGRAM(s) Oral three times a day  polyethylene glycol 3350 17 Gram(s) Oral every 12 hours  QUEtiapine 100 milliGRAM(s) Oral at bedtime  QUEtiapine 25 milliGRAM(s) Oral three times a day  senna Syrup 10 milliLiter(s) Oral at bedtime    MEDICATIONS  (PRN):  acetaminophen   Oral Liquid .. 650 milliGRAM(s) Oral every 6 hours PRN Moderate Pain (4 - 6)  acetaminophen   Oral Liquid .. 650 milliGRAM(s) Oral every 6 hours PRN Temp greater or equal to 38C (100.4F)  nystatin Powder 1 Application(s) Topical two times a day PRN skin irritation    LABS:                        8.3    86.25 )-----------( 192      ( 09 May 2024 06:39 )             27.4     05-09    142  |  105  |  7   ----------------------------<  102<H>  4.3   |  27  |  0.45<L>    Ca    8.3<L>      09 May 2024 06:38  Phos  3.8     05-09  Mg     2.2     05-09    TPro  5.6<L>  /  Alb  2.3<L>  /  TBili  0.3  /  DBili  x   /  AST  22  /  ALT  8<L>  /  AlkPhos  75  05-09    PT/INR - ( 07 May 2024 23:59 )   PT: 12.4 sec;   INR: 1.13 ratio      PTT - ( 07 May 2024 23:59 )  PTT:28.9 sec  Urinalysis Basic - ( 09 May 2024 06:38 )    Color: x / Appearance: x / SG: x / pH: x  Gluc: 102 mg/dL / Ketone: x  / Bili: x / Urobili: x   Blood: x / Protein: x / Nitrite: x   Leuk Esterase: x / RBC: x / WBC x   Sq Epi: x / Non Sq Epi: x / Bacteria: x    Arterial Blood Gas:  05-07 @ 23:55  7.44/43/93/29/99.4/4.6  ABG lactate: --    Venous Blood Gas:  05-08 @ 00:07  7.41/46/80/29/96.9  VBG Lactate: 0.5    CAPILLARY BLOOD GLUCOSE    MICROBIOLOGY:     RADIOLOGY:  [ ] Reviewed and interpreted by me    Mode: AC/ CMV (Assist Control/ Continuous Mandatory Ventilation)  RR (machine): 14  TV (machine): 460  FiO2: 40  PEEP: 5  ITime: 0.98  MAP: 12  PIP: 26

## 2024-05-09 NOTE — PROGRESS NOTE ADULT - PROBLEM SELECTOR PLAN 5
- 4/24 BL LE doppler - nonocclusive left common femoral vein DVT  - 4/24 BL UE doppler - acute left basilic SVT, no acute DVT  - continue with lovenox

## 2024-05-10 LAB
ALBUMIN SERPL ELPH-MCNC: 2.4 G/DL — LOW (ref 3.3–5)
ALP SERPL-CCNC: 77 U/L — SIGNIFICANT CHANGE UP (ref 40–120)
ALT FLD-CCNC: 5 U/L — LOW (ref 10–45)
ANION GAP SERPL CALC-SCNC: 9 MMOL/L — SIGNIFICANT CHANGE UP (ref 5–17)
AST SERPL-CCNC: 15 U/L — SIGNIFICANT CHANGE UP (ref 10–40)
BILIRUB SERPL-MCNC: 0.3 MG/DL — SIGNIFICANT CHANGE UP (ref 0.2–1.2)
BUN SERPL-MCNC: 8 MG/DL — SIGNIFICANT CHANGE UP (ref 7–23)
CALCIUM SERPL-MCNC: 8.1 MG/DL — LOW (ref 8.4–10.5)
CHLORIDE SERPL-SCNC: 106 MMOL/L — SIGNIFICANT CHANGE UP (ref 96–108)
CO2 SERPL-SCNC: 28 MMOL/L — SIGNIFICANT CHANGE UP (ref 22–31)
CREAT SERPL-MCNC: 0.45 MG/DL — LOW (ref 0.5–1.3)
EGFR: 105 ML/MIN/1.73M2 — SIGNIFICANT CHANGE UP
GLUCOSE BLDC GLUCOMTR-MCNC: 102 MG/DL — HIGH (ref 70–99)
GLUCOSE BLDC GLUCOMTR-MCNC: 113 MG/DL — HIGH (ref 70–99)
GLUCOSE SERPL-MCNC: 89 MG/DL — SIGNIFICANT CHANGE UP (ref 70–99)
HCT VFR BLD CALC: 25.3 % — LOW (ref 34.5–45)
HGB BLD-MCNC: 7.9 G/DL — LOW (ref 11.5–15.5)
MAGNESIUM SERPL-MCNC: 2.4 MG/DL — SIGNIFICANT CHANGE UP (ref 1.6–2.6)
MCHC RBC-ENTMCNC: 28.9 PG — SIGNIFICANT CHANGE UP (ref 27–34)
MCHC RBC-ENTMCNC: 31.2 GM/DL — LOW (ref 32–36)
MCV RBC AUTO: 92.7 FL — SIGNIFICANT CHANGE UP (ref 80–100)
NRBC # BLD: 0 /100 WBCS — SIGNIFICANT CHANGE UP (ref 0–0)
PHOSPHATE SERPL-MCNC: 3.3 MG/DL — SIGNIFICANT CHANGE UP (ref 2.5–4.5)
PLATELET # BLD AUTO: 171 K/UL — SIGNIFICANT CHANGE UP (ref 150–400)
POTASSIUM SERPL-MCNC: 4.3 MMOL/L — SIGNIFICANT CHANGE UP (ref 3.5–5.3)
POTASSIUM SERPL-SCNC: 4.3 MMOL/L — SIGNIFICANT CHANGE UP (ref 3.5–5.3)
PROT SERPL-MCNC: 5.3 G/DL — LOW (ref 6–8.3)
RBC # BLD: 2.73 M/UL — LOW (ref 3.8–5.2)
RBC # FLD: 20.1 % — HIGH (ref 10.3–14.5)
SODIUM SERPL-SCNC: 143 MMOL/L — SIGNIFICANT CHANGE UP (ref 135–145)
WBC # BLD: 83.86 K/UL — CRITICAL HIGH (ref 3.8–10.5)
WBC # FLD AUTO: 83.86 K/UL — CRITICAL HIGH (ref 3.8–10.5)

## 2024-05-10 PROCEDURE — 99232 SBSQ HOSP IP/OBS MODERATE 35: CPT

## 2024-05-10 PROCEDURE — 99231 SBSQ HOSP IP/OBS SF/LOW 25: CPT

## 2024-05-10 PROCEDURE — 71045 X-RAY EXAM CHEST 1 VIEW: CPT | Mod: 26

## 2024-05-10 RX ORDER — QUETIAPINE FUMARATE 200 MG/1
150 TABLET, FILM COATED ORAL AT BEDTIME
Refills: 0 | Status: DISCONTINUED | OUTPATIENT
Start: 2024-05-10 | End: 2024-05-16

## 2024-05-10 RX ORDER — QUETIAPINE FUMARATE 200 MG/1
25 TABLET, FILM COATED ORAL EVERY 6 HOURS
Refills: 0 | Status: DISCONTINUED | OUTPATIENT
Start: 2024-05-10 | End: 2024-05-20

## 2024-05-10 RX ORDER — ACETAMINOPHEN 500 MG
1000 TABLET ORAL ONCE
Refills: 0 | Status: COMPLETED | OUTPATIENT
Start: 2024-05-10 | End: 2024-05-10

## 2024-05-10 RX ADMIN — Medication 1 MILLIGRAM(S): at 17:30

## 2024-05-10 RX ADMIN — CHLORHEXIDINE GLUCONATE 1 APPLICATION(S): 213 SOLUTION TOPICAL at 17:30

## 2024-05-10 RX ADMIN — MIDODRINE HYDROCHLORIDE 30 MILLIGRAM(S): 2.5 TABLET ORAL at 05:51

## 2024-05-10 RX ADMIN — POLYETHYLENE GLYCOL 3350 17 GRAM(S): 17 POWDER, FOR SOLUTION ORAL at 05:52

## 2024-05-10 RX ADMIN — POLYETHYLENE GLYCOL 3350 17 GRAM(S): 17 POWDER, FOR SOLUTION ORAL at 17:29

## 2024-05-10 RX ADMIN — QUETIAPINE FUMARATE 25 MILLIGRAM(S): 200 TABLET, FILM COATED ORAL at 05:51

## 2024-05-10 RX ADMIN — DROXIDOPA 100 MILLIGRAM(S): 100 CAPSULE ORAL at 11:18

## 2024-05-10 RX ADMIN — Medication 3 MILLILITER(S): at 21:30

## 2024-05-10 RX ADMIN — Medication 1 TABLET(S): at 05:50

## 2024-05-10 RX ADMIN — ENOXAPARIN SODIUM 110 MILLIGRAM(S): 100 INJECTION SUBCUTANEOUS at 22:06

## 2024-05-10 RX ADMIN — QUETIAPINE FUMARATE 150 MILLIGRAM(S): 200 TABLET, FILM COATED ORAL at 21:22

## 2024-05-10 RX ADMIN — ENOXAPARIN SODIUM 110 MILLIGRAM(S): 100 INJECTION SUBCUTANEOUS at 10:59

## 2024-05-10 RX ADMIN — MIDODRINE HYDROCHLORIDE 30 MILLIGRAM(S): 2.5 TABLET ORAL at 21:22

## 2024-05-10 RX ADMIN — DROXIDOPA 100 MILLIGRAM(S): 100 CAPSULE ORAL at 05:51

## 2024-05-10 RX ADMIN — CHLORHEXIDINE GLUCONATE 15 MILLILITER(S): 213 SOLUTION TOPICAL at 05:50

## 2024-05-10 RX ADMIN — QUETIAPINE FUMARATE 25 MILLIGRAM(S): 200 TABLET, FILM COATED ORAL at 13:00

## 2024-05-10 RX ADMIN — CHLORHEXIDINE GLUCONATE 1 APPLICATION(S): 213 SOLUTION TOPICAL at 05:51

## 2024-05-10 RX ADMIN — CHLORHEXIDINE GLUCONATE 15 MILLILITER(S): 213 SOLUTION TOPICAL at 17:30

## 2024-05-10 RX ADMIN — Medication 1 MILLIGRAM(S): at 05:51

## 2024-05-10 RX ADMIN — Medication 3 MILLILITER(S): at 13:33

## 2024-05-10 RX ADMIN — Medication 400 MILLIGRAM(S): at 17:31

## 2024-05-10 RX ADMIN — DROXIDOPA 100 MILLIGRAM(S): 100 CAPSULE ORAL at 17:30

## 2024-05-10 RX ADMIN — Medication 1 TABLET(S): at 17:30

## 2024-05-10 RX ADMIN — Medication 3 MILLILITER(S): at 05:47

## 2024-05-10 NOTE — SPEAKING VALVE EVALUATION - OBSERVATIONS
Pt encountered in bed, awake/alert, +PEG. Ox3 & able to follow directives. Initially endorsing some anxiety, stating "I don't want to die"; however, seemed to improve over course of session. Phonation noted with digital occlusion. Scant amount of creamy secretions suctioned from hub of trach prior to PMV placement.

## 2024-05-10 NOTE — SPEAKING VALVE EVALUATION - SLP PERTINENT HISTORY OF CURRENT PROBLEM
69F h/o CVA (bedbound at baseline), COPD, CHF, HTN who initially p/w acute shortness of breath to outside ED and was treated for acute pulmonary edema with sublingual nitro x 2, Lasix, and BiPAP. Pt was also found to be febrile to 103, so treated for PNA. BIPAP led to improvement in O2 to 89-90. Pt transferred to [facility] on 4/16/24 for white count of 233 and possible leukophoresis. In the ED, pt intubated iso respiratory tiring and decreasing mental status, and also was started on levophed for hypotension. Following intubation and initiation of pressors, she was admitted to the MICU for AHRF and septic shock.  In MICU, heme was consulted for hyperleukocytosis, however due to mature lymphocytic leukophoresis was not performed.  Course c/b empyema s/p chest tube placement (4/16) and removal, reaccumulation of loculated pleural effusion s/p L pigtail placement and removal on 4/27. Due to inability to be extubated, tracheostomy was performed on 4/28, although pt remained on ventilator.

## 2024-05-10 NOTE — PROGRESS NOTE ADULT - PROBLEM SELECTOR PLAN 3
intubated on arrival to [facility] ED for respiratory tiring and AMS  - unable to be extubated  - s/p trach 4/28  - mechanical vent: Volume Ctrl 14/460/5/40%  - weaning as tolerated  - aggressive airway management with duonebs, chest PT and suctioning PRN

## 2024-05-10 NOTE — PROGRESS NOTE ADULT - PROBLEM SELECTOR PLAN 2
on admission WBC count 233  - transferred to [facility] for possible leukophoresis  - as per heme/onc - underlying CLL (Oct 2023) with reactive lymphocytosis 2/2 sepsis  - heme/onc deffered leukophoresis due to mature lymphocytes  - IVIG given 4/26/24  - CLL under control and plan for outpatient follow up at [facility] when patient closer to d/c

## 2024-05-10 NOTE — BH CONSULTATION LIAISON PROGRESS NOTE - NSBHFUPINTERVALHXFT_PSY_A_CORE
pt still reports some anxiety during the day, panic attacks. denies si/hi, still feels depressed, better sleep. denies psyhosis, kimmie. no prns given.

## 2024-05-10 NOTE — PROGRESS NOTE ADULT - NS ATTEND AMEND GEN_ALL_CORE FT
Pt is a 69F with MHx CVA, COPD, CHF, and HTN initially presenting to [facility] on 4/16/24 from OSH for hyperleukocytosis concerning for acute leukemia c/b acute hypoxemic respiratory failure and septic shock 2/2 Strep pneumonia bacteremia i/s/o pneumonia with empyema s/p chest tube placement x2 with MISTII with failure to extubation from MV s/p tracheostomy placement now transferred to [facility]CU on 5/8 for further medical management.     Pt now at mental status baseline, she is able to communicate spontaneously and express needs/concerns. Pt with physical debility and functional dependence i/s/o critical illness with hx CVA and wheelchair/cane dependence. PT/OT consulted. Pt further with hx anxiety/MDD with passive SI. [facility] Psych Team consulted on this admission, initiated on seroquel +clonazepam. Will continue and titrate as per  Team. No further SI thoughts.     Pt p/w acute hypoxemic respiratory failure 2/2 strep pneumonia empyema c/b bacteremia s/p chest tube x2 with MIST therapy with appropriate evacuation of empyema. Pt with failure to wean from ventilator s/p tracheostomy placement. Now tolerating SBTs and TC QD x3 days. SLP evaluation for PMV today. (-) air trapping with trach occlusion with (+) phonation although hoarse. Plan to complete 4 week course of abx (through 5/14). Airway clearance therapy in place. Trach care and suctioning. Oxygen supplementation for goal O2 saturation 90-95%.     Pt with oropharyngeal dysphagia s/p PEG placement (5/7, GI now tolerating feeds at goal rate. Bowel regimen prn. No acute renal or endocrine issues. Stress hyperglycemia i/s/o DMII better controlled now. Will c/w ISS with BGFS monitoring.     Pt found with new CLL c/b leukocytosis likely reactive. Hematology following, no indication for acute exchange therapy. Will CTM with daily CBCs, now downtrending. Pt will likely need further evaluation/treatment following resolution of acute infectious period. Full dose Lovenox in place for LLL DVT.    Dispo pending medical optimization. Pt full code. Palliative consulted and recs appreciated. Pt amenable to trial of slow weaning but would not want life long dependence on life support. Pt expressing interest in chaplaincy today, will place referral.

## 2024-05-10 NOTE — SPEAKING VALVE EVALUATION - SPECIFY REASON(S)
To assess candidacy for passy eliot speaking valve usage To assess candidacy for passy eliot speaking valve (PMV) usage

## 2024-05-10 NOTE — PROGRESS NOTE ADULT - SUBJECTIVE AND OBJECTIVE BOX
Patient is a 69y old  Female who presents with a chief complaint of Transfer for leukophoresis (09 May 2024 08:25)      Interval Events:    REVIEW OF SYSTEMS:  [ ] Positive  [ ] All other systems negative  [ ] Unable to assess ROS because ________    Vital Signs Last 24 Hrs  T(C): 37.7 (05-10-24 @ 04:48), Max: 37.8 (05-09-24 @ 21:58)  T(F): 99.9 (05-10-24 @ 04:48), Max: 100.1 (05-10-24 @ 00:00)  HR: 96 (05-10-24 @ 06:36) (84 - 108)  BP: 114/56 (05-10-24 @ 04:48) (100/55 - 121/64)  RR: 16 (05-10-24 @ 06:36) (14 - 20)  SpO2: 100% (05-10-24 @ 06:36) (94% - 100%)    PHYSICAL EXAM:  HEENT:   [ ]Tracheostomy:  [ ]Pupils equal  [ ]No oral lesions  [ ]Abnormal    SKIN  [ ]No Rash  [ ] Abnormal  [ ] pressure    CARDIAC  [ ]Regular  [ ]Abnormal    PULMONARY  [ ]Bilateral Clear Breath Sounds  [ ]Normal Excursion  [ ]Abnormal    GI  [ ]PEG      [ ] +BS		              [ ]Soft, nondistended, nontender	  [ ]Abnormal    MUSCULOSKELETAL                                   [ ]Bedbound                 [ ]Abnormal    [ ]Ambulatory/OOB to chair                           EXTREMITIES                                         [ ]Normal  [ ]Edema                           NEUROLOGIC  [ ] Normal, non focal  [ ] Focal findings:    PSYCHIATRIC  [ ]Alert and appropriate  [ ] Sedated	 [ ]Agitated    :  Andino: [ ] Yes, if yes: Date of Placement:                   [  ] No    LINES: Central Lines [ ] Yes, if yes: Date of Placement                                     [  ] No    HOSPITAL MEDICATIONS:  MEDICATIONS  (STANDING):  albuterol/ipratropium for Nebulization 3 milliLiter(s) Nebulizer every 8 hours  amoxicillin  875 milliGRAM(s)/clavulanate 1 Tablet(s) Oral every 12 hours  chlorhexidine 0.12% Liquid 15 milliLiter(s) Oral Mucosa every 12 hours  chlorhexidine 4% Liquid 1 Application(s) Topical every 12 hours  clonazePAM  Tablet 1 milliGRAM(s) Oral every 12 hours  droxidopa 100 milliGRAM(s) Oral three times a day  enoxaparin Injectable 110 milliGRAM(s) SubCutaneous every 12 hours  insulin lispro (ADMELOG) corrective regimen sliding scale   SubCutaneous every 6 hours  midodrine 30 milliGRAM(s) Oral three times a day  polyethylene glycol 3350 17 Gram(s) Oral every 12 hours  QUEtiapine 100 milliGRAM(s) Oral at bedtime  QUEtiapine 25 milliGRAM(s) Oral three times a day  senna Syrup 10 milliLiter(s) Oral at bedtime    MEDICATIONS  (PRN):  acetaminophen   Oral Liquid .. 650 milliGRAM(s) Oral every 6 hours PRN Moderate Pain (4 - 6)  acetaminophen   Oral Liquid .. 650 milliGRAM(s) Oral every 6 hours PRN Temp greater or equal to 38C (100.4F)  nystatin Powder 1 Application(s) Topical two times a day PRN skin irritation      LABS:                        7.9    83.86 )-----------( 171      ( 10 May 2024 06:35 )             25.3     05-10    143  |  106  |  8   ----------------------------<  89  4.3   |  28  |  0.45<L>    Ca    8.1<L>      10 May 2024 06:35  Phos  3.3     05-10  Mg     2.4     05-10    TPro  5.3<L>  /  Alb  2.4<L>  /  TBili  0.3  /  DBili  x   /  AST  15  /  ALT  5<L>  /  AlkPhos  77  05-10      Urinalysis Basic - ( 10 May 2024 06:35 )    Color: x / Appearance: x / SG: x / pH: x  Gluc: 89 mg/dL / Ketone: x  / Bili: x / Urobili: x   Blood: x / Protein: x / Nitrite: x   Leuk Esterase: x / RBC: x / WBC x   Sq Epi: x / Non Sq Epi: x / Bacteria: x          CAPILLARY BLOOD GLUCOSE    MICROBIOLOGY:     RADIOLOGY:  [ ] Reviewed and interpreted by me    Mode: AC/ CMV (Assist Control/ Continuous Mandatory Ventilation)  RR (machine): 14  TV (machine): 460  FiO2: 40  PEEP: 5  ITime: 1  MAP: 10  PIP: 27   Patient is a 69y old  Female who presents with a chief complaint of Transfer for leukophoresis (09 May 2024 08:25)      Interval Events:  No acute events overnight.     REVIEW OF SYSTEMS:  [ ] Positive  [X] All other systems negative  [ ] Unable to assess ROS because ________    Vital Signs Last 24 Hrs  T(C): 37.7 (05-10-24 @ 04:48), Max: 37.8 (05-09-24 @ 21:58)  T(F): 99.9 (05-10-24 @ 04:48), Max: 100.1 (05-10-24 @ 00:00)  HR: 96 (05-10-24 @ 06:36) (84 - 108)  BP: 114/56 (05-10-24 @ 04:48) (100/55 - 121/64)  RR: 16 (05-10-24 @ 06:36) (14 - 20)  SpO2: 100% (05-10-24 @ 06:36) (94% - 100%)    PHYSICAL EXAM:  HEENT:   [X]Tracheostomy: #8 cuffed portex  [X]Pupils equal  [ ]No oral lesions  [ ]Abnormal    SKIN  [ ]No Rash  [X] Abnormal: perineal IAD, BL breast ITD  [ ] pressure - BL buttocks/sacral DTI, right heel DTI    CARDIAC  [X]Regular  [ ]Abnormal    PULMONARY  [X]Bilateral Clear Breath Sounds  [ ]Normal Excursion  [ ]Abnormal    GI  [X]PEG      [X] +BS		              [X]Soft, nondistended, nontender	  [ ]Abnormal    MUSCULOSKELETAL                                   [X]Bedbound                 [ ]Abnormal    [ ]Ambulatory/OOB to chair                           EXTREMITIES                                         [X]Normal  [ ]Edema                           NEUROLOGIC  [ ] Normal, non focal  [X] Focal findings: awake and alert, mouthing words, able to write, follows commands on all extremities L>R    PSYCHIATRIC  [X]Alert and appropriate  [ ] Sedated	 [ ]Agitated    :  Andino: [ ] Yes, if yes: Date of Placement:                   [X] No    LINES: Central Lines [ ] Yes, if yes: Date of Placement                                     [X] No    HOSPITAL MEDICATIONS:  MEDICATIONS  (STANDING):  albuterol/ipratropium for Nebulization 3 milliLiter(s) Nebulizer every 8 hours  amoxicillin  875 milliGRAM(s)/clavulanate 1 Tablet(s) Oral every 12 hours  chlorhexidine 0.12% Liquid 15 milliLiter(s) Oral Mucosa every 12 hours  chlorhexidine 4% Liquid 1 Application(s) Topical every 12 hours  clonazePAM  Tablet 1 milliGRAM(s) Oral every 12 hours  droxidopa 100 milliGRAM(s) Oral three times a day  enoxaparin Injectable 110 milliGRAM(s) SubCutaneous every 12 hours  insulin lispro (ADMELOG) corrective regimen sliding scale   SubCutaneous every 6 hours  midodrine 30 milliGRAM(s) Oral three times a day  polyethylene glycol 3350 17 Gram(s) Oral every 12 hours  QUEtiapine 100 milliGRAM(s) Oral at bedtime  QUEtiapine 25 milliGRAM(s) Oral three times a day  senna Syrup 10 milliLiter(s) Oral at bedtime    MEDICATIONS  (PRN):  acetaminophen   Oral Liquid .. 650 milliGRAM(s) Oral every 6 hours PRN Moderate Pain (4 - 6)  acetaminophen   Oral Liquid .. 650 milliGRAM(s) Oral every 6 hours PRN Temp greater or equal to 38C (100.4F)  nystatin Powder 1 Application(s) Topical two times a day PRN skin irritation    LABS:                        7.9    83.86 )-----------( 171      ( 10 May 2024 06:35 )             25.3     05-10    143  |  106  |  8   ----------------------------<  89  4.3   |  28  |  0.45<L>    Ca    8.1<L>      10 May 2024 06:35  Phos  3.3     05-10  Mg     2.4     05-10    TPro  5.3<L>  /  Alb  2.4<L>  /  TBili  0.3  /  DBili  x   /  AST  15  /  ALT  5<L>  /  AlkPhos  77  05-10    Urinalysis Basic - ( 10 May 2024 06:35 )    Color: x / Appearance: x / SG: x / pH: x  Gluc: 89 mg/dL / Ketone: x  / Bili: x / Urobili: x   Blood: x / Protein: x / Nitrite: x   Leuk Esterase: x / RBC: x / WBC x   Sq Epi: x / Non Sq Epi: x / Bacteria: x    CAPILLARY BLOOD GLUCOSE    MICROBIOLOGY:     RADIOLOGY:  [ ] Reviewed and interpreted by me    Mode: AC/ CMV (Assist Control/ Continuous Mandatory Ventilation)  RR (machine): 14  TV (machine): 460  FiO2: 40  PEEP: 5  ITime: 1  MAP: 10  PIP: 27

## 2024-05-10 NOTE — BH CONSULTATION LIAISON PROGRESS NOTE - NSBHCHARTREVIEWLAB_PSY_A_CORE FT
7.9    83.86 )-----------( 171      ( 10 May 2024 06:35 )             25.3     05-10    143  |  106  |  8   ----------------------------<  89  4.3   |  28  |  0.45<L>    Ca    8.1<L>      10 May 2024 06:35  Phos  3.3     05-10  Mg     2.4     05-10    TPro  5.3<L>  /  Alb  2.4<L>  /  TBili  0.3  /  DBili  x   /  AST  15  /  ALT  5<L>  /  AlkPhos  77  05-10

## 2024-05-10 NOTE — PROGRESS NOTE ADULT - ASSESSMENT
69F h/o CVA (bedbound at baseline), COPD, CHF, HTN who initially p/w acute shortness of breath to outside ED and was treated for acute pulmonary edema with sublingual nitro x 2, Lasix, and BiPAP. Pt was also found to be febrile to 103, so treated for PNA. BIPAP led to improvement in O2 to 89-90. Pt transferred to [facility] on 4/16/24 for white count of 233 and possible leukophoresis. In the ED, pt intubated iso respiratory tiring and decreasing mental status, and also was started on levophed for hypotension. Following intubation and initiation of pressors, she was admitted to the MICU for AHRF and septic shock.  In MICU, heme was consulted for hyperleukocytosis, however due to mature lymphocytic leukophoresis was not performed.  Course c/b empyema s/p chest tube placement (4/16) and removal, reaccumulation of loculated pleural effusion s/p L pigtail placement and removal on 4/27.   S/p treatment with ceftriaxone (4/18-4/25) for s. pneumo bacteremia and empyema, continuing with augmentin PO x4 weeks with completion on 5/14.  S/p PEG 5/7.  Transitioned off IV pressors and on midodrine and droxidopa.  Transferred to [facility] 5/9.  Weaning as tolerated.  Aggressive airway management with duonebs, chest PT and suctioning PRN.  69F h/o CVA (bedbound at baseline), COPD, CHF, HTN who initially p/w acute shortness of breath to outside ED and was treated for acute pulmonary edema with sublingual nitro x 2, Lasix, and BiPAP. Pt was also found to be febrile to 103, so treated for PNA. BIPAP led to improvement in O2 to 89-90. Pt transferred to [place] on 4/16/24 for white count of 233 and possible leukophoresis. In the ED, pt intubated iso respiratory tiring and decreasing mental status, and also was started on levophed for hypotension. Following intubation and initiation of pressors, she was admitted to the MICU for AHRF and septic shock.  In MICU, heme was consulted for hyperleukocytosis, however due to mature lymphocytic leukophoresis was not performed.  Course c/b empyema s/p chest tube placement (4/16) and removal, reaccumulation of loculated pleural effusion s/p L pigtail placement and removal on 4/27.   S/p treatment with ceftriaxone (4/18-4/25) for s. pneumo bacteremia and empyema, continuing with augmentin PO x4 weeks with completion on 5/14.  S/p PEG 5/7.  Transitioned off IV pressors and on midodrine and droxidopa.  Transferred to [facility] 5/9.  Weaning as tolerated.  Aggressive airway management with duonebs, chest PT and suctioning PRN.       5/10:  Speech eval today, tolerated for 15 minutes and then became tachypneic - speech will continue to follow.  Tolerating TC throughout the day but will return to vent at night.  Seen by  and reported a lot of anxiety, meds adjusted.   69F h/o CVA (bedbound at baseline), COPD, CHF, HTN who initially p/w acute shortness of breath to outside ED and was treated for acute pulmonary edema with sublingual nitro x 2, Lasix, and BiPAP. Pt was also found to be febrile to 103, so treated for PNA. BIPAP led to improvement in O2 to 89-90. Pt transferred to [place] on 4/16/24 for white count of 233 and possible leukophoresis. In the ED, pt intubated iso respiratory tiring and decreasing mental status, and also was started on levophed for hypotension. Following intubation and initiation of pressors, she was admitted to the MICU for AHRF and septic shock.  In MICU, heme was consulted for hyperleukocytosis, however due to mature lymphocytic leukophoresis was not performed.  Course c/b empyema s/p chest tube placement (4/16) and removal, reaccumulation of loculated pleural effusion s/p L pigtail placement and removal on 4/27.   S/p treatment with ceftriaxone (4/18-4/25) for s. pneumo bacteremia and empyema, continuing with augmentin PO x4 weeks with completion on 5/14.  S/p PEG 5/7.  Transitioned off IV pressors and on midodrine and droxidopa.  Transferred to [facility] 5/9.  Weaning as tolerated.  Aggressive airway management with duonebs, chest PT and suctioning PRN.       5/10:  Speech eval today, tolerated for 15 minutes and then became tachypneic - speech will continue to follow.  Tolerating TC throughout the day, placed back on full support for tachypnea, tachycardia and hypoxia.  Seen by  and reported a lot of anxiety, meds adjusted.  Febrile to 101 later in evening, infectious workup ordered. 69F h/o CVA (bedbound at baseline), COPD, CHF, HTN who initially p/w acute shortness of breath to outside ED and was treated for acute pulmonary edema with sublingual nitro x 2, Lasix, and BiPAP. Pt was also found to be febrile to 103, so treated for PNA. BIPAP led to improvement in O2 to 89-90. Pt transferred to [place] on 4/16/24 for white count of 233 and possible leukophoresis. In the ED, pt intubated iso respiratory tiring and decreasing mental status, and also was started on levophed for hypotension. Following intubation and initiation of pressors, she was admitted to the MICU for AHRF and septic shock.  In MICU, heme was consulted for hyperleukocytosis, however due to mature lymphocytic leukophoresis was not performed.  Course c/b empyema s/p chest tube placement (4/16) and removal, reaccumulation of loculated pleural effusion s/p L pigtail placement and removal on 4/27.   S/p treatment with ceftriaxone (4/18-4/25) for s. pneumo bacteremia and empyema, continuing with augmentin PO x4 weeks with completion on 5/14.  S/p PEG 5/7.  Transitioned off IV pressors and on midodrine and droxidopa.  Transferred to [facility] 5/9.  Weaning as tolerated.  Aggressive airway management with duonebs, chest PT and suctioning PRN.       5/10:  Speech eval today, tolerated for 15 minutes and then became tachypneic - speech will continue to follow.  Tolerating TC throughout the day, placed back on full support for tachypnea, tachycardia and hypoxia.  Seen by  and reported a lot of anxiety, meds adjusted.  Febrile to 101 later in evening, infectious workup pending - one set of blood cultures drawn, pt refusing 2nd set.

## 2024-05-10 NOTE — PROVIDER CONTACT NOTE (OTHER) - ASSESSMENT
patient placed back to full vent support from tc 30% 2/2 increased work of breathing, tachycardic to 125, desatting briefly to 81

## 2024-05-10 NOTE — PROVIDER CONTACT NOTE (OTHER) - ASSESSMENT
oral temperature 101, , /102. satting 99% on full vent support after being placed back to vent from trach collar 30% at around 1630 2/2 increased work of breathing, NP at bedside and was aware oral temperature 101, , /102. satting 99% on full vent support

## 2024-05-11 LAB
ALBUMIN SERPL ELPH-MCNC: 2.6 G/DL — LOW (ref 3.3–5)
ALP SERPL-CCNC: 72 U/L — SIGNIFICANT CHANGE UP (ref 40–120)
ALT FLD-CCNC: 7 U/L — LOW (ref 10–45)
ANION GAP SERPL CALC-SCNC: 9 MMOL/L — SIGNIFICANT CHANGE UP (ref 5–17)
APPEARANCE UR: CLEAR — SIGNIFICANT CHANGE UP
AST SERPL-CCNC: 12 U/L — SIGNIFICANT CHANGE UP (ref 10–40)
BILIRUB SERPL-MCNC: 0.3 MG/DL — SIGNIFICANT CHANGE UP (ref 0.2–1.2)
BILIRUB UR-MCNC: NEGATIVE — SIGNIFICANT CHANGE UP
BUN SERPL-MCNC: 9 MG/DL — SIGNIFICANT CHANGE UP (ref 7–23)
CALCIUM SERPL-MCNC: 8.6 MG/DL — SIGNIFICANT CHANGE UP (ref 8.4–10.5)
CHLORIDE SERPL-SCNC: 104 MMOL/L — SIGNIFICANT CHANGE UP (ref 96–108)
CO2 SERPL-SCNC: 29 MMOL/L — SIGNIFICANT CHANGE UP (ref 22–31)
COLOR SPEC: YELLOW — SIGNIFICANT CHANGE UP
CREAT SERPL-MCNC: 0.44 MG/DL — LOW (ref 0.5–1.3)
CULTURE RESULTS: NO GROWTH — SIGNIFICANT CHANGE UP
CULTURE RESULTS: SIGNIFICANT CHANGE UP
CULTURE RESULTS: SIGNIFICANT CHANGE UP
DIFF PNL FLD: NEGATIVE — SIGNIFICANT CHANGE UP
EGFR: 105 ML/MIN/1.73M2 — SIGNIFICANT CHANGE UP
GLUCOSE SERPL-MCNC: 82 MG/DL — SIGNIFICANT CHANGE UP (ref 70–99)
GLUCOSE UR QL: NEGATIVE MG/DL — SIGNIFICANT CHANGE UP
GRAM STN FLD: SIGNIFICANT CHANGE UP
HCT VFR BLD CALC: 26 % — LOW (ref 34.5–45)
HGB BLD-MCNC: 8 G/DL — LOW (ref 11.5–15.5)
KETONES UR-MCNC: NEGATIVE MG/DL — SIGNIFICANT CHANGE UP
LEUKOCYTE ESTERASE UR-ACNC: NEGATIVE — SIGNIFICANT CHANGE UP
MAGNESIUM SERPL-MCNC: 2.4 MG/DL — SIGNIFICANT CHANGE UP (ref 1.6–2.6)
MCHC RBC-ENTMCNC: 29.4 PG — SIGNIFICANT CHANGE UP (ref 27–34)
MCHC RBC-ENTMCNC: 30.8 GM/DL — LOW (ref 32–36)
MCV RBC AUTO: 95.6 FL — SIGNIFICANT CHANGE UP (ref 80–100)
NITRITE UR-MCNC: NEGATIVE — SIGNIFICANT CHANGE UP
NRBC # BLD: 0 /100 WBCS — SIGNIFICANT CHANGE UP (ref 0–0)
PH UR: 8.5 (ref 5–8)
PHOSPHATE SERPL-MCNC: 3.1 MG/DL — SIGNIFICANT CHANGE UP (ref 2.5–4.5)
PLATELET # BLD AUTO: 168 K/UL — SIGNIFICANT CHANGE UP (ref 150–400)
POTASSIUM SERPL-MCNC: 4.4 MMOL/L — SIGNIFICANT CHANGE UP (ref 3.5–5.3)
POTASSIUM SERPL-SCNC: 4.4 MMOL/L — SIGNIFICANT CHANGE UP (ref 3.5–5.3)
PROT SERPL-MCNC: 5.5 G/DL — LOW (ref 6–8.3)
PROT UR-MCNC: NEGATIVE MG/DL — SIGNIFICANT CHANGE UP
RBC # BLD: 2.72 M/UL — LOW (ref 3.8–5.2)
RBC # FLD: 19.9 % — HIGH (ref 10.3–14.5)
SODIUM SERPL-SCNC: 142 MMOL/L — SIGNIFICANT CHANGE UP (ref 135–145)
SP GR SPEC: 1.01 — SIGNIFICANT CHANGE UP (ref 1–1.03)
SPECIMEN SOURCE: SIGNIFICANT CHANGE UP
UROBILINOGEN FLD QL: 1 MG/DL — SIGNIFICANT CHANGE UP (ref 0.2–1)
WBC # BLD: 78.36 K/UL — CRITICAL HIGH (ref 3.8–10.5)
WBC # FLD AUTO: 78.36 K/UL — CRITICAL HIGH (ref 3.8–10.5)

## 2024-05-11 PROCEDURE — 99233 SBSQ HOSP IP/OBS HIGH 50: CPT

## 2024-05-11 RX ORDER — CLONAZEPAM 1 MG
0.5 TABLET ORAL ONCE
Refills: 0 | Status: DISCONTINUED | OUTPATIENT
Start: 2024-05-11 | End: 2024-05-11

## 2024-05-11 RX ORDER — CLONAZEPAM 1 MG
1.5 TABLET ORAL EVERY 12 HOURS
Refills: 0 | Status: DISCONTINUED | OUTPATIENT
Start: 2024-05-11 | End: 2024-05-18

## 2024-05-11 RX ADMIN — DROXIDOPA 100 MILLIGRAM(S): 100 CAPSULE ORAL at 05:50

## 2024-05-11 RX ADMIN — QUETIAPINE FUMARATE 25 MILLIGRAM(S): 200 TABLET, FILM COATED ORAL at 05:50

## 2024-05-11 RX ADMIN — DROXIDOPA 100 MILLIGRAM(S): 100 CAPSULE ORAL at 17:40

## 2024-05-11 RX ADMIN — POLYETHYLENE GLYCOL 3350 17 GRAM(S): 17 POWDER, FOR SOLUTION ORAL at 17:40

## 2024-05-11 RX ADMIN — CHLORHEXIDINE GLUCONATE 1 APPLICATION(S): 213 SOLUTION TOPICAL at 17:41

## 2024-05-11 RX ADMIN — Medication 3 MILLILITER(S): at 05:03

## 2024-05-11 RX ADMIN — Medication 3 MILLILITER(S): at 21:00

## 2024-05-11 RX ADMIN — Medication 1 MILLIGRAM(S): at 05:50

## 2024-05-11 RX ADMIN — SENNA PLUS 10 MILLILITER(S): 8.6 TABLET ORAL at 21:54

## 2024-05-11 RX ADMIN — CHLORHEXIDINE GLUCONATE 1 APPLICATION(S): 213 SOLUTION TOPICAL at 05:51

## 2024-05-11 RX ADMIN — Medication 1.5 MILLIGRAM(S): at 17:40

## 2024-05-11 RX ADMIN — CHLORHEXIDINE GLUCONATE 15 MILLILITER(S): 213 SOLUTION TOPICAL at 05:50

## 2024-05-11 RX ADMIN — Medication 3 MILLILITER(S): at 14:53

## 2024-05-11 RX ADMIN — Medication 1 TABLET(S): at 17:40

## 2024-05-11 RX ADMIN — QUETIAPINE FUMARATE 150 MILLIGRAM(S): 200 TABLET, FILM COATED ORAL at 21:54

## 2024-05-11 RX ADMIN — QUETIAPINE FUMARATE 25 MILLIGRAM(S): 200 TABLET, FILM COATED ORAL at 21:55

## 2024-05-11 RX ADMIN — Medication 0.5 MILLIGRAM(S): at 08:45

## 2024-05-11 RX ADMIN — ENOXAPARIN SODIUM 110 MILLIGRAM(S): 100 INJECTION SUBCUTANEOUS at 21:57

## 2024-05-11 RX ADMIN — Medication 650 MILLIGRAM(S): at 06:31

## 2024-05-11 RX ADMIN — MIDODRINE HYDROCHLORIDE 30 MILLIGRAM(S): 2.5 TABLET ORAL at 05:50

## 2024-05-11 RX ADMIN — NYSTATIN CREAM 1 APPLICATION(S): 100000 CREAM TOPICAL at 05:49

## 2024-05-11 RX ADMIN — ENOXAPARIN SODIUM 110 MILLIGRAM(S): 100 INJECTION SUBCUTANEOUS at 09:56

## 2024-05-11 RX ADMIN — QUETIAPINE FUMARATE 25 MILLIGRAM(S): 200 TABLET, FILM COATED ORAL at 13:56

## 2024-05-11 RX ADMIN — Medication 1 TABLET(S): at 05:49

## 2024-05-11 RX ADMIN — DROXIDOPA 100 MILLIGRAM(S): 100 CAPSULE ORAL at 12:22

## 2024-05-11 RX ADMIN — Medication 650 MILLIGRAM(S): at 05:51

## 2024-05-11 NOTE — PROGRESS NOTE ADULT - PROBLEM SELECTOR PLAN 2
on admission WBC count 233  - transferred to [facility] for possible leukophoresis  - as per heme/onc - underlying CLL (Oct 2023) with reactive lymphocytosis 2/2 sepsis  - heme/onc deffered leukophoresis due to mature lymphocytes  - IVIG given 4/26/24  - CLL under control and plan for outpatient follow up at [facility] when patient closer to d/c

## 2024-05-11 NOTE — PROGRESS NOTE ADULT - PROBLEM SELECTOR PLAN 3
intubated on arrival to [facility] ED for respiratory tiring and AMS  - unable to be extubated  - s/p trach 4/28  - mechanical vent: Volume Ctrl 14/460/5/40%  - weaning as tolerated  - aggressive airway management with duonebs, chest PT and suctioning PRN

## 2024-05-11 NOTE — PROGRESS NOTE ADULT - SUBJECTIVE AND OBJECTIVE BOX
Patient is a 69y old  Female who presents with a chief complaint of Transfer for leukophoresis (10 May 2024 09:14)      Interval Events:    REVIEW OF SYSTEMS:  [ ] Positive  [ ] All other systems negative  [ ] Unable to assess ROS because ________    Vital Signs Last 24 Hrs  T(C): 37.8 (05-11-24 @ 05:40), Max: 38.3 (05-10-24 @ 17:00)  T(F): 100 (05-11-24 @ 05:40), Max: 101 (05-10-24 @ 17:00)  HR: 100 (05-11-24 @ 05:40) (88 - 125)  BP: 102/74 (05-11-24 @ 05:40) (102/74 - 135/78)  RR: 16 (05-11-24 @ 05:40) (14 - 18)  SpO2: 100% (05-11-24 @ 05:40) (95% - 100%)    PHYSICAL EXAM:  HEENT:   [ ]Tracheostomy:  [ ]Pupils equal  [ ]No oral lesions  [ ]Abnormal    SKIN  [ ]No Rash  [ ] Abnormal  [ ] pressure    CARDIAC  [ ]Regular  [ ]Abnormal    PULMONARY  [ ]Bilateral Clear Breath Sounds  [ ]Normal Excursion  [ ]Abnormal    GI  [ ]PEG      [ ] +BS		              [ ]Soft, nondistended, nontender	  [ ]Abnormal    MUSCULOSKELETAL                                   [ ]Bedbound                 [ ]Abnormal    [ ]Ambulatory/OOB to chair                           EXTREMITIES                                         [ ]Normal  [ ]Edema                           NEUROLOGIC  [ ] Normal, non focal  [ ] Focal findings:    PSYCHIATRIC  [ ]Alert and appropriate  [ ] Sedated	 [ ]Agitated    :  Sina: [ ] Yes, if yes: Date of Placement:                   [  ] No    LINES: Central Lines [ ] Yes, if yes: Date of Placement                                     [  ] No    HOSPITAL MEDICATIONS:  MEDICATIONS  (STANDING):  albuterol/ipratropium for Nebulization 3 milliLiter(s) Nebulizer every 8 hours  amoxicillin  875 milliGRAM(s)/clavulanate 1 Tablet(s) Oral every 12 hours  chlorhexidine 0.12% Liquid 15 milliLiter(s) Oral Mucosa every 12 hours  chlorhexidine 4% Liquid 1 Application(s) Topical every 12 hours  clonazePAM  Tablet 0.5 milliGRAM(s) Oral once  clonazePAM  Tablet 1.5 milliGRAM(s) Oral every 12 hours  droxidopa 100 milliGRAM(s) Oral three times a day  enoxaparin Injectable 110 milliGRAM(s) SubCutaneous every 12 hours  midodrine 30 milliGRAM(s) Oral three times a day  polyethylene glycol 3350 17 Gram(s) Oral every 12 hours  QUEtiapine 25 milliGRAM(s) Oral three times a day  QUEtiapine 150 milliGRAM(s) Oral at bedtime  senna Syrup 10 milliLiter(s) Oral at bedtime    MEDICATIONS  (PRN):  acetaminophen   Oral Liquid .. 650 milliGRAM(s) Oral every 6 hours PRN Moderate Pain (4 - 6)  acetaminophen   Oral Liquid .. 650 milliGRAM(s) Oral every 6 hours PRN Temp greater or equal to 38C (100.4F)  nystatin Powder 1 Application(s) Topical two times a day PRN skin irritation  QUEtiapine 25 milliGRAM(s) Oral every 6 hours PRN agitation      LABS:                        8.0    78.36 )-----------( 168      ( 11 May 2024 07:11 )             26.0     05-11    142  |  104  |  9   ----------------------------<  82  4.4   |  29  |  0.44<L>    Ca    8.6      11 May 2024 07:11  Phos  3.1     05-11  Mg     2.4     05-11    TPro  5.5<L>  /  Alb  2.6<L>  /  TBili  0.3  /  DBili  x   /  AST  12  /  ALT  7<L>  /  AlkPhos  72  05-11      Urinalysis Basic - ( 11 May 2024 07:11 )    Color: x / Appearance: x / SG: x / pH: x  Gluc: 82 mg/dL / Ketone: x  / Bili: x / Urobili: x   Blood: x / Protein: x / Nitrite: x   Leuk Esterase: x / RBC: x / WBC x   Sq Epi: x / Non Sq Epi: x / Bacteria: x          CAPILLARY BLOOD GLUCOSE    MICROBIOLOGY:     RADIOLOGY:  [ ] Reviewed and interpreted by me    Mode: AC/ CMV (Assist Control/ Continuous Mandatory Ventilation)  RR (machine): 14  TV (machine): 460  FiO2: 40  PEEP: 5  ITime: 1  MAP: 10  PIP: 25   Patient is a 69y old Female who presents with a chief complaint of Transfer for leukophoresis.      Interval Events: Pt with episodes of restlessness and noted to regularly hit siderails with hand to get attention.      REVIEW OF SYSTEMS:  Denies any SOB or pain.  [x] All other systems negative    Vital Signs Last 24 Hrs  T(C): 37.8 (05-11-24 @ 05:40), Max: 38.3 (05-10-24 @ 17:00)  T(F): 100 (05-11-24 @ 05:40), Max: 101 (05-10-24 @ 17:00)  HR: 100 (05-11-24 @ 05:40) (88 - 125)  BP: 102/74 (05-11-24 @ 05:40) (102/74 - 135/78)  RR: 16 (05-11-24 @ 05:40) (14 - 18)  SpO2: 100% (05-11-24 @ 05:40) (95% - 100%)    PHYSICAL EXAM:  HEENT:   [x]Tracheostomy: 8 cuffed portex- trach collar  [x]Pupils equal  [ ]No oral lesions  [ ]Abnormal    SKIN  [ ]No Rash  [ ] Abnormal  [ ] pressure- Sacral DTI    CARDIAC  [x]Regular  [ ]Abnormal    PULMONARY  [x]Bilateral Clear Breath Sounds  [ ]Normal Excursion  [ ]Abnormal    GI  [x]PEG      [x] +BS		              [ ]Soft, nondistended, nontender	  [ ]Abnormal    MUSCULOSKELETAL                                   [x]Bedbound                 [ ]Abnormal    [ ]Ambulatory/OOB to chair                           EXTREMITIES                                         [ ]Normal  [x] Edema                           NEUROLOGIC  [ ] Normal, non focal  [ ] Focal findings:    PSYCHIATRIC  [ ]Alert and appropriate  [ ] Sedated	 [ ]Agitated  Episodes of anxiety noted in AM. Calmer throughout the day.    :  Andino: [ ] Yes, if yes: Date of Placement:                   [x ] No    LINES: Central Lines [ ] Yes, if yes: Date of Placement                                     [ x ] No    HOSPITAL MEDICATIONS:  MEDICATIONS  (STANDING):  albuterol/ipratropium for Nebulization 3 milliLiter(s) Nebulizer every 8 hours  amoxicillin  875 milliGRAM(s)/clavulanate 1 Tablet(s) Oral every 12 hours  chlorhexidine 0.12% Liquid 15 milliLiter(s) Oral Mucosa every 12 hours  chlorhexidine 4% Liquid 1 Application(s) Topical every 12 hours  clonazePAM  Tablet 0.5 milliGRAM(s) Oral once  clonazePAM  Tablet 1.5 milliGRAM(s) Oral every 12 hours  droxidopa 100 milliGRAM(s) Oral three times a day  enoxaparin Injectable 110 milliGRAM(s) SubCutaneous every 12 hours  midodrine 30 milliGRAM(s) Oral three times a day  polyethylene glycol 3350 17 Gram(s) Oral every 12 hours  QUEtiapine 25 milliGRAM(s) Oral three times a day  QUEtiapine 150 milliGRAM(s) Oral at bedtime  senna Syrup 10 milliLiter(s) Oral at bedtime    MEDICATIONS  (PRN):  acetaminophen   Oral Liquid .. 650 milliGRAM(s) Oral every 6 hours PRN Moderate Pain (4 - 6)  acetaminophen   Oral Liquid .. 650 milliGRAM(s) Oral every 6 hours PRN Temp greater or equal to 38C (100.4F)  nystatin Powder 1 Application(s) Topical two times a day PRN skin irritation  QUEtiapine 25 milliGRAM(s) Oral every 6 hours PRN agitation      LABS:                        8.0    78.36 )-----------( 168      ( 11 May 2024 07:11 )             26.0     05-11    142  |  104  |  9   ----------------------------<  82  4.4   |  29  |  0.44<L>    Ca    8.6      11 May 2024 07:11  Phos  3.1     05-11  Mg     2.4     05-11    TPro  5.5<L>  /  Alb  2.6<L>  /  TBili  0.3  /  DBili  x   /  AST  12  /  ALT  7<L>  /  AlkPhos  72  05-11      Urinalysis Basic - ( 11 May 2024 07:11 )    Color: x / Appearance: x / SG: x / pH: x  Gluc: 82 mg/dL / Ketone: x  / Bili: x / Urobili: x   Blood: x / Protein: x / Nitrite: x   Leuk Esterase: x / RBC: x / WBC x   Sq Epi: x / Non Sq Epi: x / Bacteria: x        CAPILLARY BLOOD GLUCOSE    MICROBIOLOGY:     RADIOLOGY:  [ ] Reviewed and interpreted by me

## 2024-05-11 NOTE — PROGRESS NOTE ADULT - NS ATTEND AMEND GEN_ALL_CORE FT
I have made amendments to the documentation where necessary. Additional comments: Pt is a 69F with MHx CVA, COPD, CHF, and HTN initially presenting to [facility] on 4/16/24 from OSH for hyperleukocytosis concerning for acute leukemia c/b acute hypoxemic respiratory failure and septic shock 2/2 Strep pneumonia bacteremia i/s/o pneumonia with empyema s/p chest tube placement x2 with MISTII with failure to extubation from MV s/p tracheostomy placement now transferred to [facility] on 5/8 for further medical management.     Pt now at mental status baseline, she is able to communicate spontaneously and express needs/concerns. Pt with physical debility and functional dependence i/s/o critical illness with hx CVA and wheelchair/cane dependence. PT/OT consulted. Pt further with hx anxiety/MDD with passive SI.  [facility] Psych Team consulted on this admission, initiated on seroquel +clonazepam. Will continue and titrate as per  Team. No further SI thoughts.     Pt p/w acute hypoxemic respiratory failure 2/2 strep pneumonia empyema c/b bacteremia s/p chest tube x2 with MIST therapy with appropriate evacuation of empyema. Pt with failure to wean from ventilator s/p tracheostomy placement. Now tolerating SBTs and TC QD x3 days. SLP evaluation for PMV today. (-) air trapping with trach occlusion with (+) phonation although hoarse. Plan to complete 4 week course of abx (through 5/14). Airway clearance therapy in place. Trach care and suctioning. Oxygen supplementation for goal O2 saturation 90-95%.     Pt with oropharyngeal dysphagia s/p PEG placement (5/7, GI now tolerating feeds at goal rate. Bowel regimen prn. No acute renal or endocrine issues. Stress hyperglycemia i/s/o DMII better controlled now. Will c/w ISS with BGFS monitoring.     Pt found with new CLL c/b leukocytosis likely reactive. Hematology following, no indication for acute exchange therapy. Will CTM with daily CBCs, now downtrending. Pt will likely need further evaluation/treatment following resolution of acute infectious period. Full dose Lovenox in place for LLL DVT.    Dispo pending medical optimization. Pt full code. Palliative consulted and recs appreciated. Pt amenable to trial of slow weaning but would not want life long dependence on life support. Pt expressing interest in chaplaincy today, will place referral.

## 2024-05-11 NOTE — PROGRESS NOTE ADULT - ASSESSMENT
69F h/o CVA (bedbound at baseline), COPD, CHF, HTN who initially p/w acute shortness of breath to outside ED and was treated for acute pulmonary edema with sublingual nitro x 2, Lasix, and BiPAP. Pt was also found to be febrile to 103, so treated for PNA. BIPAP led to improvement in O2 to 89-90. Pt transferred to [place] on 4/16/24 for white count of 233 and possible leukophoresis. In the ED, pt intubated iso respiratory tiring and decreasing mental status, and also was started on levophed for hypotension. Following intubation and initiation of pressors, she was admitted to the MICU for AHRF and septic shock.  In MICU, heme was consulted for hyperleukocytosis, however due to mature lymphocytic leukophoresis was not performed.  Course c/b empyema s/p chest tube placement (4/16) and removal, reaccumulation of loculated pleural effusion s/p L pigtail placement and removal on 4/27.   S/p treatment with ceftriaxone (4/18-4/25) for s. pneumo bacteremia and empyema, continuing with augmentin PO x4 weeks with completion on 5/14.  S/p PEG 5/7.  Transitioned off IV pressors and on midodrine and droxidopa.  Transferred to [facility] 5/9.  Weaning as tolerated.  Aggressive airway management with duonebs, chest PT and suctioning PRN.       5/10:  Speech eval today, tolerated for 15 minutes and then became tachypneic - speech will continue to follow.  Tolerating TC throughout the day, placed back on full support for tachypnea, tachycardia and hypoxia.  Seen by  and reported a lot of anxiety, meds adjusted.  Febrile to 101 later in evening, infectious workup pending - one set of blood cultures drawn, pt refusing 2nd set.  69F h/o CVA (bedbound at baseline), COPD, CHF, HTN who initially p/w acute shortness of breath to outside ED and was treated for acute pulmonary edema with sublingual nitro x 2, Lasix, and BiPAP. Pt was also found to be febrile to 103, so treated for PNA. BIPAP led to improvement in O2 to 89-90. Pt transferred to [place] on 4/16/24 for white count of 233 and possible leukophoresis. In the ED, pt intubated iso respiratory tiring and decreasing mental status, and also was started on levophed for hypotension. Following intubation and initiation of pressors, she was admitted to the MICU for AHRF and septic shock.  In MICU, heme was consulted for hyperleukocytosis, however due to mature lymphocytic leukophoresis was not performed.  Course c/b empyema s/p chest tube placement (4/16) and removal, reaccumulation of loculated pleural effusion s/p L pigtail placement and removal on 4/27.   S/p treatment with ceftriaxone (4/18-4/25) for s. pneumo bacteremia and empyema, continuing with augmentin PO x4 weeks with completion on 5/14.  S/p PEG 5/7.  Transitioned off IV pressors and on midodrine and droxidopa.  Transferred to [facility] 5/9.  Weaning as tolerated.  Aggressive airway management with duonebs, chest PT and suctioning PRN.       5/10:  Speech eval today, tolerated for 15 minutes and then became tachypneic - speech will continue to follow.  Tolerating TC throughout the day, placed back on full support for tachypnea, tachycardia and hypoxia.  Seen by  and reported a lot of anxiety, meds adjusted.  Febrile to 101 later in evening, infectious workup pending - one set of blood cultures drawn, pt refusing 2nd set.  5/11: Anxious in AM and repeatedly banging on siderails. Increased Klonopin from 1mg BID to 1.5mg BID.

## 2024-05-12 LAB
ALBUMIN SERPL ELPH-MCNC: 2.8 G/DL — LOW (ref 3.3–5)
ALP SERPL-CCNC: 84 U/L — SIGNIFICANT CHANGE UP (ref 40–120)
ALT FLD-CCNC: 7 U/L — LOW (ref 10–45)
ANION GAP SERPL CALC-SCNC: 9 MMOL/L — SIGNIFICANT CHANGE UP (ref 5–17)
AST SERPL-CCNC: 12 U/L — SIGNIFICANT CHANGE UP (ref 10–40)
BILIRUB SERPL-MCNC: 0.3 MG/DL — SIGNIFICANT CHANGE UP (ref 0.2–1.2)
BUN SERPL-MCNC: 10 MG/DL — SIGNIFICANT CHANGE UP (ref 7–23)
CALCIUM SERPL-MCNC: 9 MG/DL — SIGNIFICANT CHANGE UP (ref 8.4–10.5)
CHLORIDE SERPL-SCNC: 103 MMOL/L — SIGNIFICANT CHANGE UP (ref 96–108)
CO2 SERPL-SCNC: 30 MMOL/L — SIGNIFICANT CHANGE UP (ref 22–31)
CREAT SERPL-MCNC: 0.46 MG/DL — LOW (ref 0.5–1.3)
CULTURE RESULTS: NO GROWTH — SIGNIFICANT CHANGE UP
EGFR: 104 ML/MIN/1.73M2 — SIGNIFICANT CHANGE UP
GLUCOSE SERPL-MCNC: 109 MG/DL — HIGH (ref 70–99)
HCT VFR BLD CALC: 28.3 % — LOW (ref 34.5–45)
HGB BLD-MCNC: 8.8 G/DL — LOW (ref 11.5–15.5)
MAGNESIUM SERPL-MCNC: 2.4 MG/DL — SIGNIFICANT CHANGE UP (ref 1.6–2.6)
MCHC RBC-ENTMCNC: 29.1 PG — SIGNIFICANT CHANGE UP (ref 27–34)
MCHC RBC-ENTMCNC: 31.1 GM/DL — LOW (ref 32–36)
MCV RBC AUTO: 93.7 FL — SIGNIFICANT CHANGE UP (ref 80–100)
NRBC # BLD: 0 /100 WBCS — SIGNIFICANT CHANGE UP (ref 0–0)
PHOSPHATE SERPL-MCNC: 3.5 MG/DL — SIGNIFICANT CHANGE UP (ref 2.5–4.5)
PLATELET # BLD AUTO: 164 K/UL — SIGNIFICANT CHANGE UP (ref 150–400)
POTASSIUM SERPL-MCNC: 4.2 MMOL/L — SIGNIFICANT CHANGE UP (ref 3.5–5.3)
POTASSIUM SERPL-SCNC: 4.2 MMOL/L — SIGNIFICANT CHANGE UP (ref 3.5–5.3)
PROT SERPL-MCNC: 6.1 G/DL — SIGNIFICANT CHANGE UP (ref 6–8.3)
RBC # BLD: 3.02 M/UL — LOW (ref 3.8–5.2)
RBC # FLD: 20 % — HIGH (ref 10.3–14.5)
SODIUM SERPL-SCNC: 142 MMOL/L — SIGNIFICANT CHANGE UP (ref 135–145)
SPECIMEN SOURCE: SIGNIFICANT CHANGE UP
WBC # BLD: 84.76 K/UL — CRITICAL HIGH (ref 3.8–10.5)
WBC # FLD AUTO: 84.76 K/UL — CRITICAL HIGH (ref 3.8–10.5)

## 2024-05-12 PROCEDURE — 93010 ELECTROCARDIOGRAM REPORT: CPT

## 2024-05-12 PROCEDURE — 99233 SBSQ HOSP IP/OBS HIGH 50: CPT

## 2024-05-12 RX ORDER — LANOLIN ALCOHOL/MO/W.PET/CERES
3 CREAM (GRAM) TOPICAL ONCE
Refills: 0 | Status: COMPLETED | OUTPATIENT
Start: 2024-05-12 | End: 2024-05-12

## 2024-05-12 RX ORDER — PHENYLEPHRINE-SHARK LIVER OIL-MINERAL OIL-PETROLATUM RECTAL OINTMENT
1 OINTMENT (GRAM) RECTAL ONCE
Refills: 0 | Status: COMPLETED | OUTPATIENT
Start: 2024-05-12 | End: 2024-05-12

## 2024-05-12 RX ADMIN — CHLORHEXIDINE GLUCONATE 1 APPLICATION(S): 213 SOLUTION TOPICAL at 17:27

## 2024-05-12 RX ADMIN — QUETIAPINE FUMARATE 150 MILLIGRAM(S): 200 TABLET, FILM COATED ORAL at 22:08

## 2024-05-12 RX ADMIN — Medication 650 MILLIGRAM(S): at 00:08

## 2024-05-12 RX ADMIN — ENOXAPARIN SODIUM 110 MILLIGRAM(S): 100 INJECTION SUBCUTANEOUS at 22:07

## 2024-05-12 RX ADMIN — Medication 1.5 MILLIGRAM(S): at 17:27

## 2024-05-12 RX ADMIN — MIDODRINE HYDROCHLORIDE 30 MILLIGRAM(S): 2.5 TABLET ORAL at 22:08

## 2024-05-12 RX ADMIN — QUETIAPINE FUMARATE 25 MILLIGRAM(S): 200 TABLET, FILM COATED ORAL at 14:37

## 2024-05-12 RX ADMIN — Medication 3 MILLILITER(S): at 05:08

## 2024-05-12 RX ADMIN — CHLORHEXIDINE GLUCONATE 15 MILLILITER(S): 213 SOLUTION TOPICAL at 17:27

## 2024-05-12 RX ADMIN — Medication 3 MILLIGRAM(S): at 01:35

## 2024-05-12 RX ADMIN — Medication 3 MILLILITER(S): at 21:13

## 2024-05-12 RX ADMIN — QUETIAPINE FUMARATE 25 MILLIGRAM(S): 200 TABLET, FILM COATED ORAL at 23:38

## 2024-05-12 RX ADMIN — Medication 3 MILLILITER(S): at 13:59

## 2024-05-12 RX ADMIN — PHENYLEPHRINE-SHARK LIVER OIL-MINERAL OIL-PETROLATUM RECTAL OINTMENT 1 APPLICATION(S): at 12:00

## 2024-05-12 RX ADMIN — QUETIAPINE FUMARATE 25 MILLIGRAM(S): 200 TABLET, FILM COATED ORAL at 05:22

## 2024-05-12 RX ADMIN — CHLORHEXIDINE GLUCONATE 1 APPLICATION(S): 213 SOLUTION TOPICAL at 05:24

## 2024-05-12 RX ADMIN — Medication 1 TABLET(S): at 17:27

## 2024-05-12 RX ADMIN — POLYETHYLENE GLYCOL 3350 17 GRAM(S): 17 POWDER, FOR SOLUTION ORAL at 17:27

## 2024-05-12 RX ADMIN — QUETIAPINE FUMARATE 25 MILLIGRAM(S): 200 TABLET, FILM COATED ORAL at 01:34

## 2024-05-12 RX ADMIN — Medication 1 TABLET(S): at 05:22

## 2024-05-12 RX ADMIN — DROXIDOPA 100 MILLIGRAM(S): 100 CAPSULE ORAL at 17:27

## 2024-05-12 RX ADMIN — CHLORHEXIDINE GLUCONATE 15 MILLILITER(S): 213 SOLUTION TOPICAL at 06:53

## 2024-05-12 RX ADMIN — DROXIDOPA 100 MILLIGRAM(S): 100 CAPSULE ORAL at 05:22

## 2024-05-12 RX ADMIN — Medication 1.5 MILLIGRAM(S): at 05:22

## 2024-05-12 RX ADMIN — ENOXAPARIN SODIUM 110 MILLIGRAM(S): 100 INJECTION SUBCUTANEOUS at 10:35

## 2024-05-12 RX ADMIN — Medication 650 MILLIGRAM(S): at 01:00

## 2024-05-12 RX ADMIN — DROXIDOPA 100 MILLIGRAM(S): 100 CAPSULE ORAL at 12:12

## 2024-05-12 RX ADMIN — SENNA PLUS 10 MILLILITER(S): 8.6 TABLET ORAL at 22:15

## 2024-05-12 NOTE — PROGRESS NOTE ADULT - ASSESSMENT
69F h/o CVA (bedbound at baseline), COPD, CHF, HTN who initially p/w acute shortness of breath to outside ED and was treated for acute pulmonary edema with sublingual nitro x 2, Lasix, and BiPAP. Pt was also found to be febrile to 103, so treated for PNA. BIPAP led to improvement in O2 to 89-90. Pt transferred to [place] on 4/16/24 for white count of 233 and possible leukophoresis. In the ED, pt intubated iso respiratory tiring and decreasing mental status, and also was started on levophed for hypotension. Following intubation and initiation of pressors, she was admitted to the MICU for AHRF and septic shock.  In MICU, heme was consulted for hyperleukocytosis, however due to mature lymphocytic leukophoresis was not performed.  Course c/b empyema s/p chest tube placement (4/16) and removal, reaccumulation of loculated pleural effusion s/p L pigtail placement and removal on 4/27.   S/p treatment with ceftriaxone (4/18-4/25) for s. pneumo bacteremia and empyema, continuing with augmentin PO x4 weeks with completion on 5/14.  S/p PEG 5/7.  Transitioned off IV pressors and on midodrine and droxidopa.  Transferred to [facility] 5/9.  Weaning as tolerated.  Aggressive airway management with duonebs, chest PT and suctioning PRN.       5/10:  Speech eval today, tolerated for 15 minutes and then became tachypneic - speech will continue to follow.  Tolerating TC throughout the day, placed back on full support for tachypnea, tachycardia and hypoxia.  Seen by  and reported a lot of anxiety, meds adjusted.  Febrile to 101 later in evening, infectious workup pending - one set of blood cultures drawn, pt refusing 2nd set.  5/11: Anxious in AM and repeatedly banging on siderails. Increased Klonopin from 1mg BID to 1.5mg BID.   69F h/o CVA (bedbound at baseline), COPD, CHF, HTN who initially p/w acute shortness of breath to outside ED and was treated for acute pulmonary edema with sublingual nitro x 2, Lasix, and BiPAP. Pt was also found to be febrile to 103, so treated for PNA. BIPAP led to improvement in O2 to 89-90. Pt transferred to [place] on 4/16/24 for white count of 233 and possible leukophoresis. In the ED, pt intubated iso respiratory tiring and decreasing mental status, and also was started on levophed for hypotension. Following intubation and initiation of pressors, she was admitted to the MICU for AHRF and septic shock.  In MICU, heme was consulted for hyperleukocytosis, however due to mature lymphocytic leukophoresis was not performed.  Course c/b empyema s/p chest tube placement (4/16) and removal, reaccumulation of loculated pleural effusion s/p L pigtail placement and removal on 4/27.   S/p treatment with ceftriaxone (4/18-4/25) for s. pneumo bacteremia and empyema, continuing with augmentin PO x4 weeks with completion on 5/14.  S/p PEG 5/7.  Transitioned off IV pressors and on midodrine and droxidopa.  Transferred to [facility] 5/9.  Weaning as tolerated.  Aggressive airway management with duonebs, chest PT and suctioning PRN.       5/12:  Tolerated TC throughout the day.

## 2024-05-12 NOTE — PROGRESS NOTE ADULT - NS ATTEND AMEND GEN_ALL_CORE FT
I have made amendments to the documentation where necessary. Additional comments: Pt is a 69F with MHx CVA, COPD, CHF, and HTN initially presenting to [facility] on 4/16/24 from OSH for hyperleukocytosis concerning for acute leukemia c/b acute hypoxemic respiratory failure and septic shock 2/2 Strep pneumonia bacteremia i/s/o pneumonia with empyema s/p chest tube placement x2 with MISTII with failure to extubation from MV s/p tracheostomy placement now transferred to [facility] on 5/8 for further medical management.     Pt now at mental status baseline, she is able to communicate spontaneously and express needs/concerns. Pt with physical debility and functional dependence i/s/o critical illness with hx CVA and wheelchair/cane dependence. PT/OT consulted. Pt further with hx anxiety/MDD with passive SI.  [facility] Psych Team consulted on this admission, initiated on seroquel +clonazepam. Will continue and titrate as per  Team. No further SI thoughts.     Pt p/w acute hypoxemic respiratory failure 2/2 strep pneumonia empyema c/b bacteremia s/p chest tube x2 with MIST therapy with appropriate evacuation of empyema. Pt with failure to wean from ventilator s/p tracheostomy placement. Now tolerating SBTs and TC QD x3 days. SLP evaluation for PMV today. (-) air trapping with trach occlusion with (+) phonation although hoarse. Plan to complete 4 week course of abx (through 5/14). Airway clearance therapy in place. Trach care and suctioning. Oxygen supplementation for goal O2 saturation 90-95%.     Pt with oropharyngeal dysphagia s/p PEG placement (5/7, GI now tolerating feeds at goal rate. Bowel regimen prn. No acute renal or endocrine issues. Stress hyperglycemia i/s/o DMII better controlled now. Will c/w ISS with BGFS monitoring.     Pt found with new CLL c/b leukocytosis likely reactive. Hematology following, no indication for acute exchange therapy. Will CTM with daily CBCs, now downtrending. Pt will likely need further evaluation/treatment following resolution of acute infectious period. Full dose Lovenox in place for LLL DVT.    Dispo pending medical optimization. Pt full code. Palliative consulted and recs appreciated. Pt amenable to trial of slow weaning but would not want life long dependence on life support. Pt expressing interest in chaplaincy today, will place referral. Pt is a 69F with MHx CVA, COPD, CHF, and HTN initially presenting to [facility] on 4/16/24 from OSH for hyperleukocytosis concerning for acute leukemia c/b acute hypoxemic respiratory failure and septic shock 2/2 Strep pneumonia bacteremia i/s/o pneumonia with empyema s/p chest tube placement x2 with MISTII with failure to extubation from MV s/p tracheostomy placement now transferred to [facility] on 5/8 for further medical management.     Pt now at mental status baseline, she is able to communicate spontaneously and express needs/concerns. Pt with physical debility and functional dependence i/s/o critical illness with hx CVA and wheelchair/cane dependence. PT/OT consulted. Pt further with hx anxiety/MDD with passive SI. [facility] Psych Team consulted on this admission, initiated on seroquel +clonazepam. Will continue and titrate as per  Team. No further SI thoughts.     Pt p/w acute hypoxemic respiratory failure 2/2 strep pneumonia empyema c/b bacteremia s/p chest tube x2 with MIST therapy with appropriate evacuation of empyema. Pt with failure to wean from ventilator s/p tracheostomy placement. Now tolerating SBTs and TC QD x3 days. SLP evaluation for PMV today. (-) air trapping with trach occlusion with (+) phonation although hoarse. Plan to complete 4 week course of abx (through 5/14). Airway clearance therapy in place. Trach care and suctioning. Oxygen supplementation for goal O2 saturation 90-95%.     Pt with oropharyngeal dysphagia s/p PEG placement (5/7, GI now tolerating feeds at goal rate. Bowel regimen prn. No acute renal or endocrine issues. Stress hyperglycemia i/s/o DMII better controlled now. Will c/w ISS with BGFS monitoring.     Pt found with new CLL c/b leukocytosis likely reactive. Hematology following, no indication for acute exchange therapy. Will CTM with daily CBCs, now downtrending. Pt will likely need further evaluation/treatment following resolution of acute infectious period. Full dose Lovenox in place for LLL DVT.    Dispo pending medical optimization. Pt full code. Palliative consulted and recs appreciated. Pt amenable to trial of slow weaning but would not want life long dependence on life support. Pt expressing interest in chaplaincy today, will place referral.

## 2024-05-12 NOTE — PROGRESS NOTE ADULT - SUBJECTIVE AND OBJECTIVE BOX
Patient is a 69y old  Female who presents with a chief complaint of Transfer for leukophoresis (11 May 2024 08:03)      Interval Events:    REVIEW OF SYSTEMS:  [ ] Positive  [ ] All other systems negative  [ ] Unable to assess ROS because ________    Vital Signs Last 24 Hrs  T(C): 37.7 (05-12-24 @ 06:00), Max: 38 (05-12-24 @ 00:00)  T(F): 99.8 (05-12-24 @ 06:00), Max: 100.4 (05-12-24 @ 00:00)  HR: 108 (05-12-24 @ 06:00) (89 - 113)  BP: 121/50 (05-12-24 @ 06:52) (93/60 - 141/68)  RR: 18 (05-12-24 @ 06:00) (18 - 19)  SpO2: 99% (05-12-24 @ 06:00) (95% - 100%)    PHYSICAL EXAM:  HEENT:   [ ]Tracheostomy:  [ ]Pupils equal  [ ]No oral lesions  [ ]Abnormal    SKIN  [ ]No Rash  [ ] Abnormal  [ ] pressure    CARDIAC  [ ]Regular  [ ]Abnormal    PULMONARY  [ ]Bilateral Clear Breath Sounds  [ ]Normal Excursion  [ ]Abnormal    GI  [ ]PEG      [ ] +BS		              [ ]Soft, nondistended, nontender	  [ ]Abnormal    MUSCULOSKELETAL                                   [ ]Bedbound                 [ ]Abnormal    [ ]Ambulatory/OOB to chair                           EXTREMITIES                                         [ ]Normal  [ ]Edema                           NEUROLOGIC  [ ] Normal, non focal  [ ] Focal findings:    PSYCHIATRIC  [ ]Alert and appropriate  [ ] Sedated	 [ ]Agitated    :  Andino: [ ] Yes, if yes: Date of Placement:                   [  ] No    LINES: Central Lines [ ] Yes, if yes: Date of Placement                                     [  ] No    HOSPITAL MEDICATIONS:  MEDICATIONS  (STANDING):  albuterol/ipratropium for Nebulization 3 milliLiter(s) Nebulizer every 8 hours  amoxicillin  875 milliGRAM(s)/clavulanate 1 Tablet(s) Oral every 12 hours  chlorhexidine 0.12% Liquid 15 milliLiter(s) Oral Mucosa every 12 hours  chlorhexidine 4% Liquid 1 Application(s) Topical every 12 hours  clonazePAM  Tablet 1.5 milliGRAM(s) Oral every 12 hours  droxidopa 100 milliGRAM(s) Oral three times a day  enoxaparin Injectable 110 milliGRAM(s) SubCutaneous every 12 hours  hemorrhoidal Ointment 1 Application(s) Rectal once  midodrine 30 milliGRAM(s) Oral three times a day  polyethylene glycol 3350 17 Gram(s) Oral every 12 hours  QUEtiapine 150 milliGRAM(s) Oral at bedtime  QUEtiapine 25 milliGRAM(s) Oral three times a day  senna Syrup 10 milliLiter(s) Oral at bedtime    MEDICATIONS  (PRN):  acetaminophen   Oral Liquid .. 650 milliGRAM(s) Oral every 6 hours PRN Moderate Pain (4 - 6)  acetaminophen   Oral Liquid .. 650 milliGRAM(s) Oral every 6 hours PRN Temp greater or equal to 38C (100.4F)  nystatin Powder 1 Application(s) Topical two times a day PRN skin irritation  QUEtiapine 25 milliGRAM(s) Oral every 6 hours PRN agitation      LABS:                        8.0    78.36 )-----------( 168      ( 11 May 2024 07:11 )             26.0     05-11    142  |  104  |  9   ----------------------------<  82  4.4   |  29  |  0.44<L>    Ca    8.6      11 May 2024 07:11  Phos  3.1     05-11  Mg     2.4     05-11    TPro  5.5<L>  /  Alb  2.6<L>  /  TBili  0.3  /  DBili  x   /  AST  12  /  ALT  7<L>  /  AlkPhos  72  05-11      Urinalysis Basic - ( 11 May 2024 07:11 )    Color: x / Appearance: x / SG: x / pH: x  Gluc: 82 mg/dL / Ketone: x  / Bili: x / Urobili: x   Blood: x / Protein: x / Nitrite: x   Leuk Esterase: x / RBC: x / WBC x   Sq Epi: x / Non Sq Epi: x / Bacteria: x          CAPILLARY BLOOD GLUCOSE    MICROBIOLOGY:     RADIOLOGY:  [ ] Reviewed and interpreted by me    Mode: AC/ CMV (Assist Control/ Continuous Mandatory Ventilation)  RR (machine): 14  TV (machine): 460  FiO2: 40  PEEP: 5  ITime: 1  MAP: 11  PIP: 26   Patient is a 69y old  Female who presents with a chief complaint of Transfer for leukophoresis (11 May 2024 08:03)      Interval Events:  No events overnight.     REVIEW OF SYSTEMS:  [ ] Positive  [X] All other systems negative  [ ] Unable to assess ROS because ________    Vital Signs Last 24 Hrs  T(C): 37.7 (05-12-24 @ 06:00), Max: 38 (05-12-24 @ 00:00)  T(F): 99.8 (05-12-24 @ 06:00), Max: 100.4 (05-12-24 @ 00:00)  HR: 108 (05-12-24 @ 06:00) (89 - 113)  BP: 121/50 (05-12-24 @ 06:52) (93/60 - 141/68)  RR: 18 (05-12-24 @ 06:00) (18 - 19)  SpO2: 99% (05-12-24 @ 06:00) (95% - 100%)    PHYSICAL EXAM:  HEENT:   [X]Tracheostomy: #8 cuffed portex  [X]Pupils equal  [ ]No oral lesions  [ ]Abnormal    SKIN  [ ]No Rash  [X] Abnormal: perineal IAD, BL breast ITD  [ ] pressure - BL buttocks/sacral DTI, right heel DTI    CARDIAC  [X]Regular  [ ]Abnormal    PULMONARY  [X]Bilateral Clear Breath Sounds  [ ]Normal Excursion  [ ]Abnormal    GI  [X]PEG      [X] +BS		              [X]Soft, nondistended, nontender	  [ ]Abnormal    MUSCULOSKELETAL                                   [X]Bedbound                 [ ]Abnormal    [ ]Ambulatory/OOB to chair                           EXTREMITIES                                         [X]Normal  [ ]Edema                           NEUROLOGIC  [ ] Normal, non focal  [X] Focal findings: awake and alert, mouthing words, able to write, follows commands on all extremities L<R    PSYCHIATRIC  [X]Alert and appropriate  [ ] Sedated	 [ ]Agitated    :  Andino: [ ] Yes, if yes: Date of Placement:                   [X] No    LINES: Central Lines [ ] Yes, if yes: Date of Placement                                     [X] No    HOSPITAL MEDICATIONS:  MEDICATIONS  (STANDING):  albuterol/ipratropium for Nebulization 3 milliLiter(s) Nebulizer every 8 hours  amoxicillin  875 milliGRAM(s)/clavulanate 1 Tablet(s) Oral every 12 hours  chlorhexidine 0.12% Liquid 15 milliLiter(s) Oral Mucosa every 12 hours  chlorhexidine 4% Liquid 1 Application(s) Topical every 12 hours  clonazePAM  Tablet 1.5 milliGRAM(s) Oral every 12 hours  droxidopa 100 milliGRAM(s) Oral three times a day  enoxaparin Injectable 110 milliGRAM(s) SubCutaneous every 12 hours  hemorrhoidal Ointment 1 Application(s) Rectal once  midodrine 30 milliGRAM(s) Oral three times a day  polyethylene glycol 3350 17 Gram(s) Oral every 12 hours  QUEtiapine 150 milliGRAM(s) Oral at bedtime  QUEtiapine 25 milliGRAM(s) Oral three times a day  senna Syrup 10 milliLiter(s) Oral at bedtime    MEDICATIONS  (PRN):  acetaminophen   Oral Liquid .. 650 milliGRAM(s) Oral every 6 hours PRN Moderate Pain (4 - 6)  acetaminophen   Oral Liquid .. 650 milliGRAM(s) Oral every 6 hours PRN Temp greater or equal to 38C (100.4F)  nystatin Powder 1 Application(s) Topical two times a day PRN skin irritation  QUEtiapine 25 milliGRAM(s) Oral every 6 hours PRN agitation    LABS:                        8.0    78.36 )-----------( 168      ( 11 May 2024 07:11 )             26.0     05-11    142  |  104  |  9   ----------------------------<  82  4.4   |  29  |  0.44<L>    Ca    8.6      11 May 2024 07:11  Phos  3.1     05-11  Mg     2.4     05-11    TPro  5.5<L>  /  Alb  2.6<L>  /  TBili  0.3  /  DBili  x   /  AST  12  /  ALT  7<L>  /  AlkPhos  72  05-11    Urinalysis Basic - ( 11 May 2024 07:11 )    Color: x / Appearance: x / SG: x / pH: x  Gluc: 82 mg/dL / Ketone: x  / Bili: x / Urobili: x   Blood: x / Protein: x / Nitrite: x   Leuk Esterase: x / RBC: x / WBC x   Sq Epi: x / Non Sq Epi: x / Bacteria: x    CAPILLARY BLOOD GLUCOSE    MICROBIOLOGY:     RADIOLOGY:  [ ] Reviewed and interpreted by me    Mode: AC/ CMV (Assist Control/ Continuous Mandatory Ventilation)  RR (machine): 14  TV (machine): 460  FiO2: 40  PEEP: 5  ITime: 1  MAP: 11  PIP: 26

## 2024-05-12 NOTE — PROGRESS NOTE ADULT - PROBLEM SELECTOR PLAN 3
intubated on arrival to [facility] ED for respiratory tiring and AMS  - unable to be extubated  - s/p trach 4/28  - mechanical vent: Volume Ctrl 14/460/5/40%  - weaning as tolerated  - aggressive airway management with duonebs, chest PT and suctioning PRN

## 2024-05-12 NOTE — PROGRESS NOTE ADULT - REASON FOR ADMISSION
Transfer for leukophoresis Soolantra Counseling: I discussed with the patients the risks of topial Soolantra. This is a medicine which decreases the number of mites and inflammation in the skin. You experience burning, stinging, eye irritation or allergic reactions.  Please call our office if you develop any problems from using this medication.

## 2024-05-12 NOTE — PROGRESS NOTE ADULT - PROBLEM SELECTOR PLAN 2
on admission WBC count 233  - transferred to [facility] for possible leukophoresis  - as per heme/onc - underlying CLL (Oct 2023) with reactive lymphocytosis 2/2 sepsis  - heme/onc deffered leukophoresis due to mature lymphocytes  - IVIG given 4/26/24  - CLL under control and plan for outpatient follow up at [facility] when patient closer to d/c

## 2024-05-13 LAB
ALBUMIN SERPL ELPH-MCNC: 2.4 G/DL — LOW (ref 3.3–5)
ALP SERPL-CCNC: 75 U/L — SIGNIFICANT CHANGE UP (ref 40–120)
ALT FLD-CCNC: 7 U/L — LOW (ref 10–45)
ANION GAP SERPL CALC-SCNC: 11 MMOL/L — SIGNIFICANT CHANGE UP (ref 5–17)
AST SERPL-CCNC: 13 U/L — SIGNIFICANT CHANGE UP (ref 10–40)
BILIRUB SERPL-MCNC: 0.3 MG/DL — SIGNIFICANT CHANGE UP (ref 0.2–1.2)
BUN SERPL-MCNC: 9 MG/DL — SIGNIFICANT CHANGE UP (ref 7–23)
CALCIUM SERPL-MCNC: 8.8 MG/DL — SIGNIFICANT CHANGE UP (ref 8.4–10.5)
CHLORIDE SERPL-SCNC: 102 MMOL/L — SIGNIFICANT CHANGE UP (ref 96–108)
CO2 SERPL-SCNC: 27 MMOL/L — SIGNIFICANT CHANGE UP (ref 22–31)
CREAT SERPL-MCNC: 0.45 MG/DL — LOW (ref 0.5–1.3)
EGFR: 105 ML/MIN/1.73M2 — SIGNIFICANT CHANGE UP
GLUCOSE BLDC GLUCOMTR-MCNC: 96 MG/DL — SIGNIFICANT CHANGE UP (ref 70–99)
GLUCOSE SERPL-MCNC: 83 MG/DL — SIGNIFICANT CHANGE UP (ref 70–99)
HCT VFR BLD CALC: 27.8 % — LOW (ref 34.5–45)
HGB BLD-MCNC: 8.8 G/DL — LOW (ref 11.5–15.5)
MAGNESIUM SERPL-MCNC: 2.3 MG/DL — SIGNIFICANT CHANGE UP (ref 1.6–2.6)
MCHC RBC-ENTMCNC: 29.6 PG — SIGNIFICANT CHANGE UP (ref 27–34)
MCHC RBC-ENTMCNC: 31.7 GM/DL — LOW (ref 32–36)
MCV RBC AUTO: 93.6 FL — SIGNIFICANT CHANGE UP (ref 80–100)
NRBC # BLD: 0 /100 WBCS — SIGNIFICANT CHANGE UP (ref 0–0)
PHOSPHATE SERPL-MCNC: 3.9 MG/DL — SIGNIFICANT CHANGE UP (ref 2.5–4.5)
PLATELET # BLD AUTO: 144 K/UL — LOW (ref 150–400)
POTASSIUM SERPL-MCNC: 4.7 MMOL/L — SIGNIFICANT CHANGE UP (ref 3.5–5.3)
POTASSIUM SERPL-SCNC: 4.7 MMOL/L — SIGNIFICANT CHANGE UP (ref 3.5–5.3)
PROT SERPL-MCNC: 5.6 G/DL — LOW (ref 6–8.3)
RBC # BLD: 2.97 M/UL — LOW (ref 3.8–5.2)
RBC # FLD: 19.6 % — HIGH (ref 10.3–14.5)
SODIUM SERPL-SCNC: 140 MMOL/L — SIGNIFICANT CHANGE UP (ref 135–145)
WBC # BLD: 79.37 K/UL — CRITICAL HIGH (ref 3.8–10.5)
WBC # FLD AUTO: 79.37 K/UL — CRITICAL HIGH (ref 3.8–10.5)

## 2024-05-13 PROCEDURE — 99233 SBSQ HOSP IP/OBS HIGH 50: CPT

## 2024-05-13 RX ADMIN — QUETIAPINE FUMARATE 25 MILLIGRAM(S): 200 TABLET, FILM COATED ORAL at 05:36

## 2024-05-13 RX ADMIN — ENOXAPARIN SODIUM 110 MILLIGRAM(S): 100 INJECTION SUBCUTANEOUS at 21:39

## 2024-05-13 RX ADMIN — Medication 3 MILLILITER(S): at 13:40

## 2024-05-13 RX ADMIN — CHLORHEXIDINE GLUCONATE 15 MILLILITER(S): 213 SOLUTION TOPICAL at 05:37

## 2024-05-13 RX ADMIN — MIDODRINE HYDROCHLORIDE 30 MILLIGRAM(S): 2.5 TABLET ORAL at 05:37

## 2024-05-13 RX ADMIN — Medication 3 MILLILITER(S): at 05:18

## 2024-05-13 RX ADMIN — CHLORHEXIDINE GLUCONATE 1 APPLICATION(S): 213 SOLUTION TOPICAL at 05:37

## 2024-05-13 RX ADMIN — Medication 1.5 MILLIGRAM(S): at 17:05

## 2024-05-13 RX ADMIN — ENOXAPARIN SODIUM 110 MILLIGRAM(S): 100 INJECTION SUBCUTANEOUS at 11:19

## 2024-05-13 RX ADMIN — Medication 3 MILLILITER(S): at 21:41

## 2024-05-13 RX ADMIN — QUETIAPINE FUMARATE 150 MILLIGRAM(S): 200 TABLET, FILM COATED ORAL at 21:39

## 2024-05-13 RX ADMIN — QUETIAPINE FUMARATE 25 MILLIGRAM(S): 200 TABLET, FILM COATED ORAL at 13:11

## 2024-05-13 RX ADMIN — Medication 1 TABLET(S): at 17:05

## 2024-05-13 RX ADMIN — DROXIDOPA 100 MILLIGRAM(S): 100 CAPSULE ORAL at 17:05

## 2024-05-13 RX ADMIN — Medication 1.5 MILLIGRAM(S): at 05:36

## 2024-05-13 RX ADMIN — Medication 1 TABLET(S): at 05:36

## 2024-05-13 RX ADMIN — CHLORHEXIDINE GLUCONATE 15 MILLILITER(S): 213 SOLUTION TOPICAL at 17:05

## 2024-05-13 RX ADMIN — DROXIDOPA 100 MILLIGRAM(S): 100 CAPSULE ORAL at 11:19

## 2024-05-13 RX ADMIN — DROXIDOPA 100 MILLIGRAM(S): 100 CAPSULE ORAL at 05:36

## 2024-05-13 RX ADMIN — MIDODRINE HYDROCHLORIDE 30 MILLIGRAM(S): 2.5 TABLET ORAL at 21:40

## 2024-05-13 RX ADMIN — QUETIAPINE FUMARATE 25 MILLIGRAM(S): 200 TABLET, FILM COATED ORAL at 23:49

## 2024-05-13 RX ADMIN — QUETIAPINE FUMARATE 25 MILLIGRAM(S): 200 TABLET, FILM COATED ORAL at 08:20

## 2024-05-13 NOTE — PROGRESS NOTE ADULT - SUBJECTIVE AND OBJECTIVE BOX
Patient is a 69y old  Female who presents with a chief complaint of Transfer for leukophoresis (12 May 2024 07:02)      Interval Events:    REVIEW OF SYSTEMS:  [ ] Positive  [ ] All other systems negative  [ ] Unable to assess ROS because ________    Vital Signs Last 24 Hrs  T(C): 36.3 (05-13-24 @ 05:30), Max: 37.1 (05-13-24 @ 00:33)  T(F): 97.3 (05-13-24 @ 05:30), Max: 98.8 (05-13-24 @ 00:33)  HR: 95 (05-13-24 @ 05:30) (89 - 113)  BP: 117/69 (05-13-24 @ 05:30) (113/59 - 128/69)  RR: 17 (05-13-24 @ 05:30) (16 - 20)  SpO2: 98% (05-13-24 @ 05:30) (94% - 100%)    PHYSICAL EXAM:  HEENT:   [ ]Tracheostomy:  [ ]Pupils equal  [ ]No oral lesions  [ ]Abnormal    SKIN  [ ]No Rash  [ ] Abnormal  [ ] pressure    CARDIAC  [ ]Regular  [ ]Abnormal    PULMONARY  [ ]Bilateral Clear Breath Sounds  [ ]Normal Excursion  [ ]Abnormal    GI  [ ]PEG      [ ] +BS		              [ ]Soft, nondistended, nontender	  [ ]Abnormal    MUSCULOSKELETAL                                   [ ]Bedbound                 [ ]Abnormal    [ ]Ambulatory/OOB to chair                           EXTREMITIES                                         [ ]Normal  [ ]Edema                           NEUROLOGIC  [ ] Normal, non focal  [ ] Focal findings:    PSYCHIATRIC  [ ]Alert and appropriate  [ ] Sedated	 [ ]Agitated    :  Andino: [ ] Yes, if yes: Date of Placement:                   [  ] No    LINES: Central Lines [ ] Yes, if yes: Date of Placement                                     [  ] No    HOSPITAL MEDICATIONS:  MEDICATIONS  (STANDING):  albuterol/ipratropium for Nebulization 3 milliLiter(s) Nebulizer every 8 hours  amoxicillin  875 milliGRAM(s)/clavulanate 1 Tablet(s) Oral every 12 hours  chlorhexidine 0.12% Liquid 15 milliLiter(s) Oral Mucosa every 12 hours  chlorhexidine 4% Liquid 1 Application(s) Topical every 12 hours  clonazePAM  Tablet 1.5 milliGRAM(s) Oral every 12 hours  droxidopa 100 milliGRAM(s) Oral three times a day  enoxaparin Injectable 110 milliGRAM(s) SubCutaneous every 12 hours  midodrine 30 milliGRAM(s) Oral three times a day  polyethylene glycol 3350 17 Gram(s) Oral every 12 hours  QUEtiapine 25 milliGRAM(s) Oral three times a day  QUEtiapine 150 milliGRAM(s) Oral at bedtime  senna Syrup 10 milliLiter(s) Oral at bedtime    MEDICATIONS  (PRN):  acetaminophen   Oral Liquid .. 650 milliGRAM(s) Oral every 6 hours PRN Temp greater or equal to 38C (100.4F)  acetaminophen   Oral Liquid .. 650 milliGRAM(s) Oral every 6 hours PRN Moderate Pain (4 - 6)  nystatin Powder 1 Application(s) Topical two times a day PRN skin irritation  QUEtiapine 25 milliGRAM(s) Oral every 6 hours PRN agitation      LABS:                        8.8    84.76 )-----------( 164      ( 12 May 2024 12:08 )             28.3     05-12    142  |  103  |  10  ----------------------------<  109<H>  4.2   |  30  |  0.46<L>    Ca    9.0      12 May 2024 12:08  Phos  3.5     05-12  Mg     2.4     05-12    TPro  6.1  /  Alb  2.8<L>  /  TBili  0.3  /  DBili  x   /  AST  12  /  ALT  7<L>  /  AlkPhos  84  05-12      Urinalysis Basic - ( 12 May 2024 12:08 )    Color: x / Appearance: x / SG: x / pH: x  Gluc: 109 mg/dL / Ketone: x  / Bili: x / Urobili: x   Blood: x / Protein: x / Nitrite: x   Leuk Esterase: x / RBC: x / WBC x   Sq Epi: x / Non Sq Epi: x / Bacteria: x          CAPILLARY BLOOD GLUCOSE    MICROBIOLOGY:     RADIOLOGY:  [ ] Reviewed and interpreted by me    Mode: AC/ CMV (Assist Control/ Continuous Mandatory Ventilation)  RR (machine): 14  TV (machine): 460  FiO2: 40  PEEP: 5  ITime: 1  MAP: 10  PIP: 26   Patient is a 69y old  Female who presents with a chief complaint of Transfer for leukophoresis (12 May 2024 07:02)      Interval Events:  No acute events overnight.     REVIEW OF SYSTEMS:  [ ] Positive  [X] All other systems negative  [ ] Unable to assess ROS because ________    Vital Signs Last 24 Hrs  T(C): 36.3 (05-13-24 @ 05:30), Max: 37.1 (05-13-24 @ 00:33)  T(F): 97.3 (05-13-24 @ 05:30), Max: 98.8 (05-13-24 @ 00:33)  HR: 95 (05-13-24 @ 05:30) (89 - 113)  BP: 117/69 (05-13-24 @ 05:30) (113/59 - 128/69)  RR: 17 (05-13-24 @ 05:30) (16 - 20)  SpO2: 98% (05-13-24 @ 05:30) (94% - 100%)    PHYSICAL EXAM:  HEENT:   [X]Tracheostomy: #8 cuffed portex  [X]Pupils equal  [ ]No oral lesions  [ ]Abnormal    SKIN  [ ]No Rash  [X] Abnormal: perineal IAD, BL breast ITD  [ ] pressure - BL buttocks/sacral DTI, right heel DTI    CARDIAC  [X]Regular  [ ]Abnormal    PULMONARY  [X]Bilateral Clear Breath Sounds  [ ]Normal Excursion  [ ]Abnormal    GI  [X]PEG      [X] +BS		              [X]Soft, nondistended, nontender	  [ ]Abnormal    MUSCULOSKELETAL                                   [X]Bedbound                 [ ]Abnormal    [ ]Ambulatory/OOB to chair                           EXTREMITIES                                         [X]Normal  [ ]Edema                           NEUROLOGIC  [ ] Normal, non focal  [X] Focal findings: awake and alert, mouthing words, able to write, follows commands on all extremities L<R    PSYCHIATRIC  [X]Alert and appropriate  [ ] Sedated	 [ ]Agitated    :  Andino: [ ] Yes, if yes: Date of Placement:                   [X] No    LINES: Central Lines [ ] Yes, if yes: Date of Placement                                     [X] No    HOSPITAL MEDICATIONS:  MEDICATIONS  (STANDING):  albuterol/ipratropium for Nebulization 3 milliLiter(s) Nebulizer every 8 hours  amoxicillin  875 milliGRAM(s)/clavulanate 1 Tablet(s) Oral every 12 hours  chlorhexidine 0.12% Liquid 15 milliLiter(s) Oral Mucosa every 12 hours  chlorhexidine 4% Liquid 1 Application(s) Topical every 12 hours  clonazePAM  Tablet 1.5 milliGRAM(s) Oral every 12 hours  droxidopa 100 milliGRAM(s) Oral three times a day  enoxaparin Injectable 110 milliGRAM(s) SubCutaneous every 12 hours  midodrine 30 milliGRAM(s) Oral three times a day  polyethylene glycol 3350 17 Gram(s) Oral every 12 hours  QUEtiapine 25 milliGRAM(s) Oral three times a day  QUEtiapine 150 milliGRAM(s) Oral at bedtime  senna Syrup 10 milliLiter(s) Oral at bedtime    MEDICATIONS  (PRN):  acetaminophen   Oral Liquid .. 650 milliGRAM(s) Oral every 6 hours PRN Temp greater or equal to 38C (100.4F)  acetaminophen   Oral Liquid .. 650 milliGRAM(s) Oral every 6 hours PRN Moderate Pain (4 - 6)  nystatin Powder 1 Application(s) Topical two times a day PRN skin irritation  QUEtiapine 25 milliGRAM(s) Oral every 6 hours PRN agitation    LABS:                        8.8    84.76 )-----------( 164      ( 12 May 2024 12:08 )             28.3     05-12    142  |  103  |  10  ----------------------------<  109<H>  4.2   |  30  |  0.46<L>    Ca    9.0      12 May 2024 12:08  Phos  3.5     05-12  Mg     2.4     05-12    TPro  6.1  /  Alb  2.8<L>  /  TBili  0.3  /  DBili  x   /  AST  12  /  ALT  7<L>  /  AlkPhos  84  05-12      Urinalysis Basic - ( 12 May 2024 12:08 )    Color: x / Appearance: x / SG: x / pH: x  Gluc: 109 mg/dL / Ketone: x  / Bili: x / Urobili: x   Blood: x / Protein: x / Nitrite: x   Leuk Esterase: x / RBC: x / WBC x   Sq Epi: x / Non Sq Epi: x / Bacteria: x    CAPILLARY BLOOD GLUCOSE    MICROBIOLOGY:     RADIOLOGY:  [ ] Reviewed and interpreted by me    Mode: AC/ CMV (Assist Control/ Continuous Mandatory Ventilation)  RR (machine): 14  TV (machine): 460  FiO2: 40  PEEP: 5  ITime: 1  MAP: 10  PIP: 26

## 2024-05-13 NOTE — PROGRESS NOTE ADULT - NS ATTEND AMEND GEN_ALL_CORE FT
Pt is a 69F with MHx CVA, COPD, CHF, and HTN initially presenting to [facility] on 4/16/24 from OSH for hyperleukocytosis concerning for acute leukemia c/b acute hypoxemic respiratory failure and septic shock 2/2 Strep pneumonia bacteremia i/s/o pneumonia with empyema s/p chest tube placement x2 with MISTII with failure to extubation from MV s/p tracheostomy placement now transferred to RCU on 5/8 for further medical management.     Neuro:  -Mental status baseline, she is able to communicate spontaneously and express needs/concerns.   -Physical debility and functional dependence i/s/o critical illness with hx CVA and wheelchair/cane dependence.   PT/OT consulted.   -Hx of anxiety/MDD with passive SI. [facility] Psych Team consulted on this admission, initiated on seroquel +clonazepam.   -Will continue and titrate as per  Team. No further SI thoughts.     Pulm:  -Acute hypoxemic respiratory failure 2/2 strep pneumonia empyema c/b bacteremia s/p chest tube x2 with MIST therapy  -Tracheostomy placement.   -Now tolerating SBTs and TC QD x3 days. Cont PMV .     Cards: Hemodynamically stable at this time    ID:  - Complete 4 week course of abx (through 5/14) for empyema.     GI:  -Oropharyngeal dysphagia s/p PEG placement (5/7,   -GI now tolerating feeds at goal rate. Bowel regimen prn.     Nephro:  No acute renal     Endocrine:  -Stress hyperglycemia i/s/o DMII better controlled now.   -Will c/w ISS with BGFS monitoring.     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   -Hematology following, no indication for acute exchange therapy.   -Full dose Lovenox in place for LLL DVT.    Dispo: Pt full code. Palliative consulted and recs appreciated. Pt amenable to trial of slow weaning but would not want life long dependence on life support.

## 2024-05-13 NOTE — PROGRESS NOTE ADULT - ASSESSMENT
69F h/o CVA (bedbound at baseline), COPD, CHF, HTN who initially p/w acute shortness of breath to outside ED and was treated for acute pulmonary edema with sublingual nitro x 2, Lasix, and BiPAP. Pt was also found to be febrile to 103, so treated for PNA. BIPAP led to improvement in O2 to 89-90. Pt transferred to [place] on 4/16/24 for white count of 233 and possible leukophoresis. In the ED, pt intubated iso respiratory tiring and decreasing mental status, and also was started on levophed for hypotension. Following intubation and initiation of pressors, she was admitted to the MICU for AHRF and septic shock.  In MICU, heme was consulted for hyperleukocytosis, however due to mature lymphocytic leukophoresis was not performed.  Course c/b empyema s/p chest tube placement (4/16) and removal, reaccumulation of loculated pleural effusion s/p L pigtail placement and removal on 4/27.   S/p treatment with ceftriaxone (4/18-4/25) for s. pneumo bacteremia and empyema, continuing with augmentin PO x4 weeks with completion on 5/14.  S/p PEG 5/7.  Transitioned off IV pressors and on midodrine and droxidopa.  Transferred to RCU 5/9.  Weaning as tolerated.  Aggressive airway management with duonebs, chest PT and suctioning PRN.       5/12:  Tolerated TC throughout the day. 69F h/o CVA (bedbound at baseline), COPD, CHF, HTN who initially p/w acute shortness of breath to outside ED and was treated for acute pulmonary edema with sublingual nitro x 2, Lasix, and BiPAP. Pt was also found to be febrile to 103, so treated for PNA. BIPAP led to improvement in O2 to 89-90. Pt transferred to [place] on 4/16/24 for white count of 233 and possible leukophoresis. In the ED, pt intubated iso respiratory tiring and decreasing mental status, and also was started on levophed for hypotension. Following intubation and initiation of pressors, she was admitted to the MICU for AHRF and septic shock.  In MICU, heme was consulted for hyperleukocytosis, however due to mature lymphocytic leukophoresis was not performed.  Course c/b empyema s/p chest tube placement (4/16) and removal, reaccumulation of loculated pleural effusion s/p L pigtail placement and removal on 4/27.   S/p treatment with ceftriaxone (4/18-4/25) for s. pneumo bacteremia and empyema, continuing with augmentin PO x4 weeks with completion on 5/14.  S/p PEG 5/7.  Transitioned off IV pressors and on midodrine and droxidopa.  Transferred to RCU 5/9.  Weaning as tolerated.  Aggressive airway management with duonebs, chest PT and suctioning PRN.       5/13:   69F h/o CVA (bedbound at baseline), COPD, CHF, HTN who initially p/w acute shortness of breath to outside ED and was treated for acute pulmonary edema with sublingual nitro x 2, Lasix, and BiPAP. Pt was also found to be febrile to 103, so treated for PNA. BIPAP led to improvement in O2 to 89-90. Pt transferred to [place] on 4/16/24 for white count of 233 and possible leukophoresis. In the ED, pt intubated iso respiratory tiring and decreasing mental status, and also was started on levophed for hypotension. Following intubation and initiation of pressors, she was admitted to the MICU for AHRF and septic shock.  In MICU, heme was consulted for hyperleukocytosis, however due to mature lymphocytic leukophoresis was not performed.  Course c/b empyema s/p chest tube placement (4/16) and removal, reaccumulation of loculated pleural effusion s/p L pigtail placement and removal on 4/27.   S/p treatment with ceftriaxone (4/18-4/25) for s. pneumo bacteremia and empyema, continuing with augmentin PO x4 weeks with completion on 5/14.  S/p PEG 5/7.  Transitioned off IV pressors and on midodrine and droxidopa.  Transferred to RCU 5/9.  Weaning as tolerated.  Aggressive airway management with duonebs, chest PT and suctioning PRN.       5/13:  Tolerated TC during the day, will RTV at night.  No issues throughout the day.

## 2024-05-13 NOTE — PROGRESS NOTE ADULT - PROBLEM SELECTOR PLAN 2
on admission WBC count 233  - transferred to [facility] for possible leukophoresis  - as per heme/onc - underlying CLL (Oct 2023) with reactive lymphocytosis 2/2 sepsis  - heme/onc deffered leukophoresis due to mature lymphocytes  - IVIG given 4/26/24  - CLL under control and plan for outpatient follow up at [facility] when patient closer to d/c

## 2024-05-13 NOTE — PROGRESS NOTE ADULT - PROBLEM SELECTOR PLAN 3
intubated on arrival to [facility] ED for respiratory tiring and AMS  - unable to be extubated  - s/p trach 4/28  - mechanical vent: Volume Ctrl 14/460/5/40%  - weaning as tolerated  - aggressive airway management with duonebs, chest PT and suctioning PRN

## 2024-05-14 LAB
ALBUMIN SERPL ELPH-MCNC: 2.5 G/DL — LOW (ref 3.3–5)
ALP SERPL-CCNC: 76 U/L — SIGNIFICANT CHANGE UP (ref 40–120)
ALT FLD-CCNC: 6 U/L — LOW (ref 10–45)
ANION GAP SERPL CALC-SCNC: 9 MMOL/L — SIGNIFICANT CHANGE UP (ref 5–17)
AST SERPL-CCNC: 14 U/L — SIGNIFICANT CHANGE UP (ref 10–40)
BILIRUB SERPL-MCNC: 0.2 MG/DL — SIGNIFICANT CHANGE UP (ref 0.2–1.2)
BUN SERPL-MCNC: 10 MG/DL — SIGNIFICANT CHANGE UP (ref 7–23)
CALCIUM SERPL-MCNC: 8.9 MG/DL — SIGNIFICANT CHANGE UP (ref 8.4–10.5)
CHLORIDE SERPL-SCNC: 104 MMOL/L — SIGNIFICANT CHANGE UP (ref 96–108)
CO2 SERPL-SCNC: 28 MMOL/L — SIGNIFICANT CHANGE UP (ref 22–31)
CREAT SERPL-MCNC: 0.47 MG/DL — LOW (ref 0.5–1.3)
EGFR: 104 ML/MIN/1.73M2 — SIGNIFICANT CHANGE UP
GLUCOSE SERPL-MCNC: 98 MG/DL — SIGNIFICANT CHANGE UP (ref 70–99)
HCT VFR BLD CALC: 26.7 % — LOW (ref 34.5–45)
HGB BLD-MCNC: 8.3 G/DL — LOW (ref 11.5–15.5)
MAGNESIUM SERPL-MCNC: 2.2 MG/DL — SIGNIFICANT CHANGE UP (ref 1.6–2.6)
MCHC RBC-ENTMCNC: 29.1 PG — SIGNIFICANT CHANGE UP (ref 27–34)
MCHC RBC-ENTMCNC: 31.1 GM/DL — LOW (ref 32–36)
MCV RBC AUTO: 93.7 FL — SIGNIFICANT CHANGE UP (ref 80–100)
NRBC # BLD: 0 /100 WBCS — SIGNIFICANT CHANGE UP (ref 0–0)
PHOSPHATE SERPL-MCNC: 4.3 MG/DL — SIGNIFICANT CHANGE UP (ref 2.5–4.5)
PLATELET # BLD AUTO: 158 K/UL — SIGNIFICANT CHANGE UP (ref 150–400)
POTASSIUM SERPL-MCNC: 4.9 MMOL/L — SIGNIFICANT CHANGE UP (ref 3.5–5.3)
POTASSIUM SERPL-SCNC: 4.9 MMOL/L — SIGNIFICANT CHANGE UP (ref 3.5–5.3)
PROT SERPL-MCNC: 5.7 G/DL — LOW (ref 6–8.3)
RBC # BLD: 2.85 M/UL — LOW (ref 3.8–5.2)
RBC # FLD: 19.6 % — HIGH (ref 10.3–14.5)
SODIUM SERPL-SCNC: 141 MMOL/L — SIGNIFICANT CHANGE UP (ref 135–145)
WBC # BLD: 95.2 K/UL — CRITICAL HIGH (ref 3.8–10.5)
WBC # FLD AUTO: 95.2 K/UL — CRITICAL HIGH (ref 3.8–10.5)

## 2024-05-14 PROCEDURE — 71045 X-RAY EXAM CHEST 1 VIEW: CPT | Mod: 26

## 2024-05-14 PROCEDURE — 99233 SBSQ HOSP IP/OBS HIGH 50: CPT

## 2024-05-14 RX ADMIN — QUETIAPINE FUMARATE 25 MILLIGRAM(S): 200 TABLET, FILM COATED ORAL at 05:49

## 2024-05-14 RX ADMIN — ENOXAPARIN SODIUM 110 MILLIGRAM(S): 100 INJECTION SUBCUTANEOUS at 10:58

## 2024-05-14 RX ADMIN — SENNA PLUS 10 MILLILITER(S): 8.6 TABLET ORAL at 21:53

## 2024-05-14 RX ADMIN — Medication 1.5 MILLIGRAM(S): at 17:10

## 2024-05-14 RX ADMIN — Medication 1 TABLET(S): at 05:49

## 2024-05-14 RX ADMIN — CHLORHEXIDINE GLUCONATE 15 MILLILITER(S): 213 SOLUTION TOPICAL at 05:49

## 2024-05-14 RX ADMIN — CHLORHEXIDINE GLUCONATE 1 APPLICATION(S): 213 SOLUTION TOPICAL at 05:49

## 2024-05-14 RX ADMIN — Medication 3 MILLILITER(S): at 06:33

## 2024-05-14 RX ADMIN — QUETIAPINE FUMARATE 25 MILLIGRAM(S): 200 TABLET, FILM COATED ORAL at 13:10

## 2024-05-14 RX ADMIN — Medication 3 MILLILITER(S): at 21:01

## 2024-05-14 RX ADMIN — Medication 1 TABLET(S): at 17:10

## 2024-05-14 RX ADMIN — MIDODRINE HYDROCHLORIDE 30 MILLIGRAM(S): 2.5 TABLET ORAL at 13:10

## 2024-05-14 RX ADMIN — QUETIAPINE FUMARATE 25 MILLIGRAM(S): 200 TABLET, FILM COATED ORAL at 21:53

## 2024-05-14 RX ADMIN — Medication 650 MILLIGRAM(S): at 23:22

## 2024-05-14 RX ADMIN — CHLORHEXIDINE GLUCONATE 1 APPLICATION(S): 213 SOLUTION TOPICAL at 17:11

## 2024-05-14 RX ADMIN — NYSTATIN CREAM 1 APPLICATION(S): 100000 CREAM TOPICAL at 05:49

## 2024-05-14 RX ADMIN — Medication 3 MILLILITER(S): at 16:59

## 2024-05-14 RX ADMIN — QUETIAPINE FUMARATE 25 MILLIGRAM(S): 200 TABLET, FILM COATED ORAL at 08:32

## 2024-05-14 RX ADMIN — MIDODRINE HYDROCHLORIDE 30 MILLIGRAM(S): 2.5 TABLET ORAL at 05:48

## 2024-05-14 RX ADMIN — ENOXAPARIN SODIUM 110 MILLIGRAM(S): 100 INJECTION SUBCUTANEOUS at 21:53

## 2024-05-14 RX ADMIN — POLYETHYLENE GLYCOL 3350 17 GRAM(S): 17 POWDER, FOR SOLUTION ORAL at 17:10

## 2024-05-14 RX ADMIN — QUETIAPINE FUMARATE 150 MILLIGRAM(S): 200 TABLET, FILM COATED ORAL at 21:53

## 2024-05-14 RX ADMIN — MIDODRINE HYDROCHLORIDE 30 MILLIGRAM(S): 2.5 TABLET ORAL at 21:53

## 2024-05-14 RX ADMIN — CHLORHEXIDINE GLUCONATE 15 MILLILITER(S): 213 SOLUTION TOPICAL at 17:10

## 2024-05-14 RX ADMIN — Medication 1.5 MILLIGRAM(S): at 05:49

## 2024-05-14 NOTE — PROGRESS NOTE ADULT - ASSESSMENT
69F h/o CVA (bedbound at baseline), COPD, CHF, HTN who initially p/w acute shortness of breath to outside ED and was treated for acute pulmonary edema with sublingual nitro x 2, Lasix, and BiPAP. Pt was also found to be febrile to 103, so treated for PNA. BIPAP led to improvement in O2 to 89-90. Pt transferred to [place] on 4/16/24 for white count of 233 and possible leukophoresis. In the ED, pt intubated iso respiratory tiring and decreasing mental status, and also was started on levophed for hypotension. Following intubation and initiation of pressors, she was admitted to the MICU for AHRF and septic shock.  In MICU, heme was consulted for hyperleukocytosis, however due to mature lymphocytic leukophoresis was not performed.  Course c/b empyema s/p chest tube placement (4/16) and removal, reaccumulation of loculated pleural effusion s/p L pigtail placement and removal on 4/27.   S/p treatment with ceftriaxone (4/18-4/25) for s. pneumo bacteremia and empyema, continuing with augmentin PO x4 weeks with completion on 5/14.  S/p PEG 5/7.  Transitioned off IV pressors and on midodrine and droxidopa.  Transferred to RCU 5/9.  Weaning as tolerated.  Aggressive airway management with duonebs, chest PT and suctioning PRN.       5/13:  Tolerated TC during the day, will RTV at night.  No issues throughout the day. 69F h/o CVA (bedbound at baseline), COPD, CHF, HTN who initially p/w acute shortness of breath to outside ED and was treated for acute pulmonary edema with sublingual nitro x 2, Lasix, and BiPAP. Pt was also found to be febrile to 103, so treated for PNA. BIPAP led to improvement in O2 to 89-90. Pt transferred to [place] on 4/16/24 for white count of 233 and possible leukophoresis. In the ED, pt intubated iso respiratory tiring and decreasing mental status, and also was started on levophed for hypotension. Following intubation and initiation of pressors, she was admitted to the MICU for AHRF and septic shock.  In MICU, heme was consulted for hyperleukocytosis, however due to mature lymphocytic leukophoresis was not performed.  Course c/b empyema s/p chest tube placement (4/16) and removal, reaccumulation of loculated pleural effusion s/p L pigtail placement and removal on 4/27.   S/p treatment with ceftriaxone (4/18-4/25) for s. pneumo bacteremia and empyema, continuing with augmentin PO x4 weeks with completion on 5/14.  S/p PEG 5/7.  Transitioned off IV pressors and on midodrine and droxidopa.  Transferred to RCU 5/9.  Weaning as tolerated.  Aggressive airway management with duonebs, chest PT and suctioning PRN.       5/14:  Tolerates trach collar during the day - will continue as tolerated.  Speech eval with PMV - did not tolerate, desatted to 80s within 10 seconds, had to increase FiO2 to 40%.  Speech will continue to follow.  Finishes PO Augmentin today. 69F h/o CVA (bedbound at baseline), COPD, CHF, HTN who initially p/w acute shortness of breath to outside ED and was treated for acute pulmonary edema with sublingual nitro x 2, Lasix, and BiPAP. Pt was also found to be febrile to 103, so treated for PNA. BIPAP led to improvement in O2 to 89-90. Pt transferred to [place] on 4/16/24 for white count of 233 and possible leukophoresis. In the ED, pt intubated iso respiratory tiring and decreasing mental status, and also was started on levophed for hypotension. Following intubation and initiation of pressors, she was admitted to the MICU for AHRF and septic shock.  In MICU, heme was consulted for hyperleukocytosis, however due to mature lymphocytic leukophoresis was not performed.  Course c/b empyema s/p chest tube placement (4/16) and removal, reaccumulation of loculated pleural effusion s/p L pigtail placement and removal on 4/27.   S/p treatment with ceftriaxone (4/18-4/25) for s. pneumo bacteremia and empyema, continuing with augmentin PO x4 weeks with completion on 5/14.  S/p PEG 5/7.  Transitioned off IV pressors and on midodrine and droxidopa.  Transferred to RCU 5/9.  Weaning as tolerated.  Aggressive airway management with duonebs, chest PT and suctioning PRN.       5/14:  Tolerates trach collar during the day - will continue as tolerated.  Speech eval with PMV - did not tolerate, desatted to 80s within 10 seconds, had to increase FiO2 to 40%.  Speech will continue to follow.  Finishes PO Augmentin today.  Repeat chest xray ordered. 69F h/o CVA (bedbound at baseline), COPD, CHF, HTN who initially p/w acute shortness of breath to outside ED and was treated for acute pulmonary edema with sublingual nitro x 2, Lasix, and BiPAP. Pt was also found to be febrile to 103, so treated for PNA. BIPAP led to improvement in O2 to 89-90. Pt transferred to [place] on 4/16/24 for white count of 233 and possible leukophoresis. In the ED, pt intubated iso respiratory tiring and decreasing mental status, and also was started on levophed for hypotension. Following intubation and initiation of pressors, she was admitted to the MICU for AHRF and septic shock.  In MICU, heme was consulted for hyperleukocytosis, however due to mature lymphocytic leukophoresis was not performed.  Course c/b empyema s/p chest tube placement (4/16) and removal, reaccumulation of loculated pleural effusion s/p L pigtail placement and removal on 4/27.   S/p treatment with ceftriaxone (4/18-4/25) for s. pneumo bacteremia and empyema, continuing with augmentin PO x4 weeks with completion on 5/14.  S/p PEG 5/7.  Transitioned off IV pressors and on midodrine and droxidopa.  Transferred to RCU 5/9.  Weaning as tolerated.  Aggressive airway management with duonebs, chest PT and suctioning PRN.       5/14:  Tolerates trach collar during the day - will attempt ATC.  Speech eval with PMV - did not tolerate, desatted to 80s within 10 seconds, had to increase FiO2 to 40%.  Speech will continue to follow.  Finishes PO Augmentin today.  Repeated chest xray - awaiting read.

## 2024-05-14 NOTE — PROGRESS NOTE ADULT - PROBLEM SELECTOR PROBLEM 6
Advanced care planning/counseling discussion Nutrition Assessment     Type and Reason for Visit: Consult,Patient Education    Nutrition Recommendations/Plan: Nutrition Education    · Attached educational material to chart   · Educated on Heart Failure guidelines for consistent carbohydrate nutrition therapy  · Written educational materials provided. · Contact name and number provided. Nutrition Assessment:  Consult for Diet Education: Heart Failure Guidelines: Unable to complete as patient transferred to [facility] for a Cardiac Cath.  Attached Diet information to discahrge chart      Current Nutrition Therapies:    Diet NPO    Anthropometric Measures:  · Height: 6' (182.9 cm)  · Current Body Wt:  379#   · BMI:  51.48    Nutrition Interventions:   Nutrition Education/Counseling:  Education initiated       Electronically signed by Nurys Navarrete RD, LD on 3/3/22 at 11:43 AM EST    Contact: [phone]

## 2024-05-14 NOTE — CHART NOTE - NSCHARTNOTEFT_GEN_A_CORE
NUTRITION FOLLOW UP NOTE    PATIENT SEEN FOR: follow up    SOURCE: [] Patient  [x] Current Medical Record  [x] Nursing  [] Family/support person at bedside  [x] Patient unavailable/inappropriate  [] Other:    CHART REVIEWED/EVENTS NOTED.  s/p trach 4/28  S/p PEG 5/7  Transferred to RCU 5/9.    DIET ORDER:   Diet, NPO with Tube Feed:   Tube Feeding Modality: Gastrostomy  Glucerna 1.5 Emre (GLUCERNA1.5RTH)  Total Volume for 24 Hours (mL): 1350  Continuous  Until Goal Tube Feed Rate (mL per Hour): 75  Tube Feed Duration (in Hours): 18  Tube Feed Start Time: 11:00 (05-08-24)    EN order providing at 100% provision: 2025kcal (32kcal/kg) and 112g protein (1.7g/kg)  pt with previously elevated blood glucose levels, thus Glucerna ordered.     ANTHROPOMETRICS:  Drug Dosing Weight  Height (cm): 172.7 (23 Apr 2024 17:12)  4/17 108.2kg  4/21 107.2  dosing weight: 122kg - ? accuracy  RD will continue to monitor trends    MEDICATIONS:  MEDICATIONS  (STANDING):  albuterol/ipratropium for Nebulization 3 milliLiter(s) Nebulizer every 8 hours  amoxicillin  875 milliGRAM(s)/clavulanate 1 Tablet(s) Oral every 12 hours  chlorhexidine 0.12% Liquid 15 milliLiter(s) Oral Mucosa every 12 hours  chlorhexidine 4% Liquid 1 Application(s) Topical every 12 hours  clonazePAM  Tablet 1.5 milliGRAM(s) Oral every 12 hours  enoxaparin Injectable 110 milliGRAM(s) SubCutaneous every 12 hours  midodrine 30 milliGRAM(s) Oral three times a day  polyethylene glycol 3350 17 Gram(s) Oral every 12 hours  QUEtiapine 25 milliGRAM(s) Oral three times a day  QUEtiapine 150 milliGRAM(s) Oral at bedtime  senna Syrup 10 milliLiter(s) Oral at bedtime    MEDICATIONS  (PRN):  acetaminophen   Oral Liquid .. 650 milliGRAM(s) Oral every 6 hours PRN Temp greater or equal to 38C (100.4F)  acetaminophen   Oral Liquid .. 650 milliGRAM(s) Oral every 6 hours PRN Moderate Pain (4 - 6)  nystatin Powder 1 Application(s) Topical two times a day PRN skin irritation  QUEtiapine 25 milliGRAM(s) Oral every 6 hours PRN agitation    NUTRITIONALLY PERTINENT LABS:  05-14 Na141 mmol/L Glu 98 mg/dL K+ 4.9 mmol/L Cr  0.47 mg/dL<L> BUN 10 mg/dL 05-14 Phos 4.3 mg/dL 05-14 Alb 2.5 g/dL<L> 05-05 Chol 124 mg/dL LDL --    HDL 23 mg/dL<L> Trig 187 mg/dL<H>05-14 ALT 6 U/L<L> AST 14 U/L Alkaline Phosphatase 76 U/L    NUTRITIONALLY PERTINENT MEDICATIONS/LABS:  [x] Reviewed  [] Relevant notes on medications/labs:    EDEMA:  [x] Reviewed  [] Relevant notes:    GI/ I&O:  [x] Reviewed  [] Relevant notes:  [] Other:    SKIN:   [] No pressure injuries documented, per nursing flowsheet  [x] Pressure injury previously noted  [] Change in pressure injury documentation:  [] Other:    ESTIMATED NEEDS:  based on IBW 63.5 kg  27-32kcal/kg 1715-2032kcal/day  1.4-1.8g/kg 89-114g protein/day  defer fluid needs to team     NUTRITION DIAGNOSIS:  [x] Prior Dx: Increased protein-energy needs   [] New Dx:    EDUCATION:  [] Yes:  [x] Not appropriate/warranted    NUTRITION CARE PLAN:  Can continue current EN regimen, see above for what it is providing pt with. If glucose levels remains normal, can consider Jevity 1.5  Defer free water flush to team   Consider multivitamin and Vitamin C to aid in wound healing    [] Achieved - Continue current nutrition intervention(s)  [] Current medical condition precludes nutrition intervention at this time.    MONITORING AND EVALUATION:   RD remains available upon request and will follow up per protocol.    Bhargavi Andrade, MS, RD, CDN / Teams NUTRITION FOLLOW UP NOTE    PATIENT SEEN FOR: follow up    SOURCE: [] Patient  [x] Current Medical Record  [x] Nursing  [] Family/support person at bedside  [x] Patient unavailable/inappropriate  [] Other:    CHART REVIEWED/EVENTS NOTED.  s/p trach 4/28  S/p PEG 5/7  Transferred to RCU 5/9.    DIET ORDER:   Diet, NPO with Tube Feed:   Tube Feeding Modality: Gastrostomy  Glucerna 1.5 Emre (GLUCERNA1.5RTH)  Total Volume for 24 Hours (mL): 1350  Continuous  Until Goal Tube Feed Rate (mL per Hour): 75  Tube Feed Duration (in Hours): 18  Tube Feed Start Time: 11:00 (05-08-24)    EN order providing at 100% provision: 2025kcal (32kcal/kg) and 112g protein (1.7g/kg)  pt with previously elevated blood glucose levels, thus Glucerna ordered.     ANTHROPOMETRICS:  Drug Dosing Weight  Height (cm): 172.7 (23 Apr 2024 17:12)  4/17 108.2kg  4/21 107.2  dosing weight: 122kg - ? accuracy  RD will continue to monitor trends    MEDICATIONS:  MEDICATIONS  (STANDING):  albuterol/ipratropium for Nebulization 3 milliLiter(s) Nebulizer every 8 hours  amoxicillin  875 milliGRAM(s)/clavulanate 1 Tablet(s) Oral every 12 hours  chlorhexidine 0.12% Liquid 15 milliLiter(s) Oral Mucosa every 12 hours  chlorhexidine 4% Liquid 1 Application(s) Topical every 12 hours  clonazePAM  Tablet 1.5 milliGRAM(s) Oral every 12 hours  enoxaparin Injectable 110 milliGRAM(s) SubCutaneous every 12 hours  midodrine 30 milliGRAM(s) Oral three times a day  polyethylene glycol 3350 17 Gram(s) Oral every 12 hours  QUEtiapine 25 milliGRAM(s) Oral three times a day  QUEtiapine 150 milliGRAM(s) Oral at bedtime  senna Syrup 10 milliLiter(s) Oral at bedtime    MEDICATIONS  (PRN):  acetaminophen   Oral Liquid .. 650 milliGRAM(s) Oral every 6 hours PRN Temp greater or equal to 38C (100.4F)  acetaminophen   Oral Liquid .. 650 milliGRAM(s) Oral every 6 hours PRN Moderate Pain (4 - 6)  nystatin Powder 1 Application(s) Topical two times a day PRN skin irritation  QUEtiapine 25 milliGRAM(s) Oral every 6 hours PRN agitation    NUTRITIONALLY PERTINENT LABS:  05-14 Na141 mmol/L Glu 98 mg/dL K+ 4.9 mmol/L Cr  0.47 mg/dL<L> BUN 10 mg/dL 05-14 Phos 4.3 mg/dL 05-14 Alb 2.5 g/dL<L> 05-05 Chol 124 mg/dL LDL --    HDL 23 mg/dL<L> Trig 187 mg/dL<H>05-14 ALT 6 U/L<L> AST 14 U/L Alkaline Phosphatase 76 U/L    NUTRITIONALLY PERTINENT MEDICATIONS/LABS:  [x] Reviewed  [] Relevant notes on medications/labs:    EDEMA:  [x] Reviewed  [] Relevant notes:    GI/ I&O:  [x] Reviewed  [x] Relevant notes: lose stool yesterday per flow sheets.   [] Other:    SKIN:   [] No pressure injuries documented, per nursing flowsheet  [x] Pressure injury previously noted  [] Change in pressure injury documentation:  [] Other:    ESTIMATED NEEDS:  based on IBW 63.5 kg  27-32kcal/kg 1715-2032kcal/day  1.4-1.8g/kg 89-114g protein/day  defer fluid needs to team     NUTRITION DIAGNOSIS:  [x] Prior Dx: Increased protein-energy needs   [] New Dx:    EDUCATION:  [] Yes:  [x] Not appropriate/warranted    NUTRITION CARE PLAN:  Can continue current EN regimen, see above for what it is providing pt with. If glucose levels remains normal, can consider Jevity 1.5  Defer free water flush to team   Consider multivitamin and Vitamin C to aid in wound healing  if lose stool persists, consider addition of banatrol     [] Achieved - Continue current nutrition intervention(s)  [] Current medical condition precludes nutrition intervention at this time.    MONITORING AND EVALUATION:   RD remains available upon request and will follow up per protocol.    Bhargavi Andrade, MS, RD, CDN / Teams

## 2024-05-14 NOTE — PROGRESS NOTE ADULT - SUBJECTIVE AND OBJECTIVE BOX
Patient is a 69y old  Female who presents with a chief complaint of Transfer for leukophoresis (13 May 2024 07:54)      Interval Events:    REVIEW OF SYSTEMS:  [ ] Positive  [ ] All other systems negative  [ ] Unable to assess ROS because ________    Vital Signs Last 24 Hrs  T(C): 37.2 (05-14-24 @ 05:20), Max: 37.3 (05-13-24 @ 23:53)  T(F): 99 (05-14-24 @ 05:20), Max: 99.1 (05-13-24 @ 23:53)  HR: 100 (05-14-24 @ 06:35) (83 - 115)  BP: 109/57 (05-14-24 @ 05:20) (102/60 - 125/84)  RR: 18 (05-14-24 @ 05:20) (14 - 18)  SpO2: 100% (05-14-24 @ 06:35) (90% - 100%)    PHYSICAL EXAM:  HEENT:   [ ]Tracheostomy:  [ ]Pupils equal  [ ]No oral lesions  [ ]Abnormal    SKIN  [ ]No Rash  [ ] Abnormal  [ ] pressure    CARDIAC  [ ]Regular  [ ]Abnormal    PULMONARY  [ ]Bilateral Clear Breath Sounds  [ ]Normal Excursion  [ ]Abnormal    GI  [ ]PEG      [ ] +BS		              [ ]Soft, nondistended, nontender	  [ ]Abnormal    MUSCULOSKELETAL                                   [ ]Bedbound                 [ ]Abnormal    [ ]Ambulatory/OOB to chair                           EXTREMITIES                                         [ ]Normal  [ ]Edema                           NEUROLOGIC  [ ] Normal, non focal  [ ] Focal findings:    PSYCHIATRIC  [ ]Alert and appropriate  [ ] Sedated	 [ ]Agitated    :  Andino: [ ] Yes, if yes: Date of Placement:                   [  ] No    LINES: Central Lines [ ] Yes, if yes: Date of Placement                                     [  ] No    HOSPITAL MEDICATIONS:  MEDICATIONS  (STANDING):  albuterol/ipratropium for Nebulization 3 milliLiter(s) Nebulizer every 8 hours  amoxicillin  875 milliGRAM(s)/clavulanate 1 Tablet(s) Oral every 12 hours  chlorhexidine 0.12% Liquid 15 milliLiter(s) Oral Mucosa every 12 hours  chlorhexidine 4% Liquid 1 Application(s) Topical every 12 hours  clonazePAM  Tablet 1.5 milliGRAM(s) Oral every 12 hours  enoxaparin Injectable 110 milliGRAM(s) SubCutaneous every 12 hours  midodrine 30 milliGRAM(s) Oral three times a day  polyethylene glycol 3350 17 Gram(s) Oral every 12 hours  QUEtiapine 150 milliGRAM(s) Oral at bedtime  QUEtiapine 25 milliGRAM(s) Oral three times a day  senna Syrup 10 milliLiter(s) Oral at bedtime    MEDICATIONS  (PRN):  acetaminophen   Oral Liquid .. 650 milliGRAM(s) Oral every 6 hours PRN Moderate Pain (4 - 6)  acetaminophen   Oral Liquid .. 650 milliGRAM(s) Oral every 6 hours PRN Temp greater or equal to 38C (100.4F)  nystatin Powder 1 Application(s) Topical two times a day PRN skin irritation  QUEtiapine 25 milliGRAM(s) Oral every 6 hours PRN agitation      LABS:                        8.8    79.37 )-----------( 144      ( 13 May 2024 07:39 )             27.8     05-13    140  |  102  |  9   ----------------------------<  83  4.7   |  27  |  0.45<L>    Ca    8.8      13 May 2024 07:39  Phos  3.9     05-13  Mg     2.3     05-13    TPro  5.6<L>  /  Alb  2.4<L>  /  TBili  0.3  /  DBili  x   /  AST  13  /  ALT  7<L>  /  AlkPhos  75  05-13      Urinalysis Basic - ( 13 May 2024 07:39 )    Color: x / Appearance: x / SG: x / pH: x  Gluc: 83 mg/dL / Ketone: x  / Bili: x / Urobili: x   Blood: x / Protein: x / Nitrite: x   Leuk Esterase: x / RBC: x / WBC x   Sq Epi: x / Non Sq Epi: x / Bacteria: x          CAPILLARY BLOOD GLUCOSE    MICROBIOLOGY:     RADIOLOGY:  [ ] Reviewed and interpreted by me    Mode: AC/ CMV (Assist Control/ Continuous Mandatory Ventilation)  RR (machine): 14  TV (machine): 460  FiO2: 40  PEEP: 5  ITime: 1  MAP: 10  PIP: 24   Patient is a 69y old  Female who presents with a chief complaint of Transfer for leukophoresis (13 May 2024 07:54)      Interval Events:  No acute events overnight.    REVIEW OF SYSTEMS:  [ ] Positive  [X] All other systems negative  [ ] Unable to assess ROS because ________    Vital Signs Last 24 Hrs  T(C): 37.2 (05-14-24 @ 05:20), Max: 37.3 (05-13-24 @ 23:53)  T(F): 99 (05-14-24 @ 05:20), Max: 99.1 (05-13-24 @ 23:53)  HR: 100 (05-14-24 @ 06:35) (83 - 115)  BP: 109/57 (05-14-24 @ 05:20) (102/60 - 125/84)  RR: 18 (05-14-24 @ 05:20) (14 - 18)  SpO2: 100% (05-14-24 @ 06:35) (90% - 100%)    PHYSICAL EXAM:  HEENT:   [X]Tracheostomy: #8 cuffed portex  [X]Pupils equal  [ ]No oral lesions  [ ]Abnormal    SKIN  [ ]No Rash  [X] Abnormal: perineal IAD, BL breast ITD  [ ] pressure - BL buttocks/sacral DTI, right heel DTI    CARDIAC  [X]Regular  [ ]Abnormal    PULMONARY  [X]Bilateral Clear Breath Sounds  [ ]Normal Excursion  [ ]Abnormal    GI  [X]PEG      [X] +BS		              [X]Soft, nondistended, nontender	  [ ]Abnormal    MUSCULOSKELETAL                                   [X]Bedbound                 [ ]Abnormal    [ ]Ambulatory/OOB to chair                           EXTREMITIES                                         [X]Normal  [ ]Edema                           NEUROLOGIC  [ ] Normal, non focal  [X] Focal findings: awake and alert, mouthing words, able to write, follows commands on all extremities L<R    PSYCHIATRIC  [X]Alert and appropriate  [ ] Sedated	 [ ]Agitated    :  Andino: [ ] Yes, if yes: Date of Placement:                   [X] No    LINES: Central Lines [ ] Yes, if yes: Date of Placement                                     [X] No    HOSPITAL MEDICATIONS:  MEDICATIONS  (STANDING):  albuterol/ipratropium for Nebulization 3 milliLiter(s) Nebulizer every 8 hours  amoxicillin  875 milliGRAM(s)/clavulanate 1 Tablet(s) Oral every 12 hours  chlorhexidine 0.12% Liquid 15 milliLiter(s) Oral Mucosa every 12 hours  chlorhexidine 4% Liquid 1 Application(s) Topical every 12 hours  clonazePAM  Tablet 1.5 milliGRAM(s) Oral every 12 hours  enoxaparin Injectable 110 milliGRAM(s) SubCutaneous every 12 hours  midodrine 30 milliGRAM(s) Oral three times a day  polyethylene glycol 3350 17 Gram(s) Oral every 12 hours  QUEtiapine 150 milliGRAM(s) Oral at bedtime  QUEtiapine 25 milliGRAM(s) Oral three times a day  senna Syrup 10 milliLiter(s) Oral at bedtime    MEDICATIONS  (PRN):  acetaminophen   Oral Liquid .. 650 milliGRAM(s) Oral every 6 hours PRN Moderate Pain (4 - 6)  acetaminophen   Oral Liquid .. 650 milliGRAM(s) Oral every 6 hours PRN Temp greater or equal to 38C (100.4F)  nystatin Powder 1 Application(s) Topical two times a day PRN skin irritation  QUEtiapine 25 milliGRAM(s) Oral every 6 hours PRN agitation    LABS:                        8.8    79.37 )-----------( 144      ( 13 May 2024 07:39 )             27.8     05-13    140  |  102  |  9   ----------------------------<  83  4.7   |  27  |  0.45<L>    Ca    8.8      13 May 2024 07:39  Phos  3.9     05-13  Mg     2.3     05-13    TPro  5.6<L>  /  Alb  2.4<L>  /  TBili  0.3  /  DBili  x   /  AST  13  /  ALT  7<L>  /  AlkPhos  75  05-13    Urinalysis Basic - ( 13 May 2024 07:39 )    Color: x / Appearance: x / SG: x / pH: x  Gluc: 83 mg/dL / Ketone: x  / Bili: x / Urobili: x   Blood: x / Protein: x / Nitrite: x   Leuk Esterase: x / RBC: x / WBC x   Sq Epi: x / Non Sq Epi: x / Bacteria: x    CAPILLARY BLOOD GLUCOSE    MICROBIOLOGY:     RADIOLOGY:  [ ] Reviewed and interpreted by me    Mode: AC/ CMV (Assist Control/ Continuous Mandatory Ventilation)  RR (machine): 14  TV (machine): 460  FiO2: 40  PEEP: 5  ITime: 1  MAP: 10  PIP: 24

## 2024-05-14 NOTE — PROGRESS NOTE ADULT - PROBLEM SELECTOR PLAN 2
on admission WBC count 233  - transferred to [facility] for possible leukophoresis  - as per heme/onc - underlying CLL (Oct 2023) with reactive lymphocytosis 2/2 sepsis  - heme/onc deffered leukophoresis due to mature lymphocytes  - IVIG given 4/26/24  - CLL under control and plan for outpatient follow up at [facility] when patient closer to d/c

## 2024-05-14 NOTE — PROGRESS NOTE ADULT - PROBLEM SELECTOR PLAN 3
intubated on arrival to [facility] ED for respiratory tiring and AMS  - unable to be extubated  - s/p trach 4/28  - mechanical vent: Volume Ctrl 14/460/5/40%  - weaning as tolerated  - aggressive airway management with duonebs, chest PT and suctioning PRN

## 2024-05-14 NOTE — PROGRESS NOTE ADULT - NS ATTEND AMEND GEN_ALL_CORE FT
Pt is a 69F with MHx CVA, COPD, CHF, and HTN initially presenting to [facility] on 4/16/24 from OSH for hyperleukocytosis concerning for acute leukemia c/b acute hypoxemic respiratory failure and septic shock 2/2 Strep pneumonia bacteremia i/s/o pneumonia with empyema s/p chest tube placement x2 with MISTII with failure to extubation from MV s/p tracheostomy placement now transferred to [facility]CU on 5/8 for further medical management.     Neuro:  -Mental status baseline, she is able to communicate spontaneously and express needs/concerns.   -Physical debility and functional dependence i/s/o critical illness with hx CVA and wheelchair/cane dependence.   PT/OT consulted.   -Hx of anxiety/MDD with passive SI. [facility] Psych Team consulted on this admission, initiated on seroquel +clonazepam.   -Will continue and titrate as per  Team. No further SI thoughts.     Pulm:  -Acute hypoxemic respiratory failure 2/2 strep pneumonia empyema c/b bacteremia s/p chest tube x2 with MIST therapy  -Tracheostomy placement.   -Now tolerating SBTs and TC QD x3 days. Cont PMV .     Cards: Hemodynamically stable at this time    ID:  - Complete 4 week course of abx (through 5/14) for empyema.     GI:  -Oropharyngeal dysphagia s/p PEG placement (5/7,   -GI now tolerating feeds at goal rate. Bowel regimen prn.     Nephro:  No acute renal     Endocrine:  -Stress hyperglycemia i/s/o DMII better controlled now.   -Will c/w ISS with BGFS monitoring.     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   -Hematology following, no indication for acute exchange therapy.   -Full dose Lovenox in place for LLL DVT.    Dispo: Pt full code. Palliative consulted and recs appreciated. Pt amenable to trial of slow weaning but would not want life long dependence on life support. Pt is a 69F with MHx CVA, COPD, CHF, and HTN initially presenting to [facility] on 4/16/24 from OSH for hyperleukocytosis concerning for acute leukemia c/b acute hypoxemic respiratory failure and septic shock 2/2 Strep pneumonia bacteremia i/s/o pneumonia with empyema s/p chest tube placement x2 with MISTII with failure to extubation from MV s/p tracheostomy placement now transferred to [facility] on 5/8 for further medical management.     Neuro:  -Mental status baseline, she is able to communicate spontaneously and express needs/concerns.   -Physical debility and functional dependence i/s/o critical illness with hx CVA and wheelchair/cane dependence.   PT/OT consulted.   -Hx of anxiety/MDD with passive SI. [facility] Psych Team consulted on this admission, initiated on seroquel +clonazepam.   -Will continue and titrate as per  Team. No further SI thoughts.     Pulm:  -Acute hypoxemic respiratory failure 2/2 strep pneumonia empyema c/b bacteremia s/p chest tube x2 with MIST therapy  -Tracheostomy placement.   -Now tolerating SBTs and TC. Cont PMV . Will start TCATC starting 5/14    Cards: Hemodynamically stable at this time    ID:  - Complete 4 week course of abx (through 5/14) for empyema.     GI:  -Oropharyngeal dysphagia s/p PEG placement (5/7,   -GI now tolerating feeds at goal rate. Bowel regimen prn.     Nephro:  No acute renal     Endocrine:  -Stress hyperglycemia i/s/o DMII better controlled now.   -Will c/w ISS with BGFS monitoring.     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   -Hematology following, no indication for acute exchange therapy.   -Full dose Lovenox in place for LLL DVT.    Dispo: Pt full code. Palliative consulted and recs appreciated. Pt amenable to trial of slow weaning but would not want life long dependence on life support. Pt is a 69F with MHx CVA, COPD, CHF, and HTN initially presenting to [facility] on 4/16/24 from OSH for hyperleukocytosis concerning for acute leukemia c/b acute hypoxemic respiratory failure and septic shock 2/2 Strep pneumonia bacteremia i/s/o pneumonia with empyema s/p chest tube placement x2 with MISTII with failure to extubation from MV s/p tracheostomy placement now transferred to [facility] on 5/8 for further medical management.     Neuro:  -Mental status baseline, she is able to communicate spontaneously and express needs/concerns.   -Physical debility and functional dependence i/s/o critical illness with hx CVA and wheelchair/cane dependence.   PT/OT consulted.   -Hx of anxiety/MDD with passive SI. [facility] Psych Team consulted on this admission, initiated on seroquel +clonazepam.   -Will continue and titrate as per  Team. No further SI thoughts.     Pulm:  -Acute hypoxemic respiratory failure 2/2 strep pneumonia empyema c/b bacteremia s/p chest tube x2 with MIST therapy  -Tracheostomy placement.   -Now tolerating SBTs and TC. Cont PMV . Will start TCATC starting 5/14    Cards: Hemodynamically stable at this time. Discontinue Droxydopa and continue midodrine    ID:  - Complete 4 week course of abx (through 5/14) for empyema.     GI:  -Oropharyngeal dysphagia s/p PEG placement (5/7,   -GI now tolerating feeds at goal rate. Bowel regimen prn.     Nephro:  No acute renal     Endocrine:  -Stress hyperglycemia i/s/o DMII better controlled now.   -Will c/w ISS with BGFS monitoring.     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   -Hematology following, no indication for acute exchange therapy.   -Full dose Lovenox in place for LLL DVT.    Dispo: Pt full code. Palliative consulted and recs appreciated. Pt amenable to trial of slow weaning but would not want life long dependence on life support.

## 2024-05-15 DIAGNOSIS — Z93.0 TRACHEOSTOMY STATUS: ICD-10-CM

## 2024-05-15 LAB
ALBUMIN SERPL ELPH-MCNC: 2.7 G/DL — LOW (ref 3.3–5)
ALP SERPL-CCNC: 92 U/L — SIGNIFICANT CHANGE UP (ref 40–120)
ALT FLD-CCNC: 7 U/L — LOW (ref 10–45)
ANION GAP SERPL CALC-SCNC: 13 MMOL/L — SIGNIFICANT CHANGE UP (ref 5–17)
AST SERPL-CCNC: 31 U/L — SIGNIFICANT CHANGE UP (ref 10–40)
BILIRUB SERPL-MCNC: 0.3 MG/DL — SIGNIFICANT CHANGE UP (ref 0.2–1.2)
BUN SERPL-MCNC: 11 MG/DL — SIGNIFICANT CHANGE UP (ref 7–23)
CALCIUM SERPL-MCNC: 9.6 MG/DL — SIGNIFICANT CHANGE UP (ref 8.4–10.5)
CHLORIDE SERPL-SCNC: 102 MMOL/L — SIGNIFICANT CHANGE UP (ref 96–108)
CO2 SERPL-SCNC: 23 MMOL/L — SIGNIFICANT CHANGE UP (ref 22–31)
CORTICOSTEROID BINDING GLOBULIN RESULT: 2 MG/DL — SIGNIFICANT CHANGE UP
CORTIS F/TOTAL MFR SERPL: 16 % — SIGNIFICANT CHANGE UP
CORTIS SERPL-MCNC: 14 UG/DL — SIGNIFICANT CHANGE UP
CORTISOL, FREE RESULT: 2.2 UG/DL — HIGH
CREAT SERPL-MCNC: 0.44 MG/DL — LOW (ref 0.5–1.3)
CULTURE RESULTS: SIGNIFICANT CHANGE UP
CULTURE RESULTS: SIGNIFICANT CHANGE UP
EGFR: 105 ML/MIN/1.73M2 — SIGNIFICANT CHANGE UP
GLUCOSE BLDC GLUCOMTR-MCNC: 115 MG/DL — HIGH (ref 70–99)
GLUCOSE SERPL-MCNC: 75 MG/DL — SIGNIFICANT CHANGE UP (ref 70–99)
HCT VFR BLD CALC: 31.6 % — LOW (ref 34.5–45)
HGB BLD-MCNC: 10 G/DL — LOW (ref 11.5–15.5)
MAGNESIUM SERPL-MCNC: 2.5 MG/DL — SIGNIFICANT CHANGE UP (ref 1.6–2.6)
MCHC RBC-ENTMCNC: 29.5 PG — SIGNIFICANT CHANGE UP (ref 27–34)
MCHC RBC-ENTMCNC: 31.6 GM/DL — LOW (ref 32–36)
MCV RBC AUTO: 93.2 FL — SIGNIFICANT CHANGE UP (ref 80–100)
NRBC # BLD: 0 /100 WBCS — SIGNIFICANT CHANGE UP (ref 0–0)
PHOSPHATE SERPL-MCNC: 4 MG/DL — SIGNIFICANT CHANGE UP (ref 2.5–4.5)
PLATELET # BLD AUTO: 159 K/UL — SIGNIFICANT CHANGE UP (ref 150–400)
POTASSIUM SERPL-MCNC: 4.8 MMOL/L — SIGNIFICANT CHANGE UP (ref 3.5–5.3)
POTASSIUM SERPL-MCNC: 5.6 MMOL/L — HIGH (ref 3.5–5.3)
POTASSIUM SERPL-SCNC: 4.8 MMOL/L — SIGNIFICANT CHANGE UP (ref 3.5–5.3)
POTASSIUM SERPL-SCNC: 5.6 MMOL/L — HIGH (ref 3.5–5.3)
PROT SERPL-MCNC: 6.7 G/DL — SIGNIFICANT CHANGE UP (ref 6–8.3)
RBC # BLD: 3.39 M/UL — LOW (ref 3.8–5.2)
RBC # FLD: 19.8 % — HIGH (ref 10.3–14.5)
SODIUM SERPL-SCNC: 138 MMOL/L — SIGNIFICANT CHANGE UP (ref 135–145)
SPECIMEN SOURCE: SIGNIFICANT CHANGE UP
SPECIMEN SOURCE: SIGNIFICANT CHANGE UP
WBC # BLD: 94.07 K/UL — CRITICAL HIGH (ref 3.8–10.5)
WBC # FLD AUTO: 94.07 K/UL — CRITICAL HIGH (ref 3.8–10.5)

## 2024-05-15 PROCEDURE — 99233 SBSQ HOSP IP/OBS HIGH 50: CPT

## 2024-05-15 PROCEDURE — 71045 X-RAY EXAM CHEST 1 VIEW: CPT | Mod: 26

## 2024-05-15 PROCEDURE — 31615 TRCHEOBRNCHSC EST TRACHS INC: CPT

## 2024-05-15 PROCEDURE — 99231 SBSQ HOSP IP/OBS SF/LOW 25: CPT

## 2024-05-15 PROCEDURE — 71045 X-RAY EXAM CHEST 1 VIEW: CPT | Mod: 26,77

## 2024-05-15 PROCEDURE — 99221 1ST HOSP IP/OBS SF/LOW 40: CPT | Mod: 25

## 2024-05-15 RX ORDER — CLONAZEPAM 1 MG
1 TABLET ORAL ONCE
Refills: 0 | Status: DISCONTINUED | OUTPATIENT
Start: 2024-05-15 | End: 2024-05-15

## 2024-05-15 RX ORDER — ACETAMINOPHEN 500 MG
1000 TABLET ORAL ONCE
Refills: 0 | Status: COMPLETED | OUTPATIENT
Start: 2024-05-15 | End: 2024-05-15

## 2024-05-15 RX ADMIN — Medication 1 TABLET(S): at 05:05

## 2024-05-15 RX ADMIN — MIDODRINE HYDROCHLORIDE 30 MILLIGRAM(S): 2.5 TABLET ORAL at 22:40

## 2024-05-15 RX ADMIN — Medication 3 MILLILITER(S): at 21:03

## 2024-05-15 RX ADMIN — QUETIAPINE FUMARATE 150 MILLIGRAM(S): 200 TABLET, FILM COATED ORAL at 22:39

## 2024-05-15 RX ADMIN — ENOXAPARIN SODIUM 110 MILLIGRAM(S): 100 INJECTION SUBCUTANEOUS at 22:40

## 2024-05-15 RX ADMIN — Medication 1.5 MILLIGRAM(S): at 05:05

## 2024-05-15 RX ADMIN — Medication 650 MILLIGRAM(S): at 14:46

## 2024-05-15 RX ADMIN — QUETIAPINE FUMARATE 25 MILLIGRAM(S): 200 TABLET, FILM COATED ORAL at 22:39

## 2024-05-15 RX ADMIN — ENOXAPARIN SODIUM 110 MILLIGRAM(S): 100 INJECTION SUBCUTANEOUS at 09:32

## 2024-05-15 RX ADMIN — Medication 1.5 MILLIGRAM(S): at 17:26

## 2024-05-15 RX ADMIN — MIDODRINE HYDROCHLORIDE 30 MILLIGRAM(S): 2.5 TABLET ORAL at 05:07

## 2024-05-15 RX ADMIN — CHLORHEXIDINE GLUCONATE 15 MILLILITER(S): 213 SOLUTION TOPICAL at 17:25

## 2024-05-15 RX ADMIN — CHLORHEXIDINE GLUCONATE 15 MILLILITER(S): 213 SOLUTION TOPICAL at 05:05

## 2024-05-15 RX ADMIN — QUETIAPINE FUMARATE 25 MILLIGRAM(S): 200 TABLET, FILM COATED ORAL at 00:17

## 2024-05-15 RX ADMIN — POLYETHYLENE GLYCOL 3350 17 GRAM(S): 17 POWDER, FOR SOLUTION ORAL at 05:07

## 2024-05-15 RX ADMIN — CHLORHEXIDINE GLUCONATE 1 APPLICATION(S): 213 SOLUTION TOPICAL at 05:06

## 2024-05-15 RX ADMIN — Medication 650 MILLIGRAM(S): at 00:23

## 2024-05-15 RX ADMIN — QUETIAPINE FUMARATE 25 MILLIGRAM(S): 200 TABLET, FILM COATED ORAL at 05:06

## 2024-05-15 RX ADMIN — POLYETHYLENE GLYCOL 3350 17 GRAM(S): 17 POWDER, FOR SOLUTION ORAL at 17:25

## 2024-05-15 RX ADMIN — Medication 400 MILLIGRAM(S): at 20:35

## 2024-05-15 RX ADMIN — Medication 650 MILLIGRAM(S): at 14:50

## 2024-05-15 RX ADMIN — CHLORHEXIDINE GLUCONATE 1 APPLICATION(S): 213 SOLUTION TOPICAL at 17:26

## 2024-05-15 RX ADMIN — Medication 1000 MILLIGRAM(S): at 21:35

## 2024-05-15 RX ADMIN — Medication 1 TABLET(S): at 17:25

## 2024-05-15 RX ADMIN — SENNA PLUS 10 MILLILITER(S): 8.6 TABLET ORAL at 22:40

## 2024-05-15 RX ADMIN — Medication 3 MILLILITER(S): at 14:29

## 2024-05-15 RX ADMIN — QUETIAPINE FUMARATE 25 MILLIGRAM(S): 200 TABLET, FILM COATED ORAL at 13:19

## 2024-05-15 RX ADMIN — NYSTATIN CREAM 1 APPLICATION(S): 100000 CREAM TOPICAL at 00:20

## 2024-05-15 RX ADMIN — Medication 3 MILLILITER(S): at 05:29

## 2024-05-15 RX ADMIN — MIDODRINE HYDROCHLORIDE 30 MILLIGRAM(S): 2.5 TABLET ORAL at 13:24

## 2024-05-15 NOTE — BH CONSULTATION LIAISON PROGRESS NOTE - NSBHCHARTREVIEWLAB_PSY_A_CORE FT
10.0   94.07 )-----------( 159      ( 15 May 2024 06:52 )             31.6     05-15    x   |  x   |  x   ----------------------------<  x   4.8   |  x   |  x     Ca    9.6      15 May 2024 06:53  Phos  4.0     05-15  Mg     2.5     05-15    TPro  6.7  /  Alb  2.7<L>  /  TBili  0.3  /  DBili  x   /  AST  31  /  ALT  7<L>  /  AlkPhos  92  05-15                                               10.0   94.07 )-----------( 159      ( 15 May 2024 06:52 )             31.6     05-15    x   |  x   |  x   ----------------------------<  x   4.8   |  x   |  x     Ca    9.6      15 May 2024 06:53  Phos  4.0     05-15  Mg     2.5     05-15    TPro  6.7  /  Alb  2.7<L>  /  TBili  0.3  /  DBili  x   /  AST  31  /  ALT  7<L>  /  AlkPhos  92  05-15

## 2024-05-15 NOTE — CHART NOTE - NSCHARTNOTEFT_GEN_A_CORE
MEDICINE NP    NITA FRIEDMAN  69y Female    Patient is a 69y old  Female who presents with a chief complaint of Transfer for leukophoresis (15 May 2024 07:09)         > Event Summary:   Patient with TC ATC overnight, now s/p RRT  with Critical Airway for Trach dislodged / pulled out and difficulty to re-inset.   Now, s/p  #7 portex cuffed trach inserted by ENT.  Patient back to vent support.   f/u CXR .   During RRT patient noted with Hypotension SBP 80's.  IVF Bolus x1 Liter infusing with with rpt SBP 90's after ~250ml.  F/u once bolus is complete and monitor VS.  F/u Post RRT recommendations  Endorsed to day provider Jazmine, and Attending to follow         -Vital Signs Last 24 Hrs  T(C): 36.9 (15 May 2024 06:10), Max: 37.6 (14 May 2024 18:02)  T(F): 98.4 (15 May 2024 06:10), Max: 99.6 (14 May 2024 18:02)  HR: 105 (15 May 2024 06:10) (92 - 113)  BP: 110/59 (15 May 2024 06:10) (107/51 - 125/63)  RR: 18 (15 May 2024 06:10) (18 - 20)  SpO2: 100% (15 May 2024 06:10) (85% - 100%)    Parameters below as of 15 May 2024 05:41  Patient On (Oxygen Delivery Method): ventilator        María Elena Schmid, [facility]  Medicine Department

## 2024-05-15 NOTE — BH CONSULTATION LIAISON PROGRESS NOTE - NSBHMSEPERCEPT_PSY_A_CORE
Future Appointments:  No future appointments. Last Appointment With Me:  3/28/2022     Requested Prescriptions     Pending Prescriptions Disp Refills    metFORMIN ER (GLUCOPHAGE XR) 500 mg tablet 270 Tablet 1     Sig: Take 1 tab by mouth in the morning and 2 tabs in the evening     Patient now taking 4 per day and is almost out.
No abnormalities
No abnormalities

## 2024-05-15 NOTE — CONSULT NOTE ADULT - ASSESSMENT
70yo female with PMHx CVA (bedbound at baseline), COPD, CHF, HTN who initially p/w acute shortness of breath to outside ED and was treated for acute pulmonary edema with sublingual nitro x 2, Lasix, and BiPAP. Pt transferred to [place] on 4/16/24 for white count of 233 and possible leukophoresis. In the ED, pt intubated iso respiratory tiring and decreasing mental status. Pt was started on pressors and admitted to the MICU for AHRF and septic shock. Course c/b empyema s/p chest tube placement (4/16) and removal, reaccumulation of loculated pleural effusion s/p L pigtail placement and removal on 4/27. S/p trach on 4/28.  S/p PEG 5/7. Transferred to RCU 5/9. Per team, RRT called after pt pulled out trach tube and upon reinsertion developed crepitus. Team was unable to pass suction catheter through trach tube and was having difficulty bagging through tube. Pt has been tolerating trach collar during the day, on vent at night. Tracheoscopy shows trach tube in false passage. #7 portex cuffed trach removed and reinserted in proper position centered in trachea. Carinal bifurcation visualized. Pt tolerated procedure well. Cuff inflated and pt placed on vent, ventilating well. O2 sats stable.

## 2024-05-15 NOTE — PROGRESS NOTE ADULT - PROBLEM SELECTOR PLAN 3
intubated on arrival to [facility] ED for respiratory tiring and AMS  - unable to be extubated  - s/p trach 4/28  - mechanical vent: Volume Ctrl 14/460/5/40%  - weaning as tolerated  - aggressive airway management with Duoneb, chest PT and suctioning PRN

## 2024-05-15 NOTE — CONSULT NOTE ADULT - SUBJECTIVE AND OBJECTIVE BOX
CC: RRT for dislodged trach tube    HPI: 70yo female with PMHx CVA (bedbound at baseline), COPD, CHF, HTN who initially p/w acute shortness of breath to outside ED and was treated for acute pulmonary edema with sublingual nitro x 2, Lasix, and BiPAP. Pt transferred to [place] on 4/16/24 for white count of 233 and possible leukophoresis. In the ED, pt intubated iso respiratory tiring and decreasing mental status. Pt was started on pressors and admitted to the MICU for AHRF and septic shock. Course c/b empyema s/p chest tube placement (4/16) and removal, reaccumulation of loculated pleural effusion s/p L pigtail placement and removal on 4/27. S/p trach on 4/28.  S/p PEG 5/7. Transferred to RCU 5/9. Per team, RRT called after pt pulled out trach tube and upon reinsertion developed crepitus. Team was unable to pass suction catheter through trach tube and was having difficulty bagging through tube. Pt has been tolerating trach collar during the day, on vent at night.         PAST MEDICAL & SURGICAL HISTORY:  Acute CHF      COPD, severe        Allergies    No Known Allergies    Intolerances      MEDICATIONS  (STANDING):  albuterol/ipratropium for Nebulization 3 milliLiter(s) Nebulizer every 8 hours  amoxicillin  875 milliGRAM(s)/clavulanate 1 Tablet(s) Oral every 12 hours  chlorhexidine 0.12% Liquid 15 milliLiter(s) Oral Mucosa every 12 hours  chlorhexidine 4% Liquid 1 Application(s) Topical every 12 hours  clonazePAM  Tablet 1.5 milliGRAM(s) Oral every 12 hours  enoxaparin Injectable 110 milliGRAM(s) SubCutaneous every 12 hours  midodrine 30 milliGRAM(s) Oral three times a day  polyethylene glycol 3350 17 Gram(s) Oral every 12 hours  QUEtiapine 25 milliGRAM(s) Oral three times a day  QUEtiapine 150 milliGRAM(s) Oral at bedtime  senna Syrup 10 milliLiter(s) Oral at bedtime    MEDICATIONS  (PRN):  acetaminophen   Oral Liquid .. 650 milliGRAM(s) Oral every 6 hours PRN Moderate Pain (4 - 6)  acetaminophen   Oral Liquid .. 650 milliGRAM(s) Oral every 6 hours PRN Temp greater or equal to 38C (100.4F)  nystatin Powder 1 Application(s) Topical two times a day PRN skin irritation  QUEtiapine 25 milliGRAM(s) Oral every 6 hours PRN agitation      Social History: see H&P    Family history: see H&P    ROS:   unable to obtain due to pt's condition     Vital Signs Last 24 Hrs  T(C): 36.9 (15 May 2024 06:10), Max: 37.6 (14 May 2024 18:02)  T(F): 98.4 (15 May 2024 06:10), Max: 99.6 (14 May 2024 18:02)  HR: 105 (15 May 2024 06:10) (92 - 113)  BP: 110/59 (15 May 2024 06:10) (107/51 - 125/63)  BP(mean): --  RR: 18 (15 May 2024 06:10) (18 - 20)  SpO2: 100% (15 May 2024 06:10) (85% - 100%)    Parameters below as of 15 May 2024 05:41  Patient On (Oxygen Delivery Method): ventilator                              10.0   x     )-----------( 159      ( 15 May 2024 06:52 )             31.6    05-14    141  |  104  |  10  ----------------------------<  98  4.9   |  28  |  0.47<L>    Ca    8.9      14 May 2024 07:06  Phos  4.3     05-14  Mg     2.2     05-14    TPro  5.7<L>  /  Alb  2.5<L>  /  TBili  0.2  /  DBili  x   /  AST  14  /  ALT  6<L>  /  AlkPhos  76  05-14       PHYSICAL EXAM:  Gen: Mild acute distress  Skin: No rashes, bruises, or lesions  Head: Normocephalic, Atraumatic  Face: no edema, erythema, or fluctuance. Parotid glands soft without mass  Eyes: no scleral injection  Nose: Nares bilaterally patent, no discharge  Mouth: Face masking in place, No Stridor / Drooling / Trismus.   Neck: #7 Portex cuffed trach tube dislodged from stoma, + crepitus. Trach tube reinserted into proper position using scope, secured with velcro strap. Blood-tinged secretions suctioned, cuff inflated and trach tube connected to venting. Pt ventilating well. Trachea midline, no masses  Lymphatic: No lymphadenopathy  Resp: difficulty breathing, no stridor noted  CV: no peripheral edema/cyanosis  GI: nondistended   Peripheral vascular: no JVD or edema  Neuro: facial nerve intact, no facial droop    Tracheoscopy (scope #2 used):  Scope advanced through trach tube, unable to visualize trachea - trach in false passage. Trach tube removed and reinserted using scope as a guide. Trach tube in proper position, centered in trachea. Carinal bifurcation visualized. No obstructions noted.

## 2024-05-15 NOTE — BH CONSULTATION LIAISON PROGRESS NOTE - NSBHCONSULTRECOMMENDOTHER_PSY_A_CORE FT
can inc seroquel to 150mg qhs, cont 25mg tid, seroquel 25mg po q6hr prn agitation , can inc klonopin to 0.5mg in afternoon, cont rest of doses 
can inc seroquel to 150mg qhs, cont 25mg tid, seroquel 25mg po q6hr prn agitation , can inc klonopin to 0.5mg in afternoon, cont rest of doses

## 2024-05-15 NOTE — BH CONSULTATION LIAISON PROGRESS NOTE - CURRENT MEDICATION
MEDICATIONS  (STANDING):  albuterol/ipratropium for Nebulization 3 milliLiter(s) Nebulizer every 8 hours  amoxicillin  875 milliGRAM(s)/clavulanate 1 Tablet(s) Oral every 12 hours  chlorhexidine 0.12% Liquid 15 milliLiter(s) Oral Mucosa every 12 hours  chlorhexidine 4% Liquid 1 Application(s) Topical every 12 hours  clonazePAM  Tablet 1.5 milliGRAM(s) Oral every 12 hours  enoxaparin Injectable 110 milliGRAM(s) SubCutaneous every 12 hours  midodrine 30 milliGRAM(s) Oral three times a day  polyethylene glycol 3350 17 Gram(s) Oral every 12 hours  QUEtiapine 150 milliGRAM(s) Oral at bedtime  QUEtiapine 25 milliGRAM(s) Oral three times a day  senna Syrup 10 milliLiter(s) Oral at bedtime    MEDICATIONS  (PRN):  acetaminophen   Oral Liquid .. 650 milliGRAM(s) Oral every 6 hours PRN Moderate Pain (4 - 6)  acetaminophen   Oral Liquid .. 650 milliGRAM(s) Oral every 6 hours PRN Temp greater or equal to 38C (100.4F)  nystatin Powder 1 Application(s) Topical two times a day PRN skin irritation  QUEtiapine 25 milliGRAM(s) Oral every 6 hours PRN agitation  
MEDICATIONS  (STANDING):  albuterol/ipratropium for Nebulization 3 milliLiter(s) Nebulizer every 8 hours  amoxicillin  875 milliGRAM(s)/clavulanate 1 Tablet(s) Oral every 12 hours  chlorhexidine 0.12% Liquid 15 milliLiter(s) Oral Mucosa every 12 hours  chlorhexidine 4% Liquid 1 Application(s) Topical every 12 hours  clonazePAM  Tablet 1 milliGRAM(s) Oral every 12 hours  droxidopa 100 milliGRAM(s) Oral three times a day  enoxaparin Injectable 110 milliGRAM(s) SubCutaneous every 12 hours  insulin lispro (ADMELOG) corrective regimen sliding scale   SubCutaneous every 6 hours  midodrine 30 milliGRAM(s) Oral three times a day  polyethylene glycol 3350 17 Gram(s) Oral every 12 hours  QUEtiapine 100 milliGRAM(s) Oral at bedtime  QUEtiapine 25 milliGRAM(s) Oral three times a day  senna Syrup 10 milliLiter(s) Oral at bedtime    MEDICATIONS  (PRN):  acetaminophen   Oral Liquid .. 650 milliGRAM(s) Oral every 6 hours PRN Moderate Pain (4 - 6)  acetaminophen   Oral Liquid .. 650 milliGRAM(s) Oral every 6 hours PRN Temp greater or equal to 38C (100.4F)  nystatin Powder 1 Application(s) Topical two times a day PRN skin irritation

## 2024-05-15 NOTE — BH CONSULTATION LIAISON PROGRESS NOTE - NSBHASSESSMENTFT_PSY_ALL_CORE
70 y/o female, hx depression, no prior psych admissions, not in current psych tx, h/o CVA (bedbound at baseline) COPD, CHF, HTN, /w acute shortness of breath to outside ED, Initially treated for acute pulmonary edema with sublingual nitro x 2, Lasix, and BiPAP. Pt was also found to be febrile to 103, so treated for PNA, psych consulted for depression, SI.   Pt s/p trach, difficult to understand speech at times, pt is able to mouth some words. Pt states she has been feeling depressed for some time, worse since her mother  in April. Pt reports poor sleep, dec energy, denies active si/hi. Pt stated she didn't want to live sometimes, feels tired of living. pt overwhlemed with her medical issues. pt denies active si/i/p, no hx suicide attempts. denies psychosis, kimmie.

## 2024-05-15 NOTE — CONSULT NOTE ADULT - PROBLEM SELECTOR RECOMMENDATION 9
- ensure velcro strap is secured  - HOB elevation  - Suction PRN  - Continue trach care per RCU  - call with questions or concerns x [phone]
ppsv 30%: pt requires assistance with all adl, care, turn and positioning

## 2024-05-15 NOTE — RAPID RESPONSE TEAM SUMMARY - NSSITUATIONBACKGROUNDRRT_GEN_ALL_CORE
RRT called for tracheostomy collar tube coming off with subsequent hypoxia ~75%. On arrival, patient hypoxic SpO2 ~83%, hemodynamically stable SBP >120, tachycardic HR ~120's. Lung exam with good airway movement appreciated. Patient was alert and responsive and answered questions appropriately and appeared oriented to situation. Attempted suctioning and bag mask but met with resistance. Attempt made to open tracheostomy opening but met with resistance. Patient subsequently developed neck subcutaneous emphysema. Placed on NRB and SpO2 >95%. ENT and CODE airway called. ENT arrived at bedside and scope airway via tracheostomy opening and appropriately placed tracheostomy tube collar. Patient SpO2 improved to 100% and less tachycardic ~104. However, patient became hypotensive with MAP's ~62 in setting of recently receiving Seroquel and clonopin at ~5am. Ordered LR 1L bolus --> MAP improved >65 x2 readings. CXR ordered to rule pneumothorax. Primary team at bedside. Advised to consider MICU consult if persistent hypotension or other concerns arise.

## 2024-05-15 NOTE — PROGRESS NOTE ADULT - PROBLEM SELECTOR PLAN 2
on admission WBC count 233  - transferred to [facility] for possible leukophoresis  - as per heme/onc - underlying CLL (Oct 2023) with reactive lymphocytosis 2/2 sepsis  - heme/onc deferred leukophoresis due to mature lymphocytes  - IVIG given 4/26/24  - CLL under control and plan for outpatient follow up at [facility] when patient closer to d/c

## 2024-05-15 NOTE — PROGRESS NOTE ADULT - PROBLEM SELECTOR PLAN 1
presented with shortness of breath   - treated for acute pulm edema with lasix, SL nitro x2 and bipap  - POCUS - dense LLL consolidation that contains dynamic air bronchograms consistent with PNA with moderate to large pleural effusion  - s/p chest tube and pigtail 4/24-4/27  - treated with ceftriaxone 4/18-4/25 for s. pneumo and empyema  - continuing with Augmentin PO x4 weeks (completion date 5/14) as per ID presented with shortness of breath   - treated for acute pulm edema with Lasix, SL nitro x2 and bipap  - POCUS - dense LLL consolidation that contains dynamic air bronchograms consistent with PNA with moderate to large pleural effusion  - s/p chest tube and pigtail 4/24-4/27  - treated with ceftriaxone 4/18-4/25 for s. pneumo and empyema  - continuing with Augmentin PO x4 weeks (completion date 5/14) as per ID

## 2024-05-15 NOTE — CONSULT NOTE ADULT - NS ATTEND AMEND GEN_ALL_CORE FT
ENT consulted for trach dislodge    70yo female with PMHx CVA (bedbound at baseline), COPD, CHF, HTN who initially p/w acute shortness of breath to outside ED and was treated for acute pulmonary edema with sublingual nitro x 2, Lasix, and BiPAP. Pt transferred to [place] on 4/16/24 for white count of 233 and possible leukophoresis. In the ED, pt intubated iso respiratory tiring and decreasing mental status. Pt was started on pressors and admitted to the MICU for AHRF and septic shock. Course c/b empyema s/p chest tube placement (4/16) and removal, reaccumulation of loculated pleural effusion s/p L pigtail placement and removal on 4/27. S/p trach on 4/28.  S/p PEG 5/7. Transferred to RCU 5/9.     Per team, RRT called after pt pulled out trach tube and upon reinsertion developed crepitus. Team was unable to pass suction catheter through trach tube and was having difficulty bagging through tube. Pt has been tolerating trach collar during the day, on vent at night.     Tracheoscopy shows trach tube in false passage. #7 portex cuffed trach removed and reinserted in proper position centered in trachea. Carinal bifurcation visualized. Pt tolerated procedure well. Cuff inflated and pt placed on vent, ventilating well. O2 sats stable.    Recommend  Trach Dislodge False Passage  - ensure velcro strap is secured  - HOB elevation  - Suction PRN  - Continue trach care per RCU  - call with questions or concerns x [phone]

## 2024-05-15 NOTE — PROGRESS NOTE ADULT - SUBJECTIVE AND OBJECTIVE BOX
Patient is a 69y old  Female who presents with a chief complaint of Transfer for leukophoresis (15 May 2024 07:09)    HPI:  69F h/o CVA (bedbound at baseline), COPD, CHF, HTN p/w acute shortness of breath to outside ED. Initially treated for acute pulmonary edema with sublingual nitro x 2, Lasix, and BiPAP. Pt was also found to be febrile to 103, so treated for PNA. BIPAP led to improvement in O2 to 89-90. Pt now transferred to [place] for white count of 233 and plan for possible leukophoresis. In ED, pt intubated iso respiratory tiring and decreasing mental status. Also started on levo for hypotn.  (16 Apr 2024 12:59)    Interval Events:    REVIEW OF SYSTEMS:  [ ] Positive  [ ] All other systems negative  [ ] Unable to assess ROS because ________    Vital Signs Last 24 Hrs  T(C): 36.9 (05-15-24 @ 06:10), Max: 37.6 (05-14-24 @ 18:02)  T(F): 98.4 (05-15-24 @ 06:10), Max: 99.6 (05-14-24 @ 18:02)  HR: 105 (05-15-24 @ 06:10) (92 - 113)  BP: 110/59 (05-15-24 @ 06:10) (107/51 - 125/63)  RR: 18 (05-15-24 @ 06:10) (18 - 20)  SpO2: 100% (05-15-24 @ 06:10) (85% - 100%)    PHYSICAL EXAM:  HEENT:   [ ]Tracheostomy:  [ ]Pupils equal  [ ]No oral lesions  [ ]Abnormal    SKIN  [ ] No Rash  [ ] Abnormal  [ ] pressure    CARDIAC  [ ]Regular  [ ]Abnormal    PULMONARY  [ ]Bilateral Clear Breath Sounds  [ ]Normal Excursion  [ ]Abnormal    GI  [ ]PEG      [ ] +BS		              [ ]Soft, nondistended, nontender	  [ ]Abnormal    MUSCULOSKELETAL                                   [ ]Bedbound                 [ ]Abnormal    [ ]Ambulatory/OOB to chair                           EXTREMITIES                                         [ ]Normal  [ ]Edema                           NEUROLOGIC  [ ] Normal, non focal  [ ] Focal findings:    PSYCHIATRIC  [ ]Alert and appropriate  [ ] Sedated	 [ ]Agitated    :  Sina: [ ] Yes, if yes: Date of Placement:                   [  ] No    LINES: Central Lines [ ] Yes, if yes: Date of Placement                                     [  ] No    HOSPITAL MEDICATIONS:  MEDICATIONS  (STANDING):  albuterol/ipratropium for Nebulization 3 milliLiter(s) Nebulizer every 8 hours  amoxicillin  875 milliGRAM(s)/clavulanate 1 Tablet(s) Oral every 12 hours  chlorhexidine 0.12% Liquid 15 milliLiter(s) Oral Mucosa every 12 hours  chlorhexidine 4% Liquid 1 Application(s) Topical every 12 hours  clonazePAM  Tablet 1.5 milliGRAM(s) Oral every 12 hours  enoxaparin Injectable 110 milliGRAM(s) SubCutaneous every 12 hours  midodrine 30 milliGRAM(s) Oral three times a day  polyethylene glycol 3350 17 Gram(s) Oral every 12 hours  QUEtiapine 25 milliGRAM(s) Oral three times a day  QUEtiapine 150 milliGRAM(s) Oral at bedtime  senna Syrup 10 milliLiter(s) Oral at bedtime    MEDICATIONS  (PRN):  acetaminophen   Oral Liquid .. 650 milliGRAM(s) Oral every 6 hours PRN Temp greater or equal to 38C (100.4F)  acetaminophen   Oral Liquid .. 650 milliGRAM(s) Oral every 6 hours PRN Moderate Pain (4 - 6)  nystatin Powder 1 Application(s) Topical two times a day PRN skin irritation  QUEtiapine 25 milliGRAM(s) Oral every 6 hours PRN agitation      LABS:                        10.0   94.07 )-----------( 159      ( 15 May 2024 06:52 )             31.6     05-15    138  |  102  |  11  ----------------------------<  75  5.6<H>   |  23  |  0.44<L>    Ca    9.6      15 May 2024 06:53  Phos  4.0     05-15  Mg     2.5     05-15    TPro  6.7  /  Alb  2.7<L>  /  TBili  0.3  /  DBili  x   /  AST  31  /  ALT  7<L>  /  AlkPhos  92  05-15      Mode: AC/ CMV (Assist Control/ Continuous Mandatory Ventilation)  RR (machine): 14  TV (machine): 460  FiO2: 40  PEEP: 5  ITime: 1  MAP: 11  PIP: 27 Patient is a 69y old  Female who presents with a chief complaint of Transfer for leukophoresis (15 May 2024 07:09)    HPI:  69F h/o CVA (bedbound at baseline), COPD, CHF, HTN p/w acute shortness of breath to outside ED. Initially treated for acute pulmonary edema with sublingual nitro x 2, Lasix, and BiPAP. Pt was also found to be febrile to 103, so treated for PNA. BIPAP led to improvement in O2 to 89-90. Pt now transferred to [place] for white count of 233 and plan for possible leukophoresis. In ED, pt intubated iso respiratory tiring and decreasing mental status. Also started on levo for hypotn.  (16 Apr 2024 12:59)    Interval Events: Overnight patient decannulated self. Trach was reinserted and patient was placed on vent. Unable to suction and TV were low.  ENT called, trach noted to be in false lumen, noted SQ emphysema on left apical chest area. Mary Jo was visualized and new trach placed.    REVIEW OF SYSTEMS:  [ ] Positive  [x ] All other systems negative  [ ] Unable to assess ROS because ________    Vital Signs Last 24 Hrs  T(C): 36.9 (05-15-24 @ 06:10), Max: 37.6 (05-14-24 @ 18:02)  T(F): 98.4 (05-15-24 @ 06:10), Max: 99.6 (05-14-24 @ 18:02)  HR: 105 (05-15-24 @ 06:10) (92 - 113)  BP: 110/59 (05-15-24 @ 06:10) (107/51 - 125/63)  RR: 18 (05-15-24 @ 06:10) (18 - 20)  SpO2: 100% (05-15-24 @ 06:10) (85% - 100%)    PHYSICAL EXAM:  HEENT:   [ ]Tracheostomy:  [ ]Pupils equal  [ ]No oral lesions  [ ]Abnormal    SKIN  [ ] No Rash  [ ] Abnormal  [ ] pressure    CARDIAC  [ ]Regular  [ ]Abnormal    PULMONARY  [ ]Bilateral Clear Breath Sounds  [ ]Normal Excursion  [ ]Abnormal    GI  [ ]PEG      [ ] +BS		              [ ]Soft, nondistended, nontender	  [ ]Abnormal    MUSCULOSKELETAL                                   [ ]Bedbound                 [ ]Abnormal    [ ]Ambulatory/OOB to chair                           EXTREMITIES                                         [ ]Normal  [ ]Edema                           NEUROLOGIC  [ ] Normal, non focal  [ ] Focal findings:    PSYCHIATRIC  [ ]Alert and appropriate  [ ] Sedated	 [ ]Agitated    :  Andino: [ ] Yes, if yes: Date of Placement:                   [  ] No    LINES: Central Lines [ ] Yes, if yes: Date of Placement                                     [  ] No    HOSPITAL MEDICATIONS:  MEDICATIONS  (STANDING):  albuterol/ipratropium for Nebulization 3 milliLiter(s) Nebulizer every 8 hours  amoxicillin  875 milliGRAM(s)/clavulanate 1 Tablet(s) Oral every 12 hours  chlorhexidine 0.12% Liquid 15 milliLiter(s) Oral Mucosa every 12 hours  chlorhexidine 4% Liquid 1 Application(s) Topical every 12 hours  clonazePAM  Tablet 1.5 milliGRAM(s) Oral every 12 hours  enoxaparin Injectable 110 milliGRAM(s) SubCutaneous every 12 hours  midodrine 30 milliGRAM(s) Oral three times a day  polyethylene glycol 3350 17 Gram(s) Oral every 12 hours  QUEtiapine 25 milliGRAM(s) Oral three times a day  QUEtiapine 150 milliGRAM(s) Oral at bedtime  senna Syrup 10 milliLiter(s) Oral at bedtime    MEDICATIONS  (PRN):  acetaminophen   Oral Liquid .. 650 milliGRAM(s) Oral every 6 hours PRN Temp greater or equal to 38C (100.4F)  acetaminophen   Oral Liquid .. 650 milliGRAM(s) Oral every 6 hours PRN Moderate Pain (4 - 6)  nystatin Powder 1 Application(s) Topical two times a day PRN skin irritation  QUEtiapine 25 milliGRAM(s) Oral every 6 hours PRN agitation      LABS:                        10.0   94.07 )-----------( 159      ( 15 May 2024 06:52 )             31.6     05-15    138  |  102  |  11  ----------------------------<  75  5.6<H>   |  23  |  0.44<L>    Ca    9.6      15 May 2024 06:53  Phos  4.0     05-15  Mg     2.5     05-15    TPro  6.7  /  Alb  2.7<L>  /  TBili  0.3  /  DBili  x   /  AST  31  /  ALT  7<L>  /  AlkPhos  92  05-15      Mode: AC/ CMV (Assist Control/ Continuous Mandatory Ventilation)  RR (machine): 14  TV (machine): 460  FiO2: 40  PEEP: 5  ITime: 1  MAP: 11  PIP: 27

## 2024-05-15 NOTE — BH CONSULTATION LIAISON PROGRESS NOTE - NSBHFUPINTERVALHXFT_PSY_A_CORE
pt still reports some anxiety  denies si/hi, still feels depressed, better sleep. denies psyhosis, kimmie. no prns given.  pt still reports some anxiety  denies si/hi, still feels depressed, better sleep. denies psyhosis, kimmie. pt igven seroquel prn. as per staff, pt pulling lines, agitated at times.

## 2024-05-15 NOTE — BH CONSULTATION LIAISON PROGRESS NOTE - NSBHCHARTREVIEWVS_PSY_A_CORE FT
Vital Signs Last 24 Hrs  T(C): 37.1 (10 May 2024 10:26), Max: 37.8 (09 May 2024 21:58)  T(F): 98.7 (10 May 2024 10:26), Max: 100.1 (10 May 2024 00:00)  HR: 98 (10 May 2024 10:26) (84 - 108)  BP: 116/52 (10 May 2024 10:26) (107/56 - 121/64)  BP(mean): --  RR: 18 (10 May 2024 10:26) (14 - 20)  SpO2: 95% (10 May 2024 10:26) (95% - 100%)    Parameters below as of 10 May 2024 10:26  Patient On (Oxygen Delivery Method): tracheostomy collar    O2 Concentration (%): 30
Vital Signs Last 24 Hrs  T(C): 37.4 (15 May 2024 13:06), Max: 37.6 (14 May 2024 18:02)  T(F): 99.4 (15 May 2024 13:06), Max: 99.6 (14 May 2024 18:02)  HR: 100 (15 May 2024 14:35) (95 - 113)  BP: 106/59 (15 May 2024 13:06) (106/59 - 125/63)  BP(mean): --  RR: 19 (15 May 2024 13:06) (18 - 23)  SpO2: 98% (15 May 2024 14:35) (93% - 100%)    Parameters below as of 15 May 2024 13:06  Patient On (Oxygen Delivery Method): ventilator

## 2024-05-15 NOTE — PROGRESS NOTE ADULT - NS ATTEND AMEND GEN_ALL_CORE FT
Pt is a 69F with MHx CVA, COPD, CHF, and HTN initially presenting to [facility] on 4/16/24 from OSH for hyperleukocytosis concerning for acute leukemia c/b acute hypoxemic respiratory failure and septic shock 2/2 Strep pneumonia bacteremia i/s/o pneumonia with empyema s/p chest tube placement x2 with MISTII with failure to extubation from MV s/p tracheostomy placement now transferred to RCU on 5/8 for further medical management.     Neuro:  -Mental status baseline, she is able to communicate spontaneously and express needs/concerns.   -Physical debility and functional dependence i/s/o critical illness with hx CVA and wheelchair/cane dependence.   PT/OT consulted.   -Hx of anxiety/MDD with passive SI. [facility] Psych Team consulted on this admission, initiated on seroquel +clonazepam.   -Will continue and titrate as per  Team. No further SI thoughts.   - Likely had period of delerium that caused her to remove her trach in the middle of the night.     Pulm:  -Acute hypoxemic respiratory failure 2/2 strep pneumonia empyema c/b bacteremia s/p chest tube x2 with MIST therapy  -Tracheostomy placement.   -Was tolerating SBTs and TC and PMV . Will start TCATC starting 5/14 but removed trach on 5/15  - Now back on AC for 24 hours and will reattempt TC    Cards: Hemodynamically stable at this time. Discontinue continue midodrine    ID:  - Complete 4 week course of abx (through 5/14) for empyema.     GI:  -Oropharyngeal dysphagia s/p PEG placement (5/7,   -GI now tolerating feeds at goal rate. Bowel regimen prn.     Nephro:  No acute renal     Endocrine:  -Stress hyperglycemia i/s/o DMII better controlled now.   -Will c/w ISS with BGFS monitoring.     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   -Hematology following, no indication for acute exchange therapy.   -Full dose Lovenox in place for LLL DVT.    Dispo: Pt full code. Palliative consulted and recs appreciated. Pt amenable to trial of slow weaning but would not want life long dependence on life support.

## 2024-05-15 NOTE — PROGRESS NOTE ADULT - ASSESSMENT
69F h/o CVA (bedbound at baseline), COPD, CHF, HTN who initially p/w acute shortness of breath to outside ED and was treated for acute pulmonary edema with sublingual nitro x 2, Lasix, and BiPAP. Pt was also found to be febrile to 103, so treated for PNA. BIPAP led to improvement in O2 to 89-90. Pt transferred to [place] on 4/16/24 for white count of 233 and possible leukophoresis. In the ED, pt intubated iso respiratory tiring and decreasing mental status, and also was started on levophed for hypotension. Following intubation and initiation of pressors, she was admitted to the MICU for AHRF and septic shock.  In MICU, heme was consulted for hyperleukocytosis, however due to mature lymphocytic leukophoresis was not performed.  Course c/b empyema s/p chest tube placement (4/16) and removal, reaccumulation of loculated pleural effusion s/p L pigtail placement and removal on 4/27.   S/p treatment with ceftriaxone (4/18-4/25) for s. pneumo bacteremia and empyema, continuing with augmentin PO x4 weeks with completion on 5/14.  S/p PEG 5/7.  Transitioned off IV pressors and on midodrine and droxidopa.  Transferred to RCU 5/9.  Weaning as tolerated.  Aggressive airway management with duonebs, chest PT and suctioning PRN.       5/14:  Tolerates trach collar during the day - will attempt ATC.  Speech eval with PMV - did not tolerate, desatted to 80s within 10 seconds, had to increase FiO2 to 40%.  Speech will continue to follow.  Finishes PO Augmentin today.  Repeated chest xray - awaiting read.  69F h/o CVA (bedbound at baseline), COPD, CHF, HTN who initially p/w acute shortness of breath to outside ED and was treated for acute pulmonary edema with sublingual nitro x 2, Lasix, and BiPAP. Pt was also found to be febrile to 103, so treated for PNA. BIPAP led to improvement in O2 to 89-90. Pt transferred to [place] on 4/16/24 for white count of 233 and possible leukophoresis. In the ED, pt intubated iso respiratory tiring and decreasing mental status, and also was started on levophed for hypotension. Following intubation and initiation of pressors, she was admitted to the MICU for AHRF and septic shock.  In MICU, heme was consulted for hyperleukocytosis, however due to mature lymphocytic leukophoresis was not performed.  Course c/b empyema s/p chest tube placement (4/16) and removal, reaccumulation of loculated pleural effusion s/p L pigtail placement and removal on 4/27.   S/p treatment with ceftriaxone (4/18-4/25) for s. pneumo bacteremia and empyema, continuing with Augmentin PO x4 weeks with completion on 5/14.  S/p PEG 5/7.  Transitioned off IV pressors and on midodrine and droxidopa.  Transferred to RCU 5/9.  Weaning as tolerated.  Aggressive airway management with Duoneb chest PT and suctioning PRN.       5/15: Overnight patient decannulated self. Trach was reinserted and patient was placed on vent. Unable to suction and TV were low.  ENT called, trach noted to be in false lumen, noted SQ emphysema on left apical chest area. Mary Jo was visualized and new trach placed.

## 2024-05-16 LAB
ALBUMIN SERPL ELPH-MCNC: 2.7 G/DL — LOW (ref 3.3–5)
ALP SERPL-CCNC: 82 U/L — SIGNIFICANT CHANGE UP (ref 40–120)
ALT FLD-CCNC: 6 U/L — LOW (ref 10–45)
ANION GAP SERPL CALC-SCNC: 10 MMOL/L — SIGNIFICANT CHANGE UP (ref 5–17)
AST SERPL-CCNC: 10 U/L — SIGNIFICANT CHANGE UP (ref 10–40)
BILIRUB SERPL-MCNC: 0.3 MG/DL — SIGNIFICANT CHANGE UP (ref 0.2–1.2)
BUN SERPL-MCNC: 12 MG/DL — SIGNIFICANT CHANGE UP (ref 7–23)
CALCIUM SERPL-MCNC: 9 MG/DL — SIGNIFICANT CHANGE UP (ref 8.4–10.5)
CHLORIDE SERPL-SCNC: 103 MMOL/L — SIGNIFICANT CHANGE UP (ref 96–108)
CO2 SERPL-SCNC: 28 MMOL/L — SIGNIFICANT CHANGE UP (ref 22–31)
CREAT SERPL-MCNC: 0.46 MG/DL — LOW (ref 0.5–1.3)
CULTURE RESULTS: SIGNIFICANT CHANGE UP
EGFR: 104 ML/MIN/1.73M2 — SIGNIFICANT CHANGE UP
GLUCOSE SERPL-MCNC: 99 MG/DL — SIGNIFICANT CHANGE UP (ref 70–99)
HCT VFR BLD CALC: 25.4 % — LOW (ref 34.5–45)
HGB BLD-MCNC: 7.8 G/DL — LOW (ref 11.5–15.5)
MAGNESIUM SERPL-MCNC: 2.3 MG/DL — SIGNIFICANT CHANGE UP (ref 1.6–2.6)
MCHC RBC-ENTMCNC: 28.8 PG — SIGNIFICANT CHANGE UP (ref 27–34)
MCHC RBC-ENTMCNC: 30.7 GM/DL — LOW (ref 32–36)
MCV RBC AUTO: 93.7 FL — SIGNIFICANT CHANGE UP (ref 80–100)
NRBC # BLD: 0 /100 WBCS — SIGNIFICANT CHANGE UP (ref 0–0)
PHOSPHATE SERPL-MCNC: 4 MG/DL — SIGNIFICANT CHANGE UP (ref 2.5–4.5)
PLATELET # BLD AUTO: 141 K/UL — LOW (ref 150–400)
POTASSIUM SERPL-MCNC: 4.2 MMOL/L — SIGNIFICANT CHANGE UP (ref 3.5–5.3)
POTASSIUM SERPL-SCNC: 4.2 MMOL/L — SIGNIFICANT CHANGE UP (ref 3.5–5.3)
PROT SERPL-MCNC: 5.8 G/DL — LOW (ref 6–8.3)
RBC # BLD: 2.71 M/UL — LOW (ref 3.8–5.2)
RBC # FLD: 19.9 % — HIGH (ref 10.3–14.5)
SODIUM SERPL-SCNC: 141 MMOL/L — SIGNIFICANT CHANGE UP (ref 135–145)
SPECIMEN SOURCE: SIGNIFICANT CHANGE UP
WBC # BLD: 85.98 K/UL — CRITICAL HIGH (ref 3.8–10.5)
WBC # FLD AUTO: 85.98 K/UL — CRITICAL HIGH (ref 3.8–10.5)

## 2024-05-16 PROCEDURE — 74018 RADEX ABDOMEN 1 VIEW: CPT | Mod: 26

## 2024-05-16 PROCEDURE — 99233 SBSQ HOSP IP/OBS HIGH 50: CPT

## 2024-05-16 RX ORDER — QUETIAPINE FUMARATE 200 MG/1
200 TABLET, FILM COATED ORAL
Refills: 0 | Status: DISCONTINUED | OUTPATIENT
Start: 2024-05-16 | End: 2024-06-06

## 2024-05-16 RX ORDER — ACETAMINOPHEN 500 MG
1000 TABLET ORAL ONCE
Refills: 0 | Status: COMPLETED | OUTPATIENT
Start: 2024-05-16 | End: 2024-05-16

## 2024-05-16 RX ORDER — ACETAMINOPHEN 500 MG
1000 TABLET ORAL EVERY 6 HOURS
Refills: 0 | Status: DISCONTINUED | OUTPATIENT
Start: 2024-05-16 | End: 2024-05-20

## 2024-05-16 RX ADMIN — ENOXAPARIN SODIUM 110 MILLIGRAM(S): 100 INJECTION SUBCUTANEOUS at 11:01

## 2024-05-16 RX ADMIN — Medication 1.5 MILLIGRAM(S): at 06:18

## 2024-05-16 RX ADMIN — CHLORHEXIDINE GLUCONATE 1 APPLICATION(S): 213 SOLUTION TOPICAL at 17:21

## 2024-05-16 RX ADMIN — Medication 1.5 MILLIGRAM(S): at 17:21

## 2024-05-16 RX ADMIN — QUETIAPINE FUMARATE 25 MILLIGRAM(S): 200 TABLET, FILM COATED ORAL at 06:18

## 2024-05-16 RX ADMIN — QUETIAPINE FUMARATE 25 MILLIGRAM(S): 200 TABLET, FILM COATED ORAL at 22:14

## 2024-05-16 RX ADMIN — MIDODRINE HYDROCHLORIDE 30 MILLIGRAM(S): 2.5 TABLET ORAL at 22:13

## 2024-05-16 RX ADMIN — Medication 400 MILLIGRAM(S): at 01:30

## 2024-05-16 RX ADMIN — QUETIAPINE FUMARATE 25 MILLIGRAM(S): 200 TABLET, FILM COATED ORAL at 15:03

## 2024-05-16 RX ADMIN — Medication 1 TABLET(S): at 17:21

## 2024-05-16 RX ADMIN — QUETIAPINE FUMARATE 200 MILLIGRAM(S): 200 TABLET, FILM COATED ORAL at 22:14

## 2024-05-16 RX ADMIN — ENOXAPARIN SODIUM 110 MILLIGRAM(S): 100 INJECTION SUBCUTANEOUS at 22:14

## 2024-05-16 RX ADMIN — Medication 1000 MILLIGRAM(S): at 02:00

## 2024-05-16 RX ADMIN — Medication 3 MILLILITER(S): at 05:49

## 2024-05-16 RX ADMIN — CHLORHEXIDINE GLUCONATE 1 APPLICATION(S): 213 SOLUTION TOPICAL at 06:17

## 2024-05-16 RX ADMIN — MIDODRINE HYDROCHLORIDE 30 MILLIGRAM(S): 2.5 TABLET ORAL at 06:17

## 2024-05-16 RX ADMIN — Medication 1000 MILLIGRAM(S): at 18:36

## 2024-05-16 RX ADMIN — Medication 1 TABLET(S): at 06:18

## 2024-05-16 RX ADMIN — CHLORHEXIDINE GLUCONATE 15 MILLILITER(S): 213 SOLUTION TOPICAL at 17:21

## 2024-05-16 RX ADMIN — Medication 1000 MILLIGRAM(S): at 19:50

## 2024-05-16 RX ADMIN — CHLORHEXIDINE GLUCONATE 15 MILLILITER(S): 213 SOLUTION TOPICAL at 06:18

## 2024-05-16 RX ADMIN — MIDODRINE HYDROCHLORIDE 30 MILLIGRAM(S): 2.5 TABLET ORAL at 15:03

## 2024-05-16 NOTE — PROGRESS NOTE ADULT - PROBLEM SELECTOR PLAN 3
intubated on arrival to [facility] ED for respiratory tiring and AMS  - unable to be extubated  - s/p trach 4/28  - mechanical vent: Volume Ctrl 14/460/5/40%  - weaning as tolerated  - aggressive airway management with Duoneb, chest PT and suctioning PRN

## 2024-05-16 NOTE — PROGRESS NOTE ADULT - SUBJECTIVE AND OBJECTIVE BOX
Patient is a 69y old  Female who presents with a chief complaint of Transfer for leukophoresis (15 May 2024 08:11)      Interval Events:    REVIEW OF SYSTEMS:  [ ] Positive  [ ] All other systems negative  [ ] Unable to assess ROS because ________    Vital Signs Last 24 Hrs  T(C): 37.3 (05-16-24 @ 06:00), Max: 37.4 (05-15-24 @ 13:06)  T(F): 99.1 (05-16-24 @ 06:00), Max: 99.4 (05-15-24 @ 13:06)  HR: 88 (05-16-24 @ 06:00) (88 - 102)  BP: 95/50 (05-16-24 @ 06:00) (95/50 - 120/87)  RR: 18 (05-16-24 @ 06:00) (18 - 23)  SpO2: 100% (05-16-24 @ 06:00) (98% - 100%)    PHYSICAL EXAM:  HEENT:   [ ]Tracheostomy:  [ ]Pupils equal  [ ]No oral lesions  [ ]Abnormal    SKIN  [ ]No Rash  [ ] Abnormal  [ ] pressure    CARDIAC  [ ]Regular  [ ]Abnormal    PULMONARY  [ ]Bilateral Clear Breath Sounds  [ ]Normal Excursion  [ ]Abnormal    GI  [ ]PEG      [ ] +BS		              [ ]Soft, nondistended, nontender	  [ ]Abnormal    MUSCULOSKELETAL                                   [ ]Bedbound                 [ ]Abnormal    [ ]Ambulatory/OOB to chair                           EXTREMITIES                                         [ ]Normal  [ ]Edema                           NEUROLOGIC  [ ] Normal, non focal  [ ] Focal findings:    PSYCHIATRIC  [ ]Alert and appropriate  [ ] Sedated	 [ ]Agitated    :  Andino: [ ] Yes, if yes: Date of Placement:                   [  ] No    LINES: Central Lines [ ] Yes, if yes: Date of Placement                                     [  ] No    HOSPITAL MEDICATIONS:  MEDICATIONS  (STANDING):  albuterol/ipratropium for Nebulization 3 milliLiter(s) Nebulizer every 8 hours  amoxicillin  875 milliGRAM(s)/clavulanate 1 Tablet(s) Oral every 12 hours  chlorhexidine 0.12% Liquid 15 milliLiter(s) Oral Mucosa every 12 hours  chlorhexidine 4% Liquid 1 Application(s) Topical every 12 hours  clonazePAM  Tablet 1.5 milliGRAM(s) Oral every 12 hours  enoxaparin Injectable 110 milliGRAM(s) SubCutaneous every 12 hours  midodrine 30 milliGRAM(s) Oral three times a day  polyethylene glycol 3350 17 Gram(s) Oral every 12 hours  QUEtiapine 25 milliGRAM(s) Oral three times a day  QUEtiapine 150 milliGRAM(s) Oral at bedtime  senna Syrup 10 milliLiter(s) Oral at bedtime    MEDICATIONS  (PRN):  acetaminophen   Oral Liquid .. 650 milliGRAM(s) Oral every 6 hours PRN Moderate Pain (4 - 6)  acetaminophen   Oral Liquid .. 650 milliGRAM(s) Oral every 6 hours PRN Temp greater or equal to 38C (100.4F)  nystatin Powder 1 Application(s) Topical two times a day PRN skin irritation  QUEtiapine 25 milliGRAM(s) Oral every 6 hours PRN agitation      LABS:                        10.0   94.07 )-----------( 159      ( 15 May 2024 06:52 )             31.6     05-15    x   |  x   |  x   ----------------------------<  x   4.8   |  x   |  x     Ca    9.6      15 May 2024 06:53  Phos  4.0     05-15  Mg     2.5     05-15    TPro  6.7  /  Alb  2.7<L>  /  TBili  0.3  /  DBili  x   /  AST  31  /  ALT  7<L>  /  AlkPhos  92  05-15      Urinalysis Basic - ( 15 May 2024 06:53 )    Color: x / Appearance: x / SG: x / pH: x  Gluc: 75 mg/dL / Ketone: x  / Bili: x / Urobili: x   Blood: x / Protein: x / Nitrite: x   Leuk Esterase: x / RBC: x / WBC x   Sq Epi: x / Non Sq Epi: x / Bacteria: x          CAPILLARY BLOOD GLUCOSE    MICROBIOLOGY:     RADIOLOGY:  [ ] Reviewed and interpreted by me    Mode: AC/ CMV (Assist Control/ Continuous Mandatory Ventilation)  RR (machine): 14  TV (machine): 460  FiO2: 40  PEEP: 5  ITime: 1  MAP: 9  PIP: 20   Patient is a 69y old  Female who presents with a chief complaint of Transfer for leukophoresis (15 May 2024 08:11)      Interval Events:  No events reported over night    REVIEW OF SYSTEMS:  [ ] Positive  [ ] All other systems negative  [X] Unable to assess ROS because ____Limited capacity to communicate    Vital Signs Last 24 Hrs  T(C): 37.3 (05-16-24 @ 06:00), Max: 37.4 (05-15-24 @ 13:06)  T(F): 99.1 (05-16-24 @ 06:00), Max: 99.4 (05-15-24 @ 13:06)  HR: 88 (05-16-24 @ 06:00) (88 - 102)  BP: 95/50 (05-16-24 @ 06:00) (95/50 - 120/87)  RR: 18 (05-16-24 @ 06:00) (18 - 23)  SpO2: 100% (05-16-24 @ 06:00) (98% - 100%)    PHYSICAL EXAM:  HEENT:   [X] Tracheostomy:  [X] PERRL B/L; EOMI  [X] No oral lesions  [ ] Abnormal    SKIN  [X] No Rash  [ ] Abnormal  [ ] pressure    CARDIAC  [X] Regular  [ ] Abnormal    PULMONARY  [ ] Bilateral Clear Breath Sounds  [ ] Normal Excursion  [X] Abnormal:  Mild ronchi B/L anteriorly    GI  [X] PEG      [X] +BS		              [X] Soft, nondistended, nontender	  [ ] Abnormal    MUSCULOSKELETAL                                   [ ] Bedbound                 [X] Abnormal:  Unable to assess gait; moves all limbs purposefully  [ ] Ambulatory/OOB to chair                           EXTREMITIES                                         [X] Normal  [ ]Edema                           NEUROLOGIC  [X] Normal, non focal  [ ] Focal findings:      PSYCHIATRIC  [X] Alert but not appropriate; remains on bilateral wrist restraints to prevent removing trach or PEG or lines; makes references to things that aren't present (frequently requested her civcer to be removed despite the absence fo covers); +hallucinations  [ ] Sedated	 [ ]Agitated    :  Sina: [ ] Yes, if yes: Date of Placement:                   [  ] No    LINES: Central Lines [ ] Yes, if yes: Date of Placement                                     [  ] No    HOSPITAL MEDICATIONS:  MEDICATIONS  (STANDING):  albuterol/ipratropium for Nebulization 3 milliLiter(s) Nebulizer every 8 hours  amoxicillin  875 milliGRAM(s)/clavulanate 1 Tablet(s) Oral every 12 hours  chlorhexidine 0.12% Liquid 15 milliLiter(s) Oral Mucosa every 12 hours  chlorhexidine 4% Liquid 1 Application(s) Topical every 12 hours  clonazePAM  Tablet 1.5 milliGRAM(s) Oral every 12 hours  enoxaparin Injectable 110 milliGRAM(s) SubCutaneous every 12 hours  midodrine 30 milliGRAM(s) Oral three times a day  polyethylene glycol 3350 17 Gram(s) Oral every 12 hours  QUEtiapine 25 milliGRAM(s) Oral three times a day  QUEtiapine 150 milliGRAM(s) Oral at bedtime  senna Syrup 10 milliLiter(s) Oral at bedtime    MEDICATIONS  (PRN):  acetaminophen   Oral Liquid .. 650 milliGRAM(s) Oral every 6 hours PRN Moderate Pain (4 - 6)  acetaminophen   Oral Liquid .. 650 milliGRAM(s) Oral every 6 hours PRN Temp greater or equal to 38C (100.4F)  nystatin Powder 1 Application(s) Topical two times a day PRN skin irritation  QUEtiapine 25 milliGRAM(s) Oral every 6 hours PRN agitation      LABS:                        10.0   94.07 )-----------( 159      ( 15 May 2024 06:52 )             31.6     05-15    x   |  x   |  x   ----------------------------<  x   4.8   |  x   |  x     Ca    9.6      15 May 2024 06:53  Phos  4.0     05-15  Mg     2.5     05-15    TPro  6.7  /  Alb  2.7<L>  /  TBili  0.3  /  DBili  x   /  AST  31  /  ALT  7<L>  /  AlkPhos  92  05-15      Urinalysis Basic - ( 15 May 2024 06:53 )    Color: x / Appearance: x / SG: x / pH: x  Gluc: 75 mg/dL / Ketone: x  / Bili: x / Urobili: x   Blood: x / Protein: x / Nitrite: x   Leuk Esterase: x / RBC: x / WBC x   Sq Epi: x / Non Sq Epi: x / Bacteria: x          CAPILLARY BLOOD GLUCOSE    MICROBIOLOGY:     RADIOLOGY:  [ ] Reviewed and interpreted by me    Mode: AC/ CMV (Assist Control/ Continuous Mandatory Ventilation)  RR (machine): 14  TV (machine): 460  FiO2: 40  PEEP: 5  ITime: 1  MAP: 9  PIP: 20

## 2024-05-16 NOTE — PROGRESS NOTE ADULT - NS ATTEND AMEND GEN_ALL_CORE FT
Pt is a 69F with MHx CVA, COPD, CHF, and HTN initially presenting to [facility] on 4/16/24 from OSH for hyperleukocytosis concerning for acute leukemia c/b acute hypoxemic respiratory failure and septic shock 2/2 Strep pneumonia bacteremia i/s/o pneumonia with empyema s/p chest tube placement x2 with MISTII with failure to extubation from MV s/p tracheostomy placement now transferred to [facility] on 5/8 for further medical management.     Neuro:  -Mental status baseline, she is able to communicate spontaneously and express needs/concerns.   -Physical debility and functional dependence i/s/o critical illness with hx CVA and wheelchair/cane dependence.   PT/OT consulted.   -Hx of anxiety/MDD with passive SI. [facility] Psych Team consulted on this admission, initiated on seroquel +clonazepam.   -Will continue and titrate as per  Team. No further SI thoughts.     Pulm:  -Acute hypoxemic respiratory failure 2/2 strep pneumonia empyema c/b bacteremia s/p chest tube x2 with MIST therapy  -Tracheostomy placement.   -Was tolerating SBTs and TC and PMV . Will start TCATC starting 5/14 but removed trach on 5/15  - Now back on AC for 24 hours and will reattempt TC    Cards: Hemodynamically stable at this time. Discontinue continue midodrine    ID:  - Complete 4 week course of abx (through 5/14) for empyema.     GI:  -Oropharyngeal dysphagia s/p PEG placement (5/7,   -GI now tolerating feeds at goal rate. Bowel regimen prn.     Nephro:  No acute renal     Endocrine:  -Stress hyperglycemia i/s/o DMII better controlled now.   -Will c/w ISS with BGFS monitoring.     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   -Hematology following, no indication for acute exchange therapy.   -Full dose Lovenox in place for LLL DVT.    Dispo: Pt full code. Palliative consulted and recs appreciated. Pt amenable to trial of slow weaning but would not want life long dependence on life support.

## 2024-05-16 NOTE — PROGRESS NOTE ADULT - ASSESSMENT
69F h/o CVA (bedbound at baseline), COPD, CHF, HTN who initially p/w acute shortness of breath to outside ED and was treated for acute pulmonary edema with sublingual nitro x 2, Lasix, and BiPAP. Pt was also found to be febrile to 103, so treated for PNA. BIPAP led to improvement in O2 to 89-90. Pt transferred to [place] on 4/16/24 for white count of 233 and possible leukophoresis. In the ED, pt intubated iso respiratory tiring and decreasing mental status, and also was started on levophed for hypotension. Following intubation and initiation of pressors, she was admitted to the MICU for AHRF and septic shock.  In MICU, heme was consulted for hyperleukocytosis, however due to mature lymphocytic leukophoresis was not performed.  Course c/b empyema s/p chest tube placement (4/16) and removal, reaccumulation of loculated pleural effusion s/p L pigtail placement and removal on 4/27.   S/p treatment with ceftriaxone (4/18-4/25) for s. pneumo bacteremia and empyema, continuing with Augmentin PO x4 weeks with completion on 5/14.  S/p PEG 5/7.  Transitioned off IV pressors and on midodrine and droxidopa.  Transferred to RCU 5/9.  Weaning as tolerated.  Aggressive airway management with Duoneb chest PT and suctioning PRN.       5/15: Overnight patient decannulated self. Trach was reinserted and patient was placed on vent. Unable to suction and TV were low.  ENT called, trach noted to be in false lumen, noted SQ emphysema on left apical chest area. Mary Jo was visualized and new trach placed. 69F h/o CVA (bedbound at baseline), COPD, CHF, HTN who initially p/w acute shortness of breath to outside ED and was treated for acute pulmonary edema with sublingual nitro x 2, Lasix, and BiPAP. Pt was also found to be febrile to 103, so treated for PNA. BIPAP led to improvement in O2 to 89-90. Pt transferred to [place] on 4/16/24 for white count of 233 and possible leukophoresis. In the ED, pt intubated iso respiratory tiring and decreasing mental status, and also was started on levophed for hypotension. Following intubation and initiation of pressors, she was admitted to the MICU for AHRF and septic shock.  In MICU, heme was consulted for hyperleukocytosis, however due to mature lymphocytic leukophoresis was not performed.  Course c/b empyema s/p chest tube placement (4/16) and removal, reaccumulation of loculated pleural effusion s/p L pigtail placement and removal on 4/27.   S/p treatment with ceftriaxone (4/18-4/25) for s. pneumo bacteremia and empyema, continuing with Augmentin PO x4 weeks with completion on 5/14.  S/p PEG 5/7.  Transitioned off IV pressors and on midodrine and droxidopa.  Transferred to RCU 5/9.  Weaning as tolerated.  Aggressive airway management with Duoneb chest PT and suctioning PRN.       5/15: Overnight patient decannulated self. Trach was reinserted and patient was placed on vent. Unable to suction and TV were low.  ENT called, trach noted to be in false lumen, noted SQ emphysema on left apical chest area. Mary Jo was visualized and new trach placed.  5/16:  Remains on restraints as having episodic episodes of agitation.  Seroquel PRN not being utilized optimally however.  Tolerating trach collar during the day but will return to vent at night.

## 2024-05-16 NOTE — PROVIDER CONTACT NOTE (OTHER) - ASSESSMENT
pt indicates pain is aching but not gas related. Positioning not relieving pain. Pt able to sleep on and off with pain

## 2024-05-16 NOTE — PROGRESS NOTE ADULT - PROBLEM SELECTOR PLAN 2
on admission WBC count 233  - transferred to [facility] for possible leukophoresis  - as per heme/onc - underlying CLL (Oct 2023) with reactive lymphocytosis 2/2 sepsis  - heme/onc deferred leukophoresis due to mature lymphocytes  - IVIG given 4/26/24  - CLL under control and plan for outpatient follow up at [facility] when patient closer to d/c

## 2024-05-17 DIAGNOSIS — I95.9 HYPOTENSION, UNSPECIFIED: ICD-10-CM

## 2024-05-17 DIAGNOSIS — R45.1 RESTLESSNESS AND AGITATION: ICD-10-CM

## 2024-05-17 LAB
ALBUMIN SERPL ELPH-MCNC: 2.9 G/DL — LOW (ref 3.3–5)
ALP SERPL-CCNC: 92 U/L — SIGNIFICANT CHANGE UP (ref 40–120)
ALT FLD-CCNC: 6 U/L — LOW (ref 10–45)
ANION GAP SERPL CALC-SCNC: 11 MMOL/L — SIGNIFICANT CHANGE UP (ref 5–17)
AST SERPL-CCNC: 12 U/L — SIGNIFICANT CHANGE UP (ref 10–40)
BILIRUB SERPL-MCNC: 0.2 MG/DL — SIGNIFICANT CHANGE UP (ref 0.2–1.2)
BUN SERPL-MCNC: 12 MG/DL — SIGNIFICANT CHANGE UP (ref 7–23)
CALCIUM SERPL-MCNC: 8.8 MG/DL — SIGNIFICANT CHANGE UP (ref 8.4–10.5)
CHLORIDE SERPL-SCNC: 103 MMOL/L — SIGNIFICANT CHANGE UP (ref 96–108)
CO2 SERPL-SCNC: 27 MMOL/L — SIGNIFICANT CHANGE UP (ref 22–31)
CREAT SERPL-MCNC: 0.44 MG/DL — LOW (ref 0.5–1.3)
EGFR: 105 ML/MIN/1.73M2 — SIGNIFICANT CHANGE UP
GLUCOSE SERPL-MCNC: 109 MG/DL — HIGH (ref 70–99)
HCT VFR BLD CALC: 25.8 % — LOW (ref 34.5–45)
HGB BLD-MCNC: 8.1 G/DL — LOW (ref 11.5–15.5)
MAGNESIUM SERPL-MCNC: 2.2 MG/DL — SIGNIFICANT CHANGE UP (ref 1.6–2.6)
MCHC RBC-ENTMCNC: 29.7 PG — SIGNIFICANT CHANGE UP (ref 27–34)
MCHC RBC-ENTMCNC: 31.4 GM/DL — LOW (ref 32–36)
MCV RBC AUTO: 94.5 FL — SIGNIFICANT CHANGE UP (ref 80–100)
NRBC # BLD: 0 /100 WBCS — SIGNIFICANT CHANGE UP (ref 0–0)
PHOSPHATE SERPL-MCNC: 4 MG/DL — SIGNIFICANT CHANGE UP (ref 2.5–4.5)
PLATELET # BLD AUTO: 150 K/UL — SIGNIFICANT CHANGE UP (ref 150–400)
POTASSIUM SERPL-MCNC: 4.4 MMOL/L — SIGNIFICANT CHANGE UP (ref 3.5–5.3)
POTASSIUM SERPL-SCNC: 4.4 MMOL/L — SIGNIFICANT CHANGE UP (ref 3.5–5.3)
PROT SERPL-MCNC: 6.2 G/DL — SIGNIFICANT CHANGE UP (ref 6–8.3)
RBC # BLD: 2.73 M/UL — LOW (ref 3.8–5.2)
RBC # FLD: 19.4 % — HIGH (ref 10.3–14.5)
SODIUM SERPL-SCNC: 141 MMOL/L — SIGNIFICANT CHANGE UP (ref 135–145)
WBC # BLD: 83.89 K/UL — CRITICAL HIGH (ref 3.8–10.5)
WBC # FLD AUTO: 83.89 K/UL — CRITICAL HIGH (ref 3.8–10.5)

## 2024-05-17 PROCEDURE — 99233 SBSQ HOSP IP/OBS HIGH 50: CPT | Mod: GC

## 2024-05-17 RX ADMIN — Medication 1 TABLET(S): at 17:44

## 2024-05-17 RX ADMIN — Medication 1000 MILLIGRAM(S): at 23:00

## 2024-05-17 RX ADMIN — Medication 1.5 MILLIGRAM(S): at 17:45

## 2024-05-17 RX ADMIN — QUETIAPINE FUMARATE 25 MILLIGRAM(S): 200 TABLET, FILM COATED ORAL at 12:46

## 2024-05-17 RX ADMIN — MIDODRINE HYDROCHLORIDE 30 MILLIGRAM(S): 2.5 TABLET ORAL at 21:55

## 2024-05-17 RX ADMIN — Medication 1000 MILLIGRAM(S): at 05:33

## 2024-05-17 RX ADMIN — QUETIAPINE FUMARATE 25 MILLIGRAM(S): 200 TABLET, FILM COATED ORAL at 05:33

## 2024-05-17 RX ADMIN — MIDODRINE HYDROCHLORIDE 30 MILLIGRAM(S): 2.5 TABLET ORAL at 14:06

## 2024-05-17 RX ADMIN — ENOXAPARIN SODIUM 110 MILLIGRAM(S): 100 INJECTION SUBCUTANEOUS at 09:35

## 2024-05-17 RX ADMIN — ENOXAPARIN SODIUM 110 MILLIGRAM(S): 100 INJECTION SUBCUTANEOUS at 21:54

## 2024-05-17 RX ADMIN — Medication 1000 MILLIGRAM(S): at 06:08

## 2024-05-17 RX ADMIN — MIDODRINE HYDROCHLORIDE 30 MILLIGRAM(S): 2.5 TABLET ORAL at 05:33

## 2024-05-17 RX ADMIN — QUETIAPINE FUMARATE 25 MILLIGRAM(S): 200 TABLET, FILM COATED ORAL at 14:07

## 2024-05-17 RX ADMIN — Medication 1.5 MILLIGRAM(S): at 05:34

## 2024-05-17 RX ADMIN — QUETIAPINE FUMARATE 200 MILLIGRAM(S): 200 TABLET, FILM COATED ORAL at 19:48

## 2024-05-17 RX ADMIN — CHLORHEXIDINE GLUCONATE 1 APPLICATION(S): 213 SOLUTION TOPICAL at 05:34

## 2024-05-17 RX ADMIN — QUETIAPINE FUMARATE 25 MILLIGRAM(S): 200 TABLET, FILM COATED ORAL at 21:54

## 2024-05-17 RX ADMIN — Medication 1 TABLET(S): at 05:33

## 2024-05-17 RX ADMIN — Medication 1000 MILLIGRAM(S): at 21:54

## 2024-05-17 NOTE — PROGRESS NOTE ADULT - SUBJECTIVE AND OBJECTIVE BOX
Patient is a 69y old  Female who presents with a chief complaint of Transfer for leukophoresis (16 May 2024 07:28)      Interval Events:    REVIEW OF SYSTEMS:  [ ] Positive  [ ] All other systems negative  [ ] Unable to assess ROS because ________    Vital Signs Last 24 Hrs  T(C): 37.4 (05-17-24 @ 05:30), Max: 37.8 (05-16-24 @ 18:26)  T(F): 99.3 (05-17-24 @ 05:30), Max: 100.1 (05-16-24 @ 18:26)  HR: 105 (05-17-24 @ 05:30) (97 - 118)  BP: 113/54 (05-17-24 @ 05:30) (111/56 - 138/62)  RR: 23 (05-17-24 @ 05:30) (20 - 23)  SpO2: 100% (05-17-24 @ 05:30) (94% - 100%)    PHYSICAL EXAM:  HEENT:   [ ]Tracheostomy:  [ ]Pupils equal  [ ]No oral lesions  [ ]Abnormal    SKIN  [ ]No Rash  [ ] Abnormal  [ ] pressure    CARDIAC  [ ]Regular  [ ]Abnormal    PULMONARY  [ ]Bilateral Clear Breath Sounds  [ ]Normal Excursion  [ ]Abnormal    GI  [ ]PEG      [ ] +BS		              [ ]Soft, nondistended, nontender	  [ ]Abnormal    MUSCULOSKELETAL                                   [ ]Bedbound                 [ ]Abnormal    [ ]Ambulatory/OOB to chair                           EXTREMITIES                                         [ ]Normal  [ ]Edema                           NEUROLOGIC  [ ] Normal, non focal  [ ] Focal findings:    PSYCHIATRIC  [ ]Alert and appropriate  [ ] Sedated	 [ ]Agitated    :  Andino: [ ] Yes, if yes: Date of Placement:                   [  ] No    LINES: Central Lines [ ] Yes, if yes: Date of Placement                                     [  ] No    HOSPITAL MEDICATIONS:  MEDICATIONS  (STANDING):  amoxicillin  875 milliGRAM(s)/clavulanate 1 Tablet(s) Oral every 12 hours  chlorhexidine 4% Liquid 1 Application(s) Topical every 12 hours  clonazePAM  Tablet 1.5 milliGRAM(s) Oral every 12 hours  enoxaparin Injectable 110 milliGRAM(s) SubCutaneous every 12 hours  midodrine 30 milliGRAM(s) Oral three times a day  QUEtiapine 25 milliGRAM(s) Oral three times a day  QUEtiapine 200 milliGRAM(s) Oral <User Schedule>  senna Syrup 10 milliLiter(s) Oral at bedtime    MEDICATIONS  (PRN):  acetaminophen   Oral Liquid .. 1000 milliGRAM(s) Enteral Tube every 6 hours PRN Temp greater or equal to 38.5C (101.3F), Mild Pain (1 - 3)  nystatin Powder 1 Application(s) Topical two times a day PRN skin irritation  QUEtiapine 25 milliGRAM(s) Oral every 6 hours PRN agitation      LABS:                        8.1    83.89 )-----------( 150      ( 17 May 2024 06:47 )             25.8     05-17    141  |  103  |  12  ----------------------------<  109<H>  4.4   |  27  |  0.44<L>    Ca    8.8      17 May 2024 06:46  Phos  4.0     05-17  Mg     2.2     05-17    TPro  6.2  /  Alb  2.9<L>  /  TBili  0.2  /  DBili  x   /  AST  12  /  ALT  6<L>  /  AlkPhos  92  05-17      Urinalysis Basic - ( 17 May 2024 06:46 )    Color: x / Appearance: x / SG: x / pH: x  Gluc: 109 mg/dL / Ketone: x  / Bili: x / Urobili: x   Blood: x / Protein: x / Nitrite: x   Leuk Esterase: x / RBC: x / WBC x   Sq Epi: x / Non Sq Epi: x / Bacteria: x          CAPILLARY BLOOD GLUCOSE    MICROBIOLOGY:     RADIOLOGY:  [ ] Reviewed and interpreted by me    Mode: AC/ CMV (Assist Control/ Continuous Mandatory Ventilation)  RR (machine): 14  TV (machine): 400  FiO2: 40  PEEP: 5  ITime: 1  MAP: 12  PIP: 23   Patient is a 69y old  Female who presents with a chief complaint of Transfer for leukophoresis (16 May 2024 07:28)      Interval Events: No events reported over night.    REVIEW OF SYSTEMS:  [ ] Positive  [ ] All other systems negative  [X] Unable to assess ROS because _____ Denies having any pain but limited capacity due to periodic delirium.    Vital Signs Last 24 Hrs  T(C): 37.4 (05-17-24 @ 05:30), Max: 37.8 (05-16-24 @ 18:26)  T(F): 99.3 (05-17-24 @ 05:30), Max: 100.1 (05-16-24 @ 18:26)  HR: 105 (05-17-24 @ 05:30) (97 - 118)  BP: 113/54 (05-17-24 @ 05:30) (111/56 - 138/62)  RR: 23 (05-17-24 @ 05:30) (20 - 23)  SpO2: 100% (05-17-24 @ 05:30) (94% - 100%)      PHYSICAL EXAM:  HEENT:   [X] Tracheostomy:  [X] PERRL B/L; EOMI  [X] No oral lesions  [ ] Abnormal    SKIN  [X] No Rash  [ ] Abnormal  [ ] pressure    CARDIAC  [X] Regular  [ ] Abnormal    PULMONARY  [ ] Bilateral Clear Breath Sounds  [ ] Normal Excursion  [X] Abnormal:  Mild ronchi B/L anteriorly    GI  [X] PEG      [X] +BS		              [X] Soft, nondistended, nontender	  [ ] Abnormal    MUSCULOSKELETAL                                   [ ] Bedbound                 [X] Abnormal:  Unable to assess gait; moves all limbs purposefully  [ ] Ambulatory/OOB to chair                           EXTREMITIES                                         [X] Normal  [ ]Edema                           NEUROLOGIC  [X] Normal, non focal  [ ] Focal findings:      PSYCHIATRIC  [X] Alert but not appropriate; remains on bilateral wrist restraints to prevent removing trach or PEG or lines; makes references to things that aren't present (frequently requested her civcer to be removed despite the absence fo covers); +hallucinations  [ ] Sedated	 [ ]Agitated    :  Andino: [ ] Yes, if yes: Date of Placement:                   [X] No    LINES: Central Lines [ ] Yes, if yes: Date of Placement                                     [X] No      HOSPITAL MEDICATIONS:  MEDICATIONS  (STANDING):  amoxicillin  875 milliGRAM(s)/clavulanate 1 Tablet(s) Oral every 12 hours  chlorhexidine 4% Liquid 1 Application(s) Topical every 12 hours  clonazePAM  Tablet 1.5 milliGRAM(s) Oral every 12 hours  enoxaparin Injectable 110 milliGRAM(s) SubCutaneous every 12 hours  midodrine 30 milliGRAM(s) Oral three times a day  QUEtiapine 25 milliGRAM(s) Oral three times a day  QUEtiapine 200 milliGRAM(s) Oral <User Schedule>  senna Syrup 10 milliLiter(s) Oral at bedtime    MEDICATIONS  (PRN):  acetaminophen   Oral Liquid .. 1000 milliGRAM(s) Enteral Tube every 6 hours PRN Temp greater or equal to 38.5C (101.3F), Mild Pain (1 - 3)  nystatin Powder 1 Application(s) Topical two times a day PRN skin irritation  QUEtiapine 25 milliGRAM(s) Oral every 6 hours PRN agitation      LABS:                        8.1    83.89 )-----------( 150      ( 17 May 2024 06:47 )             25.8     05-17    141  |  103  |  12  ----------------------------<  109<H>  4.4   |  27  |  0.44<L>    Ca    8.8      17 May 2024 06:46  Phos  4.0     05-17  Mg     2.2     05-17    TPro  6.2  /  Alb  2.9<L>  /  TBili  0.2  /  DBili  x   /  AST  12  /  ALT  6<L>  /  AlkPhos  92  05-17      Urinalysis Basic - ( 17 May 2024 06:46 )    Color: x / Appearance: x / SG: x / pH: x  Gluc: 109 mg/dL / Ketone: x  / Bili: x / Urobili: x   Blood: x / Protein: x / Nitrite: x   Leuk Esterase: x / RBC: x / WBC x   Sq Epi: x / Non Sq Epi: x / Bacteria: x          CAPILLARY BLOOD GLUCOSE    MICROBIOLOGY:     RADIOLOGY:  [ ] Reviewed and interpreted by me    Mode: AC/ CMV (Assist Control/ Continuous Mandatory Ventilation)  RR (machine): 14  TV (machine): 400  FiO2: 40  PEEP: 5  ITime: 1  MAP: 12  PIP: 23   Patient is a 69y old  Female who presents with a chief complaint of Transfer for leukophoresis (16 May 2024 07:28)      Interval Events: No events reported over night.    REVIEW OF SYSTEMS:  [ ] Positive  [ ] All other systems negative  [X] Unable to assess ROS because _____ Denies having any pain but limited capacity due to periodic delirium.    Vital Signs Last 24 Hrs  T(C): 37.4 (05-17-24 @ 05:30), Max: 37.8 (05-16-24 @ 18:26)  T(F): 99.3 (05-17-24 @ 05:30), Max: 100.1 (05-16-24 @ 18:26)  HR: 105 (05-17-24 @ 05:30) (97 - 118)  BP: 113/54 (05-17-24 @ 05:30) (111/56 - 138/62)  RR: 23 (05-17-24 @ 05:30) (20 - 23)  SpO2: 100% (05-17-24 @ 05:30) (94% - 100%)    PHYSICAL EXAM:  HEENT:   [X] Tracheostomy: #7 Cuffed Portex  [X] PERRL B/L; EOMI  [X] No oral lesions  [ ] Abnormal    SKIN  [X] No Rash  [ ] Abnormal  [ ] pressure    CARDIAC  [X] Regular  [ ] Abnormal    PULMONARY  [ ] Bilateral Clear Breath Sounds  [ ] Normal Excursion  [X] Abnormal:  Mild ronchi B/L anteriorly    GI  [X] PEG      [X] +BS		              [X] Soft, nondistended, nontender	  [ ] Abnormal    MUSCULOSKELETAL                                   [ ] Bedbound                 [X] Abnormal:  Unable to assess gait; moves all limbs purposefully  [ ] Ambulatory/OOB to chair                           EXTREMITIES                                         [X] Normal  [ ]Edema                           NEUROLOGIC  [X] Normal, non focal  [ ] Focal findings:      PSYCHIATRIC  [X] Alert but not appropriate; remains on bilateral wrist restraints to prevent removing trach or PEG or lines; makes references to things that aren't present (frequently requested her civcer to be removed despite the absence fo covers); +hallucinations  [ ] Sedated	 [ ]Agitated    :  Andino: [ ] Yes, if yes: Date of Placement:                   [X] No    LINES: Central Lines [ ] Yes, if yes: Date of Placement                                     [X] No      HOSPITAL MEDICATIONS:  MEDICATIONS  (STANDING):  amoxicillin  875 milliGRAM(s)/clavulanate 1 Tablet(s) Oral every 12 hours  chlorhexidine 4% Liquid 1 Application(s) Topical every 12 hours  clonazePAM  Tablet 1.5 milliGRAM(s) Oral every 12 hours  enoxaparin Injectable 110 milliGRAM(s) SubCutaneous every 12 hours  midodrine 30 milliGRAM(s) Oral three times a day  QUEtiapine 25 milliGRAM(s) Oral three times a day  QUEtiapine 200 milliGRAM(s) Oral <User Schedule>  senna Syrup 10 milliLiter(s) Oral at bedtime    MEDICATIONS  (PRN):  acetaminophen   Oral Liquid .. 1000 milliGRAM(s) Enteral Tube every 6 hours PRN Temp greater or equal to 38.5C (101.3F), Mild Pain (1 - 3)  nystatin Powder 1 Application(s) Topical two times a day PRN skin irritation  QUEtiapine 25 milliGRAM(s) Oral every 6 hours PRN agitation      LABS:                        8.1    83.89 )-----------( 150      ( 17 May 2024 06:47 )             25.8     05-17    141  |  103  |  12  ----------------------------<  109<H>  4.4   |  27  |  0.44<L>    Ca    8.8      17 May 2024 06:46  Phos  4.0     05-17  Mg     2.2     05-17    TPro  6.2  /  Alb  2.9<L>  /  TBili  0.2  /  DBili  x   /  AST  12  /  ALT  6<L>  /  AlkPhos  92  05-17      Urinalysis Basic - ( 17 May 2024 06:46 )    Color: x / Appearance: x / SG: x / pH: x  Gluc: 109 mg/dL / Ketone: x  / Bili: x / Urobili: x   Blood: x / Protein: x / Nitrite: x   Leuk Esterase: x / RBC: x / WBC x   Sq Epi: x / Non Sq Epi: x / Bacteria: x          CAPILLARY BLOOD GLUCOSE    MICROBIOLOGY:     RADIOLOGY:  [ ] Reviewed and interpreted by me    Mode: AC/ CMV (Assist Control/ Continuous Mandatory Ventilation)  RR (machine): 14  TV (machine): 400  FiO2: 40  PEEP: 5  ITime: 1  MAP: 12  PIP: 23

## 2024-05-17 NOTE — PROGRESS NOTE ADULT - PROBLEM SELECTOR PLAN 6
- full code  - [facility] involved in decision making   - palliative care has been following - 4/24 BL LE doppler - nonocclusive left common femoral vein DVT  - 4/24 BL UE doppler - acute left basilic SVT, no acute DVT  - continue with Lovenox

## 2024-05-17 NOTE — PROGRESS NOTE ADULT - ASSESSMENT
69F h/o CVA (bedbound at baseline), COPD, CHF, HTN who initially p/w acute shortness of breath to outside ED and was treated for acute pulmonary edema with sublingual nitro x 2, Lasix, and BiPAP. Pt was also found to be febrile to 103, so treated for PNA. BIPAP led to improvement in O2 to 89-90. Pt transferred to [facility] on 4/16/24 for white count of 233 and possible leukophoresis. In the ED, pt intubated iso respiratory tiring and decreasing mental status, and also was started on levophed for hypotension. Following intubation and initiation of pressors, she was admitted to the MICU for AHRF and septic shock.  In MICU, heme was consulted for hyperleukocytosis, however due to mature lymphocytic leukophoresis was not performed.  Course c/b empyema s/p chest tube placement (4/16) and removal, reaccumulation of loculated pleural effusion s/p L pigtail placement and removal on 4/27.   S/p treatment with ceftriaxone (4/18-4/25) for s. pneumo bacteremia and empyema, continuing with Augmentin PO x4 weeks with completion on 5/14.  S/p PEG 5/7.  Transitioned off IV pressors and on midodrine and droxidopa.  Transferred to RCU 5/9.  Weaning as tolerated.  Aggressive airway management with Duoneb chest PT and suctioning PRN.       5/15: Overnight patient decannulated self. Trach was reinserted and patient was placed on vent. Unable to suction and TV were low.  ENT called, trach noted to be in false lumen, noted SQ emphysema on left apical chest area. Mary Jo was visualized and new trach placed.  5/16:  Remains on restraints as having episodic episodes of agitation.  Seroquel PRN not being utilized optimally however.  Tolerating trach collar during the day but will return to vent at night.  69F h/o CVA (bedbound at baseline), COPD, CHF, HTN who initially p/w acute shortness of breath to outside ED and was treated for acute pulmonary edema with sublingual nitro x 2, Lasix, and BiPAP. Pt was also found to be febrile to 103, so treated for PNA. BIPAP led to improvement in O2 to 89-90. Pt transferred to [facility] on 4/16/24 for white count of 233 and possible leukophoresis. In the ED, pt intubated iso respiratory tiring and decreasing mental status, and also was started on levophed for hypotension. Following intubation and initiation of pressors, she was admitted to the MICU for AHRF and septic shock.  In MICU, heme was consulted for hyperleukocytosis, however due to mature lymphocytic leukophoresis was not performed.  Course c/b empyema s/p chest tube placement (4/16) and removal, reaccumulation of loculated pleural effusion s/p L pigtail placement and removal on 4/27.   S/p treatment with ceftriaxone (4/18-4/25) for s. pneumo bacteremia and empyema, continuing with Augmentin PO x4 weeks with completion on 5/14.  S/p PEG 5/7.  Transitioned off IV pressors and on midodrine and droxidopa.  Transferred to RCU 5/9.  Weaning as tolerated.  Aggressive airway management with Duoneb chest PT and suctioning PRN.       5/15: Overnight patient decannulated self. Trach was reinserted and patient was placed on vent. Unable to suction and TV were low.  ENT called, trach noted to be in false lumen, noted SQ emphysema on left apical chest area. Mary Jo was visualized and new trach placed.  5/16:  Remains on restraints as having episodic episodes of agitation.  Seroquel PRN not being utilized optimally however.  Tolerating trach collar during the day but will return to vent at night.   5/17: Will attempt to monitor off restraints.  Seroquel dose increased to 200mg HS.  Feeds changed to bolus.  Tolerated trach collar for most of the day but then exhibited labored breathing in the early evening, was placed back on vent.

## 2024-05-17 NOTE — PROGRESS NOTE ADULT - PROBLEM SELECTOR PLAN 5
- 4/24 BL LE doppler - nonocclusive left common femoral vein DVT  - 4/24 BL UE doppler - acute left basilic SVT, no acute DVT  - continue with Lovenox hypotensive on admission   - required pressors in ICU  - now on midodrine 30mg Q8H   - Will wean as tolerated, add antiHTN as needed  - monitor BP

## 2024-05-17 NOTE — PROGRESS NOTE ADULT - PROBLEM SELECTOR PLAN 4
hypotensive on admission   - required pressors in ICU  - now on midodrine and droxidopa  - wean as tolerated, add antiHTN as needed  - monitor BP - Pulling at trach.  Unable to be redirected  - Evaluated by psychology team.  - Seroquel 200mg qhs, cont 25mg tid, seroquel 25mg po q6hr prn agitation   - Klonopin 1.5MG BID

## 2024-05-17 NOTE — PROGRESS NOTE ADULT - NS ATTEND AMEND GEN_ALL_CORE FT
Pt is a 69F with MHx CVA, COPD, CHF, and HTN initially presenting to [facility] on 4/16/24 from OSH for hyperleukocytosis concerning for acute leukemia c/b acute hypoxemic respiratory failure and septic shock 2/2 Strep pneumonia bacteremia i/s/o pneumonia with empyema s/p chest tube placement x2 with MISTII with failure to extubation from MV s/p tracheostomy placement now transferred to RCU on 5/8 for further medical management.     Neuro:  -Mental status baseline, she is able to communicate spontaneously and express needs/concerns.   -Physical debility and functional dependence i/s/o critical illness with hx CVA and wheelchair/cane dependence.   PT/OT consulted.   -Hx of anxiety/MDD with passive SI.  [facility] Psych Team consulted on this admission, initiated on seroquel +clonazepam.   -Will continue and titrate as per  Team. No further SI thoughts.     Pulm:  -Acute hypoxemic respiratory failure 2/2 strep pneumonia empyema c/b bacteremia s/p chest tube x2 with MIST therapy  -Tracheostomy placement.   -Was tolerating SBTs and TC and PMV . Will start TCATC starting 5/14 but removed trach on 5/15 and urgently placed back in (likely removed from delirium)  - Now back on AC for 24 hours and will reattempt TC ATC    Cards: Hemodynamically stable at this time. Discontinue continue midodrine    ID:  - Complete 4 week course of abx (through 5/14) for empyema.     GI:  -Oropharyngeal dysphagia s/p PEG placement (5/7,   -GI now tolerating feeds at goal rate. Bowel regimen prn.     Nephro:  No acute renal     Endocrine:  -Stress hyperglycemia i/s/o DMII better controlled now.   -Will c/w ISS with BGFS monitoring.     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   -Hematology following, no indication for acute exchange therapy.   -Full dose Lovenox in place for LLL DVT.    Dispo: Pt full code. Palliative consulted and recs appreciated. Pt amenable to trial of slow weaning but would not want life long dependence on life support.

## 2024-05-17 NOTE — PROGRESS NOTE ADULT - PROBLEM SELECTOR PLAN 3
intubated on arrival to [facility] ED for respiratory tiring and AMS  - unable to be extubated  - s/p trach 4/28  - mechanical vent: Volume Ctrl 14/460/5/40%  - weaning as tolerated  - aggressive airway management with Duoneb, chest PT and suctioning PRN

## 2024-05-18 LAB
ALBUMIN SERPL ELPH-MCNC: 3 G/DL — LOW (ref 3.3–5)
ALP SERPL-CCNC: 104 U/L — SIGNIFICANT CHANGE UP (ref 40–120)
ALT FLD-CCNC: 5 U/L — LOW (ref 10–45)
ANION GAP SERPL CALC-SCNC: 11 MMOL/L — SIGNIFICANT CHANGE UP (ref 5–17)
AST SERPL-CCNC: 11 U/L — SIGNIFICANT CHANGE UP (ref 10–40)
BILIRUB SERPL-MCNC: 0.3 MG/DL — SIGNIFICANT CHANGE UP (ref 0.2–1.2)
BLD GP AB SCN SERPL QL: NEGATIVE — SIGNIFICANT CHANGE UP
BUN SERPL-MCNC: 13 MG/DL — SIGNIFICANT CHANGE UP (ref 7–23)
CALCIUM SERPL-MCNC: 9.2 MG/DL — SIGNIFICANT CHANGE UP (ref 8.4–10.5)
CHLORIDE SERPL-SCNC: 102 MMOL/L — SIGNIFICANT CHANGE UP (ref 96–108)
CO2 SERPL-SCNC: 28 MMOL/L — SIGNIFICANT CHANGE UP (ref 22–31)
CREAT SERPL-MCNC: 0.43 MG/DL — LOW (ref 0.5–1.3)
EGFR: 106 ML/MIN/1.73M2 — SIGNIFICANT CHANGE UP
GLUCOSE SERPL-MCNC: 103 MG/DL — HIGH (ref 70–99)
HCT VFR BLD CALC: 26.9 % — LOW (ref 34.5–45)
HGB BLD-MCNC: 8.2 G/DL — LOW (ref 11.5–15.5)
MAGNESIUM SERPL-MCNC: 2.3 MG/DL — SIGNIFICANT CHANGE UP (ref 1.6–2.6)
MCHC RBC-ENTMCNC: 29.2 PG — SIGNIFICANT CHANGE UP (ref 27–34)
MCHC RBC-ENTMCNC: 30.5 GM/DL — LOW (ref 32–36)
MCV RBC AUTO: 95.7 FL — SIGNIFICANT CHANGE UP (ref 80–100)
NRBC # BLD: 0 /100 WBCS — SIGNIFICANT CHANGE UP (ref 0–0)
PHOSPHATE SERPL-MCNC: 3.9 MG/DL — SIGNIFICANT CHANGE UP (ref 2.5–4.5)
PLATELET # BLD AUTO: 155 K/UL — SIGNIFICANT CHANGE UP (ref 150–400)
POTASSIUM SERPL-MCNC: 4.5 MMOL/L — SIGNIFICANT CHANGE UP (ref 3.5–5.3)
POTASSIUM SERPL-SCNC: 4.5 MMOL/L — SIGNIFICANT CHANGE UP (ref 3.5–5.3)
PROT SERPL-MCNC: 6.5 G/DL — SIGNIFICANT CHANGE UP (ref 6–8.3)
RBC # BLD: 2.81 M/UL — LOW (ref 3.8–5.2)
RBC # FLD: 19.6 % — HIGH (ref 10.3–14.5)
RH IG SCN BLD-IMP: POSITIVE — SIGNIFICANT CHANGE UP
SODIUM SERPL-SCNC: 141 MMOL/L — SIGNIFICANT CHANGE UP (ref 135–145)
WBC # BLD: 99.95 K/UL — CRITICAL HIGH (ref 3.8–10.5)
WBC # FLD AUTO: 99.95 K/UL — CRITICAL HIGH (ref 3.8–10.5)

## 2024-05-18 PROCEDURE — 99232 SBSQ HOSP IP/OBS MODERATE 35: CPT

## 2024-05-18 RX ORDER — CLONAZEPAM 1 MG
1.5 TABLET ORAL EVERY 12 HOURS
Refills: 0 | Status: DISCONTINUED | OUTPATIENT
Start: 2024-05-18 | End: 2024-05-25

## 2024-05-18 RX ADMIN — SENNA PLUS 10 MILLILITER(S): 8.6 TABLET ORAL at 21:23

## 2024-05-18 RX ADMIN — MIDODRINE HYDROCHLORIDE 30 MILLIGRAM(S): 2.5 TABLET ORAL at 05:13

## 2024-05-18 RX ADMIN — MIDODRINE HYDROCHLORIDE 30 MILLIGRAM(S): 2.5 TABLET ORAL at 21:54

## 2024-05-18 RX ADMIN — Medication 1000 MILLIGRAM(S): at 23:24

## 2024-05-18 RX ADMIN — Medication 1.5 MILLIGRAM(S): at 05:13

## 2024-05-18 RX ADMIN — CHLORHEXIDINE GLUCONATE 1 APPLICATION(S): 213 SOLUTION TOPICAL at 21:24

## 2024-05-18 RX ADMIN — QUETIAPINE FUMARATE 25 MILLIGRAM(S): 200 TABLET, FILM COATED ORAL at 05:13

## 2024-05-18 RX ADMIN — Medication 1 TABLET(S): at 05:13

## 2024-05-18 RX ADMIN — ENOXAPARIN SODIUM 110 MILLIGRAM(S): 100 INJECTION SUBCUTANEOUS at 21:26

## 2024-05-18 RX ADMIN — Medication 1.5 MILLIGRAM(S): at 22:54

## 2024-05-18 RX ADMIN — CHLORHEXIDINE GLUCONATE 1 APPLICATION(S): 213 SOLUTION TOPICAL at 05:13

## 2024-05-18 RX ADMIN — QUETIAPINE FUMARATE 200 MILLIGRAM(S): 200 TABLET, FILM COATED ORAL at 21:24

## 2024-05-18 RX ADMIN — Medication 1000 MILLIGRAM(S): at 22:54

## 2024-05-18 RX ADMIN — MIDODRINE HYDROCHLORIDE 30 MILLIGRAM(S): 2.5 TABLET ORAL at 14:58

## 2024-05-18 RX ADMIN — Medication 1000 MILLIGRAM(S): at 05:39

## 2024-05-18 RX ADMIN — Medication 1.5 MILLIGRAM(S): at 17:12

## 2024-05-18 RX ADMIN — ENOXAPARIN SODIUM 110 MILLIGRAM(S): 100 INJECTION SUBCUTANEOUS at 09:59

## 2024-05-18 NOTE — PROGRESS NOTE ADULT - NS ATTEND AMEND GEN_ALL_CORE FT
69F with MHx CVA, COPD, CHF, and HTN initially presenting to [facility] on 4/16/24 from OSH for hyperleukocytosis concerning for acute leukemia c/b acute hypoxemic respiratory failure and septic shock 2/2 Strep pneumonia bacteremia i/s/o pneumonia with empyema s/p chest tube placement x2 with MISTII with failure to extubation from MV s/p tracheostomy placement now transferred to RCU on 5/8 for further medical management.     Neuro:  -Mental status baseline, she is able to communicate spontaneously and express needs/concerns.   -Physical debility and functional dependence i/s/o critical illness with hx CVA and wheelchair/cane dependence.   PT/OT consulted.   -Hx of anxiety/MDD with passive SI. [facility] Psych Team consulted on this admission, initiated on seroquel +clonazepam.   -Will continue and titrate as per  Team. No further SI thoughts.     Pulm:  -Acute hypoxemic respiratory failure 2/2 strep pneumonia empyema c/b bacteremia s/p chest tube x2 with MIST therapy  -Tracheostomy placement.   -Was tolerating SBTs and TC and PMV . TC during the day vent at night      Cards: Hemodynamically stable at this time. Discontinue continue midodrine    ID:  - Complete 4 week course of abx (through 5/14) for empyema.     GI:  -Oropharyngeal dysphagia s/p PEG placement (5/7,   -GI now tolerating feeds at goal rate. Bowel regimen prn.     Nephro:  No acute renal     Endocrine:  -Stress hyperglycemia i/s/o DMII better controlled now.   -Will c/w ISS with BGFS monitoring.     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   -Hematology following, no indication for acute exchange therapy.   -Full dose Lovenox in place for LLL DVT.    Dispo: Pt full code. Palliative consulted and recs appreciated.

## 2024-05-18 NOTE — PATIENT PROFILE ADULT - FUNCTIONAL ASSESSMENT - BASIC MOBILITY 6.
Problem: Pain  Goal: Acceptable pain level achieved/maintained at rest using appropriate pain scale for the patient  Outcome: Monitoring/Evaluating progress  Goal: Acceptable pain level achieved/maintained with activity using appropriate pain scale for the patient  Outcome: Monitoring/Evaluating progress  Goal: Acceptable pain level achieved/maintained without oversedation  Outcome: Monitoring/Evaluating progress     Problem: Postoperative Care  Goal: Vital signs are maintained within parameters  Outcome: Monitoring/Evaluating progress  Goal: Elimination status is maintained/returned to baseline  Outcome: Monitoring/Evaluating progress  Goal: Oral intake is resumed and tolerated  Outcome: Monitoring/Evaluating progress  Goal: Activity level is resumed to level needed for d/c  Outcome: Monitoring/Evaluating progress  Goal: Verbalizes understanding of postoperative care in the hospital and after d/c  Description: Document on Patient Education Activity  Outcome: Monitoring/Evaluating progress     Problem: At Risk for Falls  Goal: Patient does not fall  Outcome: Monitoring/Evaluating progress  Goal: Patient takes action to control fall-related risks  Outcome: Monitoring/Evaluating progress     Problem: VTE (Actual)  Goal: Patient maintains mobility and remains free from complications of VTE  Outcome: Monitoring/Evaluating progress       1-calculated by average/Not able to assess (calculate score using [facility] averaging method) Cephalexin Pregnancy And Lactation Text: This medication is Pregnancy Category B and considered safe during pregnancy.  It is also excreted in breast milk but can be used safely for shorter doses.

## 2024-05-18 NOTE — PROGRESS NOTE ADULT - PROBLEM SELECTOR PLAN 5
hypotensive on admission   - required pressors in ICU  - now on midodrine 30mg Q8H   - Will wean as tolerated, add antiHTN as needed  - monitor BP

## 2024-05-18 NOTE — PROVIDER CONTACT NOTE (CRITICAL VALUE NOTIFICATION) - ASSESSMENT
Pt. is without any acute distress
No change in vital signs or neuro status, Afebrile
VSS
Pt. without any s/s of acute distress

## 2024-05-18 NOTE — PROGRESS NOTE ADULT - SUBJECTIVE AND OBJECTIVE BOX
Patient is a 69y old  Female who presents with a chief complaint of Transfer for leukophoresis (17 May 2024 07:35)      Interval Events:    REVIEW OF SYSTEMS:  [ ] Positive  [ ] All other systems negative  [ ] Unable to assess ROS because ________    Vital Signs Last 24 Hrs  T(C): 38.1 (05-18-24 @ 06:36), Max: 38.1 (05-18-24 @ 06:36)  T(F): 100.5 (05-18-24 @ 06:36), Max: 100.5 (05-18-24 @ 06:36)  HR: 102 (05-18-24 @ 06:36) (96 - 111)  BP: 100/52 (05-18-24 @ 06:36) (92/58 - 108/58)  RR: 16 (05-18-24 @ 06:36) (14 - 20)  SpO2: 100% (05-18-24 @ 06:36) (97% - 100%)    PHYSICAL EXAM:  HEENT:   [ ]Tracheostomy:  [ ]Pupils equal  [ ]No oral lesions  [ ]Abnormal    SKIN  [ ]No Rash  [ ] Abnormal  [ ] pressure    CARDIAC  [ ]Regular  [ ]Abnormal    PULMONARY  [ ]Bilateral Clear Breath Sounds  [ ]Normal Excursion  [ ]Abnormal    GI  [ ]PEG      [ ] +BS		              [ ]Soft, nondistended, nontender	  [ ]Abnormal    MUSCULOSKELETAL                                   [ ]Bedbound                 [ ]Abnormal    [ ]Ambulatory/OOB to chair                           EXTREMITIES                                         [ ]Normal  [ ]Edema                           NEUROLOGIC  [ ] Normal, non focal  [ ] Focal findings:    PSYCHIATRIC  [ ]Alert and appropriate  [ ] Sedated	 [ ]Agitated    :  Andino: [ ] Yes, if yes: Date of Placement:                   [  ] No    LINES: Central Lines [ ] Yes, if yes: Date of Placement                                     [  ] No    HOSPITAL MEDICATIONS:  MEDICATIONS  (STANDING):  amoxicillin  875 milliGRAM(s)/clavulanate 1 Tablet(s) Oral every 12 hours  chlorhexidine 4% Liquid 1 Application(s) Topical every 12 hours  clonazePAM  Tablet 1.5 milliGRAM(s) Oral every 12 hours  enoxaparin Injectable 110 milliGRAM(s) SubCutaneous every 12 hours  midodrine 30 milliGRAM(s) Oral three times a day  QUEtiapine 200 milliGRAM(s) Oral <User Schedule>  QUEtiapine 25 milliGRAM(s) Oral three times a day  senna Syrup 10 milliLiter(s) Oral at bedtime    MEDICATIONS  (PRN):  acetaminophen   Oral Liquid .. 1000 milliGRAM(s) Enteral Tube every 6 hours PRN Temp greater or equal to 38.5C (101.3F), Mild Pain (1 - 3)  nystatin Powder 1 Application(s) Topical two times a day PRN skin irritation  QUEtiapine 25 milliGRAM(s) Oral every 6 hours PRN agitation      LABS:                        8.1    83.89 )-----------( 150      ( 17 May 2024 06:47 )             25.8     05-17    141  |  103  |  12  ----------------------------<  109<H>  4.4   |  27  |  0.44<L>    Ca    8.8      17 May 2024 06:46  Phos  4.0     05-17  Mg     2.2     05-17    TPro  6.2  /  Alb  2.9<L>  /  TBili  0.2  /  DBili  x   /  AST  12  /  ALT  6<L>  /  AlkPhos  92  05-17      Urinalysis Basic - ( 17 May 2024 06:46 )    Color: x / Appearance: x / SG: x / pH: x  Gluc: 109 mg/dL / Ketone: x  / Bili: x / Urobili: x   Blood: x / Protein: x / Nitrite: x   Leuk Esterase: x / RBC: x / WBC x   Sq Epi: x / Non Sq Epi: x / Bacteria: x          CAPILLARY BLOOD GLUCOSE    MICROBIOLOGY:     RADIOLOGY:  [ ] Reviewed and interpreted by me    Mode: AC/ CMV (Assist Control/ Continuous Mandatory Ventilation)  RR (machine): 14  TV (machine): 460  FiO2: 40  PEEP: 5  ITime: 1  MAP: 11  PIP: 27   Patient is a 69y old  Female who presents with a chief complaint of Transfer for leukophoresis (17 May 2024 07:35)      Interval Events: Tmax of 100.5F    REVIEW OF SYSTEMS:  [ ] Positive  [X] All other systems negative  [ ] Unable to assess ROS because ________    Vital Signs Last 24 Hrs  T(C): 38.1 (05-18-24 @ 06:36), Max: 38.1 (05-18-24 @ 06:36)  T(F): 100.5 (05-18-24 @ 06:36), Max: 100.5 (05-18-24 @ 06:36)  HR: 102 (05-18-24 @ 06:36) (96 - 111)  BP: 100/52 (05-18-24 @ 06:36) (92/58 - 108/58)  RR: 16 (05-18-24 @ 06:36) (14 - 20)  SpO2: 100% (05-18-24 @ 06:36) (97% - 100%)    PHYSICAL EXAM:  HEENT:   [X] Tracheostomy:  #7 Cuffed Portex  [X] PERRL B/L; EOMI  [X] No oral lesions  [ ] Abnormal    SKIN  [X] No Rash  [ ] Abnormal  [ ] pressure    CARDIAC  [X] Regular  [ ] Abnormal    PULMONARY  [ ] Bilateral Clear Breath Sounds  [ ] Normal Excursion  [X] Abnormal:  Mild ronchi B/L anteriorly    GI  [X] PEG      [X] +BS		              [X] Soft, nondistended, nontender	  [ ] Abnormal    MUSCULOSKELETAL                                   [ ] Bedbound                 [X] Abnormal:  Unable to assess gait; moves RUE/RLE/LLE  [ ] Ambulatory/OOB to chair                           EXTREMITIES                                         [ ] Normal  [X] Edema  2+ edema of left hand    NEUROLOGIC  [ ] Normal, non focal  [X] Focal findings:  Awake, alert with eyes open during exam; +gag/cough reflex when suctioned; +Left facial droop (present on prior exams);  +LUE paresis    PSYCHIATRIC  [X] Alert and appropriate, normal affect  [ ] Sedated	 [ ]Agitated    :  Andino: [ ] Yes, if yes: Date of Placement:                   [X] No    LINES: Central Lines [ ] Yes, if yes: Date of Placement                                     [X] No      HOSPITAL MEDICATIONS:  MEDICATIONS  (STANDING):  amoxicillin  875 milliGRAM(s)/clavulanate 1 Tablet(s) Oral every 12 hours  chlorhexidine 4% Liquid 1 Application(s) Topical every 12 hours  clonazePAM  Tablet 1.5 milliGRAM(s) Oral every 12 hours  enoxaparin Injectable 110 milliGRAM(s) SubCutaneous every 12 hours  midodrine 30 milliGRAM(s) Oral three times a day  QUEtiapine 200 milliGRAM(s) Oral <User Schedule>  QUEtiapine 25 milliGRAM(s) Oral three times a day  senna Syrup 10 milliLiter(s) Oral at bedtime    MEDICATIONS  (PRN):  acetaminophen   Oral Liquid .. 1000 milliGRAM(s) Enteral Tube every 6 hours PRN Temp greater or equal to 38.5C (101.3F), Mild Pain (1 - 3)  nystatin Powder 1 Application(s) Topical two times a day PRN skin irritation  QUEtiapine 25 milliGRAM(s) Oral every 6 hours PRN agitation      LABS:                                   8.2    99.95 )-----------( 155      ( 18 May 2024 10:47 )             26.9     05-18    141  |  102  |  13  ----------------------------<  103<H>  4.5   |  28  |  0.43<L>    Ca    9.2      18 May 2024 10:47  Phos  3.9     05-18  Mg     2.3     05-18    TPro  6.5  /  Alb  3.0<L>  /  TBili  0.3  /  DBili  x   /  AST  11  /  ALT  5<L>  /  AlkPhos  104  05-18      CAPILLARY BLOOD GLUCOSE    MICROBIOLOGY:     RADIOLOGY:  [ ] Reviewed and interpreted by me    Mode: AC/ CMV (Assist Control/ Continuous Mandatory Ventilation)  RR (machine): 14  TV (machine): 460  FiO2: 40  PEEP: 5  ITime: 1  MAP: 11  PIP: 27

## 2024-05-18 NOTE — PROGRESS NOTE ADULT - PROBLEM SELECTOR PLAN 3
intubated on arrival to [facility] ED for respiratory tiring and AMS  - unable to be extubated  - s/p trach 4/28  - mechanical vent: Volume Ctrl 14/460/5/40%  - weaning as tolerated  - aggressive airway management with Duoneb, chest PT and suctioning PRN

## 2024-05-18 NOTE — PROGRESS NOTE ADULT - PROBLEM SELECTOR PLAN 2
on admission WBC count 233  - transferred to [facility] for possible leukophoresis  - as per heme/onc - underlying CLL (Oct 2023) with reactive lymphocytosis 2/2 sepsis  - heme/onc deferred leukophoresis due to mature lymphocytes  - IVIG given 4/26/24  - CLL under control and plan for outpatient follow up at [facility] when patient closer to d/c

## 2024-05-18 NOTE — PROGRESS NOTE ADULT - PROBLEM SELECTOR PLAN 6
- 4/24 BL LE doppler - nonocclusive left common femoral vein DVT  - 4/24 BL UE doppler - acute left basilic SVT, no acute DVT  - continue with Lovenox - 4/24 BL LE doppler - nonocclusive left common femoral vein DVT  - 4/24 BL UE doppler - acute left basilic SVT, no acute DVT  - Continue with Lovenox 110mg Q12H

## 2024-05-18 NOTE — PROGRESS NOTE ADULT - PROBLEM SELECTOR PLAN 4
- Pulling at trach.  Unable to be redirected  - Evaluated by psychology team.  - Seroquel 200mg qhs, cont 25mg tid, seroquel 25mg po q6hr prn agitation   - Klonopin 1.5MG BID - Pulling at trach.  Unable to be redirected  - Evaluated by psychology team.  - Seroquel 200mg qhs, seroquel 25mg po q6hr prn agitation.  Holding recommended 25mg TID to observe behavior and prevent over sedation  - Klonopin 1.5MG BID  - 5/18 ECG: NSR with PACs,

## 2024-05-18 NOTE — PROGRESS NOTE ADULT - ASSESSMENT
69F h/o CVA (bedbound at baseline), COPD, CHF, HTN who initially p/w acute shortness of breath to outside ED and was treated for acute pulmonary edema with sublingual nitro x 2, Lasix, and BiPAP. Pt was also found to be febrile to 103, so treated for PNA. BIPAP led to improvement in O2 to 89-90. Pt transferred to [facility] on 4/16/24 for white count of 233 and possible leukophoresis. In the ED, pt intubated iso respiratory tiring and decreasing mental status, and also was started on levophed for hypotension. Following intubation and initiation of pressors, she was admitted to the MICU for AHRF and septic shock.  In MICU, heme was consulted for hyperleukocytosis, however due to mature lymphocytic leukophoresis was not performed.  Course c/b empyema s/p chest tube placement (4/16) and removal, reaccumulation of loculated pleural effusion s/p L pigtail placement and removal on 4/27.   S/p treatment with ceftriaxone (4/18-4/25) for s. pneumo bacteremia and empyema, continuing with Augmentin PO x4 weeks with completion on 5/14.  S/p PEG 5/7.  Transitioned off IV pressors and on midodrine and droxidopa.  Transferred to RCU 5/9.  Weaning as tolerated.  Aggressive airway management with Duoneb chest PT and suctioning PRN.       5/15: Overnight patient decannulated self. Trach was reinserted and patient was placed on vent. Unable to suction and TV were low.  ENT called, trach noted to be in false lumen, noted SQ emphysema on left apical chest area. Mary Jo was visualized and new trach placed.  5/16:  Remains on restraints as having episodic episodes of agitation.  Seroquel PRN not being utilized optimally however.  Tolerating trach collar during the day but will return to vent at night.   5/17: Will attempt to monitor off restraints.  Seroquel dose increased to 200mg HS.  Feeds changed to bolus.  Tolerated trach collar for most of the day but then exhibited labored breathing in the early evening, was placed back on vent.  69F h/o CVA (bedbound at baseline), COPD, CHF, HTN who initially p/w acute shortness of breath to outside ED and was treated for acute pulmonary edema with sublingual nitro x 2, Lasix, and BiPAP. Pt was also found to be febrile to 103, so treated for PNA. BIPAP led to improvement in O2 to 89-90. Pt transferred to [place] on 4/16/24 for white count of 233 and possible leukophoresis. In the ED, pt intubated iso respiratory tiring and decreasing mental status, and also was started on levophed for hypotension. Following intubation and initiation of pressors, she was admitted to the MICU for AHRF and septic shock.  In MICU, heme was consulted for hyperleukocytosis, however due to mature lymphocytic leukophoresis was not performed.  Course c/b empyema s/p chest tube placement (4/16) and removal, reaccumulation of loculated pleural effusion s/p L pigtail placement and removal on 4/27.   S/p treatment with ceftriaxone (4/18-4/25) for s. pneumo bacteremia and empyema, continuing with Augmentin PO x4 weeks with completion on 5/14.  S/p PEG 5/7.  Transitioned off IV pressors and on midodrine and droxidopa.  Transferred to RCU 5/9.  Weaning as tolerated.  Aggressive airway management with Duoneb chest PT and suctioning PRN.       5/15: Overnight patient decannulated self. Trach was reinserted and patient was placed on vent. Unable to suction and TV were low.  ENT called, trach noted to be in false lumen, noted SQ emphysema on left apical chest area. Mary Jo was visualized and new trach placed.  5/16:  Remains on restraints as having episodic episodes of agitation.  Seroquel PRN not being utilized optimally however.  Tolerating trach collar during the day but will return to vent at night.   5/17: Will attempt to monitor off restraints.  Seroquel dose increased to 200mg HS.  Feeds changed to bolus.  Tolerated trach collar for most of the day but then exhibited labored breathing in the early evening, was placed back on vent.   5/18: Patient appears to be in more appropriate mood today.  Remains off restraint and tolerating trach collar.  Will attempt to continue trach collar over night if no issues occur. QTc 448 today with recent seroquel adjustment made yesterday.

## 2024-05-19 LAB
ADD ON TEST-SPECIMEN IN LAB: SIGNIFICANT CHANGE UP
APPEARANCE UR: ABNORMAL
BILIRUB UR-MCNC: NEGATIVE — SIGNIFICANT CHANGE UP
CK MB CFR SERPL CALC: <1 NG/ML — SIGNIFICANT CHANGE UP (ref 0–3.8)
CK SERPL-CCNC: 12 U/L — LOW (ref 25–170)
COLOR SPEC: YELLOW — SIGNIFICANT CHANGE UP
D DIMER BLD IA.RAPID-MCNC: 345 NG/ML DDU — HIGH
DIFF PNL FLD: ABNORMAL
GAS PNL BLDA: SIGNIFICANT CHANGE UP
GAS PNL BLDA: SIGNIFICANT CHANGE UP
GLUCOSE BLDC GLUCOMTR-MCNC: 181 MG/DL — HIGH (ref 70–99)
GLUCOSE UR QL: NEGATIVE MG/DL — SIGNIFICANT CHANGE UP
HCT VFR BLD CALC: 17.1 % — CRITICAL LOW (ref 34.5–45)
HGB BLD-MCNC: 4.9 G/DL — CRITICAL LOW (ref 11.5–15.5)
KETONES UR-MCNC: NEGATIVE MG/DL — SIGNIFICANT CHANGE UP
LEUKOCYTE ESTERASE UR-ACNC: ABNORMAL
MCHC RBC-ENTMCNC: 28.2 PG — SIGNIFICANT CHANGE UP (ref 27–34)
MCHC RBC-ENTMCNC: 28.7 GM/DL — LOW (ref 32–36)
MCV RBC AUTO: 98.3 FL — SIGNIFICANT CHANGE UP (ref 80–100)
NITRITE UR-MCNC: NEGATIVE — SIGNIFICANT CHANGE UP
PH UR: 7 — SIGNIFICANT CHANGE UP (ref 5–8)
PLATELET # BLD AUTO: 178 K/UL — SIGNIFICANT CHANGE UP (ref 150–400)
PROT UR-MCNC: 30 MG/DL
RBC # BLD: 1.74 M/UL — LOW (ref 3.8–5.2)
RBC # FLD: SIGNIFICANT CHANGE UP (ref 10.3–14.5)
SP GR SPEC: 1.02 — SIGNIFICANT CHANGE UP (ref 1–1.03)
UROBILINOGEN FLD QL: 1 MG/DL — SIGNIFICANT CHANGE UP (ref 0.2–1)
WBC # BLD: 161.19 K/UL — CRITICAL HIGH (ref 3.8–10.5)
WBC # FLD AUTO: 161.19 K/UL — CRITICAL HIGH (ref 3.8–10.5)

## 2024-05-19 PROCEDURE — 71045 X-RAY EXAM CHEST 1 VIEW: CPT | Mod: 26

## 2024-05-19 RX ORDER — CEFEPIME 1 G/1
1000 INJECTION, POWDER, FOR SOLUTION INTRAMUSCULAR; INTRAVENOUS EVERY 8 HOURS
Refills: 0 | Status: DISCONTINUED | OUTPATIENT
Start: 2024-05-19 | End: 2024-05-19

## 2024-05-19 RX ORDER — ACETAMINOPHEN 500 MG
1000 TABLET ORAL ONCE
Refills: 0 | Status: COMPLETED | OUTPATIENT
Start: 2024-05-19 | End: 2024-05-19

## 2024-05-19 RX ORDER — PIPERACILLIN AND TAZOBACTAM 4; .5 G/20ML; G/20ML
3.38 INJECTION, POWDER, LYOPHILIZED, FOR SOLUTION INTRAVENOUS EVERY 8 HOURS
Refills: 0 | Status: DISCONTINUED | OUTPATIENT
Start: 2024-05-20 | End: 2024-05-20

## 2024-05-19 RX ORDER — PIPERACILLIN AND TAZOBACTAM 4; .5 G/20ML; G/20ML
3.38 INJECTION, POWDER, LYOPHILIZED, FOR SOLUTION INTRAVENOUS ONCE
Refills: 0 | Status: COMPLETED | OUTPATIENT
Start: 2024-05-19 | End: 2024-05-19

## 2024-05-19 RX ORDER — PIPERACILLIN AND TAZOBACTAM 4; .5 G/20ML; G/20ML
3.38 INJECTION, POWDER, LYOPHILIZED, FOR SOLUTION INTRAVENOUS ONCE
Refills: 0 | Status: DISCONTINUED | OUTPATIENT
Start: 2024-05-20 | End: 2024-05-20

## 2024-05-19 RX ORDER — PANTOPRAZOLE SODIUM 20 MG/1
8 TABLET, DELAYED RELEASE ORAL
Qty: 80 | Refills: 0 | Status: DISCONTINUED | OUTPATIENT
Start: 2024-05-19 | End: 2024-05-20

## 2024-05-19 RX ADMIN — CEFEPIME 100 MILLIGRAM(S): 1 INJECTION, POWDER, FOR SOLUTION INTRAMUSCULAR; INTRAVENOUS at 15:25

## 2024-05-19 RX ADMIN — ENOXAPARIN SODIUM 110 MILLIGRAM(S): 100 INJECTION SUBCUTANEOUS at 09:40

## 2024-05-19 RX ADMIN — Medication 400 MILLIGRAM(S): at 23:22

## 2024-05-19 RX ADMIN — MIDODRINE HYDROCHLORIDE 30 MILLIGRAM(S): 2.5 TABLET ORAL at 04:34

## 2024-05-19 RX ADMIN — Medication 400 MILLIGRAM(S): at 12:30

## 2024-05-19 RX ADMIN — QUETIAPINE FUMARATE 25 MILLIGRAM(S): 200 TABLET, FILM COATED ORAL at 00:03

## 2024-05-19 RX ADMIN — PIPERACILLIN AND TAZOBACTAM 200 GRAM(S): 4; .5 INJECTION, POWDER, LYOPHILIZED, FOR SOLUTION INTRAVENOUS at 23:21

## 2024-05-19 RX ADMIN — CHLORHEXIDINE GLUCONATE 1 APPLICATION(S): 213 SOLUTION TOPICAL at 09:39

## 2024-05-19 RX ADMIN — MIDODRINE HYDROCHLORIDE 30 MILLIGRAM(S): 2.5 TABLET ORAL at 15:25

## 2024-05-19 RX ADMIN — QUETIAPINE FUMARATE 25 MILLIGRAM(S): 200 TABLET, FILM COATED ORAL at 07:59

## 2024-05-19 RX ADMIN — Medication 1.5 MILLIGRAM(S): at 12:30

## 2024-05-19 RX ADMIN — QUETIAPINE FUMARATE 200 MILLIGRAM(S): 200 TABLET, FILM COATED ORAL at 20:56

## 2024-05-19 NOTE — PROGRESS NOTE ADULT - NS ATTEND AMEND GEN_ALL_CORE FT
69F with MHx CVA, COPD, CHF, and HTN initially presenting to [facility] on 4/16/24 from OSH for hyperleukocytosis concerning for acute leukemia c/b acute hypoxemic respiratory failure and septic shock 2/2 Strep pneumonia bacteremia i/s/o pneumonia with empyema s/p chest tube placement x2 with MISTII with failure to extubation from MV s/p tracheostomy placement now transferred to RCU on 5/8 for further medical management.     Neuro:  -Mental status baseline, she is able to communicate spontaneously and express needs/concerns.   -Physical debility and functional dependence i/s/o critical illness with hx CVA and wheelchair/cane dependence.   PT/OT consulted.   -Hx of anxiety/MDD with passive SI. [facility] Psych Team consulted on this admission, initiated on seroquel +clonazepam.   -Will continue and titrate as per  Team. No further SI thoughts.     Pulm:  -Acute hypoxemic respiratory failure 2/2 strep pneumonia empyema c/b bacteremia s/p chest tube x2 with MIST therapy  -Tracheostomy placement.   -Was tolerating SBTs and TC and PMV . TC during the day vent at night      Cards: Hemodynamically stable at this time. Discontinue continue midodrine    ID:  - Complete 4 week course of abx (through 5/14) for empyema.     GI:  -Oropharyngeal dysphagia s/p PEG placement (5/7,   -GI now tolerating feeds at goal rate. Bowel regimen prn.     Nephro:  No acute renal     Endocrine:  -Stress hyperglycemia i/s/o DMII better controlled now.   -Will c/w ISS with BGFS monitoring.     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   -Hematology following, no indication for acute exchange therapy.   -Full dose Lovenox in place for LLL DVT.    Dispo: Pt full code. Palliative consulted and recs appreciated. 69F with MHx CVA, COPD, CHF, and HTN initially presenting to [facility] on 4/16/24 from OSH for hyperleukocytosis concerning for acute leukemia c/b acute hypoxemic respiratory failure and septic shock 2/2 Strep pneumonia bacteremia i/s/o pneumonia with empyema s/p chest tube placement x2 with MISTII with failure to extubation from MV s/p tracheostomy placement now transferred to RCU on 5/8 for further medical management.     Neuro:  -Mental status baseline, she is able to communicate spontaneously and express needs/concerns.   -Physical debility and functional dependence i/s/o critical illness with hx CVA and wheelchair/cane dependence.   PT/OT consulted.   -Hx of anxiety/MDD with passive SI. [facility] Psych Team consulted on this admission, initiated on seroquel +clonazepam.   -Will continue and titrate as per  Team. No further SI thoughts.     Pulm:  -Acute hypoxemic respiratory failure 2/2 strep pneumonia empyema c/b bacteremia s/p chest tube x2 with MIST therapy  -Tracheostomy placement.   -Was tolerating SBTs and TC and PMV . TC during the day vent at night      Cards: Hemodynamically stable at this time. Discontinue continue midodrine    ID:  - staph pneumonia bacteremia and empyema - ceftriaxone ->uynasyn then augmentin   - Completed 4 week course of po augmentin abx (through 5/14) for empyema.   Febrile to 103 today, will resume antibiotics.   - MRSA negative  - ?erythema on leg, ?cellulitis vs dvt. Fu dopplers, add cpk, crp    GI:  -Oropharyngeal dysphagia s/p PEG placement (5/7,   -GI now tolerating feeds at goal rate. Bowel regimen prn.     Nephro:  No acute renal     Endocrine:  -Stress hyperglycemia i/s/o DMII better controlled now.   -Will c/w ISS with BGFS monitoring.     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   -Hematology following, no indication for acute exchange therapy.   -Full dose Lovenox in place for LLL DVT.    Dispo: Pt full code. Palliative consulted and recs appreciated.

## 2024-05-19 NOTE — PROGRESS NOTE ADULT - PROBLEM SELECTOR PLAN 6
- 4/24 BL LE doppler - nonocclusive left common femoral vein DVT  - 4/24 BL UE doppler - acute left basilic SVT, no acute DVT  - Continue with Lovenox 110mg Q12H

## 2024-05-19 NOTE — PROGRESS NOTE ADULT - PROBLEM SELECTOR PLAN 3
intubated on arrival to [facility] ED for respiratory tiring and AMS  - unable to be extubated  - s/p trach 4/28  - mechanical vent: Volume Ctrl 14/460/5/40%  - weaning as tolerated  - aggressive airway management with Duoneb, chest PT and suctioning PRN

## 2024-05-19 NOTE — PROGRESS NOTE ADULT - PROBLEM SELECTOR PLAN 2
on admission WBC count 233  - transferred to [facility] for possible leukophoresis  - as per heme/onc - underlying CLL (Oct 2023) with reactive lymphocytosis 2/2 sepsis  - heme/onc deferred leukophoresis due to mature lymphocytes  - IVIG given 4/26/24  - CLL under control and plan for outpatient follow up at [facility] when patient closer to d/c

## 2024-05-19 NOTE — PROGRESS NOTE ADULT - SUBJECTIVE AND OBJECTIVE BOX
Patient is a 69y old  Female who presents with a chief complaint of Transfer for leukophoresis (18 May 2024 07:55)      Interval Events:    REVIEW OF SYSTEMS:  [ ] Positive  [ ] All other systems negative  [ ] Unable to assess ROS because ________    Vital Signs Last 24 Hrs  T(C): 37.2 (05-19-24 @ 04:30), Max: 37.8 (05-18-24 @ 23:50)  T(F): 98.9 (05-19-24 @ 04:30), Max: 100 (05-18-24 @ 23:50)  HR: 115 (05-19-24 @ 04:30) (103 - 125)  BP: 102/54 (05-19-24 @ 05:40) (99/61 - 115/54)  RR: 20 (05-19-24 @ 04:30) (18 - 20)  SpO2: 100% (05-19-24 @ 04:30) (94% - 100%)    PHYSICAL EXAM:  HEENT:   [ ]Tracheostomy:  [ ]Pupils equal  [ ]No oral lesions  [ ]Abnormal    SKIN  [ ]No Rash  [ ] Abnormal  [ ] pressure    CARDIAC  [ ]Regular  [ ]Abnormal    PULMONARY  [ ]Bilateral Clear Breath Sounds  [ ]Normal Excursion  [ ]Abnormal    GI  [ ]PEG      [ ] +BS		              [ ]Soft, nondistended, nontender	  [ ]Abnormal    MUSCULOSKELETAL                                   [ ]Bedbound                 [ ]Abnormal    [ ]Ambulatory/OOB to chair                           EXTREMITIES                                         [ ]Normal  [ ]Edema                           NEUROLOGIC  [ ] Normal, non focal  [ ] Focal findings:    PSYCHIATRIC  [ ]Alert and appropriate  [ ] Sedated	 [ ]Agitated    :  Nadino: [ ] Yes, if yes: Date of Placement:                   [  ] No    LINES: Central Lines [ ] Yes, if yes: Date of Placement                                     [  ] No    HOSPITAL MEDICATIONS:  MEDICATIONS  (STANDING):  chlorhexidine 4% Liquid 1 Application(s) Topical every 12 hours  enoxaparin Injectable 110 milliGRAM(s) SubCutaneous every 12 hours  midodrine 30 milliGRAM(s) Oral three times a day  QUEtiapine 200 milliGRAM(s) Oral <User Schedule>  senna Syrup 10 milliLiter(s) Oral at bedtime    MEDICATIONS  (PRN):  acetaminophen   Oral Liquid .. 1000 milliGRAM(s) Enteral Tube every 6 hours PRN Temp greater or equal to 38.5C (101.3F), Mild Pain (1 - 3)  clonazePAM  Tablet 1.5 milliGRAM(s) Oral every 12 hours PRN agitation/anxiety  nystatin Powder 1 Application(s) Topical two times a day PRN skin irritation  QUEtiapine 25 milliGRAM(s) Oral every 6 hours PRN agitation      LABS:                        8.2    99.95 )-----------( 155      ( 18 May 2024 10:47 )             26.9     05-18    141  |  102  |  13  ----------------------------<  103<H>  4.5   |  28  |  0.43<L>    Ca    9.2      18 May 2024 10:47  Phos  3.9     05-18  Mg     2.3     05-18    TPro  6.5  /  Alb  3.0<L>  /  TBili  0.3  /  DBili  x   /  AST  11  /  ALT  5<L>  /  AlkPhos  104  05-18      Urinalysis Basic - ( 18 May 2024 10:47 )    Color: x / Appearance: x / SG: x / pH: x  Gluc: 103 mg/dL / Ketone: x  / Bili: x / Urobili: x   Blood: x / Protein: x / Nitrite: x   Leuk Esterase: x / RBC: x / WBC x   Sq Epi: x / Non Sq Epi: x / Bacteria: x          CAPILLARY BLOOD GLUCOSE    MICROBIOLOGY:     RADIOLOGY:  [ ] Reviewed and interpreted by me    Mode: AC/ CMV (Assist Control/ Continuous Mandatory Ventilation)  RR (machine): 14  TV (machine): 460  FiO2: 40  PEEP: 5  ITime: 1  MAP: 14  PIP: 24   Patient is a 69y old  Female who presents with a chief complaint of Transfer for leukophoresis (18 May 2024 07:55)      Interval Events:  No acute events overnight.     REVIEW OF SYSTEMS:  [ ] Positive  [X] All other systems negative  [ ] Unable to assess ROS because ________    Vital Signs Last 24 Hrs  T(C): 37.2 (05-19-24 @ 04:30), Max: 37.8 (05-18-24 @ 23:50)  T(F): 98.9 (05-19-24 @ 04:30), Max: 100 (05-18-24 @ 23:50)  HR: 115 (05-19-24 @ 04:30) (103 - 125)  BP: 102/54 (05-19-24 @ 05:40) (99/61 - 115/54)  RR: 20 (05-19-24 @ 04:30) (18 - 20)  SpO2: 100% (05-19-24 @ 04:30) (94% - 100%)    PHYSICAL EXAM:  HEENT:   [X]Tracheostomy: #7 cuffed portex  [X]Pupils equal  [ ]No oral lesions  [ ]Abnormal    SKIN  [X]No Rash  [ ] Abnormal  [ ] pressure    CARDIAC  [ ]Regular  [X]Abnormal - Tachycardia    PULMONARY  [ ]Bilateral Clear Breath Sounds  [ ]Normal Excursion  [X]Abnormal - BS mildly coarse BL    GI  [X]PEG      [X] +BS		              [X]Soft, nondistended, nontender	  [ ]Abnormal    MUSCULOSKELETAL                                   [X]Bedbound                 [ ]Abnormal    [ ]Ambulatory/OOB to chair                           EXTREMITIES                                         [ ]Normal  [X]Edema LLE edema                            NEUROLOGIC  [ ] Normal, non focal  [X] Focal findings:    PSYCHIATRIC  [X] Alert and appropriate  [ ] Sedated	 [ ]Agitated    :  Andino: [ ] Yes, if yes: Date of Placement:                   [X] No    LINES: Central Lines [ ] Yes, if yes: Date of Placement                                     [X] No    HOSPITAL MEDICATIONS:  MEDICATIONS  (STANDING):  chlorhexidine 4% Liquid 1 Application(s) Topical every 12 hours  enoxaparin Injectable 110 milliGRAM(s) SubCutaneous every 12 hours  midodrine 30 milliGRAM(s) Oral three times a day  QUEtiapine 200 milliGRAM(s) Oral <User Schedule>  senna Syrup 10 milliLiter(s) Oral at bedtime    MEDICATIONS  (PRN):  acetaminophen   Oral Liquid .. 1000 milliGRAM(s) Enteral Tube every 6 hours PRN Temp greater or equal to 38.5C (101.3F), Mild Pain (1 - 3)  clonazePAM  Tablet 1.5 milliGRAM(s) Oral every 12 hours PRN agitation/anxiety  nystatin Powder 1 Application(s) Topical two times a day PRN skin irritation  QUEtiapine 25 milliGRAM(s) Oral every 6 hours PRN agitation    LABS:                        8.2    99.95 )-----------( 155      ( 18 May 2024 10:47 )             26.9     05-18    141  |  102  |  13  ----------------------------<  103<H>  4.5   |  28  |  0.43<L>    Ca    9.2      18 May 2024 10:47  Phos  3.9     05-18  Mg     2.3     05-18    TPro  6.5  /  Alb  3.0<L>  /  TBili  0.3  /  DBili  x   /  AST  11  /  ALT  5<L>  /  AlkPhos  104  05-18    Urinalysis Basic - ( 18 May 2024 10:47 )    Color: x / Appearance: x / SG: x / pH: x  Gluc: 103 mg/dL / Ketone: x  / Bili: x / Urobili: x   Blood: x / Protein: x / Nitrite: x   Leuk Esterase: x / RBC: x / WBC x   Sq Epi: x / Non Sq Epi: x / Bacteria: x    CAPILLARY BLOOD GLUCOSE    MICROBIOLOGY:     RADIOLOGY:  [ ] Reviewed and interpreted by me    Mode: AC/ CMV (Assist Control/ Continuous Mandatory Ventilation)  RR (machine): 14  TV (machine): 460  FiO2: 40  PEEP: 5  ITime: 1  MAP: 14  PIP: 24

## 2024-05-19 NOTE — PROGRESS NOTE ADULT - ASSESSMENT
69F h/o CVA (bedbound at baseline), COPD, CHF, HTN who initially p/w acute shortness of breath to outside ED and was treated for acute pulmonary edema with sublingual nitro x 2, Lasix, and BiPAP. Pt was also found to be febrile to 103, so treated for PNA. BIPAP led to improvement in O2 to 89-90. Pt transferred to [facility] on 4/16/24 for white count of 233 and possible leukophoresis. In the ED, pt intubated iso respiratory tiring and decreasing mental status, and also was started on levophed for hypotension. Following intubation and initiation of pressors, she was admitted to the MICU for AHRF and septic shock.  In MICU, heme was consulted for hyperleukocytosis, however due to mature lymphocytic leukophoresis was not performed.  Course c/b empyema s/p chest tube placement (4/16) and removal, reaccumulation of loculated pleural effusion s/p L pigtail placement and removal on 4/27.   S/p treatment with ceftriaxone (4/18-4/25) for s. pneumo bacteremia and empyema, continuing with Augmentin PO x4 weeks with completion on 5/14.  S/p PEG 5/7.  Transitioned off IV pressors and on midodrine and droxidopa.  Transferred to RCU 5/9.  Weaning as tolerated.  Aggressive airway management with Duoneb chest PT and suctioning PRN.       5/15: Overnight patient decannulated self. Trach was reinserted and patient was placed on vent. Unable to suction and TV were low.  ENT called, trach noted to be in false lumen, noted SQ emphysema on left apical chest area. Mary Jo was visualized and new trach placed.  5/16:  Remains on restraints as having episodic episodes of agitation.  Seroquel PRN not being utilized optimally however.  Tolerating trach collar during the day but will return to vent at night.   5/17: Will attempt to monitor off restraints.  Seroquel dose increased to 200mg HS.  Feeds changed to bolus.  Tolerated trach collar for most of the day but then exhibited labored breathing in the early evening, was placed back on vent.   5/18: Patient appears to be in more appropriate mood today.  Remains off restraint and tolerating trach collar.  Will attempt to continue trach collar over night if no issues occur. QTc 448 today with recent seroquel adjustment made yesterday. 69F h/o CVA (bedbound at baseline), COPD, CHF, HTN who initially p/w acute shortness of breath to outside ED and was treated for acute pulmonary edema with sublingual nitro x 2, Lasix, and BiPAP. Pt was also found to be febrile to 103, so treated for PNA. BIPAP led to improvement in O2 to 89-90. Pt transferred to [facility] on 4/16/24 for white count of 233 and possible leukophoresis. In the ED, pt intubated iso respiratory tiring and decreasing mental status, and also was started on levophed for hypotension. Following intubation and initiation of pressors, she was admitted to the MICU for AHRF and septic shock.  In MICU, heme was consulted for hyperleukocytosis, however due to mature lymphocytic leukophoresis was not performed.  Course c/b empyema s/p chest tube placement (4/16) and removal, reaccumulation of loculated pleural effusion s/p L pigtail placement and removal on 4/27.   S/p treatment with ceftriaxone (4/18-4/25) for s. pneumo bacteremia and empyema, continuing with Augmentin PO x4 weeks with completion on 5/14.  S/p PEG 5/7.  Transitioned off IV pressors and on midodrine and droxidopa.  Transferred to RCU 5/9.  Weaning as tolerated.  Aggressive airway management with Duoneb chest PT and suctioning PRN.       5/19:  Febrile, tachycardic today - pancultured.  Tolerating TC throughout the day, will RTV at night pending infectious w/u.

## 2024-05-19 NOTE — PROGRESS NOTE ADULT - PROBLEM SELECTOR PLAN 4
- Pulling at trach.  Unable to be redirected  - Evaluated by psychology team.  - Seroquel 200mg qhs, seroquel 25mg po q6hr prn agitation.  Holding recommended 25mg TID to observe behavior and prevent over sedation  - Klonopin 1.5MG BID  - 5/18 ECG: NSR with PACs,

## 2024-05-20 LAB
ADD ON TEST-SPECIMEN IN LAB: SIGNIFICANT CHANGE UP
ALBUMIN SERPL ELPH-MCNC: 2.4 G/DL — LOW (ref 3.3–5)
ALBUMIN SERPL ELPH-MCNC: 2.6 G/DL — LOW (ref 3.3–5)
ALP SERPL-CCNC: 63 U/L — SIGNIFICANT CHANGE UP (ref 40–120)
ALP SERPL-CCNC: 71 U/L — SIGNIFICANT CHANGE UP (ref 40–120)
ALP SERPL-CCNC: 74 U/L — SIGNIFICANT CHANGE UP (ref 40–120)
ALP SERPL-CCNC: 78 U/L — SIGNIFICANT CHANGE UP (ref 40–120)
ALT FLD-CCNC: 11 U/L — SIGNIFICANT CHANGE UP (ref 10–45)
ALT FLD-CCNC: 16 U/L — SIGNIFICANT CHANGE UP (ref 10–45)
ALT FLD-CCNC: 17 U/L — SIGNIFICANT CHANGE UP (ref 10–45)
ALT FLD-CCNC: 7 U/L — LOW (ref 10–45)
ANION GAP SERPL CALC-SCNC: 10 MMOL/L — SIGNIFICANT CHANGE UP (ref 5–17)
ANION GAP SERPL CALC-SCNC: 14 MMOL/L — SIGNIFICANT CHANGE UP (ref 5–17)
ANION GAP SERPL CALC-SCNC: 16 MMOL/L — SIGNIFICANT CHANGE UP (ref 5–17)
ANION GAP SERPL CALC-SCNC: 17 MMOL/L — SIGNIFICANT CHANGE UP (ref 5–17)
ANISOCYTOSIS BLD QL: SIGNIFICANT CHANGE UP
APTT BLD: 32.8 SEC — SIGNIFICANT CHANGE UP (ref 24.5–35.6)
APTT BLD: 34.9 SEC — SIGNIFICANT CHANGE UP (ref 24.5–35.6)
AST SERPL-CCNC: 25 U/L — SIGNIFICANT CHANGE UP (ref 10–40)
AST SERPL-CCNC: 34 U/L — SIGNIFICANT CHANGE UP (ref 10–40)
AST SERPL-CCNC: 40 U/L — SIGNIFICANT CHANGE UP (ref 10–40)
AST SERPL-CCNC: 51 U/L — HIGH (ref 10–40)
BASE EXCESS BLDV CALC-SCNC: -0.3 MMOL/L — SIGNIFICANT CHANGE UP (ref -2–3)
BASE EXCESS BLDV CALC-SCNC: 0.7 MMOL/L — SIGNIFICANT CHANGE UP (ref -2–3)
BASOPHILS # BLD AUTO: 0 K/UL — SIGNIFICANT CHANGE UP (ref 0–0.2)
BASOPHILS # BLD AUTO: 0 K/UL — SIGNIFICANT CHANGE UP (ref 0–0.2)
BASOPHILS # BLD AUTO: 0.1 K/UL — SIGNIFICANT CHANGE UP (ref 0–0.2)
BASOPHILS # BLD AUTO: 0.18 K/UL — SIGNIFICANT CHANGE UP (ref 0–0.2)
BASOPHILS NFR BLD AUTO: 0 % — SIGNIFICANT CHANGE UP (ref 0–2)
BASOPHILS NFR BLD AUTO: 0 % — SIGNIFICANT CHANGE UP (ref 0–2)
BASOPHILS NFR BLD AUTO: 0.1 % — SIGNIFICANT CHANGE UP (ref 0–2)
BASOPHILS NFR BLD AUTO: 0.1 % — SIGNIFICANT CHANGE UP (ref 0–2)
BILIRUB SERPL-MCNC: 0.2 MG/DL — SIGNIFICANT CHANGE UP (ref 0.2–1.2)
BILIRUB SERPL-MCNC: 0.2 MG/DL — SIGNIFICANT CHANGE UP (ref 0.2–1.2)
BILIRUB SERPL-MCNC: 0.3 MG/DL — SIGNIFICANT CHANGE UP (ref 0.2–1.2)
BILIRUB SERPL-MCNC: 0.4 MG/DL — SIGNIFICANT CHANGE UP (ref 0.2–1.2)
BUN SERPL-MCNC: 28 MG/DL — HIGH (ref 7–23)
BUN SERPL-MCNC: 29 MG/DL — HIGH (ref 7–23)
BUN SERPL-MCNC: 29 MG/DL — HIGH (ref 7–23)
BUN SERPL-MCNC: 32 MG/DL — HIGH (ref 7–23)
CA-I BLD-SCNC: 1.03 MMOL/L — LOW (ref 1.15–1.33)
CA-I SERPL-SCNC: 1.12 MMOL/L — LOW (ref 1.15–1.33)
CA-I SERPL-SCNC: 1.14 MMOL/L — LOW (ref 1.15–1.33)
CALCIUM SERPL-MCNC: 7.8 MG/DL — LOW (ref 8.4–10.5)
CALCIUM SERPL-MCNC: 7.9 MG/DL — LOW (ref 8.4–10.5)
CALCIUM SERPL-MCNC: 8 MG/DL — LOW (ref 8.4–10.5)
CALCIUM SERPL-MCNC: 8.2 MG/DL — LOW (ref 8.4–10.5)
CHLORIDE BLDV-SCNC: 104 MMOL/L — SIGNIFICANT CHANGE UP (ref 96–108)
CHLORIDE BLDV-SCNC: 105 MMOL/L — SIGNIFICANT CHANGE UP (ref 96–108)
CHLORIDE SERPL-SCNC: 101 MMOL/L — SIGNIFICANT CHANGE UP (ref 96–108)
CHLORIDE SERPL-SCNC: 103 MMOL/L — SIGNIFICANT CHANGE UP (ref 96–108)
CHLORIDE SERPL-SCNC: 103 MMOL/L — SIGNIFICANT CHANGE UP (ref 96–108)
CHLORIDE SERPL-SCNC: 99 MMOL/L — SIGNIFICANT CHANGE UP (ref 96–108)
CO2 BLDV-SCNC: 28 MMOL/L — HIGH (ref 22–26)
CO2 BLDV-SCNC: 28 MMOL/L — HIGH (ref 22–26)
CO2 SERPL-SCNC: 20 MMOL/L — LOW (ref 22–31)
CO2 SERPL-SCNC: 20 MMOL/L — LOW (ref 22–31)
CO2 SERPL-SCNC: 22 MMOL/L — SIGNIFICANT CHANGE UP (ref 22–31)
CO2 SERPL-SCNC: 23 MMOL/L — SIGNIFICANT CHANGE UP (ref 22–31)
CREAT SERPL-MCNC: 0.64 MG/DL — SIGNIFICANT CHANGE UP (ref 0.5–1.3)
CREAT SERPL-MCNC: 0.74 MG/DL — SIGNIFICANT CHANGE UP (ref 0.5–1.3)
CREAT SERPL-MCNC: 0.85 MG/DL — SIGNIFICANT CHANGE UP (ref 0.5–1.3)
CREAT SERPL-MCNC: 0.88 MG/DL — SIGNIFICANT CHANGE UP (ref 0.5–1.3)
EGFR: 72 ML/MIN/1.73M2 — SIGNIFICANT CHANGE UP
EGFR: 75 ML/MIN/1.73M2 — SIGNIFICANT CHANGE UP
EGFR: 88 ML/MIN/1.73M2 — SIGNIFICANT CHANGE UP
EGFR: 96 ML/MIN/1.73M2 — SIGNIFICANT CHANGE UP
EOSINOPHIL # BLD AUTO: 0 K/UL — SIGNIFICANT CHANGE UP (ref 0–0.5)
EOSINOPHIL # BLD AUTO: 0.06 K/UL — SIGNIFICANT CHANGE UP (ref 0–0.5)
EOSINOPHIL # BLD AUTO: 0.06 K/UL — SIGNIFICANT CHANGE UP (ref 0–0.5)
EOSINOPHIL # BLD AUTO: 1.45 K/UL — HIGH (ref 0–0.5)
EOSINOPHIL NFR BLD AUTO: 0 % — SIGNIFICANT CHANGE UP (ref 0–6)
EOSINOPHIL NFR BLD AUTO: 0.9 % — SIGNIFICANT CHANGE UP (ref 0–6)
FIBRINOGEN PPP-MCNC: 487 MG/DL — HIGH (ref 200–445)
FLUAV AG NPH QL: SIGNIFICANT CHANGE UP
FLUBV AG NPH QL: SIGNIFICANT CHANGE UP
FSP PPP-MCNC: < 5 UG/ML — SIGNIFICANT CHANGE UP
GAS PNL BLDA: SIGNIFICANT CHANGE UP
GAS PNL BLDV: 138 MMOL/L — SIGNIFICANT CHANGE UP (ref 136–145)
GAS PNL BLDV: 139 MMOL/L — SIGNIFICANT CHANGE UP (ref 136–145)
GAS PNL BLDV: SIGNIFICANT CHANGE UP
GIANT PLATELETS BLD QL SMEAR: PRESENT — SIGNIFICANT CHANGE UP
GLUCOSE BLDC GLUCOMTR-MCNC: 177 MG/DL — HIGH (ref 70–99)
GLUCOSE BLDC GLUCOMTR-MCNC: 180 MG/DL — HIGH (ref 70–99)
GLUCOSE BLDC GLUCOMTR-MCNC: 193 MG/DL — HIGH (ref 70–99)
GLUCOSE BLDC GLUCOMTR-MCNC: 319 MG/DL — HIGH (ref 70–99)
GLUCOSE BLDV-MCNC: 132 MG/DL — HIGH (ref 70–99)
GLUCOSE BLDV-MCNC: 136 MG/DL — HIGH (ref 70–99)
GLUCOSE SERPL-MCNC: 158 MG/DL — HIGH (ref 70–99)
GLUCOSE SERPL-MCNC: 164 MG/DL — HIGH (ref 70–99)
GLUCOSE SERPL-MCNC: 171 MG/DL — HIGH (ref 70–99)
GLUCOSE SERPL-MCNC: 224 MG/DL — HIGH (ref 70–99)
HAPTOGLOB SERPL-MCNC: 177 MG/DL — SIGNIFICANT CHANGE UP (ref 34–200)
HCO3 BLDV-SCNC: 26 MMOL/L — SIGNIFICANT CHANGE UP (ref 22–29)
HCO3 BLDV-SCNC: 26 MMOL/L — SIGNIFICANT CHANGE UP (ref 22–29)
HCT VFR BLD CALC: 15.8 % — CRITICAL LOW (ref 34.5–45)
HCT VFR BLD CALC: 19.9 % — CRITICAL LOW (ref 34.5–45)
HCT VFR BLD CALC: 21.2 % — LOW (ref 34.5–45)
HCT VFR BLD CALC: 25.4 % — LOW (ref 34.5–45)
HCT VFR BLD CALC: 25.5 % — LOW (ref 34.5–45)
HCT VFR BLDA CALC: 19 % — CRITICAL LOW (ref 34.5–46.5)
HCT VFR BLDA CALC: 20 % — CRITICAL LOW (ref 34.5–46.5)
HGB BLD CALC-MCNC: 6.3 G/DL — CRITICAL LOW (ref 11.7–16.1)
HGB BLD CALC-MCNC: 6.8 G/DL — CRITICAL LOW (ref 11.7–16.1)
HGB BLD-MCNC: 4.6 G/DL — CRITICAL LOW (ref 11.5–15.5)
HGB BLD-MCNC: 6 G/DL — CRITICAL LOW (ref 11.5–15.5)
HGB BLD-MCNC: 6.2 G/DL — CRITICAL LOW (ref 11.5–15.5)
HGB BLD-MCNC: 8.1 G/DL — LOW (ref 11.5–15.5)
HGB BLD-MCNC: 8.1 G/DL — LOW (ref 11.5–15.5)
HOROWITZ INDEX BLDV+IHG-RTO: 40 — SIGNIFICANT CHANGE UP
HOROWITZ INDEX BLDV+IHG-RTO: 40 — SIGNIFICANT CHANGE UP
HYPOCHROMIA BLD QL: SLIGHT — SIGNIFICANT CHANGE UP
IMM GRANULOCYTES NFR BLD AUTO: 0.9 % — SIGNIFICANT CHANGE UP (ref 0–0.9)
IMM GRANULOCYTES NFR BLD AUTO: 1 % — HIGH (ref 0–0.9)
INR BLD: 1.38 RATIO — HIGH (ref 0.85–1.18)
INR BLD: 1.46 RATIO — HIGH (ref 0.85–1.18)
LACTATE BLDV-MCNC: 3.6 MMOL/L — HIGH (ref 0.5–2)
LACTATE BLDV-MCNC: 4.9 MMOL/L — CRITICAL HIGH (ref 0.5–2)
LDH SERPL L TO P-CCNC: 256 U/L — HIGH (ref 50–242)
LYMPHOCYTES # BLD AUTO: 105.42 K/UL — HIGH (ref 1–3.3)
LYMPHOCYTES # BLD AUTO: 108.94 K/UL — HIGH (ref 1–3.3)
LYMPHOCYTES # BLD AUTO: 140.67 K/UL — HIGH (ref 1–3.3)
LYMPHOCYTES # BLD AUTO: 151.2 K/UL — HIGH (ref 1–3.3)
LYMPHOCYTES # BLD AUTO: 73.5 % — HIGH (ref 13–44)
LYMPHOCYTES # BLD AUTO: 81.4 % — HIGH (ref 13–44)
LYMPHOCYTES # BLD AUTO: 81.5 % — HIGH (ref 13–44)
LYMPHOCYTES # BLD AUTO: 93.8 % — HIGH (ref 13–44)
MACROCYTES BLD QL: SLIGHT — SIGNIFICANT CHANGE UP
MAGNESIUM SERPL-MCNC: 2.4 MG/DL — SIGNIFICANT CHANGE UP (ref 1.6–2.6)
MAGNESIUM SERPL-MCNC: 2.6 MG/DL — SIGNIFICANT CHANGE UP (ref 1.6–2.6)
MANUAL SMEAR VERIFICATION: SIGNIFICANT CHANGE UP
MANUAL SMEAR VERIFICATION: SIGNIFICANT CHANGE UP
MCHC RBC-ENTMCNC: 27 PG — SIGNIFICANT CHANGE UP (ref 27–34)
MCHC RBC-ENTMCNC: 27.1 PG — SIGNIFICANT CHANGE UP (ref 27–34)
MCHC RBC-ENTMCNC: 27.4 PG — SIGNIFICANT CHANGE UP (ref 27–34)
MCHC RBC-ENTMCNC: 27.7 PG — SIGNIFICANT CHANGE UP (ref 27–34)
MCHC RBC-ENTMCNC: 28.8 PG — SIGNIFICANT CHANGE UP (ref 27–34)
MCHC RBC-ENTMCNC: 29.1 GM/DL — LOW (ref 32–36)
MCHC RBC-ENTMCNC: 29.2 GM/DL — LOW (ref 32–36)
MCHC RBC-ENTMCNC: 30.2 GM/DL — LOW (ref 32–36)
MCHC RBC-ENTMCNC: 31.8 GM/DL — LOW (ref 32–36)
MCHC RBC-ENTMCNC: 31.9 GM/DL — LOW (ref 32–36)
MCV RBC AUTO: 86.1 FL — SIGNIFICANT CHANGE UP (ref 80–100)
MCV RBC AUTO: 87 FL — SIGNIFICANT CHANGE UP (ref 80–100)
MCV RBC AUTO: 89.6 FL — SIGNIFICANT CHANGE UP (ref 80–100)
MCV RBC AUTO: 92.6 FL — SIGNIFICANT CHANGE UP (ref 80–100)
MCV RBC AUTO: 98.8 FL — SIGNIFICANT CHANGE UP (ref 80–100)
MICROCYTES BLD QL: SLIGHT — SIGNIFICANT CHANGE UP
MONOCYTES # BLD AUTO: 1.45 K/UL — HIGH (ref 0–0.9)
MONOCYTES # BLD AUTO: 3.3 K/UL — HIGH (ref 0–0.9)
MONOCYTES # BLD AUTO: 5.34 K/UL — HIGH (ref 0–0.9)
MONOCYTES # BLD AUTO: 7.38 K/UL — HIGH (ref 0–0.9)
MONOCYTES NFR BLD AUTO: 0.9 % — LOW (ref 2–14)
MONOCYTES NFR BLD AUTO: 2.3 % — SIGNIFICANT CHANGE UP (ref 2–14)
MONOCYTES NFR BLD AUTO: 4 % — SIGNIFICANT CHANGE UP (ref 2–14)
MONOCYTES NFR BLD AUTO: 4.3 % — SIGNIFICANT CHANGE UP (ref 2–14)
NEUTROPHILS # BLD AUTO: 18 K/UL — HIGH (ref 1.8–7.4)
NEUTROPHILS # BLD AUTO: 22.88 K/UL — HIGH (ref 1.8–7.4)
NEUTROPHILS # BLD AUTO: 34.71 K/UL — HIGH (ref 1.8–7.4)
NEUTROPHILS # BLD AUTO: 7.09 K/UL — SIGNIFICANT CHANGE UP (ref 1.8–7.4)
NEUTROPHILS NFR BLD AUTO: 13.2 % — LOW (ref 43–77)
NEUTROPHILS NFR BLD AUTO: 13.5 % — LOW (ref 43–77)
NEUTROPHILS NFR BLD AUTO: 22.7 % — LOW (ref 43–77)
NEUTROPHILS NFR BLD AUTO: 4.4 % — LOW (ref 43–77)
NEUTS BAND # BLD: 1.5 % — SIGNIFICANT CHANGE UP (ref 0–8)
NRBC # BLD: 0 /100 WBCS — SIGNIFICANT CHANGE UP (ref 0–0)
OVALOCYTES BLD QL SMEAR: SLIGHT — SIGNIFICANT CHANGE UP
PCO2 BLDV: 48 MMHG — HIGH (ref 39–42)
PCO2 BLDV: 53 MMHG — HIGH (ref 39–42)
PH BLDV: 7.3 — LOW (ref 7.32–7.43)
PH BLDV: 7.35 — SIGNIFICANT CHANGE UP (ref 7.32–7.43)
PHOSPHATE SERPL-MCNC: 4 MG/DL — SIGNIFICANT CHANGE UP (ref 2.5–4.5)
PHOSPHATE SERPL-MCNC: 4.9 MG/DL — HIGH (ref 2.5–4.5)
PHOSPHATE SERPL-MCNC: 5.2 MG/DL — HIGH (ref 2.5–4.5)
PLAT MORPH BLD: NORMAL — SIGNIFICANT CHANGE UP
PLAT MORPH BLD: NORMAL — SIGNIFICANT CHANGE UP
PLATELET # BLD AUTO: 146 K/UL — LOW (ref 150–400)
PLATELET # BLD AUTO: 152 K/UL — SIGNIFICANT CHANGE UP (ref 150–400)
PLATELET # BLD AUTO: 159 K/UL — SIGNIFICANT CHANGE UP (ref 150–400)
PLATELET # BLD AUTO: 167 K/UL — SIGNIFICANT CHANGE UP (ref 150–400)
PLATELET # BLD AUTO: 177 K/UL — SIGNIFICANT CHANGE UP (ref 150–400)
PO2 BLDV: 22 MMHG — LOW (ref 25–45)
PO2 BLDV: 26 MMHG — SIGNIFICANT CHANGE UP (ref 25–45)
POLYCHROMASIA BLD QL SMEAR: SIGNIFICANT CHANGE UP
POTASSIUM BLDV-SCNC: 6.3 MMOL/L — CRITICAL HIGH (ref 3.5–5.1)
POTASSIUM BLDV-SCNC: 6.5 MMOL/L — CRITICAL HIGH (ref 3.5–5.1)
POTASSIUM SERPL-MCNC: 6.1 MMOL/L — HIGH (ref 3.5–5.3)
POTASSIUM SERPL-MCNC: 6.3 MMOL/L — CRITICAL HIGH (ref 3.5–5.3)
POTASSIUM SERPL-MCNC: 6.4 MMOL/L — CRITICAL HIGH (ref 3.5–5.3)
POTASSIUM SERPL-MCNC: 7.1 MMOL/L — CRITICAL HIGH (ref 3.5–5.3)
POTASSIUM SERPL-SCNC: 6.1 MMOL/L — HIGH (ref 3.5–5.3)
POTASSIUM SERPL-SCNC: 6.3 MMOL/L — CRITICAL HIGH (ref 3.5–5.3)
POTASSIUM SERPL-SCNC: 6.4 MMOL/L — CRITICAL HIGH (ref 3.5–5.3)
POTASSIUM SERPL-SCNC: 7.1 MMOL/L — CRITICAL HIGH (ref 3.5–5.3)
PROT SERPL-MCNC: 5.2 G/DL — LOW (ref 6–8.3)
PROT SERPL-MCNC: 5.3 G/DL — LOW (ref 6–8.3)
PROT SERPL-MCNC: 5.3 G/DL — LOW (ref 6–8.3)
PROT SERPL-MCNC: 5.4 G/DL — LOW (ref 6–8.3)
PROTHROM AB SERPL-ACNC: 15 SEC — HIGH (ref 9.5–13)
PROTHROM AB SERPL-ACNC: 15.2 SEC — HIGH (ref 9.5–13)
RBC # BLD: 1.6 M/UL — LOW (ref 3.8–5.2)
RBC # BLD: 2.22 M/UL — LOW (ref 3.8–5.2)
RBC # BLD: 2.29 M/UL — LOW (ref 3.8–5.2)
RBC # BLD: 2.29 M/UL — LOW (ref 3.8–5.2)
RBC # BLD: 2.92 M/UL — LOW (ref 3.8–5.2)
RBC # BLD: 2.96 M/UL — LOW (ref 3.8–5.2)
RBC # FLD: 16.3 % — HIGH (ref 10.3–14.5)
RBC # FLD: 16.4 % — HIGH (ref 10.3–14.5)
RBC # FLD: 17.9 % — HIGH (ref 10.3–14.5)
RBC # FLD: 19 % — HIGH (ref 10.3–14.5)
RBC # FLD: 20.2 % — HIGH (ref 10.3–14.5)
RBC BLD AUTO: ABNORMAL
RBC BLD AUTO: SIGNIFICANT CHANGE UP
RETICS #: 99.4 K/UL — SIGNIFICANT CHANGE UP (ref 25–125)
RETICS/RBC NFR: 4.3 % — HIGH (ref 0.5–2.5)
RSV RNA NPH QL NAA+NON-PROBE: SIGNIFICANT CHANGE UP
SAO2 % BLDV: 38.6 % — LOW (ref 67–88)
SAO2 % BLDV: 43.1 % — LOW (ref 67–88)
SARS-COV-2 RNA SPEC QL NAA+PROBE: SIGNIFICANT CHANGE UP
SODIUM SERPL-SCNC: 135 MMOL/L — SIGNIFICANT CHANGE UP (ref 135–145)
SODIUM SERPL-SCNC: 136 MMOL/L — SIGNIFICANT CHANGE UP (ref 135–145)
SODIUM SERPL-SCNC: 138 MMOL/L — SIGNIFICANT CHANGE UP (ref 135–145)
SODIUM SERPL-SCNC: 139 MMOL/L — SIGNIFICANT CHANGE UP (ref 135–145)
URATE SERPL-MCNC: 5.4 MG/DL — SIGNIFICANT CHANGE UP (ref 2.5–7)
URATE SERPL-MCNC: 6.1 MG/DL — SIGNIFICANT CHANGE UP (ref 2.5–7)
WBC # BLD: 133.68 K/UL — CRITICAL HIGH (ref 3.8–10.5)
WBC # BLD: 143.43 K/UL — CRITICAL HIGH (ref 3.8–10.5)
WBC # BLD: 160.95 K/UL — CRITICAL HIGH (ref 3.8–10.5)
WBC # BLD: 172.84 K/UL — CRITICAL HIGH (ref 3.8–10.5)
WBC # BLD: 185.77 K/UL — CRITICAL HIGH (ref 3.8–10.5)
WBC # FLD AUTO: 133.68 K/UL — CRITICAL HIGH (ref 3.8–10.5)
WBC # FLD AUTO: 143.43 K/UL — CRITICAL HIGH (ref 3.8–10.5)
WBC # FLD AUTO: 160.95 K/UL — CRITICAL HIGH (ref 3.8–10.5)
WBC # FLD AUTO: 172.84 K/UL — CRITICAL HIGH (ref 3.8–10.5)
WBC # FLD AUTO: 185.77 K/UL — CRITICAL HIGH (ref 3.8–10.5)

## 2024-05-20 PROCEDURE — 36556 INSERT NON-TUNNEL CV CATH: CPT | Mod: GC

## 2024-05-20 PROCEDURE — 99291 CRITICAL CARE FIRST HOUR: CPT | Mod: 25

## 2024-05-20 PROCEDURE — 70450 CT HEAD/BRAIN W/O DYE: CPT | Mod: 26

## 2024-05-20 PROCEDURE — 99291 CRITICAL CARE FIRST HOUR: CPT | Mod: GC,25

## 2024-05-20 PROCEDURE — 99232 SBSQ HOSP IP/OBS MODERATE 35: CPT | Mod: GC

## 2024-05-20 PROCEDURE — 71250 CT THORAX DX C-: CPT | Mod: 26

## 2024-05-20 PROCEDURE — 74176 CT ABD & PELVIS W/O CONTRAST: CPT | Mod: 26

## 2024-05-20 PROCEDURE — 36620 INSERTION CATHETER ARTERY: CPT | Mod: GC

## 2024-05-20 RX ORDER — QUETIAPINE FUMARATE 200 MG/1
25 TABLET, FILM COATED ORAL EVERY 6 HOURS
Refills: 0 | Status: DISCONTINUED | OUTPATIENT
Start: 2024-05-20 | End: 2024-05-30

## 2024-05-20 RX ORDER — ACETAMINOPHEN 500 MG
1000 TABLET ORAL ONCE
Refills: 0 | Status: COMPLETED | OUTPATIENT
Start: 2024-05-20 | End: 2024-05-20

## 2024-05-20 RX ORDER — NOREPINEPHRINE BITARTRATE/D5W 8 MG/250ML
0.05 PLASTIC BAG, INJECTION (ML) INTRAVENOUS
Qty: 8 | Refills: 0 | Status: DISCONTINUED | OUTPATIENT
Start: 2024-05-20 | End: 2024-05-23

## 2024-05-20 RX ORDER — PIPERACILLIN AND TAZOBACTAM 4; .5 G/20ML; G/20ML
3.38 INJECTION, POWDER, LYOPHILIZED, FOR SOLUTION INTRAVENOUS EVERY 8 HOURS
Refills: 0 | Status: DISCONTINUED | OUTPATIENT
Start: 2024-05-20 | End: 2024-05-24

## 2024-05-20 RX ORDER — FENTANYL CITRATE 50 UG/ML
50 INJECTION INTRAVENOUS ONCE
Refills: 0 | Status: DISCONTINUED | OUTPATIENT
Start: 2024-05-20 | End: 2024-05-20

## 2024-05-20 RX ORDER — FENTANYL CITRATE 50 UG/ML
100 INJECTION INTRAVENOUS ONCE
Refills: 0 | Status: DISCONTINUED | OUTPATIENT
Start: 2024-05-20 | End: 2024-05-20

## 2024-05-20 RX ORDER — PHENYLEPHRINE HYDROCHLORIDE 10 MG/ML
0.1 INJECTION INTRAVENOUS
Qty: 40 | Refills: 0 | Status: DISCONTINUED | OUTPATIENT
Start: 2024-05-20 | End: 2024-05-20

## 2024-05-20 RX ORDER — SODIUM CHLORIDE 9 MG/ML
1000 INJECTION, SOLUTION INTRAVENOUS ONCE
Refills: 0 | Status: COMPLETED | OUTPATIENT
Start: 2024-05-20 | End: 2024-05-20

## 2024-05-20 RX ORDER — SODIUM ZIRCONIUM CYCLOSILICATE 10 G/10G
10 POWDER, FOR SUSPENSION ORAL EVERY 8 HOURS
Refills: 0 | Status: COMPLETED | OUTPATIENT
Start: 2024-05-20 | End: 2024-05-21

## 2024-05-20 RX ORDER — SODIUM ZIRCONIUM CYCLOSILICATE 10 G/10G
10 POWDER, FOR SUSPENSION ORAL EVERY 8 HOURS
Refills: 0 | Status: DISCONTINUED | OUTPATIENT
Start: 2024-05-20 | End: 2024-05-20

## 2024-05-20 RX ORDER — PANTOPRAZOLE SODIUM 20 MG/1
40 TABLET, DELAYED RELEASE ORAL
Refills: 0 | Status: DISCONTINUED | OUTPATIENT
Start: 2024-05-20 | End: 2024-06-11

## 2024-05-20 RX ORDER — DEXTROSE 50 % IN WATER 50 %
50 SYRINGE (ML) INTRAVENOUS ONCE
Refills: 0 | Status: COMPLETED | OUTPATIENT
Start: 2024-05-20 | End: 2024-05-20

## 2024-05-20 RX ORDER — HYDROCORTISONE 20 MG
50 TABLET ORAL EVERY 6 HOURS
Refills: 0 | Status: DISCONTINUED | OUTPATIENT
Start: 2024-05-20 | End: 2024-05-23

## 2024-05-20 RX ORDER — CHLORHEXIDINE GLUCONATE 213 G/1000ML
1 SOLUTION TOPICAL EVERY 12 HOURS
Refills: 0 | Status: DISCONTINUED | OUTPATIENT
Start: 2024-05-20 | End: 2024-06-11

## 2024-05-20 RX ORDER — NIFEDIPINE 30 MG
60 TABLET, EXTENDED RELEASE 24 HR ORAL
Refills: 0 | Status: DISCONTINUED | OUTPATIENT
Start: 2024-05-20 | End: 2024-05-20

## 2024-05-20 RX ORDER — VASOPRESSIN 20 [USP'U]/ML
0.04 INJECTION INTRAVENOUS
Qty: 40 | Refills: 0 | Status: DISCONTINUED | OUTPATIENT
Start: 2024-05-20 | End: 2024-05-21

## 2024-05-20 RX ORDER — CALCIUM GLUCONATE 100 MG/ML
2 VIAL (ML) INTRAVENOUS ONCE
Refills: 0 | Status: COMPLETED | OUTPATIENT
Start: 2024-05-20 | End: 2024-05-20

## 2024-05-20 RX ORDER — MAGNESIUM SULFATE 500 MG/ML
1 VIAL (ML) INJECTION ONCE
Refills: 0 | Status: COMPLETED | OUTPATIENT
Start: 2024-05-20 | End: 2024-05-20

## 2024-05-20 RX ORDER — FENTANYL CITRATE 50 UG/ML
50 INJECTION INTRAVENOUS
Refills: 0 | Status: DISCONTINUED | OUTPATIENT
Start: 2024-05-20 | End: 2024-05-20

## 2024-05-20 RX ORDER — SODIUM CHLORIDE 9 MG/ML
10 INJECTION INTRAMUSCULAR; INTRAVENOUS; SUBCUTANEOUS
Refills: 0 | Status: DISCONTINUED | OUTPATIENT
Start: 2024-05-20 | End: 2024-06-11

## 2024-05-20 RX ORDER — VANCOMYCIN HCL 1 G
1500 VIAL (EA) INTRAVENOUS ONCE
Refills: 0 | Status: COMPLETED | OUTPATIENT
Start: 2024-05-20 | End: 2024-05-20

## 2024-05-20 RX ORDER — SODIUM ZIRCONIUM CYCLOSILICATE 10 G/10G
10 POWDER, FOR SUSPENSION ORAL ONCE
Refills: 0 | Status: COMPLETED | OUTPATIENT
Start: 2024-05-20 | End: 2024-05-20

## 2024-05-20 RX ORDER — INSULIN HUMAN 100 [IU]/ML
10 INJECTION, SOLUTION SUBCUTANEOUS ONCE
Refills: 0 | Status: COMPLETED | OUTPATIENT
Start: 2024-05-20 | End: 2024-05-20

## 2024-05-20 RX ORDER — DEXMEDETOMIDINE HYDROCHLORIDE IN 0.9% SODIUM CHLORIDE 4 UG/ML
0.05 INJECTION INTRAVENOUS
Qty: 200 | Refills: 0 | Status: DISCONTINUED | OUTPATIENT
Start: 2024-05-20 | End: 2024-05-23

## 2024-05-20 RX ORDER — MIDODRINE HYDROCHLORIDE 2.5 MG/1
30 TABLET ORAL THREE TIMES A DAY
Refills: 0 | Status: DISCONTINUED | OUTPATIENT
Start: 2024-05-20 | End: 2024-05-23

## 2024-05-20 RX ORDER — HYDROCORTISONE 20 MG
100 TABLET ORAL ONCE
Refills: 0 | Status: COMPLETED | OUTPATIENT
Start: 2024-05-20 | End: 2024-05-20

## 2024-05-20 RX ADMIN — FENTANYL CITRATE 50 MICROGRAM(S): 50 INJECTION INTRAVENOUS at 02:53

## 2024-05-20 RX ADMIN — Medication 50 MILLILITER(S): at 02:08

## 2024-05-20 RX ADMIN — VASOPRESSIN 6 UNIT(S)/MIN: 20 INJECTION INTRAVENOUS at 05:33

## 2024-05-20 RX ADMIN — PIPERACILLIN AND TAZOBACTAM 25 GRAM(S): 4; .5 INJECTION, POWDER, LYOPHILIZED, FOR SOLUTION INTRAVENOUS at 13:36

## 2024-05-20 RX ADMIN — SODIUM ZIRCONIUM CYCLOSILICATE 10 GRAM(S): 10 POWDER, FOR SUSPENSION ORAL at 21:16

## 2024-05-20 RX ADMIN — Medication 1000 MILLIGRAM(S): at 05:15

## 2024-05-20 RX ADMIN — FENTANYL CITRATE 50 MICROGRAM(S): 50 INJECTION INTRAVENOUS at 02:38

## 2024-05-20 RX ADMIN — MIDODRINE HYDROCHLORIDE 30 MILLIGRAM(S): 2.5 TABLET ORAL at 21:16

## 2024-05-20 RX ADMIN — Medication 400 MILLIGRAM(S): at 04:50

## 2024-05-20 RX ADMIN — VASOPRESSIN 6 UNIT(S)/MIN: 20 INJECTION INTRAVENOUS at 08:28

## 2024-05-20 RX ADMIN — Medication 400 MILLIGRAM(S): at 18:32

## 2024-05-20 RX ADMIN — SENNA PLUS 10 MILLILITER(S): 8.6 TABLET ORAL at 21:16

## 2024-05-20 RX ADMIN — INSULIN HUMAN 10 UNIT(S): 100 INJECTION, SOLUTION SUBCUTANEOUS at 02:06

## 2024-05-20 RX ADMIN — FENTANYL CITRATE 50 MICROGRAM(S): 50 INJECTION INTRAVENOUS at 12:45

## 2024-05-20 RX ADMIN — SODIUM ZIRCONIUM CYCLOSILICATE 10 GRAM(S): 10 POWDER, FOR SUSPENSION ORAL at 02:45

## 2024-05-20 RX ADMIN — Medication 300 MILLIGRAM(S): at 09:53

## 2024-05-20 RX ADMIN — PANTOPRAZOLE SODIUM 40 MILLIGRAM(S): 20 TABLET, DELAYED RELEASE ORAL at 05:13

## 2024-05-20 RX ADMIN — Medication 50 MILLIGRAM(S): at 12:36

## 2024-05-20 RX ADMIN — FENTANYL CITRATE 100 MICROGRAM(S): 50 INJECTION INTRAVENOUS at 03:10

## 2024-05-20 RX ADMIN — Medication 100 MILLIGRAM(S): at 09:55

## 2024-05-20 RX ADMIN — Medication 4 MILLIGRAM(S): at 04:00

## 2024-05-20 RX ADMIN — PIPERACILLIN AND TAZOBACTAM 25 GRAM(S): 4; .5 INJECTION, POWDER, LYOPHILIZED, FOR SOLUTION INTRAVENOUS at 05:13

## 2024-05-20 RX ADMIN — PIPERACILLIN AND TAZOBACTAM 25 GRAM(S): 4; .5 INJECTION, POWDER, LYOPHILIZED, FOR SOLUTION INTRAVENOUS at 22:02

## 2024-05-20 RX ADMIN — Medication 11.4 MICROGRAM(S)/KG/MIN: at 01:15

## 2024-05-20 RX ADMIN — Medication 50 MILLILITER(S): at 08:28

## 2024-05-20 RX ADMIN — SODIUM CHLORIDE 1000 MILLILITER(S): 9 INJECTION, SOLUTION INTRAVENOUS at 09:11

## 2024-05-20 RX ADMIN — CHLORHEXIDINE GLUCONATE 1 APPLICATION(S): 213 SOLUTION TOPICAL at 05:13

## 2024-05-20 RX ADMIN — Medication 100 GRAM(S): at 10:13

## 2024-05-20 RX ADMIN — INSULIN HUMAN 10 UNIT(S): 100 INJECTION, SOLUTION SUBCUTANEOUS at 08:27

## 2024-05-20 RX ADMIN — DEXMEDETOMIDINE HYDROCHLORIDE IN 0.9% SODIUM CHLORIDE 1.36 MICROGRAM(S)/KG/HR: 4 INJECTION INTRAVENOUS at 08:29

## 2024-05-20 RX ADMIN — Medication 1000 MILLIGRAM(S): at 18:59

## 2024-05-20 RX ADMIN — CHLORHEXIDINE GLUCONATE 1 APPLICATION(S): 213 SOLUTION TOPICAL at 17:08

## 2024-05-20 RX ADMIN — Medication 4 MILLIGRAM(S): at 03:11

## 2024-05-20 RX ADMIN — PHENYLEPHRINE HYDROCHLORIDE 4.08 MICROGRAM(S)/KG/MIN: 10 INJECTION INTRAVENOUS at 08:28

## 2024-05-20 RX ADMIN — FENTANYL CITRATE 50 MICROGRAM(S): 50 INJECTION INTRAVENOUS at 13:00

## 2024-05-20 RX ADMIN — FENTANYL CITRATE 50 MICROGRAM(S): 50 INJECTION INTRAVENOUS at 01:59

## 2024-05-20 RX ADMIN — DEXMEDETOMIDINE HYDROCHLORIDE IN 0.9% SODIUM CHLORIDE 1.36 MICROGRAM(S)/KG/HR: 4 INJECTION INTRAVENOUS at 06:36

## 2024-05-20 RX ADMIN — Medication 50 MILLIGRAM(S): at 17:09

## 2024-05-20 RX ADMIN — FENTANYL CITRATE 50 MICROGRAM(S): 50 INJECTION INTRAVENOUS at 01:44

## 2024-05-20 RX ADMIN — MIDODRINE HYDROCHLORIDE 30 MILLIGRAM(S): 2.5 TABLET ORAL at 05:13

## 2024-05-20 RX ADMIN — PHENYLEPHRINE HYDROCHLORIDE 4.08 MICROGRAM(S)/KG/MIN: 10 INJECTION INTRAVENOUS at 02:06

## 2024-05-20 RX ADMIN — Medication 11.4 MICROGRAM(S)/KG/MIN: at 10:13

## 2024-05-20 RX ADMIN — FENTANYL CITRATE 100 MICROGRAM(S): 50 INJECTION INTRAVENOUS at 03:25

## 2024-05-20 RX ADMIN — Medication 200 GRAM(S): at 09:15

## 2024-05-20 RX ADMIN — PANTOPRAZOLE SODIUM 40 MILLIGRAM(S): 20 TABLET, DELAYED RELEASE ORAL at 17:08

## 2024-05-20 NOTE — PROCEDURE NOTE - NSPROCDETAILS_GEN_ALL_CORE
location identified, draped/prepped, sterile technique used, needle inserted/introduced/positive blood return obtained via catheter/connected to a pressurized flush line/sutured in place/hemostasis with direct pressure, dressing applied/Seldinger technique/all materials/supplies accounted for at end of procedure
Seldinger technique/dressing applied/secured in place/sterile dressing applied/percutaneous/thoracostomy tube placed percutaneously/ultrasound assessment of fluid (location)
ultrasound assessment of fluid (location)
guidewire recovered/lumen(s) aspirated and flushed/sterile dressing applied/sterile technique, catheter placed/ultrasound guidance with use of sterile gel and probe cove
guidewire recovered/lumen(s) aspirated and flushed/sterile dressing applied/sterile technique, catheter placed/ultrasound guidance with use of sterile gel and probe cove

## 2024-05-20 NOTE — PROGRESS NOTE ADULT - ATTENDING COMMENTS
Agree with above  69F PMH CVA (bedbound), COPD, CHF, iniitally p/w dyspnea a/w acute leukocytosis (WBC >230) c/w acute leukemic (?CLL) a/w respiratory failure and hypotension. Patient has had long hospital course including chest tube placement for empyema x 2 (both since removed), chronic respiratory failure with failure to wean s/p trache / PEG, trnasferred to RCU on 5/9. Now returns to MICU for shock state.  - Was febrile yesterday in RCU to 101°F, RRT called for hypotension. Transferred back to ICU on full vent.  - Currently 18/460/100%/+5, titrated FiO2 down to 60%, increased PEEP to 8  - ABG 7.46/34/117/24 BE +0.4, breathing over vent. Adequate oxygenation and ventilation  - Currently in shock state with acute anemia (Hgb 4.6), unclear etiology. No melena / hematochezia, no evidence of hemolysis, tumor lysis, Getting pRBCs and IVF for now. Will get pan-scan to evaluate for further sources of bleeding.  - Currently on triple pressors (vaso / Dawit / Levophed), POCUS shows collapsible IVC with no evidence of fluid overload / intravascular depletion. Titrate down Dawit, then if tolerated Levophed. Monitor urine output, maintain MAP =65  - c/w empiric Zosyn for now. F/U culture results and transition to targeted therapy if any cultures result with causative organism. Last positive culture was blood culture on 4/22, which was positive for Staph epi / CoNS. Prior to that, had Streptococcus pneumoniae in pleural fluid on 4/16. All subsequent cultures have been negative.  - Continued GOC discussions ongoing. Family wishes to continue to pursue all appropriate medical interventions, which we will do. That said, if patient has cardiac arrest, there would be no benefit to chest compressions, as the chances of a meaningful recovery are essentially non-existent.

## 2024-05-20 NOTE — PROCEDURE NOTE - NSPOSTCAREGUIDE_GEN_A_CORE
Care for catheter as per unit/ICU protocols
Keep the cast/splint/dressing clean and dry
Care for catheter as per unit/ICU protocols

## 2024-05-20 NOTE — PROCEDURE NOTE - PRACTITIONER PERFORMING THE TIME OUT
Addended by: EVI LUNSFORD on: 7/19/2021 12:29 PM     Modules accepted: Orders    
Jeffery Cates MD
Santosh Patterson
Cheryl, MERY
Santosh Patterson
DEBORAH Jessica

## 2024-05-20 NOTE — PROGRESS NOTE ADULT - ATTENDING COMMENTS
[id number], bed bound from CVA s/p trach and PEG seen in follow up for leukocytosis. Patient has been diagnosed with CLL in thid admission. There was no indication for treatment. It appears that the patient is going through recurrent infections, Her IgG level is ~500. Agree with the plan for IVIG 400 mg/kg x1 dose. Supportive.

## 2024-05-20 NOTE — PROCEDURE NOTE - NSPROCNAME_GEN_A_CORE
Bronchoscopy
Feeding Tube Placement
Arterial Puncture/Cannulation
Central Line Insertion
Chest Tube
Central Line Insertion
Chest Tube

## 2024-05-20 NOTE — PROCEDURE NOTE - PROCEDURE DATE TIME, MLM
20-May-2024 03:50
28-Apr-2024 14:54
23-Apr-2024 18:58
16-Apr-2024 15:45
20-May-2024 01:15
16-Apr-2024 16:48
06-May-2024 05:55

## 2024-05-20 NOTE — PROGRESS NOTE ADULT - SUBJECTIVE AND OBJECTIVE BOX
INTERVAL HPI/OVERNIGHT EVENTS:  Patient S&E at bedside. No o/n events,     VITAL SIGNS:  T(F): 98.1 (05-20-24 @ 12:00)  HR: 80 (05-20-24 @ 12:15)  BP: 102/41 (05-20-24 @ 01:15)  RR: 27 (05-20-24 @ 12:15)  SpO2: 100% (05-20-24 @ 12:15)  Wt(kg): --    PHYSICAL EXAM:    Constitutional: NAD  Eyes: EOMI, sclera non-icteric  Neck: supple  Respiratory: CTAB, no wheezes or crackles   Cardiovascular: RRR  Gastrointestinal: soft, NTND, + BS  Extremities: no cyanosis, clubbing or edema   Neurological: awake and alert      MEDICATIONS  (STANDING):  chlorhexidine 4% Liquid 1 Application(s) Topical every 12 hours  dexMEDEtomidine Infusion 0.05 MICROgram(s)/kG/Hr (1.36 mL/Hr) IV Continuous <Continuous>  fentaNYL    Injectable 50 MICROGram(s) IV Push once  hydrocortisone sodium succinate Injectable 50 milliGRAM(s) IV Push every 6 hours  midodrine 30 milliGRAM(s) Oral three times a day  norepinephrine Infusion 0.05 MICROgram(s)/kG/Min (11.4 mL/Hr) IV Continuous <Continuous>  pantoprazole  Injectable 40 milliGRAM(s) IV Push two times a day  phenylephrine    Infusion 0.1 MICROgram(s)/kG/Min (4.08 mL/Hr) IV Continuous <Continuous>  piperacillin/tazobactam IVPB.. 3.375 Gram(s) IV Intermittent every 8 hours  QUEtiapine 200 milliGRAM(s) Oral <User Schedule>  senna Syrup 10 milliLiter(s) Oral at bedtime  vasopressin Infusion 0.04 Unit(s)/Min (6 mL/Hr) IV Continuous <Continuous>    MEDICATIONS  (PRN):  acetaminophen   Oral Liquid .. 1000 milliGRAM(s) Enteral Tube every 6 hours PRN Temp greater or equal to 38.5C (101.3F), Mild Pain (1 - 3)  clonazePAM  Tablet 1.5 milliGRAM(s) Oral every 12 hours PRN agitation/anxiety  nystatin Powder 1 Application(s) Topical two times a day PRN skin irritation  QUEtiapine 25 milliGRAM(s) Oral every 6 hours PRN agitation  sodium chloride 0.9% lock flush 10 milliLiter(s) IV Push every 1 hour PRN Pre/post blood products, medications, blood draw, and to maintain line patency      Allergies    No Known Allergies    Intolerances        LABS:                        6.0    160.95 )-----------( 152      ( 20 May 2024 10:20 )             19.9     05-20    139  |  103  |  32<H>  ----------------------------<  158<H>  6.3<HH>   |  22  |  0.88    Ca    7.8<L>      20 May 2024 06:36  Phos  4.9     05-20  Mg     2.4     05-20    TPro  5.4<L>  /  Alb  2.6<L>  /  TBili  0.3  /  DBili  x   /  AST  51<H>  /  ALT  17  /  AlkPhos  71  05-20    PT/INR - ( 20 May 2024 01:31 )   PT: 15.2 sec;   INR: 1.46 ratio         PTT - ( 20 May 2024 01:31 )  PTT:34.9 sec  Urinalysis Basic - ( 20 May 2024 06:36 )    Color: x / Appearance: x / SG: x / pH: x  Gluc: 158 mg/dL / Ketone: x  / Bili: x / Urobili: x   Blood: x / Protein: x / Nitrite: x   Leuk Esterase: x / RBC: x / WBC x   Sq Epi: x / Non Sq Epi: x / Bacteria: x        RADIOLOGY & ADDITIONAL TESTS:  Studies reviewed.   INTERVAL HPI/OVERNIGHT EVENTS:  Patient S&E at bedside. No o/n events.     VITAL SIGNS:  T(F): 98.1 (05-20-24 @ 12:00)  HR: 80 (05-20-24 @ 12:15)  BP: 102/41 (05-20-24 @ 01:15)  RR: 27 (05-20-24 @ 12:15)  SpO2: 100% (05-20-24 @ 12:15)  Wt(kg): --    PHYSICAL EXAM:    Constitutional: NAD  Eyes: EOMI, sclera non-icteric  Neck: supple  Respiratory: CTAB, no wheezes or crackles   Cardiovascular: RRR  Gastrointestinal: soft, tender+.   Extremities: no cyanosis, clubbing or edema   Neurological: intubated, not arousable, on pressors      MEDICATIONS  (STANDING):  chlorhexidine 4% Liquid 1 Application(s) Topical every 12 hours  dexMEDEtomidine Infusion 0.05 MICROgram(s)/kG/Hr (1.36 mL/Hr) IV Continuous <Continuous>  fentaNYL    Injectable 50 MICROGram(s) IV Push once  hydrocortisone sodium succinate Injectable 50 milliGRAM(s) IV Push every 6 hours  midodrine 30 milliGRAM(s) Oral three times a day  norepinephrine Infusion 0.05 MICROgram(s)/kG/Min (11.4 mL/Hr) IV Continuous <Continuous>  pantoprazole  Injectable 40 milliGRAM(s) IV Push two times a day  phenylephrine    Infusion 0.1 MICROgram(s)/kG/Min (4.08 mL/Hr) IV Continuous <Continuous>  piperacillin/tazobactam IVPB.. 3.375 Gram(s) IV Intermittent every 8 hours  QUEtiapine 200 milliGRAM(s) Oral <User Schedule>  senna Syrup 10 milliLiter(s) Oral at bedtime  vasopressin Infusion 0.04 Unit(s)/Min (6 mL/Hr) IV Continuous <Continuous>    MEDICATIONS  (PRN):  acetaminophen   Oral Liquid .. 1000 milliGRAM(s) Enteral Tube every 6 hours PRN Temp greater or equal to 38.5C (101.3F), Mild Pain (1 - 3)  clonazePAM  Tablet 1.5 milliGRAM(s) Oral every 12 hours PRN agitation/anxiety  nystatin Powder 1 Application(s) Topical two times a day PRN skin irritation  QUEtiapine 25 milliGRAM(s) Oral every 6 hours PRN agitation  sodium chloride 0.9% lock flush 10 milliLiter(s) IV Push every 1 hour PRN Pre/post blood products, medications, blood draw, and to maintain line patency      Allergies    No Known Allergies    Intolerances        LABS:                        6.0    160.95 )-----------( 152      ( 20 May 2024 10:20 )             19.9     05-20    139  |  103  |  32<H>  ----------------------------<  158<H>  6.3<HH>   |  22  |  0.88    Ca    7.8<L>      20 May 2024 06:36  Phos  4.9     05-20  Mg     2.4     05-20    TPro  5.4<L>  /  Alb  2.6<L>  /  TBili  0.3  /  DBili  x   /  AST  51<H>  /  ALT  17  /  AlkPhos  71  05-20    PT/INR - ( 20 May 2024 01:31 )   PT: 15.2 sec;   INR: 1.46 ratio         PTT - ( 20 May 2024 01:31 )  PTT:34.9 sec  Urinalysis Basic - ( 20 May 2024 06:36 )    Color: x / Appearance: x / SG: x / pH: x  Gluc: 158 mg/dL / Ketone: x  / Bili: x / Urobili: x   Blood: x / Protein: x / Nitrite: x   Leuk Esterase: x / RBC: x / WBC x   Sq Epi: x / Non Sq Epi: x / Bacteria: x        RADIOLOGY & ADDITIONAL TESTS:  Studies reviewed.

## 2024-05-20 NOTE — CHART NOTE - NSCHARTNOTEFT_GEN_A_CORE
:    INDICATION:    PROCEDURE:  [X] LIMITED ECHO  [X] LIMITED CHEST  [ ] LIMITED RETROPERITONEAL  [ ] LIMITED ABDOMINAL  [ ] LIMITED DVT  [ ] NEEDLE GUIDANCE VASCULAR  [ ] NEEDLE GUIDANCE THORACENTESIS  [ ] NEEDLE GUIDANCE PARACENTESIS  [ ] NEEDLE GUIDANCE PERICARDIOCENTESIS  [ ] OTHER    FINDINGS: LV systolic function normal grossly. LA small. RV small, grossly normal. IVC < 0.5 cm, complete collapse with respiration. Translobar R lung consolidation with possible dynamic bronchograms.. No pleural effusions. A-line otherwise.      INTERPRETATION: No cardial limitation; suggestion of volume responsiveness. Pneumonia.

## 2024-05-20 NOTE — CHART NOTE - NSCHARTNOTEFT_GEN_A_CORE
MICU ACCEPT NOTE    CHIEF COMPLAINT: Patient is a 69y old  Female who presents with a chief complaint of Transfer for leukophoresis (19 May 2024 07:31)      HPI:  Pt is a 69F h/o CVA (bedbound at baseline), COPD, CHF, HTN who initially p/w acute shortness of breath to outside ED and was treated for acute pulmonary edema with sublingual nitro x 2, Lasix, and BiPAP. Pt was also found to be febrile to 103, so treated for PNA. BIPAP led to improvement in O2 to 89-90. Pt transferred to [facility] on 4/16/24 for white count of 233 and possible leukophoresis. In the ED, pt intubated iso respiratory tiring and decreasing mental status, and also was started on levophed for hypotension. Following intubation and initiation of pressors, she was admitted to the MICU for AHRF and septic shock.     In the MICU, heme was consulted for hyperleukocytosis ISO new CLL, however due to mature lymphocytes leukophoresis was not performed. Chest tube and Shiley placed. Pt was found to have S. pneumo bacteremia and empyema and treated with CTX (4/18-4/25). On CT Chest performed on 4/22, a persistent loculated effusion was noted. IR performed Pigtail placement 4/24-4/27.    Pt intermittent low-grade fevers despite clearing her cultures; no additional infectious source or explanation of her fevers was identified. Due to inability to be extubated, tracheostomy was performed on 4/28, although pt remained on ventilator. On 4/25, CTX was switched to Unasyn per ID recs. Seroquel and Klonopin were administered for anxiety. GI was consulted for PEG placement due to dysphagia, however placement was deferred due to patient's persistent pressor needs. As of 5/5, pt has no longer needed IV pressors to maintain her MAP > 60; she was started on midodrine and droxidopa in order to wean off vasopressors, as well as stress dose steroids. GI was re-consulted and performed EGD and PEG placement on 5/7, which pt tolerated well. Tube feeds were started through her gastrostomy tube on 5/8. Psych was consulted on 5/8 due to passive SI and depression. At this time pt is clinically stable and remains on ventilator. She can appropriately be transferred to the RCU on 5/09. While in RCU she weaned on vent settings. On 5/19 Febrile, tachycardic today - pancultured.  Tolerating TC throughout the day, will RTV at night pending infectious w/u.      PAST MEDICAL & SURGICAL HISTORY:  Acute CHF  COPD, severe    FAMILY HISTORY:    SOCIAL HISTORY:  Smoking:   Substance Use:   EtOH Use:   Advance Directives:     MEDICATIONS  (STANDING):  chlorhexidine 4% Liquid 1 Application(s) Topical every 12 hours  midodrine 30 milliGRAM(s) Oral three times a day  pantoprazole Infusion 8 mG/Hr (10 mL/Hr) IV Continuous <Continuous>  piperacillin/tazobactam IVPB.. 3.375 Gram(s) IV Intermittent every 8 hours  QUEtiapine 200 milliGRAM(s) Oral <User Schedule>  senna Syrup 10 milliLiter(s) Oral at bedtime    MEDICATIONS  (PRN):  acetaminophen   Oral Liquid .. 1000 milliGRAM(s) Enteral Tube every 6 hours PRN Temp greater or equal to 38.5C (101.3F), Mild Pain (1 - 3)  clonazePAM  Tablet 1.5 milliGRAM(s) Oral every 12 hours PRN agitation/anxiety  nystatin Powder 1 Application(s) Topical two times a day PRN skin irritation  QUEtiapine 25 milliGRAM(s) Oral every 6 hours PRN agitation      Allergies    No Known Allergies    Intolerances    REVIEW OF SYSTEMS:  As documented above in HPI/Hospital Course    OBJECTIVE:  ICU Vital Signs Last 24 Hrs  T(C): 37.5 (19 May 2024 18:28), Max: 39.5 (19 May 2024 08:00)  T(F): 99.5 (19 May 2024 18:28), Max: 103.1 (19 May 2024 08:00)  HR: 134 (19 May 2024 18:28) (110 - 134)  BP: 110/72 (19 May 2024 19:27) (94/54 - 120/59)  BP(mean): --  ABP: --  ABP(mean): --  RR: 20 (19 May 2024 18:28) (19 - 20)  SpO2: 95% (19 May 2024 18:28) (95% - 100%)    O2 Parameters below as of 19 May 2024 18:28  Patient On (Oxygen Delivery Method): tracheostomy collar      Mode: standby    05-18 @ 07:01  -  05-19 @ 07:00  --------------------------------------------------------  IN: 1174 mL / OUT: 700 mL / NET: 474 mL    05-19 @ 07:01 - 05-20 @ 00:26  --------------------------------------------------------  IN: 930 mL / OUT: 0 mL / NET: 930 mL      CAPILLARY BLOOD GLUCOSE      POCT Blood Glucose.: 319 mg/dL (20 May 2024 00:14)      PHYSICAL EXAM:  GENERAL: Lethargic, resting in bed,  HEAD:  Atraumatic, normocephalic  EYES: Pupils are equal and reactive   ENT: Tracheostomy #7 Cuffed portex   HEART: regular rhythm, tachycardic no murmurs appreciated   LUNGS: Unlabored respirations.  BS mildly coarse BL  ABDOMEN: Soft, nontender, nondistended, +BS, PEG Tube  EXTREMITIES: LLE Edema  NERVOUS SYSTEM:  Alert and responsive, oriented x1 (name)  SKIN: No rashes or lesions      LABS:                        4.9    161.19 )-----------( 178      ( 19 May 2024 23:30 )             17.1     Hgb Trend: 4.9<--, 8.2<--, 8.1<--, 7.8<--, 10.0<--  05-19    135  |  99  |  28<H>  ----------------------------<  171<H>  7.1<HH>   |  20<L>  |  0.74    Ca    8.0<L>      19 May 2024 23:30  Phos  5.2     05-19  Mg     2.4     05-19    TPro  5.3<L>  /  Alb  2.6<L>  /  TBili  0.2  /  DBili  x   /  AST  25  /  ALT  7<L>  /  AlkPhos  78  05-19    Creatinine Trend: 0.74<--, 0.43<--, 0.44<--, 0.46<--, 0.44<--, 0.47<--  PT/INR - ( 19 May 2024 23:30 )   PT: 15.0 sec;   INR: 1.38 ratio         PTT - ( 19 May 2024 23:30 )  PTT:32.8 sec  Urinalysis Basic - ( 19 May 2024 23:30 )    Color: x / Appearance: x / SG: x / pH: x  Gluc: 171 mg/dL / Ketone: x  / Bili: x / Urobili: x   Blood: x / Protein: x / Nitrite: x   Leuk Esterase: x / RBC: x / WBC x   Sq Epi: x / Non Sq Epi: x / Bacteria: x      Arterial Blood Gas:  05-19 @ 23:20  7.49/29/205/22/99.7/-1.2  ABG lactate: --  Arterial Blood Gas:  05-19 @ 23:08  7.32/49/44/25/72.5/-0.8  ABG lactate: --        MICROBIOLOGY:     RADIOLOGY & ADDITIONAL TESTS:    ASSESSMENT  Ms Gordon is a 69F h/o CVA (bedbound at baseline), COPD, CHF, HTN presenting as transfer from [facility] for SOB iso leukocytosis to 233 c/f acute leukemia. Pt intubated and on pressors, transferred to MICU for further management, course c/b empyema s/p chest tube placement (4/16) and removal, reaccumulation of loculated pleural effusion s/p L pigtail placement and removal on 4/27. S/p EGD/PEG with GI, transferred to RCU on 5/9. Readmitted to MICU for septic vs hemorrhagic shock.     PLAN  =====Neurologic/Psych=====  #At Baseline, AOx4 mental status   #Anxiety, Depression   #Agitation   - L upper and lower extremity motor deficits iso hx of stroke   - Pain control w/ Tylenol  - Seroquel 200mg qhs, seroquel 25mg po q6hr prn agitation.    - Klonopin 1.5MG BID  - 5/18 ECG: NSR with PACs, .  -Psych following appreciate recs     =====Pulmonary=====  #Acute hypoxic respiratory failure  #Empyema   #Pansensitive Strep Pneumo  - S/p thora draining 1500cc fluid 4/16 with chest tube placement; c/w exudative effusion growing Strep Pneumo  - Was on vanc/zosyn/azithro (4/16)-->deescalated to CTX (4/17 -4/23), Added on Vancomycin (4/23-) due to MRSE 1/2 Cx, expanded coverage to Cefepime (4/23 1x dose), back on CTX, switched to Unasyn 3g Q6 (4/25-5/8), Augmentin (5/9--5/19 stopped as switched to zosyn as below)  - S/p Trach placement 4/28  -Aggressive airway management with Duoneb, chest PT and suctioning PRN.  -Trach Vent Settings: AC/CMV,  RR 14 Fio2 40, Peep 5      =====Cardiovascular=====  #Septic vs Hemorrhagic Shock  #Hypotension  Eval for TLS vs RP bleed once stable  - 2/2 Strep Pneumo Empyema and Bacteremia s/p Pigtail catheter   - C/w Midodrine to 30 TID   - s/p Solu-cortef 50 mg IV qd 5/6-5/8;   - MAP goal > 60   - Hematology following appreciate recs  - Obtain central access, transfuse goal >7    #CHF  - Unclear hx but elevated pro-BNP to 2600s on admission  - TTE: EF 54%, no significant abnormalities  - Diurese as needed;    =====GI=====  #GERD  - Protonix 40mg IV BID    #Diet  - NPO except meds via PEG    =====Renal/=====  #Electrolytes  - Maintain K>4, Phos>3, Mag>2, iCal>1    =====Endocrine=====  - ISS q6h while receiving Tube feeds    =====Infectious Disease=====  #Empyema 2/2 Pansensitive Strep Pneumo, resolved  #Strep Pneumo Bacteremia, resolved  #New Fevers/Sepsis  -Repeat blood and urine culture pending  -C/w Zosyn q8hrs  -Consider repeat ID Consult    =====Heme/Onc=====  #Leukocytosis  #CLL  -  on presentation; stable at 101K  - No plan for leukophoresis at this time given mature lymphocytes per heme  - CT Chest A&P showing diffuse axillary, inguinal mediastinal RP lymphadenopathy.  - Appreciate Heme recs; can plan for bone marrow bx w/ IR if in line with GOC    #L Common Femoral Vein DVT, non occlusive    - 4/24 BL LE doppler - nonocclusive left common femoral vein DVT  - 4/24 BL UE doppler - acute left basilic SVT, no acute DVT  - Hold AC iso of acute anemia   - Repeat bilateral DVT studies pending    =====Ethics=====  FULL CODE  Only known family member is Aunt Mili Gordon ([phone], [phone]) who lives in [place] has been involved in care of patient  Palliative care following MICU ACCEPT NOTE    CHIEF COMPLAINT: Patient is a 69y old  Female who presents with a chief complaint of Transfer for leukophoresis (19 May 2024 07:31)      HPI:  Pt is a 69F h/o CVA (bedbound at baseline), COPD, CHF, HTN who initially p/w acute shortness of breath to outside ED and was treated for acute pulmonary edema with sublingual nitro x 2, Lasix, and BiPAP. Pt was also found to be febrile to 103, so treated for PNA. BIPAP led to improvement in O2 to 89-90. Pt transferred to [place] on 4/16/24 for white count of 233 and possible leukophoresis. In the ED, pt intubated iso respiratory tiring and decreasing mental status, and also was started on levophed for hypotension. Following intubation and initiation of pressors, she was admitted to the MICU for AHRF and septic shock.     In the MICU, heme was consulted for hyperleukocytosis ISO new CLL, however due to mature lymphocytes leukophoresis was not performed. Chest tube and Shiley placed. Pt was found to have S. pneumo bacteremia and empyema and treated with CTX (4/18-4/25). On CT Chest performed on 4/22, a persistent loculated effusion was noted. IR performed Pigtail placement 4/24-4/27.    Pt intermittent low-grade fevers despite clearing her cultures; no additional infectious source or explanation of her fevers was identified. Due to inability to be extubated, tracheostomy was performed on 4/28, although pt remained on ventilator. On 4/25, CTX was switched to Unasyn per ID recs. Seroquel and Klonopin were administered for anxiety. GI was consulted for PEG placement due to dysphagia, however placement was deferred due to patient's persistent pressor needs. As of 5/5, pt has no longer needed IV pressors to maintain her MAP > 60; she was started on midodrine and droxidopa in order to wean off vasopressors, as well as stress dose steroids. GI was re-consulted and performed EGD and PEG placement on 5/7, which pt tolerated well. Tube feeds were started through her gastrostomy tube on 5/8. Psych was consulted on 5/8 due to passive SI and depression. At this time pt is clinically stable and remains on ventilator. She can appropriately be transferred to the RCU on 5/09. While in RCU she weaned on vent settings. On 5/19 Febrile, tachycardic today - pancultured.  Tolerating TC throughout the day, will RTV at night pending infectious w/u.      PAST MEDICAL & SURGICAL HISTORY:  Acute CHF  COPD, severe    FAMILY HISTORY:    SOCIAL HISTORY:  Smoking:   Substance Use:   EtOH Use:   Advance Directives:     MEDICATIONS  (STANDING):  chlorhexidine 4% Liquid 1 Application(s) Topical every 12 hours  midodrine 30 milliGRAM(s) Oral three times a day  pantoprazole Infusion 8 mG/Hr (10 mL/Hr) IV Continuous <Continuous>  piperacillin/tazobactam IVPB.. 3.375 Gram(s) IV Intermittent every 8 hours  QUEtiapine 200 milliGRAM(s) Oral <User Schedule>  senna Syrup 10 milliLiter(s) Oral at bedtime    MEDICATIONS  (PRN):  acetaminophen   Oral Liquid .. 1000 milliGRAM(s) Enteral Tube every 6 hours PRN Temp greater or equal to 38.5C (101.3F), Mild Pain (1 - 3)  clonazePAM  Tablet 1.5 milliGRAM(s) Oral every 12 hours PRN agitation/anxiety  nystatin Powder 1 Application(s) Topical two times a day PRN skin irritation  QUEtiapine 25 milliGRAM(s) Oral every 6 hours PRN agitation      Allergies    No Known Allergies    Intolerances    REVIEW OF SYSTEMS:  As documented above in HPI/Hospital Course    OBJECTIVE:  ICU Vital Signs Last 24 Hrs  T(C): 37.5 (19 May 2024 18:28), Max: 39.5 (19 May 2024 08:00)  T(F): 99.5 (19 May 2024 18:28), Max: 103.1 (19 May 2024 08:00)  HR: 134 (19 May 2024 18:28) (110 - 134)  BP: 110/72 (19 May 2024 19:27) (94/54 - 120/59)  BP(mean): --  ABP: --  ABP(mean): --  RR: 20 (19 May 2024 18:28) (19 - 20)  SpO2: 95% (19 May 2024 18:28) (95% - 100%)    O2 Parameters below as of 19 May 2024 18:28  Patient On (Oxygen Delivery Method): tracheostomy collar      Mode: standby    05-18 @ 07:01  -  05-19 @ 07:00  --------------------------------------------------------  IN: 1174 mL / OUT: 700 mL / NET: 474 mL    05-19 @ 07:01 - 05-20 @ 00:26  --------------------------------------------------------  IN: 930 mL / OUT: 0 mL / NET: 930 mL      CAPILLARY BLOOD GLUCOSE      POCT Blood Glucose.: 319 mg/dL (20 May 2024 00:14)      PHYSICAL EXAM:  GENERAL: Lethargic, resting in bed,  HEAD:  Atraumatic, normocephalic  EYES: Pupils are equal and reactive   ENT: Tracheostomy #7 Cuffed portex   HEART: regular rhythm, tachycardic no murmurs appreciated   LUNGS: Unlabored respirations.  BS mildly coarse BL  ABDOMEN: Soft, nontender, nondistended, +BS, PEG Tube  EXTREMITIES: LLE Edema  NERVOUS SYSTEM:  Alert and responsive, oriented x1 (name)  SKIN: No rashes or lesions      LABS:                        4.9    161.19 )-----------( 178      ( 19 May 2024 23:30 )             17.1     Hgb Trend: 4.9<--, 8.2<--, 8.1<--, 7.8<--, 10.0<--  05-19    135  |  99  |  28<H>  ----------------------------<  171<H>  7.1<HH>   |  20<L>  |  0.74    Ca    8.0<L>      19 May 2024 23:30  Phos  5.2     05-19  Mg     2.4     05-19    TPro  5.3<L>  /  Alb  2.6<L>  /  TBili  0.2  /  DBili  x   /  AST  25  /  ALT  7<L>  /  AlkPhos  78  05-19    Creatinine Trend: 0.74<--, 0.43<--, 0.44<--, 0.46<--, 0.44<--, 0.47<--  PT/INR - ( 19 May 2024 23:30 )   PT: 15.0 sec;   INR: 1.38 ratio         PTT - ( 19 May 2024 23:30 )  PTT:32.8 sec  Urinalysis Basic - ( 19 May 2024 23:30 )    Color: x / Appearance: x / SG: x / pH: x  Gluc: 171 mg/dL / Ketone: x  / Bili: x / Urobili: x   Blood: x / Protein: x / Nitrite: x   Leuk Esterase: x / RBC: x / WBC x   Sq Epi: x / Non Sq Epi: x / Bacteria: x      Arterial Blood Gas:  05-19 @ 23:20  7.49/29/205/22/99.7/-1.2  ABG lactate: --  Arterial Blood Gas:  05-19 @ 23:08  7.32/49/44/25/72.5/-0.8  ABG lactate: --        MICROBIOLOGY:     RADIOLOGY & ADDITIONAL TESTS:    ASSESSMENT  Ms Gordon is a 69F h/o CVA (bedbound at baseline), COPD, CHF, HTN presenting as transfer from [facility] for SOB iso leukocytosis to 233 c/f acute leukemia. Pt intubated and on pressors, transferred to MICU for further management, course c/b empyema s/p chest tube placement (4/16) and removal, reaccumulation of loculated pleural effusion s/p L pigtail placement and removal on 4/27. S/p EGD/PEG with GI, transferred to RCU on 5/9. Readmitted to MICU for septic vs hemorrhagic shock.     PLAN  =====Neurologic/Psych=====  #At Baseline, AOx4 mental status   #Anxiety, Depression   #Agitation   - L upper and lower extremity motor deficits iso hx of stroke   - Pain control w/ Tylenol  - Seroquel 200mg qhs, seroquel 25mg po q6hr prn agitation.    - Klonopin 1.5MG BID  - 5/18 ECG: NSR with PACs, .  -Psych following appreciate recs     =====Pulmonary=====  #Acute hypoxic respiratory failure  #Empyema   #Pansensitive Strep Pneumo  - S/p thora draining 1500cc fluid 4/16 with chest tube placement; c/w exudative effusion growing Strep Pneumo  - Was on vanc/zosyn/azithro (4/16)-->deescalated to CTX (4/17 -4/23), Added on Vancomycin (4/23-) due to MRSE 1/2 Cx, expanded coverage to Cefepime (4/23 1x dose), back on CTX, switched to Unasyn 3g Q6 (4/25-5/8), Augmentin (5/9--5/19 stopped as switched to zosyn as below)  - S/p Trach placement 4/28  -Aggressive airway management with Duoneb, chest PT and suctioning PRN.  -Trach Vent Settings: AC/CMV,  RR 14 Fio2 40, Peep 5      =====Cardiovascular=====  #Septic vs Hemorrhagic Shock  #Hypotension  Eval for TLS vs RP bleed once stable  - 2/2 Strep Pneumo Empyema and Bacteremia s/p Pigtail catheter   - C/w Midodrine to 30 TID   - s/p Solu-cortef 50 mg IV qd 5/6-5/8;   - MAP goal > 60   - Hematology following appreciate recs  - Obtain central access, transfuse goal >7    #CHF  - Unclear hx but elevated pro-BNP to 2600s on admission  - TTE: EF 54%, no significant abnormalities  - Diurese as needed;    =====GI=====  #GERD  - Protonix 40mg IV BID    #Diet  - NPO except meds via PEG    =====Renal/=====  #Electrolytes  - Maintain K>4, Phos>3, Mag>2, iCal>1    =====Endocrine=====  - ISS q6h while receiving Tube feeds    =====Infectious Disease=====  #Empyema 2/2 Pansensitive Strep Pneumo, resolved  #Strep Pneumo Bacteremia, resolved  #New Fevers/Sepsis  -Repeat blood and urine culture pending  -C/w Zosyn q8hrs  -Consider repeat ID Consult    =====Heme/Onc=====  #Leukocytosis  #CLL  -  on presentation; stable at 101K  - No plan for leukophoresis at this time given mature lymphocytes per heme  - CT Chest A&P showing diffuse axillary, inguinal mediastinal RP lymphadenopathy.  - Appreciate Heme recs; can plan for bone marrow bx w/ IR if in line with GOC    #L Common Femoral Vein DVT, non occlusive    - 4/24 BL LE doppler - nonocclusive left common femoral vein DVT  - 4/24 BL UE doppler - acute left basilic SVT, no acute DVT  - Hold AC iso of acute anemia   - Repeat bilateral DVT studies pending    =====Ethics=====  FULL CODE  Only known family member is Aunt Mili Gordon ([phone], [phone]) who lives in [place] has been involved in care of patient  Palliative care following       Critical Care Attending Attestation:   69F Hx CLL, Hyperleukocytosis R/O Acute Leukemia without Leukophoresis or Chemotherapy per hemo/Oncology, Old CVA, Bedbound, Functional Quadriplegia, Strep Bacteremia, Strep PNA c/w Lt Empyema s/p Chest Tube and MIST-2 Protocol 4/16, Reaccumulation of Lt PLEF with Pigtail Drain 4/27, prolonged ABx course, RP Bleed, Bilateral DVTs, prolonged ICU course 4/16 to 5/8, Ventilator Weaning Failure, Trach/PEG and down graded to RCU 5/9 in RRT for Hypotension, Hyperkalemia and low Hb 4.9.   - A&O x 0 with Chronic HPRF on Trach/PEG/Vent Support   - Septic Shock with low Hb with start Pressor Support pending CVP/A-Line   - Empiric ABx Coverage pending C&S   - Type and Cross and 2 PRBC transfusion, Anemia w/u   - Hemo/Oncology f/u for repeat BM biopsy   - Enteral Feeding plan via PEG   - Hyperkalemia s/p Hyper-K Protocol pending serial Labs and EKG   - DVT PPx for Extensive DVTs, Vasculopathy with difficult IV Venous Access   - GOC - Full Code per HCP  (Aunt for [place])     Patient seen and examined with ICU Resident/Fellow at bedside after lab data, medical records and radiology reports reviewed. I have read and agreeable in general with resident's Documented Note, Assessment and Management Plan which reflected my opinions from bedside round and discussion.   Total Critical Care Time = 45 Min excluding teaching and procedure activity.

## 2024-05-20 NOTE — PROCEDURE NOTE - NSPOSTPRCRAD_GEN_A_CORE
performed under US, VBG sent
Guidewire ascertained in Rt IJ via POCUS,  Catheter filtered over guidewire, Guidewire removed in entirety, Lung sliding ascertained via POCUS/central line located in the
chest tube in correct position
chest tube in correct position
chest radiograph

## 2024-05-20 NOTE — PROGRESS NOTE ADULT - SUBJECTIVE AND OBJECTIVE BOX
INTERVAL HPI/OVERNIGHT EVENTS:    SUBJECTIVE: Patient seen and examined at bedside.     ROS: All negative except as listed above.    VITAL SIGNS:  ICU Vital Signs Last 24 Hrs  T(C): 36.7 (20 May 2024 12:00), Max: 39.8 (20 May 2024 04:00)  T(F): 98.1 (20 May 2024 12:00), Max: 103.6 (20 May 2024 04:00)  HR: 72 (20 May 2024 13:00) (69 - 150)  BP: 102/41 (20 May 2024 01:15) (79/51 - 110/72)  BP(mean): 59 (20 May 2024 01:15) (58 - 60)  ABP: 94/52 (20 May 2024 13:00) (74/43 - 170/72)  ABP(mean): 68 (20 May 2024 13:00) (52 - 109)  RR: 18 (20 May 2024 13:00) (10 - 32)  SpO2: 100% (20 May 2024 13:00) (85% - 100%)    O2 Parameters below as of 20 May 2024 08:00  Patient On (Oxygen Delivery Method): ventilator    O2 Concentration (%): 40      Mode: AC/ CMV (Assist Control/ Continuous Mandatory Ventilation), RR (machine): 18, TV (machine): 460, FiO2: 60, PEEP: 8, ITime: 1, MAP: 16, PIP: 29  Plateau pressure:   P/F ratio:     05-19 @ 07:01  -  05-20 @ 07:00  --------------------------------------------------------  IN: 1801.5 mL / OUT: 0 mL / NET: 1801.5 mL    05-20 @ 07:01  -  05-20 @ 13:20  --------------------------------------------------------  IN: 3001 mL / OUT: 650 mL / NET: 2351 mL      CAPILLARY BLOOD GLUCOSE      POCT Blood Glucose.: 193 mg/dL (20 May 2024 08:25)      ECG: reviewed.    PHYSICAL EXAM:    GENERAL: NAD, lying in bed comfortably  HEAD:  Atraumatic, normocephalic  EYES: EOMI, PERRLA, conjunctiva and sclera clear  NECK: Supple, trachea midline, no JVD  HEART: Regular rate and rhythm, no murmurs, rubs, or gallops  LUNGS: Unlabored respirations.  Clear to auscultation bilaterally, no crackles, wheezing, or rhonchi  ABDOMEN: Soft, nontender, nondistended, +BS  EXTREMITIES: 2+ peripheral pulses bilaterally, cap refill<2 secs. No clubbing, cyanosis, or edema  NERVOUS SYSTEM:  A&Ox3, following commands, moving all extremities, no focal deficits   SKIN: No rashes or lesions    MEDICATIONS:  MEDICATIONS  (STANDING):  chlorhexidine 4% Liquid 1 Application(s) Topical every 12 hours  dexMEDEtomidine Infusion 0.05 MICROgram(s)/kG/Hr (1.36 mL/Hr) IV Continuous <Continuous>  hydrocortisone sodium succinate Injectable 50 milliGRAM(s) IV Push every 6 hours  midodrine 30 milliGRAM(s) Oral three times a day  norepinephrine Infusion 0.05 MICROgram(s)/kG/Min (11.4 mL/Hr) IV Continuous <Continuous>  pantoprazole  Injectable 40 milliGRAM(s) IV Push two times a day  phenylephrine    Infusion 0.1 MICROgram(s)/kG/Min (4.08 mL/Hr) IV Continuous <Continuous>  piperacillin/tazobactam IVPB.. 3.375 Gram(s) IV Intermittent every 8 hours  QUEtiapine 200 milliGRAM(s) Oral <User Schedule>  senna Syrup 10 milliLiter(s) Oral at bedtime  vasopressin Infusion 0.04 Unit(s)/Min (6 mL/Hr) IV Continuous <Continuous>    MEDICATIONS  (PRN):  acetaminophen   Oral Liquid .. 1000 milliGRAM(s) Enteral Tube every 6 hours PRN Temp greater or equal to 38.5C (101.3F), Mild Pain (1 - 3)  clonazePAM  Tablet 1.5 milliGRAM(s) Oral every 12 hours PRN agitation/anxiety  nystatin Powder 1 Application(s) Topical two times a day PRN skin irritation  QUEtiapine 25 milliGRAM(s) Oral every 6 hours PRN agitation  sodium chloride 0.9% lock flush 10 milliLiter(s) IV Push every 1 hour PRN Pre/post blood products, medications, blood draw, and to maintain line patency      ALLERGIES:  Allergies    No Known Allergies    Intolerances        LABS:                        6.0    160.95 )-----------( 152      ( 20 May 2024 10:20 )             19.9     05-20    139  |  103  |  32<H>  ----------------------------<  158<H>  6.3<HH>   |  22  |  0.88    Ca    7.8<L>      20 May 2024 06:36  Phos  4.9     05-20  Mg     2.4     05-20    TPro  5.4<L>  /  Alb  2.6<L>  /  TBili  0.3  /  DBili  x   /  AST  51<H>  /  ALT  17  /  AlkPhos  71  05-20    PT/INR - ( 20 May 2024 01:31 )   PT: 15.2 sec;   INR: 1.46 ratio         PTT - ( 20 May 2024 01:31 )  PTT:34.9 sec  Urinalysis Basic - ( 20 May 2024 06:36 )    Color: x / Appearance: x / SG: x / pH: x  Gluc: 158 mg/dL / Ketone: x  / Bili: x / Urobili: x   Blood: x / Protein: x / Nitrite: x   Leuk Esterase: x / RBC: x / WBC x   Sq Epi: x / Non Sq Epi: x / Bacteria: x      ABG:  pH, Arterial: 7.40 (05-20-24 @ 10:05)  pCO2, Arterial: 38 mmHg (05-20-24 @ 10:05)  pO2, Arterial: 181 mmHg (05-20-24 @ 10:05)  pH, Arterial: 7.46 (05-20-24 @ 06:31)  pCO2, Arterial: 34 mmHg (05-20-24 @ 06:31)  pO2, Arterial: 117 mmHg (05-20-24 @ 06:31)  pH, Arterial: 7.43 (05-20-24 @ 01:29)  pCO2, Arterial: 32 mmHg (05-20-24 @ 01:29)  pO2, Arterial: 224 mmHg (05-20-24 @ 01:29)  pH, Arterial: 7.49 (05-19-24 @ 23:20)  pCO2, Arterial: 29 mmHg (05-19-24 @ 23:20)  pO2, Arterial: 205 mmHg (05-19-24 @ 23:20)  pH, Arterial: 7.32 (05-19-24 @ 23:08)  pCO2, Arterial: 49 mmHg (05-19-24 @ 23:08)  pO2, Arterial: 44 mmHg (05-19-24 @ 23:08)      vBG:  pH, Venous: 7.35 (05-20-24 @ 04:06)  pCO2, Venous: 48 mmHg (05-20-24 @ 04:06)  pO2, Venous: 26 mmHg (05-20-24 @ 04:06)  HCO3, Venous: 26 mmol/L (05-20-24 @ 04:06)  pH, Venous: 7.30 (05-20-24 @ 03:44)  pCO2, Venous: 53 mmHg (05-20-24 @ 03:44)  pO2, Venous: 22 mmHg (05-20-24 @ 03:44)  HCO3, Venous: 26 mmol/L (05-20-24 @ 03:44)    Micro:    Culture - Blood (collected 05-18-24 @ 10:47)  Source: .Blood Blood-Venous  Preliminary Report (05-19-24 @ 16:01):    No growth at 24 hours    Culture - Blood (collected 05-18-24 @ 06:50)  Source: .Blood Blood-Peripheral  Preliminary Report (05-20-24 @ 10:01):    No growth at 48 Hours    Culture - Blood (collected 05-11-24 @ 07:11)  Source: .Blood Blood-Peripheral  Final Report (05-16-24 @ 11:00):    No growth at 5 days    Culture - Blood (collected 05-10-24 @ 15:00)  Source: .Blood Blood-Peripheral  Final Report (05-15-24 @ 22:00):    No growth at 5 days    Culture - Blood (collected 05-06-24 @ 09:33)  Source: .Blood Blood-Peripheral  Final Report (05-11-24 @ 16:01):    No growth at 5 days    Culture - Blood (collected 05-06-24 @ 09:25)  Source: .Blood Blood-Peripheral  Final Report (05-11-24 @ 16:01):    No growth at 5 days    Culture - Blood (collected 05-03-24 @ 08:30)  Source: .Blood Blood-Peripheral  Final Report (05-08-24 @ 16:00):    No growth at 5 days    Culture - Blood (collected 05-03-24 @ 08:15)  Source: .Blood Blood-Peripheral  Final Report (05-08-24 @ 16:00):    No growth at 5 days    Culture - Blood (collected 04-29-24 @ 14:00)  Source: .Blood Blood  Final Report (05-04-24 @ 17:01):    No growth at 5 days    Culture - Blood (collected 04-29-24 @ 13:50)  Source: .Blood Blood  Final Report (05-04-24 @ 17:01):    No growth at 5 days    Culture - Blood (collected 04-26-24 @ 13:30)  Source: .Blood Blood-Peripheral  Final Report (05-01-24 @ 18:00):    No growth at 5 days    Culture - Blood (collected 04-26-24 @ 13:21)  Source: .Blood Blood-Venous  Final Report (05-01-24 @ 18:00):    No growth at 5 days    Culture - Blood (collected 04-24-24 @ 05:53)  Source: .Blood Blood  Final Report (04-29-24 @ 10:01):    No growth at 5 days    Culture - Blood (collected 04-24-24 @ 05:53)  Source: .Blood Blood  Final Report (04-29-24 @ 10:01):    No growth at 5 days    Culture - Blood (collected 04-22-24 @ 21:37)  Source: .Blood Blood-Peripheral  Gram Stain (04-24-24 @ 00:09):    Growth in aerobic bottle: Gram Positive Cocci in Clusters  Final Report (04-24-24 @ 23:16):    Growth in aerobic bottle: Staphylococcus epidermidis    Isolation of Coagulase negative Staphylococcus from single blood culture    sets may represent    contamination. Contact the Microbiology Department at [phone] if    susceptibility testing is    clinically indicated.    Direct identification is available within approximately 3-5    hours either by Blood Panel Multiplexed PCR or Direct    MALDI-TOF. Details: [email or web address]  Organism: Blood Culture PCR (04-24-24 @ 23:16)  Organism: Blood Culture PCR (04-24-24 @ 23:16)      Method Type: PCR      -  Staphylococcus epidermidis, Methicillin resistant: Detec    Culture - Blood (collected 04-22-24 @ 19:30)  Source: .Blood Blood-Peripheral  Final Report (04-28-24 @ 01:00):    No growth at 5 days        Culture - Sputum (collected 05-10-24 @ 21:49)  Source: Trach Asp Tracheal Aspirate  Gram Stain (05-11-24 @ 05:58):    Few polymorphonuclear leukocytes per low power field    Rare Squamous epithelial cells per low power field    No organisms seen per oil power field  Final Report (05-12-24 @ 11:42):    No growth    Culture - Sputum (collected 05-06-24 @ 00:37)  Source: .Sputum Sputum  Gram Stain (05-06-24 @ 11:48):    Moderate polymorphonuclear leukocytes per low power field    No Squamous epithelial cells per low power field    No organisms seen per oil power field  Final Report (05-07-24 @ 15:46):    Normal Respiratory Kayla present    Culture - Sputum (collected 04-26-24 @ 13:44)  Source: .Sputum Sputum  Gram Stain (04-26-24 @ 20:33):    Rare polymorphonuclear leukocytes per low power field    No Squamous epithelial cells per low power field    No organisms seen per oil power field  Final Report (04-28-24 @ 08:42):    No growth        RADIOLOGY & ADDITIONAL TESTS: Reviewed.   INTERVAL HPI/OVERNIGHT EVENTS:    SUBJECTIVE: Patient seen and examined at bedside.     ROS: All negative except as listed above.    VITAL SIGNS:  ICU Vital Signs Last 24 Hrs  T(C): 36.7 (20 May 2024 12:00), Max: 39.8 (20 May 2024 04:00)  T(F): 98.1 (20 May 2024 12:00), Max: 103.6 (20 May 2024 04:00)  HR: 72 (20 May 2024 13:00) (69 - 150)  BP: 102/41 (20 May 2024 01:15) (79/51 - 110/72)  BP(mean): 59 (20 May 2024 01:15) (58 - 60)  ABP: 94/52 (20 May 2024 13:00) (74/43 - 170/72)  ABP(mean): 68 (20 May 2024 13:00) (52 - 109)  RR: 18 (20 May 2024 13:00) (10 - 32)  SpO2: 100% (20 May 2024 13:00) (85% - 100%)    O2 Parameters below as of 20 May 2024 08:00  Patient On (Oxygen Delivery Method): ventilator    O2 Concentration (%): 40      Mode: AC/ CMV (Assist Control/ Continuous Mandatory Ventilation), RR (machine): 18, TV (machine): 460, FiO2: 60, PEEP: 8, ITime: 1, MAP: 16, PIP: 29  Plateau pressure:   P/F ratio:     05-19 @ 07:01  -  05-20 @ 07:00  --------------------------------------------------------  IN: 1801.5 mL / OUT: 0 mL / NET: 1801.5 mL    05-20 @ 07:01  -  05-20 @ 13:20  --------------------------------------------------------  IN: 3001 mL / OUT: 650 mL / NET: 2351 mL      CAPILLARY BLOOD GLUCOSE      POCT Blood Glucose.: 193 mg/dL (20 May 2024 08:25)      ECG: reviewed.    PHYSICAL EXAM:    GENERAL: NAD, lying in bed comfortably  HEAD:  Atraumatic, normocephalic  EYES: conjunctiva and sclera clear  NECK: trach noted  HEART: Regular rate and rhythm, no murmurs, rubs, or gallops  LUNGS:  rhonchi to right lung field  ABDOMEN: Soft, nontender, nondistended, +BS  EXTREMITIES: b/l DP, PT pulses not palpable.   NERVOUS SYSTEM: obtunded, not responsive, sedated  SKIN: No rashes or lesions    MEDICATIONS:  MEDICATIONS  (STANDING):  chlorhexidine 4% Liquid 1 Application(s) Topical every 12 hours  dexMEDEtomidine Infusion 0.05 MICROgram(s)/kG/Hr (1.36 mL/Hr) IV Continuous <Continuous>  hydrocortisone sodium succinate Injectable 50 milliGRAM(s) IV Push every 6 hours  midodrine 30 milliGRAM(s) Oral three times a day  norepinephrine Infusion 0.05 MICROgram(s)/kG/Min (11.4 mL/Hr) IV Continuous <Continuous>  pantoprazole  Injectable 40 milliGRAM(s) IV Push two times a day  phenylephrine    Infusion 0.1 MICROgram(s)/kG/Min (4.08 mL/Hr) IV Continuous <Continuous>  piperacillin/tazobactam IVPB.. 3.375 Gram(s) IV Intermittent every 8 hours  QUEtiapine 200 milliGRAM(s) Oral <User Schedule>  senna Syrup 10 milliLiter(s) Oral at bedtime  vasopressin Infusion 0.04 Unit(s)/Min (6 mL/Hr) IV Continuous <Continuous>    MEDICATIONS  (PRN):  acetaminophen   Oral Liquid .. 1000 milliGRAM(s) Enteral Tube every 6 hours PRN Temp greater or equal to 38.5C (101.3F), Mild Pain (1 - 3)  clonazePAM  Tablet 1.5 milliGRAM(s) Oral every 12 hours PRN agitation/anxiety  nystatin Powder 1 Application(s) Topical two times a day PRN skin irritation  QUEtiapine 25 milliGRAM(s) Oral every 6 hours PRN agitation  sodium chloride 0.9% lock flush 10 milliLiter(s) IV Push every 1 hour PRN Pre/post blood products, medications, blood draw, and to maintain line patency      ALLERGIES:  Allergies    No Known Allergies    Intolerances        LABS:                        6.0    160.95 )-----------( 152      ( 20 May 2024 10:20 )             19.9     05-20    139  |  103  |  32<H>  ----------------------------<  158<H>  6.3<HH>   |  22  |  0.88    Ca    7.8<L>      20 May 2024 06:36  Phos  4.9     05-20  Mg     2.4     05-20    TPro  5.4<L>  /  Alb  2.6<L>  /  TBili  0.3  /  DBili  x   /  AST  51<H>  /  ALT  17  /  AlkPhos  71  05-20    PT/INR - ( 20 May 2024 01:31 )   PT: 15.2 sec;   INR: 1.46 ratio         PTT - ( 20 May 2024 01:31 )  PTT:34.9 sec  Urinalysis Basic - ( 20 May 2024 06:36 )    Color: x / Appearance: x / SG: x / pH: x  Gluc: 158 mg/dL / Ketone: x  / Bili: x / Urobili: x   Blood: x / Protein: x / Nitrite: x   Leuk Esterase: x / RBC: x / WBC x   Sq Epi: x / Non Sq Epi: x / Bacteria: x      ABG:  pH, Arterial: 7.40 (05-20-24 @ 10:05)  pCO2, Arterial: 38 mmHg (05-20-24 @ 10:05)  pO2, Arterial: 181 mmHg (05-20-24 @ 10:05)  pH, Arterial: 7.46 (05-20-24 @ 06:31)  pCO2, Arterial: 34 mmHg (05-20-24 @ 06:31)  pO2, Arterial: 117 mmHg (05-20-24 @ 06:31)  pH, Arterial: 7.43 (05-20-24 @ 01:29)  pCO2, Arterial: 32 mmHg (05-20-24 @ 01:29)  pO2, Arterial: 224 mmHg (05-20-24 @ 01:29)  pH, Arterial: 7.49 (05-19-24 @ 23:20)  pCO2, Arterial: 29 mmHg (05-19-24 @ 23:20)  pO2, Arterial: 205 mmHg (05-19-24 @ 23:20)  pH, Arterial: 7.32 (05-19-24 @ 23:08)  pCO2, Arterial: 49 mmHg (05-19-24 @ 23:08)  pO2, Arterial: 44 mmHg (05-19-24 @ 23:08)      vBG:  pH, Venous: 7.35 (05-20-24 @ 04:06)  pCO2, Venous: 48 mmHg (05-20-24 @ 04:06)  pO2, Venous: 26 mmHg (05-20-24 @ 04:06)  HCO3, Venous: 26 mmol/L (05-20-24 @ 04:06)  pH, Venous: 7.30 (05-20-24 @ 03:44)  pCO2, Venous: 53 mmHg (05-20-24 @ 03:44)  pO2, Venous: 22 mmHg (05-20-24 @ 03:44)  HCO3, Venous: 26 mmol/L (05-20-24 @ 03:44)    Micro:    Culture - Blood (collected 05-18-24 @ 10:47)  Source: .Blood Blood-Venous  Preliminary Report (05-19-24 @ 16:01):    No growth at 24 hours    Culture - Blood (collected 05-18-24 @ 06:50)  Source: .Blood Blood-Peripheral  Preliminary Report (05-20-24 @ 10:01):    No growth at 48 Hours    Culture - Blood (collected 05-11-24 @ 07:11)  Source: .Blood Blood-Peripheral  Final Report (05-16-24 @ 11:00):    No growth at 5 days    Culture - Blood (collected 05-10-24 @ 15:00)  Source: .Blood Blood-Peripheral  Final Report (05-15-24 @ 22:00):    No growth at 5 days    Culture - Blood (collected 05-06-24 @ 09:33)  Source: .Blood Blood-Peripheral  Final Report (05-11-24 @ 16:01):    No growth at 5 days    Culture - Blood (collected 05-06-24 @ 09:25)  Source: .Blood Blood-Peripheral  Final Report (05-11-24 @ 16:01):    No growth at 5 days    Culture - Blood (collected 05-03-24 @ 08:30)  Source: .Blood Blood-Peripheral  Final Report (05-08-24 @ 16:00):    No growth at 5 days    Culture - Blood (collected 05-03-24 @ 08:15)  Source: .Blood Blood-Peripheral  Final Report (05-08-24 @ 16:00):    No growth at 5 days    Culture - Blood (collected 04-29-24 @ 14:00)  Source: .Blood Blood  Final Report (05-04-24 @ 17:01):    No growth at 5 days    Culture - Blood (collected 04-29-24 @ 13:50)  Source: .Blood Blood  Final Report (05-04-24 @ 17:01):    No growth at 5 days    Culture - Blood (collected 04-26-24 @ 13:30)  Source: .Blood Blood-Peripheral  Final Report (05-01-24 @ 18:00):    No growth at 5 days    Culture - Blood (collected 04-26-24 @ 13:21)  Source: .Blood Blood-Venous  Final Report (05-01-24 @ 18:00):    No growth at 5 days    Culture - Blood (collected 04-24-24 @ 05:53)  Source: .Blood Blood  Final Report (04-29-24 @ 10:01):    No growth at 5 days    Culture - Blood (collected 04-24-24 @ 05:53)  Source: .Blood Blood  Final Report (04-29-24 @ 10:01):    No growth at 5 days    Culture - Blood (collected 04-22-24 @ 21:37)  Source: .Blood Blood-Peripheral  Gram Stain (04-24-24 @ 00:09):    Growth in aerobic bottle: Gram Positive Cocci in Clusters  Final Report (04-24-24 @ 23:16):    Growth in aerobic bottle: Staphylococcus epidermidis    Isolation of Coagulase negative Staphylococcus from single blood culture    sets may represent    contamination. Contact the Microbiology Department at [phone] if    susceptibility testing is    clinically indicated.    Direct identification is available within approximately 3-5    hours either by Blood Panel Multiplexed PCR or Direct    MALDI-TOF. Details: [email or web address]  Organism: Blood Culture PCR (04-24-24 @ 23:16)  Organism: Blood Culture PCR (04-24-24 @ 23:16)      Method Type: PCR      -  Staphylococcus epidermidis, Methicillin resistant: Detec    Culture - Blood (collected 04-22-24 @ 19:30)  Source: .Blood Blood-Peripheral  Final Report (04-28-24 @ 01:00):    No growth at 5 days        Culture - Sputum (collected 05-10-24 @ 21:49)  Source: Trach Asp Tracheal Aspirate  Gram Stain (05-11-24 @ 05:58):    Few polymorphonuclear leukocytes per low power field    Rare Squamous epithelial cells per low power field    No organisms seen per oil power field  Final Report (05-12-24 @ 11:42):    No growth    Culture - Sputum (collected 05-06-24 @ 00:37)  Source: .Sputum Sputum  Gram Stain (05-06-24 @ 11:48):    Moderate polymorphonuclear leukocytes per low power field    No Squamous epithelial cells per low power field    No organisms seen per oil power field  Final Report (05-07-24 @ 15:46):    Normal Respiratory Kayla present    Culture - Sputum (collected 04-26-24 @ 13:44)  Source: .Sputum Sputum  Gram Stain (04-26-24 @ 20:33):    Rare polymorphonuclear leukocytes per low power field    No Squamous epithelial cells per low power field    No organisms seen per oil power field  Final Report (04-28-24 @ 08:42):    No growth        RADIOLOGY & ADDITIONAL TESTS: Reviewed.

## 2024-05-20 NOTE — PROGRESS NOTE ADULT - ASSESSMENT
Ms Gordon is a 69F h/o CVA (bedbound at baseline), COPD, CHF, HTN presenting as transfer from [facility] for SOB iso leukocytosis to 233 c/f acute leukemia. Pt intubated and on pressors, transferred to MICU for further management, course c/b empyema s/p chest tube placement (4/16) and removal, reaccumulation of loculated pleural effusion s/p L pigtail placement and removal on 4/27. S/p EGD/PEG with GI, transferred to RCU on 5/9. Readmitted to MICU for septic vs hemorrhagic shock.     PLAN  =====Neurologic/Psych=====  #At Baseline, AOx4 mental status   #Anxiety, Depression   #Agitation   - L upper and lower extremity motor deficits iso hx of stroke   - Pain control w/ Tylenol  - Seroquel 200mg qhs, seroquel 25mg po q6hr prn agitation.    - Klonopin 1.5MG BID  - 5/18 ECG: NSR with PACs, .  -Psych following appreciate recs     =====Pulmonary=====  #Acute hypoxic respiratory failure  #Empyema   #Pansensitive Strep Pneumo  - S/p thora draining 1500cc fluid 4/16 with chest tube placement; c/w exudative effusion growing Strep Pneumo  - Was on vanc/zosyn/azithro (4/16)-->deescalated to CTX (4/17 -4/23), Added on Vancomycin (4/23-) due to MRSE 1/2 Cx, expanded coverage to Cefepime (4/23 1x dose), back on CTX, switched to Unasyn 3g Q6 (4/25-5/8), Augmentin (5/9--5/19 stopped as switched to zosyn as below)  - S/p Trach placement 4/28  -Aggressive airway management with Duoneb, chest PT and suctioning PRN.  -Trach Vent Settings: AC/CMV,  RR 14 Fio2 40, Peep 5  -Bedside Pocus 5/20: right sided lobar pneumonia      =====Cardiovascular=====  #Septic vs Hemorrhagic Shock  #Hypotension  Eval for TLS vs RP bleed once stable  - 2/2 Strep Pneumo Empyema and Bacteremia s/p Pigtail catheter   - C/w Midodrine to 30 TID   - s/p Solu-cortef 50 mg IV qd 5/6-5/8;   - MAP goal > 60   - Hematology following appreciate recs  - Obtain central access, transfuse goal >7    #CHF  - Unclear hx but elevated pro-BNP to 2600s on admission  - TTE: EF 54%, no significant abnormalities  - Diurese as needed;    =====GI=====  #GERD  - Protonix 40mg IV BID    #Diet  - NPO except meds via PEG    =====Renal/=====  #Electrolytes  - Maintain K>4, Phos>3, Mag>2, iCal>1    =====Endocrine=====  - ISS q6h while receiving Tube feeds    =====Infectious Disease=====  #Empyema 2/2 Pansensitive Strep Pneumo, resolved  #Strep Pneumo Bacteremia, resolved  #New Fevers/Sepsis  -Repeat blood and urine culture pending  -C/w Zosyn q8hrs + 1g Vanc X1(Hospital acquired pneumonia)  -Consider repeat ID Consult    =====Heme/Onc=====  #Leukocytosis  #CLL  -  on presentation; stable at 101K  - No plan for leukophoresis at this time given mature lymphocytes per heme  - CT Chest A&P showing diffuse axillary, inguinal mediastinal RP lymphadenopathy.  - Appreciate Heme recs; can plan for bone marrow bx w/ IR if in line with GOC    #L Common Femoral Vein DVT, non occlusive    - 4/24 BL LE doppler - nonocclusive left common femoral vein DVT  - 4/24 BL UE doppler - acute left basilic SVT, no acute DVT  - Hold AC iso of acute anemia   - Repeat bilateral DVT studies pending  -Arterial duplex pending    =====Ethics=====  FULL CODE  Only known family member is Aunt Mili Gordon ([phone], [phone]) who lives in [place] has been involved in care of patient  Palliative care following

## 2024-05-20 NOTE — RAPID RESPONSE TEAM SUMMARY - NSSITUATIONBACKGROUNDRRT_GEN_ALL_CORE
69 F PMH CVA (bedbound at baseline), COPD, CHF, HTN presenting as transfer from [facility] for SOB iso leukocytosis to 233 c/f acute leukemia. Pt intubated and on pressors, transferred to MICU for further management, course c/b empyema s/p chest tube placement (4/16) and removal, reaccumulation of loculated pleural effusion s/p L pigtail placement and removal on 4/27. S/p EGD/PEG with GI, transferred to RCU on 5/9. RRT called for hypoxia. On arrival, patient appeared in acute distress. She attempted to respond to questions and nodded/shook head to questions due to trach collar. General appearance: diaphoretic, tachypneic, cold skin of extremities. Vitals: Febrile ~101F; 's; SpO2 100% on FiO2 100% on mech vent; BP ranging from SBP 40's to 120's. Her BP mostly fluctuated intermittently SBP 80's to 120's. Hard stick. Resident colleagues and 2 ICU nurses and 2 bedside nurses attempted to obtain access. IV peripheral x1. Given patient progressively becoming ill appearing and less responsive to questions, IO for IV access was obtained --> NS flushed with blood return, but then stopped working. A second IO was placed and NS flushed as well, but fluid bolus was reportedly running unusually slow. Ordered CBC, BMP, coag, uric acid, d-dimer, fibrogen, LDH, haptoglobin, and ABG with lactate. Labs were critical for Hgb ~4.9; WBC 160k; and K 7.3. Ordered fluid bolusx1, acetaminophen IV x1, switched cefepime to Zosyn, PRBCx2, regular insulin 10 units, and D50 ampx1. Ordered therapeutic Lovenox for DVT be discontinued given acute severe anemia. MICU team consulted and they accepted patient as she would need central line and further management. At this time, suspect worsening severe sepsis with unclear etiology + acute severe anemia but no obvious active bleed + tumor fever from worsening leukemia burden and possible hyperviscosity syndrome vs DIC vs tumor lysis syndrome + hyperkalemia vs hemorrhagic vs septic shock.

## 2024-05-20 NOTE — CHART NOTE - NSCHARTNOTEFT_GEN_A_CORE
Brief Medical History:  Ms Gordon is a 69F h/o CVA (bedbound at baseline), COPD, CHF, HTN presenting as transfer from [facility] for SOB iso leukocytosis to 233 c/f acute leukemia. Pt intubated and on pressors, transferred to MICU for further management, course c/b empyema s/p chest tube placement (4/16) and removal, reaccumulation of loculated pleural effusion s/p L pigtail placement and removal on 4/27. S/p EGD/PEG with GI, transferred to [facility] on 5/9. Readmitted to MICU 5/20 for septic vs hemorrhagic shock.     Conversation Details:  Called family Mili Gordno- patient's Aunt (lives in [place]) at [phone]. Updated on admission to MICU and several critical lab results including hgb of 4.6, wbc of 130K, potassium of 6.4, lactate of 6.5. Also explained that vitals signs are notable for low blood pressure requiring vasopressors and fevers. Furthermore explained to Mili that patient requires vascular access for resuscitative measures however despite multiple attempts vascular access remains limited to IO and PIV which is suboptimal for vasopressors and blood products. As a result of this the patient is at high risk of complications of these lab findings including but not limited to end organ damage/failures, ischemic events, strokes/neurologic deficits, and cardiac arrhythmias which may be life threatening.     Mili shared that the patient's mother passed and had always wished to do everything for Batsheva including all resuscitative measures. Explained that though we would pursue all resuscitative measures these next few hours are extremely precarious given the severity of her critical labs and lack of vascular access. Mili asked us to speak to the patient directly regarding the care however informed Mili that Batsheva has minimal verbal response as she is critically sick. Introduced palliative and hospice care philosophies that would prioritize wholistic care and symptom management as opposed to diagnostic work up.     Mili expressed an understanding but reiterated that she would want everything done. Advised Mili that family should come to the hospital in person.

## 2024-05-20 NOTE — PROCEDURE NOTE - NSINDICATIONS_GEN_A_CORE
pleural effusion
critical illness/venous access
emergency venous access
feeding tube replacement/feeding difficulties
critical patient/monitoring purposes
empyema/pleural effusion

## 2024-05-20 NOTE — PROCEDURE NOTE - NSINFORMCONSENT_GEN_A_CORE
Benefits, risks, and possible complications of procedure explained to patient/caregiver who verbalized understanding and gave written consent.
from auntMili/Benefits, risks, and possible complications of procedure explained to patient/caregiver who verbalized understanding and gave written consent.
This was an emergent procedure.

## 2024-05-20 NOTE — PROGRESS NOTE ADULT - ASSESSMENT
69F h/o CVA (bedbound at baseline), COPD, CHF, HTN presenting as transfer from [facility] for SOB iso leukocytosis to 233 c/f acute leukemia. Pt intubated and on pressors in setting of strep pneumo pneumonia. Hematology consulted for leukocytosis, confirmed underlying CLL with reactive lymphocytosis 2/2 sepsis.     - Patient Labs at admission: , Hgb 13.4, PLT 96, 96% lymphocytes, 2% neutrophils.   - On peripheral smear (4/16/24): heterogeneous population of lymphocytes, smudge cells present, rare blasts, rare target cells, polychromasia, giant platelets present, reactive lymphocytes   - CMV negative, EBV serologies indicate recent or past   - There was no indication for leukophoresis at admission, no blasts seen on peripheral smear at admission. Now, WBCs are downtrending as infection is being treated.   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 She did not see an outside hematologist.  - Will arrange for follow up at the [facility] when patient is closer to discharge      Recommend  - pls get Immunoglobulin levels  - repeat CT C/A/P when more stable     69F h/o CVA (bedbound at baseline), COPD, CHF, HTN presenting as transfer from [facility] for SOB iso leukocytosis to 233 c/f acute leukemia. Pt intubated and on pressors in setting of strep pneumo pneumonia. Hematology consulted for leukocytosis, confirmed underlying CLL with reactive lymphocytosis 2/2 sepsis.     - Patient Labs at admission: , Hgb 13.4, PLT 96, 96% lymphocytes, 2% neutrophils.   - On peripheral smear (4/16/24): heterogeneous population of lymphocytes, smudge cells present, rare blasts, rare target cells, polychromasia, giant platelets present, reactive lymphocytes   - CMV negative, EBV serologies indicate recent or past   - There was no indication for leukophoresis at admission, no blasts seen on peripheral smear at admission. Now, WBCs are downtrending as infection is being treated.   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 (prior flow cytometry from October 2023 is in the HIE). She did not see an outside hematologist.  - Will arrange for follow up at the [facility] when patient is closer to discharge      Recommend:  - pls get Immunoglobulin levels  - repeat CT C/A/P when more stable to r/o intraabdominal bleed     69F h/o CVA (bedbound at baseline), COPD, CHF, HTN presenting as transfer from [facility] for SOB iso leukocytosis to 233 c/f acute leukemia. Pt intubated and on pressors in setting of strep pneumo pneumonia. Hematology consulted for leukocytosis, confirmed underlying CLL with reactive lymphocytosis 2/2 sepsis.     - Patient Labs at admission: , Hgb 13.4, PLT 96, 96% lymphocytes, 2% neutrophils.   - On peripheral smear (4/16/24): heterogeneous population of lymphocytes, smudge cells present, rare blasts, rare target cells, polychromasia, giant platelets present, reactive lymphocytes   - CMV negative, EBV serologies indicate recent or past   - There was no indication for leukophoresis at admission, no blasts seen on peripheral smear at admission. Now, WBCs are downtrending as infection is being treated.   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 She did not see an outside hematologist.  - 5/20-> PB smear reviewed again as WBC almost doubled. PB smear shows increased smudge cells, many small lymphocytes, RBCs with anisopoikilocytosis, some with increased central clearing, target cells seen.     Recommend:  - pls get Immunoglobulin levels. If low IgG, will need to give IVIG  - repeat CT C/A/P when more stable to r/o intraabdominal bleed

## 2024-05-20 NOTE — CHART NOTE - NSCHARTNOTEFT_GEN_A_CORE
HPI:  69F h/o CVA (bedbound at baseline), COPD, CHF, HTN p/w acute shortness of breath to outside ED. Initially treated for acute pulmonary edema with sublingual nitro x 2, Lasix, and BiPAP. Pt was also found to be febrile to 103, so treated for PNA. BIPAP led to improvement in O2 to 89-90. Pt now transferred to [place] for white count of 233 and plan for possible leukophoresis. In ED, pt intubated iso respiratory tiring and decreasing mental status. Also started on levo for hypotn.  (16 Apr 2024 12:59)     Patient is s/p RRT for Hypoxia . See RRT note for full detail.  Post RRT, patient transferred to the MICU.  Will endorse to primary team in AM.      Dawn Morelos PA-C  Department of Medicine  Spectra [id number] HPI:  69F h/o CVA (bedbound at baseline), COPD, CHF, HTN p/w acute shortness of breath to outside ED. Initially treated for acute pulmonary edema with sublingual nitro x 2, Lasix, and BiPAP. Pt was also found to be febrile to 103, so treated for PNA. BIPAP led to improvement in O2 to 89-90. Pt now transferred to [place] for white count of 233 and plan for possible leukophoresis. In ED, pt intubated iso respiratory tiring and decreasing mental status. Also started on levo for hypotn.  (16 Apr 2024 12:59)     Patient is s/p RRT for Hypoxia . See RRT note for full detail.  Discussed with Mili Gordon(pts Aunt), who stated patient is full code and to continue all medical management.   Post RRT, patient transferred to the MICU.  Will endorse to primary team in AM.      Dawn Morelos PA-C  Department of Medicine  Spectra [id number]

## 2024-05-20 NOTE — PROCEDURE NOTE - NSNUMATTEMPT_GEN_A_CORE
1
1
unable to pass wire beyond 20cm in [facility], procedure aborted.  Right Femoral TLC nserted without difficulty/2

## 2024-05-21 LAB
ALBUMIN SERPL ELPH-MCNC: 2.5 G/DL — LOW (ref 3.3–5)
ALBUMIN SERPL ELPH-MCNC: 2.6 G/DL — LOW (ref 3.3–5)
ALP SERPL-CCNC: 57 U/L — SIGNIFICANT CHANGE UP (ref 40–120)
ALP SERPL-CCNC: 65 U/L — SIGNIFICANT CHANGE UP (ref 40–120)
ALT FLD-CCNC: 20 U/L — SIGNIFICANT CHANGE UP (ref 10–45)
ALT FLD-CCNC: 22 U/L — SIGNIFICANT CHANGE UP (ref 10–45)
ANION GAP SERPL CALC-SCNC: 11 MMOL/L — SIGNIFICANT CHANGE UP (ref 5–17)
ANION GAP SERPL CALC-SCNC: 13 MMOL/L — SIGNIFICANT CHANGE UP (ref 5–17)
APTT BLD: 28.5 SEC — SIGNIFICANT CHANGE UP (ref 24.5–35.6)
AST SERPL-CCNC: 43 U/L — HIGH (ref 10–40)
AST SERPL-CCNC: 53 U/L — HIGH (ref 10–40)
BASOPHILS # BLD AUTO: 0.09 K/UL — SIGNIFICANT CHANGE UP (ref 0–0.2)
BASOPHILS # BLD AUTO: 0.19 K/UL — SIGNIFICANT CHANGE UP (ref 0–0.2)
BASOPHILS NFR BLD AUTO: 0.1 % — SIGNIFICANT CHANGE UP (ref 0–2)
BASOPHILS NFR BLD AUTO: 0.1 % — SIGNIFICANT CHANGE UP (ref 0–2)
BILIRUB SERPL-MCNC: 0.3 MG/DL — SIGNIFICANT CHANGE UP (ref 0.2–1.2)
BILIRUB SERPL-MCNC: 0.4 MG/DL — SIGNIFICANT CHANGE UP (ref 0.2–1.2)
BLD GP AB SCN SERPL QL: NEGATIVE — SIGNIFICANT CHANGE UP
BUN SERPL-MCNC: 31 MG/DL — HIGH (ref 7–23)
BUN SERPL-MCNC: 32 MG/DL — HIGH (ref 7–23)
CALCIUM SERPL-MCNC: 8 MG/DL — LOW (ref 8.4–10.5)
CALCIUM SERPL-MCNC: 8.2 MG/DL — LOW (ref 8.4–10.5)
CHLORIDE SERPL-SCNC: 103 MMOL/L — SIGNIFICANT CHANGE UP (ref 96–108)
CHLORIDE SERPL-SCNC: 105 MMOL/L — SIGNIFICANT CHANGE UP (ref 96–108)
CO2 SERPL-SCNC: 23 MMOL/L — SIGNIFICANT CHANGE UP (ref 22–31)
CO2 SERPL-SCNC: 25 MMOL/L — SIGNIFICANT CHANGE UP (ref 22–31)
CREAT SERPL-MCNC: 0.64 MG/DL — SIGNIFICANT CHANGE UP (ref 0.5–1.3)
CREAT SERPL-MCNC: 0.68 MG/DL — SIGNIFICANT CHANGE UP (ref 0.5–1.3)
EGFR: 95 ML/MIN/1.73M2 — SIGNIFICANT CHANGE UP
EGFR: 96 ML/MIN/1.73M2 — SIGNIFICANT CHANGE UP
EOSINOPHIL # BLD AUTO: 0.01 K/UL — SIGNIFICANT CHANGE UP (ref 0–0.5)
EOSINOPHIL # BLD AUTO: 0.03 K/UL — SIGNIFICANT CHANGE UP (ref 0–0.5)
EOSINOPHIL NFR BLD AUTO: 0 % — SIGNIFICANT CHANGE UP (ref 0–6)
EOSINOPHIL NFR BLD AUTO: 0 % — SIGNIFICANT CHANGE UP (ref 0–6)
GAS PNL BLDA: SIGNIFICANT CHANGE UP
GLUCOSE BLDC GLUCOMTR-MCNC: 138 MG/DL — HIGH (ref 70–99)
GLUCOSE SERPL-MCNC: 161 MG/DL — HIGH (ref 70–99)
GLUCOSE SERPL-MCNC: 192 MG/DL — HIGH (ref 70–99)
HCT VFR BLD CALC: 19.8 % — CRITICAL LOW (ref 34.5–45)
HCT VFR BLD CALC: 22 % — LOW (ref 34.5–45)
HCT VFR BLD CALC: 23.7 % — LOW (ref 34.5–45)
HGB BLD-MCNC: 6.2 G/DL — CRITICAL LOW (ref 11.5–15.5)
HGB BLD-MCNC: 6.8 G/DL — CRITICAL LOW (ref 11.5–15.5)
HGB BLD-MCNC: 7.6 G/DL — LOW (ref 11.5–15.5)
IGA FLD-MCNC: 131 MG/DL — SIGNIFICANT CHANGE UP (ref 84–499)
IGG FLD-MCNC: 694 MG/DL — SIGNIFICANT CHANGE UP (ref 610–1660)
IGM SERPL-MCNC: 42 MG/DL — SIGNIFICANT CHANGE UP (ref 35–242)
IMM GRANULOCYTES NFR BLD AUTO: 0.7 % — SIGNIFICANT CHANGE UP (ref 0–0.9)
IMM GRANULOCYTES NFR BLD AUTO: 1 % — HIGH (ref 0–0.9)
INR BLD: 1.14 RATIO — SIGNIFICANT CHANGE UP (ref 0.85–1.18)
KAPPA LC SER QL IFE: 17.12 MG/DL — HIGH (ref 0.33–1.94)
KAPPA/LAMBDA FREE LIGHT CHAIN RATIO, SERUM: 7.75 RATIO — HIGH (ref 0.26–1.65)
LAMBDA LC SER QL IFE: 2.21 MG/DL — SIGNIFICANT CHANGE UP (ref 0.57–2.63)
LDH SERPL L TO P-CCNC: 323 U/L — HIGH (ref 50–242)
LYMPHOCYTES # BLD AUTO: 107.99 K/UL — HIGH (ref 1–3.3)
LYMPHOCYTES # BLD AUTO: 126.75 K/UL — HIGH (ref 1–3.3)
LYMPHOCYTES # BLD AUTO: 80.9 % — HIGH (ref 13–44)
LYMPHOCYTES # BLD AUTO: 84.8 % — HIGH (ref 13–44)
MAGNESIUM SERPL-MCNC: 2.6 MG/DL — SIGNIFICANT CHANGE UP (ref 1.6–2.6)
MAGNESIUM SERPL-MCNC: 2.6 MG/DL — SIGNIFICANT CHANGE UP (ref 1.6–2.6)
MCHC RBC-ENTMCNC: 27.2 PG — SIGNIFICANT CHANGE UP (ref 27–34)
MCHC RBC-ENTMCNC: 27.3 PG — SIGNIFICANT CHANGE UP (ref 27–34)
MCHC RBC-ENTMCNC: 27.3 PG — SIGNIFICANT CHANGE UP (ref 27–34)
MCHC RBC-ENTMCNC: 30.9 GM/DL — LOW (ref 32–36)
MCHC RBC-ENTMCNC: 31.3 GM/DL — LOW (ref 32–36)
MCHC RBC-ENTMCNC: 32.1 GM/DL — SIGNIFICANT CHANGE UP (ref 32–36)
MCV RBC AUTO: 85.3 FL — SIGNIFICANT CHANGE UP (ref 80–100)
MCV RBC AUTO: 87.2 FL — SIGNIFICANT CHANGE UP (ref 80–100)
MCV RBC AUTO: 88 FL — SIGNIFICANT CHANGE UP (ref 80–100)
MONOCYTES # BLD AUTO: 4.32 K/UL — HIGH (ref 0–0.9)
MONOCYTES # BLD AUTO: 5.8 K/UL — HIGH (ref 0–0.9)
MONOCYTES NFR BLD AUTO: 3.4 % — SIGNIFICANT CHANGE UP (ref 2–14)
MONOCYTES NFR BLD AUTO: 3.7 % — SIGNIFICANT CHANGE UP (ref 2–14)
MRSA PCR RESULT.: SIGNIFICANT CHANGE UP
NEUTROPHILS # BLD AUTO: 14.07 K/UL — HIGH (ref 1.8–7.4)
NEUTROPHILS # BLD AUTO: 22.26 K/UL — HIGH (ref 1.8–7.4)
NEUTROPHILS NFR BLD AUTO: 11 % — LOW (ref 43–77)
NEUTROPHILS NFR BLD AUTO: 14.3 % — LOW (ref 43–77)
NRBC # BLD: 0 /100 WBCS — SIGNIFICANT CHANGE UP (ref 0–0)
PHOSPHATE SERPL-MCNC: 3.9 MG/DL — SIGNIFICANT CHANGE UP (ref 2.5–4.5)
PHOSPHATE SERPL-MCNC: 4.3 MG/DL — SIGNIFICANT CHANGE UP (ref 2.5–4.5)
PLATELET # BLD AUTO: 149 K/UL — LOW (ref 150–400)
PLATELET # BLD AUTO: 169 K/UL — SIGNIFICANT CHANGE UP (ref 150–400)
PLATELET # BLD AUTO: 169 K/UL — SIGNIFICANT CHANGE UP (ref 150–400)
POTASSIUM SERPL-MCNC: 4.9 MMOL/L — SIGNIFICANT CHANGE UP (ref 3.5–5.3)
POTASSIUM SERPL-MCNC: 5.8 MMOL/L — HIGH (ref 3.5–5.3)
POTASSIUM SERPL-SCNC: 4.9 MMOL/L — SIGNIFICANT CHANGE UP (ref 3.5–5.3)
POTASSIUM SERPL-SCNC: 5.8 MMOL/L — HIGH (ref 3.5–5.3)
PROT SERPL-MCNC: 5.1 G/DL — LOW (ref 6–8.3)
PROT SERPL-MCNC: 5.6 G/DL — LOW (ref 6–8.3)
PROTHROM AB SERPL-ACNC: 12.5 SEC — SIGNIFICANT CHANGE UP (ref 9.5–13)
RBC # BLD: 2.27 M/UL — LOW (ref 3.8–5.2)
RBC # BLD: 2.5 M/UL — LOW (ref 3.8–5.2)
RBC # BLD: 2.78 M/UL — LOW (ref 3.8–5.2)
RBC # FLD: 16.5 % — HIGH (ref 10.3–14.5)
RBC # FLD: 16.6 % — HIGH (ref 10.3–14.5)
RBC # FLD: 17 % — HIGH (ref 10.3–14.5)
RH IG SCN BLD-IMP: POSITIVE — SIGNIFICANT CHANGE UP
S AUREUS DNA NOSE QL NAA+PROBE: SIGNIFICANT CHANGE UP
SODIUM SERPL-SCNC: 139 MMOL/L — SIGNIFICANT CHANGE UP (ref 135–145)
SODIUM SERPL-SCNC: 141 MMOL/L — SIGNIFICANT CHANGE UP (ref 135–145)
URATE SERPL-MCNC: 5.7 MG/DL — SIGNIFICANT CHANGE UP (ref 2.5–7)
WBC # BLD: 124.91 K/UL — CRITICAL HIGH (ref 3.8–10.5)
WBC # BLD: 127.32 K/UL — CRITICAL HIGH (ref 3.8–10.5)
WBC # BLD: 156.58 K/UL — CRITICAL HIGH (ref 3.8–10.5)
WBC # FLD AUTO: 124.91 K/UL — CRITICAL HIGH (ref 3.8–10.5)
WBC # FLD AUTO: 127.32 K/UL — CRITICAL HIGH (ref 3.8–10.5)
WBC # FLD AUTO: 156.58 K/UL — CRITICAL HIGH (ref 3.8–10.5)

## 2024-05-21 PROCEDURE — 99291 CRITICAL CARE FIRST HOUR: CPT | Mod: GC,25

## 2024-05-21 PROCEDURE — 93308 TTE F-UP OR LMTD: CPT | Mod: 26,GC

## 2024-05-21 PROCEDURE — 93970 EXTREMITY STUDY: CPT | Mod: 26

## 2024-05-21 PROCEDURE — 93925 LOWER EXTREMITY STUDY: CPT | Mod: 26

## 2024-05-21 PROCEDURE — 76604 US EXAM CHEST: CPT | Mod: 26,GC

## 2024-05-21 RX ORDER — PAMIDRONATE DISODIUM 9 MG/ML
60 INJECTION, SOLUTION INTRAVENOUS ONCE
Refills: 0 | Status: DISCONTINUED | OUTPATIENT
Start: 2024-05-21 | End: 2024-05-21

## 2024-05-21 RX ORDER — FENTANYL CITRATE 50 UG/ML
50 INJECTION INTRAVENOUS ONCE
Refills: 0 | Status: DISCONTINUED | OUTPATIENT
Start: 2024-05-21 | End: 2024-05-22

## 2024-05-21 RX ORDER — SODIUM CHLORIDE 9 MG/ML
1000 INJECTION, SOLUTION INTRAVENOUS ONCE
Refills: 0 | Status: COMPLETED | OUTPATIENT
Start: 2024-05-21 | End: 2024-05-21

## 2024-05-21 RX ORDER — ACETAMINOPHEN 500 MG
1000 TABLET ORAL ONCE
Refills: 0 | Status: COMPLETED | OUTPATIENT
Start: 2024-05-21 | End: 2024-05-21

## 2024-05-21 RX ADMIN — QUETIAPINE FUMARATE 200 MILLIGRAM(S): 200 TABLET, FILM COATED ORAL at 20:51

## 2024-05-21 RX ADMIN — SODIUM ZIRCONIUM CYCLOSILICATE 10 GRAM(S): 10 POWDER, FOR SUSPENSION ORAL at 05:15

## 2024-05-21 RX ADMIN — Medication 1000 MILLIGRAM(S): at 21:06

## 2024-05-21 RX ADMIN — SENNA PLUS 10 MILLILITER(S): 8.6 TABLET ORAL at 21:23

## 2024-05-21 RX ADMIN — PANTOPRAZOLE SODIUM 40 MILLIGRAM(S): 20 TABLET, DELAYED RELEASE ORAL at 05:15

## 2024-05-21 RX ADMIN — Medication 50 MILLIGRAM(S): at 00:57

## 2024-05-21 RX ADMIN — PANTOPRAZOLE SODIUM 40 MILLIGRAM(S): 20 TABLET, DELAYED RELEASE ORAL at 17:27

## 2024-05-21 RX ADMIN — SODIUM CHLORIDE 1000 MILLILITER(S): 9 INJECTION, SOLUTION INTRAVENOUS at 11:31

## 2024-05-21 RX ADMIN — CHLORHEXIDINE GLUCONATE 1 APPLICATION(S): 213 SOLUTION TOPICAL at 05:16

## 2024-05-21 RX ADMIN — MIDODRINE HYDROCHLORIDE 30 MILLIGRAM(S): 2.5 TABLET ORAL at 13:51

## 2024-05-21 RX ADMIN — Medication 400 MILLIGRAM(S): at 20:51

## 2024-05-21 RX ADMIN — CHLORHEXIDINE GLUCONATE 1 APPLICATION(S): 213 SOLUTION TOPICAL at 17:32

## 2024-05-21 RX ADMIN — Medication 50 MILLIGRAM(S): at 17:27

## 2024-05-21 RX ADMIN — PIPERACILLIN AND TAZOBACTAM 25 GRAM(S): 4; .5 INJECTION, POWDER, LYOPHILIZED, FOR SOLUTION INTRAVENOUS at 21:23

## 2024-05-21 RX ADMIN — DEXMEDETOMIDINE HYDROCHLORIDE IN 0.9% SODIUM CHLORIDE 1.36 MICROGRAM(S)/KG/HR: 4 INJECTION INTRAVENOUS at 08:44

## 2024-05-21 RX ADMIN — Medication 50 MILLIGRAM(S): at 11:30

## 2024-05-21 RX ADMIN — PIPERACILLIN AND TAZOBACTAM 25 GRAM(S): 4; .5 INJECTION, POWDER, LYOPHILIZED, FOR SOLUTION INTRAVENOUS at 05:16

## 2024-05-21 RX ADMIN — PIPERACILLIN AND TAZOBACTAM 25 GRAM(S): 4; .5 INJECTION, POWDER, LYOPHILIZED, FOR SOLUTION INTRAVENOUS at 13:50

## 2024-05-21 RX ADMIN — Medication 4 MILLIGRAM(S): at 22:51

## 2024-05-21 RX ADMIN — MIDODRINE HYDROCHLORIDE 30 MILLIGRAM(S): 2.5 TABLET ORAL at 05:15

## 2024-05-21 RX ADMIN — MIDODRINE HYDROCHLORIDE 30 MILLIGRAM(S): 2.5 TABLET ORAL at 21:23

## 2024-05-21 RX ADMIN — SODIUM ZIRCONIUM CYCLOSILICATE 10 GRAM(S): 10 POWDER, FOR SUSPENSION ORAL at 13:50

## 2024-05-21 RX ADMIN — Medication 11.4 MICROGRAM(S)/KG/MIN: at 08:44

## 2024-05-21 RX ADMIN — Medication 50 MILLIGRAM(S): at 05:16

## 2024-05-21 NOTE — CONSULT NOTE ADULT - SUBJECTIVE AND OBJECTIVE BOX
Surgery Consult Note  Attending: Wanda  Service: Trauma/ACS #[id number]   p      HPI: Pt is a 69F h/o CVA (bedbound at baseline), COPD, CHF, HTN who initially p/w acute shortness of breath to outside ED and was treated for acute pulmonary edema with sublingual nitro x 2, Lasix, and BiPAP. Pt was also found to be febrile to 103, so treated for PNA. BIPAP led to improvement in O2 to 89-90. Pt transferred to [place] on 4/16/24 for white count of 233 and possible leukophoresis. In the ED, pt intubated iso respiratory tiring and decreasing mental status, and also was started on levophed for hypotension. Following intubation and initiation of pressors, she was admitted to the MICU for AHRF and septic shock.     In the MICU, heme was consulted for hyperleukocytosis ISO new CLL, however due to mature lymphocytes leukophoresis was not performed. Chest tube and Shiley placed. Pt was found to have S. pneumo bacteremia and empyema and treated with CTX (4/18-4/25). On CT Chest performed on 4/22, a persistent loculated effusion was noted. IR performed Pigtail placement 4/24-4/27.    Pt intermittent low-grade fevers despite clearing her cultures; no additional infectious source or explanation of her fevers was identified. Due to inability to be extubated, tracheostomy was performed on 4/28, although pt remained on ventilator. On 4/25, CTX was switched to Unasyn per ID recs. Seroquel and Klonopin were administered for anxiety. GI was consulted for PEG placement due to dysphagia, however placement was deferred due to patient's persistent pressor needs. As of 5/5, pt has no longer needed IV pressors to maintain her MAP > 60; she was started on midodrine and droxidopa in order to wean off vasopressors, as well as stress dose steroids. GI was re-consulted and performed EGD and PEG placement on 5/7, which pt tolerated well. Tube feeds were started through her gastrostomy tube on 5/8. Psych was consulted on 5/8 due to passive SI and depression. At this time pt is clinically stable and remains on ventilator. She can appropriately be transferred to the RCU on 5/09.      5/20 patient was a RRT for hypotension/hypoxemia/shock and was transferred to MICU, Hgb 4.2->6.2 after 2uprbc at this time concerning for bleed iso T-lovenox, noncon CTCAP revealed new right neck->axilla collection and right chest wall collection. widespread bulky adenopathy 2/2 CLL, difficult to define borders of chest wall collection vs adenopathy. Patient on levo .03 upon evaluation, receiving 1u prbc. on sedation unable to obtain further history.     PAST MEDICAL HISTORY:  PAST MEDICAL & SURGICAL HISTORY:  Acute CHF      COPD, severe          ALLERGIES:  Allergies    No Known Allergies    Intolerances        SOCIAL HISTORY: Negative for tobacco, etoh, or drug use    FAMILY HISTORY:  FAMILY HISTORY:      PHYSICAL EXAM:  General: sedated  HEENT: Right neck base with large swelling, no ecchymosis, no fluctuance   Pulmonary: normal resp effort, CTA-B  Cardiovascular: NSR, no murmurs, right chest wall with swelling  Abdominal: soft, ND/NT, no organomegaly, no guarding or rebound tenderness  Extremities: WWP, normal strength, no clubbing/cyanosis/edema  Neuro: A/O x 3, CNs II-XII grossly intact, normal sensation, no focal deficits      VITAL SIGNS:  Vital Signs Last 24 Hrs  T(C): 37.4 (21 May 2024 12:00), Max: 38.9 (20 May 2024 20:00)  T(F): 99.3 (21 May 2024 12:00), Max: 102 (20 May 2024 20:00)  HR: 58 (21 May 2024 15:45) (45 - 124)  BP: --  BP(mean): --  RR: 18 (21 May 2024 15:45) (12 - 32)  SpO2: 100% (21 May 2024 15:45) (100% - 100%)    Parameters below as of 21 May 2024 09:52  Patient On (Oxygen Delivery Method): ventilator        I&O's Summary    20 May 2024 07:01  -  21 May 2024 07:00  --------------------------------------------------------  IN: 4230.2 mL / OUT: 1660 mL / NET: 2570.2 mL    21 May 2024 07:01  -  21 May 2024 16:07  --------------------------------------------------------  IN: 1273.2 mL / OUT: 295 mL / NET: 978.2 mL        LABS:                        6.2    124.91 )-----------( 149      ( 21 May 2024 12:52 )             19.8     05-21    141  |  105  |  32<H>  ----------------------------<  161<H>  4.9   |  25  |  0.64    Ca    8.0<L>      21 May 2024 12:52  Phos  3.9     05-21  Mg     2.6     05-21    TPro  5.1<L>  /  Alb  2.5<L>  /  TBili  0.3  /  DBili  x   /  AST  53<H>  /  ALT  22  /  AlkPhos  57  05-21    PT/INR - ( 21 May 2024 00:25 )   PT: 12.5 sec;   INR: 1.14 ratio         PTT - ( 21 May 2024 00:25 )  PTT:28.5 sec  Urinalysis Basic - ( 21 May 2024 12:52 )    Color: x / Appearance: x / SG: x / pH: x  Gluc: 161 mg/dL / Ketone: x  / Bili: x / Urobili: x   Blood: x / Protein: x / Nitrite: x   Leuk Esterase: x / RBC: x / WBC x   Sq Epi: x / Non Sq Epi: x / Bacteria: x      CAPILLARY BLOOD GLUCOSE        LIVER FUNCTIONS - ( 21 May 2024 12:52 )  Alb: 2.5 g/dL / Pro: 5.1 g/dL / ALK PHOS: 57 U/L / ALT: 22 U/L / AST: 53 U/L / GGT: x             CULTURES:  Culture Results:   No growth at 24 hours (05-20 @ 06:36)  Culture Results:   No growth at 24 hours (05-20 @ 06:36)      RADIOLOGY & ADDITIONAL STUDIES:      < from: CT Abdomen and Pelvis No Cont (05.20.24 @ 22:43) >    ACC: [id number] EXAM:  CT CHEST   ORDERED BY:  HU MCNAIR     ACC: [id number] EXAM:  CT ABDOMEN AND PELVIS   ORDERED BY:  HU MCNAIR     PROCEDURE DATE:  05/20/2024          INTERPRETATION:  CLINICAL INFORMATION: Hypotension and anemia, concern   for septic or hemorrhagic shock, evaluate for retroperitoneal hematoma.   Concern for leukemia. Empyema status post chest tube placement, now   removed.    COMPARISON: CT chest abdomen and pelvis 5/1/2024.    CONTRAST/COMPLICATIONS:  IV Contrast: NONE  Oral Contrast: NONE  Complications: None reported at time of study completion    PROCEDURE:  CT of the Chest, Abdomen and Pelvis was performed.  Sagittal and coronal reformats were performed.    FINDINGS:  Limited evaluation of the vasculature and viscera in the absence of   intravenous contrast. Streak artifact from the patient's overlying upper   extremities and some motion also limits evaluation.    CHEST:  LUNGS AND LARGE AIRWAYS: Tracheostomy tube in place. Trace tracheal   secretions above the level of the tracheostomy. Patchy groundglass   opacities within the right lower lobe, new since 5/1/2024. Bilateral   atelectasis.  PLEURA: Moderate right and small left pleural effusions, similar to the   previous CT.  VESSELS: Atherosclerotic changes. Coronary artery calcifications.  HEART: Heart size is normal. No pericardial effusion.  MEDIASTINUM, HEIDY, AND LYMPH NODES: Mediastinal, bilateral axillary, and   bilateral supraclavicular and cervical chain lymphadenopathy again noted.   The mediastinal, left axillary, left supraclavicular, and left cervical   chain adenopathy appears overall similar to mildly decreased in size in   comparison to 5/1/2024. For reference, a prevascular lymph node measures   1.9 x 1.1 cm (series 4 image 177), previously 2.1 x 1.1 cm, and a left   axillary node measures 4.4 x 2.7 cm (series 4 image 213), previously 5.3   x 2.9 cm. Right-sided adenopathy is difficult to assess due to the   presence of a new collection, as described below.  CHEST WALL AND LOWER NECK: Multilobed collection seen extending from the   right lower neck into the right axilla, and along the right lateral chest   wall and into the subpectoral region, with its superior aspect not   included within the field-of-view. This is new since 5/1/2024. The   collection appears heterogeneous with areas of high density, though is   difficult to accurately characterize due to overlying streak artifact. A   conglomerate component within the axilla/chest wall measures   approximately 15.3 x 10.4 x 16.1 cm, though this measurement may be   inclusive of adenopathy, which is difficult to delineate from collection.   A component of necrotic adenopathy is not excluded. There is associated   subcutaneous infiltration and overlying skin thickening.    Foci of subcutaneous gas within the right and left upper anterior chest   wall    ABDOMEN AND PELVIS:  LIVER: Within normal limits.  BILE DUCTS: Normal caliber.  GALLBLADDER: Within normal limits.  SPLEEN: Within normal limits.  PANCREAS: Within normal limits.  ADRENALS: Within normal limits.  KIDNEYS/URETERS: No hydronephrosis.    BLADDER: Underdistended with Andino catheter in place.  REPRODUCTIVE ORGANS: Uterus and adnexa within normal limits.    BOWEL: Gastrostomy tube in place with balloon in the stomach. No bowel   obstruction. Appendix is normal.  PERITONEUM: No ascites.  VESSELS: Atherosclerotic changes. IVC filter. Right femoral venous   catheter with tip in the region of the right common iliac vein; vessels   are difficult to delineate due to confluent surrounding adenopathy and   lack of intravenous contrast.  RETROPERITONEUM/LYMPH NODES: Bulky confluent retroperitoneal, bilateral   iliac chain, and bilateral inguinal lymphadenopathy, grossly similar to   the previous CT.For reference, a left external iliac node measures 7.7 x   3.9 cm (series 3 image 272), previously 8.0 x 4.1 cm.  ABDOMINAL WALL: Portions of the left greater than right lateral abdominal   wall are not included within the field-of-view. Partially imaged left   greater than right flank subcutaneous edema. Partially imaged   subcutaneous high density focus in the left anterior lateral lower   abdominal wall measuring at least 4.2 x 2.2 cm, likely new since   5/1/2024. Apparent high density could be artifactual due to the patient's   body wall being close to the scanner gantry, though this may represent a   hematoma. Additional smaller ill-defined high attenuation foci within the   ventral abdominal wall subcutaneously, which are new from priorand could   be related to injections and/or additional smaller hematomas, with   largest example located on the right measuring 2.0 x 2.4 cm (series 3   image 233).  BONES: Degenerative changes.    IMPRESSION:  *  New large multilobed collection seenextending from the right lower   neck into the right axilla and along the right lateral chest wall, its   superior portion within the neck not included within the field-of-view.   Overlying streak artifact limits characterization, though this appearsto   be heterogeneous with areas of high density, favoring hematoma. Known   adenopathy is difficult to delineate from collection, and a component of   necrotic adenopathy is not excluded. Infection/superimposed infection is   also not entirely excluded. There is associated subcutaneous edema and   overlying skin thickening. This was discussed with Dr. HU MCNAIR on   5/21/2024 at 8:56 AM by Dr. Painting with read back confirmation.  *  Foci of subcutaneous gas in the upper anterior chest wall of uncertain   etiology or significance. Correlate for iatrogenic causes. Airforming   infection is not excluded.  *  New patchy groundglass opacities in the right lower lobe, which may be   infectious/inflammatory in etiology.  *  Moderate right and small left pleural effusions, similar to prior.  *  Small subcutaneous high density foci within the ventral abdominal   wall, which could secondary to recent injections and/or small hematomas.   The largest focus is partially imaged in the left abdominal wall and   measures at least 4.2 cm.  *  Aside from the adenopathy that is difficult to delineate from above   described collection, thoracic adenopathy appears overall similar to   mildly decreased since the prior CT. Abdominopelvic adenopathy appears   similar.    < end of copied text >  < from: [facility] Duplex Lower Extrem Arterial, Bilat (05.21.24 @ 12:59) >  ACC: [id number] EXAM:  ART DUPLEX LOWER EXT BILATERAL   ORDERED BY: YARITZA QUEEN     PROCEDURE DATE:  05/21/2024          INTERPRETATION:  Clinical information: Diminished pedal pulses    Technique: Grayscale, color and spectral Doppler imaging was performed at   the arteries of both lower extremities    Findings:    There is extensive edema within the superficial soft tissues of the left   calf. Small amounts of edema are present in the right calf.    Heavily calcified atheromatous plaque is prominent along the course of   the arteries supplying both the right and left lower extremities.    The right external iliac, common femoral and deep femoral arteries were   not diagnostically imaged because of overlying dressings.    A low resistance pattern of antegrade flow through the right superficial   femoral artery with increased flow in diastole indicates capillary   dilatation.    The peak systolic velocities of the Right lower extremity are as follows:    Superficial femoral artery: 25 cm/sproximal,  48 cm/s mid, 214 cm/s   distal  Popliteal artery: 39 cm/s  Tibioperoneal trunk: 33 cm/s  Posterior tibial artery: 36 cm/s proximal,  14 cm/s mid, 16 cm/s distal  Peroneal artery: 28 cm/s proximal,  20 cm/s mid, 14 cm/s distal  Anterior tibial artery: 25 cm/s proximal,  24 cm/s mid, 25 cm/s distal  Dorsalis pedis artery: 24 cm/s    The peak systolic velocities of the Left lower extremity are as follows:    Distal external iliac artery: 402 cm/s  Common femoral artery: 279 cm/s  Deep femoral artery: 137 cm/s  Superficial femoral artery: 80 cm/s proximal,  OCCLUDED mid and distal  Popliteal artery: 50 cm/s  Tibioperoneal trunk: 26 cm/s  Posterior tibial artery: 29 cm/s proximal,  28 cm/s mid, 46 cm/s distal  Peroneal artery: 25 cm/s proximal,  21 cm/s mid, 20 cm/s distal  Anterior tibial artery: 28 cm/s proximal,  OCCLUDED mid and distal  Dorsalis pedis artery: tardus parvus, 4 cm/s    Impression: There is bilateral lower extremity arterial vascular disease.    There may be nonimaged disease affecting the right iliac arteries, and/or   the nonimaged common femoral artery in the proximal thigh.  There is a flow-limiting stenosis of the right superficial femoral artery   in the distal thigh.    On the left, there is a flow-limiting stenosis of the distal external   iliac artery and of the common femoral artery.  The left superficial femoral artery is occluded in the mid and distal   thigh.  The left anterior tibial artery is occluded in the mid and distal calf.    --- End ofReport ---    < end of copied text >             Surgery Consult Note  Attending: Wanda  Service: Trauma/ACS #[id number]   p      HPI: Pt is a 69F h/o CVA (bedbound at baseline), COPD, CHF, HTN who initially p/w acute shortness of breath to outside ED and was treated for acute pulmonary edema with sublingual nitro x 2, Lasix, and BiPAP. Pt was also found to be febrile to 103, so treated for PNA. BIPAP led to improvement in O2 to 89-90. Pt transferred to [place] on 4/16/24 for white count of 233 and possible leukophoresis. In the ED, pt intubated iso respiratory tiring and decreasing mental status, and also was started on levophed for hypotension. Following intubation and initiation of pressors, she was admitted to the MICU for AHRF and septic shock.     In the MICU, heme was consulted for hyperleukocytosis ISO new CLL, however due to mature lymphocytes leukophoresis was not performed. Chest tube and Shiley placed. Pt was found to have S. pneumo bacteremia and empyema and treated with CTX (4/18-4/25). On CT Chest performed on 4/22, a persistent loculated effusion was noted. IR performed Pigtail placement 4/24-4/27.    Pt intermittent low-grade fevers despite clearing her cultures; no additional infectious source or explanation of her fevers was identified. Due to inability to be extubated, tracheostomy was performed on 4/28, although pt remained on ventilator. On 4/25, CTX was switched to Unasyn per ID recs. Seroquel and Klonopin were administered for anxiety. GI was consulted for PEG placement due to dysphagia, however placement was deferred due to patient's persistent pressor needs. As of 5/5, pt has no longer needed IV pressors to maintain her MAP > 60; she was started on midodrine and droxidopa in order to wean off vasopressors, as well as stress dose steroids. GI was re-consulted and performed EGD and PEG placement on 5/7, which pt tolerated well. Tube feeds were started through her gastrostomy tube on 5/8. Psych was consulted on 5/8 due to passive SI and depression. At this time pt is clinically stable and remains on ventilator. She can appropriately be transferred to the RCU on 5/09.      5/20 patient was a RRT for hypotension/hypoxemia/shock and was transferred to MICU, Hgb 4.2->6.2 after 2uprbc at this time concerning for bleed iso T-lovenox, noncon CTCAP revealed new right neck->axilla collection and right chest wall collection. widespread bulky adenopathy 2/2 CLL, difficult to define borders of chest wall collection vs adenopathy. Patient on levo .03 upon evaluation, receiving 1u prbc. on sedation unable to obtain further history.     PAST MEDICAL HISTORY:  PAST MEDICAL & SURGICAL HISTORY:  Acute CHF      COPD, severe          ALLERGIES:  Allergies    No Known Allergies    Intolerances        SOCIAL HISTORY: Negative for tobacco, etoh, or drug use    FAMILY HISTORY:  FAMILY HISTORY:      PHYSICAL EXAM:  General: sedated  HEENT: Right neck base with large swelling, no ecchymosis, no fluctuance   Pulmonary: trached, on vent  Cardiovascular: NSR, no murmurs, right chest wall with swelling  Abdominal: soft, ND/NT, no organomegaly, no guarding or rebound tenderness  Extremities: WWP,   Neuro: A/O x 0,       VITAL SIGNS:  Vital Signs Last 24 Hrs  T(C): 37.4 (21 May 2024 12:00), Max: 38.9 (20 May 2024 20:00)  T(F): 99.3 (21 May 2024 12:00), Max: 102 (20 May 2024 20:00)  HR: 58 (21 May 2024 15:45) (45 - 124)  BP: --  BP(mean): --  RR: 18 (21 May 2024 15:45) (12 - 32)  SpO2: 100% (21 May 2024 15:45) (100% - 100%)    Parameters below as of 21 May 2024 09:52  Patient On (Oxygen Delivery Method): ventilator        I&O's Summary    20 May 2024 07:01  -  21 May 2024 07:00  --------------------------------------------------------  IN: 4230.2 mL / OUT: 1660 mL / NET: 2570.2 mL    21 May 2024 07:01  -  21 May 2024 16:07  --------------------------------------------------------  IN: 1273.2 mL / OUT: 295 mL / NET: 978.2 mL        LABS:                        6.2    124.91 )-----------( 149      ( 21 May 2024 12:52 )             19.8     05-21    141  |  105  |  32<H>  ----------------------------<  161<H>  4.9   |  25  |  0.64    Ca    8.0<L>      21 May 2024 12:52  Phos  3.9     05-21  Mg     2.6     05-21    TPro  5.1<L>  /  Alb  2.5<L>  /  TBili  0.3  /  DBili  x   /  AST  53<H>  /  ALT  22  /  AlkPhos  57  05-21    PT/INR - ( 21 May 2024 00:25 )   PT: 12.5 sec;   INR: 1.14 ratio         PTT - ( 21 May 2024 00:25 )  PTT:28.5 sec  Urinalysis Basic - ( 21 May 2024 12:52 )    Color: x / Appearance: x / SG: x / pH: x  Gluc: 161 mg/dL / Ketone: x  / Bili: x / Urobili: x   Blood: x / Protein: x / Nitrite: x   Leuk Esterase: x / RBC: x / WBC x   Sq Epi: x / Non Sq Epi: x / Bacteria: x      CAPILLARY BLOOD GLUCOSE        LIVER FUNCTIONS - ( 21 May 2024 12:52 )  Alb: 2.5 g/dL / Pro: 5.1 g/dL / ALK PHOS: 57 U/L / ALT: 22 U/L / AST: 53 U/L / GGT: x             CULTURES:  Culture Results:   No growth at 24 hours (05-20 @ 06:36)  Culture Results:   No growth at 24 hours (05-20 @ 06:36)      RADIOLOGY & ADDITIONAL STUDIES:      < from: CT Abdomen and Pelvis No Cont (05.20.24 @ 22:43) >    ACC: [id number] EXAM:  CT CHEST   ORDERED BY:  HU MCNAIR     ACC: [id number] EXAM:  CT ABDOMEN AND PELVIS   ORDERED BY:  HU MCNAIR     PROCEDURE DATE:  05/20/2024          INTERPRETATION:  CLINICAL INFORMATION: Hypotension and anemia, concern   for septic or hemorrhagic shock, evaluate for retroperitoneal hematoma.   Concern for leukemia. Empyema status post chest tube placement, now   removed.    COMPARISON: CT chest abdomen and pelvis 5/1/2024.    CONTRAST/COMPLICATIONS:  IV Contrast: NONE  Oral Contrast: NONE  Complications: None reported at time of study completion    PROCEDURE:  CT of the Chest, Abdomen and Pelvis was performed.  Sagittal and coronal reformats were performed.    FINDINGS:  Limited evaluation of the vasculature and viscera in the absence of   intravenous contrast. Streak artifact from the patient's overlying upper   extremities and some motion also limits evaluation.    CHEST:  LUNGS AND LARGE AIRWAYS: Tracheostomy tube in place. Trace tracheal   secretions above the level of the tracheostomy. Patchy groundglass   opacities within the right lower lobe, new since 5/1/2024. Bilateral   atelectasis.  PLEURA: Moderate right and small left pleural effusions, similar to the   previous CT.  VESSELS: Atherosclerotic changes. Coronary artery calcifications.  HEART: Heart size is normal. No pericardial effusion.  MEDIASTINUM, HEIDY, AND LYMPH NODES: Mediastinal, bilateral axillary, and   bilateral supraclavicular and cervical chain lymphadenopathy again noted.   The mediastinal, left axillary, left supraclavicular, and left cervical   chain adenopathy appears overall similar to mildly decreased in size in   comparison to 5/1/2024. For reference, a prevascular lymph node measures   1.9 x 1.1 cm (series 4 image 177), previously 2.1 x 1.1 cm, and a left   axillary node measures 4.4 x 2.7 cm (series 4 image 213), previously 5.3   x 2.9 cm. Right-sided adenopathy is difficult to assess due to the   presence of a new collection, as described below.  CHEST WALL AND LOWER NECK: Multilobed collection seen extending from the   right lower neck into the right axilla, and along the right lateral chest   wall and into the subpectoral region, with its superior aspect not   included within the field-of-view. This is new since 5/1/2024. The   collection appears heterogeneous with areas of high density, though is   difficult to accurately characterize due to overlying streak artifact. A   conglomerate component within the axilla/chest wall measures   approximately 15.3 x 10.4 x 16.1 cm, though this measurement may be   inclusive of adenopathy, which is difficult to delineate from collection.   A component of necrotic adenopathy is not excluded. There is associated   subcutaneous infiltration and overlying skin thickening.    Foci of subcutaneous gas within the right and left upper anterior chest   wall    ABDOMEN AND PELVIS:  LIVER: Within normal limits.  BILE DUCTS: Normal caliber.  GALLBLADDER: Within normal limits.  SPLEEN: Within normal limits.  PANCREAS: Within normal limits.  ADRENALS: Within normal limits.  KIDNEYS/URETERS: No hydronephrosis.    BLADDER: Underdistended with Andino catheter in place.  REPRODUCTIVE ORGANS: Uterus and adnexa within normal limits.    BOWEL: Gastrostomy tube in place with balloon in the stomach. No bowel   obstruction. Appendix is normal.  PERITONEUM: No ascites.  VESSELS: Atherosclerotic changes. IVC filter. Right femoral venous   catheter with tip in the region of the right common iliac vein; vessels   are difficult to delineate due to confluent surrounding adenopathy and   lack of intravenous contrast.  RETROPERITONEUM/LYMPH NODES: Bulky confluent retroperitoneal, bilateral   iliac chain, and bilateral inguinal lymphadenopathy, grossly similar to   the previous CT.For reference, a left external iliac node measures 7.7 x   3.9 cm (series 3 image 272), previously 8.0 x 4.1 cm.  ABDOMINAL WALL: Portions of the left greater than right lateral abdominal   wall are not included within the field-of-view. Partially imaged left   greater than right flank subcutaneous edema. Partially imaged   subcutaneous high density focus in the left anterior lateral lower   abdominal wall measuring at least 4.2 x 2.2 cm, likely new since   5/1/2024. Apparent high density could be artifactual due to the patient's   body wall being close to the scanner gantry, though this may represent a   hematoma. Additional smaller ill-defined high attenuation foci within the   ventral abdominal wall subcutaneously, which are new from priorand could   be related to injections and/or additional smaller hematomas, with   largest example located on the right measuring 2.0 x 2.4 cm (series 3   image 233).  BONES: Degenerative changes.    IMPRESSION:  *  New large multilobed collection seenextending from the right lower   neck into the right axilla and along the right lateral chest wall, its   superior portion within the neck not included within the field-of-view.   Overlying streak artifact limits characterization, though this appearsto   be heterogeneous with areas of high density, favoring hematoma. Known   adenopathy is difficult to delineate from collection, and a component of   necrotic adenopathy is not excluded. Infection/superimposed infection is   also not entirely excluded. There is associated subcutaneous edema and   overlying skin thickening. This was discussed with Dr. HU MCNAIR on   5/21/2024 at 8:56 AM by Dr. Painting with read back confirmation.  *  Foci of subcutaneous gas in the upper anterior chest wall of uncertain   etiology or significance. Correlate for iatrogenic causes. Airforming   infection is not excluded.  *  New patchy groundglass opacities in the right lower lobe, which may be   infectious/inflammatory in etiology.  *  Moderate right and small left pleural effusions, similar to prior.  *  Small subcutaneous high density foci within the ventral abdominal   wall, which could secondary to recent injections and/or small hematomas.   The largest focus is partially imaged in the left abdominal wall and   measures at least 4.2 cm.  *  Aside from the adenopathy that is difficult to delineate from above   described collection, thoracic adenopathy appears overall similar to   mildly decreased since the prior CT. Abdominopelvic adenopathy appears   similar.    < end of copied text >  < from: [facility] Duplex Lower Extrem Arterial, Bilat (05.21.24 @ 12:59) >  ACC: [id number] EXAM:  ART DUPLEX LOWER EXT BILATERAL   ORDERED BY: YARITZA QUEEN     PROCEDURE DATE:  05/21/2024          INTERPRETATION:  Clinical information: Diminished pedal pulses    Technique: Grayscale, color and spectral Doppler imaging was performed at   the arteries of both lower extremities    Findings:    There is extensive edema within the superficial soft tissues of the left   calf. Small amounts of edema are present in the right calf.    Heavily calcified atheromatous plaque is prominent along the course of   the arteries supplying both the right and left lower extremities.    The right external iliac, common femoral and deep femoral arteries were   not diagnostically imaged because of overlying dressings.    A low resistance pattern of antegrade flow through the right superficial   femoral artery with increased flow in diastole indicates capillary   dilatation.    The peak systolic velocities of the Right lower extremity are as follows:    Superficial femoral artery: 25 cm/sproximal,  48 cm/s mid, 214 cm/s   distal  Popliteal artery: 39 cm/s  Tibioperoneal trunk: 33 cm/s  Posterior tibial artery: 36 cm/s proximal,  14 cm/s mid, 16 cm/s distal  Peroneal artery: 28 cm/s proximal,  20 cm/s mid, 14 cm/s distal  Anterior tibial artery: 25 cm/s proximal,  24 cm/s mid, 25 cm/s distal  Dorsalis pedis artery: 24 cm/s    The peak systolic velocities of the Left lower extremity are as follows:    Distal external iliac artery: 402 cm/s  Common femoral artery: 279 cm/s  Deep femoral artery: 137 cm/s  Superficial femoral artery: 80 cm/s proximal,  OCCLUDED mid and distal  Popliteal artery: 50 cm/s  Tibioperoneal trunk: 26 cm/s  Posterior tibial artery: 29 cm/s proximal,  28 cm/s mid, 46 cm/s distal  Peroneal artery: 25 cm/s proximal,  21 cm/s mid, 20 cm/s distal  Anterior tibial artery: 28 cm/s proximal,  OCCLUDED mid and distal  Dorsalis pedis artery: tardus parvus, 4 cm/s    Impression: There is bilateral lower extremity arterial vascular disease.    There may be nonimaged disease affecting the right iliac arteries, and/or   the nonimaged common femoral artery in the proximal thigh.  There is a flow-limiting stenosis of the right superficial femoral artery   in the distal thigh.    On the left, there is a flow-limiting stenosis of the distal external   iliac artery and of the common femoral artery.  The left superficial femoral artery is occluded in the mid and distal   thigh.  The left anterior tibial artery is occluded in the mid and distal calf.    --- End ofReport ---    < end of copied text >

## 2024-05-21 NOTE — CHART NOTE - NSCHARTNOTEFT_GEN_A_CORE
: Tanika    INDICATION: shock    PROCEDURE:  [X] LIMITED ECHO  [X] LIMITED CHEST  [ ] LIMITED RETROPERITONEAL  [ ] LIMITED ABDOMINAL  [ ] LIMITED DVT  [ ] NEEDLE GUIDANCE VASCULAR  [ ] NEEDLE GUIDANCE THORACENTESIS  [ ] NEEDLE GUIDANCE PARACENTESIS  [ ] NEEDLE GUIDANCE PERICARDIOCENTESIS  [ ] OTHER    FINDINGS: LV systolic function normal grossly. LA small. RV small, grossly normal. IVC < 1cm with variability. A-lines.    INTERPRETATION: No cardiac limitation. Possible volume responsiveness.

## 2024-05-21 NOTE — PROGRESS NOTE ADULT - ASSESSMENT
Ms Gordon is a 69F h/o CVA (bedbound at baseline), COPD, CHF, HTN presenting as transfer from [facility] for SOB iso leukocytosis to 233 c/f acute leukemia. Pt intubated and on pressors, transferred to MICU for further management, course c/b empyema s/p chest tube placement (4/16) and removal, reaccumulation of loculated pleural effusion s/p L pigtail placement and removal on 4/27. S/p EGD/PEG with GI, transferred to RCU on 5/9. Readmitted to MICU for septic vs hemorrhagic shock.     PLAN  =====Neurologic/Psych=====  #At Baseline, AOx4 mental status   #Anxiety, Depression   #Agitation   - L upper and lower extremity motor deficits iso hx of stroke   - Pain control w/ Tylenol  - Seroquel 200mg qhs, seroquel 25mg po q6hr prn agitation.    - Klonopin 1.5MG BID  - 5/18 ECG: NSR with PACs, .  -Psych following appreciate recs     =====Pulmonary=====  #Acute hypoxic respiratory failure  #Empyema   #Pansensitive Strep Pneumo  - S/p thora draining 1500cc fluid 4/16 with chest tube placement; c/w exudative effusion growing Strep Pneumo  - Was on vanc/zosyn/azithro (4/16)-->deescalated to CTX (4/17 -4/23), Added on Vancomycin (4/23-) due to MRSE 1/2 Cx, expanded coverage to Cefepime (4/23 1x dose), back on CTX, switched to Unasyn 3g Q6 (4/25-5/8), Augmentin (5/9--5/19 stopped as switched to zosyn as below)  - S/p Trach placement 4/28  -Aggressive airway management with Duoneb, chest PT and suctioning PRN.  -Trach Vent Settings: AC/CMV,  RR 14 Fio2 40, Peep 5  -Bedside Pocus 5/20: right sided lobar pneumonia      =====Cardiovascular=====  #Septic vs Hemorrhagic Shock  #Hypotension  Eval for TLS vs RP bleed once stable  - 2/2 Strep Pneumo Empyema and Bacteremia s/p Pigtail catheter   - C/w Midodrine to 30 TID   - s/p Solu-cortef 50 mg IV qd 5/6-5/8;   - MAP goal > 60   - Hematology following appreciate recs  - Obtain central access, transfuse goal >7    #CHF  - Unclear hx but elevated pro-BNP to 2600s on admission  - TTE: EF 54%, no significant abnormalities  - Diurese as needed;    =====GI=====  #GERD  - Protonix 40mg IV BID    #Diet  - NPO except meds via PEG    =====Renal/=====  #Electrolytes  - Maintain K>4, Phos>3, Mag>2, iCal>1    =====Endocrine=====  - ISS q6h while receiving Tube feeds    =====Infectious Disease=====  #Empyema 2/2 Pansensitive Strep Pneumo, resolved  #Strep Pneumo Bacteremia, resolved  #New Fevers/Sepsis  -Repeat blood and urine culture pending  -C/w Zosyn q8hrs + 1g Vanc X1(Hospital acquired pneumonia)  -Consider repeat ID Consult    =====Heme/Onc=====  #Leukocytosis  #CLL  -  on presentation; stable at 101K  - No plan for leukophoresis at this time given mature lymphocytes per heme  - CT Chest A&P showing diffuse axillary, inguinal mediastinal RP lymphadenopathy.  - Appreciate Heme recs; can plan for bone marrow bx w/ IR if in line with GOC    #L Common Femoral Vein DVT, non occlusive    - 4/24 BL LE doppler - nonocclusive left common femoral vein DVT  - 4/24 BL UE doppler - acute left basilic SVT, no acute DVT  - Hold AC iso of acute anemia   - Repeat bilateral DVT studies pending  -Arterial duplex pending    =====Ethics=====  FULL CODE  Only known family member is Aunt Mili Gordon ([phone], [phone]) who lives in [place] has been involved in care of patient  Palliative care following    Ms Gordon is a 69F h/o CVA (bedbound at baseline), COPD, CHF, HTN presenting as transfer from [facility] for SOB iso leukocytosis to 233 c/f acute leukemia. Pt intubated and on pressors, transferred to MICU for further management, course c/b empyema s/p chest tube placement (4/16) and removal, reaccumulation of loculated pleural effusion s/p L pigtail placement and removal on 4/27. S/p EGD/PEG with GI, transferred to RCU on 5/9. Readmitted to MICU for septic vs hemorrhagic shock.     PLAN  =====Neurologic/Psych=====  #At Baseline, AOx4 mental status   #Anxiety, Depression   #Agitation   - L upper and lower extremity motor deficits iso hx of stroke   - Pain control w/ Tylenol  - Seroquel 200mg qhs, seroquel 25mg po q6hr prn agitation.    - Klonopin 1.5MG BID  - 5/18 ECG: NSR with PACs, .  -Psych following appreciate recs     =====Pulmonary=====  #Acute hypoxic respiratory failure  #Empyema   #Pansensitive Strep Pneumo  - S/p thora draining 1500cc fluid 4/16 with chest tube placement; c/w exudative effusion growing Strep Pneumo  - Was on vanc/zosyn/azithro (4/16)-->deescalated to CTX (4/17 -4/23), Added on Vancomycin (4/23-) due to MRSE 1/2 Cx, expanded coverage to Cefepime (4/23 1x dose), back on CTX, switched to Unasyn 3g Q6 (4/25-5/8), Augmentin (5/9--5/19 stopped as switched to zosyn as below)  - S/p Trach placement 4/28  -Aggressive airway management with Duoneb, chest PT and suctioning PRN.  -Trach Vent Settings: AC/CMV,  RR 14 Fio2 40, Peep 5  -Bedside Pocus 5/20: right sided lobar pneumonia      =====Cardiovascular=====  #Septic vs Hemorrhagic Shock  #Hypotension  Eval for TLS vs RP bleed once stable  - 2/2 Strep Pneumo Empyema and Bacteremia s/p Pigtail catheter   - C/w Midodrine to 30 TID   - s/p Solu-cortef 50 mg IV qd 5/6-5/8;   - MAP goal > 60   - Hematology following appreciate recs  - Obtain central access, transfuse goal >7    #CHF  - Unclear hx but elevated pro-BNP to 2600s on admission  - TTE: EF 54%, no significant abnormalities  - Diurese as needed;    =====GI=====  #GERD  - Protonix 40mg IV BID    #Diet  - NPO except meds via PEG    =====Renal/=====  #Electrolytes  - Maintain K>4, Phos>3, Mag>2, iCal>1    =====Endocrine=====  - ISS q6h while receiving Tube feeds    =====Infectious Disease=====  #Empyema 2/2 Pansensitive Strep Pneumo, resolved  #Strep Pneumo Bacteremia, resolved  #New Fevers/Sepsis  -Repeat blood and urine culture pending  -C/w Zosyn q8hrs + 1g Vanc X1(Hospital acquired pneumonia) + 1g vanc X1, check vanc level  -Consider repeat ID Consult    =====Heme/Onc=====  #Leukocytosis  #CLL  -  on presentation; stable at 101K  - No plan for leukophoresis at this time given mature lymphocytes per heme  - CT Chest A&P showing diffuse axillary, inguinal mediastinal RP lymphadenopathy.  - Appreciate Heme recs; can plan for bone marrow bx w/ IR if in line with GOC    #L Common Femoral Vein DVT, non occlusive    - 4/24 BL LE doppler - nonocclusive left common femoral vein DVT  - 4/24 BL UE doppler - acute left basilic SVT, no acute DVT  - Hold AC iso of acute anemia   - Repeat bilateral DVT studies pending  -Arterial duplex pending    =====Ethics=====  FULL CODE  Only known family member is Aunt Mili Gordon ([phone], [phone]) who lives in [place] has been involved in care of patient  Palliative care following    Ms Gordon is a 69F h/o CVA (bedbound at baseline), COPD, CHF, HTN presenting as transfer from [facility] for SOB iso leukocytosis to 233 c/f acute leukemia. Pt intubated and on pressors, transferred to MICU for further management, course c/b empyema s/p chest tube placement (4/16) and removal, reaccumulation of loculated pleural effusion s/p L pigtail placement and removal on 4/27. S/p EGD/PEG with GI, transferred to RCU on 5/9. Readmitted to MICU for septic vs hemorrhagic shock.     PLAN  =====Neurologic/Psych=====  #At Baseline, AOx4 mental status   #Anxiety, Depression   #Agitation   - L upper and lower extremity motor deficits iso hx of stroke   - Pain control w/ Tylenol  - Seroquel 200mg qhs, seroquel 25mg po q6hr prn agitation.    - Klonopin 1.5MG BID  - 5/18 ECG: NSR with PACs, .  -Psych following appreciate recs   -Wean precedex as tolerated    =====Pulmonary=====  #Acute hypoxic respiratory failure  #Empyema   #Pansensitive Strep Pneumo  - S/p thora draining 1500cc fluid 4/16 with chest tube placement; c/w exudative effusion growing Strep Pneumo  - Was on vanc/zosyn/azithro (4/16)-->deescalated to CTX (4/17 -4/23), Added on Vancomycin (4/23-) due to MRSE 1/2 Cx, expanded coverage to Cefepime (4/23 1x dose), back on CTX, switched to Unasyn 3g Q6 (4/25-5/8), Augmentin (5/9--5/19 stopped as switched to zosyn as below)  - S/p Trach placement 4/28  -Aggressive airway management with Duoneb, chest PT and suctioning PRN.  -Trach Vent Settings: AC/CMV,  RR 14 Fio2 40, Peep 5  -Bedside Pocus 5/20: right sided lobar pneumonia      =====Cardiovascular=====  #Septic vs Hemorrhagic Shock  #Hypotension  Eval for TLS vs RP bleed once stable  - 2/2 Strep Pneumo Empyema and Bacteremia s/p Pigtail catheter   - C/w Midodrine to 30 TID   - s/p Solu-cortef 50 mg IV qd 5/6-5/8;   - MAP goal > 60   - Hematology following appreciate recs  - Obtain central access, transfuse goal >7  -5/21: weaned pt down to 1 pressor (levo)    #CHF  - Unclear hx but elevated pro-BNP to 2600s on admission  - TTE: EF 54%, no significant abnormalities  - Diurese as needed;    =====GI=====  #GERD  - Protonix 40mg IV BID    #Diet  - NPO except meds via PEG    =====Renal/=====  #Electrolytes  - Maintain K>4, Phos>3, Mag>2, iCal>1    =====Endocrine=====  - ISS q6h while receiving Tube feeds    =====Infectious Disease=====  #Empyema 2/2 Pansensitive Strep Pneumo, resolved  #Strep Pneumo Bacteremia, resolved  #New Fevers/Sepsis  -Repeat blood and urine culture pending  -C/w Zosyn q8hrs + 1g Vanc X1(Hospital acquired pneumonia) + 1g vanc X1, check vanc level  -Consider repeat ID Consult    =====Heme/Onc=====  #Leukocytosis  #CLL  -  on presentation; stable at 101K  - No plan for leukophoresis at this time given mature lymphocytes per heme  - CT Chest A&P showing diffuse axillary, inguinal mediastinal RP lymphadenopathy.  - Appreciate Heme recs; can plan for bone marrow bx w/ IR if in line with GOC    #L Common Femoral Vein DVT, non occlusive    - 4/24 BL LE doppler - nonocclusive left common femoral vein DVT  - 4/24 BL UE doppler - acute left basilic SVT, no acute DVT  - Hold AC iso of acute anemia   - Repeat bilateral DVT studies pending  -Arterial duplex pending    =====Ethics=====  FULL CODE  Only known family member is Aunt Mili oGrdon ([phone], [phone]) who lives in [place] has been involved in care of patient  Palliative care following

## 2024-05-21 NOTE — PROGRESS NOTE ADULT - SUBJECTIVE AND OBJECTIVE BOX
INTERVAL HPI/OVERNIGHT EVENTS:    SUBJECTIVE: Patient seen and examined at bedside.     ROS: All negative except as listed above.    VITAL SIGNS:  ICU Vital Signs Last 24 Hrs  T(C): 37.4 (21 May 2024 12:00), Max: 38.9 (20 May 2024 20:00)  T(F): 99.3 (21 May 2024 12:00), Max: 102 (20 May 2024 20:00)  HR: 57 (21 May 2024 12:15) (45 - 124)  BP: --  BP(mean): --  ABP: 128/50 (21 May 2024 12:15) (80/41 - 153/63)  ABP(mean): 77 (21 May 2024 12:15) (56 - 99)  RR: 16 (21 May 2024 12:15) (12 - 32)  SpO2: 100% (21 May 2024 12:15) (100% - 100%)    O2 Parameters below as of 21 May 2024 09:52  Patient On (Oxygen Delivery Method): ventilator          Mode: AC/ CMV (Assist Control/ Continuous Mandatory Ventilation), RR (machine): 18, TV (machine): 460, FiO2: 30, PEEP: 8, ITime: 1, MAP: 13, PIP: 31  Plateau pressure:   P/F ratio:     05-20 @ 07:01  -  05-21 @ 07:00  --------------------------------------------------------  IN: 4230.2 mL / OUT: 1660 mL / NET: 2570.2 mL    05-21 @ 07:01  -  05-21 @ 12:26  --------------------------------------------------------  IN: 1147.8 mL / OUT: 220 mL / NET: 927.8 mL      CAPILLARY BLOOD GLUCOSE      POCT Blood Glucose.: 193 mg/dL (20 May 2024 08:25)      ECG: reviewed.    PHYSICAL EXAM:    GENERAL: NAD, lying in bed comfortably  HEAD:  Atraumatic, normocephalic  EYES: EOMI, PERRLA, conjunctiva and sclera clear  NECK: Supple, trachea midline, no JVD  HEART: Regular rate and rhythm, no murmurs, rubs, or gallops  LUNGS: Unlabored respirations.  Clear to auscultation bilaterally, no crackles, wheezing, or rhonchi  ABDOMEN: Soft, nontender, nondistended, +BS  EXTREMITIES: 2+ peripheral pulses bilaterally, cap refill<2 secs. No clubbing, cyanosis, or edema  NERVOUS SYSTEM:  A&Ox3, following commands, moving all extremities, no focal deficits   SKIN: No rashes or lesions    MEDICATIONS:  MEDICATIONS  (STANDING):  chlorhexidine 4% Liquid 1 Application(s) Topical every 12 hours  dexMEDEtomidine Infusion 0.05 MICROgram(s)/kG/Hr (1.36 mL/Hr) IV Continuous <Continuous>  hydrocortisone sodium succinate Injectable 50 milliGRAM(s) IV Push every 6 hours  midodrine 30 milliGRAM(s) Oral three times a day  norepinephrine Infusion 0.05 MICROgram(s)/kG/Min (11.4 mL/Hr) IV Continuous <Continuous>  pantoprazole  Injectable 40 milliGRAM(s) IV Push two times a day  piperacillin/tazobactam IVPB.. 3.375 Gram(s) IV Intermittent every 8 hours  QUEtiapine 200 milliGRAM(s) Oral <User Schedule>  senna Syrup 10 milliLiter(s) Oral at bedtime  sodium zirconium cyclosilicate 10 Gram(s) Oral every 8 hours    MEDICATIONS  (PRN):  clonazePAM  Tablet 1.5 milliGRAM(s) Oral every 12 hours PRN agitation/anxiety  nystatin Powder 1 Application(s) Topical two times a day PRN skin irritation  QUEtiapine 25 milliGRAM(s) Oral every 6 hours PRN agitation  sodium chloride 0.9% lock flush 10 milliLiter(s) IV Push every 1 hour PRN Pre/post blood products, medications, blood draw, and to maintain line patency      ALLERGIES:  Allergies    No Known Allergies    Intolerances        LABS:                        7.6    156.58 )-----------( 169      ( 21 May 2024 00:25 )             23.7     05-21    139  |  103  |  31<H>  ----------------------------<  192<H>  5.8<H>   |  23  |  0.68    Ca    8.2<L>      21 May 2024 00:24  Phos  4.3     05-21  Mg     2.6     05-21    TPro  5.6<L>  /  Alb  2.6<L>  /  TBili  0.4  /  DBili  x   /  AST  43<H>  /  ALT  20  /  AlkPhos  65  05-21    PT/INR - ( 21 May 2024 00:25 )   PT: 12.5 sec;   INR: 1.14 ratio         PTT - ( 21 May 2024 00:25 )  PTT:28.5 sec  Urinalysis Basic - ( 21 May 2024 00:24 )    Color: x / Appearance: x / SG: x / pH: x  Gluc: 192 mg/dL / Ketone: x  / Bili: x / Urobili: x   Blood: x / Protein: x / Nitrite: x   Leuk Esterase: x / RBC: x / WBC x   Sq Epi: x / Non Sq Epi: x / Bacteria: x      ABG:  pH, Arterial: 7.42 (05-21-24 @ 00:20)  pCO2, Arterial: 38 mmHg (05-21-24 @ 00:20)  pO2, Arterial: 114 mmHg (05-21-24 @ 00:20)  pH, Arterial: 7.42 (05-20-24 @ 14:03)  pCO2, Arterial: 38 mmHg (05-20-24 @ 14:03)  pO2, Arterial: 131 mmHg (05-20-24 @ 14:03)      vBG:    Micro:    Culture - Blood (collected 05-20-24 @ 06:36)  Source: .Blood Blood-Peripheral  Preliminary Report (05-21-24 @ 11:01):    No growth at 24 hours    Culture - Blood (collected 05-20-24 @ 06:36)  Source: .Blood Blood-Peripheral  Preliminary Report (05-21-24 @ 11:01):    No growth at 24 hours    Culture - Blood (collected 05-18-24 @ 10:47)  Source: .Blood Blood-Venous  Preliminary Report (05-20-24 @ 16:02):    No growth at 48 Hours    Culture - Blood (collected 05-18-24 @ 06:50)  Source: .Blood Blood-Peripheral  Preliminary Report (05-21-24 @ 10:01):    No growth at 72 Hours    Culture - Blood (collected 05-11-24 @ 07:11)  Source: .Blood Blood-Peripheral  Final Report (05-16-24 @ 11:00):    No growth at 5 days    Culture - Blood (collected 05-10-24 @ 15:00)  Source: .Blood Blood-Peripheral  Final Report (05-15-24 @ 22:00):    No growth at 5 days    Culture - Blood (collected 05-06-24 @ 09:33)  Source: .Blood Blood-Peripheral  Final Report (05-11-24 @ 16:01):    No growth at 5 days    Culture - Blood (collected 05-06-24 @ 09:25)  Source: .Blood Blood-Peripheral  Final Report (05-11-24 @ 16:01):    No growth at 5 days    Culture - Blood (collected 05-03-24 @ 08:30)  Source: .Blood Blood-Peripheral  Final Report (05-08-24 @ 16:00):    No growth at 5 days    Culture - Blood (collected 05-03-24 @ 08:15)  Source: .Blood Blood-Peripheral  Final Report (05-08-24 @ 16:00):    No growth at 5 days    Culture - Blood (collected 04-29-24 @ 14:00)  Source: .Blood Blood  Final Report (05-04-24 @ 17:01):    No growth at 5 days    Culture - Blood (collected 04-29-24 @ 13:50)  Source: .Blood Blood  Final Report (05-04-24 @ 17:01):    No growth at 5 days    Culture - Blood (collected 04-26-24 @ 13:30)  Source: .Blood Blood-Peripheral  Final Report (05-01-24 @ 18:00):    No growth at 5 days    Culture - Blood (collected 04-26-24 @ 13:21)  Source: .Blood Blood-Venous  Final Report (05-01-24 @ 18:00):    No growth at 5 days    Culture - Blood (collected 04-24-24 @ 05:53)  Source: .Blood Blood  Final Report (04-29-24 @ 10:01):    No growth at 5 days    Culture - Blood (collected 04-24-24 @ 05:53)  Source: .Blood Blood  Final Report (04-29-24 @ 10:01):    No growth at 5 days    Culture - Blood (collected 04-22-24 @ 21:37)  Source: .Blood Blood-Peripheral  Gram Stain (04-24-24 @ 00:09):    Growth in aerobic bottle: Gram Positive Cocci in Clusters  Final Report (04-24-24 @ 23:16):    Growth in aerobic bottle: Staphylococcus epidermidis    Isolation of Coagulase negative Staphylococcus from single blood culture    sets may represent    contamination. Contact the Microbiology Department at [phone] if    susceptibility testing is    clinically indicated.    Direct identification is available within approximately 3-5    hours either by Blood Panel Multiplexed PCR or Direct    MALDI-TOF. Details: [email or web address]  Organism: Blood Culture PCR (04-24-24 @ 23:16)  Organism: Blood Culture PCR (04-24-24 @ 23:16)      Method Type: PCR      -  Staphylococcus epidermidis, Methicillin resistant: Detec    Culture - Blood (collected 04-22-24 @ 19:30)  Source: .Blood Blood-Peripheral  Final Report (04-28-24 @ 01:00):    No growth at 5 days        Culture - Sputum (collected 05-10-24 @ 21:49)  Source: Trach Asp Tracheal Aspirate  Gram Stain (05-11-24 @ 05:58):    Few polymorphonuclear leukocytes per low power field    Rare Squamous epithelial cells per low power field    No organisms seen per oil power field  Final Report (05-12-24 @ 11:42):    No growth    Culture - Sputum (collected 05-06-24 @ 00:37)  Source: .Sputum Sputum  Gram Stain (05-06-24 @ 11:48):    Moderate polymorphonuclear leukocytes per low power field    No Squamous epithelial cells per low power field    No organisms seen per oil power field  Final Report (05-07-24 @ 15:46):    Normal Respiratory Kayla present    Culture - Sputum (collected 04-26-24 @ 13:44)  Source: .Sputum Sputum  Gram Stain (04-26-24 @ 20:33):    Rare polymorphonuclear leukocytes per low power field    No Squamous epithelial cells per low power field    No organisms seen per oil power field  Final Report (04-28-24 @ 08:42):    No growth        RADIOLOGY & ADDITIONAL TESTS: Reviewed.   INTERVAL HPI/OVERNIGHT EVENTS:    SUBJECTIVE: Patient seen and examined at bedside.     ROS: All negative except as listed above.    VITAL SIGNS:  ICU Vital Signs Last 24 Hrs  T(C): 37.4 (21 May 2024 12:00), Max: 38.9 (20 May 2024 20:00)  T(F): 99.3 (21 May 2024 12:00), Max: 102 (20 May 2024 20:00)  HR: 57 (21 May 2024 12:15) (45 - 124)  BP: --  BP(mean): --  ABP: 128/50 (21 May 2024 12:15) (80/41 - 153/63)  ABP(mean): 77 (21 May 2024 12:15) (56 - 99)  RR: 16 (21 May 2024 12:15) (12 - 32)  SpO2: 100% (21 May 2024 12:15) (100% - 100%)    O2 Parameters below as of 21 May 2024 09:52  Patient On (Oxygen Delivery Method): ventilator          Mode: AC/ CMV (Assist Control/ Continuous Mandatory Ventilation), RR (machine): 18, TV (machine): 460, FiO2: 30, PEEP: 8, ITime: 1, MAP: 13, PIP: 31  Plateau pressure:   P/F ratio:     05-20 @ 07:01  -  05-21 @ 07:00  --------------------------------------------------------  IN: 4230.2 mL / OUT: 1660 mL / NET: 2570.2 mL    05-21 @ 07:01  -  05-21 @ 12:26  --------------------------------------------------------  IN: 1147.8 mL / OUT: 220 mL / NET: 927.8 mL      CAPILLARY BLOOD GLUCOSE      POCT Blood Glucose.: 193 mg/dL (20 May 2024 08:25)      ECG: reviewed.    PHYSICAL EXAM:    GENERAL: NAD, lying in bed comfortably  HEAD:  Atraumatic, normocephalic  EYES: EOMI, PERRLA, conjunctiva and sclera clear  NECK: trach noted  HEART: Regular rate and rhythm, no murmurs, rubs, or gallops  LUNGS:  Clear to auscultation bilaterally, no crackles, wheezing, or rhonchi  ABDOMEN: Soft, nontender, nondistended, +BS  EXTREMITIES: 2+ peripheral pulses bilaterally, cap refill<2 secs. No clubbing, cyanosis, or edema  NERVOUS SYSTEM: Sedated  SKIN: No rashes or lesions    MEDICATIONS:  MEDICATIONS  (STANDING):  chlorhexidine 4% Liquid 1 Application(s) Topical every 12 hours  dexMEDEtomidine Infusion 0.05 MICROgram(s)/kG/Hr (1.36 mL/Hr) IV Continuous <Continuous>  hydrocortisone sodium succinate Injectable 50 milliGRAM(s) IV Push every 6 hours  midodrine 30 milliGRAM(s) Oral three times a day  norepinephrine Infusion 0.05 MICROgram(s)/kG/Min (11.4 mL/Hr) IV Continuous <Continuous>  pantoprazole  Injectable 40 milliGRAM(s) IV Push two times a day  piperacillin/tazobactam IVPB.. 3.375 Gram(s) IV Intermittent every 8 hours  QUEtiapine 200 milliGRAM(s) Oral <User Schedule>  senna Syrup 10 milliLiter(s) Oral at bedtime  sodium zirconium cyclosilicate 10 Gram(s) Oral every 8 hours    MEDICATIONS  (PRN):  clonazePAM  Tablet 1.5 milliGRAM(s) Oral every 12 hours PRN agitation/anxiety  nystatin Powder 1 Application(s) Topical two times a day PRN skin irritation  QUEtiapine 25 milliGRAM(s) Oral every 6 hours PRN agitation  sodium chloride 0.9% lock flush 10 milliLiter(s) IV Push every 1 hour PRN Pre/post blood products, medications, blood draw, and to maintain line patency      ALLERGIES:  Allergies    No Known Allergies    Intolerances        LABS:                        7.6    156.58 )-----------( 169      ( 21 May 2024 00:25 )             23.7     05-21    139  |  103  |  31<H>  ----------------------------<  192<H>  5.8<H>   |  23  |  0.68    Ca    8.2<L>      21 May 2024 00:24  Phos  4.3     05-21  Mg     2.6     05-21    TPro  5.6<L>  /  Alb  2.6<L>  /  TBili  0.4  /  DBili  x   /  AST  43<H>  /  ALT  20  /  AlkPhos  65  05-21    PT/INR - ( 21 May 2024 00:25 )   PT: 12.5 sec;   INR: 1.14 ratio         PTT - ( 21 May 2024 00:25 )  PTT:28.5 sec  Urinalysis Basic - ( 21 May 2024 00:24 )    Color: x / Appearance: x / SG: x / pH: x  Gluc: 192 mg/dL / Ketone: x  / Bili: x / Urobili: x   Blood: x / Protein: x / Nitrite: x   Leuk Esterase: x / RBC: x / WBC x   Sq Epi: x / Non Sq Epi: x / Bacteria: x      ABG:  pH, Arterial: 7.42 (05-21-24 @ 00:20)  pCO2, Arterial: 38 mmHg (05-21-24 @ 00:20)  pO2, Arterial: 114 mmHg (05-21-24 @ 00:20)  pH, Arterial: 7.42 (05-20-24 @ 14:03)  pCO2, Arterial: 38 mmHg (05-20-24 @ 14:03)  pO2, Arterial: 131 mmHg (05-20-24 @ 14:03)      vBG:    Micro:    Culture - Blood (collected 05-20-24 @ 06:36)  Source: .Blood Blood-Peripheral  Preliminary Report (05-21-24 @ 11:01):    No growth at 24 hours    Culture - Blood (collected 05-20-24 @ 06:36)  Source: .Blood Blood-Peripheral  Preliminary Report (05-21-24 @ 11:01):    No growth at 24 hours    Culture - Blood (collected 05-18-24 @ 10:47)  Source: .Blood Blood-Venous  Preliminary Report (05-20-24 @ 16:02):    No growth at 48 Hours    Culture - Blood (collected 05-18-24 @ 06:50)  Source: .Blood Blood-Peripheral  Preliminary Report (05-21-24 @ 10:01):    No growth at 72 Hours    Culture - Blood (collected 05-11-24 @ 07:11)  Source: .Blood Blood-Peripheral  Final Report (05-16-24 @ 11:00):    No growth at 5 days    Culture - Blood (collected 05-10-24 @ 15:00)  Source: .Blood Blood-Peripheral  Final Report (05-15-24 @ 22:00):    No growth at 5 days    Culture - Blood (collected 05-06-24 @ 09:33)  Source: .Blood Blood-Peripheral  Final Report (05-11-24 @ 16:01):    No growth at 5 days    Culture - Blood (collected 05-06-24 @ 09:25)  Source: .Blood Blood-Peripheral  Final Report (05-11-24 @ 16:01):    No growth at 5 days    Culture - Blood (collected 05-03-24 @ 08:30)  Source: .Blood Blood-Peripheral  Final Report (05-08-24 @ 16:00):    No growth at 5 days    Culture - Blood (collected 05-03-24 @ 08:15)  Source: .Blood Blood-Peripheral  Final Report (05-08-24 @ 16:00):    No growth at 5 days    Culture - Blood (collected 04-29-24 @ 14:00)  Source: .Blood Blood  Final Report (05-04-24 @ 17:01):    No growth at 5 days    Culture - Blood (collected 04-29-24 @ 13:50)  Source: .Blood Blood  Final Report (05-04-24 @ 17:01):    No growth at 5 days    Culture - Blood (collected 04-26-24 @ 13:30)  Source: .Blood Blood-Peripheral  Final Report (05-01-24 @ 18:00):    No growth at 5 days    Culture - Blood (collected 04-26-24 @ 13:21)  Source: .Blood Blood-Venous  Final Report (05-01-24 @ 18:00):    No growth at 5 days    Culture - Blood (collected 04-24-24 @ 05:53)  Source: .Blood Blood  Final Report (04-29-24 @ 10:01):    No growth at 5 days    Culture - Blood (collected 04-24-24 @ 05:53)  Source: .Blood Blood  Final Report (04-29-24 @ 10:01):    No growth at 5 days    Culture - Blood (collected 04-22-24 @ 21:37)  Source: .Blood Blood-Peripheral  Gram Stain (04-24-24 @ 00:09):    Growth in aerobic bottle: Gram Positive Cocci in Clusters  Final Report (04-24-24 @ 23:16):    Growth in aerobic bottle: Staphylococcus epidermidis    Isolation of Coagulase negative Staphylococcus from single blood culture    sets may represent    contamination. Contact the Microbiology Department at [phone] if    susceptibility testing is    clinically indicated.    Direct identification is available within approximately 3-5    hours either by Blood Panel Multiplexed PCR or Direct    MALDI-TOF. Details: [email or web address]  Organism: Blood Culture PCR (04-24-24 @ 23:16)  Organism: Blood Culture PCR (04-24-24 @ 23:16)      Method Type: PCR      -  Staphylococcus epidermidis, Methicillin resistant: Detec    Culture - Blood (collected 04-22-24 @ 19:30)  Source: .Blood Blood-Peripheral  Final Report (04-28-24 @ 01:00):    No growth at 5 days        Culture - Sputum (collected 05-10-24 @ 21:49)  Source: Trach Asp Tracheal Aspirate  Gram Stain (05-11-24 @ 05:58):    Few polymorphonuclear leukocytes per low power field    Rare Squamous epithelial cells per low power field    No organisms seen per oil power field  Final Report (05-12-24 @ 11:42):    No growth    Culture - Sputum (collected 05-06-24 @ 00:37)  Source: .Sputum Sputum  Gram Stain (05-06-24 @ 11:48):    Moderate polymorphonuclear leukocytes per low power field    No Squamous epithelial cells per low power field    No organisms seen per oil power field  Final Report (05-07-24 @ 15:46):    Normal Respiratory Kayla present    Culture - Sputum (collected 04-26-24 @ 13:44)  Source: .Sputum Sputum  Gram Stain (04-26-24 @ 20:33):    Rare polymorphonuclear leukocytes per low power field    No Squamous epithelial cells per low power field    No organisms seen per oil power field  Final Report (04-28-24 @ 08:42):    No growth        RADIOLOGY & ADDITIONAL TESTS: Reviewed.   INTERVAL HPI/OVERNIGHT EVENTS:    SUBJECTIVE: Patient seen and examined at bedside.     ROS: All negative except as listed above.    VITAL SIGNS:  ICU Vital Signs Last 24 Hrs  T(C): 37.4 (21 May 2024 12:00), Max: 38.9 (20 May 2024 20:00)  T(F): 99.3 (21 May 2024 12:00), Max: 102 (20 May 2024 20:00)  HR: 57 (21 May 2024 12:15) (45 - 124)  BP: --  BP(mean): --  ABP: 128/50 (21 May 2024 12:15) (80/41 - 153/63)  ABP(mean): 77 (21 May 2024 12:15) (56 - 99)  RR: 16 (21 May 2024 12:15) (12 - 32)  SpO2: 100% (21 May 2024 12:15) (100% - 100%)    O2 Parameters below as of 21 May 2024 09:52  Patient On (Oxygen Delivery Method): ventilator          Mode: AC/ CMV (Assist Control/ Continuous Mandatory Ventilation), RR (machine): 18, TV (machine): 460, FiO2: 30, PEEP: 8, ITime: 1, MAP: 13, PIP: 31  Plateau pressure:   P/F ratio:     05-20 @ 07:01  -  05-21 @ 07:00  --------------------------------------------------------  IN: 4230.2 mL / OUT: 1660 mL / NET: 2570.2 mL    05-21 @ 07:01  -  05-21 @ 12:26  --------------------------------------------------------  IN: 1147.8 mL / OUT: 220 mL / NET: 927.8 mL      CAPILLARY BLOOD GLUCOSE      POCT Blood Glucose.: 193 mg/dL (20 May 2024 08:25)      ECG: reviewed.    PHYSICAL EXAM:    GENERAL: NAD, lying in bed comfortably  HEAD:  Atraumatic, normocephalic  EYES: EOMI, PERRLA, conjunctiva and sclera clear  NECK: trach noted  HEART: Regular rate and rhythm, no murmurs, rubs, or gallops  LUNGS:  Clear to auscultation bilaterally, no crackles, wheezing, or rhonchi  ABDOMEN: Soft, nontender, nondistended, +BS  EXTREMITIES: No palpable DP or posterior tib pulses b/l  NERVOUS SYSTEM: Sedated  SKIN: No rashes or lesions    MEDICATIONS:  MEDICATIONS  (STANDING):  chlorhexidine 4% Liquid 1 Application(s) Topical every 12 hours  dexMEDEtomidine Infusion 0.05 MICROgram(s)/kG/Hr (1.36 mL/Hr) IV Continuous <Continuous>  hydrocortisone sodium succinate Injectable 50 milliGRAM(s) IV Push every 6 hours  midodrine 30 milliGRAM(s) Oral three times a day  norepinephrine Infusion 0.05 MICROgram(s)/kG/Min (11.4 mL/Hr) IV Continuous <Continuous>  pantoprazole  Injectable 40 milliGRAM(s) IV Push two times a day  piperacillin/tazobactam IVPB.. 3.375 Gram(s) IV Intermittent every 8 hours  QUEtiapine 200 milliGRAM(s) Oral <User Schedule>  senna Syrup 10 milliLiter(s) Oral at bedtime  sodium zirconium cyclosilicate 10 Gram(s) Oral every 8 hours    MEDICATIONS  (PRN):  clonazePAM  Tablet 1.5 milliGRAM(s) Oral every 12 hours PRN agitation/anxiety  nystatin Powder 1 Application(s) Topical two times a day PRN skin irritation  QUEtiapine 25 milliGRAM(s) Oral every 6 hours PRN agitation  sodium chloride 0.9% lock flush 10 milliLiter(s) IV Push every 1 hour PRN Pre/post blood products, medications, blood draw, and to maintain line patency      ALLERGIES:  Allergies    No Known Allergies    Intolerances        LABS:                        7.6    156.58 )-----------( 169      ( 21 May 2024 00:25 )             23.7     05-21    139  |  103  |  31<H>  ----------------------------<  192<H>  5.8<H>   |  23  |  0.68    Ca    8.2<L>      21 May 2024 00:24  Phos  4.3     05-21  Mg     2.6     05-21    TPro  5.6<L>  /  Alb  2.6<L>  /  TBili  0.4  /  DBili  x   /  AST  43<H>  /  ALT  20  /  AlkPhos  65  05-21    PT/INR - ( 21 May 2024 00:25 )   PT: 12.5 sec;   INR: 1.14 ratio         PTT - ( 21 May 2024 00:25 )  PTT:28.5 sec  Urinalysis Basic - ( 21 May 2024 00:24 )    Color: x / Appearance: x / SG: x / pH: x  Gluc: 192 mg/dL / Ketone: x  / Bili: x / Urobili: x   Blood: x / Protein: x / Nitrite: x   Leuk Esterase: x / RBC: x / WBC x   Sq Epi: x / Non Sq Epi: x / Bacteria: x      ABG:  pH, Arterial: 7.42 (05-21-24 @ 00:20)  pCO2, Arterial: 38 mmHg (05-21-24 @ 00:20)  pO2, Arterial: 114 mmHg (05-21-24 @ 00:20)  pH, Arterial: 7.42 (05-20-24 @ 14:03)  pCO2, Arterial: 38 mmHg (05-20-24 @ 14:03)  pO2, Arterial: 131 mmHg (05-20-24 @ 14:03)      vBG:    Micro:    Culture - Blood (collected 05-20-24 @ 06:36)  Source: .Blood Blood-Peripheral  Preliminary Report (05-21-24 @ 11:01):    No growth at 24 hours    Culture - Blood (collected 05-20-24 @ 06:36)  Source: .Blood Blood-Peripheral  Preliminary Report (05-21-24 @ 11:01):    No growth at 24 hours    Culture - Blood (collected 05-18-24 @ 10:47)  Source: .Blood Blood-Venous  Preliminary Report (05-20-24 @ 16:02):    No growth at 48 Hours    Culture - Blood (collected 05-18-24 @ 06:50)  Source: .Blood Blood-Peripheral  Preliminary Report (05-21-24 @ 10:01):    No growth at 72 Hours    Culture - Blood (collected 05-11-24 @ 07:11)  Source: .Blood Blood-Peripheral  Final Report (05-16-24 @ 11:00):    No growth at 5 days    Culture - Blood (collected 05-10-24 @ 15:00)  Source: .Blood Blood-Peripheral  Final Report (05-15-24 @ 22:00):    No growth at 5 days    Culture - Blood (collected 05-06-24 @ 09:33)  Source: .Blood Blood-Peripheral  Final Report (05-11-24 @ 16:01):    No growth at 5 days    Culture - Blood (collected 05-06-24 @ 09:25)  Source: .Blood Blood-Peripheral  Final Report (05-11-24 @ 16:01):    No growth at 5 days    Culture - Blood (collected 05-03-24 @ 08:30)  Source: .Blood Blood-Peripheral  Final Report (05-08-24 @ 16:00):    No growth at 5 days    Culture - Blood (collected 05-03-24 @ 08:15)  Source: .Blood Blood-Peripheral  Final Report (05-08-24 @ 16:00):    No growth at 5 days    Culture - Blood (collected 04-29-24 @ 14:00)  Source: .Blood Blood  Final Report (05-04-24 @ 17:01):    No growth at 5 days    Culture - Blood (collected 04-29-24 @ 13:50)  Source: .Blood Blood  Final Report (05-04-24 @ 17:01):    No growth at 5 days    Culture - Blood (collected 04-26-24 @ 13:30)  Source: .Blood Blood-Peripheral  Final Report (05-01-24 @ 18:00):    No growth at 5 days    Culture - Blood (collected 04-26-24 @ 13:21)  Source: .Blood Blood-Venous  Final Report (05-01-24 @ 18:00):    No growth at 5 days    Culture - Blood (collected 04-24-24 @ 05:53)  Source: .Blood Blood  Final Report (04-29-24 @ 10:01):    No growth at 5 days    Culture - Blood (collected 04-24-24 @ 05:53)  Source: .Blood Blood  Final Report (04-29-24 @ 10:01):    No growth at 5 days    Culture - Blood (collected 04-22-24 @ 21:37)  Source: .Blood Blood-Peripheral  Gram Stain (04-24-24 @ 00:09):    Growth in aerobic bottle: Gram Positive Cocci in Clusters  Final Report (04-24-24 @ 23:16):    Growth in aerobic bottle: Staphylococcus epidermidis    Isolation of Coagulase negative Staphylococcus from single blood culture    sets may represent    contamination. Contact the Microbiology Department at [phone] if    susceptibility testing is    clinically indicated.    Direct identification is available within approximately 3-5    hours either by Blood Panel Multiplexed PCR or Direct    MALDI-TOF. Details: [email or web address]  Organism: Blood Culture PCR (04-24-24 @ 23:16)  Organism: Blood Culture PCR (04-24-24 @ 23:16)      Method Type: PCR      -  Staphylococcus epidermidis, Methicillin resistant: Detec    Culture - Blood (collected 04-22-24 @ 19:30)  Source: .Blood Blood-Peripheral  Final Report (04-28-24 @ 01:00):    No growth at 5 days        Culture - Sputum (collected 05-10-24 @ 21:49)  Source: Trach Asp Tracheal Aspirate  Gram Stain (05-11-24 @ 05:58):    Few polymorphonuclear leukocytes per low power field    Rare Squamous epithelial cells per low power field    No organisms seen per oil power field  Final Report (05-12-24 @ 11:42):    No growth    Culture - Sputum (collected 05-06-24 @ 00:37)  Source: .Sputum Sputum  Gram Stain (05-06-24 @ 11:48):    Moderate polymorphonuclear leukocytes per low power field    No Squamous epithelial cells per low power field    No organisms seen per oil power field  Final Report (05-07-24 @ 15:46):    Normal Respiratory Kayla present    Culture - Sputum (collected 04-26-24 @ 13:44)  Source: .Sputum Sputum  Gram Stain (04-26-24 @ 20:33):    Rare polymorphonuclear leukocytes per low power field    No Squamous epithelial cells per low power field    No organisms seen per oil power field  Final Report (04-28-24 @ 08:42):    No growth        RADIOLOGY & ADDITIONAL TESTS: Reviewed.

## 2024-05-21 NOTE — PROGRESS NOTE ADULT - ATTENDING COMMENTS
Agree with above  69F PMH CVA (bedbound), COPD, CHF, initially p/w dyspnea a/w acute leukocytosis (WBC >230) c/w acute leukemic (?CLL) a/w respiratory failure and hypotension. Patient has had long hospital course including chest tube placement for empyema x 2 (both since removed), chronic respiratory failure with failure to wean s/p trache / PEG, transferred to RCU on 5/9. Now returns to MICU for shock state (fever, hypotension), placed back on full vent.  - Currently 18/460/30%/+8, continue to titrate FiO2 as tolerated to maintain spO2 >92%  - Hgb now stable after decrease to 6.2 (6 - 8.1 - 8.1 - 7.6). CT shows ?hematoma in axilla. Continue to trend CBCs Q12h, surgery consult.  - Remains on pressors (Levo 0.04), albeit improving (down from triple pressors) and Precedex 0.6.  - c/w empiric Zosyn for now. F/U culture results and transition to targeted therapy if any cultures result with causative organism. Last positive culture was blood culture on 4/22, which was positive for Staph epi / CoNS. Prior to that, had Streptococcus pneumoniae in pleural fluid on 4/16. All subsequent cultures have been negative.  - Continued GOC discussions ongoing. Family wishes to continue to pursue all appropriate medical interventions, which we will do. That said, if patient has cardiac arrest, there would be no benefit to chest compressions, as the chances of a meaningful recovery are essentially non-existent.

## 2024-05-22 LAB
-  AMIKACIN: SIGNIFICANT CHANGE UP
-  AZTREONAM: SIGNIFICANT CHANGE UP
-  CEFEPIME: SIGNIFICANT CHANGE UP
-  CEFTAZIDIME: SIGNIFICANT CHANGE UP
-  CIPROFLOXACIN: SIGNIFICANT CHANGE UP
-  IMIPENEM: SIGNIFICANT CHANGE UP
-  LEVOFLOXACIN: SIGNIFICANT CHANGE UP
-  MEROPENEM: SIGNIFICANT CHANGE UP
-  PIPERACILLIN/TAZOBACTAM: SIGNIFICANT CHANGE UP
ALBUMIN SERPL ELPH-MCNC: 2.8 G/DL — LOW (ref 3.3–5)
ALBUMIN SERPL ELPH-MCNC: 2.8 G/DL — LOW (ref 3.3–5)
ALBUMIN SERPL ELPH-MCNC: 3 G/DL — LOW (ref 3.3–5)
ALP SERPL-CCNC: 58 U/L — SIGNIFICANT CHANGE UP (ref 40–120)
ALP SERPL-CCNC: 58 U/L — SIGNIFICANT CHANGE UP (ref 40–120)
ALP SERPL-CCNC: 59 U/L — SIGNIFICANT CHANGE UP (ref 40–120)
ALT FLD-CCNC: 31 U/L — SIGNIFICANT CHANGE UP (ref 10–45)
ALT FLD-CCNC: 31 U/L — SIGNIFICANT CHANGE UP (ref 10–45)
ALT FLD-CCNC: 32 U/L — SIGNIFICANT CHANGE UP (ref 10–45)
ANION GAP SERPL CALC-SCNC: 10 MMOL/L — SIGNIFICANT CHANGE UP (ref 5–17)
ANION GAP SERPL CALC-SCNC: 12 MMOL/L — SIGNIFICANT CHANGE UP (ref 5–17)
ANION GAP SERPL CALC-SCNC: 12 MMOL/L — SIGNIFICANT CHANGE UP (ref 5–17)
ANISOCYTOSIS BLD QL: SLIGHT — SIGNIFICANT CHANGE UP
APTT BLD: 25.7 SEC — SIGNIFICANT CHANGE UP (ref 24.5–35.6)
AST SERPL-CCNC: 59 U/L — HIGH (ref 10–40)
AST SERPL-CCNC: 61 U/L — HIGH (ref 10–40)
AST SERPL-CCNC: 69 U/L — HIGH (ref 10–40)
BASE EXCESS BLDV CALC-SCNC: 2.8 MMOL/L — SIGNIFICANT CHANGE UP (ref -2–3)
BASOPHILS # BLD AUTO: 0 K/UL — SIGNIFICANT CHANGE UP (ref 0–0.2)
BASOPHILS # BLD AUTO: 0 K/UL — SIGNIFICANT CHANGE UP (ref 0–0.2)
BASOPHILS # BLD AUTO: 0.05 K/UL — SIGNIFICANT CHANGE UP (ref 0–0.2)
BASOPHILS NFR BLD AUTO: 0 % — SIGNIFICANT CHANGE UP (ref 0–2)
BASOPHILS NFR BLD AUTO: 0 % — SIGNIFICANT CHANGE UP (ref 0–2)
BASOPHILS NFR BLD AUTO: 0.1 % — SIGNIFICANT CHANGE UP (ref 0–2)
BILIRUB SERPL-MCNC: 0.4 MG/DL — SIGNIFICANT CHANGE UP (ref 0.2–1.2)
BILIRUB SERPL-MCNC: 0.5 MG/DL — SIGNIFICANT CHANGE UP (ref 0.2–1.2)
BILIRUB SERPL-MCNC: 0.6 MG/DL — SIGNIFICANT CHANGE UP (ref 0.2–1.2)
BUN SERPL-MCNC: 23 MG/DL — SIGNIFICANT CHANGE UP (ref 7–23)
BUN SERPL-MCNC: 27 MG/DL — HIGH (ref 7–23)
BUN SERPL-MCNC: 29 MG/DL — HIGH (ref 7–23)
CA-I SERPL-SCNC: 1.12 MMOL/L — LOW (ref 1.15–1.33)
CALCIUM SERPL-MCNC: 7.6 MG/DL — LOW (ref 8.4–10.5)
CALCIUM SERPL-MCNC: 7.9 MG/DL — LOW (ref 8.4–10.5)
CALCIUM SERPL-MCNC: 8 MG/DL — LOW (ref 8.4–10.5)
CHLORIDE BLDV-SCNC: 108 MMOL/L — SIGNIFICANT CHANGE UP (ref 96–108)
CHLORIDE SERPL-SCNC: 104 MMOL/L — SIGNIFICANT CHANGE UP (ref 96–108)
CHLORIDE SERPL-SCNC: 105 MMOL/L — SIGNIFICANT CHANGE UP (ref 96–108)
CHLORIDE SERPL-SCNC: 106 MMOL/L — SIGNIFICANT CHANGE UP (ref 96–108)
CO2 BLDV-SCNC: 28 MMOL/L — HIGH (ref 22–26)
CO2 SERPL-SCNC: 24 MMOL/L — SIGNIFICANT CHANGE UP (ref 22–31)
CREAT SERPL-MCNC: 0.57 MG/DL — SIGNIFICANT CHANGE UP (ref 0.5–1.3)
CREAT SERPL-MCNC: 0.65 MG/DL — SIGNIFICANT CHANGE UP (ref 0.5–1.3)
CREAT SERPL-MCNC: 0.69 MG/DL — SIGNIFICANT CHANGE UP (ref 0.5–1.3)
CULTURE RESULTS: ABNORMAL
CULTURE RESULTS: SIGNIFICANT CHANGE UP
EGFR: 94 ML/MIN/1.73M2 — SIGNIFICANT CHANGE UP
EGFR: 96 ML/MIN/1.73M2 — SIGNIFICANT CHANGE UP
EGFR: 99 ML/MIN/1.73M2 — SIGNIFICANT CHANGE UP
EOSINOPHIL # BLD AUTO: 0 K/UL — SIGNIFICANT CHANGE UP (ref 0–0.5)
EOSINOPHIL # BLD AUTO: 0 K/UL — SIGNIFICANT CHANGE UP (ref 0–0.5)
EOSINOPHIL # BLD AUTO: 0.01 K/UL — SIGNIFICANT CHANGE UP (ref 0–0.5)
EOSINOPHIL NFR BLD AUTO: 0 % — SIGNIFICANT CHANGE UP (ref 0–6)
GAS PNL BLDA: SIGNIFICANT CHANGE UP
GAS PNL BLDA: SIGNIFICANT CHANGE UP
GAS PNL BLDV: 139 MMOL/L — SIGNIFICANT CHANGE UP (ref 136–145)
GAS PNL BLDV: SIGNIFICANT CHANGE UP
GAS PNL BLDV: SIGNIFICANT CHANGE UP
GIANT PLATELETS BLD QL SMEAR: PRESENT — SIGNIFICANT CHANGE UP
GIANT PLATELETS BLD QL SMEAR: PRESENT — SIGNIFICANT CHANGE UP
GLUCOSE BLDC GLUCOMTR-MCNC: 112 MG/DL — HIGH (ref 70–99)
GLUCOSE BLDC GLUCOMTR-MCNC: 138 MG/DL — HIGH (ref 70–99)
GLUCOSE BLDV-MCNC: 151 MG/DL — HIGH (ref 70–99)
GLUCOSE SERPL-MCNC: 132 MG/DL — HIGH (ref 70–99)
GLUCOSE SERPL-MCNC: 142 MG/DL — HIGH (ref 70–99)
GLUCOSE SERPL-MCNC: 151 MG/DL — HIGH (ref 70–99)
HCO3 BLDV-SCNC: 27 MMOL/L — SIGNIFICANT CHANGE UP (ref 22–29)
HCT VFR BLD CALC: 23.1 % — LOW (ref 34.5–45)
HCT VFR BLD CALC: 24.4 % — LOW (ref 34.5–45)
HCT VFR BLD CALC: 24.9 % — LOW (ref 34.5–45)
HCT VFR BLDA CALC: 25 % — LOW (ref 34.5–46.5)
HGB BLD CALC-MCNC: 8.3 G/DL — LOW (ref 11.7–16.1)
HGB BLD-MCNC: 7.7 G/DL — LOW (ref 11.5–15.5)
HGB BLD-MCNC: 7.7 G/DL — LOW (ref 11.5–15.5)
HGB BLD-MCNC: 7.9 G/DL — LOW (ref 11.5–15.5)
HOROWITZ INDEX BLDV+IHG-RTO: SIGNIFICANT CHANGE UP
IMM GRANULOCYTES NFR BLD AUTO: 0.5 % — SIGNIFICANT CHANGE UP (ref 0–0.9)
INR BLD: 1.21 RATIO — HIGH (ref 0.85–1.18)
LACTATE BLDV-MCNC: 1.2 MMOL/L — SIGNIFICANT CHANGE UP (ref 0.5–2)
LG PLATELETS BLD QL AUTO: SLIGHT — SIGNIFICANT CHANGE UP
LYMPHOCYTES # BLD AUTO: 67.08 K/UL — HIGH (ref 1–3.3)
LYMPHOCYTES # BLD AUTO: 77.81 K/UL — HIGH (ref 1–3.3)
LYMPHOCYTES # BLD AUTO: 80.9 % — HIGH (ref 13–44)
LYMPHOCYTES # BLD AUTO: 81.9 % — HIGH (ref 13–44)
LYMPHOCYTES # BLD AUTO: 91 % — HIGH (ref 13–44)
LYMPHOCYTES # BLD AUTO: 98.51 K/UL — HIGH (ref 1–3.3)
MACROCYTES BLD QL: SLIGHT — SIGNIFICANT CHANGE UP
MAGNESIUM SERPL-MCNC: 2.7 MG/DL — HIGH (ref 1.6–2.6)
MANUAL SMEAR VERIFICATION: SIGNIFICANT CHANGE UP
MANUAL SMEAR VERIFICATION: SIGNIFICANT CHANGE UP
MCHC RBC-ENTMCNC: 27.8 PG — SIGNIFICANT CHANGE UP (ref 27–34)
MCHC RBC-ENTMCNC: 28 PG — SIGNIFICANT CHANGE UP (ref 27–34)
MCHC RBC-ENTMCNC: 28.7 PG — SIGNIFICANT CHANGE UP (ref 27–34)
MCHC RBC-ENTMCNC: 31.6 GM/DL — LOW (ref 32–36)
MCHC RBC-ENTMCNC: 31.7 GM/DL — LOW (ref 32–36)
MCHC RBC-ENTMCNC: 33.3 GM/DL — SIGNIFICANT CHANGE UP (ref 32–36)
MCV RBC AUTO: 86.2 FL — SIGNIFICANT CHANGE UP (ref 80–100)
MCV RBC AUTO: 87.7 FL — SIGNIFICANT CHANGE UP (ref 80–100)
MCV RBC AUTO: 88.7 FL — SIGNIFICANT CHANGE UP (ref 80–100)
METHOD TYPE: SIGNIFICANT CHANGE UP
MICROCYTES BLD QL: SLIGHT — SIGNIFICANT CHANGE UP
MONOCYTES # BLD AUTO: 2.17 K/UL — HIGH (ref 0–0.9)
MONOCYTES # BLD AUTO: 2.5 K/UL — HIGH (ref 0–0.9)
MONOCYTES # BLD AUTO: 3.28 K/UL — HIGH (ref 0–0.9)
MONOCYTES NFR BLD AUTO: 2 % — SIGNIFICANT CHANGE UP (ref 2–14)
MONOCYTES NFR BLD AUTO: 2.6 % — SIGNIFICANT CHANGE UP (ref 2–14)
MONOCYTES NFR BLD AUTO: 4 % — SIGNIFICANT CHANGE UP (ref 2–14)
NEUTROPHILS # BLD AUTO: 11.04 K/UL — HIGH (ref 1.8–7.4)
NEUTROPHILS # BLD AUTO: 15.87 K/UL — HIGH (ref 1.8–7.4)
NEUTROPHILS # BLD AUTO: 7.58 K/UL — HIGH (ref 1.8–7.4)
NEUTROPHILS NFR BLD AUTO: 13.5 % — LOW (ref 43–77)
NEUTROPHILS NFR BLD AUTO: 16.5 % — LOW (ref 43–77)
NEUTROPHILS NFR BLD AUTO: 7 % — LOW (ref 43–77)
NRBC # BLD: 0 /100 WBCS — SIGNIFICANT CHANGE UP (ref 0–0)
NRBC # BLD: 0 /100 WBCS — SIGNIFICANT CHANGE UP (ref 0–0)
ORGANISM # SPEC MICROSCOPIC CNT: ABNORMAL
ORGANISM # SPEC MICROSCOPIC CNT: ABNORMAL
OVALOCYTES BLD QL SMEAR: SLIGHT — SIGNIFICANT CHANGE UP
PCO2 BLDV: 40 MMHG — SIGNIFICANT CHANGE UP (ref 39–42)
PH BLDV: 7.44 — HIGH (ref 7.32–7.43)
PHOSPHATE SERPL-MCNC: 3.8 MG/DL — SIGNIFICANT CHANGE UP (ref 2.5–4.5)
PLAT MORPH BLD: ABNORMAL
PLAT MORPH BLD: ABNORMAL
PLATELET # BLD AUTO: 123 K/UL — LOW (ref 150–400)
PLATELET # BLD AUTO: 144 K/UL — LOW (ref 150–400)
PLATELET # BLD AUTO: 148 K/UL — LOW (ref 150–400)
PO2 BLDV: 34 MMHG — SIGNIFICANT CHANGE UP (ref 25–45)
POLYCHROMASIA BLD QL SMEAR: SLIGHT — SIGNIFICANT CHANGE UP
POTASSIUM BLDV-SCNC: 3.8 MMOL/L — SIGNIFICANT CHANGE UP (ref 3.5–5.1)
POTASSIUM SERPL-MCNC: 3.5 MMOL/L — SIGNIFICANT CHANGE UP (ref 3.5–5.3)
POTASSIUM SERPL-MCNC: 4 MMOL/L — SIGNIFICANT CHANGE UP (ref 3.5–5.3)
POTASSIUM SERPL-MCNC: 4.3 MMOL/L — SIGNIFICANT CHANGE UP (ref 3.5–5.3)
POTASSIUM SERPL-SCNC: 3.5 MMOL/L — SIGNIFICANT CHANGE UP (ref 3.5–5.3)
POTASSIUM SERPL-SCNC: 4 MMOL/L — SIGNIFICANT CHANGE UP (ref 3.5–5.3)
POTASSIUM SERPL-SCNC: 4.3 MMOL/L — SIGNIFICANT CHANGE UP (ref 3.5–5.3)
PROT SERPL-MCNC: 5.2 G/DL — LOW (ref 6–8.3)
PROT SERPL-MCNC: 5.4 G/DL — LOW (ref 6–8.3)
PROT SERPL-MCNC: 5.6 G/DL — LOW (ref 6–8.3)
PROTHROM AB SERPL-ACNC: 13.2 SEC — HIGH (ref 9.5–13)
RBC # BLD: 2.68 M/UL — LOW (ref 3.8–5.2)
RBC # BLD: 2.75 M/UL — LOW (ref 3.8–5.2)
RBC # BLD: 2.84 M/UL — LOW (ref 3.8–5.2)
RBC # FLD: 15.8 % — HIGH (ref 10.3–14.5)
RBC # FLD: 15.9 % — HIGH (ref 10.3–14.5)
RBC # FLD: 16.1 % — HIGH (ref 10.3–14.5)
RBC BLD AUTO: ABNORMAL
RBC BLD AUTO: SIGNIFICANT CHANGE UP
SAO2 % BLDV: 61.3 % — LOW (ref 67–88)
SMUDGE CELLS # BLD: PRESENT — SIGNIFICANT CHANGE UP
SMUDGE CELLS # BLD: PRESENT — SIGNIFICANT CHANGE UP
SODIUM SERPL-SCNC: 139 MMOL/L — SIGNIFICANT CHANGE UP (ref 135–145)
SODIUM SERPL-SCNC: 140 MMOL/L — SIGNIFICANT CHANGE UP (ref 135–145)
SODIUM SERPL-SCNC: 142 MMOL/L — SIGNIFICANT CHANGE UP (ref 135–145)
SPECIMEN SOURCE: SIGNIFICANT CHANGE UP
SPECIMEN SOURCE: SIGNIFICANT CHANGE UP
STOMATOCYTES BLD QL SMEAR: SLIGHT — SIGNIFICANT CHANGE UP
URATE SERPL-MCNC: 6.2 MG/DL — SIGNIFICANT CHANGE UP (ref 2.5–7)
WBC # BLD: 108.25 K/UL — CRITICAL HIGH (ref 3.8–10.5)
WBC # BLD: 81.88 K/UL — CRITICAL HIGH (ref 3.8–10.5)
WBC # BLD: 96.18 K/UL — CRITICAL HIGH (ref 3.8–10.5)
WBC # FLD AUTO: 108.25 K/UL — CRITICAL HIGH (ref 3.8–10.5)
WBC # FLD AUTO: 81.88 K/UL — CRITICAL HIGH (ref 3.8–10.5)
WBC # FLD AUTO: 96.18 K/UL — CRITICAL HIGH (ref 3.8–10.5)

## 2024-05-22 PROCEDURE — 71275 CT ANGIOGRAPHY CHEST: CPT | Mod: 26

## 2024-05-22 PROCEDURE — 99291 CRITICAL CARE FIRST HOUR: CPT | Mod: GC

## 2024-05-22 PROCEDURE — 74177 CT ABD & PELVIS W/CONTRAST: CPT | Mod: 26

## 2024-05-22 RX ORDER — ACETAMINOPHEN 500 MG
1000 TABLET ORAL ONCE
Refills: 0 | Status: COMPLETED | OUTPATIENT
Start: 2024-05-22 | End: 2024-05-22

## 2024-05-22 RX ADMIN — SENNA PLUS 10 MILLILITER(S): 8.6 TABLET ORAL at 21:22

## 2024-05-22 RX ADMIN — CHLORHEXIDINE GLUCONATE 1 APPLICATION(S): 213 SOLUTION TOPICAL at 05:09

## 2024-05-22 RX ADMIN — PANTOPRAZOLE SODIUM 40 MILLIGRAM(S): 20 TABLET, DELAYED RELEASE ORAL at 05:08

## 2024-05-22 RX ADMIN — Medication 1.5 MILLIGRAM(S): at 22:12

## 2024-05-22 RX ADMIN — FENTANYL CITRATE 50 MICROGRAM(S): 50 INJECTION INTRAVENOUS at 00:20

## 2024-05-22 RX ADMIN — Medication 50 MILLIGRAM(S): at 12:09

## 2024-05-22 RX ADMIN — MIDODRINE HYDROCHLORIDE 30 MILLIGRAM(S): 2.5 TABLET ORAL at 05:08

## 2024-05-22 RX ADMIN — Medication 400 MILLIGRAM(S): at 08:02

## 2024-05-22 RX ADMIN — DEXMEDETOMIDINE HYDROCHLORIDE IN 0.9% SODIUM CHLORIDE 1.36 MICROGRAM(S)/KG/HR: 4 INJECTION INTRAVENOUS at 20:22

## 2024-05-22 RX ADMIN — PIPERACILLIN AND TAZOBACTAM 25 GRAM(S): 4; .5 INJECTION, POWDER, LYOPHILIZED, FOR SOLUTION INTRAVENOUS at 21:22

## 2024-05-22 RX ADMIN — Medication 1000 MILLIGRAM(S): at 20:30

## 2024-05-22 RX ADMIN — FENTANYL CITRATE 50 MICROGRAM(S): 50 INJECTION INTRAVENOUS at 00:05

## 2024-05-22 RX ADMIN — Medication 50 MILLIGRAM(S): at 17:22

## 2024-05-22 RX ADMIN — Medication 400 MILLIGRAM(S): at 20:00

## 2024-05-22 RX ADMIN — Medication 50 MILLIGRAM(S): at 05:08

## 2024-05-22 RX ADMIN — Medication 50 MILLIGRAM(S): at 01:54

## 2024-05-22 RX ADMIN — PIPERACILLIN AND TAZOBACTAM 25 GRAM(S): 4; .5 INJECTION, POWDER, LYOPHILIZED, FOR SOLUTION INTRAVENOUS at 13:17

## 2024-05-22 RX ADMIN — PIPERACILLIN AND TAZOBACTAM 25 GRAM(S): 4; .5 INJECTION, POWDER, LYOPHILIZED, FOR SOLUTION INTRAVENOUS at 05:08

## 2024-05-22 RX ADMIN — PANTOPRAZOLE SODIUM 40 MILLIGRAM(S): 20 TABLET, DELAYED RELEASE ORAL at 17:22

## 2024-05-22 RX ADMIN — Medication 11.4 MICROGRAM(S)/KG/MIN: at 20:00

## 2024-05-22 RX ADMIN — QUETIAPINE FUMARATE 200 MILLIGRAM(S): 200 TABLET, FILM COATED ORAL at 20:28

## 2024-05-22 RX ADMIN — CHLORHEXIDINE GLUCONATE 1 APPLICATION(S): 213 SOLUTION TOPICAL at 17:22

## 2024-05-22 RX ADMIN — MIDODRINE HYDROCHLORIDE 30 MILLIGRAM(S): 2.5 TABLET ORAL at 21:22

## 2024-05-22 NOTE — PROGRESS NOTE ADULT - SUBJECTIVE AND OBJECTIVE BOX
INTERVAL HPI/OVERNIGHT EVENTS:    SUBJECTIVE: Patient seen and examined at bedside.     ROS: All negative except as listed above.    VITAL SIGNS:  ICU Vital Signs Last 24 Hrs  T(C): 37.9 (22 May 2024 04:00), Max: 38.3 (21 May 2024 20:00)  T(F): 100.2 (22 May 2024 04:00), Max: 100.9 (21 May 2024 20:00)  HR: 53 (22 May 2024 07:30) (45 - 124)  BP: --  BP(mean): --  ABP: 148/51 (22 May 2024 07:30) (75/40 - 160/55)  ABP(mean): 83 (22 May 2024 07:30) (55 - 101)  RR: 18 (22 May 2024 07:30) (10 - 30)  SpO2: 100% (22 May 2024 07:30) (98% - 100%)    O2 Parameters below as of 21 May 2024 20:00  Patient On (Oxygen Delivery Method): ventilator    O2 Concentration (%): 30      Mode: AC/ CMV (Assist Control/ Continuous Mandatory Ventilation), RR (machine): 18, TV (machine): 460, FiO2: 30, PEEP: 8, ITime: 1, MAP: 13, PIP: 30  Plateau pressure:   P/F ratio:     05-21 @ 07:01  -  05-22 @ 07:00  --------------------------------------------------------  IN: 2649.3 mL / OUT: 1360 mL / NET: 1289.3 mL      CAPILLARY BLOOD GLUCOSE      POCT Blood Glucose.: 138 mg/dL (21 May 2024 18:39)      ECG: reviewed.    PHYSICAL EXAM:    GENERAL: NAD, lying in bed comfortably  HEAD:  Atraumatic, normocephalic  EYES: EOMI, PERRLA, conjunctiva and sclera clear  NECK: Supple, trachea midline, no JVD  HEART: Regular rate and rhythm, no murmurs, rubs, or gallops  LUNGS: Unlabored respirations.  Clear to auscultation bilaterally, no crackles, wheezing, or rhonchi  ABDOMEN: Soft, nontender, nondistended, +BS  EXTREMITIES: 2+ peripheral pulses bilaterally, cap refill<2 secs. No clubbing, cyanosis, or edema  NERVOUS SYSTEM:  A&Ox3, following commands, moving all extremities, no focal deficits   SKIN: No rashes or lesions    MEDICATIONS:  MEDICATIONS  (STANDING):  chlorhexidine 4% Liquid 1 Application(s) Topical every 12 hours  dexMEDEtomidine Infusion 0.05 MICROgram(s)/kG/Hr (1.36 mL/Hr) IV Continuous <Continuous>  hydrocortisone sodium succinate Injectable 50 milliGRAM(s) IV Push every 6 hours  midodrine 30 milliGRAM(s) Oral three times a day  norepinephrine Infusion 0.05 MICROgram(s)/kG/Min (11.4 mL/Hr) IV Continuous <Continuous>  pantoprazole  Injectable 40 milliGRAM(s) IV Push two times a day  piperacillin/tazobactam IVPB.. 3.375 Gram(s) IV Intermittent every 8 hours  QUEtiapine 200 milliGRAM(s) Oral <User Schedule>  senna Syrup 10 milliLiter(s) Oral at bedtime    MEDICATIONS  (PRN):  clonazePAM  Tablet 1.5 milliGRAM(s) Oral every 12 hours PRN agitation/anxiety  nystatin Powder 1 Application(s) Topical two times a day PRN skin irritation  QUEtiapine 25 milliGRAM(s) Oral every 6 hours PRN agitation  sodium chloride 0.9% lock flush 10 milliLiter(s) IV Push every 1 hour PRN Pre/post blood products, medications, blood draw, and to maintain line patency      ALLERGIES:  Allergies    No Known Allergies    Intolerances        LABS:                        7.7    81.88 )-----------( 123      ( 22 May 2024 01:57 )             23.1     05-22    139  |  105  |  29<H>  ----------------------------<  151<H>  4.3   |  24  |  0.69    Ca    7.6<L>      22 May 2024 01:57  Phos  3.8     05-22  Mg     2.7     05-22    TPro  5.2<L>  /  Alb  2.8<L>  /  TBili  0.4  /  DBili  x   /  AST  69<H>  /  ALT  31  /  AlkPhos  58  05-22    PT/INR - ( 22 May 2024 01:57 )   PT: 13.2 sec;   INR: 1.21 ratio         PTT - ( 22 May 2024 01:57 )  PTT:25.7 sec  Urinalysis Basic - ( 22 May 2024 01:57 )    Color: x / Appearance: x / SG: x / pH: x  Gluc: 151 mg/dL / Ketone: x  / Bili: x / Urobili: x   Blood: x / Protein: x / Nitrite: x   Leuk Esterase: x / RBC: x / WBC x   Sq Epi: x / Non Sq Epi: x / Bacteria: x      ABG:  pH, Arterial: 7.49 (05-22-24 @ 01:55)  pCO2, Arterial: 35 mmHg (05-22-24 @ 01:55)  pO2, Arterial: 99 mmHg (05-22-24 @ 01:55)      vBG:    Micro:    Culture - Blood (collected 05-20-24 @ 06:36)  Source: .Blood Blood-Peripheral  Preliminary Report (05-21-24 @ 11:01):    No growth at 24 hours    Culture - Blood (collected 05-20-24 @ 06:36)  Source: .Blood Blood-Peripheral  Preliminary Report (05-21-24 @ 11:01):    No growth at 24 hours    Culture - Blood (collected 05-18-24 @ 10:47)  Source: .Blood Blood-Venous  Preliminary Report (05-21-24 @ 16:01):    No growth at 72 Hours    Culture - Blood (collected 05-18-24 @ 06:50)  Source: .Blood Blood-Peripheral  Preliminary Report (05-21-24 @ 10:01):    No growth at 72 Hours    Culture - Blood (collected 05-11-24 @ 07:11)  Source: .Blood Blood-Peripheral  Final Report (05-16-24 @ 11:00):    No growth at 5 days    Culture - Blood (collected 05-10-24 @ 15:00)  Source: .Blood Blood-Peripheral  Final Report (05-15-24 @ 22:00):    No growth at 5 days    Culture - Blood (collected 05-06-24 @ 09:33)  Source: .Blood Blood-Peripheral  Final Report (05-11-24 @ 16:01):    No growth at 5 days    Culture - Blood (collected 05-06-24 @ 09:25)  Source: .Blood Blood-Peripheral  Final Report (05-11-24 @ 16:01):    No growth at 5 days    Culture - Blood (collected 05-03-24 @ 08:30)  Source: .Blood Blood-Peripheral  Final Report (05-08-24 @ 16:00):    No growth at 5 days    Culture - Blood (collected 05-03-24 @ 08:15)  Source: .Blood Blood-Peripheral  Final Report (05-08-24 @ 16:00):    No growth at 5 days    Culture - Blood (collected 04-29-24 @ 14:00)  Source: .Blood Blood  Final Report (05-04-24 @ 17:01):    No growth at 5 days    Culture - Blood (collected 04-29-24 @ 13:50)  Source: .Blood Blood  Final Report (05-04-24 @ 17:01):    No growth at 5 days    Culture - Blood (collected 04-26-24 @ 13:30)  Source: .Blood Blood-Peripheral  Final Report (05-01-24 @ 18:00):    No growth at 5 days    Culture - Blood (collected 04-26-24 @ 13:21)  Source: .Blood Blood-Venous  Final Report (05-01-24 @ 18:00):    No growth at 5 days    Culture - Blood (collected 04-24-24 @ 05:53)  Source: .Blood Blood  Final Report (04-29-24 @ 10:01):    No growth at 5 days    Culture - Blood (collected 04-24-24 @ 05:53)  Source: .Blood Blood  Final Report (04-29-24 @ 10:01):    No growth at 5 days    Culture - Blood (collected 04-22-24 @ 21:37)  Source: .Blood Blood-Peripheral  Gram Stain (04-24-24 @ 00:09):    Growth in aerobic bottle: Gram Positive Cocci in Clusters  Final Report (04-24-24 @ 23:16):    Growth in aerobic bottle: Staphylococcus epidermidis    Isolation of Coagulase negative Staphylococcus from single blood culture    sets may represent    contamination. Contact the Microbiology Department at [phone] if    susceptibility testing is    clinically indicated.    Direct identification is available within approximately 3-5    hours either by Blood Panel Multiplexed PCR or Direct    MALDI-TOF. Details: [email or web address]  Organism: Blood Culture PCR (04-24-24 @ 23:16)  Organism: Blood Culture PCR (04-24-24 @ 23:16)      Method Type: PCR      -  Staphylococcus epidermidis, Methicillin resistant: Detec    Culture - Blood (collected 04-22-24 @ 19:30)  Source: .Blood Blood-Peripheral  Final Report (04-28-24 @ 01:00):    No growth at 5 days        Culture - Sputum (collected 05-10-24 @ 21:49)  Source: Trach Asp Tracheal Aspirate  Gram Stain (05-11-24 @ 05:58):    Few polymorphonuclear leukocytes per low power field    Rare Squamous epithelial cells per low power field    No organisms seen per oil power field  Final Report (05-12-24 @ 11:42):    No growth    Culture - Sputum (collected 05-06-24 @ 00:37)  Source: .Sputum Sputum  Gram Stain (05-06-24 @ 11:48):    Moderate polymorphonuclear leukocytes per low power field    No Squamous epithelial cells per low power field    No organisms seen per oil power field  Final Report (05-07-24 @ 15:46):    Normal Respiratory Kayla present    Culture - Sputum (collected 04-26-24 @ 13:44)  Source: .Sputum Sputum  Gram Stain (04-26-24 @ 20:33):    Rare polymorphonuclear leukocytes per low power field    No Squamous epithelial cells per low power field    No organisms seen per oil power field  Final Report (04-28-24 @ 08:42):    No growth        RADIOLOGY & ADDITIONAL TESTS: Reviewed.   INTERVAL HPI/OVERNIGHT EVENTS:    SUBJECTIVE: Patient seen and examined at bedside.     ROS: All negative except as listed above.    VITAL SIGNS:  ICU Vital Signs Last 24 Hrs  T(C): 37.9 (22 May 2024 04:00), Max: 38.3 (21 May 2024 20:00)  T(F): 100.2 (22 May 2024 04:00), Max: 100.9 (21 May 2024 20:00)  HR: 53 (22 May 2024 07:30) (45 - 124)  BP: --  BP(mean): --  ABP: 148/51 (22 May 2024 07:30) (75/40 - 160/55)  ABP(mean): 83 (22 May 2024 07:30) (55 - 101)  RR: 18 (22 May 2024 07:30) (10 - 30)  SpO2: 100% (22 May 2024 07:30) (98% - 100%)    O2 Parameters below as of 21 May 2024 20:00  Patient On (Oxygen Delivery Method): ventilator    O2 Concentration (%): 30      Mode: AC/ CMV (Assist Control/ Continuous Mandatory Ventilation), RR (machine): 18, TV (machine): 460, FiO2: 30, PEEP: 8, ITime: 1, MAP: 13, PIP: 30  Plateau pressure:   P/F ratio:     05-21 @ 07:01  -  05-22 @ 07:00  --------------------------------------------------------  IN: 2649.3 mL / OUT: 1360 mL / NET: 1289.3 mL      CAPILLARY BLOOD GLUCOSE      POCT Blood Glucose.: 138 mg/dL (21 May 2024 18:39)      ECG: reviewed.    PHYSICAL EXAM:    GENERAL: NAD, lying in bed comfortably  HEAD:  Atraumatic, normocephalic  EYES: EOMI, PERRLA, conjunctiva and sclera clear  NECK: trach noted  HEART: Regular rate and rhythm, no murmurs, rubs, or gallops  LUNGS: Unlabored respirations.  Clear to auscultation bilaterally, no crackles, wheezing, or rhonchi  ABDOMEN: Soft, nontender, nondistended, +BS  EXTREMITIES: non palpable b/l DP, PT pulses  NERVOUS SYSTEM: Alert, Awake Oriented X3, follows commands  SKIN: No rashes or lesions    MEDICATIONS:  MEDICATIONS  (STANDING):  chlorhexidine 4% Liquid 1 Application(s) Topical every 12 hours  dexMEDEtomidine Infusion 0.05 MICROgram(s)/kG/Hr (1.36 mL/Hr) IV Continuous <Continuous>  hydrocortisone sodium succinate Injectable 50 milliGRAM(s) IV Push every 6 hours  midodrine 30 milliGRAM(s) Oral three times a day  norepinephrine Infusion 0.05 MICROgram(s)/kG/Min (11.4 mL/Hr) IV Continuous <Continuous>  pantoprazole  Injectable 40 milliGRAM(s) IV Push two times a day  piperacillin/tazobactam IVPB.. 3.375 Gram(s) IV Intermittent every 8 hours  QUEtiapine 200 milliGRAM(s) Oral <User Schedule>  senna Syrup 10 milliLiter(s) Oral at bedtime    MEDICATIONS  (PRN):  clonazePAM  Tablet 1.5 milliGRAM(s) Oral every 12 hours PRN agitation/anxiety  nystatin Powder 1 Application(s) Topical two times a day PRN skin irritation  QUEtiapine 25 milliGRAM(s) Oral every 6 hours PRN agitation  sodium chloride 0.9% lock flush 10 milliLiter(s) IV Push every 1 hour PRN Pre/post blood products, medications, blood draw, and to maintain line patency      ALLERGIES:  Allergies    No Known Allergies    Intolerances        LABS:                        7.7    81.88 )-----------( 123      ( 22 May 2024 01:57 )             23.1     05-22    139  |  105  |  29<H>  ----------------------------<  151<H>  4.3   |  24  |  0.69    Ca    7.6<L>      22 May 2024 01:57  Phos  3.8     05-22  Mg     2.7     05-22    TPro  5.2<L>  /  Alb  2.8<L>  /  TBili  0.4  /  DBili  x   /  AST  69<H>  /  ALT  31  /  AlkPhos  58  05-22    PT/INR - ( 22 May 2024 01:57 )   PT: 13.2 sec;   INR: 1.21 ratio         PTT - ( 22 May 2024 01:57 )  PTT:25.7 sec  Urinalysis Basic - ( 22 May 2024 01:57 )    Color: x / Appearance: x / SG: x / pH: x  Gluc: 151 mg/dL / Ketone: x  / Bili: x / Urobili: x   Blood: x / Protein: x / Nitrite: x   Leuk Esterase: x / RBC: x / WBC x   Sq Epi: x / Non Sq Epi: x / Bacteria: x      ABG:  pH, Arterial: 7.49 (05-22-24 @ 01:55)  pCO2, Arterial: 35 mmHg (05-22-24 @ 01:55)  pO2, Arterial: 99 mmHg (05-22-24 @ 01:55)      vBG:    Micro:    Culture - Blood (collected 05-20-24 @ 06:36)  Source: .Blood Blood-Peripheral  Preliminary Report (05-21-24 @ 11:01):    No growth at 24 hours    Culture - Blood (collected 05-20-24 @ 06:36)  Source: .Blood Blood-Peripheral  Preliminary Report (05-21-24 @ 11:01):    No growth at 24 hours    Culture - Blood (collected 05-18-24 @ 10:47)  Source: .Blood Blood-Venous  Preliminary Report (05-21-24 @ 16:01):    No growth at 72 Hours    Culture - Blood (collected 05-18-24 @ 06:50)  Source: .Blood Blood-Peripheral  Preliminary Report (05-21-24 @ 10:01):    No growth at 72 Hours    Culture - Blood (collected 05-11-24 @ 07:11)  Source: .Blood Blood-Peripheral  Final Report (05-16-24 @ 11:00):    No growth at 5 days    Culture - Blood (collected 05-10-24 @ 15:00)  Source: .Blood Blood-Peripheral  Final Report (05-15-24 @ 22:00):    No growth at 5 days    Culture - Blood (collected 05-06-24 @ 09:33)  Source: .Blood Blood-Peripheral  Final Report (05-11-24 @ 16:01):    No growth at 5 days    Culture - Blood (collected 05-06-24 @ 09:25)  Source: .Blood Blood-Peripheral  Final Report (05-11-24 @ 16:01):    No growth at 5 days    Culture - Blood (collected 05-03-24 @ 08:30)  Source: .Blood Blood-Peripheral  Final Report (05-08-24 @ 16:00):    No growth at 5 days    Culture - Blood (collected 05-03-24 @ 08:15)  Source: .Blood Blood-Peripheral  Final Report (05-08-24 @ 16:00):    No growth at 5 days    Culture - Blood (collected 04-29-24 @ 14:00)  Source: .Blood Blood  Final Report (05-04-24 @ 17:01):    No growth at 5 days    Culture - Blood (collected 04-29-24 @ 13:50)  Source: .Blood Blood  Final Report (05-04-24 @ 17:01):    No growth at 5 days    Culture - Blood (collected 04-26-24 @ 13:30)  Source: .Blood Blood-Peripheral  Final Report (05-01-24 @ 18:00):    No growth at 5 days    Culture - Blood (collected 04-26-24 @ 13:21)  Source: .Blood Blood-Venous  Final Report (05-01-24 @ 18:00):    No growth at 5 days    Culture - Blood (collected 04-24-24 @ 05:53)  Source: .Blood Blood  Final Report (04-29-24 @ 10:01):    No growth at 5 days    Culture - Blood (collected 04-24-24 @ 05:53)  Source: .Blood Blood  Final Report (04-29-24 @ 10:01):    No growth at 5 days    Culture - Blood (collected 04-22-24 @ 21:37)  Source: .Blood Blood-Peripheral  Gram Stain (04-24-24 @ 00:09):    Growth in aerobic bottle: Gram Positive Cocci in Clusters  Final Report (04-24-24 @ 23:16):    Growth in aerobic bottle: Staphylococcus epidermidis    Isolation of Coagulase negative Staphylococcus from single blood culture    sets may represent    contamination. Contact the Microbiology Department at [phone] if    susceptibility testing is    clinically indicated.    Direct identification is available within approximately 3-5    hours either by Blood Panel Multiplexed PCR or Direct    MALDI-TOF. Details: [email or web address]  Organism: Blood Culture PCR (04-24-24 @ 23:16)  Organism: Blood Culture PCR (04-24-24 @ 23:16)      Method Type: PCR      -  Staphylococcus epidermidis, Methicillin resistant: Detec    Culture - Blood (collected 04-22-24 @ 19:30)  Source: .Blood Blood-Peripheral  Final Report (04-28-24 @ 01:00):    No growth at 5 days        Culture - Sputum (collected 05-10-24 @ 21:49)  Source: Trach Asp Tracheal Aspirate  Gram Stain (05-11-24 @ 05:58):    Few polymorphonuclear leukocytes per low power field    Rare Squamous epithelial cells per low power field    No organisms seen per oil power field  Final Report (05-12-24 @ 11:42):    No growth    Culture - Sputum (collected 05-06-24 @ 00:37)  Source: .Sputum Sputum  Gram Stain (05-06-24 @ 11:48):    Moderate polymorphonuclear leukocytes per low power field    No Squamous epithelial cells per low power field    No organisms seen per oil power field  Final Report (05-07-24 @ 15:46):    Normal Respiratory Kayla present    Culture - Sputum (collected 04-26-24 @ 13:44)  Source: .Sputum Sputum  Gram Stain (04-26-24 @ 20:33):    Rare polymorphonuclear leukocytes per low power field    No Squamous epithelial cells per low power field    No organisms seen per oil power field  Final Report (04-28-24 @ 08:42):    No growth        RADIOLOGY & ADDITIONAL TESTS: Reviewed.

## 2024-05-22 NOTE — PROGRESS NOTE ADULT - ASSESSMENT
· Assessment	  Ms Gordon is a 69F h/o CVA (bedbound at baseline), COPD, CHF, HTN presenting as transfer from [facility] for SOB iso leukocytosis to 233 c/f acute leukemia. Pt intubated and on pressors, transferred to MICU for further management, course c/b empyema s/p chest tube placement (4/16) and removal, reaccumulation of loculated pleural effusion s/p L pigtail placement and removal on 4/27. S/p EGD/PEG with GI, transferred to RCU on 5/9. Readmitted to MICU for septic vs hemorrhagic shock.     PLAN  =====Neurologic/Psych=====  #At Baseline, AOx4 mental status   #Anxiety, Depression   #Agitation   - L upper and lower extremity motor deficits iso hx of stroke   - Pain control w/ Tylenol  - Seroquel 200mg qhs, seroquel 25mg po q6hr prn agitation.    - Klonopin 1.5MG BID  - 5/18 ECG: NSR with PACs, .  -Psych following appreciate recs   -Wean precedex as tolerated    =====Pulmonary=====  #Acute hypoxic respiratory failure  #Empyema   #Pansensitive Strep Pneumo  - S/p thora draining 1500cc fluid 4/16 with chest tube placement; c/w exudative effusion growing Strep Pneumo  - Was on vanc/zosyn/azithro (4/16)-->deescalated to CTX (4/17 -4/23), Added on Vancomycin (4/23-) due to MRSE 1/2 Cx, expanded coverage to Cefepime (4/23 1x dose), back on CTX, switched to Unasyn 3g Q6 (4/25-5/8), Augmentin (5/9--5/19 stopped as switched to zosyn as below)  - S/p Trach placement 4/28  -Aggressive airway management with Duoneb, chest PT and suctioning PRN.  -Trach Vent Settings: AC/CMV,  RR 14 Fio2 40, Peep 5  -Bedside Pocus 5/20: right sided lobar pneumonia      =====Cardiovascular=====  #Septic vs Hemorrhagic Shock  #Hypotension  Eval for TLS vs RP bleed once stable  - 2/2 Strep Pneumo Empyema and Bacteremia s/p Pigtail catheter   - C/w Midodrine to 30 TID   - s/p Solu-cortef 50 mg IV qd 5/6-5/8;   - MAP goal > 60   - Hematology following appreciate recs  - Obtain central access, transfuse goal >7  -5/21: weaned pt down to 1 pressor (levo)    #CHF  - Unclear hx but elevated pro-BNP to 2600s on admission  - TTE: EF 54%, no significant abnormalities  - Diurese as needed;    =====GI=====  #GERD  - Protonix 40mg IV BID    #Diet  - NPO except meds via PEG    =====Renal/=====  #Electrolytes  - Maintain K>4, Phos>3, Mag>2, iCal>1    =====Endocrine=====  - ISS q6h while receiving Tube feeds    =====Infectious Disease=====  #Empyema 2/2 Pansensitive Strep Pneumo, resolved  #Strep Pneumo Bacteremia, resolved  #New Fevers/Sepsis  -Repeat blood and urine culture pending  -C/w Zosyn q8hrs + 1g Vanc X1(Hospital acquired pneumonia) + 1g vanc X1, check vanc level  -Consider repeat ID Consult    =====Heme/Onc=====  #Leukocytosis  #CLL  -  on presentation; stable at 101K  - No plan for leukophoresis at this time given mature lymphocytes per heme  - CT Chest A&P showing diffuse axillary, inguinal mediastinal RP lymphadenopathy.  - Appreciate Heme recs; can plan for bone marrow bx w/ IR if in line with GOC    #L Common Femoral Vein DVT, non occlusive    - 4/24 BL LE doppler - nonocclusive left common femoral vein DVT  - 4/24 BL UE doppler - acute left basilic SVT, no acute DVT  - Hold AC iso of acute anemia   - Repeat bilateral DVT studies pending  -Arterial duplex pending    =====Ethics=====  FULL CODE  Only known family member is Aunt Mili Gordon ([phone], [phone]) who lives in [place] has been involved in care of patient  Palliative care following        · Assessment	  Ms Gordon is a 69F h/o CVA (bedbound at baseline), COPD, CHF, HTN presenting as transfer from [facility] for SOB iso leukocytosis to 233 c/f acute leukemia. Pt intubated and on pressors, transferred to MICU for further management, course c/b empyema s/p chest tube placement (4/16) and removal, reaccumulation of loculated pleural effusion s/p L pigtail placement and removal on 4/27. S/p EGD/PEG with GI, transferred to RCU on 5/9. Readmitted to MICU for septic vs hemorrhagic shock.     PLAN  =====Neurologic/Psych=====  #At Baseline, AOx4 mental status   #Anxiety, Depression   #Agitation   - L upper and lower extremity motor deficits iso hx of stroke   - Pain control w/ Tylenol  - Seroquel 200mg qhs, seroquel 25mg po q6hr prn agitation.    - Klonopin 1.5MG BID  - 5/18 ECG: NSR with PACs, .  -Psych following appreciate recs   -Wean precedex as tolerated, 5/22: dex 0.2    =====Pulmonary=====  #Acute hypoxic respiratory failure  #Empyema   #Pansensitive Strep Pneumo  - S/p thora draining 1500cc fluid 4/16 with chest tube placement; c/w exudative effusion growing Strep Pneumo  - Was on vanc/zosyn/azithro (4/16)-->deescalated to CTX (4/17 -4/23), Added on Vancomycin (4/23-) due to MRSE 1/2 Cx, expanded coverage to Cefepime (4/23 1x dose), back on CTX, switched to Unasyn 3g Q6 (4/25-5/8), Augmentin (5/9--5/19 stopped as switched to zosyn as below)  - S/p Trach placement 4/28  -Aggressive airway management with Duoneb, chest PT and suctioning PRN.  -Trach Vent Settings: AC/CMV,  RR 14 Fio2 40, Peep 5  -Bedside Pocus 5/20: right sided lobar pneumonia      =====Cardiovascular=====  #Septic vs Hemorrhagic Shock  #Hypotension  Eval for TLS vs RP bleed once stable  - 2/2 Strep Pneumo Empyema and Bacteremia s/p Pigtail catheter   - C/w Midodrine to 30 TID   - s/p Solu-cortef 50 mg IV qd 5/6-5/8;   - MAP goal > 60   - Hematology following appreciate recs  - Obtain central access, transfuse goal >7  -5/21: weaned pt down to 1 pressor (levo)   -5/22: norepi 0.02    #CHF  - Unclear hx but elevated pro-BNP to 2600s on admission  - TTE: EF 54%, no significant abnormalities  - Diurese as needed;    =====GI=====  #GERD  - Protonix 40mg IV BID    #Diet  - NPO except meds via PEG    =====Renal/=====  #Electrolytes  - Maintain K>4, Phos>3, Mag>2, iCal>1    =====Endocrine=====  - ISS q6h while receiving Tube feeds    =====Infectious Disease=====  #Empyema 2/2 Pansensitive Strep Pneumo, resolved  #Strep Pneumo Bacteremia, resolved  #New Fevers/Sepsis  -Repeat blood and urine culture pending  -C/w Zosyn q8hrs + 1g Vanc X1(Hospital acquired pneumonia) + 1g vanc X1, check vanc level  -Consider repeat ID Consult    =====Heme/Onc=====  #Leukocytosis  #CLL  -  on presentation; downtrending to 96.18 on 5/22  - No plan for leukophoresis at this time given mature lymphocytes per heme  - CT Chest A&P showing diffuse axillary, inguinal mediastinal RP lymphadenopathy.  - Appreciate Heme recs; can plan for bone marrow bx w/ IR if in line with GOC    #L Common Femoral Vein DVT, non occlusive    - 4/24 BL LE doppler - nonocclusive left common femoral vein DVT  - 4/24 BL UE doppler - acute left basilic SVT, no acute DVT  - Hold AC iso of acute anemia   - b/l LLE vein duplex: L. common femoral DVT resolved. No acute DVT  -b/l LLE arterial duplex: Stenosis of r. superficial aa. in distal thigh. Stenosis of distal external iliac aa. + common femoral aa. + L. superficial and L. anterior tibial.   -Vascular consult pending to arterial duplex findings    #Hematoma:  -CTA C/A/P: No active bleeding within a large multilobed heterogenous collection extending from R. lower neck into the right axilla and along right lateral chest wall c/w hematoma.   -Pending gen surg recs for hematoma     =====Ethics=====  FULL CODE  Only known family member is Aunt Mili Gordon ([phone], [phone]) who lives in [place] has been involved in care of patient  Palliative care following     Lines/Tubes/Drains: R. femoral central line, R upper arm powerglide, L. A line, Andino

## 2024-05-22 NOTE — CHART NOTE - NSCHARTNOTEFT_GEN_A_CORE
NUTRITION FOLLOW UP NOTE    PATIENT SEEN FOR: length of stay follow up.     SOURCE: [] Patient  [x] Current Medical Record  [x] RN  [] Family/support person at bedside  [x] Patient unavailable/inappropriate  [x] Other: Interdisciplinary Rounds     CHART REVIEWED/EVENTS NOTED.  [] No changes to nutrition care plan to note  [x] Nutrition Status:  - s/p trach , placed back on full vent. On Precedex  - Septic vs Hemorrhagic Shock  - s/p PEG     DIET ORDER:   Diet, NPO:   Except Medications     Special Instructions for Nursing:  Except Medications (24 @ 00:25)    CURRENT DIET ORDER IS:  [] Appropriate:  [] Inadequate:  [x] Other: See recommendations below    NUTRITION INTAKE/PROVISION:  [] PO:  [x] Enteral Nutrition:   Ordered for Glucerna 1.5 goal 75 mL/hr x18 hrs ->; Glucerna 1.5 bolus 338 mL x4 daily ->  *Unknown energy intake given limited enteral documentation in flowsheet  [] Parenteral Nutrition:    ANTHROPOMETRICS:  Drug Dosing Weight  Height (cm): 172.7 (20 May 2024 00:42)  Weight (kg): 108.7 (20 May 2024 00:42)  BMI (kg/m2): 36.4 (20 May 2024 00:42)    Weights:   Daily Weight in k.6 (-), 110.6 (), 107.2 (), 106.8 (), 108.2 ()  RD obtained wt: 122.4 (-), 128.6 kg () ? accuracy  Drastic wt fluctuations possibly secondary to fluid shifts vs various bed scales used  RD will continue to trend as new wts available/able.     NUTRITIONALLY PERTINENT MEDICATIONS:  MEDICATIONS  (STANDING):  dexMEDEtomidine Infusion 0.05 MICROgram(s)/kG/Hr (1.36 mL/Hr) IV Continuous <Continuous>  hydrocortisone sodium succinate Injectable 50 milliGRAM(s) IV Push every 6 hours  midodrine 30 milliGRAM(s) Oral three times a day  norepinephrine Infusion 0.05 MICROgram(s)/kG/Min (11.4 mL/Hr) IV Continuous <Continuous>  pantoprazole  Injectable 40 milliGRAM(s) IV Push two times a day  piperacillin/tazobactam IVPB.. 3.375 Gram(s) IV Intermittent every 8 hours  QUEtiapine 200 milliGRAM(s) Oral <User Schedule>  senna Syrup 10 milliLiter(s) Oral at bedtime    NUTRITIONALLY PERTINENT LABS:   Na139 mmol/L Glu 151 mg/dL<H> K+ 4.3 mmol/L Cr  0.69 mg/dL BUN 29 mg/dL<H>  Phos 3.8 mg/dL  Alb 2.8 g/dL<L>    Chol 124 mg/dL   HDL 23 mg/dL<L> Trig 187 mg/dL<H> ALT 31 U/L AST 69 U/L<H> Alkaline Phosphatase 58 U/L    Finger Sticks:  POCT Blood Glucose.: 138 mg/dL ( @ 18:39)    NUTRITIONALLY PERTINENT MEDICATIONS/LABS:  [x] Reviewed  [x] Relevant notes on medications/labs: On steroid which can elevate BG    EDEMA:  [x] Reviewed  [x] Relevant notes per RN flowsheet: 2+ generalized    GI/ I&O:  [x] Reviewed  [] Relevant notes:  [] Other:    SKIN:   [] No pressure injuries documented, per nursing flowsheet  [x] Pressure injury previously noted; Suspected deep tissue injury right heel, Bilateral sacrum   [] Change in pressure injury documentation:  [] Other:    ESTIMATED NEEDS:  Estimated Energy Needs: (15-20 kcals/kg based on daily wt 106.8 kg ()) 8936-5748 kcals  Estimated Protein Needs: (1.5-2.0 gm/kg based on IBW 63.5 kg)  gm  Fluid needs deferred to provider.   Ge State Equation (based on daily wt 106.8 kg ()): 1845 kcals ()    NUTRITION DIAGNOSIS:  [x] Prior Dx: Increased protein-energy needs   [] New Dx:    EDUCATION:  [] Yes:  [x] Not appropriate/warranted    NUTRITION CARE PLAN:  1. When able to feed pt, Glucerna 1.5 at 10 mL/hr increasing as tolerated to goal rate 70 mL/hr x18 hours to provide total volume 1260 mL, 1890 kcals, 104 gm protein, and 956 mL free water. Meets 18 kcals/kG based on daily wt 106.8 kg () and 1.6 gm protein/kG based on IBW 63.5 kg.  2. Consider multivitamin and Vitamin C to aid in wound healing    [] Achieved - Continue current nutrition intervention(s)  [] Current medical condition precludes nutrition intervention at this time.    MONITORING AND EVALUATION:   RD remains available upon request and will follow up per protocol.    Taylor Aguero, MS, RD, CDN, CNSC, CDCES TEAMS   Available on MS TEAMS

## 2024-05-22 NOTE — PROGRESS NOTE ADULT - ATTENDING COMMENTS
Agree with above  69F PMH CVA (bedbound), COPD, CHF, initially p/w dyspnea a/w acute leukocytosis (WBC >230) c/w acute leukemic (?CLL) a/w respiratory failure and hypotension. Patient has had long hospital course including chest tube placement for empyema x 2 (both since removed), chronic respiratory failure with failure to wean s/p trache / PEG, transferred to RCU on 5/9. Now returns to MICU for shock state (fever, hypotension), placed back on full vent.  - Currently 14/460/30%/+8, continue to titrate FiO2 as tolerated to maintain spO2 >92%  - Hgb now decreasing once more, getting additional 2U pRBCs. CT shows ?hematoma in axilla, with no obvious extravasation. No other obvious source of bleeding. Continue to trend CBCs Q12h, F/U surgery re: any additional recommendations  - Remains on low-dose pressors (Levo 0.04), improving  - c/w empiric Zosyn for now, but will consider stopping soon. F/U culture results and transition to targeted therapy if any cultures result with causative organism. Last positive culture was blood culture on 4/22, which was positive for Staph epi / CoNS. Prior to that, had Streptococcus pneumoniae in pleural fluid on 4/16. All subsequent cultures have been negative.

## 2024-05-23 LAB
ALBUMIN SERPL ELPH-MCNC: 2.9 G/DL — LOW (ref 3.3–5)
ALBUMIN SERPL ELPH-MCNC: 3 G/DL — LOW (ref 3.3–5)
ALP SERPL-CCNC: 55 U/L — SIGNIFICANT CHANGE UP (ref 40–120)
ALP SERPL-CCNC: 59 U/L — SIGNIFICANT CHANGE UP (ref 40–120)
ALT FLD-CCNC: 32 U/L — SIGNIFICANT CHANGE UP (ref 10–45)
ALT FLD-CCNC: 32 U/L — SIGNIFICANT CHANGE UP (ref 10–45)
ANION GAP SERPL CALC-SCNC: 13 MMOL/L — SIGNIFICANT CHANGE UP (ref 5–17)
ANION GAP SERPL CALC-SCNC: 13 MMOL/L — SIGNIFICANT CHANGE UP (ref 5–17)
APTT BLD: 22.3 SEC — LOW (ref 24.5–35.6)
AST SERPL-CCNC: 50 U/L — HIGH (ref 10–40)
AST SERPL-CCNC: 54 U/L — HIGH (ref 10–40)
BASOPHILS # BLD AUTO: 0 K/UL — SIGNIFICANT CHANGE UP (ref 0–0.2)
BASOPHILS # BLD AUTO: 0.07 K/UL — SIGNIFICANT CHANGE UP (ref 0–0.2)
BASOPHILS NFR BLD AUTO: 0 % — SIGNIFICANT CHANGE UP (ref 0–2)
BASOPHILS NFR BLD AUTO: 0.1 % — SIGNIFICANT CHANGE UP (ref 0–2)
BILIRUB SERPL-MCNC: 0.6 MG/DL — SIGNIFICANT CHANGE UP (ref 0.2–1.2)
BILIRUB SERPL-MCNC: 0.8 MG/DL — SIGNIFICANT CHANGE UP (ref 0.2–1.2)
BUN SERPL-MCNC: 17 MG/DL — SIGNIFICANT CHANGE UP (ref 7–23)
BUN SERPL-MCNC: 21 MG/DL — SIGNIFICANT CHANGE UP (ref 7–23)
CALCIUM SERPL-MCNC: 7.8 MG/DL — LOW (ref 8.4–10.5)
CALCIUM SERPL-MCNC: 8.2 MG/DL — LOW (ref 8.4–10.5)
CHLORIDE SERPL-SCNC: 105 MMOL/L — SIGNIFICANT CHANGE UP (ref 96–108)
CHLORIDE SERPL-SCNC: 105 MMOL/L — SIGNIFICANT CHANGE UP (ref 96–108)
CO2 SERPL-SCNC: 24 MMOL/L — SIGNIFICANT CHANGE UP (ref 22–31)
CO2 SERPL-SCNC: 24 MMOL/L — SIGNIFICANT CHANGE UP (ref 22–31)
CREAT SERPL-MCNC: 0.5 MG/DL — SIGNIFICANT CHANGE UP (ref 0.5–1.3)
CREAT SERPL-MCNC: 0.51 MG/DL — SIGNIFICANT CHANGE UP (ref 0.5–1.3)
CULTURE RESULTS: SIGNIFICANT CHANGE UP
CULTURE RESULTS: SIGNIFICANT CHANGE UP
EGFR: 102 ML/MIN/1.73M2 — SIGNIFICANT CHANGE UP
EGFR: 102 ML/MIN/1.73M2 — SIGNIFICANT CHANGE UP
EOSINOPHIL # BLD AUTO: 0 K/UL — SIGNIFICANT CHANGE UP (ref 0–0.5)
EOSINOPHIL # BLD AUTO: 0 K/UL — SIGNIFICANT CHANGE UP (ref 0–0.5)
EOSINOPHIL NFR BLD AUTO: 0 % — SIGNIFICANT CHANGE UP (ref 0–6)
EOSINOPHIL NFR BLD AUTO: 0 % — SIGNIFICANT CHANGE UP (ref 0–6)
GAS PNL BLDA: SIGNIFICANT CHANGE UP
GIANT PLATELETS BLD QL SMEAR: PRESENT — SIGNIFICANT CHANGE UP
GLUCOSE BLDC GLUCOMTR-MCNC: 132 MG/DL — HIGH (ref 70–99)
GLUCOSE BLDC GLUCOMTR-MCNC: 92 MG/DL — SIGNIFICANT CHANGE UP (ref 70–99)
GLUCOSE SERPL-MCNC: 103 MG/DL — HIGH (ref 70–99)
GLUCOSE SERPL-MCNC: 130 MG/DL — HIGH (ref 70–99)
HCT VFR BLD CALC: 23.2 % — LOW (ref 34.5–45)
HCT VFR BLD CALC: 23.4 % — LOW (ref 34.5–45)
HGB BLD-MCNC: 7.4 G/DL — LOW (ref 11.5–15.5)
HGB BLD-MCNC: 7.5 G/DL — LOW (ref 11.5–15.5)
IMM GRANULOCYTES NFR BLD AUTO: 0.4 % — SIGNIFICANT CHANGE UP (ref 0–0.9)
INR BLD: 1.25 RATIO — HIGH (ref 0.85–1.18)
LDH SERPL L TO P-CCNC: 289 U/L — HIGH (ref 50–242)
LYMPHOCYTES # BLD AUTO: 76.09 K/UL — HIGH (ref 1–3.3)
LYMPHOCYTES # BLD AUTO: 78.94 K/UL — HIGH (ref 1–3.3)
LYMPHOCYTES # BLD AUTO: 86.9 % — HIGH (ref 13–44)
LYMPHOCYTES # BLD AUTO: 91.2 % — HIGH (ref 13–44)
MAGNESIUM SERPL-MCNC: 2.7 MG/DL — HIGH (ref 1.6–2.6)
MAGNESIUM SERPL-MCNC: 2.8 MG/DL — HIGH (ref 1.6–2.6)
MANUAL SMEAR VERIFICATION: SIGNIFICANT CHANGE UP
MCHC RBC-ENTMCNC: 28.5 PG — SIGNIFICANT CHANGE UP (ref 27–34)
MCHC RBC-ENTMCNC: 28.6 PG — SIGNIFICANT CHANGE UP (ref 27–34)
MCHC RBC-ENTMCNC: 31.9 GM/DL — LOW (ref 32–36)
MCHC RBC-ENTMCNC: 32.1 GM/DL — SIGNIFICANT CHANGE UP (ref 32–36)
MCV RBC AUTO: 89 FL — SIGNIFICANT CHANGE UP (ref 80–100)
MCV RBC AUTO: 89.6 FL — SIGNIFICANT CHANGE UP (ref 80–100)
MONOCYTES # BLD AUTO: 2.25 K/UL — HIGH (ref 0–0.9)
MONOCYTES # BLD AUTO: 3.03 K/UL — HIGH (ref 0–0.9)
MONOCYTES NFR BLD AUTO: 2.6 % — SIGNIFICANT CHANGE UP (ref 2–14)
MONOCYTES NFR BLD AUTO: 3.5 % — SIGNIFICANT CHANGE UP (ref 2–14)
NEUTROPHILS # BLD AUTO: 4.59 K/UL — SIGNIFICANT CHANGE UP (ref 1.8–7.4)
NEUTROPHILS # BLD AUTO: 8.81 K/UL — HIGH (ref 1.8–7.4)
NEUTROPHILS NFR BLD AUTO: 10 % — LOW (ref 43–77)
NEUTROPHILS NFR BLD AUTO: 5.3 % — LOW (ref 43–77)
NRBC # BLD: 0 /100 WBCS — SIGNIFICANT CHANGE UP (ref 0–0)
PHOSPHATE SERPL-MCNC: 2.2 MG/DL — LOW (ref 2.5–4.5)
PHOSPHATE SERPL-MCNC: 2.7 MG/DL — SIGNIFICANT CHANGE UP (ref 2.5–4.5)
PLAT MORPH BLD: ABNORMAL
PLATELET # BLD AUTO: 139 K/UL — LOW (ref 150–400)
PLATELET # BLD AUTO: 143 K/UL — LOW (ref 150–400)
POTASSIUM SERPL-MCNC: 3.3 MMOL/L — LOW (ref 3.5–5.3)
POTASSIUM SERPL-MCNC: 3.4 MMOL/L — LOW (ref 3.5–5.3)
POTASSIUM SERPL-SCNC: 3.3 MMOL/L — LOW (ref 3.5–5.3)
POTASSIUM SERPL-SCNC: 3.4 MMOL/L — LOW (ref 3.5–5.3)
PROT SERPL-MCNC: 5.6 G/DL — LOW (ref 6–8.3)
PROT SERPL-MCNC: 5.8 G/DL — LOW (ref 6–8.3)
PROTHROM AB SERPL-ACNC: 13 SEC — SIGNIFICANT CHANGE UP (ref 9.5–13)
RBC # BLD: 2.59 M/UL — LOW (ref 3.8–5.2)
RBC # BLD: 2.63 M/UL — LOW (ref 3.8–5.2)
RBC # FLD: 16.1 % — HIGH (ref 10.3–14.5)
RBC # FLD: 16.3 % — HIGH (ref 10.3–14.5)
RBC BLD AUTO: SIGNIFICANT CHANGE UP
SMUDGE CELLS # BLD: PRESENT — SIGNIFICANT CHANGE UP
SODIUM SERPL-SCNC: 142 MMOL/L — SIGNIFICANT CHANGE UP (ref 135–145)
SODIUM SERPL-SCNC: 142 MMOL/L — SIGNIFICANT CHANGE UP (ref 135–145)
SPECIMEN SOURCE: SIGNIFICANT CHANGE UP
SPECIMEN SOURCE: SIGNIFICANT CHANGE UP
WBC # BLD: 86.56 K/UL — CRITICAL HIGH (ref 3.8–10.5)
WBC # BLD: 87.55 K/UL — CRITICAL HIGH (ref 3.8–10.5)
WBC # FLD AUTO: 86.56 K/UL — CRITICAL HIGH (ref 3.8–10.5)
WBC # FLD AUTO: 87.55 K/UL — CRITICAL HIGH (ref 3.8–10.5)

## 2024-05-23 PROCEDURE — 99291 CRITICAL CARE FIRST HOUR: CPT | Mod: GC

## 2024-05-23 RX ORDER — POTASSIUM PHOSPHATE, MONOBASIC POTASSIUM PHOSPHATE, DIBASIC 236; 224 MG/ML; MG/ML
15 INJECTION, SOLUTION INTRAVENOUS ONCE
Refills: 0 | Status: COMPLETED | OUTPATIENT
Start: 2024-05-23 | End: 2024-05-23

## 2024-05-23 RX ORDER — POTASSIUM CHLORIDE 20 MEQ
40 PACKET (EA) ORAL ONCE
Refills: 0 | Status: COMPLETED | OUTPATIENT
Start: 2024-05-23 | End: 2024-05-23

## 2024-05-23 RX ORDER — HYDROCORTISONE 20 MG
50 TABLET ORAL EVERY 12 HOURS
Refills: 0 | Status: DISCONTINUED | OUTPATIENT
Start: 2024-05-23 | End: 2024-05-25

## 2024-05-23 RX ORDER — ACETAMINOPHEN 500 MG
1000 TABLET ORAL ONCE
Refills: 0 | Status: COMPLETED | OUTPATIENT
Start: 2024-05-23 | End: 2024-05-23

## 2024-05-23 RX ORDER — MIDODRINE HYDROCHLORIDE 2.5 MG/1
20 TABLET ORAL THREE TIMES A DAY
Refills: 0 | Status: DISCONTINUED | OUTPATIENT
Start: 2024-05-23 | End: 2024-05-29

## 2024-05-23 RX ADMIN — POTASSIUM PHOSPHATE, MONOBASIC POTASSIUM PHOSPHATE, DIBASIC 62.5 MILLIMOLE(S): 236; 224 INJECTION, SOLUTION INTRAVENOUS at 15:38

## 2024-05-23 RX ADMIN — PIPERACILLIN AND TAZOBACTAM 25 GRAM(S): 4; .5 INJECTION, POWDER, LYOPHILIZED, FOR SOLUTION INTRAVENOUS at 21:01

## 2024-05-23 RX ADMIN — Medication 50 MILLIGRAM(S): at 01:14

## 2024-05-23 RX ADMIN — PANTOPRAZOLE SODIUM 40 MILLIGRAM(S): 20 TABLET, DELAYED RELEASE ORAL at 05:21

## 2024-05-23 RX ADMIN — Medication 400 MILLIGRAM(S): at 13:09

## 2024-05-23 RX ADMIN — SENNA PLUS 10 MILLILITER(S): 8.6 TABLET ORAL at 21:01

## 2024-05-23 RX ADMIN — DEXMEDETOMIDINE HYDROCHLORIDE IN 0.9% SODIUM CHLORIDE 1.36 MICROGRAM(S)/KG/HR: 4 INJECTION INTRAVENOUS at 05:22

## 2024-05-23 RX ADMIN — PANTOPRAZOLE SODIUM 40 MILLIGRAM(S): 20 TABLET, DELAYED RELEASE ORAL at 18:09

## 2024-05-23 RX ADMIN — QUETIAPINE FUMARATE 200 MILLIGRAM(S): 200 TABLET, FILM COATED ORAL at 20:03

## 2024-05-23 RX ADMIN — Medication 1000 MILLIGRAM(S): at 20:30

## 2024-05-23 RX ADMIN — CHLORHEXIDINE GLUCONATE 1 APPLICATION(S): 213 SOLUTION TOPICAL at 18:09

## 2024-05-23 RX ADMIN — QUETIAPINE FUMARATE 25 MILLIGRAM(S): 200 TABLET, FILM COATED ORAL at 01:13

## 2024-05-23 RX ADMIN — PIPERACILLIN AND TAZOBACTAM 25 GRAM(S): 4; .5 INJECTION, POWDER, LYOPHILIZED, FOR SOLUTION INTRAVENOUS at 13:10

## 2024-05-23 RX ADMIN — Medication 1000 MILLIGRAM(S): at 14:00

## 2024-05-23 RX ADMIN — MIDODRINE HYDROCHLORIDE 20 MILLIGRAM(S): 2.5 TABLET ORAL at 21:01

## 2024-05-23 RX ADMIN — PIPERACILLIN AND TAZOBACTAM 25 GRAM(S): 4; .5 INJECTION, POWDER, LYOPHILIZED, FOR SOLUTION INTRAVENOUS at 05:21

## 2024-05-23 RX ADMIN — Medication 50 MILLIGRAM(S): at 05:22

## 2024-05-23 RX ADMIN — Medication 40 MILLIEQUIVALENT(S): at 15:38

## 2024-05-23 RX ADMIN — Medication 400 MILLIGRAM(S): at 20:00

## 2024-05-23 RX ADMIN — Medication 50 MILLIGRAM(S): at 18:10

## 2024-05-23 RX ADMIN — MIDODRINE HYDROCHLORIDE 30 MILLIGRAM(S): 2.5 TABLET ORAL at 05:21

## 2024-05-23 RX ADMIN — MIDODRINE HYDROCHLORIDE 20 MILLIGRAM(S): 2.5 TABLET ORAL at 13:10

## 2024-05-23 RX ADMIN — CHLORHEXIDINE GLUCONATE 1 APPLICATION(S): 213 SOLUTION TOPICAL at 05:22

## 2024-05-23 RX ADMIN — Medication 40 MILLIEQUIVALENT(S): at 05:21

## 2024-05-23 RX ADMIN — NYSTATIN CREAM 1 APPLICATION(S): 100000 CREAM TOPICAL at 05:23

## 2024-05-23 NOTE — PROGRESS NOTE ADULT - ATTENDING COMMENTS
Agree with above  69F PMH CVA (bedbound), COPD, CHF, initially p/w dyspnea a/w acute leukocytosis (WBC >230) c/w acute leukemic (?CLL) a/w respiratory failure and hypotension. Patient has had long hospital course including chest tube placement for empyema x 2 (both since removed), chronic respiratory failure with failure to wean s/p trache / PEG, transferred to RCU on 5/9. Now returns to MICU for shock state (fever, hypotension), placed back on full vent.  - Currently 18/460/30%/+8, continue to titrate FiO2 as tolerated to maintain spO2 >92%. Decreased RR to 14.  - Hgb currently stable, continue to monitor CBC (check Q12h today and then transition to daily).  - Off pressors ~12 hours now, decreased stress-dose steroids.  - c/w Seroquel QHS  - Remains on Zosyn since 5/20. Urine growing Pseudomonas from 5/19, but <100kCFU, so not truly positive culture. Will check procalcitonin tomorrow and consider D/C antibiotics. Last positive culture was blood culture on 4/22, which was positive for Staph epi / CoNS. Prior to that, had Streptococcus pneumoniae in pleural fluid on 4/16. All subsequent cultures have been negative.  - Transfer to RCU for further management once bed is available.

## 2024-05-23 NOTE — PROGRESS NOTE ADULT - ASSESSMENT
Ms Gordon is a 69F h/o CVA (bedbound at baseline), COPD, CHF, HTN presenting as transfer from [facility] for SOB iso leukocytosis to 233 c/f acute leukemia. Pt intubated and on pressors, transferred to MICU for further management, course c/b empyema s/p chest tube placement (4/16) and removal, reaccumulation of loculated pleural effusion s/p L pigtail placement and removal on 4/27. S/p EGD/PEG with GI, transferred to RCU on 5/9. Readmitted to MICU for septic vs hemorrhagic shock.     PLAN  =====Neurologic/Psych=====  #At Baseline, AOx4 mental status   #Anxiety, Depression   #Agitation   - L upper and lower extremity motor deficits iso hx of stroke   - Pain control w/ Tylenol  - Seroquel 200mg qhs, seroquel 25mg po q6hr prn agitation.    - Klonopin 1.5MG BID  - 5/18 ECG: NSR with PACs, .  -Psych following appreciate recs   -Wean precedex as tolerated    =====Pulmonary=====  #Acute hypoxic respiratory failure  #Empyema   #Pansensitive Strep Pneumo  - S/p thora draining 1500cc fluid 4/16 with chest tube placement; c/w exudative effusion growing Strep Pneumo  - Was on vanc/zosyn/azithro (4/16)-->deescalated to CTX (4/17 -4/23), Added on Vancomycin (4/23-) due to MRSE 1/2 Cx, expanded coverage to Cefepime (4/23 1x dose), back on CTX, switched to Unasyn 3g Q6 (4/25-5/8), Augmentin (5/9--5/19 stopped as switched to zosyn as below)  - S/p Trach placement 4/28  -Aggressive airway management with Duoneb, chest PT and suctioning PRN.  -Trach Vent Settings: AC/CMV,  RR 14 Fio2 40, Peep 5  -Bedside Pocus 5/20: right sided lobar pneumonia      =====Cardiovascular=====  #Septic vs Hemorrhagic Shock  #Hypotension  Eval for TLS vs RP bleed once stable  - 2/2 Strep Pneumo Empyema and Bacteremia s/p Pigtail catheter   - C/w Midodrine to 30 TID   - s/p Solu-cortef 50 mg IV qd 5/6-5/8;   - MAP goal > 60   - Hematology following appreciate recs  - Obtain central access, transfuse goal >7  -5/21: weaned pt down to 1 pressor (levo)    #CHF  - Unclear hx but elevated pro-BNP to 2600s on admission  - TTE: EF 54%, no significant abnormalities  - Diurese as needed;    =====GI=====  #GERD  - Protonix 40mg IV BID    #Diet  - NPO except meds via PEG    =====Renal/=====  #Electrolytes  - Maintain K>4, Phos>3, Mag>2, iCal>1    =====Endocrine=====  - ISS q6h while receiving Tube feeds    =====Infectious Disease=====  #Empyema 2/2 Pansensitive Strep Pneumo, resolved  #Strep Pneumo Bacteremia, resolved  #New Fevers/Sepsis  -Repeat blood and urine culture pending  -C/w Zosyn q8hrs + 1g Vanc X1(Hospital acquired pneumonia) + 1g vanc X1, check vanc level  -Consider repeat ID Consult    =====Heme/Onc=====  #Leukocytosis  #CLL  -  on presentation; stable at 101K  - No plan for leukophoresis at this time given mature lymphocytes per heme  - CT Chest A&P showing diffuse axillary, inguinal mediastinal RP lymphadenopathy.  - Appreciate Heme recs; can plan for bone marrow bx w/ IR if in line with GOC    #L Common Femoral Vein DVT, non occlusive    - 4/24 BL LE doppler - nonocclusive left common femoral vein DVT  - 4/24 BL UE doppler - acute left basilic SVT, no acute DVT  - Hold AC iso of acute anemia   - Repeat bilateral DVT studies pending  -Arterial duplex pending    =====Ethics=====  FULL CODE  Only known family member is Aunt Mili Gordon ([phone], [phone]) who lives in [place] has been involved in care of patient  Palliative care following    Ms Gordon is a 69F h/o CVA (bedbound at baseline), COPD, CHF, HTN presenting as transfer from [facility] for SOB iso leukocytosis to 233 c/f acute leukemia. Pt intubated and on pressors, transferred to MICU for further management, course c/b empyema s/p chest tube placement (4/16) and removal, reaccumulation of loculated pleural effusion s/p L pigtail placement and removal on 4/27. S/p EGD/PEG with GI, transferred to RCU on 5/9. Readmitted to MICU for septic vs hemorrhagic shock.     PLAN  =====Neurologic/Psych=====  #At Baseline, AOx4 mental status   #Anxiety, Depression   #Agitation   - L upper and lower extremity motor deficits iso hx of stroke   - Pain control w/ Tylenol  - Seroquel 200mg qhs, seroquel 25mg po q6hr prn agitation.    - Klonopin 1.5MG BID  - 5/18 ECG: NSR with PACs, .  -Psych following appreciate recs   -Wean precedex as tolerated    =====Pulmonary=====  #Acute hypoxic respiratory failure  #Empyema   #Pansensitive Strep Pneumo  - S/p thora draining 1500cc fluid 4/16 with chest tube placement; c/w exudative effusion growing Strep Pneumo  - Was on vanc/zosyn/azithro (4/16)-->deescalated to CTX (4/17 -4/23), Added on Vancomycin (4/23-) due to MRSE 1/2 Cx, expanded coverage to Cefepime (4/23 1x dose), back on CTX, switched to Unasyn 3g Q6 (4/25-5/8), Augmentin (5/9--5/19 stopped as switched to zosyn as below)  - S/p Trach placement 4/28  -Aggressive airway management with Duoneb, chest PT and suctioning PRN.  -Trach Vent Settings: AC/CMV,  RR 14 Fio2 40, Peep 5  -Bedside Pocus 5/20: right sided lobar pneumonia      =====Cardiovascular=====  #Septic vs Hemorrhagic Shock  #Hypotension  Eval for TLS vs RP bleed once stable  - 2/2 Strep Pneumo Empyema and Bacteremia s/p Pigtail catheter   - C/w Midodrine to 30 TID   - s/p Solu-cortef 50 mg IV qd 5/6-5/8;   - MAP goal > 60   - Hematology following appreciate recs  - Obtain central access, transfuse goal >7  -5/21: weaned pt down to 1 pressor (levo)  -5/23: No pressors    #CHF  - Unclear hx but elevated pro-BNP to 2600s on admission  - TTE: EF 54%, no significant abnormalities  - Diurese as needed;    =====GI=====  #GERD  - Protonix 40mg IV BID    #Diet  -tube feeds    =====Renal/=====  #Electrolytes  - Maintain K>4, Phos>3, Mag>2, iCal>1    =====Endocrine=====  - ISS q6h while receiving Tube feeds    =====Infectious Disease=====  #Empyema 2/2 Pansensitive Strep Pneumo, resolved  #Strep Pneumo Bacteremia, resolved  #New Fevers/Sepsis  -Repeat blood and urine culture pending  -C/w Zosyn q8hrs + 1g Vanc X1(Hospital acquired pneumonia) + 1g vanc X1, check vanc level  -Consider repeat ID Consult    =====Heme/Onc=====  #Leukocytosis  #CLL  -  on presentation; downtrending to 96.18 on 5/22  - No plan for leukophoresis at this time given mature lymphocytes per heme  - CT Chest A&P showing diffuse axillary, inguinal mediastinal RP lymphadenopathy.  - Appreciate Heme recs; can plan for bone marrow bx w/ IR if in line with GOC    #L Common Femoral Vein DVT, non occlusive    - 4/24 BL LE doppler - nonocclusive left common femoral vein DVT  - 4/24 BL UE doppler - acute left basilic SVT, no acute DVT  - Hold AC iso of acute anemia   - b/l LLE vein duplex: L. common femoral DVT resolved. No acute DVT  -b/l LLE arterial duplex: Stenosis of r. superficial aa. in distal thigh. Stenosis of distal external iliac aa. + common femoral aa. + L. superficial and L. anterior tibial.   -Vascular consult pending to arterial duplex findings    #Hematoma:  -CTA C/A/P: No active bleeding within a large multilobed heterogenous collection extending from R. lower neck into the right axilla and along right lateral chest wall c/w hematoma.   -Pending gen surg recs for hematoma     =====Ethics=====  FULL CODE  Only known family member is Aunt Mili Gordon ([phone], [phone]) who lives in [place] has been involved in care of patient  Palliative care following

## 2024-05-23 NOTE — PROGRESS NOTE ADULT - SUBJECTIVE AND OBJECTIVE BOX
INTERVAL HPI/OVERNIGHT EVENTS:    SUBJECTIVE: Patient seen and examined at bedside.     ROS: All negative except as listed above.    VITAL SIGNS:  ICU Vital Signs Last 24 Hrs  T(C): 38 (23 May 2024 04:00), Max: 38.2 (22 May 2024 07:45)  T(F): 100.4 (23 May 2024 04:00), Max: 100.8 (22 May 2024 07:45)  HR: 73 (23 May 2024 07:00) (47 - 126)  BP: --  BP(mean): --  ABP: 145/63 (23 May 2024 07:00) (92/51 - 156/78)  ABP(mean): 93 (23 May 2024 07:00) (63 - 111)  RR: 23 (23 May 2024 07:00) (10 - 30)  SpO2: 100% (23 May 2024 07:00) (97% - 100%)    O2 Parameters below as of 22 May 2024 20:00  Patient On (Oxygen Delivery Method): ventilator    O2 Concentration (%): 30      Mode: AC/ CMV (Assist Control/ Continuous Mandatory Ventilation), RR (machine): 14, TV (machine): 500, FiO2: 30, PEEP: 8, ITime: 1, MAP: 16, PIP: 33  Plateau pressure:   P/F ratio:     05-22 @ 07:01  -  05-23 @ 07:00  --------------------------------------------------------  IN: 773.4 mL / OUT: 1775 mL / NET: -1001.6 mL      CAPILLARY BLOOD GLUCOSE      POCT Blood Glucose.: 132 mg/dL (23 May 2024 05:19)      ECG: reviewed.    PHYSICAL EXAM:    GENERAL: NAD, lying in bed comfortably  HEAD:  Atraumatic, normocephalic  EYES: EOMI, PERRLA, conjunctiva and sclera clear  NECK: Supple, trachea midline, no JVD  HEART: Regular rate and rhythm, no murmurs, rubs, or gallops  LUNGS: Unlabored respirations.  Clear to auscultation bilaterally, no crackles, wheezing, or rhonchi  ABDOMEN: Soft, nontender, nondistended, +BS  EXTREMITIES: 2+ peripheral pulses bilaterally, cap refill<2 secs. No clubbing, cyanosis, or edema  NERVOUS SYSTEM:  A&Ox3, following commands, moving all extremities, no focal deficits   SKIN: No rashes or lesions    MEDICATIONS:  MEDICATIONS  (STANDING):  chlorhexidine 4% Liquid 1 Application(s) Topical every 12 hours  dexMEDEtomidine Infusion 0.05 MICROgram(s)/kG/Hr (1.36 mL/Hr) IV Continuous <Continuous>  hydrocortisone sodium succinate Injectable 50 milliGRAM(s) IV Push every 12 hours  midodrine 30 milliGRAM(s) Oral three times a day  norepinephrine Infusion 0.05 MICROgram(s)/kG/Min (11.4 mL/Hr) IV Continuous <Continuous>  pantoprazole  Injectable 40 milliGRAM(s) IV Push two times a day  piperacillin/tazobactam IVPB.. 3.375 Gram(s) IV Intermittent every 8 hours  QUEtiapine 200 milliGRAM(s) Oral <User Schedule>  senna Syrup 10 milliLiter(s) Oral at bedtime    MEDICATIONS  (PRN):  clonazePAM  Tablet 1.5 milliGRAM(s) Oral every 12 hours PRN agitation/anxiety  nystatin Powder 1 Application(s) Topical two times a day PRN skin irritation  QUEtiapine 25 milliGRAM(s) Oral every 6 hours PRN agitation  sodium chloride 0.9% lock flush 10 milliLiter(s) IV Push every 1 hour PRN Pre/post blood products, medications, blood draw, and to maintain line patency      ALLERGIES:  Allergies    No Known Allergies    Intolerances        LABS:                        7.5    87.55 )-----------( 143      ( 23 May 2024 00:09 )             23.4     05-23    142  |  105  |  21  ----------------------------<  130<H>  3.4<L>   |  24  |  0.51    Ca    7.8<L>      23 May 2024 00:09  Phos  2.7     05-23  Mg     2.7     05-23    TPro  5.6<L>  /  Alb  3.0<L>  /  TBili  0.6  /  DBili  x   /  AST  54<H>  /  ALT  32  /  AlkPhos  59  05-23    PT/INR - ( 23 May 2024 00:09 )   PT: 13.0 sec;   INR: 1.25 ratio         PTT - ( 23 May 2024 00:09 )  PTT:22.3 sec  Urinalysis Basic - ( 23 May 2024 00:09 )    Color: x / Appearance: x / SG: x / pH: x  Gluc: 130 mg/dL / Ketone: x  / Bili: x / Urobili: x   Blood: x / Protein: x / Nitrite: x   Leuk Esterase: x / RBC: x / WBC x   Sq Epi: x / Non Sq Epi: x / Bacteria: x      ABG:  pH, Arterial: 7.49 (05-23-24 @ 00:04)  pCO2, Arterial: 33 mmHg (05-23-24 @ 00:04)  pO2, Arterial: 107 mmHg (05-23-24 @ 00:04)  pH, Arterial: 7.51 (05-22-24 @ 11:32)  pCO2, Arterial: 32 mmHg (05-22-24 @ 11:32)  pO2, Arterial: 173 mmHg (05-22-24 @ 11:32)      vBG:  pH, Venous: 7.44 (05-22-24 @ 14:40)  pCO2, Venous: 40 mmHg (05-22-24 @ 14:40)  pO2, Venous: 34 mmHg (05-22-24 @ 14:40)  HCO3, Venous: 27 mmol/L (05-22-24 @ 14:40)    Micro:    Culture - Blood (collected 05-20-24 @ 06:36)  Source: .Blood Blood-Peripheral  Preliminary Report (05-22-24 @ 11:02):    No growth at 48 Hours    Culture - Blood (collected 05-20-24 @ 06:36)  Source: .Blood Blood-Peripheral  Preliminary Report (05-22-24 @ 11:02):    No growth at 48 Hours    Culture - Blood (collected 05-18-24 @ 10:47)  Source: .Blood Blood-Venous  Preliminary Report (05-22-24 @ 16:00):    No growth at 4 days    Culture - Blood (collected 05-18-24 @ 06:50)  Source: .Blood Blood-Peripheral  Preliminary Report (05-22-24 @ 10:01):    No growth at 4 days    Culture - Blood (collected 05-11-24 @ 07:11)  Source: .Blood Blood-Peripheral  Final Report (05-16-24 @ 11:00):    No growth at 5 days    Culture - Blood (collected 05-10-24 @ 15:00)  Source: .Blood Blood-Peripheral  Final Report (05-15-24 @ 22:00):    No growth at 5 days    Culture - Blood (collected 05-06-24 @ 09:33)  Source: .Blood Blood-Peripheral  Final Report (05-11-24 @ 16:01):    No growth at 5 days    Culture - Blood (collected 05-06-24 @ 09:25)  Source: .Blood Blood-Peripheral  Final Report (05-11-24 @ 16:01):    No growth at 5 days    Culture - Blood (collected 05-03-24 @ 08:30)  Source: .Blood Blood-Peripheral  Final Report (05-08-24 @ 16:00):    No growth at 5 days    Culture - Blood (collected 05-03-24 @ 08:15)  Source: .Blood Blood-Peripheral  Final Report (05-08-24 @ 16:00):    No growth at 5 days    Culture - Blood (collected 04-29-24 @ 14:00)  Source: .Blood Blood  Final Report (05-04-24 @ 17:01):    No growth at 5 days    Culture - Blood (collected 04-29-24 @ 13:50)  Source: .Blood Blood  Final Report (05-04-24 @ 17:01):    No growth at 5 days    Culture - Blood (collected 04-26-24 @ 13:30)  Source: .Blood Blood-Peripheral  Final Report (05-01-24 @ 18:00):    No growth at 5 days    Culture - Blood (collected 04-26-24 @ 13:21)  Source: .Blood Blood-Venous  Final Report (05-01-24 @ 18:00):    No growth at 5 days    Culture - Blood (collected 04-24-24 @ 05:53)  Source: .Blood Blood  Final Report (04-29-24 @ 10:01):    No growth at 5 days    Culture - Blood (collected 04-24-24 @ 05:53)  Source: .Blood Blood  Final Report (04-29-24 @ 10:01):    No growth at 5 days        Culture - Sputum (collected 05-10-24 @ 21:49)  Source: Trach Asp Tracheal Aspirate  Gram Stain (05-11-24 @ 05:58):    Few polymorphonuclear leukocytes per low power field    Rare Squamous epithelial cells per low power field    No organisms seen per oil power field  Final Report (05-12-24 @ 11:42):    No growth    Culture - Sputum (collected 05-06-24 @ 00:37)  Source: .Sputum Sputum  Gram Stain (05-06-24 @ 11:48):    Moderate polymorphonuclear leukocytes per low power field    No Squamous epithelial cells per low power field    No organisms seen per oil power field  Final Report (05-07-24 @ 15:46):    Normal Respiratory Kayla present    Culture - Sputum (collected 04-26-24 @ 13:44)  Source: .Sputum Sputum  Gram Stain (04-26-24 @ 20:33):    Rare polymorphonuclear leukocytes per low power field    No Squamous epithelial cells per low power field    No organisms seen per oil power field  Final Report (04-28-24 @ 08:42):    No growth        RADIOLOGY & ADDITIONAL TESTS: Reviewed.   INTERVAL HPI/OVERNIGHT EVENTS:    SUBJECTIVE: Patient seen and examined at bedside.     ROS: All negative except as listed above.    VITAL SIGNS:  ICU Vital Signs Last 24 Hrs  T(C): 38 (23 May 2024 04:00), Max: 38.2 (22 May 2024 07:45)  T(F): 100.4 (23 May 2024 04:00), Max: 100.8 (22 May 2024 07:45)  HR: 73 (23 May 2024 07:00) (47 - 126)  BP: --  BP(mean): --  ABP: 145/63 (23 May 2024 07:00) (92/51 - 156/78)  ABP(mean): 93 (23 May 2024 07:00) (63 - 111)  RR: 23 (23 May 2024 07:00) (10 - 30)  SpO2: 100% (23 May 2024 07:00) (97% - 100%)    O2 Parameters below as of 22 May 2024 20:00  Patient On (Oxygen Delivery Method): ventilator    O2 Concentration (%): 30      Mode: AC/ CMV (Assist Control/ Continuous Mandatory Ventilation), RR (machine): 14, TV (machine): 500, FiO2: 30, PEEP: 8, ITime: 1, MAP: 16, PIP: 33  Plateau pressure:   P/F ratio:     05-22 @ 07:01  -  05-23 @ 07:00  --------------------------------------------------------  IN: 773.4 mL / OUT: 1775 mL / NET: -1001.6 mL      CAPILLARY BLOOD GLUCOSE      POCT Blood Glucose.: 132 mg/dL (23 May 2024 05:19)      ECG: reviewed.    PHYSICAL EXAM:    GENERAL: NAD, lying in bed comfortably  HEAD:  Atraumatic, normocephalic  EYES: EOMI, PERRLA, conjunctiva and sclera clear  NECK: trach noted  HEART: Regular rate and rhythm, no murmurs, rubs, or gallops  LUNGS: Clear to auscultation bilaterally, no crackles, wheezing, or rhonchi  ABDOMEN: Soft, nontender, nondistended, +BS  NERVOUS SYSTEM: Alert, Awake, following commands  SKIN: No rashes or lesions    MEDICATIONS:  MEDICATIONS  (STANDING):  chlorhexidine 4% Liquid 1 Application(s) Topical every 12 hours  dexMEDEtomidine Infusion 0.05 MICROgram(s)/kG/Hr (1.36 mL/Hr) IV Continuous <Continuous>  hydrocortisone sodium succinate Injectable 50 milliGRAM(s) IV Push every 12 hours  midodrine 30 milliGRAM(s) Oral three times a day  norepinephrine Infusion 0.05 MICROgram(s)/kG/Min (11.4 mL/Hr) IV Continuous <Continuous>  pantoprazole  Injectable 40 milliGRAM(s) IV Push two times a day  piperacillin/tazobactam IVPB.. 3.375 Gram(s) IV Intermittent every 8 hours  QUEtiapine 200 milliGRAM(s) Oral <User Schedule>  senna Syrup 10 milliLiter(s) Oral at bedtime    MEDICATIONS  (PRN):  clonazePAM  Tablet 1.5 milliGRAM(s) Oral every 12 hours PRN agitation/anxiety  nystatin Powder 1 Application(s) Topical two times a day PRN skin irritation  QUEtiapine 25 milliGRAM(s) Oral every 6 hours PRN agitation  sodium chloride 0.9% lock flush 10 milliLiter(s) IV Push every 1 hour PRN Pre/post blood products, medications, blood draw, and to maintain line patency      ALLERGIES:  Allergies    No Known Allergies    Intolerances        LABS:                        7.5    87.55 )-----------( 143      ( 23 May 2024 00:09 )             23.4     05-23    142  |  105  |  21  ----------------------------<  130<H>  3.4<L>   |  24  |  0.51    Ca    7.8<L>      23 May 2024 00:09  Phos  2.7     05-23  Mg     2.7     05-23    TPro  5.6<L>  /  Alb  3.0<L>  /  TBili  0.6  /  DBili  x   /  AST  54<H>  /  ALT  32  /  AlkPhos  59  05-23    PT/INR - ( 23 May 2024 00:09 )   PT: 13.0 sec;   INR: 1.25 ratio         PTT - ( 23 May 2024 00:09 )  PTT:22.3 sec  Urinalysis Basic - ( 23 May 2024 00:09 )    Color: x / Appearance: x / SG: x / pH: x  Gluc: 130 mg/dL / Ketone: x  / Bili: x / Urobili: x   Blood: x / Protein: x / Nitrite: x   Leuk Esterase: x / RBC: x / WBC x   Sq Epi: x / Non Sq Epi: x / Bacteria: x      ABG:  pH, Arterial: 7.49 (05-23-24 @ 00:04)  pCO2, Arterial: 33 mmHg (05-23-24 @ 00:04)  pO2, Arterial: 107 mmHg (05-23-24 @ 00:04)  pH, Arterial: 7.51 (05-22-24 @ 11:32)  pCO2, Arterial: 32 mmHg (05-22-24 @ 11:32)  pO2, Arterial: 173 mmHg (05-22-24 @ 11:32)      vBG:  pH, Venous: 7.44 (05-22-24 @ 14:40)  pCO2, Venous: 40 mmHg (05-22-24 @ 14:40)  pO2, Venous: 34 mmHg (05-22-24 @ 14:40)  HCO3, Venous: 27 mmol/L (05-22-24 @ 14:40)    Micro:    Culture - Blood (collected 05-20-24 @ 06:36)  Source: .Blood Blood-Peripheral  Preliminary Report (05-22-24 @ 11:02):    No growth at 48 Hours    Culture - Blood (collected 05-20-24 @ 06:36)  Source: .Blood Blood-Peripheral  Preliminary Report (05-22-24 @ 11:02):    No growth at 48 Hours    Culture - Blood (collected 05-18-24 @ 10:47)  Source: .Blood Blood-Venous  Preliminary Report (05-22-24 @ 16:00):    No growth at 4 days    Culture - Blood (collected 05-18-24 @ 06:50)  Source: .Blood Blood-Peripheral  Preliminary Report (05-22-24 @ 10:01):    No growth at 4 days    Culture - Blood (collected 05-11-24 @ 07:11)  Source: .Blood Blood-Peripheral  Final Report (05-16-24 @ 11:00):    No growth at 5 days    Culture - Blood (collected 05-10-24 @ 15:00)  Source: .Blood Blood-Peripheral  Final Report (05-15-24 @ 22:00):    No growth at 5 days    Culture - Blood (collected 05-06-24 @ 09:33)  Source: .Blood Blood-Peripheral  Final Report (05-11-24 @ 16:01):    No growth at 5 days    Culture - Blood (collected 05-06-24 @ 09:25)  Source: .Blood Blood-Peripheral  Final Report (05-11-24 @ 16:01):    No growth at 5 days    Culture - Blood (collected 05-03-24 @ 08:30)  Source: .Blood Blood-Peripheral  Final Report (05-08-24 @ 16:00):    No growth at 5 days    Culture - Blood (collected 05-03-24 @ 08:15)  Source: .Blood Blood-Peripheral  Final Report (05-08-24 @ 16:00):    No growth at 5 days    Culture - Blood (collected 04-29-24 @ 14:00)  Source: .Blood Blood  Final Report (05-04-24 @ 17:01):    No growth at 5 days    Culture - Blood (collected 04-29-24 @ 13:50)  Source: .Blood Blood  Final Report (05-04-24 @ 17:01):    No growth at 5 days    Culture - Blood (collected 04-26-24 @ 13:30)  Source: .Blood Blood-Peripheral  Final Report (05-01-24 @ 18:00):    No growth at 5 days    Culture - Blood (collected 04-26-24 @ 13:21)  Source: .Blood Blood-Venous  Final Report (05-01-24 @ 18:00):    No growth at 5 days    Culture - Blood (collected 04-24-24 @ 05:53)  Source: .Blood Blood  Final Report (04-29-24 @ 10:01):    No growth at 5 days    Culture - Blood (collected 04-24-24 @ 05:53)  Source: .Blood Blood  Final Report (04-29-24 @ 10:01):    No growth at 5 days        Culture - Sputum (collected 05-10-24 @ 21:49)  Source: Trach Asp Tracheal Aspirate  Gram Stain (05-11-24 @ 05:58):    Few polymorphonuclear leukocytes per low power field    Rare Squamous epithelial cells per low power field    No organisms seen per oil power field  Final Report (05-12-24 @ 11:42):    No growth    Culture - Sputum (collected 05-06-24 @ 00:37)  Source: .Sputum Sputum  Gram Stain (05-06-24 @ 11:48):    Moderate polymorphonuclear leukocytes per low power field    No Squamous epithelial cells per low power field    No organisms seen per oil power field  Final Report (05-07-24 @ 15:46):    Normal Respiratory Kayla present    Culture - Sputum (collected 04-26-24 @ 13:44)  Source: .Sputum Sputum  Gram Stain (04-26-24 @ 20:33):    Rare polymorphonuclear leukocytes per low power field    No Squamous epithelial cells per low power field    No organisms seen per oil power field  Final Report (04-28-24 @ 08:42):    No growth        RADIOLOGY & ADDITIONAL TESTS: Reviewed.

## 2024-05-23 NOTE — PROGRESS NOTE ADULT - SUBJECTIVE AND OBJECTIVE BOX
Pt is a 69F h/o CVA (bedbound at baseline), COPD, CHF, HTN who initially p/w acute shortness of breath to outside ED and was treated for acute pulmonary edema with sublingual nitro x 2, Lasix, and BiPAP. Pt was also found to be febrile to 103, so treated for PNA. BIPAP led to improvement in O2 to 89-90. Pt transferred to [place] on 4/16/24 for white count of 233 and possible leukophoresis. In the ED, pt intubated iso respiratory tiring and decreasing mental status, and also was started on levophed for hypotension. Following intubation and initiation of pressors, she was admitted to the MICU for AHRF and septic shock.     In the MICU, heme was consulted for hyperleukocytosis ISO new CLL, however due to mature lymphocytes leukophoresis was not performed. Chest tube and Shiley placed. Pt was found to have S. pneumo bacteremia and empyema and treated with CTX (4/18-4/25). On CT Chest performed on 4/22, a persistent loculated effusion was noted. IR performed Pigtail placement 4/24-4/27.    Pt intermittent low-grade fevers despite clearing her cultures; no additional infectious source or explanation of her fevers was identified. Due to inability to be extubated, tracheostomy was performed on 4/28, although pt remained on ventilator. On 4/25, CTX was switched to Unasyn per ID recs. Seroquel and Klonopin were administered for anxiety. GI was consulted for PEG placement due to dysphagia, however placement was deferred due to patient's persistent pressor needs. As of 5/5, pt has no longer needed IV pressors to maintain her MAP > 60; she was started on midodrine and droxidopa in order to wean off vasopressors, as well as stress dose steroids. GI was re-consulted and performed EGD and PEG placement on 5/7, which pt tolerated well. Tube feeds were started through her gastrostomy tube on 5/8. Psych was consulted on 5/8 due to passive SI and depression. At this time pt is clinically stable and remains on ventilator. She can appropriately be transferred to the RCU on 5/09.      5/20 patient was a RRT for hypotension/hypoxemia/shock and was transferred to MICU, Hgb 4.2->6.2 after 2uprbc at this time concerning for bleed iso T-lovenox, noncon CTCAP revealed new right neck->axilla collection and right chest wall collection. widespread bulky adenopathy 2/2 CLL, difficult to define borders of chest wall collection vs adenopathy. Patient on levo .03 upon evaluation, receiving 1u prbc. on sedation unable to obtain further history.     PAST MEDICAL HISTORY:  PAST MEDICAL & SURGICAL HISTORY:  Acute CHF      COPD, severe          ALLERGIES:  Allergies    No Known Allergies    Intolerances        SOCIAL HISTORY: Negative for tobacco, etoh, or drug use    FAMILY HISTORY:  FAMILY HISTORY:      PHYSICAL EXAM:  General: sedated  HEENT: Right neck base with large swelling, no ecchymosis, no fluctuance   Pulmonary: trached, on vent  Cardiovascular: NSR, no murmurs, right chest wall with swelling  Abdominal: soft, ND/NT, no organomegaly, no guarding or rebound tenderness  Extremities: WWP,   Neuro: A/O x 0,       VITAL SIGNS:  Vital Signs Last 24 Hrs  T(C): 37.4 (21 May 2024 12:00), Max: 38.9 (20 May 2024 20:00)  T(F): 99.3 (21 May 2024 12:00), Max: 102 (20 May 2024 20:00)  HR: 58 (21 May 2024 15:45) (45 - 124)  BP: --  BP(mean): --  RR: 18 (21 May 2024 15:45) (12 - 32)  SpO2: 100% (21 May 2024 15:45) (100% - 100%)    Parameters below as of 21 May 2024 09:52  Patient On (Oxygen Delivery Method): ventilator        I&O's Summary    20 May 2024 07:01  -  21 May 2024 07:00  --------------------------------------------------------  IN: 4230.2 mL / OUT: 1660 mL / NET: 2570.2 mL    21 May 2024 07:01  -  21 May 2024 16:07  --------------------------------------------------------  IN: 1273.2 mL / OUT: 295 mL / NET: 978.2 mL        LABS:                        6.2    124.91 )-----------( 149      ( 21 May 2024 12:52 )             19.8     05-21    141  |  105  |  32<H>  ----------------------------<  161<H>  4.9   |  25  |  0.64    Ca    8.0<L>      21 May 2024 12:52  Phos  3.9     05-21  Mg     2.6     05-21    TPro  5.1<L>  /  Alb  2.5<L>  /  TBili  0.3  /  DBili  x   /  AST  53<H>  /  ALT  22  /  AlkPhos  57  05-21    PT/INR - ( 21 May 2024 00:25 )   PT: 12.5 sec;   INR: 1.14 ratio         PTT - ( 21 May 2024 00:25 )  PTT:28.5 sec  Urinalysis Basic - ( 21 May 2024 12:52 )    Color: x / Appearance: x / SG: x / pH: x  Gluc: 161 mg/dL / Ketone: x  / Bili: x / Urobili: x   Blood: x / Protein: x / Nitrite: x   Leuk Esterase: x / RBC: x / WBC x   Sq Epi: x / Non Sq Epi: x / Bacteria: x      CAPILLARY BLOOD GLUCOSE        LIVER FUNCTIONS - ( 21 May 2024 12:52 )  Alb: 2.5 g/dL / Pro: 5.1 g/dL / ALK PHOS: 57 U/L / ALT: 22 U/L / AST: 53 U/L / GGT: x             CULTURES:  Culture Results:   No growth at 24 hours (05-20 @ 06:36)  Culture Results:   No growth at 24 hours (05-20 @ 06:36)      RADIOLOGY & ADDITIONAL STUDIES:      < from: CT Abdomen and Pelvis No Cont (05.20.24 @ 22:43) >    ACC: [id number] EXAM:  CT CHEST   ORDERED BY:  HU MCNAIR     ACC: [id number] EXAM:  CT ABDOMEN AND PELVIS   ORDERED BY:  HU MCNAIR     PROCEDURE DATE:  05/20/2024          INTERPRETATION:  CLINICAL INFORMATION: Hypotension and anemia, concern   for septic or hemorrhagic shock, evaluate for retroperitoneal hematoma.   Concern for leukemia. Empyema status post chest tube placement, now   removed.    COMPARISON: CT chest abdomen and pelvis 5/1/2024.    CONTRAST/COMPLICATIONS:  IV Contrast: NONE  Oral Contrast: NONE  Complications: None reported at time of study completion    PROCEDURE:  CT of the Chest, Abdomen and Pelvis was performed.  Sagittal and coronal reformats were performed.    FINDINGS:  Limited evaluation of the vasculature and viscera in the absence of   intravenous contrast. Streak artifact from the patient's overlying upper   extremities and some motion also limits evaluation.    CHEST:  LUNGS AND LARGE AIRWAYS: Tracheostomy tube in place. Trace tracheal   secretions above the level of the tracheostomy. Patchy groundglass   opacities within the right lower lobe, new since 5/1/2024. Bilateral   atelectasis.  PLEURA: Moderate right and small left pleural effusions, similar to the   previous CT.  VESSELS: Atherosclerotic changes. Coronary artery calcifications.  HEART: Heart size is normal. No pericardial effusion.  MEDIASTINUM, HEIDY, AND LYMPH NODES: Mediastinal, bilateral axillary, and   bilateral supraclavicular and cervical chain lymphadenopathy again noted.   The mediastinal, left axillary, left supraclavicular, and left cervical   chain adenopathy appears overall similar to mildly decreased in size in   comparison to 5/1/2024. For reference, a prevascular lymph node measures   1.9 x 1.1 cm (series 4 image 177), previously 2.1 x 1.1 cm, and a left   axillary node measures 4.4 x 2.7 cm (series 4 image 213), previously 5.3   x 2.9 cm. Right-sided adenopathy is difficult to assess due to the   presence of a new collection, as described below.  CHEST WALL AND LOWER NECK: Multilobed collection seen extending from the   right lower neck into the right axilla, and along the right lateral chest   wall and into the subpectoral region, with its superior aspect not   included within the field-of-view. This is new since 5/1/2024. The   collection appears heterogeneous with areas of high density, though is   difficult to accurately characterize due to overlying streak artifact. A   conglomerate component within the axilla/chest wall measures   approximately 15.3 x 10.4 x 16.1 cm, though this measurement may be   inclusive of adenopathy, which is difficult to delineate from collection.   A component of necrotic adenopathy is not excluded. There is associated   subcutaneous infiltration and overlying skin thickening.    Foci of subcutaneous gas within the right and left upper anterior chest   wall    ABDOMEN AND PELVIS:  LIVER: Within normal limits.  BILE DUCTS: Normal caliber.  GALLBLADDER: Within normal limits.  SPLEEN: Within normal limits.  PANCREAS: Within normal limits.  ADRENALS: Within normal limits.  KIDNEYS/URETERS: No hydronephrosis.    BLADDER: Underdistended with Andino catheter in place.  REPRODUCTIVE ORGANS: Uterus and adnexa within normal limits.    BOWEL: Gastrostomy tube in place with balloon in the stomach. No bowel   obstruction. Appendix is normal.  PERITONEUM: No ascites.  VESSELS: Atherosclerotic changes. IVC filter. Right femoral venous   catheter with tip in the region of the right common iliac vein; vessels   are difficult to delineate due to confluent surrounding adenopathy and   lack of intravenous contrast.  RETROPERITONEUM/LYMPH NODES: Bulky confluent retroperitoneal, bilateral   iliac chain, and bilateral inguinal lymphadenopathy, grossly similar to   the previous CT.For reference, a left external iliac node measures 7.7 x   3.9 cm (series 3 image 272), previously 8.0 x 4.1 cm.  ABDOMINAL WALL: Portions of the left greater than right lateral abdominal   wall are not included within the field-of-view. Partially imaged left   greater than right flank subcutaneous edema. Partially imaged   subcutaneous high density focus in the left anterior lateral lower   abdominal wall measuring at least 4.2 x 2.2 cm, likely new since   5/1/2024. Apparent high density could be artifactual due to the patient's   body wall being close to the scanner gantry, though this may represent a   hematoma. Additional smaller ill-defined high attenuation foci within the   ventral abdominal wall subcutaneously, which are new from priorand could   be related to injections and/or additional smaller hematomas, with   largest example located on the right measuring 2.0 x 2.4 cm (series 3   image 233).  BONES: Degenerative changes.    IMPRESSION:  *  New large multilobed collection seenextending from the right lower   neck into the right axilla and along the right lateral chest wall, its   superior portion within the neck not included within the field-of-view.   Overlying streak artifact limits characterization, though this appearsto   be heterogeneous with areas of high density, favoring hematoma. Known   adenopathy is difficult to delineate from collection, and a component of   necrotic adenopathy is not excluded. Infection/superimposed infection is   also not entirely excluded. There is associated subcutaneous edema and   overlying skin thickening. This was discussed with Dr. HU MCNAIR on   5/21/2024 at 8:56 AM by Dr. Painting with read back confirmation.  *  Foci of subcutaneous gas in the upper anterior chest wall of uncertain   etiology or significance. Correlate for iatrogenic causes. Airforming   infection is not excluded.  *  New patchy groundglass opacities in the right lower lobe, which may be   infectious/inflammatory in etiology.  *  Moderate right and small left pleural effusions, similar to prior.  *  Small subcutaneous high density foci within the ventral abdominal   wall, which could secondary to recent injections and/or small hematomas.   The largest focus is partially imaged in the left abdominal wall and   measures at least 4.2 cm.  *  Aside from the adenopathy that is difficult to delineate from above   described collection, thoracic adenopathy appears overall similar to   mildly decreased since the prior CT. Abdominopelvic adenopathy appears   similar.    < end of copied text >  < from: [facility] Duplex Lower Extrem Arterial, Bilat (05.21.24 @ 12:59) >  ACC: [id number] EXAM:  ART DUPLEX LOWER EXT BILATERAL   ORDERED BY: YARITZA QUEEN     PROCEDURE DATE:  05/21/2024          INTERPRETATION:  Clinical information: Diminished pedal pulses    Technique: Grayscale, color and spectral Doppler imaging was performed at   the arteries of both lower extremities    Findings:    There is extensive edema within the superficial soft tissues of the left   calf. Small amounts of edema are present in the right calf.    Heavily calcified atheromatous plaque is prominent along the course of   the arteries supplying both the right and left lower extremities.    The right external iliac, common femoral and deep femoral arteries were   not diagnostically imaged because of overlying dressings.    A low resistance pattern of antegrade flow through the right superficial   femoral artery with increased flow in diastole indicates capillary   dilatation.    The peak systolic velocities of the Right lower extremity are as follows:    Superficial femoral artery: 25 cm/sproximal,  48 cm/s mid, 214 cm/s   distal  Popliteal artery: 39 cm/s  Tibioperoneal trunk: 33 cm/s  Posterior tibial artery: 36 cm/s proximal,  14 cm/s mid, 16 cm/s distal  Peroneal artery: 28 cm/s proximal,  20 cm/s mid, 14 cm/s distal  Anterior tibial artery: 25 cm/s proximal,  24 cm/s mid, 25 cm/s distal  Dorsalis pedis artery: 24 cm/s    The peak systolic velocities of the Left lower extremity are as follows:    Distal external iliac artery: 402 cm/s  Common femoral artery: 279 cm/s  Deep femoral artery: 137 cm/s  Superficial femoral artery: 80 cm/s proximal,  OCCLUDED mid and distal  Popliteal artery: 50 cm/s  Tibioperoneal trunk: 26 cm/s  Posterior tibial artery: 29 cm/s proximal,  28 cm/s mid, 46 cm/s distal  Peroneal artery: 25 cm/s proximal,  21 cm/s mid, 20 cm/s distal  Anterior tibial artery: 28 cm/s proximal,  OCCLUDED mid and distal  Dorsalis pedis artery: tardus parvus, 4 cm/s    Impression: There is bilateral lower extremity arterial vascular disease.    There may be nonimaged disease affecting the right iliac arteries, and/or   the nonimaged common femoral artery in the proximal thigh.  There is a flow-limiting stenosis of the right superficial femoral artery   in the distal thigh.    On the left, there is a flow-limiting stenosis of the distal external   iliac artery and of the common femoral artery.  The left superficial femoral artery is occluded in the mid and distal   thigh.  The left anterior tibial artery is occluded in the mid and distal calf.    --- End ofReport ---    < end of copied text >

## 2024-05-23 NOTE — PROGRESS NOTE ADULT - ASSESSMENT
69F PMH CVA (bedbound), COPD, CHF, initially p/w dyspnea a/w acute leukocytosis (WBC >230) c/w  CLL a/w respiratory failure and hypotension. Patient has had long hospital course including chest tube placement for empyema x 2 (both since removed), chronic respiratory failure with failure to wean s/p trache / PEG, transferred to RCU on 5/9. Start on therapeutic AC for lower extremity DVTs, recently on lovenox, now held iso recent RRt for hypotension and dropping H/H along with hypotension and fevers. Now admitted to MICU for shock state placed back on full vent. CTAP with large fluid collection in right neck/axilla as well as chest wall collection/mixed bulky adenopathy. Patient H/H 7.6->6.2 today receiving 1u prbc.     Plan:  - Recommenced hem w/u for coagulopathy  - call  back if questions.    Discussed with Dr. Muñoz  Trauma/ACS #[id number]

## 2024-05-24 LAB
ALBUMIN SERPL ELPH-MCNC: 3 G/DL — LOW (ref 3.3–5)
ALBUMIN SERPL ELPH-MCNC: 3.1 G/DL — LOW (ref 3.3–5)
ALP SERPL-CCNC: 55 U/L — SIGNIFICANT CHANGE UP (ref 40–120)
ALP SERPL-CCNC: 60 U/L — SIGNIFICANT CHANGE UP (ref 40–120)
ALT FLD-CCNC: 27 U/L — SIGNIFICANT CHANGE UP (ref 10–45)
ALT FLD-CCNC: 30 U/L — SIGNIFICANT CHANGE UP (ref 10–45)
ANION GAP SERPL CALC-SCNC: 10 MMOL/L — SIGNIFICANT CHANGE UP (ref 5–17)
ANION GAP SERPL CALC-SCNC: 12 MMOL/L — SIGNIFICANT CHANGE UP (ref 5–17)
APTT BLD: 23.8 SEC — LOW (ref 24.5–35.6)
AST SERPL-CCNC: 35 U/L — SIGNIFICANT CHANGE UP (ref 10–40)
AST SERPL-CCNC: 45 U/L — HIGH (ref 10–40)
BASOPHILS # BLD AUTO: 0 K/UL — SIGNIFICANT CHANGE UP (ref 0–0.2)
BASOPHILS # BLD AUTO: 0.08 K/UL — SIGNIFICANT CHANGE UP (ref 0–0.2)
BASOPHILS NFR BLD AUTO: 0 % — SIGNIFICANT CHANGE UP (ref 0–2)
BASOPHILS NFR BLD AUTO: 0.1 % — SIGNIFICANT CHANGE UP (ref 0–2)
BILIRUB SERPL-MCNC: 0.8 MG/DL — SIGNIFICANT CHANGE UP (ref 0.2–1.2)
BILIRUB SERPL-MCNC: 0.9 MG/DL — SIGNIFICANT CHANGE UP (ref 0.2–1.2)
BLD GP AB SCN SERPL QL: NEGATIVE — SIGNIFICANT CHANGE UP
BUN SERPL-MCNC: 14 MG/DL — SIGNIFICANT CHANGE UP (ref 7–23)
BUN SERPL-MCNC: 16 MG/DL — SIGNIFICANT CHANGE UP (ref 7–23)
CALCIUM SERPL-MCNC: 7.8 MG/DL — LOW (ref 8.4–10.5)
CALCIUM SERPL-MCNC: 8 MG/DL — LOW (ref 8.4–10.5)
CHLORIDE SERPL-SCNC: 108 MMOL/L — SIGNIFICANT CHANGE UP (ref 96–108)
CHLORIDE SERPL-SCNC: 110 MMOL/L — HIGH (ref 96–108)
CO2 SERPL-SCNC: 23 MMOL/L — SIGNIFICANT CHANGE UP (ref 22–31)
CO2 SERPL-SCNC: 25 MMOL/L — SIGNIFICANT CHANGE UP (ref 22–31)
CREAT SERPL-MCNC: 0.5 MG/DL — SIGNIFICANT CHANGE UP (ref 0.5–1.3)
CREAT SERPL-MCNC: 0.53 MG/DL — SIGNIFICANT CHANGE UP (ref 0.5–1.3)
EGFR: 101 ML/MIN/1.73M2 — SIGNIFICANT CHANGE UP
EGFR: 102 ML/MIN/1.73M2 — SIGNIFICANT CHANGE UP
EOSINOPHIL # BLD AUTO: 0 K/UL — SIGNIFICANT CHANGE UP (ref 0–0.5)
EOSINOPHIL # BLD AUTO: 0.02 K/UL — SIGNIFICANT CHANGE UP (ref 0–0.5)
EOSINOPHIL NFR BLD AUTO: 0 % — SIGNIFICANT CHANGE UP (ref 0–6)
EOSINOPHIL NFR BLD AUTO: 0 % — SIGNIFICANT CHANGE UP (ref 0–6)
GAS PNL BLDA: SIGNIFICANT CHANGE UP
GAS PNL BLDA: SIGNIFICANT CHANGE UP
GLUCOSE BLDC GLUCOMTR-MCNC: 104 MG/DL — HIGH (ref 70–99)
GLUCOSE BLDC GLUCOMTR-MCNC: 115 MG/DL — HIGH (ref 70–99)
GLUCOSE BLDC GLUCOMTR-MCNC: 93 MG/DL — SIGNIFICANT CHANGE UP (ref 70–99)
GLUCOSE SERPL-MCNC: 107 MG/DL — HIGH (ref 70–99)
GLUCOSE SERPL-MCNC: 111 MG/DL — HIGH (ref 70–99)
HCT VFR BLD CALC: 23.4 % — LOW (ref 34.5–45)
HCT VFR BLD CALC: 23.9 % — LOW (ref 34.5–45)
HGB BLD-MCNC: 7.1 G/DL — LOW (ref 11.5–15.5)
HGB BLD-MCNC: 7.4 G/DL — LOW (ref 11.5–15.5)
IMM GRANULOCYTES NFR BLD AUTO: 0.4 % — SIGNIFICANT CHANGE UP (ref 0–0.9)
INR BLD: 1.18 RATIO — SIGNIFICANT CHANGE UP (ref 0.85–1.18)
LDH SERPL L TO P-CCNC: 318 U/L — HIGH (ref 50–242)
LYMPHOCYTES # BLD AUTO: 63.23 K/UL — HIGH (ref 1–3.3)
LYMPHOCYTES # BLD AUTO: 68.74 K/UL — HIGH (ref 1–3.3)
LYMPHOCYTES # BLD AUTO: 85.7 % — HIGH (ref 13–44)
LYMPHOCYTES # BLD AUTO: 91 % — HIGH (ref 13–44)
MAGNESIUM SERPL-MCNC: 2.7 MG/DL — HIGH (ref 1.6–2.6)
MANUAL SMEAR VERIFICATION: SIGNIFICANT CHANGE UP
MCHC RBC-ENTMCNC: 28.2 PG — SIGNIFICANT CHANGE UP (ref 27–34)
MCHC RBC-ENTMCNC: 28.8 PG — SIGNIFICANT CHANGE UP (ref 27–34)
MCHC RBC-ENTMCNC: 30.3 GM/DL — LOW (ref 32–36)
MCHC RBC-ENTMCNC: 31 GM/DL — LOW (ref 32–36)
MCV RBC AUTO: 92.9 FL — SIGNIFICANT CHANGE UP (ref 80–100)
MCV RBC AUTO: 93 FL — SIGNIFICANT CHANGE UP (ref 80–100)
MONOCYTES # BLD AUTO: 0.76 K/UL — SIGNIFICANT CHANGE UP (ref 0–0.9)
MONOCYTES # BLD AUTO: 1.95 K/UL — HIGH (ref 0–0.9)
MONOCYTES NFR BLD AUTO: 1 % — LOW (ref 2–14)
MONOCYTES NFR BLD AUTO: 2.6 % — SIGNIFICANT CHANGE UP (ref 2–14)
NEUTROPHILS # BLD AUTO: 6.04 K/UL — SIGNIFICANT CHANGE UP (ref 1.8–7.4)
NEUTROPHILS # BLD AUTO: 8.22 K/UL — HIGH (ref 1.8–7.4)
NEUTROPHILS NFR BLD AUTO: 11.2 % — LOW (ref 43–77)
NEUTROPHILS NFR BLD AUTO: 8 % — LOW (ref 43–77)
NRBC # BLD: 0 /100 WBCS — SIGNIFICANT CHANGE UP (ref 0–0)
NRBC # BLD: 0 /100 WBCS — SIGNIFICANT CHANGE UP (ref 0–0)
PHOSPHATE SERPL-MCNC: 2.7 MG/DL — SIGNIFICANT CHANGE UP (ref 2.5–4.5)
PLAT MORPH BLD: NORMAL — SIGNIFICANT CHANGE UP
PLATELET # BLD AUTO: 153 K/UL — SIGNIFICANT CHANGE UP (ref 150–400)
PLATELET # BLD AUTO: 154 K/UL — SIGNIFICANT CHANGE UP (ref 150–400)
POTASSIUM SERPL-MCNC: 3.4 MMOL/L — LOW (ref 3.5–5.3)
POTASSIUM SERPL-MCNC: 3.9 MMOL/L — SIGNIFICANT CHANGE UP (ref 3.5–5.3)
POTASSIUM SERPL-SCNC: 3.4 MMOL/L — LOW (ref 3.5–5.3)
POTASSIUM SERPL-SCNC: 3.9 MMOL/L — SIGNIFICANT CHANGE UP (ref 3.5–5.3)
PROT SERPL-MCNC: 5.4 G/DL — LOW (ref 6–8.3)
PROT SERPL-MCNC: 5.5 G/DL — LOW (ref 6–8.3)
PROTHROM AB SERPL-ACNC: 12.9 SEC — SIGNIFICANT CHANGE UP (ref 9.5–13)
RBC # BLD: 2.52 M/UL — LOW (ref 3.8–5.2)
RBC # BLD: 2.57 M/UL — LOW (ref 3.8–5.2)
RBC # FLD: 17.5 % — HIGH (ref 10.3–14.5)
RBC # FLD: 18.5 % — HIGH (ref 10.3–14.5)
RBC BLD AUTO: SIGNIFICANT CHANGE UP
RH IG SCN BLD-IMP: POSITIVE — SIGNIFICANT CHANGE UP
SODIUM SERPL-SCNC: 143 MMOL/L — SIGNIFICANT CHANGE UP (ref 135–145)
SODIUM SERPL-SCNC: 145 MMOL/L — SIGNIFICANT CHANGE UP (ref 135–145)
URATE SERPL-MCNC: 4 MG/DL — SIGNIFICANT CHANGE UP (ref 2.5–7)
WBC # BLD: 73.77 K/UL — CRITICAL HIGH (ref 3.8–10.5)
WBC # BLD: 75.54 K/UL — CRITICAL HIGH (ref 3.8–10.5)
WBC # FLD AUTO: 73.77 K/UL — CRITICAL HIGH (ref 3.8–10.5)
WBC # FLD AUTO: 75.54 K/UL — CRITICAL HIGH (ref 3.8–10.5)

## 2024-05-24 PROCEDURE — 99291 CRITICAL CARE FIRST HOUR: CPT | Mod: GC

## 2024-05-24 RX ORDER — ASCORBIC ACID 60 MG
500 TABLET,CHEWABLE ORAL DAILY
Refills: 0 | Status: DISCONTINUED | OUTPATIENT
Start: 2024-05-24 | End: 2024-06-10

## 2024-05-24 RX ORDER — ACETAMINOPHEN 500 MG
650 TABLET ORAL ONCE
Refills: 0 | Status: COMPLETED | OUTPATIENT
Start: 2024-05-24 | End: 2024-05-24

## 2024-05-24 RX ORDER — POTASSIUM CHLORIDE 20 MEQ
40 PACKET (EA) ORAL ONCE
Refills: 0 | Status: COMPLETED | OUTPATIENT
Start: 2024-05-24 | End: 2024-05-24

## 2024-05-24 RX ORDER — ACETAMINOPHEN 500 MG
1000 TABLET ORAL ONCE
Refills: 0 | Status: COMPLETED | OUTPATIENT
Start: 2024-05-24 | End: 2024-05-24

## 2024-05-24 RX ADMIN — PIPERACILLIN AND TAZOBACTAM 25 GRAM(S): 4; .5 INJECTION, POWDER, LYOPHILIZED, FOR SOLUTION INTRAVENOUS at 05:21

## 2024-05-24 RX ADMIN — CHLORHEXIDINE GLUCONATE 1 APPLICATION(S): 213 SOLUTION TOPICAL at 05:22

## 2024-05-24 RX ADMIN — PANTOPRAZOLE SODIUM 40 MILLIGRAM(S): 20 TABLET, DELAYED RELEASE ORAL at 17:28

## 2024-05-24 RX ADMIN — PANTOPRAZOLE SODIUM 40 MILLIGRAM(S): 20 TABLET, DELAYED RELEASE ORAL at 05:21

## 2024-05-24 RX ADMIN — NYSTATIN CREAM 1 APPLICATION(S): 100000 CREAM TOPICAL at 17:29

## 2024-05-24 RX ADMIN — MIDODRINE HYDROCHLORIDE 20 MILLIGRAM(S): 2.5 TABLET ORAL at 13:08

## 2024-05-24 RX ADMIN — Medication 50 MILLIGRAM(S): at 05:21

## 2024-05-24 RX ADMIN — SENNA PLUS 10 MILLILITER(S): 8.6 TABLET ORAL at 21:32

## 2024-05-24 RX ADMIN — Medication 50 MILLIGRAM(S): at 17:28

## 2024-05-24 RX ADMIN — Medication 650 MILLIGRAM(S): at 23:04

## 2024-05-24 RX ADMIN — Medication 1.5 MILLIGRAM(S): at 22:04

## 2024-05-24 RX ADMIN — Medication 1000 MILLIGRAM(S): at 11:12

## 2024-05-24 RX ADMIN — CHLORHEXIDINE GLUCONATE 1 APPLICATION(S): 213 SOLUTION TOPICAL at 17:29

## 2024-05-24 RX ADMIN — Medication 40 MILLIEQUIVALENT(S): at 05:21

## 2024-05-24 RX ADMIN — MIDODRINE HYDROCHLORIDE 20 MILLIGRAM(S): 2.5 TABLET ORAL at 05:21

## 2024-05-24 RX ADMIN — QUETIAPINE FUMARATE 200 MILLIGRAM(S): 200 TABLET, FILM COATED ORAL at 19:25

## 2024-05-24 RX ADMIN — Medication 400 MILLIGRAM(S): at 10:42

## 2024-05-24 RX ADMIN — NYSTATIN CREAM 1 APPLICATION(S): 100000 CREAM TOPICAL at 05:22

## 2024-05-24 RX ADMIN — Medication 650 MILLIGRAM(S): at 22:04

## 2024-05-24 RX ADMIN — MIDODRINE HYDROCHLORIDE 20 MILLIGRAM(S): 2.5 TABLET ORAL at 21:31

## 2024-05-24 NOTE — CHART NOTE - NSCHARTNOTEFT_GEN_A_CORE
MICU Transfer Note    Transfer from: MICU    Transfer to: (  ) Medicine    (  ) Telemetry     (  X ) RCU        (    ) Palliative         (   ) Stroke Unit          (   ) __________________    Accepting Physician: Dr. Denice Rogers    Signout given to:       HPI:  Pt is a 69F h/o CVA (bedbound at baseline), COPD, CHF, HTN who initially p/w acute shortness of breath to outside ED and was treated for acute pulmonary edema with sublingual nitro x 2, Lasix, and BiPAP. Pt was also found to be febrile to 103, so treated for PNA. BIPAP led to improvement in O2 to 89-90. Pt transferred to [place] on 4/16/24 for white count of 233 and possible leukophoresis. In the ED, pt intubated iso respiratory tiring and decreasing mental status, and also was started on levophed for hypotension. Following intubation and initiation of pressors, she was admitted to the MICU for AHRF and septic shock.     In the MICU, heme was consulted for hyperleukocytosis ISO new CLL, however due to mature lymphocytes leukophoresis was not performed. Chest tube and Shiley placed. Pt was found to have S. pneumo bacteremia and empyema and treated with CTX (4/18-4/25). On CT Chest performed on 4/22, a persistent loculated effusion was noted. IR performed Pigtail placement 4/24-4/27.    Pt intermittent low-grade fevers despite clearing her cultures; no additional infectious source or explanation of her fevers was identified. Due to inability to be extubated, tracheostomy was performed on 4/28, although pt remained on ventilator. On 4/25, CTX was switched to Unasyn per ID recs. Seroquel and Klonopin were administered for anxiety. GI was consulted for PEG placement due to dysphagia, however placement was deferred due to patient's persistent pressor needs. As of 5/5, pt has no longer needed IV pressors to maintain her MAP > 60; she was started on midodrine and droxidopa in order to wean off vasopressors, as well as stress dose steroids. GI was re-consulted and performed EGD and PEG placement on 5/7, which pt tolerated well. Tube feeds were started through her gastrostomy tube on 5/8. Psych was consulted on 5/8 due to passive SI and depression. At this time pt is clinically stable and remains on ventilator. She can appropriately be transferred to the RCU on 5/09. While in RCU she weaned on vent settings. On 5/19 Febrile, tachycardic today - pancultured.  Tolerating TC throughout the day, will RTV at night pending infectious w/u.       RRT called for hypoxia in RCU. On arrival, patient appeared in acute distress. She attempted to respond to questions and nodded/shook head to questions due to trach collar. Given patient progressively becoming ill appearing and less responsive to questions, IO for IV access was obtained --> NS flushed with blood return, but then stopped working. A second IO was placed and NS flushed as well, but fluid bolus was reportedly running unusually slow. Labs were critical for Hgb ~4.9; WBC 160k; and K 7.3. Ordered fluid bolusx1, acetaminophen IV x1, switched cefepime to Zosyn, PRBCx2, regular insulin 10 units, and D50 ampx1. Ordered therapeutic Lovenox for DVT be discontinued given acute severe anemia. MICU consulted for hypotension 2/2 septic shock vs. hemorrhagic shock.    MICU Course:  Ms Gordon is a 69F h/o CVA (bedbound at baseline), COPD, CHF, HTN presenting as transfer from [facility] for SOB iso leukocytosis to 233 c/f acute leukemia. Pt intubated and on pressors, transferred to MICU for further management, course c/b empyema s/p chest tube placement (4/16) and removal, reaccumulation of loculated pleural effusion s/p L pigtail placement and removal on 4/27. S/p EGD/PEG with GI, transferred to RCU on 5/9. Readmitted to MICU for septic vs hemorrhagic shock. Hgb now stable in MICU. Had to give 3units of PRBCs, due to Hgb <7. Now hgb has been trending in 7.1-7.7. Pt is mentating at baseline, Alert, awake, Oriented X3. Pt is HD stable currently without pressors, initially on midodrine 30mg, weaned down to 20mg. Pt weaned off sedation and pressors. Heme following for pt's leukocytosis 2/2 to CLL. No recommendations at this time. Pt empirically on zosyn for possible septic shock, discontinued on 5/24. CTA C/A/P showed no active bleeding within a large multilobed heterogenous collection extending from R. lower neck into the right axilla and along right lateral chest wall c/w hematoma. General surgery consulted for hematoma, no surgical intervention at this time. No need for IR embolization with no active bleed seen on CT.    Assessment/Plan:  PLAN  =====Neurologic/Psych=====  #At Baseline, AOx4 mental status   #Anxiety, Depression   #Agitation   - L upper and lower extremity motor deficits iso hx of stroke   - Pain control w/ Tylenol  - Seroquel 200mg qhs, seroquel 25mg po q6hr prn agitation.    - Klonopin 1.5MG BID  - 5/18 ECG: NSR with PACs, .  -Psych following appreciate recs   -Wean precedex as tolerated    =====Pulmonary=====  #Acute hypoxic respiratory failure  #Empyema   #Pansensitive Strep Pneumo  - S/p thora draining 1500cc fluid 4/16 with chest tube placement; c/w exudative effusion growing Strep Pneumo  - Was on vanc/zosyn/azithro (4/16)-->deescalated to CTX (4/17 -4/23), Added on Vancomycin (4/23-) due to MRSE 1/2 Cx, expanded coverage to Cefepime (4/23 1x dose), back on CTX, switched to Unasyn 3g Q6 (4/25-5/8), Augmentin (5/9--5/19 stopped as switched to zosyn as below)  - S/p Trach placement 4/28  -Aggressive airway management with Duoneb, chest PT and suctioning PRN.  -Trach Vent Settings: AC/CMV,  RR 14 Fio2 40, Peep 5  -Bedside Pocus 5/20: right sided lobar pneumonia      =====Cardiovascular=====  #Septic vs Hemorrhagic Shock  #Hypotension  Eval for TLS vs RP bleed once stable  - 2/2 Strep Pneumo Empyema and Bacteremia s/p Pigtail catheter   - C/w Midodrine to 30 TID   - s/p Solu-cortef 50 mg IV qd 5/6-5/8;   - MAP goal > 60   - Hematology following appreciate recs  - Obtain central access, transfuse goal >7  -5/21: weaned pt down to 1 pressor (levo)  -5/23: No pressors    #CHF  - Unclear hx but elevated pro-BNP to 2600s on admission  - TTE: EF 54%, no significant abnormalities  - Diurese as needed;    =====GI=====  #GERD  - Protonix 40mg IV BID    #Diet  -tube feeds    =====Renal/=====  #Electrolytes  - Maintain K>4, Phos>3, Mag>2, iCal>1    =====Endocrine=====  - ISS q6h while receiving Tube feeds    =====Infectious Disease=====  #Empyema 2/2 Pansensitive Strep Pneumo, resolved  #Strep Pneumo Bacteremia, resolved  #New Fevers/Sepsis  -Repeat blood and urine culture pending  -C/w Zosyn q8hrs + 1g Vanc X1(Hospital acquired pneumonia) + 1g vanc X1, check vanc level  -Consider repeat ID Consult  -d/c zosyn 5/24    =====Heme/Onc=====  #Leukocytosis  #CLL  -  on presentation; downtrending to 96.18 on 5/22  - No plan for leukophoresis at this time given mature lymphocytes per heme  - CT Chest A&P showing diffuse axillary, inguinal mediastinal RP lymphadenopathy.  - Appreciate Heme recs; can plan for bone marrow bx w/ IR if in line with GOC    #L Common Femoral Vein DVT, non occlusive    - 4/24 BL LE doppler - nonocclusive left common femoral vein DVT  - 4/24 BL UE doppler - acute left basilic SVT, no acute DVT  - Hold AC iso of acute anemia   - b/l LLE vein duplex: L. common femoral DVT resolved. No acute DVT  -b/l LLE arterial duplex: Stenosis of r. superficial aa. in distal thigh. Stenosis of distal external iliac aa. + common femoral aa. + L. superficial and L. anterior tibial.   -Vascular consult pending to arterial duplex findings    #Hematoma:  -CTA C/A/P: No active bleeding within a large multilobed heterogenous collection extending from R. lower neck into the right axilla and along right lateral chest wall c/w hematoma.   -Surgery recommends f/u with heme for coagulopathy. No surgical intervention at this time.    =====Ethics=====  FULL CODE  Only known family member is Aunt Mili Gordon ([phone], [phone]) who lives in [place] has been involved in care of patient  Palliative care following       To Do:  [ ] Trend Hgb levels  [ ]SBT, wean pt off vent to trach collar

## 2024-05-24 NOTE — PROGRESS NOTE ADULT - SUBJECTIVE AND OBJECTIVE BOX
INTERVAL HPI/OVERNIGHT EVENTS:    SUBJECTIVE: Patient seen and examined at bedside.     ROS: All negative except as listed above.    VITAL SIGNS:  ICU Vital Signs Last 24 Hrs  T(C): 37.5 (24 May 2024 04:00), Max: 38.6 (23 May 2024 13:00)  T(F): 99.5 (24 May 2024 04:00), Max: 101.5 (23 May 2024 13:00)  HR: 93 (24 May 2024 07:00) (75 - 124)  BP: --  BP(mean): --  ABP: 126/60 (24 May 2024 07:00) (100/61 - 146/64)  ABP(mean): 87 (24 May 2024 07:00) (78 - 100)  RR: 18 (24 May 2024 07:00) (16 - 38)  SpO2: 100% (24 May 2024 07:00) (98% - 100%)    O2 Parameters below as of 23 May 2024 20:00  Patient On (Oxygen Delivery Method): ventilator    O2 Concentration (%): 30      Mode: AC/ CMV (Assist Control/ Continuous Mandatory Ventilation), RR (machine): 18, TV (machine): 460, FiO2: 30, PEEP: 8, ITime: 1, MAP: 15, PIP: 28  Plateau pressure:   P/F ratio:     05-23 @ 07:01  -  05-24 @ 07:00  --------------------------------------------------------  IN: 1121.8 mL / OUT: 1065 mL / NET: 56.8 mL      CAPILLARY BLOOD GLUCOSE      POCT Blood Glucose.: 104 mg/dL (24 May 2024 05:20)      ECG: reviewed.    PHYSICAL EXAM:    GENERAL: NAD, lying in bed comfortably  HEAD:  Atraumatic, normocephalic  EYES: EOMI, PERRLA, conjunctiva and sclera clear  NECK: Supple, trachea midline, no JVD  HEART: Regular rate and rhythm, no murmurs, rubs, or gallops  LUNGS: Unlabored respirations.  Clear to auscultation bilaterally, no crackles, wheezing, or rhonchi  ABDOMEN: Soft, nontender, nondistended, +BS  EXTREMITIES: 2+ peripheral pulses bilaterally, cap refill<2 secs. No clubbing, cyanosis, or edema  NERVOUS SYSTEM:  A&Ox3, following commands, moving all extremities, no focal deficits   SKIN: No rashes or lesions    MEDICATIONS:  MEDICATIONS  (STANDING):  chlorhexidine 4% Liquid 1 Application(s) Topical every 12 hours  hydrocortisone sodium succinate Injectable 50 milliGRAM(s) IV Push every 12 hours  midodrine 20 milliGRAM(s) Oral three times a day  pantoprazole  Injectable 40 milliGRAM(s) IV Push two times a day  piperacillin/tazobactam IVPB.. 3.375 Gram(s) IV Intermittent every 8 hours  QUEtiapine 200 milliGRAM(s) Oral <User Schedule>  senna Syrup 10 milliLiter(s) Oral at bedtime    MEDICATIONS  (PRN):  clonazePAM  Tablet 1.5 milliGRAM(s) Oral every 12 hours PRN agitation/anxiety  nystatin Powder 1 Application(s) Topical two times a day PRN skin irritation  QUEtiapine 25 milliGRAM(s) Oral every 6 hours PRN agitation  sodium chloride 0.9% lock flush 10 milliLiter(s) IV Push every 1 hour PRN Pre/post blood products, medications, blood draw, and to maintain line patency      ALLERGIES:  Allergies    No Known Allergies    Intolerances        LABS:                        7.4    73.77 )-----------( 154      ( 24 May 2024 00:40 )             23.9     05-24    143  |  108  |  16  ----------------------------<  111<H>  3.4<L>   |  23  |  0.53    Ca    7.8<L>      24 May 2024 00:40  Phos  2.7     05-24  Mg     2.7     05-24    TPro  5.5<L>  /  Alb  3.1<L>  /  TBili  0.8  /  DBili  x   /  AST  45<H>  /  ALT  30  /  AlkPhos  60  05-24    PT/INR - ( 24 May 2024 00:40 )   PT: 12.9 sec;   INR: 1.18 ratio         PTT - ( 24 May 2024 00:40 )  PTT:23.8 sec  Urinalysis Basic - ( 24 May 2024 00:40 )    Color: x / Appearance: x / SG: x / pH: x  Gluc: 111 mg/dL / Ketone: x  / Bili: x / Urobili: x   Blood: x / Protein: x / Nitrite: x   Leuk Esterase: x / RBC: x / WBC x   Sq Epi: x / Non Sq Epi: x / Bacteria: x      ABG:  pH, Arterial: 7.50 (05-24-24 @ 00:27)  pCO2, Arterial: 32 mmHg (05-24-24 @ 00:27)  pO2, Arterial: 133 mmHg (05-24-24 @ 00:27)      vBG:    Micro:    Culture - Blood (collected 05-22-24 @ 08:46)  Source: .Blood Blood-Peripheral  Preliminary Report (05-23-24 @ 12:02):    No growth at 24 hours    Culture - Blood (collected 05-22-24 @ 08:28)  Source: .Blood Blood-Peripheral  Preliminary Report (05-23-24 @ 12:02):    No growth at 24 hours    Culture - Blood (collected 05-20-24 @ 06:36)  Source: .Blood Blood-Peripheral  Preliminary Report (05-23-24 @ 11:01):    No growth at 72 Hours    Culture - Blood (collected 05-20-24 @ 06:36)  Source: .Blood Blood-Peripheral  Preliminary Report (05-23-24 @ 11:01):    No growth at 72 Hours    Culture - Blood (collected 05-18-24 @ 10:47)  Source: .Blood Blood-Venous  Final Report (05-23-24 @ 16:01):    No growth at 5 days    Culture - Blood (collected 05-18-24 @ 06:50)  Source: .Blood Blood-Peripheral  Final Report (05-23-24 @ 10:00):    No growth at 5 days    Culture - Blood (collected 05-11-24 @ 07:11)  Source: .Blood Blood-Peripheral  Final Report (05-16-24 @ 11:00):    No growth at 5 days    Culture - Blood (collected 05-10-24 @ 15:00)  Source: .Blood Blood-Peripheral  Final Report (05-15-24 @ 22:00):    No growth at 5 days    Culture - Blood (collected 05-06-24 @ 09:33)  Source: .Blood Blood-Peripheral  Final Report (05-11-24 @ 16:01):    No growth at 5 days    Culture - Blood (collected 05-06-24 @ 09:25)  Source: .Blood Blood-Peripheral  Final Report (05-11-24 @ 16:01):    No growth at 5 days    Culture - Blood (collected 05-03-24 @ 08:30)  Source: .Blood Blood-Peripheral  Final Report (05-08-24 @ 16:00):    No growth at 5 days    Culture - Blood (collected 05-03-24 @ 08:15)  Source: .Blood Blood-Peripheral  Final Report (05-08-24 @ 16:00):    No growth at 5 days    Culture - Blood (collected 04-29-24 @ 14:00)  Source: .Blood Blood  Final Report (05-04-24 @ 17:01):    No growth at 5 days    Culture - Blood (collected 04-29-24 @ 13:50)  Source: .Blood Blood  Final Report (05-04-24 @ 17:01):    No growth at 5 days    Culture - Blood (collected 04-26-24 @ 13:30)  Source: .Blood Blood-Peripheral  Final Report (05-01-24 @ 18:00):    No growth at 5 days    Culture - Blood (collected 04-26-24 @ 13:21)  Source: .Blood Blood-Venous  Final Report (05-01-24 @ 18:00):    No growth at 5 days        Culture - Sputum (collected 05-10-24 @ 21:49)  Source: Trach Asp Tracheal Aspirate  Gram Stain (05-11-24 @ 05:58):    Few polymorphonuclear leukocytes per low power field    Rare Squamous epithelial cells per low power field    No organisms seen per oil power field  Final Report (05-12-24 @ 11:42):    No growth    Culture - Sputum (collected 05-06-24 @ 00:37)  Source: .Sputum Sputum  Gram Stain (05-06-24 @ 11:48):    Moderate polymorphonuclear leukocytes per low power field    No Squamous epithelial cells per low power field    No organisms seen per oil power field  Final Report (05-07-24 @ 15:46):    Normal Respiratory Kayla present    Culture - Sputum (collected 04-26-24 @ 13:44)  Source: .Sputum Sputum  Gram Stain (04-26-24 @ 20:33):    Rare polymorphonuclear leukocytes per low power field    No Squamous epithelial cells per low power field    No organisms seen per oil power field  Final Report (04-28-24 @ 08:42):    No growth        RADIOLOGY & ADDITIONAL TESTS: Reviewed.   INTERVAL HPI/OVERNIGHT EVENTS:    SUBJECTIVE: Patient seen and examined at bedside.     ROS: All negative except as listed above.    VITAL SIGNS:  ICU Vital Signs Last 24 Hrs  T(C): 37.5 (24 May 2024 04:00), Max: 38.6 (23 May 2024 13:00)  T(F): 99.5 (24 May 2024 04:00), Max: 101.5 (23 May 2024 13:00)  HR: 93 (24 May 2024 07:00) (75 - 124)  BP: --  BP(mean): --  ABP: 126/60 (24 May 2024 07:00) (100/61 - 146/64)  ABP(mean): 87 (24 May 2024 07:00) (78 - 100)  RR: 18 (24 May 2024 07:00) (16 - 38)  SpO2: 100% (24 May 2024 07:00) (98% - 100%)    O2 Parameters below as of 23 May 2024 20:00  Patient On (Oxygen Delivery Method): ventilator    O2 Concentration (%): 30      Mode: AC/ CMV (Assist Control/ Continuous Mandatory Ventilation), RR (machine): 18, TV (machine): 460, FiO2: 30, PEEP: 8, ITime: 1, MAP: 15, PIP: 28  Plateau pressure:   P/F ratio:     05-23 @ 07:01  -  05-24 @ 07:00  --------------------------------------------------------  IN: 1121.8 mL / OUT: 1065 mL / NET: 56.8 mL      CAPILLARY BLOOD GLUCOSE      POCT Blood Glucose.: 104 mg/dL (24 May 2024 05:20)      ECG: reviewed.    PHYSICAL EXAM:    GENERAL: NAD, lying in bed comfortably  HEAD:  Atraumatic, normocephalic  EYES: EOMI, PERRLA, conjunctiva and sclera clear  NECK: trach noted  HEART: Regular rate and rhythm, no murmurs, rubs, or gallops  LUNGS: Clear to auscultation bilaterally, no crackles, wheezing, or rhonchi  ABDOMEN: Soft, nontender, nondistended, +BS  NERVOUS SYSTEM: Alert, Awake, following commands  SKIN: No rashes or lesions      MEDICATIONS:  MEDICATIONS  (STANDING):  chlorhexidine 4% Liquid 1 Application(s) Topical every 12 hours  hydrocortisone sodium succinate Injectable 50 milliGRAM(s) IV Push every 12 hours  midodrine 20 milliGRAM(s) Oral three times a day  pantoprazole  Injectable 40 milliGRAM(s) IV Push two times a day  piperacillin/tazobactam IVPB.. 3.375 Gram(s) IV Intermittent every 8 hours  QUEtiapine 200 milliGRAM(s) Oral <User Schedule>  senna Syrup 10 milliLiter(s) Oral at bedtime    MEDICATIONS  (PRN):  clonazePAM  Tablet 1.5 milliGRAM(s) Oral every 12 hours PRN agitation/anxiety  nystatin Powder 1 Application(s) Topical two times a day PRN skin irritation  QUEtiapine 25 milliGRAM(s) Oral every 6 hours PRN agitation  sodium chloride 0.9% lock flush 10 milliLiter(s) IV Push every 1 hour PRN Pre/post blood products, medications, blood draw, and to maintain line patency      ALLERGIES:  Allergies    No Known Allergies    Intolerances        LABS:                        7.4    73.77 )-----------( 154      ( 24 May 2024 00:40 )             23.9     05-24    143  |  108  |  16  ----------------------------<  111<H>  3.4<L>   |  23  |  0.53    Ca    7.8<L>      24 May 2024 00:40  Phos  2.7     05-24  Mg     2.7     05-24    TPro  5.5<L>  /  Alb  3.1<L>  /  TBili  0.8  /  DBili  x   /  AST  45<H>  /  ALT  30  /  AlkPhos  60  05-24    PT/INR - ( 24 May 2024 00:40 )   PT: 12.9 sec;   INR: 1.18 ratio         PTT - ( 24 May 2024 00:40 )  PTT:23.8 sec  Urinalysis Basic - ( 24 May 2024 00:40 )    Color: x / Appearance: x / SG: x / pH: x  Gluc: 111 mg/dL / Ketone: x  / Bili: x / Urobili: x   Blood: x / Protein: x / Nitrite: x   Leuk Esterase: x / RBC: x / WBC x   Sq Epi: x / Non Sq Epi: x / Bacteria: x      ABG:  pH, Arterial: 7.50 (05-24-24 @ 00:27)  pCO2, Arterial: 32 mmHg (05-24-24 @ 00:27)  pO2, Arterial: 133 mmHg (05-24-24 @ 00:27)      vBG:    Micro:    Culture - Blood (collected 05-22-24 @ 08:46)  Source: .Blood Blood-Peripheral  Preliminary Report (05-23-24 @ 12:02):    No growth at 24 hours    Culture - Blood (collected 05-22-24 @ 08:28)  Source: .Blood Blood-Peripheral  Preliminary Report (05-23-24 @ 12:02):    No growth at 24 hours    Culture - Blood (collected 05-20-24 @ 06:36)  Source: .Blood Blood-Peripheral  Preliminary Report (05-23-24 @ 11:01):    No growth at 72 Hours    Culture - Blood (collected 05-20-24 @ 06:36)  Source: .Blood Blood-Peripheral  Preliminary Report (05-23-24 @ 11:01):    No growth at 72 Hours    Culture - Blood (collected 05-18-24 @ 10:47)  Source: .Blood Blood-Venous  Final Report (05-23-24 @ 16:01):    No growth at 5 days    Culture - Blood (collected 05-18-24 @ 06:50)  Source: .Blood Blood-Peripheral  Final Report (05-23-24 @ 10:00):    No growth at 5 days    Culture - Blood (collected 05-11-24 @ 07:11)  Source: .Blood Blood-Peripheral  Final Report (05-16-24 @ 11:00):    No growth at 5 days    Culture - Blood (collected 05-10-24 @ 15:00)  Source: .Blood Blood-Peripheral  Final Report (05-15-24 @ 22:00):    No growth at 5 days    Culture - Blood (collected 05-06-24 @ 09:33)  Source: .Blood Blood-Peripheral  Final Report (05-11-24 @ 16:01):    No growth at 5 days    Culture - Blood (collected 05-06-24 @ 09:25)  Source: .Blood Blood-Peripheral  Final Report (05-11-24 @ 16:01):    No growth at 5 days    Culture - Blood (collected 05-03-24 @ 08:30)  Source: .Blood Blood-Peripheral  Final Report (05-08-24 @ 16:00):    No growth at 5 days    Culture - Blood (collected 05-03-24 @ 08:15)  Source: .Blood Blood-Peripheral  Final Report (05-08-24 @ 16:00):    No growth at 5 days    Culture - Blood (collected 04-29-24 @ 14:00)  Source: .Blood Blood  Final Report (05-04-24 @ 17:01):    No growth at 5 days    Culture - Blood (collected 04-29-24 @ 13:50)  Source: .Blood Blood  Final Report (05-04-24 @ 17:01):    No growth at 5 days    Culture - Blood (collected 04-26-24 @ 13:30)  Source: .Blood Blood-Peripheral  Final Report (05-01-24 @ 18:00):    No growth at 5 days    Culture - Blood (collected 04-26-24 @ 13:21)  Source: .Blood Blood-Venous  Final Report (05-01-24 @ 18:00):    No growth at 5 days        Culture - Sputum (collected 05-10-24 @ 21:49)  Source: Trach Asp Tracheal Aspirate  Gram Stain (05-11-24 @ 05:58):    Few polymorphonuclear leukocytes per low power field    Rare Squamous epithelial cells per low power field    No organisms seen per oil power field  Final Report (05-12-24 @ 11:42):    No growth    Culture - Sputum (collected 05-06-24 @ 00:37)  Source: .Sputum Sputum  Gram Stain (05-06-24 @ 11:48):    Moderate polymorphonuclear leukocytes per low power field    No Squamous epithelial cells per low power field    No organisms seen per oil power field  Final Report (05-07-24 @ 15:46):    Normal Respiratory Kyala present    Culture - Sputum (collected 04-26-24 @ 13:44)  Source: .Sputum Sputum  Gram Stain (04-26-24 @ 20:33):    Rare polymorphonuclear leukocytes per low power field    No Squamous epithelial cells per low power field    No organisms seen per oil power field  Final Report (04-28-24 @ 08:42):    No growth        RADIOLOGY & ADDITIONAL TESTS: Reviewed.

## 2024-05-24 NOTE — PROGRESS NOTE ADULT - ATTENDING COMMENTS
Agree with above  69F PMH CVA (bedbound), COPD, CHF, initially p/w dyspnea a/w acute leukocytosis (WBC >230) c/w acute leukemic (?CLL) a/w respiratory failure and hypotension. Patient has had long hospital course including chest tube placement for empyema x 2 (both since removed), chronic respiratory failure with failure to wean s/p trache / PEG, transferred to RCU on 5/9. Now returns to MICU for shock state (fever, hypotension), placed back on full vent.  - Currently 14/460/30%/+8, continue to titrate FiO2 as tolerated to maintain spO2 >92%. Place on PS 12/8 and attempt to keep on for as long as she tolerates.  - Hgb currently stable, continue to monitor CBC (check daily).  - Off pressors >24 hours now, decreased stress-dose steroids. Remove femoral TLC, a-line.  - c/w Seroquel QHS  - Completing course of Zosyn today. Urine growing Pseudomonas from 5/19, but <100kCFU, so not truly positive culture. Will check procalcitonin tomorrow and consider D/C antibiotics. Last positive culture was blood culture on 4/22, which was positive for Staph epi / CoNS. Prior to that, had Streptococcus pneumoniae in pleural fluid on 4/16. All subsequent cultures have been negative.  - Transfer to RCU for further management once bed is available.

## 2024-05-24 NOTE — PROGRESS NOTE ADULT - ASSESSMENT
Ms Gordon is a 69F h/o CVA (bedbound at baseline), COPD, CHF, HTN presenting as transfer from [facility] for SOB iso leukocytosis to 233 c/f acute leukemia. Pt intubated and on pressors, transferred to MICU for further management, course c/b empyema s/p chest tube placement (4/16) and removal, reaccumulation of loculated pleural effusion s/p L pigtail placement and removal on 4/27. S/p EGD/PEG with GI, transferred to RCU on 5/9. Readmitted to MICU for septic vs hemorrhagic shock.     PLAN  =====Neurologic/Psych=====  #At Baseline, AOx4 mental status   #Anxiety, Depression   #Agitation   - L upper and lower extremity motor deficits iso hx of stroke   - Pain control w/ Tylenol  - Seroquel 200mg qhs, seroquel 25mg po q6hr prn agitation.    - Klonopin 1.5MG BID  - 5/18 ECG: NSR with PACs, .  -Psych following appreciate recs   -Wean precedex as tolerated    =====Pulmonary=====  #Acute hypoxic respiratory failure  #Empyema   #Pansensitive Strep Pneumo  - S/p thora draining 1500cc fluid 4/16 with chest tube placement; c/w exudative effusion growing Strep Pneumo  - Was on vanc/zosyn/azithro (4/16)-->deescalated to CTX (4/17 -4/23), Added on Vancomycin (4/23-) due to MRSE 1/2 Cx, expanded coverage to Cefepime (4/23 1x dose), back on CTX, switched to Unasyn 3g Q6 (4/25-5/8), Augmentin (5/9--5/19 stopped as switched to zosyn as below)  - S/p Trach placement 4/28  -Aggressive airway management with Duoneb, chest PT and suctioning PRN.  -Trach Vent Settings: AC/CMV,  RR 14 Fio2 40, Peep 5  -Bedside Pocus 5/20: right sided lobar pneumonia      =====Cardiovascular=====  #Septic vs Hemorrhagic Shock  #Hypotension  Eval for TLS vs RP bleed once stable  - 2/2 Strep Pneumo Empyema and Bacteremia s/p Pigtail catheter   - C/w Midodrine to 30 TID   - s/p Solu-cortef 50 mg IV qd 5/6-5/8;   - MAP goal > 60   - Hematology following appreciate recs  - Obtain central access, transfuse goal >7  -5/21: weaned pt down to 1 pressor (levo)  -5/23: No pressors    #CHF  - Unclear hx but elevated pro-BNP to 2600s on admission  - TTE: EF 54%, no significant abnormalities  - Diurese as needed;    =====GI=====  #GERD  - Protonix 40mg IV BID    #Diet  -tube feeds    =====Renal/=====  #Electrolytes  - Maintain K>4, Phos>3, Mag>2, iCal>1    =====Endocrine=====  - ISS q6h while receiving Tube feeds    =====Infectious Disease=====  #Empyema 2/2 Pansensitive Strep Pneumo, resolved  #Strep Pneumo Bacteremia, resolved  #New Fevers/Sepsis  -Repeat blood and urine culture pending  -C/w Zosyn q8hrs + 1g Vanc X1(Hospital acquired pneumonia) + 1g vanc X1, check vanc level  -Consider repeat ID Consult    =====Heme/Onc=====  #Leukocytosis  #CLL  -  on presentation; downtrending to 96.18 on 5/22  - No plan for leukophoresis at this time given mature lymphocytes per heme  - CT Chest A&P showing diffuse axillary, inguinal mediastinal RP lymphadenopathy.  - Appreciate Heme recs; can plan for bone marrow bx w/ IR if in line with GOC    #L Common Femoral Vein DVT, non occlusive    - 4/24 BL LE doppler - nonocclusive left common femoral vein DVT  - 4/24 BL UE doppler - acute left basilic SVT, no acute DVT  - Hold AC iso of acute anemia   - b/l LLE vein duplex: L. common femoral DVT resolved. No acute DVT  -b/l LLE arterial duplex: Stenosis of r. superficial aa. in distal thigh. Stenosis of distal external iliac aa. + common femoral aa. + L. superficial and L. anterior tibial.   -Vascular consult pending to arterial duplex findings    #Hematoma:  -CTA C/A/P: No active bleeding within a large multilobed heterogenous collection extending from R. lower neck into the right axilla and along right lateral chest wall c/w hematoma.   -Pending gen surg recs for hematoma     =====Ethics=====  FULL CODE  Only known family member is Aunt Mili Gordon ([phone], [phone]) who lives in [place] has been involved in care of patient  Palliative care following      Ms Gordon is a 69F h/o CVA (bedbound at baseline), COPD, CHF, HTN presenting as transfer from [facility] for SOB iso leukocytosis to 233 c/f acute leukemia. Pt intubated and on pressors, transferred to MICU for further management, course c/b empyema s/p chest tube placement (4/16) and removal, reaccumulation of loculated pleural effusion s/p L pigtail placement and removal on 4/27. S/p EGD/PEG with GI, transferred to RCU on 5/9. Readmitted to MICU for septic vs hemorrhagic shock.     PLAN  =====Neurologic/Psych=====  #At Baseline, AOx4 mental status   #Anxiety, Depression   #Agitation   - L upper and lower extremity motor deficits iso hx of stroke   - Pain control w/ Tylenol  - Seroquel 200mg qhs, seroquel 25mg po q6hr prn agitation.    - Klonopin 1.5MG BID  - 5/18 ECG: NSR with PACs, .  -Psych following appreciate recs   -Wean precedex as tolerated    =====Pulmonary=====  #Acute hypoxic respiratory failure  #Empyema   #Pansensitive Strep Pneumo  - S/p thora draining 1500cc fluid 4/16 with chest tube placement; c/w exudative effusion growing Strep Pneumo  - Was on vanc/zosyn/azithro (4/16)-->deescalated to CTX (4/17 -4/23), Added on Vancomycin (4/23-) due to MRSE 1/2 Cx, expanded coverage to Cefepime (4/23 1x dose), back on CTX, switched to Unasyn 3g Q6 (4/25-5/8), Augmentin (5/9--5/19 stopped as switched to zosyn as below)  - S/p Trach placement 4/28  -Aggressive airway management with Duoneb, chest PT and suctioning PRN.  -Trach Vent Settings: AC/CMV,  RR 14 Fio2 40, Peep 5  -Bedside Pocus 5/20: right sided lobar pneumonia      =====Cardiovascular=====  #Septic vs Hemorrhagic Shock  #Hypotension  Eval for TLS vs RP bleed once stable  - 2/2 Strep Pneumo Empyema and Bacteremia s/p Pigtail catheter   - C/w Midodrine to 30 TID   - s/p Solu-cortef 50 mg IV qd 5/6-5/8;   - MAP goal > 60   - Hematology following appreciate recs  - Obtain central access, transfuse goal >7  -5/21: weaned pt down to 1 pressor (levo)  -5/23: No pressors    #CHF  - Unclear hx but elevated pro-BNP to 2600s on admission  - TTE: EF 54%, no significant abnormalities  - Diurese as needed;    =====GI=====  #GERD  - Protonix 40mg IV BID    #Diet  -tube feeds    =====Renal/=====  #Electrolytes  - Maintain K>4, Phos>3, Mag>2, iCal>1    =====Endocrine=====  - ISS q6h while receiving Tube feeds    =====Infectious Disease=====  #Empyema 2/2 Pansensitive Strep Pneumo, resolved  #Strep Pneumo Bacteremia, resolved  #New Fevers/Sepsis  -Repeat blood and urine culture pending  -C/w Zosyn q8hrs + 1g Vanc X1(Hospital acquired pneumonia) + 1g vanc X1, check vanc level  -Consider repeat ID Consult  -d/c zosyn 5/24    =====Heme/Onc=====  #Leukocytosis  #CLL  -  on presentation; downtrending to 96.18 on 5/22  - No plan for leukophoresis at this time given mature lymphocytes per heme  - CT Chest A&P showing diffuse axillary, inguinal mediastinal RP lymphadenopathy.  - Appreciate Heme recs; can plan for bone marrow bx w/ IR if in line with GOC    #L Common Femoral Vein DVT, non occlusive    - 4/24 BL LE doppler - nonocclusive left common femoral vein DVT  - 4/24 BL UE doppler - acute left basilic SVT, no acute DVT  - Hold AC iso of acute anemia   - b/l LLE vein duplex: L. common femoral DVT resolved. No acute DVT  -b/l LLE arterial duplex: Stenosis of r. superficial aa. in distal thigh. Stenosis of distal external iliac aa. + common femoral aa. + L. superficial and L. anterior tibial.   -Vascular consult pending to arterial duplex findings    #Hematoma:  -CTA C/A/P: No active bleeding within a large multilobed heterogenous collection extending from R. lower neck into the right axilla and along right lateral chest wall c/w hematoma.   -Surgery recommends f/u with heme for coagulopathy. No surgical intervention at this time.    =====Ethics=====  FULL CODE  Only known family member is Aunt Mili Gordon ([phone], [phone]) who lives in [place] has been involved in care of patient  Palliative care following

## 2024-05-25 LAB
ALBUMIN SERPL ELPH-MCNC: 3.1 G/DL — LOW (ref 3.3–5)
ALP SERPL-CCNC: 58 U/L — SIGNIFICANT CHANGE UP (ref 40–120)
ALT FLD-CCNC: 26 U/L — SIGNIFICANT CHANGE UP (ref 10–45)
ANION GAP SERPL CALC-SCNC: 13 MMOL/L — SIGNIFICANT CHANGE UP (ref 5–17)
APTT BLD: 21.2 SEC — LOW (ref 24.5–35.6)
AST SERPL-CCNC: 29 U/L — SIGNIFICANT CHANGE UP (ref 10–40)
BASOPHILS # BLD AUTO: 0.07 K/UL — SIGNIFICANT CHANGE UP (ref 0–0.2)
BASOPHILS NFR BLD AUTO: 0.1 % — SIGNIFICANT CHANGE UP (ref 0–2)
BILIRUB SERPL-MCNC: 0.8 MG/DL — SIGNIFICANT CHANGE UP (ref 0.2–1.2)
BUN SERPL-MCNC: 13 MG/DL — SIGNIFICANT CHANGE UP (ref 7–23)
CALCIUM SERPL-MCNC: 7.8 MG/DL — LOW (ref 8.4–10.5)
CHLORIDE SERPL-SCNC: 108 MMOL/L — SIGNIFICANT CHANGE UP (ref 96–108)
CO2 SERPL-SCNC: 23 MMOL/L — SIGNIFICANT CHANGE UP (ref 22–31)
CREAT SERPL-MCNC: 0.49 MG/DL — LOW (ref 0.5–1.3)
CULTURE RESULTS: SIGNIFICANT CHANGE UP
CULTURE RESULTS: SIGNIFICANT CHANGE UP
EGFR: 103 ML/MIN/1.73M2 — SIGNIFICANT CHANGE UP
EOSINOPHIL # BLD AUTO: 0.11 K/UL — SIGNIFICANT CHANGE UP (ref 0–0.5)
EOSINOPHIL NFR BLD AUTO: 0.2 % — SIGNIFICANT CHANGE UP (ref 0–6)
GAS PNL BLDA: SIGNIFICANT CHANGE UP
GLUCOSE BLDC GLUCOMTR-MCNC: 88 MG/DL — SIGNIFICANT CHANGE UP (ref 70–99)
GLUCOSE SERPL-MCNC: 93 MG/DL — SIGNIFICANT CHANGE UP (ref 70–99)
HCT VFR BLD CALC: 24.4 % — LOW (ref 34.5–45)
HGB BLD-MCNC: 7.3 G/DL — LOW (ref 11.5–15.5)
IMM GRANULOCYTES NFR BLD AUTO: 0.3 % — SIGNIFICANT CHANGE UP (ref 0–0.9)
INR BLD: 1.23 RATIO — HIGH (ref 0.85–1.18)
LDH SERPL L TO P-CCNC: 325 U/L — HIGH (ref 50–242)
LYMPHOCYTES # BLD AUTO: 62.84 K/UL — HIGH (ref 1–3.3)
LYMPHOCYTES # BLD AUTO: 87.9 % — HIGH (ref 13–44)
MAGNESIUM SERPL-MCNC: 2.8 MG/DL — HIGH (ref 1.6–2.6)
MCHC RBC-ENTMCNC: 28.5 PG — SIGNIFICANT CHANGE UP (ref 27–34)
MCHC RBC-ENTMCNC: 29.9 GM/DL — LOW (ref 32–36)
MCV RBC AUTO: 95.3 FL — SIGNIFICANT CHANGE UP (ref 80–100)
MONOCYTES # BLD AUTO: 1.82 K/UL — HIGH (ref 0–0.9)
MONOCYTES NFR BLD AUTO: 2.5 % — SIGNIFICANT CHANGE UP (ref 2–14)
NEUTROPHILS # BLD AUTO: 6.47 K/UL — SIGNIFICANT CHANGE UP (ref 1.8–7.4)
NEUTROPHILS NFR BLD AUTO: 9 % — LOW (ref 43–77)
NRBC # BLD: 0 /100 WBCS — SIGNIFICANT CHANGE UP (ref 0–0)
PHOSPHATE SERPL-MCNC: 2.6 MG/DL — SIGNIFICANT CHANGE UP (ref 2.5–4.5)
PLATELET # BLD AUTO: 173 K/UL — SIGNIFICANT CHANGE UP (ref 150–400)
POTASSIUM SERPL-MCNC: 3.4 MMOL/L — LOW (ref 3.5–5.3)
POTASSIUM SERPL-SCNC: 3.4 MMOL/L — LOW (ref 3.5–5.3)
PROT SERPL-MCNC: 5.7 G/DL — LOW (ref 6–8.3)
PROTHROM AB SERPL-ACNC: 12.8 SEC — SIGNIFICANT CHANGE UP (ref 9.5–13)
RBC # BLD: 2.56 M/UL — LOW (ref 3.8–5.2)
RBC # FLD: 19.4 % — HIGH (ref 10.3–14.5)
SODIUM SERPL-SCNC: 144 MMOL/L — SIGNIFICANT CHANGE UP (ref 135–145)
SPECIMEN SOURCE: SIGNIFICANT CHANGE UP
SPECIMEN SOURCE: SIGNIFICANT CHANGE UP
URATE SERPL-MCNC: 3.6 MG/DL — SIGNIFICANT CHANGE UP (ref 2.5–7)
WBC # BLD: 71.51 K/UL — CRITICAL HIGH (ref 3.8–10.5)
WBC # FLD AUTO: 71.51 K/UL — CRITICAL HIGH (ref 3.8–10.5)

## 2024-05-25 PROCEDURE — 99233 SBSQ HOSP IP/OBS HIGH 50: CPT | Mod: GC

## 2024-05-25 RX ORDER — HYDROCORTISONE 20 MG
50 TABLET ORAL DAILY
Refills: 0 | Status: COMPLETED | OUTPATIENT
Start: 2024-05-25 | End: 2024-05-25

## 2024-05-25 RX ORDER — HYDROMORPHONE HYDROCHLORIDE 2 MG/ML
1 INJECTION INTRAMUSCULAR; INTRAVENOUS; SUBCUTANEOUS ONCE
Refills: 0 | Status: DISCONTINUED | OUTPATIENT
Start: 2024-05-25 | End: 2024-05-25

## 2024-05-25 RX ORDER — HYDROCORTISONE 20 MG
25 TABLET ORAL ONCE
Refills: 0 | Status: DISCONTINUED | OUTPATIENT
Start: 2024-05-26 | End: 2024-05-26

## 2024-05-25 RX ORDER — ENOXAPARIN SODIUM 100 MG/ML
40 INJECTION SUBCUTANEOUS EVERY 12 HOURS
Refills: 0 | Status: DISCONTINUED | OUTPATIENT
Start: 2024-05-25 | End: 2024-06-11

## 2024-05-25 RX ORDER — POTASSIUM CHLORIDE 20 MEQ
40 PACKET (EA) ORAL ONCE
Refills: 0 | Status: COMPLETED | OUTPATIENT
Start: 2024-05-25 | End: 2024-05-25

## 2024-05-25 RX ORDER — HEPARIN SODIUM 5000 [USP'U]/ML
5000 INJECTION INTRAVENOUS; SUBCUTANEOUS EVERY 8 HOURS
Refills: 0 | Status: DISCONTINUED | OUTPATIENT
Start: 2024-05-25 | End: 2024-05-25

## 2024-05-25 RX ADMIN — ENOXAPARIN SODIUM 40 MILLIGRAM(S): 100 INJECTION SUBCUTANEOUS at 05:38

## 2024-05-25 RX ADMIN — CHLORHEXIDINE GLUCONATE 1 APPLICATION(S): 213 SOLUTION TOPICAL at 17:11

## 2024-05-25 RX ADMIN — ENOXAPARIN SODIUM 40 MILLIGRAM(S): 100 INJECTION SUBCUTANEOUS at 17:11

## 2024-05-25 RX ADMIN — Medication 1 TABLET(S): at 11:06

## 2024-05-25 RX ADMIN — PANTOPRAZOLE SODIUM 40 MILLIGRAM(S): 20 TABLET, DELAYED RELEASE ORAL at 05:16

## 2024-05-25 RX ADMIN — MIDODRINE HYDROCHLORIDE 20 MILLIGRAM(S): 2.5 TABLET ORAL at 05:16

## 2024-05-25 RX ADMIN — QUETIAPINE FUMARATE 200 MILLIGRAM(S): 200 TABLET, FILM COATED ORAL at 19:20

## 2024-05-25 RX ADMIN — PANTOPRAZOLE SODIUM 40 MILLIGRAM(S): 20 TABLET, DELAYED RELEASE ORAL at 17:11

## 2024-05-25 RX ADMIN — HYDROMORPHONE HYDROCHLORIDE 1 MILLIGRAM(S): 2 INJECTION INTRAMUSCULAR; INTRAVENOUS; SUBCUTANEOUS at 10:50

## 2024-05-25 RX ADMIN — Medication 500 MILLIGRAM(S): at 11:06

## 2024-05-25 RX ADMIN — Medication 50 MILLIGRAM(S): at 05:16

## 2024-05-25 RX ADMIN — Medication 40 MILLIEQUIVALENT(S): at 01:32

## 2024-05-25 RX ADMIN — HYDROMORPHONE HYDROCHLORIDE 1 MILLIGRAM(S): 2 INJECTION INTRAMUSCULAR; INTRAVENOUS; SUBCUTANEOUS at 11:05

## 2024-05-25 RX ADMIN — QUETIAPINE FUMARATE 25 MILLIGRAM(S): 200 TABLET, FILM COATED ORAL at 22:32

## 2024-05-25 RX ADMIN — CHLORHEXIDINE GLUCONATE 1 APPLICATION(S): 213 SOLUTION TOPICAL at 05:38

## 2024-05-25 RX ADMIN — SENNA PLUS 10 MILLILITER(S): 8.6 TABLET ORAL at 22:32

## 2024-05-25 RX ADMIN — Medication 1.5 MILLIGRAM(S): at 22:31

## 2024-05-25 NOTE — PROGRESS NOTE ADULT - ATTENDING COMMENTS
1.Chronic hypoxemic respiratory failure s/p strep pneumonia  bacteremia , pneumonia and empyema.  Pt tolerating tracheostomy well. Trial of trach collar as tolerated . Then back to AC ventilation if needed.  2. ID. Off antibiotics .  3. CLL. WBC decreasing after infection.  4. Continue midodrine  5.DVT . Restart Lovenox once daily . Increase to BID if HB remains stable after 48 hrs.

## 2024-05-25 NOTE — PROGRESS NOTE ADULT - SUBJECTIVE AND OBJECTIVE BOX
INTERVAL HPI/OVERNIGHT EVENTS:    SUBJECTIVE: Patient seen and examined at bedside.     ROS: All negative except as listed above.    VITAL SIGNS:  ICU Vital Signs Last 24 Hrs  T(C): 37.8 (25 May 2024 04:00), Max: 38 (25 May 2024 00:00)  T(F): 100 (25 May 2024 04:00), Max: 100.4 (25 May 2024 00:00)  HR: 104 (25 May 2024 06:00) (85 - 114)  BP: 143/89 (25 May 2024 06:00) (111/56 - 167/105)  BP(mean): 111 (25 May 2024 06:00) (77 - 126)  ABP: 134/57 (24 May 2024 14:00) (115/52 - 138/61)  ABP(mean): 88 (24 May 2024 14:00) (78 - 94)  RR: 28 (25 May 2024 06:00) (13 - 35)  SpO2: 100% (25 May 2024 06:00) (98% - 100%)    O2 Parameters below as of 24 May 2024 20:00  Patient On (Oxygen Delivery Method): ventilator          Mode: AC/ CMV (Assist Control/ Continuous Mandatory Ventilation), RR (machine): 18, TV (machine): 460, FiO2: 30, PEEP: 8, ITime: 1, MAP: 16, PIP: 30  Plateau pressure:   P/F ratio:     05-23 @ 07:01  -  05-24 @ 07:00  --------------------------------------------------------  IN: 1121.8 mL / OUT: 1065 mL / NET: 56.8 mL    05-24 @ 07:01  -  05-25 @ 06:53  --------------------------------------------------------  IN: 545 mL / OUT: 515 mL / NET: 30 mL      CAPILLARY BLOOD GLUCOSE      POCT Blood Glucose.: 93 mg/dL (24 May 2024 17:35)      ECG: reviewed.    PHYSICAL EXAM:    GENERAL: NAD, lying in bed comfortably  HEAD:  Atraumatic, normocephalic  EYES: EOMI, PERRLA, conjunctiva and sclera clear  NECK: Supple, trachea midline, no JVD  HEART: Regular rate and rhythm, no murmurs, rubs, or gallops  LUNGS: Unlabored respirations.  Clear to auscultation bilaterally, no crackles, wheezing, or rhonchi  ABDOMEN: Soft, nontender, nondistended, +BS  EXTREMITIES: 2+ peripheral pulses bilaterally, cap refill<2 secs. No clubbing, cyanosis, or edema  NERVOUS SYSTEM:  A&Ox3, following commands, moving all extremities, no focal deficits   SKIN: No rashes or lesions    MEDICATIONS:  MEDICATIONS  (STANDING):  ascorbic acid 500 milliGRAM(s) Oral daily  chlorhexidine 4% Liquid 1 Application(s) Topical every 12 hours  enoxaparin Injectable 40 milliGRAM(s) SubCutaneous every 12 hours  hydrocortisone sodium succinate Injectable 50 milliGRAM(s) IV Push daily  midodrine 20 milliGRAM(s) Oral three times a day  multivitamin 1 Tablet(s) Oral daily  pantoprazole  Injectable 40 milliGRAM(s) IV Push two times a day  QUEtiapine 200 milliGRAM(s) Oral <User Schedule>  senna Syrup 10 milliLiter(s) Oral at bedtime    MEDICATIONS  (PRN):  clonazePAM  Tablet 1.5 milliGRAM(s) Oral every 12 hours PRN agitation/anxiety  nystatin Powder 1 Application(s) Topical two times a day PRN skin irritation  QUEtiapine 25 milliGRAM(s) Oral every 6 hours PRN agitation  sodium chloride 0.9% lock flush 10 milliLiter(s) IV Push every 1 hour PRN Pre/post blood products, medications, blood draw, and to maintain line patency      ALLERGIES:  Allergies    No Known Allergies    Intolerances        LABS:                        7.3    71.51 )-----------( 173      ( 25 May 2024 00:12 )             24.4     05-25    144  |  108  |  13  ----------------------------<  93  3.4<L>   |  23  |  0.49<L>    Ca    7.8<L>      25 May 2024 00:14  Phos  2.6     05-25  Mg     2.8     05-25    TPro  5.7<L>  /  Alb  3.1<L>  /  TBili  0.8  /  DBili  x   /  AST  29  /  ALT  26  /  AlkPhos  58  05-25    PT/INR - ( 25 May 2024 00:12 )   PT: 12.8 sec;   INR: 1.23 ratio         PTT - ( 25 May 2024 00:12 )  PTT:21.2 sec  Urinalysis Basic - ( 25 May 2024 00:14 )    Color: x / Appearance: x / SG: x / pH: x  Gluc: 93 mg/dL / Ketone: x  / Bili: x / Urobili: x   Blood: x / Protein: x / Nitrite: x   Leuk Esterase: x / RBC: x / WBC x   Sq Epi: x / Non Sq Epi: x / Bacteria: x      ABG:  pH, Arterial: 7.49 (05-25-24 @ 00:02)  pCO2, Arterial: 35 mmHg (05-25-24 @ 00:02)  pO2, Arterial: 131 mmHg (05-25-24 @ 00:02)  pH, Arterial: 7.46 (05-24-24 @ 11:48)  pCO2, Arterial: 38 mmHg (05-24-24 @ 11:48)  pO2, Arterial: 130 mmHg (05-24-24 @ 11:48)      vBG:    Micro:    Culture - Blood (collected 05-22-24 @ 08:46)  Source: .Blood Blood-Peripheral  Preliminary Report (05-24-24 @ 12:01):    No growth at 48 Hours    Culture - Blood (collected 05-22-24 @ 08:28)  Source: .Blood Blood-Peripheral  Preliminary Report (05-24-24 @ 12:01):    No growth at 48 Hours    Culture - Blood (collected 05-20-24 @ 06:36)  Source: .Blood Blood-Peripheral  Preliminary Report (05-24-24 @ 11:00):    No growth at 4 days    Culture - Blood (collected 05-20-24 @ 06:36)  Source: .Blood Blood-Peripheral  Preliminary Report (05-24-24 @ 11:00):    No growth at 4 days    Culture - Blood (collected 05-18-24 @ 10:47)  Source: .Blood Blood-Venous  Final Report (05-23-24 @ 16:01):    No growth at 5 days    Culture - Blood (collected 05-18-24 @ 06:50)  Source: .Blood Blood-Peripheral  Final Report (05-23-24 @ 10:00):    No growth at 5 days    Culture - Blood (collected 05-11-24 @ 07:11)  Source: .Blood Blood-Peripheral  Final Report (05-16-24 @ 11:00):    No growth at 5 days    Culture - Blood (collected 05-10-24 @ 15:00)  Source: .Blood Blood-Peripheral  Final Report (05-15-24 @ 22:00):    No growth at 5 days    Culture - Blood (collected 05-06-24 @ 09:33)  Source: .Blood Blood-Peripheral  Final Report (05-11-24 @ 16:01):    No growth at 5 days    Culture - Blood (collected 05-06-24 @ 09:25)  Source: .Blood Blood-Peripheral  Final Report (05-11-24 @ 16:01):    No growth at 5 days    Culture - Blood (collected 05-03-24 @ 08:30)  Source: .Blood Blood-Peripheral  Final Report (05-08-24 @ 16:00):    No growth at 5 days    Culture - Blood (collected 05-03-24 @ 08:15)  Source: .Blood Blood-Peripheral  Final Report (05-08-24 @ 16:00):    No growth at 5 days    Culture - Blood (collected 04-29-24 @ 14:00)  Source: .Blood Blood  Final Report (05-04-24 @ 17:01):    No growth at 5 days    Culture - Blood (collected 04-29-24 @ 13:50)  Source: .Blood Blood  Final Report (05-04-24 @ 17:01):    No growth at 5 days    Culture - Blood (collected 04-26-24 @ 13:30)  Source: .Blood Blood-Peripheral  Final Report (05-01-24 @ 18:00):    No growth at 5 days    Culture - Blood (collected 04-26-24 @ 13:21)  Source: .Blood Blood-Venous  Final Report (05-01-24 @ 18:00):    No growth at 5 days        Culture - Sputum (collected 05-10-24 @ 21:49)  Source: Trach Asp Tracheal Aspirate  Gram Stain (05-11-24 @ 05:58):    Few polymorphonuclear leukocytes per low power field    Rare Squamous epithelial cells per low power field    No organisms seen per oil power field  Final Report (05-12-24 @ 11:42):    No growth    Culture - Sputum (collected 05-06-24 @ 00:37)  Source: .Sputum Sputum  Gram Stain (05-06-24 @ 11:48):    Moderate polymorphonuclear leukocytes per low power field    No Squamous epithelial cells per low power field    No organisms seen per oil power field  Final Report (05-07-24 @ 15:46):    Normal Respiratory Kayla present    Culture - Sputum (collected 04-26-24 @ 13:44)  Source: .Sputum Sputum  Gram Stain (04-26-24 @ 20:33):    Rare polymorphonuclear leukocytes per low power field    No Squamous epithelial cells per low power field    No organisms seen per oil power field  Final Report (04-28-24 @ 08:42):    No growth        RADIOLOGY & ADDITIONAL TESTS: Reviewed.   INTERVAL HPI/OVERNIGHT EVENTS: Start on dvt prophylaxis dosing Lovenox    SUBJECTIVE: Patient seen and examined at bedside. Talking in one to two word answers. Noting dry mouth but otherwise okay. Denies CP, SOB, N/V/D/C.     ROS: All negative except as listed above.    VITAL SIGNS:  ICU Vital Signs Last 24 Hrs  T(C): 37.8 (25 May 2024 04:00), Max: 38 (25 May 2024 00:00)  T(F): 100 (25 May 2024 04:00), Max: 100.4 (25 May 2024 00:00)  HR: 104 (25 May 2024 06:00) (85 - 114)  BP: 143/89 (25 May 2024 06:00) (111/56 - 167/105)  BP(mean): 111 (25 May 2024 06:00) (77 - 126)  ABP: 134/57 (24 May 2024 14:00) (115/52 - 138/61)  ABP(mean): 88 (24 May 2024 14:00) (78 - 94)  RR: 28 (25 May 2024 06:00) (13 - 35)  SpO2: 100% (25 May 2024 06:00) (98% - 100%)    O2 Parameters below as of 24 May 2024 20:00  Patient On (Oxygen Delivery Method): ventilator          Mode: AC/ CMV (Assist Control/ Continuous Mandatory Ventilation), RR (machine): 18, TV (machine): 460, FiO2: 30, PEEP: 8, ITime: 1, MAP: 16, PIP: 30  Plateau pressure:   P/F ratio:     05-23 @ 07:01  -  05-24 @ 07:00  --------------------------------------------------------  IN: 1121.8 mL / OUT: 1065 mL / NET: 56.8 mL    05-24 @ 07:01  -  05-25 @ 06:53  --------------------------------------------------------  IN: 545 mL / OUT: 515 mL / NET: 30 mL      CAPILLARY BLOOD GLUCOSE      POCT Blood Glucose.: 93 mg/dL (24 May 2024 17:35)      ECG: reviewed.    PHYSICAL EXAM:    GENERAL: NAD, lying in bed comfortably  HEAD:  Atraumatic, normocephalic  EYES: Pupils are equal, conjunctiva and sclera clear  NECK: Trach in place, placed on trach collar fio2 40%  HEART: Regular rate no murmurs appreciated   LUNGS: Unlabored respirations.  Clear to auscultation bilaterally, no crackles, wheezing, or rhonchi  ABDOMEN: Soft, nontender, nondistended, +BS  EXTREMITIES: 2+ peripheral pulses bilaterally, cap refill<2 secs. No clubbing, cyanosis, or edema  NERVOUS SYSTEM:  A&Ox3, following commands, moving all extremities, no focal deficits   SKIN: No rashes or lesions    MEDICATIONS:  MEDICATIONS  (STANDING):  ascorbic acid 500 milliGRAM(s) Oral daily  chlorhexidine 4% Liquid 1 Application(s) Topical every 12 hours  enoxaparin Injectable 40 milliGRAM(s) SubCutaneous every 12 hours  hydrocortisone sodium succinate Injectable 50 milliGRAM(s) IV Push daily  midodrine 20 milliGRAM(s) Oral three times a day  multivitamin 1 Tablet(s) Oral daily  pantoprazole  Injectable 40 milliGRAM(s) IV Push two times a day  QUEtiapine 200 milliGRAM(s) Oral <User Schedule>  senna Syrup 10 milliLiter(s) Oral at bedtime    MEDICATIONS  (PRN):  clonazePAM  Tablet 1.5 milliGRAM(s) Oral every 12 hours PRN agitation/anxiety  nystatin Powder 1 Application(s) Topical two times a day PRN skin irritation  QUEtiapine 25 milliGRAM(s) Oral every 6 hours PRN agitation  sodium chloride 0.9% lock flush 10 milliLiter(s) IV Push every 1 hour PRN Pre/post blood products, medications, blood draw, and to maintain line patency      ALLERGIES:  Allergies    No Known Allergies    Intolerances        LABS:                        7.3    71.51 )-----------( 173      ( 25 May 2024 00:12 )             24.4     05-25    144  |  108  |  13  ----------------------------<  93  3.4<L>   |  23  |  0.49<L>    Ca    7.8<L>      25 May 2024 00:14  Phos  2.6     05-25  Mg     2.8     05-25    TPro  5.7<L>  /  Alb  3.1<L>  /  TBili  0.8  /  DBili  x   /  AST  29  /  ALT  26  /  AlkPhos  58  05-25    PT/INR - ( 25 May 2024 00:12 )   PT: 12.8 sec;   INR: 1.23 ratio         PTT - ( 25 May 2024 00:12 )  PTT:21.2 sec  Urinalysis Basic - ( 25 May 2024 00:14 )    Color: x / Appearance: x / SG: x / pH: x  Gluc: 93 mg/dL / Ketone: x  / Bili: x / Urobili: x   Blood: x / Protein: x / Nitrite: x   Leuk Esterase: x / RBC: x / WBC x   Sq Epi: x / Non Sq Epi: x / Bacteria: x      ABG:  pH, Arterial: 7.49 (05-25-24 @ 00:02)  pCO2, Arterial: 35 mmHg (05-25-24 @ 00:02)  pO2, Arterial: 131 mmHg (05-25-24 @ 00:02)  pH, Arterial: 7.46 (05-24-24 @ 11:48)  pCO2, Arterial: 38 mmHg (05-24-24 @ 11:48)  pO2, Arterial: 130 mmHg (05-24-24 @ 11:48)      vBG:    Micro:    Culture - Blood (collected 05-22-24 @ 08:46)  Source: .Blood Blood-Peripheral  Preliminary Report (05-24-24 @ 12:01):    No growth at 48 Hours    Culture - Blood (collected 05-22-24 @ 08:28)  Source: .Blood Blood-Peripheral  Preliminary Report (05-24-24 @ 12:01):    No growth at 48 Hours    Culture - Blood (collected 05-20-24 @ 06:36)  Source: .Blood Blood-Peripheral  Preliminary Report (05-24-24 @ 11:00):    No growth at 4 days    Culture - Blood (collected 05-20-24 @ 06:36)  Source: .Blood Blood-Peripheral  Preliminary Report (05-24-24 @ 11:00):    No growth at 4 days    Culture - Blood (collected 05-18-24 @ 10:47)  Source: .Blood Blood-Venous  Final Report (05-23-24 @ 16:01):    No growth at 5 days    Culture - Blood (collected 05-18-24 @ 06:50)  Source: .Blood Blood-Peripheral  Final Report (05-23-24 @ 10:00):    No growth at 5 days    Culture - Blood (collected 05-11-24 @ 07:11)  Source: .Blood Blood-Peripheral  Final Report (05-16-24 @ 11:00):    No growth at 5 days    Culture - Blood (collected 05-10-24 @ 15:00)  Source: .Blood Blood-Peripheral  Final Report (05-15-24 @ 22:00):    No growth at 5 days    Culture - Blood (collected 05-06-24 @ 09:33)  Source: .Blood Blood-Peripheral  Final Report (05-11-24 @ 16:01):    No growth at 5 days    Culture - Blood (collected 05-06-24 @ 09:25)  Source: .Blood Blood-Peripheral  Final Report (05-11-24 @ 16:01):    No growth at 5 days    Culture - Blood (collected 05-03-24 @ 08:30)  Source: .Blood Blood-Peripheral  Final Report (05-08-24 @ 16:00):    No growth at 5 days    Culture - Blood (collected 05-03-24 @ 08:15)  Source: .Blood Blood-Peripheral  Final Report (05-08-24 @ 16:00):    No growth at 5 days    Culture - Blood (collected 04-29-24 @ 14:00)  Source: .Blood Blood  Final Report (05-04-24 @ 17:01):    No growth at 5 days    Culture - Blood (collected 04-29-24 @ 13:50)  Source: .Blood Blood  Final Report (05-04-24 @ 17:01):    No growth at 5 days    Culture - Blood (collected 04-26-24 @ 13:30)  Source: .Blood Blood-Peripheral  Final Report (05-01-24 @ 18:00):    No growth at 5 days    Culture - Blood (collected 04-26-24 @ 13:21)  Source: .Blood Blood-Venous  Final Report (05-01-24 @ 18:00):    No growth at 5 days        Culture - Sputum (collected 05-10-24 @ 21:49)  Source: Trach Asp Tracheal Aspirate  Gram Stain (05-11-24 @ 05:58):    Few polymorphonuclear leukocytes per low power field    Rare Squamous epithelial cells per low power field    No organisms seen per oil power field  Final Report (05-12-24 @ 11:42):    No growth    Culture - Sputum (collected 05-06-24 @ 00:37)  Source: .Sputum Sputum  Gram Stain (05-06-24 @ 11:48):    Moderate polymorphonuclear leukocytes per low power field    No Squamous epithelial cells per low power field    No organisms seen per oil power field  Final Report (05-07-24 @ 15:46):    Normal Respiratory Kayla present    Culture - Sputum (collected 04-26-24 @ 13:44)  Source: .Sputum Sputum  Gram Stain (04-26-24 @ 20:33):    Rare polymorphonuclear leukocytes per low power field    No Squamous epithelial cells per low power field    No organisms seen per oil power field  Final Report (04-28-24 @ 08:42):    No growth        RADIOLOGY & ADDITIONAL TESTS: Reviewed.

## 2024-05-25 NOTE — PROGRESS NOTE ADULT - ASSESSMENT
Ms Gordon is a 69F h/o CVA (bedbound at baseline), COPD, CHF, HTN presenting as transfer from [facility] for SOB iso leukocytosis to 233 c/f acute leukemia. Pt intubated and on pressors, transferred to MICU for further management, course c/b empyema s/p chest tube placement (4/16) and removal, reaccumulation of loculated pleural effusion s/p L pigtail placement and removal on 4/27. S/p EGD/PEG with GI, transferred to RCU on 5/9. Readmitted to MICU for septic vs hemorrhagic shock.     PLAN  =====Neurologic/Psych=====  #At Baseline, AOx4 mental status   #Anxiety, Depression   #Agitation   - L upper and lower extremity motor deficits iso hx of stroke   - Pain control w/ Tylenol  - Seroquel 200mg qhs, seroquel 25mg po q6hr prn agitation.    - Klonopin 1.5MG BID  - 5/18 ECG: NSR with PACs, .  -Psych following appreciate recs   -Wean precedex as tolerated    =====Pulmonary=====  #Acute hypoxic respiratory failure  #Empyema   #Pansensitive Strep Pneumo  - S/p thora draining 1500cc fluid 4/16 with chest tube placement; c/w exudative effusion growing Strep Pneumo  - Was on vanc/zosyn/azithro (4/16)-->deescalated to CTX (4/17 -4/23), Added on Vancomycin (4/23-) due to MRSE 1/2 Cx, expanded coverage to Cefepime (4/23 1x dose), back on CTX, switched to Unasyn 3g Q6 (4/25-5/8), Augmentin (5/9--5/19 stopped as switched to zosyn as below)  - S/p Trach placement 4/28  -Aggressive airway management with Duoneb, chest PT and suctioning PRN.  -Trach Vent Settings: AC/CMV,  RR 14 Fio2 40, Peep 5  -Bedside Pocus 5/20: right sided lobar pneumonia      =====Cardiovascular=====  #Septic vs Hemorrhagic Shock  #Hypotension  Eval for TLS vs RP bleed once stable  - 2/2 Strep Pneumo Empyema and Bacteremia s/p Pigtail catheter   - C/w Midodrine to 30 TID   - s/p Solu-cortef 50 mg IV qd 5/6-5/8;   - MAP goal > 60   - Hematology following appreciate recs  - Obtain central access, transfuse goal >7  -5/21: weaned pt down to 1 pressor (levo)  -5/23: No pressors    #CHF  - Unclear hx but elevated pro-BNP to 2600s on admission  - TTE: EF 54%, no significant abnormalities  - Diurese as needed;    =====GI=====  #GERD  - Protonix 40mg IV BID    #Diet  -tube feeds    =====Renal/=====  #Electrolytes  - Maintain K>4, Phos>3, Mag>2, iCal>1    =====Endocrine=====  - ISS q6h while receiving Tube feeds    =====Infectious Disease=====  #Empyema 2/2 Pansensitive Strep Pneumo, resolved  #Strep Pneumo Bacteremia, resolved  #New Fevers/Sepsis  -Repeat blood and urine culture pending  -C/w Zosyn q8hrs + 1g Vanc X1(Hospital acquired pneumonia) + 1g vanc X1, check vanc level  -Consider repeat ID Consult  -d/c zosyn 5/24    =====Heme/Onc=====  #Leukocytosis  #CLL  -  on presentation; downtrending to 96.18 on 5/22  - No plan for leukophoresis at this time given mature lymphocytes per heme  - CT Chest A&P showing diffuse axillary, inguinal mediastinal RP lymphadenopathy.  - Appreciate Heme recs; can plan for bone marrow bx w/ IR if in line with GOC    #L Common Femoral Vein DVT, non occlusive    - 4/24 BL LE doppler - nonocclusive left common femoral vein DVT  - 4/24 BL UE doppler - acute left basilic SVT, no acute DVT  - Hold AC iso of acute anemia   - b/l LLE vein duplex: L. common femoral DVT resolved. No acute DVT  -b/l LLE arterial duplex: Stenosis of r. superficial aa. in distal thigh. Stenosis of distal external iliac aa. + common femoral aa. + L. superficial and L. anterior tibial.   -Vascular consult pending to arterial duplex findings    #Hematoma:  -CTA C/A/P: No active bleeding within a large multilobed heterogenous collection extending from R. lower neck into the right axilla and along right lateral chest wall c/w hematoma.   -Surgery recommends f/u with heme for coagulopathy. No surgical intervention at this time.    =====Ethics=====  FULL CODE  Only known family member is Aunt Mili Gordon ([phone], [phone]) who lives in [place] has been involved in care of patient  Palliative care following    Ms Gordon is a 69F h/o CVA (bedbound at baseline), COPD, CHF, HTN presenting as transfer from [facility] for SOB iso leukocytosis to 233 c/f acute leukemia. Pt intubated and on pressors, transferred to MICU for further management, course c/b empyema s/p chest tube placement (4/16) and removal, reaccumulation of loculated pleural effusion s/p L pigtail placement and removal on 4/27. S/p EGD/PEG with GI, transferred to RCU on 5/9. Readmitted to MICU for septic vs hemorrhagic shock.     PLAN  =====Neurologic/Psych=====  #At Baseline, AOx4 mental status   #Anxiety, Depression   #Agitation   - L upper and lower extremity motor deficits iso hx of stroke   - Pain control w/ Tylenol  - Seroquel 200mg qhs, seroquel 25mg po q6hr prn agitation.    - Klonopin 1.5MG BID  - 5/18 ECG: NSR with PACs, .  -Psych following appreciate recs   -Wean precedex as tolerated    =====Pulmonary=====  #Acute hypoxic respiratory failure  #Empyema   #Pansensitive Strep Pneumo  - S/p thora draining 1500cc fluid 4/16 with chest tube placement; c/w exudative effusion growing Strep Pneumo  - Was on vanc/zosyn/azithro (4/16)-->deescalated to CTX (4/17 -4/23), Added on Vancomycin (4/23-) due to MRSE 1/2 Cx, expanded coverage to Cefepime (4/23 1x dose), back on CTX, switched to Unasyn 3g Q6 (4/25-5/8), Augmentin (5/9--5/19 stopped as switched to zosyn as below)  - S/p Trach placement 4/28  -Aggressive airway management with Duoneb, chest PT and suctioning PRN.  -Trach Vent Settings: AC/CMV,  RR 14 Fio2 40, Peep 5--trial trach collar 5/25  -Bedside Pocus 5/20: right sided lobar pneumonia      =====Cardiovascular=====  #Septic vs Hemorrhagic Shock  #Hypotension  Eval for TLS vs RP bleed once stable  - 2/2 Strep Pneumo Empyema and Bacteremia s/p Pigtail catheter   - C/w Midodrine to 30 TID   - s/p Solu-cortef 50 mg IV qd 5/6-5/8;   - MAP goal > 60   - Hematology following appreciate recs  - Obtain central access, transfuse goal >7  -5/21: weaned pt down to 1 pressor (levo)  -5/23: No pressors    #CHF  - Unclear hx but elevated pro-BNP to 2600s on admission  - TTE: EF 54%, no significant abnormalities  - Diurese as needed;    =====GI=====  #GERD  - Protonix 40mg IV BID    #Diet  -tube feeds; concern for suction TF ovn 5/25--restart trickle at 15cc/hr    =====Renal/=====  #Electrolytes  - Maintain K>4, Phos>3, Mag>2, iCal>1    =====Endocrine=====  - ISS q6h while receiving Tube feeds    =====Infectious Disease=====  #Empyema 2/2 Pansensitive Strep Pneumo, resolved  #Strep Pneumo Bacteremia, resolved  #New Fevers/Sepsis  -Repeat blood and urine culture pending  -C/w Zosyn q8hrs + 1g Vanc X1(Hospital acquired pneumonia) + 1g vanc X1, check vanc level  -Consider repeat ID Consult  -d/c zosyn 5/24    =====Heme/Onc=====  #Leukocytosis  #CLL  -  on presentation; downtrending to 96.18 on 5/22  - No plan for leukophoresis at this time given mature lymphocytes per heme  - CT Chest A&P showing diffuse axillary, inguinal mediastinal RP lymphadenopathy.  - Appreciate Heme recs; can plan for bone marrow bx w/ IR if in line with GOC    #L Common Femoral Vein DVT, non occlusive    - 4/24 BL LE doppler - nonocclusive left common femoral vein DVT  - 4/24 BL UE doppler - acute left basilic SVT, no acute DVT  - Hold AC iso of acute anemia   - b/l LLE vein duplex: L. common femoral DVT resolved. No acute DVT  -b/l LLE arterial duplex: Stenosis of r. superficial aa. in distal thigh. Stenosis of distal external iliac aa. + common femoral aa. + L. superficial and L. anterior tibial.   -Vascular consult pending to arterial duplex findings  -H&H stable restart Lovenox subq QD at prophylaxis level    #Hematoma:  -CTA C/A/P: No active bleeding within a large multilobed heterogenous collection extending from R. lower neck into the right axilla and along right lateral chest wall c/w hematoma.   -Surgery recommends f/u with heme for coagulopathy. No surgical intervention at this time.  -CBC qd, h&H stable    =====Ethics=====  FULL CODE  Only known family member is Aunt Mili Gordon ([phone], [phone]) who lives in [place] has been involved in care of patient  Palliative care following    Ms Gordon is a 69F h/o CVA (bedbound at baseline), COPD, CHF, HTN presenting as transfer from [facility] for SOB iso leukocytosis to 233 c/f acute leukemia. Pt intubated and on pressors, transferred to MICU for further management, course c/b empyema s/p chest tube placement (4/16) and removal, reaccumulation of loculated pleural effusion s/p L pigtail placement and removal on 4/27. S/p EGD/PEG with GI, transferred to RCU on 5/9. Readmitted to MICU for septic vs hemorrhagic shock.     PLAN  =====Neurologic/Psych=====  #At Baseline, AOx4 mental status   #Anxiety, Depression   #Agitation   - L upper and lower extremity motor deficits iso hx of stroke   - Pain control w/ Tylenol  - Seroquel 200mg qhs, seroquel 25mg po q6hr prn agitation.    - Klonopin 1.5MG BID  - 5/18 ECG: NSR with PACs, .  -Psych following appreciate recs   -Wean precedex as tolerated    =====Pulmonary=====  #Acute hypoxic respiratory failure  #Empyema   #Pansensitive Strep Pneumo  - S/p thora draining 1500cc fluid 4/16 with chest tube placement; c/w exudative effusion growing Strep Pneumo  - Was on vanc/zosyn/azithro (4/16)-->deescalated to CTX (4/17 -4/23), Added on Vancomycin (4/23-) due to MRSE 1/2 Cx, expanded coverage to Cefepime (4/23 1x dose), back on CTX, switched to Unasyn 3g Q6 (4/25-5/8), Augmentin (5/9--5/19 stopped as switched to zosyn as below)  - S/p Trach placement 4/28  -Aggressive airway management with Duoneb, chest PT and suctioning PRN.  -Trach Vent Settings: AC/CMV,  RR 14 Fio2 40, Peep 5--trial trach collar 5/25  -Bedside Pocus 5/20: right sided lobar pneumonia      =====Cardiovascular=====  #Septic vs Hemorrhagic Shock  #Hypotension  Eval for TLS vs RP bleed once stable  - 2/2 Strep Pneumo Empyema and Bacteremia s/p Pigtail catheter   - C/w Midodrine to 30 TID   - s/p Solu-cortef 50 mg IV qd 5/25, transition to 25mg QDx1 on 26 then dc;   - MAP goal > 60   - Hematology following appreciate recs  - transfuse goal >7  -5/23: No pressors    #CHF  - Unclear hx but elevated pro-BNP to 2600s on admission  - TTE: EF 54%, no significant abnormalities  - Diurese as needed;    =====GI=====  #GERD  - Protonix 40mg IV BID    #Diet  -tube feeds; concern for suction TF ovn 5/25--restart trickle at 15cc/hr    =====Renal/=====  #Electrolytes  - Maintain K>4, Phos>3, Mag>2, iCal>1    =====Endocrine=====  - ISS q6h while receiving Tube feeds    =====Infectious Disease=====  #Empyema 2/2 Pansensitive Strep Pneumo, resolved  #Strep Pneumo Bacteremia, resolved  #New Fevers/Sepsis  -Repeat blood and urine culture pending  -C/w Zosyn q8hrs + 1g Vanc X1(Hospital acquired pneumonia) + 1g vanc X1, check vanc level  -Consider repeat ID Consult  -d/c zosyn 5/24    =====Heme/Onc=====  #Leukocytosis  #CLL  -  on presentation; downtrending to 96.18 on 5/22  - No plan for leukophoresis at this time given mature lymphocytes per heme  - CT Chest A&P showing diffuse axillary, inguinal mediastinal RP lymphadenopathy.  - Appreciate Heme recs; can plan for bone marrow bx w/ IR if in line with GOC    #L Common Femoral Vein DVT, non occlusive    - 4/24 BL LE doppler - nonocclusive left common femoral vein DVT  - 4/24 BL UE doppler - acute left basilic SVT, no acute DVT  - Hold AC iso of acute anemia   - b/l LLE vein duplex: L. common femoral DVT resolved. No acute DVT  -b/l LLE arterial duplex: Stenosis of r. superficial aa. in distal thigh. Stenosis of distal external iliac aa. + common femoral aa. + L. superficial and L. anterior tibial.   -Vascular consult pending to arterial duplex findings  -H&H stable restart Lovenox subq QD at prophylaxis level    #Hematoma:  -CTA C/A/P: No active bleeding within a large multilobed heterogenous collection extending from R. lower neck into the right axilla and along right lateral chest wall c/w hematoma.   -Surgery recommends f/u with heme for coagulopathy. No surgical intervention at this time.  -CBC qd, h&H stable    =====Ethics=====  FULL CODE  Only known family member is Aunt Mili Gordon ([phone], [phone]) who lives in [place] has been involved in care of patient  Palliative care following    Ms Gordon is a 69F h/o CVA (bedbound at baseline), COPD, CHF, HTN presenting as transfer from [facility] for SOB iso leukocytosis to 233 c/f acute leukemia. Pt intubated and on pressors, transferred to MICU for further management, course c/b empyema s/p chest tube placement (4/16) and removal, reaccumulation of loculated pleural effusion s/p L pigtail placement and removal on 4/27. S/p EGD/PEG with GI, transferred to RCU on 5/9. Readmitted to MICU for septic vs hemorrhagic shock.     PLAN  =====Neurologic/Psych=====  #At Baseline, AOx4 mental status   #Anxiety, Depression   #Agitation   - L upper and lower extremity motor deficits iso hx of stroke   - Pain control w/ Tylenol  - Seroquel 200mg qhs, seroquel 25mg po q6hr prn agitation.    - Klonopin 1.5MG BID  - 5/18 ECG: NSR with PACs, .  -Psych following appreciate recs   -Wean precedex as tolerated    =====Pulmonary=====  #Acute hypoxic respiratory failure  #Empyema   #Pansensitive Strep Pneumo  - S/p thora draining 1500cc fluid 4/16 with chest tube placement; c/w exudative effusion growing Strep Pneumo  - Was on vanc/zosyn/azithro (4/16)-->deescalated to CTX (4/17 -4/23), Added on Vancomycin (4/23-) due to MRSE 1/2 Cx, expanded coverage to Cefepime (4/23 1x dose), back on CTX, switched to Unasyn 3g Q6 (4/25-5/8), Augmentin (5/9--5/19 stopped as switched to zosyn as below)  - S/p Trach placement 4/28  -Aggressive airway management with Duoneb, chest PT and suctioning PRN.  -Trach Vent Settings: AC/CMV,  RR 14 Fio2 40, Peep 5--trial trach collar 5/25  -Bedside Pocus 5/20: right sided lobar pneumonia      =====Cardiovascular=====  #Septic vs Hemorrhagic Shock  #Hypotension  Eval for TLS vs RP bleed once stable  - 2/2 Strep Pneumo Empyema and Bacteremia s/p Pigtail catheter   - C/w Midodrine to 30 TID   - s/p Solu-cortef 50 mg IV qd 5/25, transition to 25mg QDx1 on 26 then dc;   - MAP goal > 60   - Hematology following appreciate recs  - transfuse goal >7  -5/23: No pressors    #CHF  - Unclear hx but elevated pro-BNP to 2600s on admission  - TTE: EF 54%, no significant abnormalities  - Diurese as needed;    =====GI=====  #GERD  - Protonix 40mg IV BID    #Diet  -tube feeds; concern for suction TF ovn 5/25--restart trickle at 15cc/hr    =====Renal/=====  #Electrolytes  - Maintain K>4, Phos>3, Mag>2, iCal>1    =====Endocrine=====  - ISS q6h while receiving Tube feeds    =====Infectious Disease=====  #Empyema 2/2 Pansensitive Strep Pneumo, resolved  #Strep Pneumo Bacteremia, resolved  #New Fevers/Sepsis  -Repeat blood and urine culture pending  -C/w Zosyn q8hrs + 1g Vanc X1(Hospital acquired pneumonia) + 1g vanc X1, check vanc level    -d/c zosyn 5/24    =====Heme/Onc=====  #Leukocytosis  #CLL  -  on presentation; downtrending to 96.18 on 5/22  - No plan for leukophoresis at this time given mature lymphocytes per heme  - CT Chest A&P showing diffuse axillary, inguinal mediastinal RP lymphadenopathy.  - Appreciate Heme recs; can plan for bone marrow bx w/ IR if in line with GOC    #L Common Femoral Vein DVT, non occlusive    - 4/24 BL LE doppler - nonocclusive left common femoral vein DVT  - 4/24 BL UE doppler - acute left basilic SVT, no acute DVT  - Hold AC iso of acute anemia   - b/l LLE vein duplex: L. common femoral DVT resolved. No acute DVT  -b/l LLE arterial duplex: Stenosis of r. superficial aa. in distal thigh. Stenosis of distal external iliac aa. + common femoral aa. + L. superficial and L. anterior tibial.   -Vascular consult pending to arterial duplex findings  -H&H stable restart Lovenox subq QD at prophylaxis level    #Hematoma:  -CTA C/A/P: No active bleeding within a large multilobed heterogenous collection extending from R. lower neck into the right axilla and along right lateral chest wall c/w hematoma.   -Surgery recommends f/u with heme for coagulopathy. No surgical intervention at this time.  -CBC qd, h&H stable    =====Ethics=====  FULL CODE  Only known family member is Aunt Mili Gordon ([phone], [phone]) who lives in [place] has been involved in care of patient  Palliative care following

## 2024-05-26 LAB
BASOPHILS # BLD AUTO: 0 K/UL — SIGNIFICANT CHANGE UP (ref 0–0.2)
BASOPHILS NFR BLD AUTO: 0 % — SIGNIFICANT CHANGE UP (ref 0–2)
EOSINOPHIL # BLD AUTO: 0 K/UL — SIGNIFICANT CHANGE UP (ref 0–0.5)
EOSINOPHIL NFR BLD AUTO: 0 % — SIGNIFICANT CHANGE UP (ref 0–6)
GIANT PLATELETS BLD QL SMEAR: PRESENT — SIGNIFICANT CHANGE UP
GLUCOSE BLDC GLUCOMTR-MCNC: 100 MG/DL — HIGH (ref 70–99)
GLUCOSE BLDC GLUCOMTR-MCNC: 109 MG/DL — HIGH (ref 70–99)
GLUCOSE BLDC GLUCOMTR-MCNC: 87 MG/DL — SIGNIFICANT CHANGE UP (ref 70–99)
GLUCOSE BLDC GLUCOMTR-MCNC: 89 MG/DL — SIGNIFICANT CHANGE UP (ref 70–99)
HCT VFR BLD CALC: 25.1 % — LOW (ref 34.5–45)
HGB BLD-MCNC: 7.6 G/DL — LOW (ref 11.5–15.5)
LYMPHOCYTES # BLD AUTO: 58.72 K/UL — HIGH (ref 1–3.3)
LYMPHOCYTES # BLD AUTO: 94 % — HIGH (ref 13–44)
MAGNESIUM SERPL-MCNC: 2.7 MG/DL — HIGH (ref 1.6–2.6)
MANUAL SMEAR VERIFICATION: SIGNIFICANT CHANGE UP
MCHC RBC-ENTMCNC: 28.9 PG — SIGNIFICANT CHANGE UP (ref 27–34)
MCHC RBC-ENTMCNC: 30.3 GM/DL — LOW (ref 32–36)
MCV RBC AUTO: 95.4 FL — SIGNIFICANT CHANGE UP (ref 80–100)
MONOCYTES # BLD AUTO: 0 K/UL — SIGNIFICANT CHANGE UP (ref 0–0.9)
MONOCYTES NFR BLD AUTO: 0 % — LOW (ref 2–14)
NEUTROPHILS # BLD AUTO: 3.75 K/UL — SIGNIFICANT CHANGE UP (ref 1.8–7.4)
NEUTROPHILS NFR BLD AUTO: 6 % — LOW (ref 43–77)
PHOSPHATE SERPL-MCNC: 2.1 MG/DL — LOW (ref 2.5–4.5)
PLAT MORPH BLD: NORMAL — SIGNIFICANT CHANGE UP
PLATELET # BLD AUTO: 193 K/UL — SIGNIFICANT CHANGE UP (ref 150–400)
RBC # BLD: 2.63 M/UL — LOW (ref 3.8–5.2)
RBC # FLD: 20.5 % — HIGH (ref 10.3–14.5)
RBC BLD AUTO: SIGNIFICANT CHANGE UP
WBC # BLD: 62.47 K/UL — CRITICAL HIGH (ref 3.8–10.5)
WBC # FLD AUTO: 62.47 K/UL — CRITICAL HIGH (ref 3.8–10.5)

## 2024-05-26 PROCEDURE — 99233 SBSQ HOSP IP/OBS HIGH 50: CPT

## 2024-05-26 RX ORDER — HYDROMORPHONE HYDROCHLORIDE 2 MG/ML
1 INJECTION INTRAMUSCULAR; INTRAVENOUS; SUBCUTANEOUS ONCE
Refills: 0 | Status: DISCONTINUED | OUTPATIENT
Start: 2024-05-26 | End: 2024-05-26

## 2024-05-26 RX ORDER — CLONAZEPAM 1 MG
1.5 TABLET ORAL EVERY 12 HOURS
Refills: 0 | Status: DISCONTINUED | OUTPATIENT
Start: 2024-05-26 | End: 2024-05-30

## 2024-05-26 RX ORDER — ACETAMINOPHEN 500 MG
650 TABLET ORAL EVERY 6 HOURS
Refills: 0 | Status: DISCONTINUED | OUTPATIENT
Start: 2024-05-26 | End: 2024-06-06

## 2024-05-26 RX ADMIN — Medication 650 MILLIGRAM(S): at 21:53

## 2024-05-26 RX ADMIN — PANTOPRAZOLE SODIUM 40 MILLIGRAM(S): 20 TABLET, DELAYED RELEASE ORAL at 05:05

## 2024-05-26 RX ADMIN — ENOXAPARIN SODIUM 40 MILLIGRAM(S): 100 INJECTION SUBCUTANEOUS at 17:07

## 2024-05-26 RX ADMIN — Medication 500 MILLIGRAM(S): at 12:02

## 2024-05-26 RX ADMIN — Medication 1 TABLET(S): at 12:02

## 2024-05-26 RX ADMIN — CHLORHEXIDINE GLUCONATE 1 APPLICATION(S): 213 SOLUTION TOPICAL at 05:05

## 2024-05-26 RX ADMIN — CHLORHEXIDINE GLUCONATE 1 APPLICATION(S): 213 SOLUTION TOPICAL at 17:07

## 2024-05-26 RX ADMIN — PANTOPRAZOLE SODIUM 40 MILLIGRAM(S): 20 TABLET, DELAYED RELEASE ORAL at 17:08

## 2024-05-26 RX ADMIN — Medication 650 MILLIGRAM(S): at 22:30

## 2024-05-26 RX ADMIN — QUETIAPINE FUMARATE 200 MILLIGRAM(S): 200 TABLET, FILM COATED ORAL at 21:54

## 2024-05-26 RX ADMIN — ENOXAPARIN SODIUM 40 MILLIGRAM(S): 100 INJECTION SUBCUTANEOUS at 05:05

## 2024-05-26 RX ADMIN — HYDROMORPHONE HYDROCHLORIDE 1 MILLIGRAM(S): 2 INJECTION INTRAMUSCULAR; INTRAVENOUS; SUBCUTANEOUS at 12:26

## 2024-05-26 RX ADMIN — HYDROMORPHONE HYDROCHLORIDE 1 MILLIGRAM(S): 2 INJECTION INTRAMUSCULAR; INTRAVENOUS; SUBCUTANEOUS at 11:12

## 2024-05-26 RX ADMIN — Medication 1.5 MILLIGRAM(S): at 17:07

## 2024-05-26 NOTE — PROGRESS NOTE ADULT - PROBLEM SELECTOR PLAN 5
hypotensive on admission   - required pressors in ICU  - now on midodrine 30mg Q8H   - Will wean as tolerated, add antiHTN as needed  - monitor BP Alert and oriented to person, place, time/situation. normal mood and affect. no apparent risk to self or others. hypotensive on admission   - required pressors in ICU  - now on midodrine 20mg Q8H   - Will wean as tolerated, add antiHTN as needed  - monitor BP

## 2024-05-26 NOTE — PROGRESS NOTE ADULT - ASSESSMENT
69F h/o CVA (bedbound at baseline), COPD, CHF, HTN who initially p/w acute shortness of breath to outside ED and was treated for acute pulmonary edema with sublingual nitro x 2, Lasix, and BiPAP. Pt was also found to be febrile to 103, so treated for PNA. BIPAP led to improvement in O2 to 89-90. Pt transferred to [place] on 4/16/24 for white count of 233 and possible leukophoresis. In the ED, pt intubated iso respiratory tiring and decreasing mental status, and also was started on levophed for hypotension. Following intubation and initiation of pressors, she was admitted to the MICU for AHRF and septic shock.  In MICU, heme was consulted for hyperleukocytosis, however due to mature lymphocytic leukophoresis was not performed.  Course c/b empyema s/p chest tube placement (4/16) and removal, reaccumulation of loculated pleural effusion s/p L pigtail placement and removal on 4/27.   S/p treatment with ceftriaxone (4/18-4/25) for s. pneumo bacteremia and empyema, continuing with Augmentin PO x4 weeks with completion on 5/14.  S/p PEG 5/7.  Transitioned off IV pressors and on midodrine and droxidopa.  Transferred to RCU 5/9.  Weaning as tolerated.  Aggressive airway management with Duoneb chest PT and suctioning PRN.       5/19:  Febrile, tachycardic today - pancultured.  Tolerating TC throughout the day, will RTV at night pending infectious w/u.   69F h/o CVA (bedbound at baseline), COPD, CHF, HTN who initially p/w acute shortness of breath to outside ED and was treated for acute pulmonary edema with sublingual nitro x 2, Lasix, and BiPAP. Pt was also found to be febrile to 103, so treated for PNA. BIPAP led to improvement in O2 to 89-90. Pt transferred to [place] on 4/16/24 for white count of 233 and possible leukophoresis. In the ED, pt intubated iso respiratory tiring and decreasing mental status, and also was started on levophed for hypotension. Following intubation and initiation of pressors, she was admitted to the MICU for AHRF and septic shock.  In MICU, heme was consulted for hyperleukocytosis, however due to mature lymphocytic leukophoresis was not performed.  Course c/b empyema s/p chest tube placement (4/16) and removal, reaccumulation of loculated pleural effusion s/p L pigtail placement and removal on 4/27.   S/p treatment with ceftriaxone (4/18-4/25) for s. pneumo bacteremia and empyema, continuing with Augmentin PO x4 weeks with completion on 5/14.  S/p PEG 5/7.  Transitioned off IV pressors and on midodrine and droxidopa.  Transferred to RCU 5/9.  S/p RRT 5/20 for hypoxia, readmitted to MICU for septic vs hemorrhagic shock requiring pressors.  Received 3 units of PRBCs, CT C/A/P w/ no active bleeding within a large multilobed heterogenous collection extending from R. lower neck into the right axilla and along the right lateral chest wall consistent with hematoma - no surgical intervention necessary.  Treated empirically with Zosyn until 5/24.  Returned to RCU 5/26.  Weaning as tolerated.  Aggressive airway management with Duoneb chest PT and suctioning PRN.       5/26:  Pt transferred to RCU overnight.  Stable throughout the day.  Was able to phonate over trach/vent - may need trach looked at by ENT (upsized or XLT)? Speech following patient.  Tolerated a couple of hours of trach collar today but then became hypertensive, tachycardic, and complained of SOB.  ?Vascular consult for findings on arterial doppler.

## 2024-05-26 NOTE — PROGRESS NOTE ADULT - PROBLEM SELECTOR PLAN 6
- 4/24 BL LE doppler - nonocclusive left common femoral vein DVT  - 4/24 BL UE doppler - acute left basilic SVT, no acute DVT  - Continue with Lovenox 110mg Q12H - 4/24 BL LE doppler - nonocclusive left common femoral vein DVT  - 4/24 BL UE doppler - acute left basilic SVT, no acute DVT  - 5/21 venous doppler BL LE - resolved left common fem DVT  - 5/21 arterial doppler BL LE - BL LE arterial vascular disease  - continue with lovenox as ordered  - vascular consult for arterial duplex findings

## 2024-05-26 NOTE — PROGRESS NOTE ADULT - SUBJECTIVE AND OBJECTIVE BOX
Patient is a 69y old  Female who presents with a chief complaint of Transfer for leukophoresis (25 May 2024 06:52)      Interval Events:    REVIEW OF SYSTEMS:  [ ] Positive  [ ] All other systems negative  [ ] Unable to assess ROS because ________    Vital Signs Last 24 Hrs  T(C): 37.2 (05-26-24 @ 11:35), Max: 37.7 (05-26-24 @ 03:50)  T(F): 99 (05-26-24 @ 11:35), Max: 99.8 (05-26-24 @ 03:50)  HR: 106 (05-26-24 @ 11:35) (82 - 107)  BP: 148/78 (05-26-24 @ 11:35) (143/82 - 162/73)  RR: 18 (05-26-24 @ 11:35) (16 - 28)  SpO2: 96% (05-26-24 @ 11:35) (95% - 100%)    PHYSICAL EXAM:  HEENT:   [ ]Tracheostomy:  [ ]Pupils equal  [ ]No oral lesions  [ ]Abnormal    SKIN  [ ]No Rash  [ ] Abnormal  [ ] pressure    CARDIAC  [ ]Regular  [ ]Abnormal    PULMONARY  [ ]Bilateral Clear Breath Sounds  [ ]Normal Excursion  [ ]Abnormal    GI  [ ]PEG      [ ] +BS		              [ ]Soft, nondistended, nontender	  [ ]Abnormal    MUSCULOSKELETAL                                   [ ]Bedbound                 [ ]Abnormal    [ ]Ambulatory/OOB to chair                           EXTREMITIES                                         [ ]Normal  [ ]Edema                           NEUROLOGIC  [ ] Normal, non focal  [ ] Focal findings:    PSYCHIATRIC  [ ]Alert and appropriate  [ ] Sedated	 [ ]Agitated    :  Andino: [ ] Yes, if yes: Date of Placement:                   [  ] No    LINES: Central Lines [ ] Yes, if yes: Date of Placement                                     [  ] No    HOSPITAL MEDICATIONS:  MEDICATIONS  (STANDING):  ascorbic acid 500 milliGRAM(s) Oral daily  chlorhexidine 4% Liquid 1 Application(s) Topical every 12 hours  clonazePAM  Tablet 1.5 milliGRAM(s) Oral every 12 hours  enoxaparin Injectable 40 milliGRAM(s) SubCutaneous every 12 hours  hydrocortisone sodium succinate Injectable 25 milliGRAM(s) IV Push once  midodrine 20 milliGRAM(s) Oral three times a day  multivitamin 1 Tablet(s) Oral daily  pantoprazole  Injectable 40 milliGRAM(s) IV Push two times a day  QUEtiapine 200 milliGRAM(s) Oral <User Schedule>  senna Syrup 10 milliLiter(s) Oral at bedtime    MEDICATIONS  (PRN):  nystatin Powder 1 Application(s) Topical two times a day PRN skin irritation  QUEtiapine 25 milliGRAM(s) Oral every 6 hours PRN agitation  sodium chloride 0.9% lock flush 10 milliLiter(s) IV Push every 1 hour PRN Pre/post blood products, medications, blood draw, and to maintain line patency      LABS:                        7.6    62.47 )-----------( 193      ( 26 May 2024 06:47 )             25.1     05-25    144  |  108  |  13  ----------------------------<  93  3.4<L>   |  23  |  0.49<L>    Ca    7.8<L>      25 May 2024 00:14  Phos  2.1     05-26  Mg     2.7     05-26    TPro  5.7<L>  /  Alb  3.1<L>  /  TBili  0.8  /  DBili  x   /  AST  29  /  ALT  26  /  AlkPhos  58  05-25    PT/INR - ( 25 May 2024 00:12 )   PT: 12.8 sec;   INR: 1.23 ratio         PTT - ( 25 May 2024 00:12 )  PTT:21.2 sec  Urinalysis Basic - ( 25 May 2024 00:14 )    Color: x / Appearance: x / SG: x / pH: x  Gluc: 93 mg/dL / Ketone: x  / Bili: x / Urobili: x   Blood: x / Protein: x / Nitrite: x   Leuk Esterase: x / RBC: x / WBC x   Sq Epi: x / Non Sq Epi: x / Bacteria: x      Arterial Blood Gas:  05-25 @ 00:02  7.49/35/131/27/98.9/3.2  ABG lactate: --  Arterial Blood Gas:  05-24 @ 11:48  7.46/38/130/27/98.9/3.0  ABG lactate: --      CAPILLARY BLOOD GLUCOSE    MICROBIOLOGY:     RADIOLOGY:  [ ] Reviewed and interpreted by me    Mode: turned off by bedside  FiO2: 40   Patient is a 69y old  Female who presents with a chief complaint of Transfer for leukophoresis (25 May 2024 06:52)      Interval Events:   Transferred to RCU overnight.    REVIEW OF SYSTEMS:  [ ] Positive  [X] All other systems negative  [ ] Unable to assess ROS because ________    Vital Signs Last 24 Hrs  T(C): 37.2 (05-26-24 @ 11:35), Max: 37.7 (05-26-24 @ 03:50)  T(F): 99 (05-26-24 @ 11:35), Max: 99.8 (05-26-24 @ 03:50)  HR: 106 (05-26-24 @ 11:35) (82 - 107)  BP: 148/78 (05-26-24 @ 11:35) (143/82 - 162/73)  RR: 18 (05-26-24 @ 11:35) (16 - 28)  SpO2: 96% (05-26-24 @ 11:35) (95% - 100%)    PHYSICAL EXAM:  HEENT:   [X]Tracheostomy: #7 cuffed portex  [X]Pupils equal  [ ]No oral lesions  [ ]Abnormal    SKIN  [ ]No Rash  [ ] Abnormal  [X] pressure - sacral DTI, right heel DTI    CARDIAC  [ ]Regular  [X]Abnormal - mild tachycardia    PULMONARY  [ ]Bilateral Clear Breath Sounds  [ ]Normal Excursion  [X]Abnormal - mild coarse BL BS    GI  [X]PEG      [X] +BS		              [X]Soft, nondistended, nontender	  [ ]Abnormal    MUSCULOSKELETAL                                   [X]Bedbound                 [ ]Abnormal    [ ]Ambulatory/OOB to chair                           EXTREMITIES                                         [X]Normal  [ ]Edema                           NEUROLOGIC  [ ] Normal, non focal  [X] Focal findings: phonating over trach, following commands on all 4 extremities R>L    PSYCHIATRIC  [X]Alert and appropriate  [ ] Sedated	 [ ]Agitated    :  Andino: [ ] Yes, if yes: Date of Placement:                   [X] No    LINES: Central Lines [ ] Yes, if yes: Date of Placement                                     [X] No    HOSPITAL MEDICATIONS:  MEDICATIONS  (STANDING):  ascorbic acid 500 milliGRAM(s) Oral daily  chlorhexidine 4% Liquid 1 Application(s) Topical every 12 hours  clonazePAM  Tablet 1.5 milliGRAM(s) Oral every 12 hours  enoxaparin Injectable 40 milliGRAM(s) SubCutaneous every 12 hours  hydrocortisone sodium succinate Injectable 25 milliGRAM(s) IV Push once  midodrine 20 milliGRAM(s) Oral three times a day  multivitamin 1 Tablet(s) Oral daily  pantoprazole  Injectable 40 milliGRAM(s) IV Push two times a day  QUEtiapine 200 milliGRAM(s) Oral <User Schedule>  senna Syrup 10 milliLiter(s) Oral at bedtime    MEDICATIONS  (PRN):  nystatin Powder 1 Application(s) Topical two times a day PRN skin irritation  QUEtiapine 25 milliGRAM(s) Oral every 6 hours PRN agitation  sodium chloride 0.9% lock flush 10 milliLiter(s) IV Push every 1 hour PRN Pre/post blood products, medications, blood draw, and to maintain line patency    LABS:                        7.6    62.47 )-----------( 193      ( 26 May 2024 06:47 )             25.1     05-25    144  |  108  |  13  ----------------------------<  93  3.4<L>   |  23  |  0.49<L>    Ca    7.8<L>      25 May 2024 00:14  Phos  2.1     05-26  Mg     2.7     05-26    TPro  5.7<L>  /  Alb  3.1<L>  /  TBili  0.8  /  DBili  x   /  AST  29  /  ALT  26  /  AlkPhos  58  05-25    PT/INR - ( 25 May 2024 00:12 )   PT: 12.8 sec;   INR: 1.23 ratio      PTT - ( 25 May 2024 00:12 )  PTT:21.2 sec  Urinalysis Basic - ( 25 May 2024 00:14 )    Color: x / Appearance: x / SG: x / pH: x  Gluc: 93 mg/dL / Ketone: x  / Bili: x / Urobili: x   Blood: x / Protein: x / Nitrite: x   Leuk Esterase: x / RBC: x / WBC x   Sq Epi: x / Non Sq Epi: x / Bacteria: x    Arterial Blood Gas:  05-25 @ 00:02  7.49/35/131/27/98.9/3.2  ABG lactate: --  Arterial Blood Gas:  05-24 @ 11:48  7.46/38/130/27/98.9/3.0  ABG lactate: --    CAPILLARY BLOOD GLUCOSE    MICROBIOLOGY:     RADIOLOGY:  [ ] Reviewed and interpreted by me    Mode: turned off by bedside  FiO2: 40

## 2024-05-26 NOTE — PROGRESS NOTE ADULT - PROBLEM SELECTOR PLAN 3
intubated on arrival to [facility] ED for respiratory tiring and AMS  - unable to be extubated  - s/p trach 4/28  - mechanical vent: Volume Ctrl 14/460/5/40%  - weaning as tolerated  - aggressive airway management with Duoneb, chest PT and suctioning PRN

## 2024-05-26 NOTE — PROGRESS NOTE ADULT - NS ATTEND AMEND GEN_ALL_CORE FT
Pt is a 69F with MHx CVA, COPD, CHF, and HTN initially presenting to [facility] on 4/16/24 from OSH for hyperleukocytosis concerning for acute leukemia c/b acute hypoxemic respiratory failure and septic shock 2/2 Strep pneumonia bacteremia i/s/o pneumonia with empyema s/p chest tube placement x2 with MISTII with failure to extubation from MV s/p tracheostomy placement transferred to RCU on 5/8 for further medical management with hospital course c/b hemorrhagic shock 2/2 abdominal wall hematoma requiring MICU stay for vasopressor support and pRBC resuscitation now returning to RCU on 5/25.     Pt at mental status baseline, she is able to communicate spontaneously and express needs/concerns. Pt with physical debility and functional dependence i/s/o critical illness with hx CVA and wheelchair/cane dependence. PT/OT consulted. Pt further with hx anxiety/MDD with passive SI.  CL Psych Team consulted on this admission, initiated on seroquel +clonazepam. Will continue and titrate as per  Team. No further SI thoughts.     Pt initially p/w acute hypoxemic respiratory failure 2/2 strep pneumonia empyema c/b bacteremia s/p chest tube x2 with MIST therapy with appropriate evacuation of empyema. Pt with failure to wean from ventilator s/p tracheostomy placement. Was tolerating SBTs and TC QD, will continue to wean. SLP following for PMV today. Now s/p completion of 4 week course of abx (through 5/14). Airway clearance therapy in place. Trach care and suctioning. Oxygen supplementation for goal O2 saturation 90-95%.     Pt with oropharyngeal dysphagia s/p PEG placement (5/7, GI). Now tolerating feeds at goal rate. Bowel regimen prn. No acute renal or endocrine issues. Stress hyperglycemia i/s/o DMII better controlled now. Will c/w ISS with BGFS monitoring.     Pt found with new CLL c/b leukocytosis likely reactive. Hematology following, no indication for acute exchange therapy. Will CTM with daily CBCs, now downtrending. Pt will likely need further evaluation/treatment following resolution of acute infectious period. Full dose Lovenox in place for LLL DVT.    Hospital course c/b hemorrhagic shock i/s/o chest wall hematoma requiring IV vasopressors. CT imaging showed no active bleeding within a large multilobed heterogenous collection extending from R. lower neck into the right axilla and along right lateral chest wall c/w hematoma. General surgery consulted for hematoma, no surgical intervention at this time. No need for IR embolization with no active bleed seen on CT. CBCs now stabilized, will continue to monitor conservatively.     Dispo pending medical optimization. Pt full code. Palliative consulted and recs appreciated. Pt amenable to trial of slow weaning but would not want life long dependence on life support.

## 2024-05-26 NOTE — PROGRESS NOTE ADULT - PROBLEM SELECTOR PLAN 1
presented with shortness of breath   - treated for acute pulm edema with Lasix, SL nitro x2 and bipap  - POCUS - dense LLL consolidation that contains dynamic air bronchograms consistent with PNA with moderate to large pleural effusion  - s/p chest tube and pigtail 4/24-4/27  - treated with ceftriaxone 4/18-4/25 for s. pneumo and empyema  - continuing with Augmentin PO x4 weeks (completion date 5/14) as per ID presented with shortness of breath   - treated for acute pulm edema with Lasix, SL nitro x2 and bipap  - POCUS - dense LLL consolidation that contains dynamic air bronchograms consistent with PNA with moderate to large pleural effusion  - s/p chest tube and pigtail 4/24-4/27  - treated with ceftriaxone 4/18-4/25 for s. pneumo and empyema  - continuing with Augmentin PO x4 weeks (completion date 5/14) as per ID  - treated empirically with zosyn 5/19-5/24

## 2024-05-26 NOTE — PROGRESS NOTE ADULT - PROBLEM SELECTOR PLAN 4
- Pulling at trach.  Unable to be redirected  - Evaluated by psychology team.  - Seroquel 200mg qhs, seroquel 25mg po q6hr prn agitation.  Holding recommended 25mg TID to observe behavior and prevent over sedation  - Klonopin 1.5MG BID  - 5/18 ECG: NSR with PACs,  - Pulling at trach - Unable to be redirected  - Evaluated by psychology team  - Seroquel 200mg qhs, seroquel 25mg po q6hr prn agitation.  Holding recommended 25mg TID to observe behavior and prevent over sedation  - Klonopin 1.5MG BID  - 5/18 ECG: NSR with PACs,

## 2024-05-26 NOTE — PROGRESS NOTE ADULT - PROBLEM SELECTOR PLAN 2
on admission WBC count 233  - transferred to [facility] for possible leukophoresis  - as per heme/onc - underlying CLL (Oct 2023) with reactive lymphocytosis 2/2 sepsis  - heme/onc deferred leukophoresis due to mature lymphocytes  - IVIG given 4/26/24  - CLL under control and plan for outpatient follow up at [facility] when patient closer to d/c

## 2024-05-27 LAB
CULTURE RESULTS: SIGNIFICANT CHANGE UP
CULTURE RESULTS: SIGNIFICANT CHANGE UP
GLUCOSE BLDC GLUCOMTR-MCNC: 101 MG/DL — HIGH (ref 70–99)
GLUCOSE BLDC GLUCOMTR-MCNC: 108 MG/DL — HIGH (ref 70–99)
GLUCOSE BLDC GLUCOMTR-MCNC: 115 MG/DL — HIGH (ref 70–99)
GLUCOSE BLDC GLUCOMTR-MCNC: 120 MG/DL — HIGH (ref 70–99)
SPECIMEN SOURCE: SIGNIFICANT CHANGE UP
SPECIMEN SOURCE: SIGNIFICANT CHANGE UP

## 2024-05-27 PROCEDURE — 99233 SBSQ HOSP IP/OBS HIGH 50: CPT

## 2024-05-27 RX ADMIN — ENOXAPARIN SODIUM 40 MILLIGRAM(S): 100 INJECTION SUBCUTANEOUS at 17:25

## 2024-05-27 RX ADMIN — Medication 650 MILLIGRAM(S): at 23:25

## 2024-05-27 RX ADMIN — Medication 2 MILLIGRAM(S): at 10:50

## 2024-05-27 RX ADMIN — Medication 650 MILLIGRAM(S): at 18:15

## 2024-05-27 RX ADMIN — NYSTATIN CREAM 1 APPLICATION(S): 100000 CREAM TOPICAL at 00:49

## 2024-05-27 RX ADMIN — QUETIAPINE FUMARATE 25 MILLIGRAM(S): 200 TABLET, FILM COATED ORAL at 00:49

## 2024-05-27 RX ADMIN — PANTOPRAZOLE SODIUM 40 MILLIGRAM(S): 20 TABLET, DELAYED RELEASE ORAL at 17:25

## 2024-05-27 RX ADMIN — Medication 1 TABLET(S): at 13:21

## 2024-05-27 RX ADMIN — CHLORHEXIDINE GLUCONATE 1 APPLICATION(S): 213 SOLUTION TOPICAL at 17:25

## 2024-05-27 RX ADMIN — ENOXAPARIN SODIUM 40 MILLIGRAM(S): 100 INJECTION SUBCUTANEOUS at 04:40

## 2024-05-27 RX ADMIN — QUETIAPINE FUMARATE 25 MILLIGRAM(S): 200 TABLET, FILM COATED ORAL at 07:42

## 2024-05-27 RX ADMIN — PANTOPRAZOLE SODIUM 40 MILLIGRAM(S): 20 TABLET, DELAYED RELEASE ORAL at 04:40

## 2024-05-27 RX ADMIN — CHLORHEXIDINE GLUCONATE 1 APPLICATION(S): 213 SOLUTION TOPICAL at 04:40

## 2024-05-27 RX ADMIN — QUETIAPINE FUMARATE 25 MILLIGRAM(S): 200 TABLET, FILM COATED ORAL at 22:55

## 2024-05-27 RX ADMIN — Medication 2 MILLIGRAM(S): at 07:54

## 2024-05-27 RX ADMIN — Medication 500 MILLIGRAM(S): at 13:21

## 2024-05-27 RX ADMIN — SENNA PLUS 10 MILLILITER(S): 8.6 TABLET ORAL at 21:34

## 2024-05-27 RX ADMIN — Medication 650 MILLIGRAM(S): at 22:55

## 2024-05-27 RX ADMIN — MIDODRINE HYDROCHLORIDE 20 MILLIGRAM(S): 2.5 TABLET ORAL at 21:35

## 2024-05-27 RX ADMIN — Medication 650 MILLIGRAM(S): at 17:37

## 2024-05-27 RX ADMIN — Medication 1.5 MILLIGRAM(S): at 04:39

## 2024-05-27 RX ADMIN — Medication 1.5 MILLIGRAM(S): at 17:24

## 2024-05-27 RX ADMIN — QUETIAPINE FUMARATE 200 MILLIGRAM(S): 200 TABLET, FILM COATED ORAL at 19:43

## 2024-05-27 NOTE — PROGRESS NOTE ADULT - PROBLEM SELECTOR PLAN 2
on admission WBC count 233  - transferred to [facility] for possible leukophoresis  - as per heme/onc - underlying CLL (Oct 2023) with reactive lymphocytosis 2/2 sepsis  - heme/onc deferred leukophoresis due to mature lymphocytes  - IVIG given 4/26/24  - CLL under control and plan for outpatient follow up at [facility] when patient closer to d/c

## 2024-05-27 NOTE — PROGRESS NOTE ADULT - PROBLEM SELECTOR PLAN 1
presented with shortness of breath   - treated for acute pulm edema with Lasix, SL nitro x2 and bipap  - POCUS - dense LLL consolidation that contains dynamic air bronchograms consistent with PNA with moderate to large pleural effusion  - s/p chest tube and pigtail 4/24-4/27  - treated with ceftriaxone 4/18-4/25 for s. pneumo and empyema  - continuing with Augmentin PO x4 weeks (completion date 5/14) as per ID  - treated empirically with zosyn 5/19-5/24

## 2024-05-27 NOTE — PROGRESS NOTE ADULT - ASSESSMENT
69F h/o CVA (bedbound at baseline), COPD, CHF, HTN who initially p/w acute shortness of breath to outside ED and was treated for acute pulmonary edema with sublingual nitro x 2, Lasix, and BiPAP. Pt was also found to be febrile to 103, so treated for PNA. BIPAP led to improvement in O2 to 89-90. Pt transferred to [place] on 4/16/24 for white count of 233 and possible leukophoresis. In the ED, pt intubated iso respiratory tiring and decreasing mental status, and also was started on levophed for hypotension. Following intubation and initiation of pressors, she was admitted to the MICU for AHRF and septic shock.  In MICU, heme was consulted for hyperleukocytosis, however due to mature lymphocytic leukophoresis was not performed.  Course c/b empyema s/p chest tube placement (4/16) and removal, reaccumulation of loculated pleural effusion s/p L pigtail placement and removal on 4/27.   S/p treatment with ceftriaxone (4/18-4/25) for s. pneumo bacteremia and empyema, continuing with Augmentin PO x4 weeks with completion on 5/14.  S/p PEG 5/7.  Transitioned off IV pressors and on midodrine and droxidopa.  Transferred to RCU 5/9.  S/p RRT 5/20 for hypoxia, readmitted to MICU for septic vs hemorrhagic shock requiring pressors.  Received 3 units of PRBCs, CT C/A/P w/ no active bleeding within a large multilobed heterogenous collection extending from R. lower neck into the right axilla and along the right lateral chest wall consistent with hematoma - no surgical intervention necessary.  Treated empirically with Zosyn until 5/24.  Returned to RCU 5/26.  Weaning as tolerated.  Aggressive airway management with Duoneb chest PT and suctioning PRN.       5/26:  Pt transferred to RCU overnight.  Stable throughout the day.  Was able to phonate over trach/vent - may need trach looked at by ENT (upsized or XLT)? Speech following patient.  Tolerated a couple of hours of trach collar today but then became hypertensive, tachycardic, and complained of SOB.  ?Vascular consult for findings on arterial doppler.

## 2024-05-27 NOTE — PROGRESS NOTE ADULT - PROBLEM SELECTOR PLAN 4
- Pulling at trach - Unable to be redirected  - Evaluated by psychology team  - Seroquel 200mg qhs, seroquel 25mg po q6hr prn agitation.  Holding recommended 25mg TID to observe behavior and prevent over sedation  - Klonopin 1.5MG BID  - 5/18 ECG: NSR with PACs,

## 2024-05-27 NOTE — PROGRESS NOTE ADULT - NS ATTEND AMEND GEN_ALL_CORE FT
Pt is a 69F with MHx CVA, COPD, CHF, and HTN initially presenting to [facility] on 4/16/24 from OSH for hyperleukocytosis concerning for acute leukemia c/b acute hypoxemic respiratory failure and septic shock 2/2 Strep pneumonia bacteremia i/s/o pneumonia with empyema s/p chest tube placement x2 with MISTII with failure to extubation from MV s/p tracheostomy placement transferred to RCU on 5/8 for further medical management with hospital course c/b hemorrhagic shock 2/2 abdominal wall hematoma requiring MICU stay for vasopressor support and pRBC resuscitation now returning to RCU on 5/25.     Pt at mental status baseline, she is able to communicate spontaneously and express needs/concerns. Pt with physical debility and functional dependence i/s/o critical illness with hx CVA and wheelchair/cane dependence. PT/OT consulted. Pt further with hx anxiety/MDD with passive SI.  CL Psych Team consulted on this admission, initiated on seroquel +clonazepam. Will continue and titrate as per  Team. No further SI thoughts.     Pt initially p/w acute hypoxemic respiratory failure 2/2 strep pneumonia empyema c/b bacteremia s/p chest tube x2 with MIST therapy with appropriate evacuation of empyema. Pt with failure to wean from ventilator s/p tracheostomy placement. Was tolerating SBTs and TC QD, will continue to wean. SLP following for PMV trial. Now s/p completion of 4 week course of abx (through 5/14). Airway clearance therapy in place. Trach care and suctioning. Oxygen supplementation for goal O2 saturation 90-95%.     Pt with oropharyngeal dysphagia s/p PEG placement (5/7, GI). Now tolerating feeds at goal rate. Bowel regimen prn. No acute renal or endocrine issues. Stress hyperglycemia i/s/o DMII better controlled now. Will c/w ISS with BGFS monitoring.     Pt found with new CLL c/b leukocytosis likely reactive. Hematology following, no indication for acute exchange therapy. Will CTM with daily CBCs, now downtrending. Pt will likely need further evaluation/treatment following resolution of acute infectious period. Full dose Lovenox in place for LLL DVT.    Hospital course c/b hemorrhagic shock i/s/o chest wall hematoma requiring IV vasopressors. CT imaging showed no active bleeding within a large multilobed heterogenous collection extending from R. lower neck into the right axilla and along right lateral chest wall c/w hematoma. General surgery consulted for hematoma, no surgical intervention at this time. No need for IR embolization with no active bleed seen on CT. CBCs now stabilized, will continue to monitor conservatively.     Dispo pending medical optimization. Pt full code. Palliative consulted and recs appreciated. Pt amenable to trial of slow weaning but would not want life long dependence on life support.

## 2024-05-27 NOTE — PROGRESS NOTE ADULT - PROBLEM SELECTOR PLAN 6
- 4/24 BL LE doppler - nonocclusive left common femoral vein DVT  - 4/24 BL UE doppler - acute left basilic SVT, no acute DVT  - 5/21 venous doppler BL LE - resolved left common fem DVT  - 5/21 arterial doppler BL LE - BL LE arterial vascular disease  - continue with lovenox as ordered  - vascular consult for arterial duplex findings

## 2024-05-27 NOTE — PROGRESS NOTE ADULT - SUBJECTIVE AND OBJECTIVE BOX
Patient is a 69y old  Female who presents with a chief complaint of Transfer for leukophoresis (26 May 2024 11:36)    HPI:  69F h/o CVA (bedbound at baseline), COPD, CHF, HTN p/w acute shortness of breath to outside ED. Initially treated for acute pulmonary edema with sublingual nitro x 2, Lasix, and BiPAP. Pt was also found to be febrile to 103, so treated for PNA. BIPAP led to improvement in O2 to 89-90. Pt now transferred to [facility] for white count of 233 and plan for possible leukophoresis. In ED, pt intubated iso respiratory tiring and decreasing mental status. Also started on levo for hypotn.  (16 Apr 2024 12:59)    Interval Events:    REVIEW OF SYSTEMS:  [ ] Positive  [ ] All other systems negative  [ ] Unable to assess ROS because ________    Vital Signs Last 24 Hrs  T(C): 37.2 (05-27-24 @ 03:27), Max: 37.2 (05-26-24 @ 11:35)  T(F): 99 (05-27-24 @ 03:27), Max: 99 (05-26-24 @ 11:35)  HR: 92 (05-27-24 @ 03:27) (92 - 123)  BP: 141/89 (05-27-24 @ 03:27) (141/89 - 154/67)  RR: 20 (05-27-24 @ 03:27) (18 - 20)  SpO2: 100% (05-27-24 @ 03:27) (96% - 100%)    PHYSICAL EXAM:  HEENT:   [ ]Tracheostomy:  [ ]Pupils equal  [ ]No oral lesions  [ ]Abnormal    SKIN  [ ] No Rash  [ ] Abnormal  [ ] pressure    CARDIAC  [ ]Regular  [ ]Abnormal    PULMONARY  [ ]Bilateral Clear Breath Sounds  [ ]Normal Excursion  [ ]Abnormal    GI  [ ]PEG      [ ] +BS		              [ ]Soft, nondistended, nontender	  [ ]Abnormal    MUSCULOSKELETAL                                   [ ]Bedbound                 [ ]Abnormal    [ ]Ambulatory/OOB to chair                           EXTREMITIES                                         [ ]Normal  [ ]Edema                           NEUROLOGIC  [ ] Normal, non focal  [ ] Focal findings:    PSYCHIATRIC  [ ]Alert and appropriate  [ ] Sedated	 [ ]Agitated    :  Andino: [ ] Yes, if yes: Date of Placement:                   [  ] No    LINES: Central Lines [ ] Yes, if yes: Date of Placement                                     [  ] No    HOSPITAL MEDICATIONS:  MEDICATIONS  (STANDING):  ascorbic acid 500 milliGRAM(s) Oral daily  chlorhexidine 4% Liquid 1 Application(s) Topical every 12 hours  clonazePAM  Tablet 1.5 milliGRAM(s) Oral every 12 hours  enoxaparin Injectable 40 milliGRAM(s) SubCutaneous every 12 hours  LORazepam   Injectable 2 milliGRAM(s) IV Push once  midodrine 20 milliGRAM(s) Oral three times a day  multivitamin 1 Tablet(s) Oral daily  pantoprazole  Injectable 40 milliGRAM(s) IV Push two times a day  QUEtiapine 200 milliGRAM(s) Oral <User Schedule>  senna Syrup 10 milliLiter(s) Oral at bedtime    MEDICATIONS  (PRN):  acetaminophen     Tablet .. 650 milliGRAM(s) Oral every 6 hours PRN Temp greater or equal to 38C (100.4F), Mild Pain (1 - 3)  nystatin Powder 1 Application(s) Topical two times a day PRN skin irritation  QUEtiapine 25 milliGRAM(s) Oral every 6 hours PRN agitation  sodium chloride 0.9% lock flush 10 milliLiter(s) IV Push every 1 hour PRN Pre/post blood products, medications, blood draw, and to maintain line patency      LABS:                        7.6    62.47 )-----------( 193      ( 26 May 2024 06:47 )             25.1       Phos  2.1     05-26  Mg     2.7     05-26        Mode: AC/ CMV (Assist Control/ Continuous Mandatory Ventilation)  RR (machine): 18  TV (machine): 460  FiO2: 30  PEEP: 8  ITime: 1  MAP: 14  PIP: 27   Patient is a 69y old  Female who presents with a chief complaint of Transfer for leukophoresis (26 May 2024 11:36)    HPI:  69F h/o CVA (bedbound at baseline), COPD, CHF, HTN p/w acute shortness of breath to outside ED. Initially treated for acute pulmonary edema with sublingual nitro x 2, Lasix, and BiPAP. Pt was also found to be febrile to 103, so treated for PNA. BIPAP led to improvement in O2 to 89-90. Pt now transferred to [place] for white count of 233 and plan for possible leukophoresis. In ED, pt intubated iso respiratory tiring and decreasing mental status. Also started on levo for hypotn.  (16 Apr 2024 12:59)    Interval Events: No issues overnight    REVIEW OF SYSTEMS:  [ x] Positive; Complaining of being hungry  [ ] All other systems negative  [ ] Unable to assess ROS because ________    Vital Signs Last 24 Hrs  T(C): 37.2 (05-27-24 @ 03:27), Max: 37.2 (05-26-24 @ 11:35)  T(F): 99 (05-27-24 @ 03:27), Max: 99 (05-26-24 @ 11:35)  HR: 92 (05-27-24 @ 03:27) (92 - 123)  BP: 141/89 (05-27-24 @ 03:27) (141/89 - 154/67)  RR: 20 (05-27-24 @ 03:27) (18 - 20)  SpO2: 100% (05-27-24 @ 03:27) (96% - 100%)    PHYSICAL EXAM:  HEENT:   [X]Tracheostomy: #7 cuffed portex  [X]Pupils equal  [ ]No oral lesions  [ ]Abnormal    SKIN  [ ]No Rash  [ ] Abnormal  [X] pressure - sacral DTI, right heel DTI    CARDIAC  [ ]Regular  [X]Abnormal - mild tachycardia    PULMONARY  [ ]Bilateral Clear Breath Sounds  [ ]Normal Excursion  [X]Abnormal - mild coarse BL BS    GI  [X]PEG      [X] +BS		              [X]Soft, nondistended, nontender	  [ ]Abnormal    MUSCULOSKELETAL                                   [X]Bedbound                 [ ]Abnormal    [ ]Ambulatory/OOB to chair                           EXTREMITIES                                         [X]Normal  [ ]Edema                           NEUROLOGIC  [ ] Normal, non focal  [X] Focal findings: phonating over trach, following commands on all 4 extremities R>L    PSYCHIATRIC  [X]Alert and appropriate  [ ] Sedated	 [ ]Agitated    :  Andino: [ ] Yes, if yes: Date of Placement:                   [X] No    LINES: Central Lines [ ] Yes, if yes: Date of Placement                                     [X] No    HOSPITAL MEDICATIONS:  MEDICATIONS  (STANDING):  ascorbic acid 500 milliGRAM(s) Oral daily  chlorhexidine 4% Liquid 1 Application(s) Topical every 12 hours  clonazePAM  Tablet 1.5 milliGRAM(s) Oral every 12 hours  enoxaparin Injectable 40 milliGRAM(s) SubCutaneous every 12 hours  LORazepam   Injectable 2 milliGRAM(s) IV Push once  midodrine 20 milliGRAM(s) Oral three times a day  multivitamin 1 Tablet(s) Oral daily  pantoprazole  Injectable 40 milliGRAM(s) IV Push two times a day  QUEtiapine 200 milliGRAM(s) Oral <User Schedule>  senna Syrup 10 milliLiter(s) Oral at bedtime    MEDICATIONS  (PRN):  acetaminophen     Tablet .. 650 milliGRAM(s) Oral every 6 hours PRN Temp greater or equal to 38C (100.4F), Mild Pain (1 - 3)  nystatin Powder 1 Application(s) Topical two times a day PRN skin irritation  QUEtiapine 25 milliGRAM(s) Oral every 6 hours PRN agitation  sodium chloride 0.9% lock flush 10 milliLiter(s) IV Push every 1 hour PRN Pre/post blood products, medications, blood draw, and to maintain line patency      LABS:                        7.6    62.47 )-----------( 193      ( 26 May 2024 06:47 )             25.1       Phos  2.1     05-26  Mg     2.7     05-26        Mode: AC/ CMV (Assist Control/ Continuous Mandatory Ventilation)  RR (machine): 18  TV (machine): 460  FiO2: 30  PEEP: 8  ITime: 1  MAP: 14  PIP: 27

## 2024-05-27 NOTE — PROGRESS NOTE ADULT - PROBLEM SELECTOR PLAN 5
hypotensive on admission   - required pressors in ICU  - now on midodrine 20mg Q8H   - Will wean as tolerated, add antiHTN as needed  - monitor BP

## 2024-05-27 NOTE — PROGRESS NOTE ADULT - PROBLEM SELECTOR PLAN 3
intubated on arrival to [facility] ED for respiratory tiring and AMS  - unable to be extubated  - s/p trach 4/28  - mechanical vent: Volume Ctrl 14/460/5/40%  - weaning as tolerated  - aggressive airway management with Duoneb, chest PT and suctioning PRN intubated on arrival to [facility] ED for respiratory tiring and AMS  - unable to be extubated  - s/p trach 4/28  - mechanical vent: Volume Ctrl 14/460/5/40%  - weaning as tolerated  - aggressive airway management with Duoneb, chest PT and suctioning PRN  - Have ENT see patient regarding leak

## 2024-05-28 LAB
ALBUMIN SERPL ELPH-MCNC: 3 G/DL — LOW (ref 3.3–5)
ALP SERPL-CCNC: 70 U/L — SIGNIFICANT CHANGE UP (ref 40–120)
ALT FLD-CCNC: 16 U/L — SIGNIFICANT CHANGE UP (ref 10–45)
ANION GAP SERPL CALC-SCNC: 10 MMOL/L — SIGNIFICANT CHANGE UP (ref 5–17)
AST SERPL-CCNC: 40 U/L — SIGNIFICANT CHANGE UP (ref 10–40)
BILIRUB SERPL-MCNC: 1.1 MG/DL — SIGNIFICANT CHANGE UP (ref 0.2–1.2)
BUN SERPL-MCNC: 9 MG/DL — SIGNIFICANT CHANGE UP (ref 7–23)
CALCIUM SERPL-MCNC: 8.4 MG/DL — SIGNIFICANT CHANGE UP (ref 8.4–10.5)
CHLORIDE SERPL-SCNC: 110 MMOL/L — HIGH (ref 96–108)
CO2 SERPL-SCNC: 21 MMOL/L — LOW (ref 22–31)
CREAT SERPL-MCNC: 0.46 MG/DL — LOW (ref 0.5–1.3)
EGFR: 104 ML/MIN/1.73M2 — SIGNIFICANT CHANGE UP
GAS PNL BLDA: SIGNIFICANT CHANGE UP
GLUCOSE BLDC GLUCOMTR-MCNC: 99 MG/DL — SIGNIFICANT CHANGE UP (ref 70–99)
GLUCOSE SERPL-MCNC: 100 MG/DL — HIGH (ref 70–99)
HCT VFR BLD CALC: 31.2 % — LOW (ref 34.5–45)
HGB BLD-MCNC: 9.4 G/DL — LOW (ref 11.5–15.5)
MAGNESIUM SERPL-MCNC: 2.3 MG/DL — SIGNIFICANT CHANGE UP (ref 1.6–2.6)
MCHC RBC-ENTMCNC: 28.9 PG — SIGNIFICANT CHANGE UP (ref 27–34)
MCHC RBC-ENTMCNC: 30.1 GM/DL — LOW (ref 32–36)
MCV RBC AUTO: 96 FL — SIGNIFICANT CHANGE UP (ref 80–100)
NRBC # BLD: 0 /100 WBCS — SIGNIFICANT CHANGE UP (ref 0–0)
PHOSPHATE SERPL-MCNC: 3.1 MG/DL — SIGNIFICANT CHANGE UP (ref 2.5–4.5)
PLATELET # BLD AUTO: 183 K/UL — SIGNIFICANT CHANGE UP (ref 150–400)
POTASSIUM SERPL-MCNC: 5.3 MMOL/L — SIGNIFICANT CHANGE UP (ref 3.5–5.3)
POTASSIUM SERPL-SCNC: 5.3 MMOL/L — SIGNIFICANT CHANGE UP (ref 3.5–5.3)
PROT SERPL-MCNC: 5.8 G/DL — LOW (ref 6–8.3)
RBC # BLD: 3.25 M/UL — LOW (ref 3.8–5.2)
RBC # FLD: 21.1 % — HIGH (ref 10.3–14.5)
SODIUM SERPL-SCNC: 141 MMOL/L — SIGNIFICANT CHANGE UP (ref 135–145)
WBC # BLD: 77.24 K/UL — CRITICAL HIGH (ref 3.8–10.5)
WBC # FLD AUTO: 77.24 K/UL — CRITICAL HIGH (ref 3.8–10.5)

## 2024-05-28 PROCEDURE — 99233 SBSQ HOSP IP/OBS HIGH 50: CPT

## 2024-05-28 PROCEDURE — 93010 ELECTROCARDIOGRAM REPORT: CPT

## 2024-05-28 PROCEDURE — 93971 EXTREMITY STUDY: CPT | Mod: 26,RT

## 2024-05-28 RX ORDER — QUETIAPINE FUMARATE 200 MG/1
50 TABLET, FILM COATED ORAL THREE TIMES A DAY
Refills: 0 | Status: DISCONTINUED | OUTPATIENT
Start: 2024-05-28 | End: 2024-05-30

## 2024-05-28 RX ADMIN — MIDODRINE HYDROCHLORIDE 20 MILLIGRAM(S): 2.5 TABLET ORAL at 05:34

## 2024-05-28 RX ADMIN — ENOXAPARIN SODIUM 40 MILLIGRAM(S): 100 INJECTION SUBCUTANEOUS at 17:09

## 2024-05-28 RX ADMIN — ENOXAPARIN SODIUM 40 MILLIGRAM(S): 100 INJECTION SUBCUTANEOUS at 05:34

## 2024-05-28 RX ADMIN — Medication 1.5 MILLIGRAM(S): at 05:33

## 2024-05-28 RX ADMIN — Medication 650 MILLIGRAM(S): at 11:50

## 2024-05-28 RX ADMIN — Medication 1 TABLET(S): at 10:49

## 2024-05-28 RX ADMIN — Medication 1.5 MILLIGRAM(S): at 19:39

## 2024-05-28 RX ADMIN — PANTOPRAZOLE SODIUM 40 MILLIGRAM(S): 20 TABLET, DELAYED RELEASE ORAL at 17:09

## 2024-05-28 RX ADMIN — CHLORHEXIDINE GLUCONATE 1 APPLICATION(S): 213 SOLUTION TOPICAL at 17:12

## 2024-05-28 RX ADMIN — QUETIAPINE FUMARATE 25 MILLIGRAM(S): 200 TABLET, FILM COATED ORAL at 10:49

## 2024-05-28 RX ADMIN — QUETIAPINE FUMARATE 50 MILLIGRAM(S): 200 TABLET, FILM COATED ORAL at 13:01

## 2024-05-28 RX ADMIN — PANTOPRAZOLE SODIUM 40 MILLIGRAM(S): 20 TABLET, DELAYED RELEASE ORAL at 05:35

## 2024-05-28 RX ADMIN — QUETIAPINE FUMARATE 50 MILLIGRAM(S): 200 TABLET, FILM COATED ORAL at 21:27

## 2024-05-28 RX ADMIN — QUETIAPINE FUMARATE 200 MILLIGRAM(S): 200 TABLET, FILM COATED ORAL at 20:57

## 2024-05-28 RX ADMIN — Medication 500 MILLIGRAM(S): at 10:49

## 2024-05-28 RX ADMIN — SENNA PLUS 10 MILLILITER(S): 8.6 TABLET ORAL at 21:26

## 2024-05-28 RX ADMIN — Medication 650 MILLIGRAM(S): at 10:51

## 2024-05-28 RX ADMIN — CHLORHEXIDINE GLUCONATE 1 APPLICATION(S): 213 SOLUTION TOPICAL at 05:35

## 2024-05-28 NOTE — PROGRESS NOTE ADULT - SUBJECTIVE AND OBJECTIVE BOX
Patient is a 69y old  Female who presents with a chief complaint of Transfer for leukophoresis (27 May 2024 08:31)      Interval Events:    REVIEW OF SYSTEMS:  [ ] Positive  [ ] All other systems negative  [ ] Unable to assess ROS because ________    Vital Signs Last 24 Hrs  T(C): 37.4 (05-28-24 @ 04:22), Max: 37.9 (05-27-24 @ 17:56)  T(F): 99.3 (05-28-24 @ 04:22), Max: 100.3 (05-27-24 @ 17:56)  HR: 104 (05-28-24 @ 04:22) (92 - 114)  BP: 109/60 (05-28-24 @ 04:22) (90/58 - 168/73)  RR: 20 (05-28-24 @ 04:22) (20 - 20)  SpO2: 100% (05-28-24 @ 04:22) (95% - 100%)    PHYSICAL EXAM:  HEENT:   [ ]Tracheostomy:  [ ]Pupils equal  [ ]No oral lesions  [ ]Abnormal    SKIN  [ ]No Rash  [ ] Abnormal  [ ] pressure    CARDIAC  [ ]Regular  [ ]Abnormal    PULMONARY  [ ]Bilateral Clear Breath Sounds  [ ]Normal Excursion  [ ]Abnormal    GI  [ ]PEG      [ ] +BS		              [ ]Soft, nondistended, nontender	  [ ]Abnormal    MUSCULOSKELETAL                                   [ ]Bedbound                 [ ]Abnormal    [ ]Ambulatory/OOB to chair                           EXTREMITIES                                         [ ]Normal  [ ]Edema                           NEUROLOGIC  [ ] Normal, non focal  [ ] Focal findings:    PSYCHIATRIC  [ ]Alert and appropriate  [ ] Sedated	 [ ]Agitated    :  Andino: [ ] Yes, if yes: Date of Placement:                   [  ] No    LINES: Central Lines [ ] Yes, if yes: Date of Placement                                     [  ] No    HOSPITAL MEDICATIONS:  MEDICATIONS  (STANDING):  ascorbic acid 500 milliGRAM(s) Oral daily  chlorhexidine 4% Liquid 1 Application(s) Topical every 12 hours  clonazePAM  Tablet 1.5 milliGRAM(s) Oral every 12 hours  enoxaparin Injectable 40 milliGRAM(s) SubCutaneous every 12 hours  midodrine 20 milliGRAM(s) Oral three times a day  multivitamin 1 Tablet(s) Oral daily  pantoprazole  Injectable 40 milliGRAM(s) IV Push two times a day  QUEtiapine 200 milliGRAM(s) Oral <User Schedule>  senna Syrup 10 milliLiter(s) Oral at bedtime    MEDICATIONS  (PRN):  acetaminophen     Tablet .. 650 milliGRAM(s) Oral every 6 hours PRN Temp greater or equal to 38C (100.4F), Mild Pain (1 - 3)  nystatin Powder 1 Application(s) Topical two times a day PRN skin irritation  QUEtiapine 25 milliGRAM(s) Oral every 6 hours PRN agitation  sodium chloride 0.9% lock flush 10 milliLiter(s) IV Push every 1 hour PRN Pre/post blood products, medications, blood draw, and to maintain line patency      LABS:                  CAPILLARY BLOOD GLUCOSE    MICROBIOLOGY:     RADIOLOGY:  [ ] Reviewed and interpreted by me    Mode: AC/ CMV (Assist Control/ Continuous Mandatory Ventilation)  RR (machine): 18  TV (machine): 460  FiO2: 30  PEEP: 8  ITime: 1  MAP: 15  PIP: 27   Patient is a 69y old  Female who presents with a chief complaint of Transfer for leukophoresis (27 May 2024 08:31)      Interval Events:  No acute events overnight.     REVIEW OF SYSTEMS:  [ ] Positive  [X] All other systems negative  [ ] Unable to assess ROS because ________    Vital Signs Last 24 Hrs  T(C): 37.4 (05-28-24 @ 04:22), Max: 37.9 (05-27-24 @ 17:56)  T(F): 99.3 (05-28-24 @ 04:22), Max: 100.3 (05-27-24 @ 17:56)  HR: 104 (05-28-24 @ 04:22) (92 - 114)  BP: 109/60 (05-28-24 @ 04:22) (90/58 - 168/73)  RR: 20 (05-28-24 @ 04:22) (20 - 20)  SpO2: 100% (05-28-24 @ 04:22) (95% - 100%)    PHYSICAL EXAM:  HEENT:   [X]Tracheostomy: #7 cuffed portex  [X]Pupils equal  [ ]No oral lesions  [ ]Abnormal    SKIN  [ ]No Rash  [ ] Abnormal  [X] pressure - sacral DTI, right heel DTI    CARDIAC  [ ]Regular  [X]Abnormal - mild tachycardia    PULMONARY  [ ]Bilateral Clear Breath Sounds  [ ]Normal Excursion  [X]Abnormal - mild coarse BL BS    GI  [X]PEG      [X] +BS		              [X]Soft, nondistended, nontender	  [ ]Abnormal    MUSCULOSKELETAL                                   [X]Bedbound                 [ ]Abnormal    [ ]Ambulatory/OOB to chair                           EXTREMITIES                                         [X]Normal  [ ]Edema                           NEUROLOGIC  [ ] Normal, non focal  [X] Focal findings: phonating over trach, following commands on all 4 extremities R>L    PSYCHIATRIC  [X]Alert and appropriate  [ ] Sedated	 [ ]Agitated    :  Andino: [ ] Yes, if yes: Date of Placement:                   [X] No    LINES: Central Lines [ ] Yes, if yes: Date of Placement                                     [X] No    HOSPITAL MEDICATIONS:  MEDICATIONS  (STANDING):  ascorbic acid 500 milliGRAM(s) Oral daily  chlorhexidine 4% Liquid 1 Application(s) Topical every 12 hours  clonazePAM  Tablet 1.5 milliGRAM(s) Oral every 12 hours  enoxaparin Injectable 40 milliGRAM(s) SubCutaneous every 12 hours  midodrine 20 milliGRAM(s) Oral three times a day  multivitamin 1 Tablet(s) Oral daily  pantoprazole  Injectable 40 milliGRAM(s) IV Push two times a day  QUEtiapine 200 milliGRAM(s) Oral <User Schedule>  senna Syrup 10 milliLiter(s) Oral at bedtime    MEDICATIONS  (PRN):  acetaminophen     Tablet .. 650 milliGRAM(s) Oral every 6 hours PRN Temp greater or equal to 38C (100.4F), Mild Pain (1 - 3)  nystatin Powder 1 Application(s) Topical two times a day PRN skin irritation  QUEtiapine 25 milliGRAM(s) Oral every 6 hours PRN agitation  sodium chloride 0.9% lock flush 10 milliLiter(s) IV Push every 1 hour PRN Pre/post blood products, medications, blood draw, and to maintain line patency    LABS:    CAPILLARY BLOOD GLUCOSE    MICROBIOLOGY:     RADIOLOGY:  [ ] Reviewed and interpreted by me    Mode: AC/ CMV (Assist Control/ Continuous Mandatory Ventilation)  RR (machine): 18  TV (machine): 460  FiO2: 30  PEEP: 8  ITime: 1  MAP: 15  PIP: 27

## 2024-05-28 NOTE — PROGRESS NOTE ADULT - PROBLEM SELECTOR PLAN 1
presented with shortness of breath   - treated for acute pulm edema with Lasix, SL nitro x2 and bipap  - POCUS - dense LLL consolidation that contains dynamic air bronchograms consistent with PNA with moderate to large pleural effusion  - s/p chest tube and pigtail 4/24-4/27  - treated with ceftriaxone 4/18-4/25 for s. pneumo and empyema  - continuing with Augmentin PO x4 weeks (completion date 5/14) as per ID  - treated empirically with zosyn 5/19-5/24 Universal Safety Interventions

## 2024-05-28 NOTE — PROGRESS NOTE ADULT - PROBLEM SELECTOR PLAN 2
on admission WBC count 233  - transferred to [facility] for possible leukophoresis  - as per heme/onc - underlying CLL (Oct 2023) with reactive lymphocytosis 2/2 sepsis  - heme/onc deferred leukophoresis due to mature lymphocytes  - IVIG given 4/26/24  - CLL under control and plan for outpatient follow up at [facility] when patient closer to d/c

## 2024-05-28 NOTE — CHART NOTE - NSCHARTNOTEFT_GEN_A_CORE
*full note to follow     Recommendations:  1. Defer independent use of speaking valve; this service will continue to follow for speaking valve trials   2. Maintain good oral care     Kary Guzman CCC-SLP ([employer]) 69F h/o CVA (bedbound at baseline), COPD, CHF, HTN presenting as transfer from [facility] for SOB iso leukocytosis to 233 c/f acute leukemia. Pt intubated and on pressors, transferred to MICU for further management, course c/b empyema s/p chest tube placement (4/16) and removal, reaccumulation of loculated pleural effusion s/p L pigtail placement and removal on 4/27, s/p trach on 4/27. S/p EGD/PEG with GI, transferred to RCU on 5/9. Readmitted to MICU for septic vs hemorrhagic shock.  Now hgb has been trending in 7.1-7.7. Pt weaned off sedation and pressors. Heme following for pt's leukocytosis 2/2 to CLL. No recommendations at this time. Pt empirically on zosyn for possible septic shock, discontinued on 5/24. CTA C/A/P showed no active bleeding within a large multilobed heterogenous collection extending from R. lower neck into the right axilla and along right lateral chest wall c/w hematoma. General surgery consulted for hematoma, no surgical intervention at this time. No need for IR embolization with no active bleed seen on CT.    Pt known to this service; prior to most recent admission to the MICU, was being followed for PMV trials. Seen most recently on 5/14 & noted with immediate O2 desaturation s/p PMV placement -> RCU team alerted. The following day RRT/ ENT consulted for patient self-decannulating; as per ent, "Tracheoscopy shows trach tube in false passage. #7 portex cuffed trach removed and reinserted in proper position centered in trachea." Further PMV trials deferred given patient transferred to the MICU/ on the ventilator. Now transferred back to the RCU, tolerating trach collar during the day.     Today, patient seen for passy-eliot speaking valve evaluation Pt initially asleep; rouses to deep tactile stimulation, subsequently alert though groggy (as per discussion with RN, pt received sedating medication earlier today). Voicing around trach. VSS - SpO2 100, , RR 22. Patient maintained on 30% FIO2 via trach collar. +PEG. Low profile valve placed on the hub of the trach.  Patient with adequate vocal quality & volume. Patient tolerated valve. VSS stable throughout assessment. No signs of respiratory distress. No increased work of breathing. No subjective complaints. No signs of air trapping. Valve removed after 20 minutes. Patient left in NAD.     Impressions/ Recommendations: Patient tolerated speaking valve well. Will plan to follow-up tomorrow to ensure continued tolerance. Discussed with DEBORAH Milian/ SHANT Alfred.     Kary Guzman, CHASITY-SLP ([employer] Teams)

## 2024-05-28 NOTE — PROGRESS NOTE ADULT - PROBLEM SELECTOR PLAN 3
intubated on arrival to [facility] ED for respiratory tiring and AMS  - unable to be extubated  - s/p trach 4/28  - mechanical vent: Volume Ctrl 14/460/5/40%  - weaning as tolerated  - aggressive airway management with Duoneb, chest PT and suctioning PRN  - Have ENT see patient regarding leak

## 2024-05-28 NOTE — CONSULT NOTE ADULT - RESPIRATORY DISTRESS OBSERVATION: TOTAL SCORE
Discharge instructions reviewed with patient.  All question answered.  Pt verbalized understanding.  Pt left with her care giver in stable condition.   
0

## 2024-05-28 NOTE — PROGRESS NOTE ADULT - NS ATTEND AMEND GEN_ALL_CORE FT
69F with MHx CVA, COPD, CHF, and HTN initially presenting to [facility] on 4/16/24 from OSH for hyperleukocytosis concerning for acute leukemia c/b acute hypoxemic respiratory failure and septic shock 2/2 Strep pneumonia bacteremia i/s/o pneumonia with empyema s/p chest tube placement x2 with MISTII with failure to extubation from MV s/p tracheostomy placement transferred to RCU on 5/8 for further medical management with hospital course c/b hemorrhagic shock 2/2 abdominal wall hematoma requiring MICU stay for vasopressor support and pRBC resuscitation now returning to RCU on 5/25.     Neuro  Pt w/ baseline L sided CVA deficits  is awake and speaking but intermittently confused  cont PT/OT consulted    Pulm  acute hypoxemic respiratory failure 2/2 strep pneumonia empyema c/b bacteremia s/p chest tube x2 with MIST therapy, now resolved  completed extended abx tx   s/p tracheostomy placement   Airway clearance therapy in place. Trach care and suctioning. Oxygen supplementation for goal O2 saturation 90-95%.     Endo  Stress hyperglycemia i/s/o DMII better controlled now. Will c/w ISS with BGFS monitoring.     Heme  new CLL c/b leukocytosis, no indication for leukophoresis  f/u HEme reccs   LLL DVT, a/c held for hemorrhagic shock  Hospital course c/b hemorrhagic shock due to chest wall hematoma. CT imaging showed no active bleeding  and seen by General surgery, no surgical intervention at this time    CBCs now stabilized, will continue to monitor conservatively.     Dispo pending medical optimization. Pt full code. Palliative consulted and recs appreciated. Pt amenable to trial of slow weaning but would not want life long dependence on life support.

## 2024-05-28 NOTE — PROGRESS NOTE ADULT - ASSESSMENT
69F h/o CVA (bedbound at baseline), COPD, CHF, HTN who initially p/w acute shortness of breath to outside ED and was treated for acute pulmonary edema with sublingual nitro x 2, Lasix, and BiPAP. Pt was also found to be febrile to 103, so treated for PNA. BIPAP led to improvement in O2 to 89-90. Pt transferred to [place] on 4/16/24 for white count of 233 and possible leukophoresis. In the ED, pt intubated iso respiratory tiring and decreasing mental status, and also was started on levophed for hypotension. Following intubation and initiation of pressors, she was admitted to the MICU for AHRF and septic shock.  In MICU, heme was consulted for hyperleukocytosis, however due to mature lymphocytic leukophoresis was not performed.  Course c/b empyema s/p chest tube placement (4/16) and removal, reaccumulation of loculated pleural effusion s/p L pigtail placement and removal on 4/27.   S/p treatment with ceftriaxone (4/18-4/25) for s. pneumo bacteremia and empyema, continuing with Augmentin PO x4 weeks with completion on 5/14.  S/p PEG 5/7.  Transitioned off IV pressors and on midodrine and droxidopa.  Transferred to RCU 5/9.  S/p RRT 5/20 for hypoxia, readmitted to MICU for septic vs hemorrhagic shock requiring pressors.  Received 3 units of PRBCs, CT C/A/P w/ no active bleeding within a large multilobed heterogenous collection extending from R. lower neck into the right axilla and along the right lateral chest wall consistent with hematoma - no surgical intervention necessary.  Treated empirically with Zosyn until 5/24.  Returned to RCU 5/26.  Weaning as tolerated.  Aggressive airway management with Duoneb chest PT and suctioning PRN.       5/26:  Pt transferred to RCU overnight.  Stable throughout the day.  Was able to phonate over trach/vent - may need trach looked at by ENT (upsized or XLT)? Speech following patient.  Tolerated a couple of hours of trach collar today but then became hypertensive, tachycardic, and complained of SOB.  ?Vascular consult for findings on arterial doppler. 69F h/o CVA (bedbound at baseline), COPD, CHF, HTN who initially p/w acute shortness of breath to outside ED and was treated for acute pulmonary edema with sublingual nitro x 2, Lasix, and BiPAP. Pt was also found to be febrile to 103, so treated for PNA. BIPAP led to improvement in O2 to 89-90. Pt transferred to [place] on 4/16/24 for white count of 233 and possible leukophoresis. In the ED, pt intubated iso respiratory tiring and decreasing mental status, and also was started on levophed for hypotension. Following intubation and initiation of pressors, she was admitted to the MICU for AHRF and septic shock.  In MICU, heme was consulted for hyperleukocytosis, however due to mature lymphocytic leukophoresis was not performed.  Course c/b empyema s/p chest tube placement (4/16) and removal, reaccumulation of loculated pleural effusion s/p L pigtail placement and removal on 4/27.   S/p treatment with ceftriaxone (4/18-4/25) for s. pneumo bacteremia and empyema, continuing with Augmentin PO x4 weeks with completion on 5/14.  S/p PEG 5/7.  Transitioned off IV pressors and on midodrine and droxidopa.  Transferred to RCU 5/9.  S/p RRT 5/20 for hypoxia, readmitted to MICU for septic vs hemorrhagic shock requiring pressors.  Received 3 units of PRBCs, CT C/A/P w/ no active bleeding within a large multilobed heterogenous collection extending from R. lower neck into the right axilla and along the right lateral chest wall consistent with hematoma - no surgical intervention necessary.  Treated empirically with Zosyn until 5/24.  Returned to RCU 5/26.  Weaning as tolerated.  Aggressive airway management with Duoneb chest PT and suctioning PRN.       5/28:  Tolerating TC.  Will attempt ATC with f/u ABG in AM.  Speech saw patient today, tolerated PMV, will see again tomorrow.

## 2024-05-29 LAB
GAS PNL BLDA: SIGNIFICANT CHANGE UP
HCT VFR BLD CALC: 33.4 % — LOW (ref 34.5–45)
HGB BLD-MCNC: 10 G/DL — LOW (ref 11.5–15.5)
MCHC RBC-ENTMCNC: 28.8 PG — SIGNIFICANT CHANGE UP (ref 27–34)
MCHC RBC-ENTMCNC: 29.9 GM/DL — LOW (ref 32–36)
MCV RBC AUTO: 96.3 FL — SIGNIFICANT CHANGE UP (ref 80–100)
NRBC # BLD: 0 /100 WBCS — SIGNIFICANT CHANGE UP (ref 0–0)
PLATELET # BLD AUTO: 190 K/UL — SIGNIFICANT CHANGE UP (ref 150–400)
RBC # BLD: 3.47 M/UL — LOW (ref 3.8–5.2)
RBC # FLD: 20.5 % — HIGH (ref 10.3–14.5)
WBC # BLD: 65.86 K/UL — CRITICAL HIGH (ref 3.8–10.5)
WBC # FLD AUTO: 65.86 K/UL — CRITICAL HIGH (ref 3.8–10.5)

## 2024-05-29 PROCEDURE — 99233 SBSQ HOSP IP/OBS HIGH 50: CPT

## 2024-05-29 RX ORDER — MIDODRINE HYDROCHLORIDE 2.5 MG/1
10 TABLET ORAL THREE TIMES A DAY
Refills: 0 | Status: DISCONTINUED | OUTPATIENT
Start: 2024-05-29 | End: 2024-05-30

## 2024-05-29 RX ADMIN — Medication 1.5 MILLIGRAM(S): at 06:12

## 2024-05-29 RX ADMIN — Medication 1 TABLET(S): at 11:22

## 2024-05-29 RX ADMIN — ENOXAPARIN SODIUM 40 MILLIGRAM(S): 100 INJECTION SUBCUTANEOUS at 06:11

## 2024-05-29 RX ADMIN — QUETIAPINE FUMARATE 25 MILLIGRAM(S): 200 TABLET, FILM COATED ORAL at 11:43

## 2024-05-29 RX ADMIN — QUETIAPINE FUMARATE 25 MILLIGRAM(S): 200 TABLET, FILM COATED ORAL at 00:49

## 2024-05-29 RX ADMIN — ENOXAPARIN SODIUM 40 MILLIGRAM(S): 100 INJECTION SUBCUTANEOUS at 17:47

## 2024-05-29 RX ADMIN — Medication 650 MILLIGRAM(S): at 22:15

## 2024-05-29 RX ADMIN — QUETIAPINE FUMARATE 50 MILLIGRAM(S): 200 TABLET, FILM COATED ORAL at 13:10

## 2024-05-29 RX ADMIN — CHLORHEXIDINE GLUCONATE 1 APPLICATION(S): 213 SOLUTION TOPICAL at 06:11

## 2024-05-29 RX ADMIN — Medication 500 MILLIGRAM(S): at 11:19

## 2024-05-29 RX ADMIN — SENNA PLUS 10 MILLILITER(S): 8.6 TABLET ORAL at 21:14

## 2024-05-29 RX ADMIN — QUETIAPINE FUMARATE 200 MILLIGRAM(S): 200 TABLET, FILM COATED ORAL at 19:40

## 2024-05-29 RX ADMIN — PANTOPRAZOLE SODIUM 40 MILLIGRAM(S): 20 TABLET, DELAYED RELEASE ORAL at 06:12

## 2024-05-29 RX ADMIN — MIDODRINE HYDROCHLORIDE 10 MILLIGRAM(S): 2.5 TABLET ORAL at 21:14

## 2024-05-29 RX ADMIN — QUETIAPINE FUMARATE 50 MILLIGRAM(S): 200 TABLET, FILM COATED ORAL at 06:11

## 2024-05-29 RX ADMIN — PANTOPRAZOLE SODIUM 40 MILLIGRAM(S): 20 TABLET, DELAYED RELEASE ORAL at 17:47

## 2024-05-29 RX ADMIN — QUETIAPINE FUMARATE 50 MILLIGRAM(S): 200 TABLET, FILM COATED ORAL at 21:14

## 2024-05-29 RX ADMIN — Medication 650 MILLIGRAM(S): at 21:46

## 2024-05-29 RX ADMIN — CHLORHEXIDINE GLUCONATE 1 APPLICATION(S): 213 SOLUTION TOPICAL at 17:47

## 2024-05-29 NOTE — SPEAKING VALVE EVALUATION - DIAGNOSTIC IMPRESSIONS
Patient seen for passy-eliot speaking valve evaluation (PMV). Patient maintained on 30% FIO2 via trach collar. Low profile valve placed on the hub of the trach. Patient achieved sustained phonation at phrase level with mildly reduced speech intelligibility. Patient tolerated valve; No signs of air trapping up to 40 minutes, however, patient reportedly felt tired as trial continued therefore PMV removed.
Patient seen for passy-eliot speaking valve evaluation. Patient maintained on 30% FIO2 via trach collar. Low profile valve placed on the hub of the trach. Patient was dysphonic, slight hoarse/harsh vocal quality. Noted with reduced coordination between phonation and respiration. Patient tolerated valve; No signs of air trapping after 15 minutes, however, patient noted with increased RR as trial progressed, reporting breathing felt "weird" and "different". No change in SpO2. Valve removed after 15 minutes and patient left in NAD.

## 2024-05-29 NOTE — PROGRESS NOTE ADULT - NS ATTEND AMEND GEN_ALL_CORE FT
69F with MHx CVA, COPD, CHF, and HTN initially presenting to [facility] on 4/16/24 from OSH for hyperleukocytosis concerning for acute leukemia c/b acute hypoxemic respiratory failure and septic shock 2/2 Strep pneumonia bacteremia i/s/o pneumonia with empyema s/p chest tube placement x2 with MISTII with failure to extubation from MV s/p tracheostomy placement transferred to [facility]CU on 5/8 for further medical management with hospital course c/b hemorrhagic shock 2/2 abdominal wall hematoma requiring MICU stay for vasopressor support and pRBC resuscitation now returning to RCU on 5/25.     Neuro  Pt w/ baseline L sided CVA deficits  is awake and speaking but intermittently confused  cont PT/OT consulted    Pulm  acute hypoxemic respiratory failure 2/2 strep pneumonia empyema c/b bacteremia s/p chest tube x2 with MIST therapy, now resolved  completed extended abx tx   s/p tracheostomy placement   Airway clearance therapy in place. Trach care and suctioning. Oxygen supplementation for goal O2 saturation 90-95%.     Endo  Stress hyperglycemia i/s/o DMII better controlled now. Will c/w ISS with BGFS monitoring.     Heme  new CLL c/b leukocytosis, no indication for leukophoresis  f/u HEme reccs   LLL DVT, a/c held for hemorrhagic shock  Hospital course c/b hemorrhagic shock due to chest wall hematoma. CT imaging showed no active bleeding  and seen by General surgery, no surgical intervention at this time    CBCs now stabilized, will continue to monitor conservatively.     Dispo pending medical optimization. Pt full code. Palliative consulted and recs appreciated. Pt amenable to trial of slow weaning but would not want life long dependence on life support. 69F with MHx CVA, COPD, CHF, and HTN initially presenting to [facility] on 4/16/24 from OSH for hyperleukocytosis concerning for acute leukemia c/b acute hypoxemic respiratory failure and septic shock 2/2 Strep pneumonia bacteremia i/s/o pneumonia with empyema s/p chest tube placement x2 with MISTII with failure to extubation from MV s/p tracheostomy placement transferred to RCU on 5/8 for further medical management with hospital course c/b hemorrhagic shock 2/2 abdominal wall hematoma requiring MICU stay for vasopressor support and pRBC resuscitation now returning to RCU on 5/25.     Neuro  Pt w/ baseline L sided CVA deficits  is awake and speaking but intermittently confused  cont PT/OT consulted    Pulm  acute hypoxemic respiratory failure 2/2 strep pneumonia empyema c/b bacteremia s/p chest tube x2 with MIST therapy, now resolved  completed extended abx tx   s/p tracheostomy placement  cont TC trials   Airway clearance therapy in place. Trach care and suctioning. Oxygen supplementation for goal O2 saturation 90-95%.     CVS  bp stable, midodrine dose decreasd    Endo  Stress hyperglycemia i/s/o DMII better controlled now. Will c/w ISS with BGFS monitoring.     Heme  new CLL c/b leukocytosis, no indication for leukophoresis  f/u Heme reccs   LLL DVT, a/c held for hemorrhagic shock  repeat LE duplex showed clot has resolved  RUE duplex with superifical thrmobus- conservative management  Hospital course c/b hemorrhagic shock due to chest wall hematoma. CT imaging showed no active bleeding  and seen by General surgery, no surgical intervention at this time    CBCs now stabilized, will continue to monitor conservatively.     Dispo pending medical optimization. Pt full code. Palliative consulted and recs appreciated. Pt amenable to trial of slow weaning but would not want life long dependence on life support.

## 2024-05-29 NOTE — PROGRESS NOTE ADULT - PROBLEM SELECTOR PLAN 2
on admission WBC count 233  - transferred to [facility] for possible leukophoresis  - as per heme/onc - underlying CLL (Oct 2023) with reactive lymphocytosis 2/2 sepsis  - heme/onc deferred leukophoresis due to mature lymphocytes  - IVIG given 4/26/24  - CLL under control and plan for outpatient follow up at [facility] when patient closer to d/c

## 2024-05-29 NOTE — PROGRESS NOTE ADULT - SUBJECTIVE AND OBJECTIVE BOX
Patient is a 69y old  Female who presents with a chief complaint of Transfer for leukophoresis (28 May 2024 07:18)      Interval Events:    REVIEW OF SYSTEMS:  [ ] Positive  [ ] All other systems negative  [ ] Unable to assess ROS because ________    Vital Signs Last 24 Hrs  T(C): 37.2 (05-29-24 @ 05:53), Max: 37.6 (05-28-24 @ 12:06)  T(F): 98.9 (05-29-24 @ 05:53), Max: 99.6 (05-28-24 @ 12:06)  HR: 113 (05-29-24 @ 05:53) (98 - 113)  BP: 147/82 (05-29-24 @ 05:53) (128/67 - 154/89)  RR: 20 (05-29-24 @ 05:53) (18 - 20)  SpO2: 100% (05-29-24 @ 05:53) (97% - 100%)    PHYSICAL EXAM:  HEENT:   [ ]Tracheostomy:  [ ]Pupils equal  [ ]No oral lesions  [ ]Abnormal    SKIN  [ ]No Rash  [ ] Abnormal  [ ] pressure    CARDIAC  [ ]Regular  [ ]Abnormal    PULMONARY  [ ]Bilateral Clear Breath Sounds  [ ]Normal Excursion  [ ]Abnormal    GI  [ ]PEG      [ ] +BS		              [ ]Soft, nondistended, nontender	  [ ]Abnormal    MUSCULOSKELETAL                                   [ ]Bedbound                 [ ]Abnormal    [ ]Ambulatory/OOB to chair                           EXTREMITIES                                         [ ]Normal  [ ]Edema                           NEUROLOGIC  [ ] Normal, non focal  [ ] Focal findings:    PSYCHIATRIC  [ ]Alert and appropriate  [ ] Sedated	 [ ]Agitated    :  Andino: [ ] Yes, if yes: Date of Placement:                   [  ] No    LINES: Central Lines [ ] Yes, if yes: Date of Placement                                     [  ] No    HOSPITAL MEDICATIONS:  MEDICATIONS  (STANDING):  ascorbic acid 500 milliGRAM(s) Oral daily  chlorhexidine 4% Liquid 1 Application(s) Topical every 12 hours  clonazePAM  Tablet 1.5 milliGRAM(s) Oral every 12 hours  enoxaparin Injectable 40 milliGRAM(s) SubCutaneous every 12 hours  midodrine 20 milliGRAM(s) Oral three times a day  multivitamin 1 Tablet(s) Oral daily  pantoprazole  Injectable 40 milliGRAM(s) IV Push two times a day  QUEtiapine 50 milliGRAM(s) Oral three times a day  QUEtiapine 200 milliGRAM(s) Oral <User Schedule>  senna Syrup 10 milliLiter(s) Oral at bedtime    MEDICATIONS  (PRN):  acetaminophen     Tablet .. 650 milliGRAM(s) Oral every 6 hours PRN Temp greater or equal to 38C (100.4F), Mild Pain (1 - 3)  nystatin Powder 1 Application(s) Topical two times a day PRN skin irritation  QUEtiapine 25 milliGRAM(s) Oral every 6 hours PRN agitation  sodium chloride 0.9% lock flush 10 milliLiter(s) IV Push every 1 hour PRN Pre/post blood products, medications, blood draw, and to maintain line patency      LABS:                        9.4    77.24 )-----------( 183      ( 28 May 2024 11:19 )             31.2     05-28    141  |  110<H>  |  9   ----------------------------<  100<H>  5.3   |  21<L>  |  0.46<L>    Ca    8.4      28 May 2024 11:19  Phos  3.1     05-28  Mg     2.3     05-28    TPro  5.8<L>  /  Alb  3.0<L>  /  TBili  1.1  /  DBili  x   /  AST  40  /  ALT  16  /  AlkPhos  70  05-28      Urinalysis Basic - ( 28 May 2024 11:19 )    Color: x / Appearance: x / SG: x / pH: x  Gluc: 100 mg/dL / Ketone: x  / Bili: x / Urobili: x   Blood: x / Protein: x / Nitrite: x   Leuk Esterase: x / RBC: x / WBC x   Sq Epi: x / Non Sq Epi: x / Bacteria: x      Arterial Blood Gas:  05-29 @ 05:29  7.44/43/102/29/98.8/4.6  ABG lactate: --  Arterial Blood Gas:  05-28 @ 18:35  7.38/46/86/27/97.8/1.7  ABG lactate: --      CAPILLARY BLOOD GLUCOSE    MICROBIOLOGY:     RADIOLOGY:  [ ] Reviewed and interpreted by me    Mode: vent off  FiO2: 40   Patient is a 69y old  Female who presents with a chief complaint of Transfer for leukophoresis (28 May 2024 07:18)      Interval Events:  Tolerated TC ATC.    REVIEW OF SYSTEMS:  [ ] Positive  [X] All other systems negative  [ ] Unable to assess ROS because ________    Vital Signs Last 24 Hrs  T(C): 37.2 (05-29-24 @ 05:53), Max: 37.6 (05-28-24 @ 12:06)  T(F): 98.9 (05-29-24 @ 05:53), Max: 99.6 (05-28-24 @ 12:06)  HR: 113 (05-29-24 @ 05:53) (98 - 113)  BP: 147/82 (05-29-24 @ 05:53) (128/67 - 154/89)  RR: 20 (05-29-24 @ 05:53) (18 - 20)  SpO2: 100% (05-29-24 @ 05:53) (97% - 100%)    PHYSICAL EXAM:  HEENT:   [X]Tracheostomy: #7 cuffed portex  [X]Pupils equal  [ ]No oral lesions  [ ]Abnormal    SKIN  [ ]No Rash  [ ] Abnormal  [X] pressure - sacral DTI, right heel DTI    CARDIAC  [ ]Regular  [X]Abnormal - mild tachycardia    PULMONARY  [ ]Bilateral Clear Breath Sounds  [ ]Normal Excursion  [X]Abnormal - mild coarse BL BS    GI  [X]PEG      [X] +BS		              [X]Soft, nondistended, nontender	  [ ]Abnormal    MUSCULOSKELETAL                                   [X]Bedbound                 [ ]Abnormal    [ ]Ambulatory/OOB to chair                           EXTREMITIES                                         [X]Normal  [ ]Edema                           NEUROLOGIC  [ ] Normal, non focal  [X] Focal findings: phonating over trach, following commands on all 4 extremities R>L    PSYCHIATRIC  [X]Alert and appropriate  [ ] Sedated	 [ ]Agitated    :  Andino: [ ] Yes, if yes: Date of Placement:                   [X] No    LINES: Central Lines [ ] Yes, if yes: Date of Placement                                     [X] No    HOSPITAL MEDICATIONS:  MEDICATIONS  (STANDING):  ascorbic acid 500 milliGRAM(s) Oral daily  chlorhexidine 4% Liquid 1 Application(s) Topical every 12 hours  clonazePAM  Tablet 1.5 milliGRAM(s) Oral every 12 hours  enoxaparin Injectable 40 milliGRAM(s) SubCutaneous every 12 hours  midodrine 20 milliGRAM(s) Oral three times a day  multivitamin 1 Tablet(s) Oral daily  pantoprazole  Injectable 40 milliGRAM(s) IV Push two times a day  QUEtiapine 50 milliGRAM(s) Oral three times a day  QUEtiapine 200 milliGRAM(s) Oral <User Schedule>  senna Syrup 10 milliLiter(s) Oral at bedtime    MEDICATIONS  (PRN):  acetaminophen     Tablet .. 650 milliGRAM(s) Oral every 6 hours PRN Temp greater or equal to 38C (100.4F), Mild Pain (1 - 3)  nystatin Powder 1 Application(s) Topical two times a day PRN skin irritation  QUEtiapine 25 milliGRAM(s) Oral every 6 hours PRN agitation  sodium chloride 0.9% lock flush 10 milliLiter(s) IV Push every 1 hour PRN Pre/post blood products, medications, blood draw, and to maintain line patency    LABS:                        9.4    77.24 )-----------( 183      ( 28 May 2024 11:19 )             31.2     05-28    141  |  110<H>  |  9   ----------------------------<  100<H>  5.3   |  21<L>  |  0.46<L>    Ca    8.4      28 May 2024 11:19  Phos  3.1     05-28  Mg     2.3     05-28    TPro  5.8<L>  /  Alb  3.0<L>  /  TBili  1.1  /  DBili  x   /  AST  40  /  ALT  16  /  AlkPhos  70  05-28    Urinalysis Basic - ( 28 May 2024 11:19 )    Color: x / Appearance: x / SG: x / pH: x  Gluc: 100 mg/dL / Ketone: x  / Bili: x / Urobili: x   Blood: x / Protein: x / Nitrite: x   Leuk Esterase: x / RBC: x / WBC x   Sq Epi: x / Non Sq Epi: x / Bacteria: x    Arterial Blood Gas:  05-29 @ 05:29  7.44/43/102/29/98.8/4.6  ABG lactate: --  Arterial Blood Gas:  05-28 @ 18:35  7.38/46/86/27/97.8/1.7  ABG lactate: --    CAPILLARY BLOOD GLUCOSE    MICROBIOLOGY:     RADIOLOGY:  [ ] Reviewed and interpreted by me    Mode: vent off  FiO2: 40

## 2024-05-29 NOTE — PROGRESS NOTE ADULT - PROBLEM SELECTOR PLAN 3
intubated on arrival to [facility] ED for respiratory tiring and AMS  - unable to be extubated  - s/p trach 4/28  - mechanical vent: Volume Ctrl 14/460/5/40%  - weaning as tolerated  - aggressive airway management with Duoneb, chest PT and suctioning PRN  - Have ENT see patient regarding leak

## 2024-05-29 NOTE — SPEAKING VALVE EVALUATION - COMMENTS
Hx continued: S/p treatment with ceftriaxone (4/18-4/25) for s. pneumo bacteremia and empyema, continuing with augmentin PO x4 weeks with completion on 5/14.  S/p PEG 5/7.  Transitioned off IV pressors and on midodrine and droxidopa.  Transferred to RCU 5/9.  Weaning as tolerated.  Aggressive airway management with duonebs, chest PT and suctioning PRN.   5/9: patient noted to be lethargic with shallow breathing. patient endorses some difficulty breathing. patient mildly tachycardic to 105, currently on tc 30%. vss. Placed back on the vent.
69F h/o CVA (bedbound at baseline), COPD, CHF, HTN presenting as transfer from [facility] for SOB iso leukocytosis to 233 c/f acute leukemia. Pt intubated and on pressors, transferred to MICU for further management, course c/b empyema s/p chest tube placement (4/16) and removal, reaccumulation of loculated pleural effusion s/p L pigtail placement and removal on 4/27, s/p trach on 4/27. S/p EGD/PEG with GI, transferred to RCU on 5/9. Readmitted to MICU for septic vs hemorrhagic shock.  Now hgb has been trending in 7.1-7.7. Pt weaned off sedation and pressors. Heme following for pt's leukocytosis 2/2 to CLL. Pt empirically on zosyn for possible septic shock, discontinued on 5/24. CTA C/A/P showed no active bleeding within a large multilobed heterogenous collection extending from R. lower neck into the right axilla and along right lateral chest wall c/w hematoma. General surgery consulted for hematoma, no surgical intervention at this time. No need for IR embolization with no active bleed seen on CT.   5/28/24 Pt tolerated PMV trial with SLP. See previous notes for additional info. 5/29/24 Per Nsg, Pt has tiny hole in PEG tube that leaks when bent a certain way. PEG feeds and medication able to infuse without any issues, unless bent. WBC 65.86.

## 2024-05-29 NOTE — SPEAKING VALVE EVALUATION - OBSERVATIONS
Pt awake, alert and oriented to self,  with difficulty recalling name of hospital. Pt requesting mental health assessment and speaks of wanting to see doctors at [facility]; ACP Aubrie at b/s for part of assessment. Pt awake, alert and oriented to self,  with difficulty recalling name of hospital. Pt requesting mental health assessment and speaks of wanting to see doctors at [facility]; ACP Aubrie at b/s for part of assessment. Oral care provided with green toothette and suctioning.

## 2024-05-29 NOTE — CHART NOTE - NSCHARTNOTEFT_GEN_A_CORE
Called about tear in distal end of PEG tubing at attachment site. PEG tube shortened with scissors and distal connection tip reattached. OK to resume PEG feeds. ACP educated on how to manage tear at distal end of tube in the future if this recurs.

## 2024-05-29 NOTE — PROGRESS NOTE ADULT - ASSESSMENT
69F h/o CVA (bedbound at baseline), COPD, CHF, HTN who initially p/w acute shortness of breath to outside ED and was treated for acute pulmonary edema with sublingual nitro x 2, Lasix, and BiPAP. Pt was also found to be febrile to 103, so treated for PNA. BIPAP led to improvement in O2 to 89-90. Pt transferred to [place] on 4/16/24 for white count of 233 and possible leukophoresis. In the ED, pt intubated iso respiratory tiring and decreasing mental status, and also was started on levophed for hypotension. Following intubation and initiation of pressors, she was admitted to the MICU for AHRF and septic shock.  In MICU, heme was consulted for hyperleukocytosis, however due to mature lymphocytic leukophoresis was not performed.  Course c/b empyema s/p chest tube placement (4/16) and removal, reaccumulation of loculated pleural effusion s/p L pigtail placement and removal on 4/27.   S/p treatment with ceftriaxone (4/18-4/25) for s. pneumo bacteremia and empyema, continuing with Augmentin PO x4 weeks with completion on 5/14.  S/p PEG 5/7.  Transitioned off IV pressors and on midodrine and droxidopa.  Transferred to RCU 5/9.  S/p RRT 5/20 for hypoxia, readmitted to MICU for septic vs hemorrhagic shock requiring pressors.  Received 3 units of PRBCs, CT C/A/P w/ no active bleeding within a large multilobed heterogenous collection extending from R. lower neck into the right axilla and along the right lateral chest wall consistent with hematoma - no surgical intervention necessary.  Treated empirically with Zosyn until 5/24.  Returned to RCU 5/26.  Weaning as tolerated.  Aggressive airway management with Duoneb chest PT and suctioning PRN.       5/28:  Tolerating TC.  Will attempt ATC with f/u ABG in AM.  Speech saw patient today, tolerated PMV, will see again tomorrow.   69F h/o CVA (bedbound at baseline), COPD, CHF, HTN who initially p/w acute shortness of breath to outside ED and was treated for acute pulmonary edema with sublingual nitro x 2, Lasix, and BiPAP. Pt was also found to be febrile to 103, so treated for PNA. BIPAP led to improvement in O2 to 89-90. Pt transferred to [place] on 4/16/24 for white count of 233 and possible leukophoresis. In the ED, pt intubated iso respiratory tiring and decreasing mental status, and also was started on levophed for hypotension. Following intubation and initiation of pressors, she was admitted to the MICU for AHRF and septic shock.  In MICU, heme was consulted for hyperleukocytosis, however due to mature lymphocytic leukophoresis was not performed.  Course c/b empyema s/p chest tube placement (4/16) and removal, reaccumulation of loculated pleural effusion s/p L pigtail placement and removal on 4/27.   S/p treatment with ceftriaxone (4/18-4/25) for s. pneumo bacteremia and empyema, continuing with Augmentin PO x4 weeks with completion on 5/14.  S/p PEG 5/7.  Transitioned off IV pressors and on midodrine and droxidopa.  Transferred to RCU 5/9.  S/p RRT 5/20 for hypoxia, readmitted to MICU for septic vs hemorrhagic shock requiring pressors.  Received 3 units of PRBCs, CT C/A/P w/ no active bleeding within a large multilobed heterogenous collection extending from R. lower neck into the right axilla and along the right lateral chest wall consistent with hematoma - no surgical intervention necessary.  Treated empirically with Zosyn until 5/24.  Returned to RCU 5/26.  Weaning as tolerated.  Aggressive airway management with Duoneb chest PT and suctioning PRN.       5/29: 69F h/o CVA (bedbound at baseline), COPD, CHF, HTN who initially p/w acute shortness of breath to outside ED and was treated for acute pulmonary edema with sublingual nitro x 2, Lasix, and BiPAP. Pt was also found to be febrile to 103, so treated for PNA. BIPAP led to improvement in O2 to 89-90. Pt transferred to [place] on 4/16/24 for white count of 233 and possible leukophoresis. In the ED, pt intubated iso respiratory tiring and decreasing mental status, and also was started on levophed for hypotension. Following intubation and initiation of pressors, she was admitted to the MICU for AHRF and septic shock.  In MICU, heme was consulted for hyperleukocytosis, however due to mature lymphocytic leukophoresis was not performed.  Course c/b empyema s/p chest tube placement (4/16) and removal, reaccumulation of loculated pleural effusion s/p L pigtail placement and removal on 4/27.   S/p treatment with ceftriaxone (4/18-4/25) for s. pneumo bacteremia and empyema, continuing with Augmentin PO x4 weeks with completion on 5/14.  S/p PEG 5/7.  Transitioned off IV pressors and on midodrine and droxidopa.  Transferred to RCU 5/9.  S/p RRT 5/20 for hypoxia, readmitted to MICU for septic vs hemorrhagic shock requiring pressors.  Received 3 units of PRBCs, CT C/A/P w/ no active bleeding within a large multilobed heterogenous collection extending from R. lower neck into the right axilla and along the right lateral chest wall consistent with hematoma - no surgical intervention necessary.  Treated empirically with Zosyn until 5/24.  Returned to RCU 5/26.  Weaning as tolerated.  Aggressive airway management with Duoneb chest PT and suctioning PRN.       5/29:  Continues to tolerate TC ATC.  SLP today again.  No other acute events during the day. 69F h/o CVA (bedbound at baseline), COPD, CHF, HTN who initially p/w acute shortness of breath to outside ED and was treated for acute pulmonary edema with sublingual nitro x 2, Lasix, and BiPAP. Pt was also found to be febrile to 103, so treated for PNA. BIPAP led to improvement in O2 to 89-90. Pt transferred to [place] on 4/16/24 for white count of 233 and possible leukophoresis. In the ED, pt intubated iso respiratory tiring and decreasing mental status, and also was started on levophed for hypotension. Following intubation and initiation of pressors, she was admitted to the MICU for AHRF and septic shock.  In MICU, heme was consulted for hyperleukocytosis, however due to mature lymphocytic leukophoresis was not performed.  Course c/b empyema s/p chest tube placement (4/16) and removal, reaccumulation of loculated pleural effusion s/p L pigtail placement and removal on 4/27.   S/p treatment with ceftriaxone (4/18-4/25) for s. pneumo bacteremia and empyema, continuing with Augmentin PO x4 weeks with completion on 5/14.  S/p PEG 5/7.  Transitioned off IV pressors and on midodrine and droxidopa.  Transferred to RCU 5/9.  S/p RRT 5/20 for hypoxia, readmitted to MICU for septic vs hemorrhagic shock requiring pressors.  Received 3 units of PRBCs, CT C/A/P w/ no active bleeding within a large multilobed heterogenous collection extending from R. lower neck into the right axilla and along the right lateral chest wall consistent with hematoma - no surgical intervention necessary.  Treated empirically with Zosyn until 5/24.  Returned to RCU 5/26.  TC ATC since 5/28.  Continuing airway management with Chest PT and suction PRN.       5/29:  Continues to tolerate TC ATC.  SLP today again - tolerated PMV for >30minutes - pt requesting palliative care consult.  PEG noted to have hole at adapter site - GI came and fixed PEG at bedside.  69F h/o CVA (bedbound at baseline), COPD, CHF, HTN who initially p/w acute shortness of breath to outside ED and was treated for acute pulmonary edema with sublingual nitro x 2, Lasix, and BiPAP. Pt was also found to be febrile to 103, so treated for PNA. BIPAP led to improvement in O2 to 89-90. Pt transferred to [place] on 4/16/24 for white count of 233 and possible leukophoresis. In the ED, pt intubated iso respiratory tiring and decreasing mental status, and also was started on levophed for hypotension. Following intubation and initiation of pressors, she was admitted to the MICU for AHRF and septic shock.  In MICU, heme was consulted for hyperleukocytosis, however due to mature lymphocytic leukophoresis was not performed.  Course c/b empyema s/p chest tube placement (4/16) and removal, reaccumulation of loculated pleural effusion s/p L pigtail placement and removal on 4/27.   S/p treatment with ceftriaxone (4/18-4/25) for s. pneumo bacteremia and empyema, continuing with Augmentin PO x4 weeks with completion on 5/14.  S/p PEG 5/7.  Transitioned off IV pressors and on midodrine and droxidopa.  Transferred to RCU 5/9.  S/p RRT 5/20 for hypoxia, readmitted to MICU for septic vs hemorrhagic shock requiring pressors.  Received 3 units of PRBCs, CT C/A/P w/ no active bleeding within a large multilobed heterogenous collection extending from R. lower neck into the right axilla and along the right lateral chest wall consistent with hematoma - no surgical intervention necessary.  Treated empirically with Zosyn until 5/24.  Returned to RCU 5/26.  TC ATC since 5/28.  Continuing airway management with Chest PT and suction PRN.       5/29:  Continues to tolerate TC ATC.  SLP today again - tolerated PMV for >30minutes - pt requesting palliative care consult - reconsulted.  PEG noted to have hole at adapter site - GI came and fixed PEG at bedside.

## 2024-05-29 NOTE — SPEAKING VALVE EVALUATION - RECOMMENDATIONS
Pt is a candidate for PMV placement with CUFF DEFLATION up to 30 minutes with supervision, as tolerated. Speech language  therapy post d/c; SLP to f/u on inpt basis 1-2 times per week to improve communication skills and post d/c in rehab.
Defer independent use of speaking valve at this time. This service will continue to follow for PMV trials as tolerated by patient.

## 2024-05-29 NOTE — SPEAKING VALVE EVALUATION - SUBJECTIVE COMPLAINTS DURING VALVE TRIAL
Pt denied any difficulty breathing. After approximately 40 minutes Pt reported that she felt tired and PMV removed; d/w Nsg.

## 2024-05-29 NOTE — SPEAKING VALVE EVALUATION - RECOMMENDED SPEAKING VALVE GUIDELINES
Placement of speaking valve as tolerated/During waking hours only/Place on hub of tracheostomy.../Cuff fully deflated

## 2024-05-29 NOTE — CHART NOTE - NSCHARTNOTEFT_GEN_A_CORE
NUTRITION FOLLOW UP NOTE    PATIENT SEEN FOR: follow up    SOURCE: [] Patient  [x] Current Medical Record  [x] Nursing  [] Family/support person at bedside  [x] Patient unavailable/inappropriate  [] Other:    CHART REVIEWED/EVENTS NOTED.  s/p trach   S/p PEG     DIET ORDER:   Diet, NPO with Tube Feed:   Tube Feeding Modality: Gastrostomy  Glucerna 1.2 Emre (GLUCERNARTH)  Total Volume for 24 Hours (mL): 1260  Continuous  Starting Tube Feed Rate {mL per Hour}: 15  Increase Tube Feed Rate by (mL): 10     Every 4 hours  Until Goal Tube Feed Rate (mL per Hour): 70  Tube Feed Duration (in Hours): 18  Tube Feed Start Time: 00:29 (24)    EN order providing at 100% provision: 1512kcal (22kcal/kg) and 76g protein (1g/kg)    ANTHROPOMETRICS:  Drug Dosing Weight  Height (cm): 172.7 (20 May 2024 00:42)  Weight (kg): 108.7 (20 May 2024 00:42)  BMI (kg/m2): 36.4 (20 May 2024 00:42)  BSA (m2): 2.21 (20 May 2024 00:42)  Weights:   Daily Weight in k.7 (-29), Weight in k.4 (-25), Weight in k.6 (-24), Weight in k.5 (-23)     MEDICATIONS:  MEDICATIONS  (STANDING):  ascorbic acid 500 milliGRAM(s) Oral daily  chlorhexidine 4% Liquid 1 Application(s) Topical every 12 hours  clonazePAM  Tablet 1.5 milliGRAM(s) Oral every 12 hours  enoxaparin Injectable 40 milliGRAM(s) SubCutaneous every 12 hours  midodrine 10 milliGRAM(s) Oral three times a day  multivitamin 1 Tablet(s) Oral daily  pantoprazole  Injectable 40 milliGRAM(s) IV Push two times a day  QUEtiapine 200 milliGRAM(s) Oral <User Schedule>  QUEtiapine 50 milliGRAM(s) Oral three times a day  senna Syrup 10 milliLiter(s) Oral at bedtime    MEDICATIONS  (PRN):  acetaminophen     Tablet .. 650 milliGRAM(s) Oral every 6 hours PRN Temp greater or equal to 38C (100.4F), Mild Pain (1 - 3)  nystatin Powder 1 Application(s) Topical two times a day PRN skin irritation  QUEtiapine 25 milliGRAM(s) Oral every 6 hours PRN agitation  sodium chloride 0.9% lock flush 10 milliLiter(s) IV Push every 1 hour PRN Pre/post blood products, medications, blood draw, and to maintain line patency    NUTRITIONALLY PERTINENT LABS:   Na141 mmol/L Glu 100 mg/dL<H> K+ 5.3 mmol/L Cr  0.46 mg/dL<L> BUN 9 mg/dL  Phos 3.1 mg/dL  Alb 3.0 g/dL<L>  Chol 124 mg/dL LDL --    HDL 23 mg/dL<L> Trig 187 mg/dL<H> ALT 16 U/L AST 40 U/L Alkaline Phosphatase 70 U/L    NUTRITIONALLY PERTINENT MEDICATIONS/LABS:  [x] Reviewed  [] Relevant notes on medications/labs:    EDEMA:  [x] Reviewed  [] Relevant notes:    GI/ I&O:  [x] Reviewed  [] Relevant notes:  [] Other:    SKIN:   per flow sheets:  sacrum suspected deep tissue injury   right heel suspected deep tissue injury     ESTIMATED NEEDS:  using IBW + 10% 70kg  27-32kcal/kg 1890-2240kcal/day  1.2-1.5g/kg 84-105g protein/day  defer fluid needs to team    NUTRITION DIAGNOSIS:  [x] Prior Dx: Increased protein-energy needs   [] New Dx:    EDUCATION:  [] Yes:  [x] Not appropriate/warranted    NUTRITION CARE PLAN:  -consider updating tube feeding regimen to better align with pt nutritional needs. Change to Glucerna 1.5, pt with recently elevated glucose levels. Consider 55ml/hr x 24 hours to provide 1320ml total volume, 1980kcal (28kcal/kg) and 109g protein (~1.5g/kg).   -Defer free water flush to team   -continue Multivitamin and vitamin C to aid in wound healing     MONITORING AND EVALUATION:   RD remains available upon request and will follow up per protocol.    Bhargavi Andrade, MS, RD, CDN / Teams

## 2024-05-30 DIAGNOSIS — R41.0 DISORIENTATION, UNSPECIFIED: ICD-10-CM

## 2024-05-30 LAB
ANION GAP SERPL CALC-SCNC: 12 MMOL/L — SIGNIFICANT CHANGE UP (ref 5–17)
BUN SERPL-MCNC: 8 MG/DL — SIGNIFICANT CHANGE UP (ref 7–23)
CALCIUM SERPL-MCNC: 8.5 MG/DL — SIGNIFICANT CHANGE UP (ref 8.4–10.5)
CHLORIDE SERPL-SCNC: 109 MMOL/L — HIGH (ref 96–108)
CO2 SERPL-SCNC: 24 MMOL/L — SIGNIFICANT CHANGE UP (ref 22–31)
CREAT SERPL-MCNC: 0.4 MG/DL — LOW (ref 0.5–1.3)
EGFR: 108 ML/MIN/1.73M2 — SIGNIFICANT CHANGE UP
GLUCOSE SERPL-MCNC: 94 MG/DL — SIGNIFICANT CHANGE UP (ref 70–99)
HCT VFR BLD CALC: 33.1 % — LOW (ref 34.5–45)
HGB BLD-MCNC: 10.1 G/DL — LOW (ref 11.5–15.5)
MAGNESIUM SERPL-MCNC: 2.2 MG/DL — SIGNIFICANT CHANGE UP (ref 1.6–2.6)
MCHC RBC-ENTMCNC: 29.3 PG — SIGNIFICANT CHANGE UP (ref 27–34)
MCHC RBC-ENTMCNC: 30.5 GM/DL — LOW (ref 32–36)
MCV RBC AUTO: 95.9 FL — SIGNIFICANT CHANGE UP (ref 80–100)
NRBC # BLD: 0 /100 WBCS — SIGNIFICANT CHANGE UP (ref 0–0)
PHOSPHATE SERPL-MCNC: 3.6 MG/DL — SIGNIFICANT CHANGE UP (ref 2.5–4.5)
PLATELET # BLD AUTO: 203 K/UL — SIGNIFICANT CHANGE UP (ref 150–400)
POTASSIUM SERPL-MCNC: 4.7 MMOL/L — SIGNIFICANT CHANGE UP (ref 3.5–5.3)
POTASSIUM SERPL-SCNC: 4.7 MMOL/L — SIGNIFICANT CHANGE UP (ref 3.5–5.3)
RBC # BLD: 3.45 M/UL — LOW (ref 3.8–5.2)
RBC # FLD: 20.5 % — HIGH (ref 10.3–14.5)
SODIUM SERPL-SCNC: 145 MMOL/L — SIGNIFICANT CHANGE UP (ref 135–145)
WBC # BLD: 61.15 K/UL — CRITICAL HIGH (ref 3.8–10.5)
WBC # FLD AUTO: 61.15 K/UL — CRITICAL HIGH (ref 3.8–10.5)

## 2024-05-30 PROCEDURE — 99233 SBSQ HOSP IP/OBS HIGH 50: CPT

## 2024-05-30 RX ORDER — KETOROLAC TROMETHAMINE 30 MG/ML
30 SYRINGE (ML) INJECTION ONCE
Refills: 0 | Status: DISCONTINUED | OUTPATIENT
Start: 2024-05-30 | End: 2024-05-30

## 2024-05-30 RX ORDER — QUETIAPINE FUMARATE 200 MG/1
25 TABLET, FILM COATED ORAL
Refills: 0 | Status: DISCONTINUED | OUTPATIENT
Start: 2024-05-30 | End: 2024-06-01

## 2024-05-30 RX ADMIN — QUETIAPINE FUMARATE 50 MILLIGRAM(S): 200 TABLET, FILM COATED ORAL at 05:20

## 2024-05-30 RX ADMIN — Medication 500 MILLIGRAM(S): at 11:32

## 2024-05-30 RX ADMIN — SENNA PLUS 10 MILLILITER(S): 8.6 TABLET ORAL at 22:25

## 2024-05-30 RX ADMIN — Medication 1 TABLET(S): at 11:33

## 2024-05-30 RX ADMIN — Medication 1.5 MILLIGRAM(S): at 17:19

## 2024-05-30 RX ADMIN — Medication 1.5 MILLIGRAM(S): at 05:19

## 2024-05-30 RX ADMIN — QUETIAPINE FUMARATE 25 MILLIGRAM(S): 200 TABLET, FILM COATED ORAL at 13:33

## 2024-05-30 RX ADMIN — CHLORHEXIDINE GLUCONATE 1 APPLICATION(S): 213 SOLUTION TOPICAL at 05:22

## 2024-05-30 RX ADMIN — Medication 30 MILLIGRAM(S): at 14:01

## 2024-05-30 RX ADMIN — ENOXAPARIN SODIUM 40 MILLIGRAM(S): 100 INJECTION SUBCUTANEOUS at 17:19

## 2024-05-30 RX ADMIN — ENOXAPARIN SODIUM 40 MILLIGRAM(S): 100 INJECTION SUBCUTANEOUS at 05:20

## 2024-05-30 RX ADMIN — CHLORHEXIDINE GLUCONATE 1 APPLICATION(S): 213 SOLUTION TOPICAL at 17:18

## 2024-05-30 RX ADMIN — QUETIAPINE FUMARATE 200 MILLIGRAM(S): 200 TABLET, FILM COATED ORAL at 19:48

## 2024-05-30 RX ADMIN — PANTOPRAZOLE SODIUM 40 MILLIGRAM(S): 20 TABLET, DELAYED RELEASE ORAL at 05:20

## 2024-05-30 RX ADMIN — Medication 30 MILLIGRAM(S): at 13:30

## 2024-05-30 RX ADMIN — PANTOPRAZOLE SODIUM 40 MILLIGRAM(S): 20 TABLET, DELAYED RELEASE ORAL at 17:18

## 2024-05-30 RX ADMIN — NYSTATIN CREAM 1 APPLICATION(S): 100000 CREAM TOPICAL at 22:25

## 2024-05-30 NOTE — PROGRESS NOTE ADULT - NS ATTEND AMEND GEN_ALL_CORE FT
69F with MHx CVA, COPD, CHF, and HTN initially presenting to [facility] on 4/16/24 from OSH for hyperleukocytosis concerning for acute leukemia c/b acute hypoxemic respiratory failure and septic shock 2/2 Strep pneumonia bacteremia i/s/o pneumonia with empyema s/p chest tube placement x2 with MISTII with failure to extubation from MV s/p tracheostomy placement transferred to RCU on 5/8 for further medical management with hospital course c/b hemorrhagic shock 2/2 abdominal wall hematoma requiring MICU stay for vasopressor support and pRBC resuscitation now returning to RCU on 5/25.     Neuro  Pt w/ baseline L sided CVA deficits  is awake and speaking but intermittently confused  cont PT/OT      Pulm  acute hypoxemic respiratory failure 2/2 strep pneumonia empyema c/b bacteremia s/p chest tube x2 with MIST therapy, now resolved  completed extended abx tx   s/p tracheostomy placement  cont TC trials   Airway clearance therapy in place. Trach care and suctioning. Oxygen supplementation for goal O2 saturation 90-95%.     CVS  bp cont to improve and midodrine now d/c    Endo  Stress hyperglycemia i/s/o DMII better controlled now. Will c/w ISS with BGFS monitoring.     Heme  new CLL c/b leukocytosis, no indication for leukophoresis  f/u Heme reccs   LLL DVT, a/c held for hemorrhagic shock  repeat LE duplex showed clot has resolved  RUE duplex with superficial thrombus- conservative management  Hospital course c/b hemorrhagic shock due to chest wall hematoma. CT imaging showed no active bleeding  and seen by General surgery, no surgical intervention at this time    CBCs now stabilized, will continue to monitor conservatively.     Dispo pending medical optimization. Pt full code. Palliative consulted and recs appreciated. Pt amenable to trial of slow weaning but would not want life long dependence on life support.

## 2024-05-30 NOTE — PROVIDER CONTACT NOTE (CRITICAL VALUE NOTIFICATION) - NAME OF MD/NP/PA/DO NOTIFIED:
Lacy Victor
Aubrie Singh, NP
Rod BRAN
Rod Gross
sherry Singh, NP
SHANT Emery
Storm Taylor
Rod BRAN
SHANT Griffin
Aubrie Singh, NP

## 2024-05-30 NOTE — PROGRESS NOTE ADULT - PROBLEM SELECTOR PLAN 5
pt having delirium on exam, unable to participate in goc  VMx2 left for surrogate Yenni Gordon to set up family meeting with RCU team pt having delirium on exam, unable to participate in goc  VMx2 left for surrogate Yenni Gordon to set up family meeting with RCU team  spoke with Yenni (pt's aunt) plan to speak via phone tomorrow with palliative and RCU team

## 2024-05-30 NOTE — PROGRESS NOTE ADULT - PROBLEM SELECTOR PLAN 6
will continue to follow for goc  case discussed with PCU team  Can be reached by TEAMS M-F 9-5 Sammie Armendariz Any other time please page [phone] if needed

## 2024-05-30 NOTE — PROVIDER CONTACT NOTE (CRITICAL VALUE NOTIFICATION) - SITUATION
WBC 83.89
Pt's WBC is 77.24
pt WBC 62.47
ELEVATED wbc,  WBC, 83.86, Has CLL,  WBC is being elevated
Pt's WBC are 61.15
Pt's WBC is 79.36
pt's WBCs are 95.20.
Pt's WBC are 65.86.
WBC 85.98
WBC 99.95

## 2024-05-30 NOTE — PROVIDER CONTACT NOTE (CRITICAL VALUE NOTIFICATION) - ACTION/TREATMENT ORDERED:
NP notified. No furthe recs at this time. Will continue to monitor patient.
PA notified. No further recs at this time. Will continuie to monitor patient.
MD made aware
NP notified. No further recs at this time. Will continue to monitor.
NP notified. No further recs done. Will continue to monitor patient.
Pt.'s WBC 83.89 and SHANT Gross notified verbally of result. No new orders given presently.
PA notified. No further recs at this time.  Will continue to monitor patient.
will continue to monitor
Pt. WBC 99.95 and SHANT Gross notified verbally that WBC 99.95 as reported by [facility] lab.

## 2024-05-30 NOTE — PROGRESS NOTE ADULT - PROBLEM SELECTOR PLAN 2
on admission WBC count 233  - transferred to [facility] for possible leukophoresis  - as per heme/onc - underlying CLL (Oct 2023) with reactive lymphocytosis 2/2 sepsis  - heme/onc deferred leukophoresis due to mature lymphocytes  - IVIG given 4/26/24  - CLL under control and plan for outpatient follow up at [facility] when patient closer to d/c

## 2024-05-30 NOTE — PROGRESS NOTE ADULT - PROBLEM SELECTOR PROBLEM 2
Called pt. No answer and left VM to call office back. Office phone number provided. Informed will also send portal msg.    CLL (chronic lymphocytic leukemia)

## 2024-05-30 NOTE — PROGRESS NOTE ADULT - ASSESSMENT
69F h/o CVA (bedbound at baseline), COPD, CHF, HTN who initially p/w acute shortness of breath to outside ED and was treated for acute pulmonary edema with sublingual nitro x 2, Lasix, and BiPAP. Pt was also found to be febrile to 103, so treated for PNA. BIPAP led to improvement in O2 to 89-90. Pt transferred to [place] on 4/16/24 for white count of 233 and possible leukophoresis. In the ED, pt intubated iso respiratory tiring and decreasing mental status, and also was started on levophed for hypotension. Following intubation and initiation of pressors, she was admitted to the MICU for AHRF and septic shock.  In MICU, heme was consulted for hyperleukocytosis, however due to mature lymphocytic leukophoresis was not performed.  Course c/b empyema s/p chest tube placement (4/16) and removal, reaccumulation of loculated pleural effusion s/p L pigtail placement and removal on 4/27.   S/p treatment with ceftriaxone (4/18-4/25) for s. pneumo bacteremia and empyema, continuing with Augmentin PO x4 weeks with completion on 5/14.  S/p PEG 5/7.  Transitioned off IV pressors and on midodrine and droxidopa.  Transferred to RCU 5/9.  S/p RRT 5/20 for hypoxia, readmitted to MICU for septic vs hemorrhagic shock requiring pressors.  Received 3 units of PRBCs, CT C/A/P w/ no active bleeding within a large multilobed heterogenous collection extending from R. lower neck into the right axilla and along the right lateral chest wall consistent with hematoma - no surgical intervention necessary.  Treated empirically with Zosyn until 5/24.  Returned to RCU 5/26.  TC ATC since 5/28.  Continuing airway management with Chest PT and suction PRN.       5/29:  Continues to tolerate TC ATC.  SLP today again - tolerated PMV for >30minutes - pt requesting palliative care consult - reconsulted.  PEG noted to have hole at adapter site - GI came and fixed PEG at bedside.  69F h/o CVA (bedbound at baseline), COPD, CHF, HTN who initially p/w acute shortness of breath to outside ED and was treated for acute pulmonary edema with sublingual nitro x 2, Lasix, and BiPAP. Pt was also found to be febrile to 103, so treated for PNA. BIPAP led to improvement in O2 to 89-90. Pt transferred to [place] on 4/16/24 for white count of 233 and possible leukophoresis. In the ED, pt intubated iso respiratory tiring and decreasing mental status, and also was started on levophed for hypotension. Following intubation and initiation of pressors, she was admitted to the MICU for AHRF and septic shock.  In MICU, heme was consulted for hyperleukocytosis, however due to mature lymphocytic leukophoresis was not performed.  Course c/b empyema s/p chest tube placement (4/16) and removal, reaccumulation of loculated pleural effusion s/p L pigtail placement and removal on 4/27.   S/p treatment with ceftriaxone (4/18-4/25) for s. pneumo bacteremia and empyema, continuing with Augmentin PO x4 weeks with completion on 5/14.  S/p PEG 5/7.  Transitioned off IV pressors and on midodrine and droxidopa.  Transferred to RCU 5/9.  S/p RRT 5/20 for hypoxia, readmitted to MICU for septic vs hemorrhagic shock requiring pressors.  Received 3 units of PRBCs, CT C/A/P w/ no active bleeding within a large multilobed heterogenous collection extending from R. lower neck into the right axilla and along the right lateral chest wall consistent with hematoma - no surgical intervention necessary.  Treated empirically with Zosyn until 5/24.  Returned to RCU 5/26.  TC ATC since 5/28.  Continuing airway management with Chest PT and suction PRN.       5/29:  Continues to tolerate TC ATC.  SLP today again - tolerated PMV for >30minutes - pt requesting palliative care consult - reconsulted.  PEG noted to have hole at adapter site - GI came and fixed PEG at bedside.   5/30: Patient tolerated 48hrs on continuous trach collar, vent discontinued.  Hgb remains stable.  Discharge planning in progress.

## 2024-05-30 NOTE — PROGRESS NOTE ADULT - ASSESSMENT
69F h/o CVA (bedbound at baseline), COPD, CHF, HTN presenting as transfer from [facility] for SOB iso leukocytosis to 233 c/f acute leukemia. Pt intubated and on pressors, transferred to MICU for further management, course c/b empyema s/p chest tube placement (4/16) and removal, reaccumulation of loculated pleural effusion s/p L pigtail placement and removal on 4/27. Now off of IV pressors, pending EGD/PEG with GI today and likely to be transferred to RCU following this procedure. (Taken from MICU). Palliative consulted for assistance with GOC.

## 2024-05-30 NOTE — PROVIDER CONTACT NOTE (CRITICAL VALUE NOTIFICATION) - PERSON GIVING RESULT:
Deonte Jackson
Dru Anderson  from [facility]
derrek jennings/core lab
Stat lab Frankie Tobar
Alexa Lundberg/Core lab
Rios Griffith/ core lab
Rj Nation/Core lab
Jen Gibbs
Rj Nation/core lab
Son Griffith from [facility]

## 2024-05-30 NOTE — PROGRESS NOTE ADULT - PROBLEM SELECTOR PLAN 3
intubated on arrival to [facility] ED for respiratory tiring and AMS  - unable to be extubated  - s/p trach 4/28  - mechanical vent: Volume Ctrl 14/460/5/40%  - weaning as tolerated  - aggressive airway management with Duoneb, chest PT and suctioning PRN  - Have ENT see patient regarding leak

## 2024-05-30 NOTE — PROGRESS NOTE ADULT - PROBLEM SELECTOR PLAN 2
pt delirious on exam   can consider  consult, currently on seroquel  Monitor for constipation, urinary retention, pain, hunger, thirst, etc.  Promote sleep wake cycle and reorientation as indicated.

## 2024-05-30 NOTE — PROGRESS NOTE ADULT - SUBJECTIVE AND OBJECTIVE BOX
SUBJECTIVE AND OBJECTIVE: No acute events o/n. Patient having delirium on exam. Limited ability to participate in GOC discussion.   Indication for Geriatrics and Palliative Care Services/INTERVAL HPI:    OVERNIGHT EVENTS:    DNR on chart:  Allergies    No Known Allergies    Intolerances    MEDICATIONS  (STANDING):  ascorbic acid 500 milliGRAM(s) Oral daily  chlorhexidine 4% Liquid 1 Application(s) Topical every 12 hours  clonazePAM  Tablet 1.5 milliGRAM(s) Oral every 12 hours  enoxaparin Injectable 40 milliGRAM(s) SubCutaneous every 12 hours  multivitamin 1 Tablet(s) Oral daily  pantoprazole  Injectable 40 milliGRAM(s) IV Push two times a day  QUEtiapine 200 milliGRAM(s) Oral <User Schedule>  QUEtiapine 25 milliGRAM(s) Oral <User Schedule>  senna Syrup 10 milliLiter(s) Oral at bedtime    MEDICATIONS  (PRN):  acetaminophen     Tablet .. 650 milliGRAM(s) Oral every 6 hours PRN Temp greater or equal to 38C (100.4F), Mild Pain (1 - 3)  nystatin Powder 1 Application(s) Topical two times a day PRN skin irritation  sodium chloride 0.9% lock flush 10 milliLiter(s) IV Push every 1 hour PRN Pre/post blood products, medications, blood draw, and to maintain line patency      ITEMS UNCHECKED ARE NOT PRESENT    PRESENT SYMPTOMS: [ ]Unable to self-report - see [ ] CPOT [ ] PAINADS [ ] RDOS  Source if other than patient:  [ ]Family   [ ]Team     Pain:  [ ]yes [ ]no  QOL impact -   Location -                    Aggravating factors -  Quality -  Radiation -  Timing-  Severity (0-10 scale):  Minimal acceptable level (0-10 scale):     CPOT:    https://www.sccm.org/getattachment/asj68s63-9w5k-6d8k-6e5r-0805m5333s5z/Critical-Care-Pain-Observation-Tool-(CPOT)    PAINAD Score: See PAINAD tool and score below       Dyspnea:                           [ ]Mild [ ]Moderate [ ]Severe    RDOS: See RDOS tool and score below   0 to 2  minimal or no respiratory distress   3  mild distress  4 to 6 moderate distress  >7 severe distress      Anxiety:                             [ ]Mild [ ]Moderate [ ]Severe  Fatigue:                             [ ]Mild [ ]Moderate [ ]Severe  Nausea:                             [ ]Mild [ ]Moderate [ ]Severe  Loss of appetite:              [ ]Mild [ ]Moderate [ ]Severe  Constipation:                    [ ]Mild [ ]Moderate [ ]Severe    PCSSQ[Palliative Care Spiritual Screening Question]   Severity (0-10):  Score of 4 or > indicate consideration of Chaplaincy referral.  Chaplaincy Referral: [ ] yes [ ] refused [ ] following [ ] Deferred     Caregiver [place]? : [ ] yes [ ] no [ ] Deferred [ ] Declined             Social work referral [ ] Patient & Family Centered Care Referral [ ]     Anticipatory Grief present?:  [ ] yes [ ] no  [ ] Deferred                  Social work referral [ ] Chaplaincy Referral [ ]    		  Other Symptoms:  [ ]All other review of systems negative     Palliative Performance Status Version 2:   See PPSv2 tool and score below         PHYSICAL EXAM:  Vital Signs Last 24 Hrs  T(C): 36.3 (30 May 2024 05:38), Max: 37.2 (29 May 2024 23:59)  T(F): 97.4 (30 May 2024 05:38), Max: 99 (29 May 2024 23:59)  HR: 108 (30 May 2024 09:30) (108 - 120)  BP: 153/82 (30 May 2024 05:38) (133/67 - 154/83)  BP(mean): --  RR: 23 (30 May 2024 09:30) (18 - 23)  SpO2: 100% (30 May 2024 09:30) (94% - 100%)    Parameters below as of 30 May 2024 09:30  Patient On (Oxygen Delivery Method): tracheostomy collar  O2 Flow (L/min): 8  O2 Concentration (%): 40 I&O's Summary    29 May 2024 07:01  -  30 May 2024 07:00  --------------------------------------------------------  IN: 1140 mL / OUT: 1100 mL / NET: 40 mL    30 May 2024 07:01  -  30 May 2024 11:45  --------------------------------------------------------  IN: 340 mL / OUT: 0 mL / NET: 340 mL       GENERAL: [ ]Cachexia    [ ]Alert  [ ]Oriented x   [ ]Lethargic  [ ]Unarousable  [ ]Verbal  [ ]Non-Verbal  Behavioral:   [ ]Anxiety  [ ]Delirium [ ]Agitation [ ]Other  HEENT:  [ ]Normal   [ ]Dry mouth   [ ]ET Tube/Trach  [ ]Oral lesions  PULMONARY:   [ ]Clear [ ]Tachypnea  [ ]Audible excessive secretions   [ ]Rhonchi        [ ]Right [ ]Left [ ]Bilateral  [ ]Crackles        [ ]Right [ ]Left [ ]Bilateral  [ ]Wheezing     [ ]Right [ ]Left [ ]Bilateral  [ ]Diminished BS [ ] Right [ ]Left [ ]Bilateral  CARDIOVASCULAR:    [ ]Regular [ ]Irregular [ ]Tachy  [ ]Rehan [ ]Murmur [ ]Other  GASTROINTESTINAL:  [ ]Soft  [ ]Distended   [ ]+BS  [ ]Non tender [ ]Tender  [ ]Other [ ]PEG [ ]OGT/ NGT   Last BM:   GENITOURINARY:  [ ]Normal [ ]Incontinent   [ ]Oliguria/Anuria   [ ]Andino  MUSCULOSKELETAL:   [ ]Normal   [ ]Weakness  [ ]Bed/Wheelchair bound [ ]Edema  NEUROLOGIC:   [ ]No focal deficits  [ ] Cognitive impairment  [ ] Dysphagia [ ]Dysarthria [ ] Paresis [ ]Other   SKIN:   [ ]Normal  [ ]Rash  [ ]Other  [ ]Pressure ulcer(s) [ ]y [ ]n present on admission    CRITICAL CARE:  [ ]Shock Present  [ ]Septic [ ]Cardiogenic [ ]Neurologic [ ]Hypovolemic  [ ]Vasopressors [ ]Inotropes  [ ]Respiratory failure present [ ]Mechanical Ventilation [ ]Non-invasive ventilatory support [ ]High-Flow Mode: Off, FiO2: 30  [ ]Acute  [ ]Chronic [ ]Hypoxic  [ ]Hypercarbic [ ]Other  [ ]Other organ failure     LABS:                        10.1   61.15 )-----------( 203      ( 30 May 2024 06:53 )             33.1   05-30    145  |  109<H>  |  8   ----------------------------<  94  4.7   |  24  |  0.40<L>    Ca    8.5      30 May 2024 06:53  Phos  3.6     05-30  Mg     2.2     05-30        Urinalysis Basic - ( 30 May 2024 06:53 )    Color: x / Appearance: x / SG: x / pH: x  Gluc: 94 mg/dL / Ketone: x  / Bili: x / Urobili: x   Blood: x / Protein: x / Nitrite: x   Leuk Esterase: x / RBC: x / WBC x   Sq Epi: x / Non Sq Epi: x / Bacteria: x      RADIOLOGY & ADDITIONAL STUDIES:    Protein Calorie Malnutrition Present: [ ]mild [ ]moderate [ ]severe [ ]underweight [ ]morbid obesity  https://www.andeal.org/vault/2440/web/files/ONC/Table_Clinical%20Characteristics%20to%20Document%20Malnutrition-White%20JV%20et%20al%202012.pdf    Height (cm): 172.7 (05-20-24 @ 00:42), 167.6 (04-16-24 @ 04:53)  Weight (kg): 108.7 (05-20-24 @ 00:42), 113.4 (04-16-24 @ 04:53)  BMI (kg/m2): 36.4 (05-20-24 @ 00:42), 40.4 (04-16-24 @ 04:53)    [ ]PPSV2 < or = 30%  [ ]significant weight loss [ ]poor nutritional intake [ ]anasarca[ ]Artificial Nutrition    Other REFERRALS:  [ ]Hospice  [ ]Child Life  [ ]Social Work  [ ]Case management [ ]Holistic Therapy     Goals of Care Document: SUBJECTIVE AND OBJECTIVE: No acute events o/n. Patient having delirium on exam. Limited ability to participate in GOC discussion.   Indication for Geriatrics and Palliative Care Services/INTERVAL HPI: GOC     OVERNIGHT EVENTS: No acute events o/n     DNR on chart:  Allergies    No Known Allergies    Intolerances    MEDICATIONS  (STANDING):  ascorbic acid 500 milliGRAM(s) Oral daily  chlorhexidine 4% Liquid 1 Application(s) Topical every 12 hours  clonazePAM  Tablet 1.5 milliGRAM(s) Oral every 12 hours  enoxaparin Injectable 40 milliGRAM(s) SubCutaneous every 12 hours  multivitamin 1 Tablet(s) Oral daily  pantoprazole  Injectable 40 milliGRAM(s) IV Push two times a day  QUEtiapine 200 milliGRAM(s) Oral <User Schedule>  QUEtiapine 25 milliGRAM(s) Oral <User Schedule>  senna Syrup 10 milliLiter(s) Oral at bedtime    MEDICATIONS  (PRN):  acetaminophen     Tablet .. 650 milliGRAM(s) Oral every 6 hours PRN Temp greater or equal to 38C (100.4F), Mild Pain (1 - 3)  nystatin Powder 1 Application(s) Topical two times a day PRN skin irritation  sodium chloride 0.9% lock flush 10 milliLiter(s) IV Push every 1 hour PRN Pre/post blood products, medications, blood draw, and to maintain line patency      ITEMS UNCHECKED ARE NOT PRESENT    PRESENT SYMPTOMS: [ ]Unable to self-report - see [ ] CPOT [ ] PAINADS [ ] RDOS  Source if other than patient:  [ ]Family   [ ]Team     Pain:  [x ]yes [ ]no- unable to identify location   QOL impact -   Location -                    Aggravating factors -  Quality -  Radiation -  Timing-  Severity (0-10 scale):  Minimal acceptable level (0-10 scale):     CPOT:    https://www.sccm.org/getattachment/ozk63u50-1l5m-2n3k-9o2x-3783b5692a6v/Critical-Care-Pain-Observation-Tool-(CPOT)    PAINAD Score: See PAINAD tool and score below       Dyspnea:                           [ ]Mild [ ]Moderate [ ]Severe    RDOS: See RDOS tool and score below   0 to 2  minimal or no respiratory distress   3  mild distress  4 to 6 moderate distress  >7 severe distress      Anxiety:                             [ ]Mild [ ]Moderate [ ]Severe  Fatigue:                             [ ]Mild [ ]Moderate [ ]Severe  Nausea:                             [ ]Mild [ ]Moderate [ ]Severe  Loss of appetite:              [ ]Mild [ ]Moderate [ ]Severe  Constipation:                    [ ]Mild [ ]Moderate [ ]Severe    PCSSQ[Palliative Care Spiritual Screening Question]   Severity (0-10):  Score of 4 or > indicate consideration of Chaplaincy referral.  Chaplaincy Referral: [x ] yes [ ] refused [ x] following [ ] Deferred     Caregiver [place]? : [ ] yes [ ] no [ ] Deferred [ ] Declined             Social work referral [ ] Patient & Family Centered Care Referral [ ]     Anticipatory Grief present?:  [ ] yes [ ] no  [ ] Deferred                  Social work referral [ ] Chaplaincy Referral [ ]    		  Other Symptoms:  [ x]All other review of systems negative     Palliative Performance Status Version 2:   See PPSv2 tool and score below         PHYSICAL EXAM:  Vital Signs Last 24 Hrs  T(C): 36.3 (30 May 2024 05:38), Max: 37.2 (29 May 2024 23:59)  T(F): 97.4 (30 May 2024 05:38), Max: 99 (29 May 2024 23:59)  HR: 108 (30 May 2024 09:30) (108 - 120)  BP: 153/82 (30 May 2024 05:38) (133/67 - 154/83)  BP(mean): --  RR: 23 (30 May 2024 09:30) (18 - 23)  SpO2: 100% (30 May 2024 09:30) (94% - 100%)    Parameters below as of 30 May 2024 09:30  Patient On (Oxygen Delivery Method): tracheostomy collar  O2 Flow (L/min): 8  O2 Concentration (%): 40 I&O's Summary    29 May 2024 07:01  -  30 May 2024 07:00  --------------------------------------------------------  IN: 1140 mL / OUT: 1100 mL / NET: 40 mL    30 May 2024 07:01  -  30 May 2024 11:45  --------------------------------------------------------  IN: 340 mL / OUT: 0 mL / NET: 340 mL       GENERAL: [ ]Cachexia    [x ]Alert  [ x]Oriented x1   [ ]Lethargic  [ ]Unarousable  [ ]Verbal  [ ]Non-Verbal  Behavioral:   [ ]Anxiety  [x ]Delirium [x ]Agitation [ ]Other  HEENT:  [ ]Normal   [ ]Dry mouth   [ x]ET Tube/Trach  [ ]Oral lesions  PULMONARY:   [ ]Clear [ ]Tachypnea  [ ]Audible excessive secretions   [ ]Rhonchi        [ ]Right [ ]Left [ ]Bilateral  [ ]Crackles        [ ]Right [ ]Left [ ]Bilateral  [ ]Wheezing     [ ]Right [ ]Left [ ]Bilateral  [x ]Diminished BS [ ] Right [ ]Left [x ]Bilateral  CARDIOVASCULAR:    [x ]Regular [ ]Irregular [ ]Tachy  [ ]Rehan [ ]Murmur [ ]Other  GASTROINTESTINAL:  [ x]Soft  [ ]Distended   [x ]+BS  [ ]Non tender [ ]Tender  [ ]Other [ x]PEG [ ]OGT/ NGT   Last BM:   GENITOURINARY:  [ ]Normal [ ]Incontinent   [ ]Oliguria/Anuria   [ ]Andino  MUSCULOSKELETAL:   [ ]Normal   [x ]Weakness  [x ]Bed/Wheelchair bound [ ]Edema  NEUROLOGIC:   [ ]No focal deficits  [x ] Cognitive impairment  [ ] Dysphagia [ ]Dysarthria [ ] Paresis [ ]Other   SKIN:   [ ]Normal  [ ]Rash  [ ]Other  [ ]Pressure ulcer(s) [ ]y [ ]n present on admission    CRITICAL CARE:  [ ]Shock Present  [ ]Septic [ ]Cardiogenic [ ]Neurologic [ ]Hypovolemic  [ ]Vasopressors [ ]Inotropes  [ ]Respiratory failure present [ ]Mechanical Ventilation [ ]Non-invasive ventilatory support [ ]High-Flow Mode: Off, FiO2: 30  [ ]Acute  [ ]Chronic [ ]Hypoxic  [ ]Hypercarbic [ ]Other  [ ]Other organ failure     LABS:                        10.1   61.15 )-----------( 203      ( 30 May 2024 06:53 )             33.1   05-30    145  |  109<H>  |  8   ----------------------------<  94  4.7   |  24  |  0.40<L>    Ca    8.5      30 May 2024 06:53  Phos  3.6     05-30  Mg     2.2     05-30        Urinalysis Basic - ( 30 May 2024 06:53 )    Color: x / Appearance: x / SG: x / pH: x  Gluc: 94 mg/dL / Ketone: x  / Bili: x / Urobili: x   Blood: x / Protein: x / Nitrite: x   Leuk Esterase: x / RBC: x / WBC x   Sq Epi: x / Non Sq Epi: x / Bacteria: x      RADIOLOGY & ADDITIONAL STUDIES:  < from: CT Abdomen and Pelvis w/ IV Cont (05.22.24 @ 00:32) >    ACC: [id number] EXAM:  CT ABDOMEN AND PELVIS IC   ORDERED BY:  HELLEN BARRY     ACC: [id number] EXAM:  CT ANGIO CHEST AORTA WAWIC   ORDERED BY:  HU MCNAIR     PROCEDURE DATE:  05/22/2024          INTERPRETATION:  CLINICAL INFORMATION: Acute anemia with multiple   hematomas. CLL.    COMPARISON: CT chest abdomen pelvis 5/20/2024.    CONTRAST/COMPLICATIONS:  IV Contrast: Omnipaque 350 (accession [id number]), IV contrast documented   in unlinked concurrent exam (accession [id number])  125 cc administered 5   cc discarded  Oral Contrast: NONE  Complications: None reported at time of study completion    PROCEDURE:  CT Angiography of the Chest was performed with Precontrast, Arterial and   Delayed phases acquired of the chest.  CT Abdomen and Pelvis was performed with IV contrast  Sagittal and coronal reformats were performed as well as 3D (MIP)   reconstructions.    FINDINGS: Some images are degraded by streak artifacts from the patient's   arms within the scanning field of view.  Suboptimal patient positioning on the CT table with the left abdomen   contacting the gantry also result in imaging artifacts which degrade this   examination  Motion artifacts degrade some of the imaging.    CHEST:  LUNGS AND LARGE AIRWAYS: Endotracheal tube terminates within the upper   trachea. Retrained secretions versus aspirated material proximal to the   endotracheal tube inflated balloon. Expiratory phase imaging. Respiratory   motion artifacts. Mild diffuse groundglass density with prominent   interlobular septa likely represent pulmonary interstitial and alveolar   edema. Mild bibasilar subsegmental atelectasis.  PLEURA: Small right and trace left pleural effusions.  VESSELS: Atherosclerotic changes of the aorta and coronary arteries.  HEART: Heart size is normal. No pericardial effusion.  MEDIASTINUM AND HEIDY: Unchanged mild multistation mediastinal and   bilateral hilar lymphadenopathy.  Partially visualized right lower neck mild lymphadenopathy is similar.   Unchanged extensive bilateral subpectoral and axillary lymphadenopathy  CHEST WALL AND LOWER NECK: Overall unchanged size of the large right   axillary hematoma which extends to the right supraclavicular space. No   findings to indicate active bleeding at this time.  Small foci of subcutaneous air within the anterior chest wall bilaterally.    ABDOMEN AND PELVIS:  LIVER: Unremarkable as visualized.  BILE DUCTS: Normal caliber.  GALLBLADDER: Nondistended  SPLEEN: Partially obscured by artifact  PANCREAS: Unremarkable as visualized.  ADRENALS: Unremarkable as visualized.  KIDNEYS/URETERS: No hydronephrosis    BLADDER: Minimally distended. Contains Andino catheter and small volume of   air  REPRODUCTIVE ORGANS: Atrophic. Possible thickened endometrium measuring 8   mm versus distention of the intrauterine canal by complex fluid    BOWEL: Percutaneous gastrostomy tube balloon tents the anterior gastric   wall to the abdominal wall. No extraluminal oral contrast. No bowel   obstruction. LAR. Colonic diverticulosis  PERITONEUM: Presacral edema.  VESSELS: Atherosclerotic changes. Infrarenal IVC filter. Right femoral   venous catheter tip terminates within a lumbar vein and should be pulled   back by at least 5 cm  RETROPERITONEUM/LYMPH NODES: Unchanged extensive bulky confluent   retroperitoneal and pelvic lymphadenopathy.  ABDOMINAL WALL: Anasarca. Multiple anterior abdominal wall areas of   subcutaneous nodularity likely represent sites of subcutaneous medication   administration  BONES: Multilevel degenerative changes throughout the spine.    IMPRESSION: Exam limitations as qualified above    Right femoral venous catheter tip terminates within a lumbar vein and   should be pulled back by at least 5 cm    Overall unchanged size of the large right axillary hematoma which extends   to the right supraclavicular space. No findings to indicate active   bleeding at this time.    Unchanged lymphadenopathy throughout lower neck, chest, abdomen and pelvis    Likely pulmonary interstitial with alveolar edema and small pleural   effusions.      Findings were discussed with Dr. HU MCNAIR [id number] 5/22/2024 9:32 AM   by Dr. Palomares with read back confirmation.    --- End of Report ---           GORDON TAPIA MD; Resident Radiologist  This document has been electronically signed.  HENRIETTA PALOMARES MD; Attending Radiologist  This document has been electronically signed. May 22 2024  9:37AM    < end of copied text >    Protein Calorie Malnutrition Present: [ ]mild [ ]moderate [ ]severe [ ]underweight [ ]morbid obesity  https://www.andeal.org/vault/2440/web/files/ONC/Table_Clinical%20Characteristics%20to%20Document%20Malnutrition-White%20JV%20et%20al%202012.pdf    Height (cm): 172.7 (05-20-24 @ 00:42), 167.6 (04-16-24 @ 04:53)  Weight (kg): 108.7 (05-20-24 @ 00:42), 113.4 (04-16-24 @ 04:53)  BMI (kg/m2): 36.4 (05-20-24 @ 00:42), 40.4 (04-16-24 @ 04:53)    [x ]PPSV2 < or = 30%  [ ]significant weight loss [ ]poor nutritional intake [ ]anasarca[ ]Artificial Nutrition    Other REFERRALS:  [ ]Hospice  [ ]Child Life  [ ]Social Work  [ ]Case management [ ]Holistic Therapy     Goals of Care Document:  Goals of Care:   GOALS OF CARE:  · Participants	Patient; Family  · Relative	Mili Gordon(Aunt)    Conversation Discussion:  · Conversation	Diagnosis; Prognosis; Treatment Options  · Conversation Details	Initially met with patient, introduced myself and the role of palliative care. Throughout conversation patient repeatedly agitated expressing concern that a family member had "stolen her checks." Able to be redirected back to conversation throughout. Patient trached to vent but able to mouth words clearly/write. She shared she has cancer and was unable to be weaned off the ventilator. Shared that she would not want to spend the rest of her life on machines but would like to try and continue to be weaned from the ventilator.   Spoke with patient's aunt Mili Gordon. She shared from previous conversations with Batsheva she had expressed she would want everything to be done. Discussed that goals of care can change with circumstance and discussed the above conversation. Mili would like to remain honoring Batsheva's wishes to see if she can be weaned from the ventilator, and verbalized understanding that if unable to be weaned prolonging life on a ventilator long term may greatly impact Batsheva's quality of life. She is amenable to ongoing palliative follow up to discuss next steps in the event patient cannot be weaned from the ventilator. Emotional support provided.    Location of Discussion:  · Location of discussion	Face to face     Time Spent on Advance Care Planning:  Attending or JV Only.     I personally spent 46 minutes on advance care planning services with the patient. This time is separate and distinct from any other care management services provided on this date.

## 2024-05-30 NOTE — PROGRESS NOTE ADULT - SUBJECTIVE AND OBJECTIVE BOX
Patient is a 69y old  Female who presents with a chief complaint of Transfer for leukophoresis (29 May 2024 07:28)      Interval Events:    REVIEW OF SYSTEMS:  [ ] Positive  [ ] All other systems negative  [ ] Unable to assess ROS because ________    Vital Signs Last 24 Hrs  T(C): 36.3 (05-30-24 @ 05:38), Max: 37.2 (05-29-24 @ 23:59)  T(F): 97.4 (05-30-24 @ 05:38), Max: 99 (05-29-24 @ 23:59)  HR: 110 (05-30-24 @ 05:38) (109 - 120)  BP: 153/82 (05-30-24 @ 05:38) (133/67 - 154/83)  RR: 18 (05-30-24 @ 05:38) (18 - 18)  SpO2: 100% (05-30-24 @ 05:38) (94% - 100%)    PHYSICAL EXAM:  HEENT:   [ ]Tracheostomy:  [ ]Pupils equal  [ ]No oral lesions  [ ]Abnormal    SKIN  [ ]No Rash  [ ] Abnormal  [ ] pressure    CARDIAC  [ ]Regular  [ ]Abnormal    PULMONARY  [ ]Bilateral Clear Breath Sounds  [ ]Normal Excursion  [ ]Abnormal    GI  [ ]PEG      [ ] +BS		              [ ]Soft, nondistended, nontender	  [ ]Abnormal    MUSCULOSKELETAL                                   [ ]Bedbound                 [ ]Abnormal    [ ]Ambulatory/OOB to chair                           EXTREMITIES                                         [ ]Normal  [ ]Edema                           NEUROLOGIC  [ ] Normal, non focal  [ ] Focal findings:    PSYCHIATRIC  [ ]Alert and appropriate  [ ] Sedated	 [ ]Agitated    :  Andino: [ ] Yes, if yes: Date of Placement:                   [  ] No    LINES: Central Lines [ ] Yes, if yes: Date of Placement                                     [  ] No    HOSPITAL MEDICATIONS:  MEDICATIONS  (STANDING):  ascorbic acid 500 milliGRAM(s) Oral daily  chlorhexidine 4% Liquid 1 Application(s) Topical every 12 hours  clonazePAM  Tablet 1.5 milliGRAM(s) Oral every 12 hours  enoxaparin Injectable 40 milliGRAM(s) SubCutaneous every 12 hours  midodrine 10 milliGRAM(s) Oral three times a day  multivitamin 1 Tablet(s) Oral daily  pantoprazole  Injectable 40 milliGRAM(s) IV Push two times a day  QUEtiapine 50 milliGRAM(s) Oral three times a day  QUEtiapine 200 milliGRAM(s) Oral <User Schedule>  senna Syrup 10 milliLiter(s) Oral at bedtime    MEDICATIONS  (PRN):  acetaminophen     Tablet .. 650 milliGRAM(s) Oral every 6 hours PRN Temp greater or equal to 38C (100.4F), Mild Pain (1 - 3)  nystatin Powder 1 Application(s) Topical two times a day PRN skin irritation  QUEtiapine 25 milliGRAM(s) Oral every 6 hours PRN agitation  sodium chloride 0.9% lock flush 10 milliLiter(s) IV Push every 1 hour PRN Pre/post blood products, medications, blood draw, and to maintain line patency      LABS:                        10.1   61.15 )-----------( 203      ( 30 May 2024 06:53 )             33.1     05-28    141  |  110<H>  |  9   ----------------------------<  100<H>  5.3   |  21<L>  |  0.46<L>    Ca    8.4      28 May 2024 11:19  Phos  3.1     05-28  Mg     2.3     05-28    TPro  5.8<L>  /  Alb  3.0<L>  /  TBili  1.1  /  DBili  x   /  AST  40  /  ALT  16  /  AlkPhos  70  05-28      Urinalysis Basic - ( 28 May 2024 11:19 )    Color: x / Appearance: x / SG: x / pH: x  Gluc: 100 mg/dL / Ketone: x  / Bili: x / Urobili: x   Blood: x / Protein: x / Nitrite: x   Leuk Esterase: x / RBC: x / WBC x   Sq Epi: x / Non Sq Epi: x / Bacteria: x      Arterial Blood Gas:  05-29 @ 05:29  7.44/43/102/29/98.8/4.6  ABG lactate: --  Arterial Blood Gas:  05-28 @ 18:35  7.38/46/86/27/97.8/1.7  ABG lactate: --      CAPILLARY BLOOD GLUCOSE    MICROBIOLOGY:     RADIOLOGY:  [ ] Reviewed and interpreted by me    Mode: Off  FiO2: 30   Patient is a 69y old  Female who presents with a chief complaint of Transfer for leukophoresis (29 May 2024 07:28)      Interval Events: no events reported over night.    REVIEW OF SYSTEMS:  [X] Positive:  Lower abdominal pain  [ ] All other systems negative  [ ] Unable to assess ROS because ________    Vital Signs Last 24 Hrs  T(C): 36.3 (05-30-24 @ 05:38), Max: 37.2 (05-29-24 @ 23:59)  T(F): 97.4 (05-30-24 @ 05:38), Max: 99 (05-29-24 @ 23:59)  HR: 110 (05-30-24 @ 05:38) (109 - 120)  BP: 153/82 (05-30-24 @ 05:38) (133/67 - 154/83)  RR: 18 (05-30-24 @ 05:38) (18 - 18)  SpO2: 100% (05-30-24 @ 05:38) (94% - 100%)      PHYSICAL EXAM:  HEENT:   [X] Tracheostomy: #7 Cuffed Portex  [X] PERRL B/L; EOMI  [X] No oral lesions  [ ] Abnormal    SKIN  [X] No Rash  [ ] Abnormal  [ ] pressure    CARDIAC  [X] Regular  [ ] Abnormal    PULMONARY  [ ] Bilateral Clear Breath Sounds  [ ] Normal Excursion  [X] Abnormal:  Mild ronchi on left anteriorly    GI  [X] PEG      [X] +BS		              [X] Soft, nondistended, 	  [X] Abnormal:  mild tenderness on right lower abdomen where there is a nodule on palpation, another smaller nodule felt along lower anterior abdomen along adipose tissue layer    MUSCULOSKELETAL                                   [ ] Bedbound                 [X] Abnormal:  Unable to assess gait; moves all limbs purposefully  [ ] Ambulatory/OOB to chair                           EXTREMITIES                                         [X] Normal  [ ]Edema                           NEUROLOGIC  [ ] Normal, non focal  [X] Focal findings:  Awake and alert; responding appropriately verbally to questions (using PMV); follows simple commands; +Left hemiplegia    PSYCHIATRIC  [X] Alert and appropriate during exam  [ ] Sedated	 [ ]Agitated    :  Andino: [ ] Yes, if yes: Date of Placement:                   [X] No    LINES: Central Lines [ ] Yes, if yes: Date of Placement                                     [X] No      HOSPITAL MEDICATIONS:  MEDICATIONS  (STANDING):  ascorbic acid 500 milliGRAM(s) Oral daily  chlorhexidine 4% Liquid 1 Application(s) Topical every 12 hours  clonazePAM  Tablet 1.5 milliGRAM(s) Oral every 12 hours  enoxaparin Injectable 40 milliGRAM(s) SubCutaneous every 12 hours  midodrine 10 milliGRAM(s) Oral three times a day  multivitamin 1 Tablet(s) Oral daily  pantoprazole  Injectable 40 milliGRAM(s) IV Push two times a day  QUEtiapine 50 milliGRAM(s) Oral three times a day  QUEtiapine 200 milliGRAM(s) Oral <User Schedule>  senna Syrup 10 milliLiter(s) Oral at bedtime    MEDICATIONS  (PRN):  acetaminophen     Tablet .. 650 milliGRAM(s) Oral every 6 hours PRN Temp greater or equal to 38C (100.4F), Mild Pain (1 - 3)  nystatin Powder 1 Application(s) Topical two times a day PRN skin irritation  QUEtiapine 25 milliGRAM(s) Oral every 6 hours PRN agitation  sodium chloride 0.9% lock flush 10 milliLiter(s) IV Push every 1 hour PRN Pre/post blood products, medications, blood draw, and to maintain line patency      LABS:                        10.1   61.15 )-----------( 203      ( 30 May 2024 06:53 )             33.1     05-30    145  |  109<H>  |  8   ----------------------------<  94  4.7   |  24  |  0.40<L>    Ca    8.5      30 May 2024 06:53  Phos  3.6     05-30  Mg     2.2     05-30        Urinalysis Basic - ( 28 May 2024 11:19 )    Color: x / Appearance: x / SG: x / pH: x  Gluc: 100 mg/dL / Ketone: x  / Bili: x / Urobili: x   Blood: x / Protein: x / Nitrite: x   Leuk Esterase: x / RBC: x / WBC x   Sq Epi: x / Non Sq Epi: x / Bacteria: x      Arterial Blood Gas:  05-29 @ 05:29  7.44/43/102/29/98.8/4.6  ABG lactate: --  Arterial Blood Gas:  05-28 @ 18:35  7.38/46/86/27/97.8/1.7  ABG lactate: --      CAPILLARY BLOOD GLUCOSE    MICROBIOLOGY:     RADIOLOGY:  [ ] Reviewed and interpreted by me    Mode: Off  FiO2: 30

## 2024-05-31 DIAGNOSIS — Z75.8 OTHER PROBLEMS RELATED TO MEDICAL FACILITIES AND OTHER HEALTH CARE: ICD-10-CM

## 2024-05-31 DIAGNOSIS — T14.8XXA OTHER INJURY OF UNSPECIFIED BODY REGION, INITIAL ENCOUNTER: ICD-10-CM

## 2024-05-31 LAB
ANION GAP SERPL CALC-SCNC: 13 MMOL/L — SIGNIFICANT CHANGE UP (ref 5–17)
BLD GP AB SCN SERPL QL: NEGATIVE — SIGNIFICANT CHANGE UP
BUN SERPL-MCNC: 10 MG/DL — SIGNIFICANT CHANGE UP (ref 7–23)
CALCIUM SERPL-MCNC: 8.8 MG/DL — SIGNIFICANT CHANGE UP (ref 8.4–10.5)
CHLORIDE SERPL-SCNC: 106 MMOL/L — SIGNIFICANT CHANGE UP (ref 96–108)
CO2 SERPL-SCNC: 28 MMOL/L — SIGNIFICANT CHANGE UP (ref 22–31)
CREAT SERPL-MCNC: 0.39 MG/DL — LOW (ref 0.5–1.3)
EGFR: 108 ML/MIN/1.73M2 — SIGNIFICANT CHANGE UP
GLUCOSE SERPL-MCNC: 108 MG/DL — HIGH (ref 70–99)
HCT VFR BLD CALC: 30.8 % — LOW (ref 34.5–45)
HGB BLD-MCNC: 9 G/DL — LOW (ref 11.5–15.5)
MAGNESIUM SERPL-MCNC: 2.2 MG/DL — SIGNIFICANT CHANGE UP (ref 1.6–2.6)
MCHC RBC-ENTMCNC: 28.9 PG — SIGNIFICANT CHANGE UP (ref 27–34)
MCHC RBC-ENTMCNC: 29.2 GM/DL — LOW (ref 32–36)
MCV RBC AUTO: 99 FL — SIGNIFICANT CHANGE UP (ref 80–100)
NRBC # BLD: 0 /100 WBCS — SIGNIFICANT CHANGE UP (ref 0–0)
PHOSPHATE SERPL-MCNC: 4 MG/DL — SIGNIFICANT CHANGE UP (ref 2.5–4.5)
PLATELET # BLD AUTO: 184 K/UL — SIGNIFICANT CHANGE UP (ref 150–400)
POTASSIUM SERPL-MCNC: 4.5 MMOL/L — SIGNIFICANT CHANGE UP (ref 3.5–5.3)
POTASSIUM SERPL-SCNC: 4.5 MMOL/L — SIGNIFICANT CHANGE UP (ref 3.5–5.3)
RBC # BLD: 3.11 M/UL — LOW (ref 3.8–5.2)
RBC # FLD: 19.9 % — HIGH (ref 10.3–14.5)
RH IG SCN BLD-IMP: POSITIVE — SIGNIFICANT CHANGE UP
SODIUM SERPL-SCNC: 143 MMOL/L — SIGNIFICANT CHANGE UP (ref 135–145)
WBC # BLD: 53.63 K/UL — CRITICAL HIGH (ref 3.8–10.5)
WBC # FLD AUTO: 53.63 K/UL — CRITICAL HIGH (ref 3.8–10.5)

## 2024-05-31 PROCEDURE — 99497 ADVNCD CARE PLAN 30 MIN: CPT | Mod: 25

## 2024-05-31 PROCEDURE — 99498 ADVNCD CARE PLAN ADDL 30 MIN: CPT | Mod: 25

## 2024-05-31 PROCEDURE — 99233 SBSQ HOSP IP/OBS HIGH 50: CPT | Mod: 25

## 2024-05-31 PROCEDURE — 99232 SBSQ HOSP IP/OBS MODERATE 35: CPT | Mod: GC

## 2024-05-31 RX ADMIN — QUETIAPINE FUMARATE 25 MILLIGRAM(S): 200 TABLET, FILM COATED ORAL at 14:25

## 2024-05-31 RX ADMIN — Medication 500 MILLIGRAM(S): at 11:31

## 2024-05-31 RX ADMIN — QUETIAPINE FUMARATE 25 MILLIGRAM(S): 200 TABLET, FILM COATED ORAL at 08:32

## 2024-05-31 RX ADMIN — PANTOPRAZOLE SODIUM 40 MILLIGRAM(S): 20 TABLET, DELAYED RELEASE ORAL at 05:17

## 2024-05-31 RX ADMIN — ENOXAPARIN SODIUM 40 MILLIGRAM(S): 100 INJECTION SUBCUTANEOUS at 05:17

## 2024-05-31 RX ADMIN — CHLORHEXIDINE GLUCONATE 1 APPLICATION(S): 213 SOLUTION TOPICAL at 05:17

## 2024-05-31 RX ADMIN — NYSTATIN CREAM 1 APPLICATION(S): 100000 CREAM TOPICAL at 05:18

## 2024-05-31 RX ADMIN — PANTOPRAZOLE SODIUM 40 MILLIGRAM(S): 20 TABLET, DELAYED RELEASE ORAL at 18:10

## 2024-05-31 RX ADMIN — ENOXAPARIN SODIUM 40 MILLIGRAM(S): 100 INJECTION SUBCUTANEOUS at 18:11

## 2024-05-31 RX ADMIN — CHLORHEXIDINE GLUCONATE 1 APPLICATION(S): 213 SOLUTION TOPICAL at 18:26

## 2024-05-31 RX ADMIN — Medication 1 TABLET(S): at 11:31

## 2024-05-31 RX ADMIN — QUETIAPINE FUMARATE 200 MILLIGRAM(S): 200 TABLET, FILM COATED ORAL at 19:44

## 2024-05-31 RX ADMIN — SENNA PLUS 10 MILLILITER(S): 8.6 TABLET ORAL at 21:58

## 2024-05-31 NOTE — PROGRESS NOTE ADULT - PROBLEM SELECTOR PLAN 5
- Hypotensive on admission   - required pressors in ICU  - Midodrine d/cd on 5/30  - BP remains stable will continue to monitor

## 2024-05-31 NOTE — PROGRESS NOTE ADULT - PROBLEM SELECTOR PLAN 5
pt having delirium on exam, unable to participate in goc  VMx2 left for surrogate Yenni Gordon to set up family meeting with RCU team  spoke with Yenni (pt's aunt) plan to speak via phone tomorrow with palliative and RCU team see GOC note above  today patient had clear insight into medical condition and goc   full code  surrogate if unable to participate is pt's aunt Yenni Gordon  disposition plan is for rehab

## 2024-05-31 NOTE — PROGRESS NOTE ADULT - PROBLEM SELECTOR PLAN 2
- On admission WBC count 233  - transferred to [facility] for possible leukophoresis  - as per heme/onc - underlying CLL (Oct 2023) with reactive lymphocytosis 2/2 sepsis  - heme/onc deferred leukophoresis due to mature lymphocytes; IVIG given 4/26/24  - CLL under control and plan for outpatient follow up at [facility] when patient closer to d/c

## 2024-05-31 NOTE — PROGRESS NOTE ADULT - PROBLEM SELECTOR PLAN 6
will continue to follow for goc  case discussed with PCU team  Can be reached by TEAMS M-F 9-5 Sammie Armendariz Any other time please page [phone] if needed will sign off as goals established  case discussed with RCU team  referral for PFCC placed for volunteer services to spend time with patient and assist with phone calls to aunt   Can be reached by TEAMS M-F 9-5 Sammie Armendariz Any other time please page [phone] if needed

## 2024-05-31 NOTE — PROGRESS NOTE ADULT - ASSESSMENT
69F h/o CVA (bedbound at baseline), COPD, CHF, HTN presenting as transfer from [facility] for SOB iso leukocytosis to 233 c/f acute leukemia. Pt intubated and on pressors, transferred to MICU for further management, course c/b empyema s/p chest tube placement (4/16) and removal, reaccumulation of loculated pleural effusion s/p L pigtail placement and removal on 4/27. Now off of IV pressors, pending EGD/PEG with GI today and likely to be transferred to RCU following this procedure. (Taken from MICU). Palliative consulted for assistance with GOC.

## 2024-05-31 NOTE — PROGRESS NOTE ADULT - NS ATTEND AMEND GEN_ALL_CORE FT
69F with MHx CVA, COPD, CHF, and HTN initially presenting to [facility] on 4/16/24 from OSH for hyperleukocytosis concerning for acute leukemia c/b acute hypoxemic respiratory failure and septic shock 2/2 Strep pneumonia bacteremia i/s/o pneumonia with empyema s/p chest tube placement x2 with MISTII with failure to extubation from MV s/p tracheostomy placement transferred to RCU on 5/8 for further medical management with hospital course c/b hemorrhagic shock 2/2 abdominal wall hematoma requiring MICU stay for vasopressor support and pRBC resuscitation now returning to RCU on 5/25.     Neuro  Pt w/ baseline L sided CVA deficits  much more alert and oriented today, no acute complaints  seems to have benefitted from d/c benzo and cont seroquel  cont PT/OT      Pulm  acute hypoxemic respiratory failure 2/2 strep pneumonia empyema c/b bacteremia s/p chest tube x2 with MIST therapy, now resolved  completed extended abx tx   s/p tracheostomy placement  cont TC trials   Airway clearance therapy in place. Trach care and suctioning. Oxygen supplementation for goal O2 saturation 90-95%.     CVS  bp cont to improve and midodrine now d/c    Endo  Stress hyperglycemia i/s/o DMII better controlled now. Will c/w ISS with BGFS monitoring.     Heme  new CLL c/b leukocytosis, no indication for leukophoresis, f/u Heme reccs   LLL DVT, a/c held for hemorrhagic shock- repeat LE duplex showed clot has resolved  RUE duplex with superficial thrombus- conservative management  Hospital course c/b hemorrhagic shock due to chest wall hematoma. CT imaging showed no active bleeding  and seen by General surgery, no surgical intervention at this time    CBCs now stabilized, will continue to monitor conservatively.     Skin  ecchymoses R forearm/AC area still present- remains swollen but compartment not hard, cont to monitor      Dispo pending medical optimization. Pt full code. Palliative consulted and recs appreciated. Pt amenable to trial of slow weaning but would not want life long dependence on life support.

## 2024-05-31 NOTE — PROGRESS NOTE ADULT - SUBJECTIVE AND OBJECTIVE BOX
SUBJECTIVE AND OBJECTIVE: Pt seen and examined at bedside.   Indication for Geriatrics and Palliative Care Services/INTERVAL HPI: GOC     OVERNIGHT EVENTS: No acute events o/n     DNR on chart:  Allergies    No Known Allergies    Intolerances    MEDICATIONS  (STANDING):  ascorbic acid 500 milliGRAM(s) Oral daily  chlorhexidine 4% Liquid 1 Application(s) Topical every 12 hours  enoxaparin Injectable 40 milliGRAM(s) SubCutaneous every 12 hours  multivitamin 1 Tablet(s) Oral daily  pantoprazole  Injectable 40 milliGRAM(s) IV Push two times a day  QUEtiapine 25 milliGRAM(s) Oral <User Schedule>  QUEtiapine 200 milliGRAM(s) Oral <User Schedule>  senna Syrup 10 milliLiter(s) Oral at bedtime    MEDICATIONS  (PRN):  acetaminophen     Tablet .. 650 milliGRAM(s) Oral every 6 hours PRN Temp greater or equal to 38C (100.4F), Mild Pain (1 - 3)  nystatin Powder 1 Application(s) Topical two times a day PRN skin irritation  sodium chloride 0.9% lock flush 10 milliLiter(s) IV Push every 1 hour PRN Pre/post blood products, medications, blood draw, and to maintain line patency      ITEMS UNCHECKED ARE NOT PRESENT    PRESENT SYMPTOMS: [ ]Unable to self-report - see [ ] CPOT [ ] PAINADS [ ] RDOS  Source if other than patient:  [ ]Family   [ ]Team     Pain:  [x ]yes [ ]no  QOL impact -   Location -         abdominal pain            Aggravating factors -  Quality -  Radiation -  Timing-  Severity (0-10 scale):  Minimal acceptable level (0-10 scale):     CPOT:    https://www.sccm.org/getattachment/lcu93s84-6x6x-2k4i-2x8h-9520r8945e1e/Critical-Care-Pain-Observation-Tool-(CPOT)    PAINAD Score: See PAINAD tool and score below       Dyspnea:                           [ ]Mild [ ]Moderate [ ]Severe    RDOS: See RDOS tool and score below   0 to 2  minimal or no respiratory distress   3  mild distress  4 to 6 moderate distress  >7 severe distress      Anxiety:                             [ ]Mild [ ]Moderate [ ]Severe  Fatigue:                             [ ]Mild [ ]Moderate [ ]Severe  Nausea:                             [ ]Mild [ ]Moderate [ ]Severe  Loss of appetite:              [ ]Mild [ ]Moderate [ ]Severe  Constipation:                    [ ]Mild [ ]Moderate [ ]Severe    PCSSQ[Palliative Care Spiritual Screening Question]   Severity (0-10):  Score of 4 or > indicate consideration of Chaplaincy referral.  Chaplaincy Referral: [x ] yes [ ] refused [ x] following [ ] Deferred     Caregiver Dolph? : [ ] yes [ ] no [ ] Deferred [ ] Declined             Social work referral [ ] Patient & Family Centered Care Referral [ ]     Anticipatory Grief present?:  [ ] yes [ ] no  [ ] Deferred                  Social work referral [ ] Chaplaincy Referral [ ]    		  Other Symptoms:  [x ]All other review of systems negative     Palliative Performance Status Version 2:   See PPSv2 tool and score below         PHYSICAL EXAM:  Vital Signs Last 24 Hrs  T(C): 36.8 (31 May 2024 05:00), Max: 37.3 (31 May 2024 01:23)  T(F): 98.2 (31 May 2024 05:00), Max: 99.1 (31 May 2024 01:23)  HR: 107 (31 May 2024 09:31) (100 - 112)  BP: 141/71 (31 May 2024 05:00) (110/80 - 158/77)  BP(mean): --  RR: 18 (31 May 2024 09:31) (13 - 22)  SpO2: 100% (31 May 2024 09:31) (98% - 100%)    Parameters below as of 31 May 2024 09:31  Patient On (Oxygen Delivery Method): tracheostomy collar    O2 Concentration (%): 40 I&O's Summary    30 May 2024 07:01  -  31 May 2024 07:00  --------------------------------------------------------  IN: 1390 mL / OUT: 850 mL / NET: 540 mL       GENERAL: [ ]Cachexia    [x ]Alert  [x ]Oriented x3   [ ]Lethargic  [ ]Unarousable  [ ]Verbal  [ ]Non-Verbal  Behavioral:   [ ]Anxiety  [ ]Delirium [ ]Agitation [ ]Other  HEENT:  [ ]Normal   [ ]Dry mouth   [ x]ET Tube/Trach  [ ]Oral lesions  PULMONARY:   [ ]Clear [ ]Tachypnea  [ ]Audible excessive secretions   [ ]Rhonchi        [ ]Right [ ]Left [ ]Bilateral  [ ]Crackles        [ ]Right [ ]Left [ ]Bilateral  [ ]Wheezing     [ ]Right [ ]Left [ ]Bilateral  [x ]Diminished BS [ ] Right [ ]Left [x ]Bilateral  CARDIOVASCULAR:    [x ]Regular [ ]Irregular [ ]Tachy  [ ]Rehan [ ]Murmur [ ]Other  GASTROINTESTINAL:  [ x]Soft  [ ]Distended   [ x]+BS  [ x]Non tender [ ]Tender  [ ]Other [ ]PEG [ ]OGT/ NGT   Last BM:   GENITOURINARY:  [ ]Normal [ ]Incontinent   [ ]Oliguria/Anuria   [x ]Andino  MUSCULOSKELETAL:   [ ]Normal   [x ]Weakness  [ x]Bed/Wheelchair bound [ ]Edema  NEUROLOGIC:   [ ]No focal deficits  [ ] Cognitive impairment  [ ] Dysphagia [ ]Dysarthria [ ] Paresis [ ]Other   SKIN:   [x ]Normal  [ ]Rash  [ ]Other  [ ]Pressure ulcer(s) [ ]y [ ]n present on admission    CRITICAL CARE:  [ ]Shock Present  [ ]Septic [ ]Cardiogenic [ ]Neurologic [ ]Hypovolemic  [ ]Vasopressors [ ]Inotropes  [ ]Respiratory failure present [ ]Mechanical Ventilation [ ]Non-invasive ventilatory support [ ]High-Flow   [ ]Acute  [ ]Chronic [ ]Hypoxic  [ ]Hypercarbic [ ]Other  [ ]Other organ failure     LABS:                        9.0    53.63 )-----------( 184      ( 31 May 2024 10:14 )             30.8   05-31    143  |  106  |  10  ----------------------------<  108<H>  4.5   |  28  |  0.39<L>    Ca    8.8      31 May 2024 10:14  Phos  4.0     05-31  Mg     2.2     05-31        Urinalysis Basic - ( 31 May 2024 10:14 )    Color: x / Appearance: x / SG: x / pH: x  Gluc: 108 mg/dL / Ketone: x  / Bili: x / Urobili: x   Blood: x / Protein: x / Nitrite: x   Leuk Esterase: x / RBC: x / WBC x   Sq Epi: x / Non Sq Epi: x / Bacteria: x      RADIOLOGY & ADDITIONAL STUDIES:  < from: CT Abdomen and Pelvis w/ IV Cont (05.22.24 @ 00:32) >  ACC: [id number] EXAM:  CT ABDOMEN AND PELVIS IC   ORDERED BY:  HELLEN BARRY     ACC: [id number] EXAM:  CT ANGIO CHEST AORTA WAWIC   ORDERED BY:  HU MCNAIR     PROCEDURE DATE:  05/22/2024          INTERPRETATION:  CLINICAL INFORMATION: Acute anemia with multiple   hematomas. CLL.    COMPARISON: CT chest abdomen pelvis 5/20/2024.    CONTRAST/COMPLICATIONS:  IV Contrast: Omnipaque 350 (accession [id number]), IV contrast documented   in unlinked concurrent exam (accession [id number])  125 cc administered 5   cc discarded  Oral Contrast: NONE  Complications: None reported at time of study completion    PROCEDURE:  CT Angiography of the Chest was performed with Precontrast, Arterial and   Delayed phases acquired of the chest.  CT Abdomen and Pelvis was performed with IV contrast  Sagittal and coronal reformats were performed as well as 3D (MIP)   reconstructions.    FINDINGS: Some images are degraded by streak artifacts from the patient's   arms within the scanning field of view.  Suboptimal patient positioning on the CT table with the left abdomen   contacting the gantry also result in imaging artifacts which degrade this   examination  Motion artifacts degrade some of the imaging.    CHEST:  LUNGS AND LARGE AIRWAYS: Endotracheal tube terminates within the upper   trachea. Retrained secretions versus aspirated material proximal to the   endotracheal tube inflated balloon. Expiratory phase imaging. Respiratory   motion artifacts. Mild diffuse groundglass density with prominent   interlobular septa likely represent pulmonary interstitial and alveolar   edema. Mild bibasilar subsegmental atelectasis.  PLEURA: Small right and trace left pleural effusions.  VESSELS: Atherosclerotic changes of the aorta and coronary arteries.  HEART: Heart size is normal. No pericardial effusion.  MEDIASTINUM AND HEIDY: Unchanged mild multistation mediastinal and   bilateral hilar lymphadenopathy.  Partially visualized right lower neck mild lymphadenopathy is similar.   Unchanged extensive bilateral subpectoral and axillary lymphadenopathy  CHEST WALL AND LOWER NECK: Overall unchanged size of the large right   axillary hematoma which extends to the right supraclavicular space. No   findings to indicate active bleeding at this time.  Small foci of subcutaneous air within the anterior chest wall bilaterally.    ABDOMEN AND PELVIS:  LIVER: Unremarkable as visualized.  BILE DUCTS: Normal caliber.  GALLBLADDER: Nondistended  SPLEEN: Partially obscured by artifact  PANCREAS: Unremarkable as visualized.  ADRENALS: Unremarkable as visualized.  KIDNEYS/URETERS: No hydronephrosis    BLADDER: Minimally distended. Contains Andino catheter and small volume of   air  REPRODUCTIVE ORGANS: Atrophic. Possible thickened endometrium measuring 8   mm versus distention of the intrauterine canal by complex fluid    BOWEL: Percutaneous gastrostomy tube balloon tents the anterior gastric   wall to the abdominal wall. No extraluminal oral contrast. No bowel   obstruction. LAR. Colonic diverticulosis  PERITONEUM: Presacral edema.  VESSELS: Atherosclerotic changes. Infrarenal IVC filter. Right femoral   venous catheter tip terminates within a lumbar vein and should be pulled   back by at least 5 cm  RETROPERITONEUM/LYMPH NODES: Unchanged extensive bulky confluent   retroperitoneal and pelvic lymphadenopathy.  ABDOMINAL WALL: Anasarca. Multiple anterior abdominal wall areas of   subcutaneous nodularity likely represent sites of subcutaneous medication   administration  BONES: Multilevel degenerative changes throughout the spine.    IMPRESSION: Exam limitations as qualified above    Right femoral venous catheter tip terminates within a lumbar vein and   should be pulled back by at least 5 cm    Overall unchanged size of the large right axillary hematoma which extends   to the right supraclavicular space. No findings to indicate active   bleeding at this time.    Unchanged lymphadenopathy throughout lower neck, chest, abdomen and pelvis    Likely pulmonary interstitial with alveolar edema and small pleural   effusions.      Findings were discussed with Dr. HU MCNAIR [id number] 5/22/2024 9:32 AM   by Dr. Palomares with read back confirmation.    --- End of Report ---           GORDON TAPIA MD; Resident Radiologist  This document has been electronically signed.  HENRIETTA PALOMARES MD; Attending Radiologist  This document has been electronically signed. May 22 2024  9:37AM    < end of copied text >    Protein Calorie Malnutrition Present: [ ]mild [ ]moderate [ ]severe [ ]underweight [ ]morbid obesity  https://www.andeal.org/vault/2440/web/files/ONC/Table_Clinical%20Characteristics%20to%20Document%20Malnutrition-White%20JV%20et%20al%202012.pdf    Height (cm): 172.7 (05-20-24 @ 00:42), 167.6 (04-16-24 @ 04:53)  Weight (kg): 108.7 (05-20-24 @ 00:42), 113.4 (04-16-24 @ 04:53)  BMI (kg/m2): 36.4 (05-20-24 @ 00:42), 40.4 (04-16-24 @ 04:53)    [ x]PPSV2 < or = 30%  [ ]significant weight loss [ ]poor nutritional intake [ ]anasarca[ ]Artificial Nutrition    Other REFERRALS:  [ ]Hospice  [ ]Child Life  [ ]Social Work  [ ]Case management [ ]Holistic Therapy     Goals of Care Document:

## 2024-05-31 NOTE — PROGRESS NOTE ADULT - PROBLEM SELECTOR PLAN 3
- Intubated on arrival to [facility] ED for respiratory tiring and AMS  - Unable to be extubated  - S/p Trach 4/28  - Patient tolerating TC ATC since 5/28; FIO2: 40 %  - Patient cleared for Supervised PMV Trials   - Continue Chest PT and suctioning PRN

## 2024-05-31 NOTE — PROGRESS NOTE ADULT - SUBJECTIVE AND OBJECTIVE BOX
ELDER NITA 69y Female      Patient is a 69y old  Female who presents with a chief complaint of Transfer for leukophoresis (31 May 2024 12:50)        INTERVAL HPI/OVERNIGHT EVENTS: No acute events overnight. Patient was seen and evaluated at the bedside. The patient denies pain. Vitals stable. Patient denies fever/chills, chest pain, shortness of breath, abdominal pain, headaches, nausea/vomiting, and diarrhea/constipation.      PHYSICAL EXAM:  GENERAL: NAD  HEAD:  Normocephalic  EYES:  conjunctiva and sclera clear  ENMT: Moist mucous membranes  NECK: Supple   CHEST/LUNG: Good air exchange bilaterally, no wheeze  HEART: Regular rate and rhythm  ABD: Soft, nontender   EXT: No edema, cyanosis, or deformity  NERVOUS SYSTEM:  Alert, awake      Vital Signs Last 24 Hrs  T(C): 37.1 (31 May 2024 14:21), Max: 37.3 (31 May 2024 01:23)  T(F): 98.7 (31 May 2024 14:21), Max: 99.1 (31 May 2024 01:23)  HR: 111 (31 May 2024 15:37) (100 - 112)  BP: 143/69 (31 May 2024 14:21) (110/80 - 158/77)  BP(mean): --  RR: 18 (31 May 2024 15:37) (13 - 22)  SpO2: 100% (31 May 2024 15:37) (97% - 100%)    Parameters below as of 31 May 2024 15:37  Patient On (Oxygen Delivery Method): tracheostomy collar    O2 Concentration (%): 40      MEDICATIONS  (STANDING):  ascorbic acid 500 milliGRAM(s) Oral daily  chlorhexidine 4% Liquid 1 Application(s) Topical every 12 hours  enoxaparin Injectable 40 milliGRAM(s) SubCutaneous every 12 hours  multivitamin 1 Tablet(s) Oral daily  pantoprazole  Injectable 40 milliGRAM(s) IV Push two times a day  QUEtiapine 25 milliGRAM(s) Oral <User Schedule>  QUEtiapine 200 milliGRAM(s) Oral <User Schedule>  senna Syrup 10 milliLiter(s) Oral at bedtime    MEDICATIONS  (PRN):  acetaminophen     Tablet .. 650 milliGRAM(s) Oral every 6 hours PRN Temp greater or equal to 38C (100.4F), Mild Pain (1 - 3)  nystatin Powder 1 Application(s) Topical two times a day PRN skin irritation  sodium chloride 0.9% lock flush 10 milliLiter(s) IV Push every 1 hour PRN Pre/post blood products, medications, blood draw, and to maintain line patency      Consultant(s) Notes Reviewed:  [X] YES  [ ] NO  Care Discussed with Other Providers [X] YES  [ ] NO  Imaging Personally Reviewed:  [X] YES  [ ] NO      LABS:                        9.0    53.63 )-----------( 184      ( 31 May 2024 10:14 )             30.8     05-31    143  |  106  |  10  ----------------------------<  108<H>  4.5   |  28  |  0.39<L>    Ca    8.8      31 May 2024 10:14  Phos  4.0     05-31  Mg     2.2     05-31          Urinalysis Basic - ( 31 May 2024 10:14 )    Color: x / Appearance: x / SG: x / pH: x  Gluc: 108 mg/dL / Ketone: x  / Bili: x / Urobili: x   Blood: x / Protein: x / Nitrite: x   Leuk Esterase: x / RBC: x / WBC x   Sq Epi: x / Non Sq Epi: x / Bacteria: x

## 2024-05-31 NOTE — PROGRESS NOTE ADULT - PROBLEM SELECTOR PLAN 6
- 4/24 BL LE doppler - nonocclusive left common femoral vein DVT  - 4/24 BL UE doppler - acute left basilic SVT, no acute DVT  - 5/21 venous doppler BL LE - resolved left common fem DVT  - 5/21 arterial doppler BL LE - BL LE arterial vascular disease  - Continue Lovenox

## 2024-05-31 NOTE — PROGRESS NOTE ADULT - PROBLEM SELECTOR PLAN 8
- Seen by Palliative Care on 5/31  - Patient wants to remain Full code  - Aunt Aneida involved in decision making

## 2024-05-31 NOTE — PROGRESS NOTE ADULT - CONVERSATION DETAILS
Met with patient in person and Yenni Gordon over the phone. We discussed the HPI, discussed the extensive hospitalization, and plans for next steps. Batsheva Gordon expressed her frustration with her prolonged hospitalization and her wishes to return home and be comfortable in her own space. I presented options including ongoing aggressive medical interventions versus a more palliative comfort approach to care. Batsheva emphasized that at any cost she would like all life prolonging measures to be provided at this time. She shared although her quality of life is so greatly impacted, her goal is to prolong quantity of time. I discussed advanced directives as a guide in the event a catastrophic event occurred where her heart stopped or she needed to be put back on life support. Batsheva stated she would like all measures to be provided in order to resuscitate her. I confirmed full code. Discussed higher risk in the setting of tracheostomy and deconditioning of hospital readmission. She verbalized understanding but remains steadfast with current goc.   We discussed disposition. Discussed concerns for rehospitalization and complications in the home setting. Pt's aunt discussed other social barriers to home at this time. Patient is amenable to go to rehab.  Palliative care remained available as Yenni and Batsheva were able to speak with each other regarding home social issues as well. Yenni stated social work previously involved and provided resources in order for them to prepare the home for Batsheva when she is discharged.

## 2024-05-31 NOTE — PROGRESS NOTE ADULT - ATTENDING COMMENTS
[id number], bed bound from CVA s/p trach and PEG seen in follow up for leukocytosis. Patient has been diagnosed with CLL in this admission. It appears that the patient is going through recurrent infections, Her IgG level is ~500. Received IVIG 400 mg/kg x1 dose. Her WBC count decreased from ~250K to ~60K without any CLL directed treatment. There was no indication for treatment of CLL at this time. Follow up as outpatient.  Supportive.

## 2024-05-31 NOTE — PROGRESS NOTE ADULT - PROBLEM SELECTOR PLAN 4
- Patient with periods of Delirium  - Continue Seroquel 25 mg @ 08:00 / 14: 00 AM, Seroquel 200 mg QHS   - Klonopin discontinued on 5/30 with improved Delirium today   - Patient voiced concerns on being long term Seroquel as she had experienced weight gain in the past   - 5/28 ECG: QTc 474

## 2024-05-31 NOTE — PROGRESS NOTE ADULT - ATTENDING SUPERVISION STATEMENT
Fellow
Fellow
Resident
Resident
Fellow
Resident
Fellow
Fellow
Resident
Fellow
Fellow
Resident
Fellow
Resident
Fellow
Resident
Fellow
Resident
Resident

## 2024-05-31 NOTE — PROGRESS NOTE ADULT - SUBJECTIVE AND OBJECTIVE BOX
Patient is a 69y old  Female who presents with a chief complaint of Transfer for leukophoresis (30 May 2024 11:43)      Interval Events: No events reported overnight     REVIEW OF SYSTEMS:  [ ] Positive  [ ] All other systems negative  [ ] Unable to assess ROS because ________    Vital Signs Last 24 Hrs  T(C): 36.8 (05-31-24 @ 05:00), Max: 37.3 (05-31-24 @ 01:23)  T(F): 98.2 (05-31-24 @ 05:00), Max: 99.1 (05-31-24 @ 01:23)  HR: 109 (05-31-24 @ 05:00) (100 - 112)  BP: 141/71 (05-31-24 @ 05:00) (110/80 - 158/77)  RR: 16 (05-31-24 @ 05:00) (13 - 23)  SpO2: 99% (05-31-24 @ 05:00) (96% - 100%)    PHYSICAL EXAM:  HEENT:   [ ]Tracheostomy:  [ ]Pupils equal  [ ]No oral lesions  [ ]Abnormal    SKIN  [ ]No Rash  [ ] Abnormal  [ ] pressure    CARDIAC  [ ]Regular  [ ]Abnormal    PULMONARY  [ ]Bilateral Clear Breath Sounds  [ ]Normal Excursion  [ ]Abnormal    GI  [ ]PEG      [ ] +BS		              [ ]Soft, nondistended, nontender	  [ ]Abnormal    MUSCULOSKELETAL                                   [ ]Bedbound                 [ ]Abnormal    [ ]Ambulatory/OOB to chair                           EXTREMITIES                                         [ ]Normal  [ ]Edema                           NEUROLOGIC  [ ] Normal, non focal  [ ] Focal findings:    PSYCHIATRIC  [ ]Alert and appropriate  [ ] Sedated	 [ ]Agitated    :  Andino: [ ] Yes, if yes: Date of Placement:                   [  ] No    LINES: Central Lines [ ] Yes, if yes: Date of Placement                                     [  ] No    HOSPITAL MEDICATIONS:  MEDICATIONS  (STANDING):  ascorbic acid 500 milliGRAM(s) Oral daily  chlorhexidine 4% Liquid 1 Application(s) Topical every 12 hours  enoxaparin Injectable 40 milliGRAM(s) SubCutaneous every 12 hours  multivitamin 1 Tablet(s) Oral daily  pantoprazole  Injectable 40 milliGRAM(s) IV Push two times a day  QUEtiapine 200 milliGRAM(s) Oral <User Schedule>  QUEtiapine 25 milliGRAM(s) Oral <User Schedule>  senna Syrup 10 milliLiter(s) Oral at bedtime    MEDICATIONS  (PRN):  acetaminophen     Tablet .. 650 milliGRAM(s) Oral every 6 hours PRN Temp greater or equal to 38C (100.4F), Mild Pain (1 - 3)  nystatin Powder 1 Application(s) Topical two times a day PRN skin irritation  sodium chloride 0.9% lock flush 10 milliLiter(s) IV Push every 1 hour PRN Pre/post blood products, medications, blood draw, and to maintain line patency      LABS:                        10.1   61.15 )-----------( 203      ( 30 May 2024 06:53 )             33.1     05-30    145  |  109<H>  |  8   ----------------------------<  94  4.7   |  24  |  0.40<L>    Ca    8.5      30 May 2024 06:53  Phos  3.6     05-30  Mg     2.2     05-30        Urinalysis Basic - ( 30 May 2024 06:53 )    Color: x / Appearance: x / SG: x / pH: x  Gluc: 94 mg/dL / Ketone: x  / Bili: x / Urobili: x   Blood: x / Protein: x / Nitrite: x   Leuk Esterase: x / RBC: x / WBC x   Sq Epi: x / Non Sq Epi: x / Bacteria: x          CAPILLARY BLOOD GLUCOSE    MICROBIOLOGY:     RADIOLOGY:  [ ] Reviewed and interpreted by me     Patient is a 69y old  Female who presents with a chief complaint of Transfer for leukophoresis (30 May 2024 11:43)      Interval Events: No events reported overnight     REVIEW OF SYSTEMS:  [ ] Positive  [x] All other systems negative  [ ] Unable to assess ROS because ________    Vital Signs Last 24 Hrs  T(C): 36.8 (05-31-24 @ 05:00), Max: 37.3 (05-31-24 @ 01:23)  T(F): 98.2 (05-31-24 @ 05:00), Max: 99.1 (05-31-24 @ 01:23)  HR: 109 (05-31-24 @ 05:00) (100 - 112)  BP: 141/71 (05-31-24 @ 05:00) (110/80 - 158/77)  RR: 16 (05-31-24 @ 05:00) (13 - 23)  SpO2: 99% (05-31-24 @ 05:00) (96% - 100%)    PHYSICAL EXAM:  HEENT:   [x] Tracheostomy: #7 Cuffed Portex  [x] PERRL B/L; EOMI  [x] No oral lesions  [ ] Abnormal    SKIN  [x] No Rash  [ ] Abnormal  [ ] pressure    CARDIAC  [x] Regular  [ ] Abnormal    PULMONARY  [x] Bilateral Coarse Breath Sounds  [ ] Normal Excursion  [ ] Abnormal:     GI  [x] PEG      [x] +BS		              [x] Soft, nondistended, 	  [x] Abnormal:  mild tenderness on right lower abdomen where there is a nodule on palpation, another smaller nodule felt along lower anterior abdomen along adipose tissue layer    MUSCULOSKELETAL                                   [ ] Bedbound                 [x] Abnormal:  Unable to assess gait; moves all limbs purposefully  [ ] Ambulatory/OOB to chair                           EXTREMITIES                                         [x] Normal  [ ]Edema                           NEUROLOGIC  [ ] Normal, non focal  [x] Focal findings:  Awake and alert; responding appropriately verbally to questions (using PMV); follows simple commands; +Left hemiplegia    PSYCHIATRIC  [x] Alert and appropriate during exam  [ ] Sedated	 [ ]Agitated    :  Andino: [ ] Yes, if yes: Date of Placement:                   [x] No    LINES: Central Lines [ ] Yes, if yes: Date of Placement                                     [x] No    HOSPITAL MEDICATIONS:  MEDICATIONS  (STANDING):  ascorbic acid 500 milliGRAM(s) Oral daily  chlorhexidine 4% Liquid 1 Application(s) Topical every 12 hours  enoxaparin Injectable 40 milliGRAM(s) SubCutaneous every 12 hours  multivitamin 1 Tablet(s) Oral daily  pantoprazole  Injectable 40 milliGRAM(s) IV Push two times a day  QUEtiapine 200 milliGRAM(s) Oral <User Schedule>  QUEtiapine 25 milliGRAM(s) Oral <User Schedule>  senna Syrup 10 milliLiter(s) Oral at bedtime    MEDICATIONS  (PRN):  acetaminophen     Tablet .. 650 milliGRAM(s) Oral every 6 hours PRN Temp greater or equal to 38C (100.4F), Mild Pain (1 - 3)  nystatin Powder 1 Application(s) Topical two times a day PRN skin irritation  sodium chloride 0.9% lock flush 10 milliLiter(s) IV Push every 1 hour PRN Pre/post blood products, medications, blood draw, and to maintain line patency      LABS:                        10.1   61.15 )-----------( 203      ( 30 May 2024 06:53 )             33.1     05-30    145  |  109<H>  |  8   ----------------------------<  94  4.7   |  24  |  0.40<L>    Ca    8.5      30 May 2024 06:53  Phos  3.6     05-30  Mg     2.2     05-30        Urinalysis Basic - ( 30 May 2024 06:53 )    Color: x / Appearance: x / SG: x / pH: x  Gluc: 94 mg/dL / Ketone: x  / Bili: x / Urobili: x   Blood: x / Protein: x / Nitrite: x   Leuk Esterase: x / RBC: x / WBC x   Sq Epi: x / Non Sq Epi: x / Bacteria: x          CAPILLARY BLOOD GLUCOSE    MICROBIOLOGY:     RADIOLOGY:  [ ] Reviewed and interpreted by me

## 2024-05-31 NOTE — PROGRESS NOTE ADULT - ASSESSMENT
69F h/o CVA (bedbound at baseline), COPD, CHF, HTN presenting as transfer from [facility] for SOB iso leukocytosis to 233 c/f acute leukemia. Pt intubated and on pressors in setting of strep pneumo pneumonia. Hematology consulted for leukocytosis, confirmed underlying CLL with reactive lymphocytosis 2/2 sepsis.     - Patient Labs at admission: , Hgb 13.4, PLT 96, 96% lymphocytes, 2% neutrophils.   - On peripheral smear (4/16/24): heterogeneous population of lymphocytes, smudge cells present, rare blasts, rare target cells, polychromasia, giant platelets present, reactive lymphocytes   - CMV negative, EBV serologies indicate recent or past   - There was no indication for leukophoresis at admission, no blasts seen on peripheral smear at admission. Now, WBCs are downtrending as infection is being treated.   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 S/P IVIG.   - 5/20-> PB smear reviewed again as WBC almost doubled. PB smear shows increased smudge cells, many small lymphocytes, RBCs with anisopoikilocytosis, some with increased central clearing, target cells seen.   - No indication for CLL-directed therapy at this time. Markedly elevated white count likely leukemoid on top of high baseline white count from CLL. Now downtrending to her baseline with treatment of underlying infection.     Yousuf Raymundo MD, PGY-5  Hematology/Medical Oncology Fellow  Pager: [phone]  Available on Microsoft Teams  After 5pm or on weekends please contact  to page on-call fellow  69F h/o CVA (bedbound at baseline), COPD, CHF, HTN presenting as transfer from [facility] for SOB iso leukocytosis to 233 c/f acute leukemia. Pt intubated and on pressors in setting of strep pneumo pneumonia. Hematology consulted for leukocytosis, confirmed underlying CLL with reactive lymphocytosis 2/2 sepsis.     - Patient Labs at admission: , Hgb 13.4, PLT 96, 96% lymphocytes, 2% neutrophils.   - On peripheral smear (4/16/24): heterogeneous population of lymphocytes, smudge cells present, rare blasts, rare target cells, polychromasia, giant platelets present, reactive lymphocytes   - CMV negative, EBV serologies indicate recent or past   - There was no indication for leukophoresis at admission, no blasts seen on peripheral smear at admission. Now, WBCs are downtrending as infection is being treated.   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 S/P IVIG.   - 5/20-> PB smear reviewed again as WBC almost doubled. PB smear shows increased smudge cells, many small lymphocytes, RBCs with anisopoikilocytosis, some with increased central clearing, target cells seen.   - No indication for CLL-directed therapy at this time. Markedly elevated white count likely leukemoid on top of high baseline white count from CLL. Now downtrending to her baseline with treatment of underlying infection.   - Hematology to sign off     Yousuf Raymundo MD, PGY-5  Hematology/Medical Oncology Fellow  Pager: [phone]  Available on Microsoft Teams  After 5pm or on weekends please contact  to page on-call fellow

## 2024-05-31 NOTE — PROGRESS NOTE ADULT - PROBLEM SELECTOR PLAN 1
- POCUS 4/17: Dense LLL consolidation w/ air bronchograms c/w PNA with moderate to large pleural effusion  - S/p chest tube and pigtail 4/24-4/27  - Treated with ceftriaxone 4/18-4/25 for s. pneumo and empyema  - Txd with Augmentin PO x4 weeks (completed 5/14)   - Treated empirically with Zosyn 5/19-5/24

## 2024-05-31 NOTE — PROGRESS NOTE ADULT - ASSESSMENT
69F h/o CVA (bedbound at baseline), COPD, CHF, HTN who initially p/w acute shortness of breath to outside ED and was treated for acute pulmonary edema with sublingual nitro x 2, Lasix, and BiPAP. Pt was also found to be febrile to 103, so treated for PNA. BIPAP led to improvement in O2 to 89-90. Pt transferred to [place] on 4/16/24 for white count of 233 and possible leukophoresis. In the ED, pt intubated iso respiratory tiring and decreasing mental status, and also was started on levophed for hypotension. Following intubation and initiation of pressors, she was admitted to the MICU for AHRF and septic shock.  In MICU, heme was consulted for hyperleukocytosis, however due to mature lymphocytic leukophoresis was not performed.  Course c/b empyema s/p chest tube placement (4/16) and removal, reaccumulation of loculated pleural effusion s/p L pigtail placement and removal on 4/27.   S/p treatment with ceftriaxone (4/18-4/25) for s. pneumo bacteremia and empyema, txd with Augmentin PO x4 weeks with completed on 5/14.  S/p PEG 5/7.  Transitioned off IV pressors and on midodrine and droxidopa.  Transferred to RCU 5/9.  S/p RRT 5/20 for hypoxia, readmitted to MICU for septic vs hemorrhagic shock requiring pressors.  Received 3 units of PRBCs, CT C/A/P w/ no active bleeding within a large multilobed heterogenous collection extending from R. lower neck into the right axilla and along the right lateral chest wall consistent with hematoma - no surgical intervention necessary.  Treated empirically with Zosyn until 5/24.  Returned to RCU 5/26. Tolerating TC ATC since 5/28.  Continuing airway management with Chest PT and suction PRN.       5/31: Patient reported to have delirium yesterday; Clonopin discontinued / Vastly improved today. Patient evaluated by Palliative care discussion had while wearing PMV and she was able to make her wishes known. Patient wants to remain full code and continue medical management.

## 2024-05-31 NOTE — PROGRESS NOTE ADULT - REASON FOR ADMISSION
Transfer for leukophoresis Oculoplastic Surgeon Procedure Text (A): After obtaining clear surgical margins the patient was sent to oculoplastics for surgical repair.  The patient understands they will receive post-surgical care and follow-up from the referring physician's office.

## 2024-05-31 NOTE — PROGRESS NOTE ADULT - PROBLEM SELECTOR PLAN 2
pt delirious on exam   can consider  consult, currently on seroquel  Monitor for constipation, urinary retention, pain, hunger, thirst, etc.  Promote sleep wake cycle and reorientation as indicated. resolved, c/w serhowardl   appreciate  input   Monitor for constipation, urinary retention, pain, hunger, thirst, etc.  Promote sleep wake cycle and reorientation as indicated.

## 2024-06-01 PROCEDURE — 99232 SBSQ HOSP IP/OBS MODERATE 35: CPT

## 2024-06-01 RX ORDER — QUETIAPINE FUMARATE 200 MG/1
25 TABLET, FILM COATED ORAL
Refills: 0 | Status: DISCONTINUED | OUTPATIENT
Start: 2024-06-01 | End: 2024-06-06

## 2024-06-01 RX ADMIN — CHLORHEXIDINE GLUCONATE 1 APPLICATION(S): 213 SOLUTION TOPICAL at 17:42

## 2024-06-01 RX ADMIN — ENOXAPARIN SODIUM 40 MILLIGRAM(S): 100 INJECTION SUBCUTANEOUS at 17:42

## 2024-06-01 RX ADMIN — QUETIAPINE FUMARATE 200 MILLIGRAM(S): 200 TABLET, FILM COATED ORAL at 22:20

## 2024-06-01 RX ADMIN — Medication 500 MILLIGRAM(S): at 11:26

## 2024-06-01 RX ADMIN — QUETIAPINE FUMARATE 25 MILLIGRAM(S): 200 TABLET, FILM COATED ORAL at 09:05

## 2024-06-01 RX ADMIN — CHLORHEXIDINE GLUCONATE 1 APPLICATION(S): 213 SOLUTION TOPICAL at 05:22

## 2024-06-01 RX ADMIN — ENOXAPARIN SODIUM 40 MILLIGRAM(S): 100 INJECTION SUBCUTANEOUS at 05:22

## 2024-06-01 RX ADMIN — Medication 1 TABLET(S): at 11:26

## 2024-06-01 RX ADMIN — QUETIAPINE FUMARATE 25 MILLIGRAM(S): 200 TABLET, FILM COATED ORAL at 13:43

## 2024-06-01 RX ADMIN — NYSTATIN CREAM 1 APPLICATION(S): 100000 CREAM TOPICAL at 05:22

## 2024-06-01 RX ADMIN — PANTOPRAZOLE SODIUM 40 MILLIGRAM(S): 20 TABLET, DELAYED RELEASE ORAL at 17:42

## 2024-06-01 RX ADMIN — PANTOPRAZOLE SODIUM 40 MILLIGRAM(S): 20 TABLET, DELAYED RELEASE ORAL at 05:22

## 2024-06-01 NOTE — PROGRESS NOTE ADULT - PROBLEM SELECTOR PLAN 2
- On admission WBC count 233  - transferred to [facility] for possible leukophoresis  - as per heme/onc - underlying CLL (Oct 2023) with reactive lymphocytosis 2/2 sepsis  - heme/onc deferred leukophoresis due to mature lymphocytes; IVIG given 4/26/24  - CLL under control and plan for outpatient follow up at [facility] when patient closer to d/c

## 2024-06-01 NOTE — PROGRESS NOTE ADULT - NS ATTEND AMEND GEN_ALL_CORE FT
69F with MHx CVA, COPD, CHF, and HTN initially presenting to [facility] on 4/16/24 from OSH for hyperleukocytosis concerning for acute leukemia c/b acute hypoxemic respiratory failure and septic shock 2/2 Strep pneumonia bacteremia i/s/o pneumonia with empyema s/p chest tube placement x2 with MISTII with failure to extubation from MV s/p tracheostomy placement transferred to RCU on 5/8 for further medical management with hospital course c/b hemorrhagic shock 2/2 abdominal wall hematoma requiring MICU stay for vasopressor support and pRBC resuscitation now returning to RCU on 5/25.     Neuro  Pt w/ baseline L sided CVA deficits  much more alert and oriented today, no acute complaints  seems to have benefitted from d/c benzo and cont seroquel  cont PT/OT      Pulm  acute hypoxemic respiratory failure 2/2 strep pneumonia empyema c/b bacteremia s/p chest tube x2 with MIST therapy, now resolved  completed extended abx tx   s/p tracheostomy placement  cont TC trials   Airway clearance therapy in place. Trach care and suctioning. Oxygen supplementation for goal O2 saturation 90-95%.     CVS  bp cont to improve and midodrine now d/c    Endo  Stress hyperglycemia i/s/o DMII better controlled now. Will c/w ISS with BGFS monitoring.     Heme  new CLL c/b leukocytosis, no indication for leukophoresis, f/u Heme reccs   LLL DVT, a/c held for hemorrhagic shock- repeat LE duplex showed clot has resolved  RUE duplex with superficial thrombus- conservative management  Hospital course c/b hemorrhagic shock due to chest wall hematoma. CT imaging showed no active bleeding  and seen by General surgery, no surgical intervention at this time    CBCs now stabilized, will continue to monitor conservatively.     Skin  ecchymoses R forearm/AC area still present- remains swollen but compartment not hard, cont to monitor      Dispo pending medical optimization. Pt full code. Palliative consulted and recs appreciated. Pt amenable to trial of slow weaning but would not want life long dependence on life support. as above:  69F with MHx CVA, COPD, CHF, and HTN initially presenting to [facility] on 4/16/24 from OSH for hyperleukocytosis concerning for acute leukemia c/b acute hypoxemic respiratory failure and septic shock 2/2 Strep pneumonia bacteremia i/s/o pneumonia with empyema s/p chest tube placement x2 with MISTII with failure to extubation from MV s/p tracheostomy placement transferred to [facility]CU on 5/8 for further medical management with hospital course c/b hemorrhagic shock 2/2 abdominal wall hematoma requiring MICU stay for vasopressor support and pRBC resuscitation now returning to RCU on 5/25.     Neuro  Pt w/ baseline L sided CVA deficits  much more alert and oriented today, no acute complaints  seems to have benefitted from d/c benzo and cont Seroquel  cont PT/OT      Pulm  acute hypoxemic respiratory failure 2/2 strep pneumonia empyema c/b bacteremia s/p chest tube x2 with MIST therapy, now resolved  completed extended abx tx   s/p tracheostomy placement  cont TC trials   Airway clearance therapy in place. Trach care and suctioning. Oxygen supplementation for goal O2 saturation 90-95%.     CVS  bp cont to improve and midodrine now d/c    Endo  Stress hyperglycemia i/s/o DMII better controlled now. Will c/w ISS with BGFS monitoring.     Heme  new CLL c/b leukocytosis, no indication for leukophoresis, f/u Heme reccs   LLL DVT, a/c held for hemorrhagic shock- repeat LE duplex showed clot has resolved  RUE duplex with superficial thrombus- conservative management  Hospital course c/b hemorrhagic shock due to chest wall hematoma. CT imaging showed no active bleeding  and seen by General surgery, no surgical intervention at this time    CBCs now stabilized, will continue to monitor conservatively.     Skin  ecchymoses R forearm/AC area still present- remains swollen but compartment not hard, cont to monitor      Dispo pending medical optimization. Pt full code. Palliative consulted and recs appreciated. Pt amenable to trial of slow weaning but would not want life long dependence on life support.    Cale Wilder MD-Pulmonary   [phone]

## 2024-06-01 NOTE — PROGRESS NOTE ADULT - SUBJECTIVE AND OBJECTIVE BOX
Patient is a 69y old  Female who presents with a chief complaint of Transfer for leukophoresis (30 May 2024 11:43)      Interval Events: No events reported overnight     REVIEW OF SYSTEMS:  [ ] Positive  [x] All other systems negative  [ ] Unable to assess ROS because ________    Vital Signs Last 24 Hrs  T(C): 36.8 (05-31-24 @ 05:00), Max: 37.3 (05-31-24 @ 01:23)  T(F): 98.2 (05-31-24 @ 05:00), Max: 99.1 (05-31-24 @ 01:23)  HR: 109 (05-31-24 @ 05:00) (100 - 112)  BP: 141/71 (05-31-24 @ 05:00) (110/80 - 158/77)  RR: 16 (05-31-24 @ 05:00) (13 - 23)  SpO2: 99% (05-31-24 @ 05:00) (96% - 100%)    PHYSICAL EXAM:  HEENT:   [x] Tracheostomy: #7 Cuffed Portex  [x] PERRL B/L; EOMI  [x] No oral lesions  [ ] Abnormal    SKIN  [x] No Rash  [ ] Abnormal  [ ] pressure    CARDIAC  [x] Regular  [ ] Abnormal    PULMONARY  [x] Bilateral Coarse Breath Sounds  [ ] Normal Excursion  [ ] Abnormal:     GI  [x] PEG      [x] +BS		              [x] Soft, nondistended, 	  [x] Abnormal:  mild tenderness on right lower abdomen where there is a nodule on palpation, another smaller nodule felt along lower anterior abdomen along adipose tissue layer    MUSCULOSKELETAL                                   [ ] Bedbound                 [x] Abnormal:  Unable to assess gait; moves all limbs purposefully  [ ] Ambulatory/OOB to chair                           EXTREMITIES                                         [x] Normal  [ ]Edema                           NEUROLOGIC  [ ] Normal, non focal  [x] Focal findings:  Awake and alert; responding appropriately verbally to questions (using PMV); follows simple commands; +Left hemiplegia    PSYCHIATRIC  [x] Alert and appropriate during exam  [ ] Sedated	 [ ]Agitated    :  Andino: [ ] Yes, if yes: Date of Placement:                   [x] No    LINES: Central Lines [ ] Yes, if yes: Date of Placement                                     [x] No    HOSPITAL MEDICATIONS:  MEDICATIONS  (STANDING):  ascorbic acid 500 milliGRAM(s) Oral daily  chlorhexidine 4% Liquid 1 Application(s) Topical every 12 hours  enoxaparin Injectable 40 milliGRAM(s) SubCutaneous every 12 hours  multivitamin 1 Tablet(s) Oral daily  pantoprazole  Injectable 40 milliGRAM(s) IV Push two times a day  QUEtiapine 200 milliGRAM(s) Oral <User Schedule>  QUEtiapine 25 milliGRAM(s) Oral <User Schedule>  senna Syrup 10 milliLiter(s) Oral at bedtime    MEDICATIONS  (PRN):  acetaminophen     Tablet .. 650 milliGRAM(s) Oral every 6 hours PRN Temp greater or equal to 38C (100.4F), Mild Pain (1 - 3)  nystatin Powder 1 Application(s) Topical two times a day PRN skin irritation  sodium chloride 0.9% lock flush 10 milliLiter(s) IV Push every 1 hour PRN Pre/post blood products, medications, blood draw, and to maintain line patency      LABS:                        10.1   61.15 )-----------( 203      ( 30 May 2024 06:53 )             33.1     05-30    145  |  109<H>  |  8   ----------------------------<  94  4.7   |  24  |  0.40<L>    Ca    8.5      30 May 2024 06:53  Phos  3.6     05-30  Mg     2.2     05-30        Urinalysis Basic - ( 30 May 2024 06:53 )    Color: x / Appearance: x / SG: x / pH: x  Gluc: 94 mg/dL / Ketone: x  / Bili: x / Urobili: x   Blood: x / Protein: x / Nitrite: x   Leuk Esterase: x / RBC: x / WBC x   Sq Epi: x / Non Sq Epi: x / Bacteria: x          CAPILLARY BLOOD GLUCOSE    MICROBIOLOGY:     RADIOLOGY:  [ ] Reviewed and interpreted by me     Patient is a 69y old  Female who presents with a chief complaint of Transfer for leukophoresis (30 May 2024 11:43)      Interval Events: No events reported overnight     REVIEW OF SYSTEMS:  [ ] Positive  [x] All other systems negative  [ ] Unable to assess ROS because ________    Vital Signs Last 24 Hrs  T(C): 36.8 (05-31-24 @ 05:00), Max: 37.3 (05-31-24 @ 01:23)  T(F): 98.2 (05-31-24 @ 05:00), Max: 99.1 (05-31-24 @ 01:23)  HR: 109 (05-31-24 @ 05:00) (100 - 112)  BP: 141/71 (05-31-24 @ 05:00) (110/80 - 158/77)  RR: 16 (05-31-24 @ 05:00) (13 - 23)  SpO2: 99% (05-31-24 @ 05:00) (96% - 100%)    PHYSICAL EXAM:  HEENT:   [x] Tracheostomy: #7 Cuffed Portex  [x] PERRL B/L; EOMI  [x] No oral lesions  [ ] Abnormal    SKIN  [x] No Rash  [ ] Abnormal  [ ] pressure    CARDIAC  [x] Regular  [ ] Abnormal    PULMONARY  [x] Bilateral Coarse Breath Sounds  [ ] Normal Excursion  [ ] Abnormal:     GI  [x] PEG      [x] +BS		              [x] Soft, nondistended, 	  [x] Abnormal:  mild tenderness on right lower abdomen where there is a nodule on palpation, another smaller nodule felt along lower anterior abdomen along adipose tissue layer    MUSCULOSKELETAL                                   [ ] Bedbound                 [x] Abnormal:  Unable to assess gait; moves all limbs purposefully  [ ] Ambulatory/OOB to chair                           EXTREMITIES                                         [x] Normal  [ ]Edema                           NEUROLOGIC  [ ] Normal, non focal  [x] Focal findings:  Awake and alert; responding appropriately verbally to questions (using PMV); follows simple commands; +Left hemiplegia    PSYCHIATRIC  [x] Alert and appropriate during exam  [ ] Sedated	 [ ]Agitated    :  Andino: [ ] Yes, if yes: Date of Placement:                   [x] No    LINES: Central Lines [ ] Yes, if yes: Date of Placement                                     [x] No    HOSPITAL MEDICATIONS:  MEDICATIONS  (STANDING):  ascorbic acid 500 milliGRAM(s) Oral daily  chlorhexidine 4% Liquid 1 Application(s) Topical every 12 hours  enoxaparin Injectable 40 milliGRAM(s) SubCutaneous every 12 hours  multivitamin 1 Tablet(s) Oral daily  pantoprazole  Injectable 40 milliGRAM(s) IV Push two times a day  QUEtiapine 200 milliGRAM(s) Oral <User Schedule>  QUEtiapine 25 milliGRAM(s) Oral <User Schedule>  senna Syrup 10 milliLiter(s) Oral at bedtime    MEDICATIONS  (PRN):  acetaminophen     Tablet .. 650 milliGRAM(s) Oral every 6 hours PRN Temp greater or equal to 38C (100.4F), Mild Pain (1 - 3)  nystatin Powder 1 Application(s) Topical two times a day PRN skin irritation  sodium chloride 0.9% lock flush 10 milliLiter(s) IV Push every 1 hour PRN Pre/post blood products, medications, blood draw, and to maintain line patency      LABS:                          CAPILLARY BLOOD GLUCOSE    MICROBIOLOGY:     RADIOLOGY:  [ ] Reviewed and interpreted by me

## 2024-06-01 NOTE — PROGRESS NOTE ADULT - PROBLEM SELECTOR PLAN 3
- Intubated on arrival to [facility] ED for respiratory tiring and AMS  - Unable to be extubated  - S/p Trach 4/28  - Patient tolerating TC ATC since 5/28; FIO2: 40 %  - Patient cleared for Supervised PMV Trials   - Continue Chest PT and suctioning PRN - Intubated on arrival to [facility] ED for respiratory tiring and AMS  - Unable to be extubated  - S/p Trach 4/28  - Patient tolerating TC ATC since 5/28; FIO2: 40 %  - Patient cleared for Supervised PMV Trials   - 6/1: Trach changed to #7 Cuffless Portex   - Continue Chest PT and suctioning PRN

## 2024-06-01 NOTE — PROGRESS NOTE ADULT - PROBLEM SELECTOR PLAN 4
- Patient with periods of Delirium  - Continue Seroquel 25 mg @ 08:00 / 14: 00 AM, Seroquel 200 mg QHS   - Klonopin discontinued on 5/30 with improved Delirium today   - Patient voiced concerns on being long term Seroquel as she had experienced weight gain in the past   - 5/28 ECG: QTc 474 - Patient with periods of Delirium however improving.  - Continue Seroquel 25 mg BID PRN;  Seroquel 200 mg QHS   - Klonopin discontinued on 5/30 with improved Delirium.  - Patient voiced concerns on being long term Seroquel as she had experienced weight gain in the past   - 5/28 ECG: QTc 474

## 2024-06-01 NOTE — PROGRESS NOTE ADULT - ASSESSMENT
69F h/o CVA (bedbound at baseline), COPD, CHF, HTN who initially p/w acute shortness of breath to outside ED and was treated for acute pulmonary edema with sublingual nitro x 2, Lasix, and BiPAP. Pt was also found to be febrile to 103, so treated for PNA. BIPAP led to improvement in O2 to 89-90. Pt transferred to [place] on 4/16/24 for white count of 233 and possible leukophoresis. In the ED, pt intubated iso respiratory tiring and decreasing mental status, and also was started on levophed for hypotension. Following intubation and initiation of pressors, she was admitted to the MICU for AHRF and septic shock.  In MICU, heme was consulted for hyperleukocytosis, however due to mature lymphocytic leukophoresis was not performed.  Course c/b empyema s/p chest tube placement (4/16) and removal, reaccumulation of loculated pleural effusion s/p L pigtail placement and removal on 4/27.   S/p treatment with ceftriaxone (4/18-4/25) for s. pneumo bacteremia and empyema, txd with Augmentin PO x4 weeks with completed on 5/14.  S/p PEG 5/7.  Transitioned off IV pressors and on midodrine and droxidopa.  Transferred to RCU 5/9.  S/p RRT 5/20 for hypoxia, readmitted to MICU for septic vs hemorrhagic shock requiring pressors.  Received 3 units of PRBCs, CT C/A/P w/ no active bleeding within a large multilobed heterogenous collection extending from R. lower neck into the right axilla and along the right lateral chest wall consistent with hematoma - no surgical intervention necessary.  Treated empirically with Zosyn until 5/24.  Returned to RCU 5/26. Tolerating TC ATC since 5/28.  Continuing airway management with Chest PT and suction PRN.       5/31: Patient reported to have delirium yesterday; Clonopin discontinued / Vastly improved today. Patient evaluated by Palliative care discussion had while wearing PMV and she was able to make her wishes known. Patient wants to remain full code and continue medical management.  69F h/o CVA (bedbound at baseline), COPD, CHF, HTN who initially p/w acute shortness of breath to outside ED and was treated for acute pulmonary edema with sublingual nitro x 2, Lasix, and BiPAP. Pt was also found to be febrile to 103, so treated for PNA. BIPAP led to improvement in O2 to 89-90. Pt transferred to [facility] on 4/16/24 for white count of 233 and possible leukophoresis. In the ED, pt intubated iso respiratory tiring and decreasing mental status, and also was started on levophed for hypotension. Following intubation and initiation of pressors, she was admitted to the MICU for AHRF and septic shock.  In MICU, heme was consulted for hyperleukocytosis, however due to mature lymphocytic leukophoresis was not performed.  Course c/b empyema s/p chest tube placement (4/16) and removal, reaccumulation of loculated pleural effusion s/p L pigtail placement and removal on 4/27.   S/p treatment with ceftriaxone (4/18-4/25) for s. pneumo bacteremia and empyema, txd with Augmentin PO x4 weeks with completed on 5/14.  S/p PEG 5/7.  Transitioned off IV pressors and on midodrine and droxidopa.  Transferred to RCU 5/9.  S/p RRT 5/20 for hypoxia, readmitted to MICU for septic vs hemorrhagic shock requiring pressors.  Received 3 units of PRBCs, CT C/A/P w/ no active bleeding within a large multilobed heterogenous collection extending from R. lower neck into the right axilla and along the right lateral chest wall consistent with hematoma - no surgical intervention necessary.  Treated empirically with Zosyn until 5/24.  Returned to RCU 5/26. Tolerating TC ATC since 5/28.  Continuing airway management with Chest PT and suction PRN.     6/1-no new events    5/31: Patient reported to have delirium yesterday; Clonopin discontinued / Vastly improved today. Patient evaluated by Palliative care discussion had while wearing PMV and she was able to make her wishes known. Patient wants to remain full code and continue medical management.  69F h/o CVA (bedbound at baseline), COPD, CHF, HTN who initially p/w acute shortness of breath to outside ED and was treated for acute pulmonary edema with sublingual nitro x 2, Lasix, and BiPAP. Pt was also found to be febrile to 103, so treated for PNA. BIPAP led to improvement in O2 to 89-90. Pt transferred to [place] on 4/16/24 for white count of 233 and possible leukophoresis. In the ED, pt intubated iso respiratory tiring and decreasing mental status, and also was started on levophed for hypotension. Following intubation and initiation of pressors, she was admitted to the MICU for AHRF and septic shock.  In MICU, heme was consulted for hyperleukocytosis, however due to mature lymphocytic leukophoresis was not performed.  Course c/b empyema s/p chest tube placement (4/16) and removal, reaccumulation of loculated pleural effusion s/p L pigtail placement and removal on 4/27.   S/p treatment with ceftriaxone (4/18-4/25) for s. pneumo bacteremia and empyema, txd with Augmentin PO x4 weeks with completed on 5/14.  S/p PEG 5/7.  Transitioned off IV pressors and on midodrine and droxidopa.  Transferred to RCU 5/9.  S/p RRT 5/20 for hypoxia, readmitted to MICU for septic vs hemorrhagic shock requiring pressors.  Received 3 units of PRBCs, CT C/A/P w/ no active bleeding within a large multilobed heterogenous collection extending from R. lower neck into the right axilla and along the right lateral chest wall consistent with hematoma - no surgical intervention necessary.  Treated empirically with Zosyn until 5/24.  Returned to RCU 5/26. Tolerating TC ATC since 5/28.  Continuing airway management with Chest PT and suction PRN.     5/31: Patient reported to have delirium yesterday; Clonopin discontinued / Vastly improved today. Patient evaluated by Palliative care discussion had while wearing PMV and she was able to make her wishes known. Patient wants to remain full code and continue medical management.   6/1-no new events.  Patient with appropriate mentation and tolerating PMV throughout the day.  Will change daytime Seroquel to BID PRN and monitor.  Discharge planning in progress 69F h/o CVA (bedbound at baseline), COPD, CHF, HTN who initially p/w acute shortness of breath to outside ED and was treated for acute pulmonary edema with sublingual nitro x 2, Lasix, and BiPAP. Pt was also found to be febrile to 103, so treated for PNA. BIPAP led to improvement in O2 to 89-90. Pt transferred to [place] on 4/16/24 for white count of 233 and possible leukophoresis. In the ED, pt intubated iso respiratory tiring and decreasing mental status, and also was started on levophed for hypotension. Following intubation and initiation of pressors, she was admitted to the MICU for AHRF and septic shock.  In MICU, heme was consulted for hyperleukocytosis, however due to mature lymphocytic leukophoresis was not performed.  Course c/b empyema s/p chest tube placement (4/16) and removal, reaccumulation of loculated pleural effusion s/p L pigtail placement and removal on 4/27.   S/p treatment with ceftriaxone (4/18-4/25) for s. pneumo bacteremia and empyema, txd with Augmentin PO x4 weeks with completed on 5/14.  S/p PEG 5/7.  Transitioned off IV pressors and on midodrine and droxidopa.  Transferred to RCU 5/9.  S/p RRT 5/20 for hypoxia, readmitted to MICU for septic vs hemorrhagic shock requiring pressors.  Received 3 units of PRBCs, CT C/A/P w/ no active bleeding within a large multilobed heterogenous collection extending from R. lower neck into the right axilla and along the right lateral chest wall consistent with hematoma - no surgical intervention necessary.  Treated empirically with Zosyn until 5/24.  Returned to RCU 5/26. Tolerating TC ATC since 5/28.  Continuing airway management with Chest PT and suction PRN.     5/31: Patient reported to have delirium yesterday; Clonopin discontinued / Vastly improved today. Patient evaluated by Palliative care discussion had while wearing PMV and she was able to make her wishes known. Patient wants to remain full code and continue medical management.   6/1-no new events.  Patient with appropriate mentation and tolerating PMV throughout the day.  Will change daytime Seroquel to BID PRN and monitor.  Trach changed to 3& cuffless portex.  An obturator was used to facilitate placement of new trach.  Suction catheter esily passes trach with sputum recovered; faint blood tinged hemoptysis post placement.  Swallow evaluation placed as per patient request.  Discharge planning in progress.

## 2024-06-02 ENCOUNTER — TRANSCRIPTION ENCOUNTER (OUTPATIENT)
Age: 69
End: 2024-06-02

## 2024-06-02 PROCEDURE — 99232 SBSQ HOSP IP/OBS MODERATE 35: CPT

## 2024-06-02 RX ORDER — SENNA PLUS 8.6 MG/1
10 TABLET ORAL
Qty: 0 | Refills: 0 | DISCHARGE
Start: 2024-06-02

## 2024-06-02 RX ORDER — ASCORBIC ACID 60 MG
1 TABLET,CHEWABLE ORAL
Qty: 0 | Refills: 0 | DISCHARGE
Start: 2024-06-02

## 2024-06-02 RX ADMIN — QUETIAPINE FUMARATE 200 MILLIGRAM(S): 200 TABLET, FILM COATED ORAL at 19:58

## 2024-06-02 RX ADMIN — Medication 650 MILLIGRAM(S): at 17:20

## 2024-06-02 RX ADMIN — QUETIAPINE FUMARATE 25 MILLIGRAM(S): 200 TABLET, FILM COATED ORAL at 12:12

## 2024-06-02 RX ADMIN — NYSTATIN CREAM 1 APPLICATION(S): 100000 CREAM TOPICAL at 05:52

## 2024-06-02 RX ADMIN — Medication 650 MILLIGRAM(S): at 18:35

## 2024-06-02 RX ADMIN — PANTOPRAZOLE SODIUM 40 MILLIGRAM(S): 20 TABLET, DELAYED RELEASE ORAL at 17:21

## 2024-06-02 RX ADMIN — ENOXAPARIN SODIUM 40 MILLIGRAM(S): 100 INJECTION SUBCUTANEOUS at 05:51

## 2024-06-02 RX ADMIN — CHLORHEXIDINE GLUCONATE 1 APPLICATION(S): 213 SOLUTION TOPICAL at 05:51

## 2024-06-02 RX ADMIN — PANTOPRAZOLE SODIUM 40 MILLIGRAM(S): 20 TABLET, DELAYED RELEASE ORAL at 05:51

## 2024-06-02 RX ADMIN — SENNA PLUS 10 MILLILITER(S): 8.6 TABLET ORAL at 22:31

## 2024-06-02 RX ADMIN — Medication 500 MILLIGRAM(S): at 12:12

## 2024-06-02 RX ADMIN — Medication 1 TABLET(S): at 12:12

## 2024-06-02 RX ADMIN — ENOXAPARIN SODIUM 40 MILLIGRAM(S): 100 INJECTION SUBCUTANEOUS at 17:20

## 2024-06-02 RX ADMIN — QUETIAPINE FUMARATE 25 MILLIGRAM(S): 200 TABLET, FILM COATED ORAL at 22:31

## 2024-06-02 RX ADMIN — CHLORHEXIDINE GLUCONATE 1 APPLICATION(S): 213 SOLUTION TOPICAL at 17:21

## 2024-06-02 NOTE — PROGRESS NOTE ADULT - NS ATTEND AMEND GEN_ALL_CORE FT
as above:  69F with MHx CVA, COPD, CHF, and HTN initially presenting to [facility] on 4/16/24 from OSH for hyperleukocytosis concerning for acute leukemia c/b acute hypoxemic respiratory failure and septic shock 2/2 Strep pneumonia bacteremia i/s/o pneumonia with empyema s/p chest tube placement x2 with MISTII with failure to extubation from MV s/p tracheostomy placement transferred to RCU on 5/8 for further medical management with hospital course c/b hemorrhagic shock 2/2 abdominal wall hematoma requiring MICU stay for vasopressor support and pRBC resuscitation now returning to RCU on 5/25.     Neuro  Pt w/ baseline L sided CVA deficits  much more alert and oriented today, no acute complaints  seems to have benefitted from d/c benzo and cont Seroquel  cont PT/OT      Pulm  acute hypoxemic respiratory failure 2/2 strep pneumonia empyema c/b bacteremia s/p chest tube x2 with MIST therapy, now resolved  completed extended abx tx   s/p tracheostomy placement  cont TC trials   Airway clearance therapy in place. Trach care and suctioning. Oxygen supplementation for goal O2 saturation 90-95%.     CVS  bp cont to improve and midodrine now d/c    Endo  Stress hyperglycemia i/s/o DMII better controlled now. Will c/w ISS with BGFS monitoring.     Heme  new CLL c/b leukocytosis, no indication for leukophoresis, f/u Heme reccs   LLL DVT, a/c held for hemorrhagic shock- repeat LE duplex showed clot has resolved  RUE duplex with superficial thrombus- conservative management  Hospital course c/b hemorrhagic shock due to chest wall hematoma. CT imaging showed no active bleeding  and seen by General surgery, no surgical intervention at this time    CBCs now stabilized, will continue to monitor conservatively.     Skin  ecchymoses R forearm/AC area still present- remains swollen but compartment not hard, cont to monitor      Dispo pending medical optimization. Pt full code. Palliative consulted and recs appreciated. Pt amenable to trial of slow weaning but would not want life long dependence on life support.    Cale Wilder MD-Pulmonary   [phone] as above: no new events  69F with MHx CVA, COPD, CHF, and HTN initially presenting to [facility] on 4/16/24 from OSH for hyperleukocytosis concerning for acute leukemia c/b acute hypoxemic respiratory failure and septic shock 2/2 Strep pneumonia bacteremia i/s/o pneumonia with empyema s/p chest tube placement x2 with MISTII with failure to extubation from MV s/p tracheostomy placement transferred to RCU on 5/8 for further medical management with hospital course c/b hemorrhagic shock 2/2 abdominal wall hematoma requiring MICU stay for vasopressor support and pRBC resuscitation now returning to RCU on 5/25.     Neuro  Pt w/ baseline L sided CVA deficits  much more alert and oriented today, no acute complaints  seems to have benefitted from d/c benzo and cont Seroquel  cont PT/OT      Pulm  acute hypoxemic respiratory failure 2/2 strep pneumonia empyema c/b bacteremia s/p chest tube x2 with MIST therapy, now resolved  completed extended abx tx   s/p tracheostomy placement  cont TC trials   Airway clearance therapy in place. Trach care and suctioning. Oxygen supplementation for goal O2 saturation 90-95%.     CVS  bp cont to improve and midodrine now d/c    Endo  Stress hyperglycemia i/s/o DMII better controlled now. Will c/w ISS with BGFS monitoring.     Heme  new CLL c/b leukocytosis, no indication for leukophoresis, f/u Heme reccs   LLL DVT, a/c held for hemorrhagic shock- repeat LE duplex showed clot has resolved  RUE duplex with superficial thrombus- conservative management  Hospital course c/b hemorrhagic shock due to chest wall hematoma. CT imaging showed no active bleeding  and seen by General surgery, no surgical intervention at this time    CBCs now stabilized, will continue to monitor conservatively.     Skin  ecchymoses R forearm/AC area still present- remains swollen but compartment not hard, cont to monitor      Dispo pending medical optimization. Pt full code. Palliative consulted and recs appreciated. Pt amenable to trial of slow weaning but would not want life long dependence on life support.    Cale Wilder MD-Pulmonary   [phone]

## 2024-06-02 NOTE — PROGRESS NOTE ADULT - PROBLEM SELECTOR PLAN 2
- On admission WBC count 233  - transferred to [facility] for possible leukophoresis  - as per heme/onc - underlying CLL (Oct 2023) with reactive lymphocytosis 2/2 sepsis  - heme/onc deferred leukophoresis due to mature lymphocytes; IVIG given 4/26/24  - CLL under control and plan for outpatient follow up at [facility] when patient closer to d/c

## 2024-06-02 NOTE — DISCHARGE NOTE PROVIDER - NSDCCPCAREPLAN_GEN_ALL_CORE_FT
PRINCIPAL DISCHARGE DIAGNOSIS  Diagnosis: CLL (chronic lymphocytic leukemia)  Assessment and Plan of Treatment: - On admission WBC count 233  - transferred to [facility] for possible leukophoresis  - as per heme/onc - underlying CLL (Oct 2023) with reactive lymphocytosis 2/2 sepsis  - heme/onc deferred leukophoresis due to mature lymphocytes; IVIG given 4/26/24  - CLL under control and plan for outpatient follow up at [facility] when patient closer to d/c.      SECONDARY DISCHARGE DIAGNOSES  Diagnosis: Empyema lung  Assessment and Plan of Treatment: - POCUS 4/17: Dense LLL consolidation w/ air bronchograms c/w PNA with moderate to large pleural effusion  - S/p chest tube and pigtail 4/24-4/27  - Treated with ceftriaxone 4/18-4/25 for s. pneumo and empyema  - Txd with Augmentin PO x4 weeks (completed 5/14)   - Treated empirically with Zosyn 5/19-5/24.    Diagnosis: Acute respiratory failure with hypoxia  Assessment and Plan of Treatment: - Intubated on arrival to [facility] ED for respiratory tiring and AMS  - Unable to be extubated  - S/p Trach 4/28  - Patient tolerating TC ATC since 5/28; FIO2: 40 %  - Patient cleared for Supervised PMV Trials   - 6/1: Trach changed to #7 Cuffless Portex  - Patient Red Capped since 6/3; NC  - Patient self decannulated early morning of 6/6.  Evaluated by RRT and ENT, deemed stable to remain decannulated.  Subsequent ABGs have been appropriate.  Remains stable on 2LNC.  - Continue Chest PT and suctioning PRN.    Diagnosis: Agitation  Assessment and Plan of Treatment: - Patient with periods of Delirium however vastly improving.  - Continue Seroquel 25 mg BID PRN;  Seroquel 150mg QHS   - Klonopin discontinued on 5/30 with improved Delirium  - Patient voiced concerns on being long term Seroquel as she had experienced weight gain in the past   - 5/28 ECG: QTc 474.    Diagnosis: Hypotension  Assessment and Plan of Treatment: - Hypotensive on admission   - required pressors in ICU  - Midodrine d/c'd on 5/30  - BP remains stable; continue to monitor.    Diagnosis: DVT (deep venous thrombosis)  Assessment and Plan of Treatment: - 4/24 BL LE doppler - nonocclusive left common femoral vein DVT  - 4/24 BL UE doppler - acute left basilic SVT, no acute DVT  - 5/21 venous doppler BL LE - resolved left common fem DVT  - 5/21 arterial doppler BL LE - BL LE arterial vascular disease  - Continue Lovenox.    Diagnosis: Hematoma  Assessment and Plan of Treatment: - RT UE Hematoma   - [facility] Duplex 5/28: Superficial thrombophlebitis involving the right cephalic and basilic veins  - CTA 5/22: Unchanged size of the large right axillary hematoma extends to the right supraclavicular space  - No evidence of Active Bleeding noted on CT Imaging   - Continue to monitor Site and H+H.    Diagnosis: Dysphagia  Assessment and Plan of Treatment: - S/p PEG 5/7 by GI   - FEES 6/3: Passed for Puree and Moderately thickened Liquids   - 6/7: Repeat FEEST performed however patient was not cleared for advancement of diet.  Tube feeds discontinued. Calorie count initiated.    Diagnosis: Advanced care planning/counseling discussion  Assessment and Plan of Treatment: - Seen by Palliative Care on 5/31  - Patient wants to remain Full code  - Aunt Aneida involved in decision making.    Diagnosis: Pleural effusion  Assessment and Plan of Treatment:     Diagnosis: Septic shock  Assessment and Plan of Treatment:      PRINCIPAL DISCHARGE DIAGNOSIS  Diagnosis: CLL (chronic lymphocytic leukemia)  Assessment and Plan of Treatment: - On admission WBC count 233  - transferred to [facility] for possible leukophoresis  - as per heme/onc - underlying CLL (Oct 2023) with reactive lymphocytosis 2/2 sepsis  - heme/onc deferred leukophoresis due to mature lymphocytes; IVIG given 4/26/24  - CLL under control and plan for outpatient follow up at [email or web address] when patient closer to d/c.      SECONDARY DISCHARGE DIAGNOSES  Diagnosis: Empyema lung  Assessment and Plan of Treatment: - POCUS 4/17: Dense LLL consolidation w/ air bronchograms c/w PNA with moderate to large pleural effusion  - S/p chest tube and pigtail 4/24-4/27  - Treated with ceftriaxone 4/18-4/25 for s. pneumo and empyema  - Txd with Augmentin PO x4 weeks (completed 5/14)   - Treated empirically with Zosyn 5/19-5/24.    Diagnosis: Acute respiratory failure with hypoxia  Assessment and Plan of Treatment: - Intubated on arrival to [facility] ED for respiratory tiring and AMS  - Unable to be extubated  - S/p Trach 4/28  - Patient tolerating TC ATC since 5/28; FIO2: 40 %  - Patient cleared for Supervised PMV Trials   - 6/1: Trach changed to #7 Cuffless Portex  - Patient Red Capped since 6/3; NC  - Patient self decannulated early morning of 6/6.  Evaluated by RRT and ENT, deemed stable to remain decannulated.  Subsequent ABGs have been appropriate.  Remains stable on 2LNC.  - Continue Chest PT and suctioning PRN.    Diagnosis: Agitation  Assessment and Plan of Treatment: - Patient with periods of Delirium however vastly improving.  - Continue Seroquel 25 mg BID PRN;  Seroquel 150mg QHS   - Klonopin discontinued on 5/30 with improved Delirium  - Patient voiced concerns on being long term Seroquel as she had experienced weight gain in the past   - 5/28 ECG: QTc 474.    Diagnosis: Hypotension  Assessment and Plan of Treatment: - Hypotensive on admission   - required pressors in ICU  - Midodrine d/c'd on 5/30  - BP remains stable; continue to monitor.    Diagnosis: DVT (deep venous thrombosis)  Assessment and Plan of Treatment: - 4/24 BL LE doppler - nonocclusive left common femoral vein DVT  - 4/24 BL UE doppler - acute left basilic SVT, no acute DVT  - 5/21 venous doppler BL LE - resolved left common fem DVT  - 5/21 arterial doppler BL LE - BL LE arterial vascular disease  - Continue Lovenox.    Diagnosis: Hematoma  Assessment and Plan of Treatment: - RT UE Hematoma   - VA Duplex 5/28: Superficial thrombophlebitis involving the right cephalic and basilic veins  - CTA 5/22: Unchanged size of the large right axillary hematoma extends to the right supraclavicular space  - No evidence of Active Bleeding noted on CT Imaging   - Continue to monitor Site and H+H.    Diagnosis: Dysphagia  Assessment and Plan of Treatment: - S/p PEG 5/7 by GI   - FEES 6/3: Passed for Puree and Moderately thickened Liquids   - 6/7: Repeat FEEST performed however patient was not cleared for advancement of diet.  Tube feeds discontinued.   - 6/7-6/9 calorie count - pt meeting nutritional needs with PO intake, continue with PO diet w/o tube feeds    Diagnosis: Advanced care planning/counseling discussion  Assessment and Plan of Treatment: - Seen by Palliative Care on 5/31  - Patient wants to remain Full code  - Aunt Aneida involved in decision making.    Diagnosis: Pleural effusion  Assessment and Plan of Treatment:     Diagnosis: Septic shock  Assessment and Plan of Treatment:      PRINCIPAL DISCHARGE DIAGNOSIS  Diagnosis: CLL (chronic lymphocytic leukemia)  Assessment and Plan of Treatment: - On admission WBC count 233  - transferred to [facility] for possible leukophoresis  - as per heme/onc - underlying CLL (Oct 2023) with reactive lymphocytosis 2/2 sepsis  - heme/onc deferred leukophoresis due to mature lymphocytes; IVIG given 4/26/24  - CLL under control and plan for outpatient follow up at [facility] when patient closer to d/c.      SECONDARY DISCHARGE DIAGNOSES  Diagnosis: Empyema lung  Assessment and Plan of Treatment: - POCUS 4/17: Dense LLL consolidation w/ air bronchograms c/w PNA with moderate to large pleural effusion  - S/p chest tube and pigtail 4/24-4/27  - Treated with ceftriaxone 4/18-4/25 for s. pneumo and empyema  - Txd with Augmentin PO x4 weeks (completed 5/14)   - Treated empirically with Zosyn 5/19-5/24.    Diagnosis: Acute respiratory failure with hypoxia  Assessment and Plan of Treatment: - Intubated on arrival to [facility] ED for respiratory tiring and AMS  - Unable to be extubated  - S/p Trach 4/28  - Patient tolerating TC ATC since 5/28; FIO2: 40 %  - Patient cleared for Supervised PMV Trials   - 6/1: Trach changed to #7 Cuffless Portex  - Patient Red Capped since 6/3; NC  - Patient self decannulated early morning of 6/6.  Evaluated by RRT and ENT, deemed stable to remain decannulated.  Subsequent ABGs have been appropriate.  Remains stable on 2LNC.  - Continue Chest PT and suctioning PRN.    Diagnosis: Agitation  Assessment and Plan of Treatment: - Patient with periods of Delirium however vastly improving.  - Continue Seroquel 25 mg BID PRN;  Seroquel 150mg QHS   - Klonopin discontinued on 5/30 with improved Delirium  - Patient voiced concerns on being long term Seroquel as she had experienced weight gain in the past   - 5/28 ECG: QTc 474.    Diagnosis: Hypotension  Assessment and Plan of Treatment: - Hypotensive on admission   - required pressors in ICU  - Midodrine d/c'd on 5/30  - BP remains stable; continue to monitor.    Diagnosis: DVT (deep venous thrombosis)  Assessment and Plan of Treatment: - 4/24 BL LE doppler - nonocclusive left common femoral vein DVT  - 4/24 BL UE doppler - acute left basilic SVT, no acute DVT  - 5/21 venous doppler BL LE - resolved left common fem DVT  - 5/21 arterial doppler BL LE - BL LE arterial vascular disease  - Continue Lovenox.    Diagnosis: Hematoma  Assessment and Plan of Treatment: - RT UE Hematoma   - VA Duplex 5/28: Superficial thrombophlebitis involving the right cephalic and basilic veins  - CTA 5/22: Unchanged size of the large right axillary hematoma extends to the right supraclavicular space  - No evidence of Active Bleeding noted on CT Imaging   - Continue to monitor Site and H+H.    Diagnosis: Dysphagia  Assessment and Plan of Treatment: - S/p PEG 5/7 by GI   - FEES 6/3: Passed for Puree and Moderately thickened Liquids   - 6/7: Repeat FEEST performed however patient was not cleared for advancement of diet.  Tube feeds discontinued.   - 6/7 Repeat FEEST performed however patient was not cleared for advancement of diet.  Tube feeds discontinued. - 6/7-6/9 calorie count - pt meeting nutritional needs with PO intake, continue diet as is.  - 6/11 Repeat FEESST - advanced to easy to chew and mildly thickened  - will need to follow up outpt/at rehab with repeat swallow studies to continue to advance diet as tolerated    Diagnosis: Advanced care planning/counseling discussion  Assessment and Plan of Treatment: - Seen by Palliative Care on 5/31  - Patient wants to remain Full code  - Aunt Aneida involved in decision making.    Diagnosis: Pleural effusion  Assessment and Plan of Treatment:     Diagnosis: Septic shock  Assessment and Plan of Treatment:

## 2024-06-02 NOTE — PROGRESS NOTE ADULT - ASSESSMENT
69F h/o CVA (bedbound at baseline), COPD, CHF, HTN who initially p/w acute shortness of breath to outside ED and was treated for acute pulmonary edema with sublingual nitro x 2, Lasix, and BiPAP. Pt was also found to be febrile to 103, so treated for PNA. BIPAP led to improvement in O2 to 89-90. Pt transferred to [place] on 4/16/24 for white count of 233 and possible leukophoresis. In the ED, pt intubated iso respiratory tiring and decreasing mental status, and also was started on levophed for hypotension. Following intubation and initiation of pressors, she was admitted to the MICU for AHRF and septic shock.  In MICU, heme was consulted for hyperleukocytosis, however due to mature lymphocytic leukophoresis was not performed.  Course c/b empyema s/p chest tube placement (4/16) and removal, reaccumulation of loculated pleural effusion s/p L pigtail placement and removal on 4/27.   S/p treatment with ceftriaxone (4/18-4/25) for s. pneumo bacteremia and empyema, txd with Augmentin PO x4 weeks with completed on 5/14.  S/p PEG 5/7.  Transitioned off IV pressors and on midodrine and droxidopa.  Transferred to RCU 5/9.  S/p RRT 5/20 for hypoxia, readmitted to MICU for septic vs hemorrhagic shock requiring pressors.  Received 3 units of PRBCs, CT C/A/P w/ no active bleeding within a large multilobed heterogenous collection extending from R. lower neck into the right axilla and along the right lateral chest wall consistent with hematoma - no surgical intervention necessary.  Treated empirically with Zosyn until 5/24.  Returned to RCU 5/26. Tolerating TC ATC since 5/28.  Continuing airway management with Chest PT and suction PRN.     5/31: Patient reported to have delirium yesterday; Clonopin discontinued / Vastly improved today. Patient evaluated by Palliative care discussion had while wearing PMV and she was able to make her wishes known. Patient wants to remain full code and continue medical management.   6/1-no new events.  Patient with appropriate mentation and tolerating PMV throughout the day.  Will change daytime Seroquel to BID PRN and monitor.  Trach changed to 3& cuffless portex.  An obturator was used to facilitate placement of new trach.  Suction catheter esily passes trach with sputum recovered; faint blood tinged hemoptysis post placement.  Swallow evaluation placed as per patient request.  Discharge planning in progress. 69F h/o CVA (bedbound at baseline), COPD, CHF, HTN who initially p/w acute shortness of breath to outside ED and was treated for acute pulmonary edema with sublingual nitro x 2, Lasix, and BiPAP. Pt was also found to be febrile to 103, so treated for PNA. BIPAP led to improvement in O2 to 89-90. Pt transferred to [place] on 4/16/24 for white count of 233 and possible leukophoresis. In the ED, pt intubated iso respiratory tiring and decreasing mental status, and also was started on levophed for hypotension. Following intubation and initiation of pressors, she was admitted to the MICU for AHRF and septic shock.  In MICU, heme was consulted for hyperleukocytosis, however due to mature lymphocytic leukophoresis was not performed.  Course c/b empyema s/p chest tube placement (4/16) and removal, reaccumulation of loculated pleural effusion s/p L pigtail placement and removal on 4/27.   S/p treatment with ceftriaxone (4/18-4/25) for s. pneumo bacteremia and empyema, txd with Augmentin PO x4 weeks with completed on 5/14.  S/p PEG 5/7.  Transitioned off IV pressors and on midodrine and droxidopa.  Transferred to RCU 5/9.  S/p RRT 5/20 for hypoxia, readmitted to MICU for septic vs hemorrhagic shock requiring pressors.  Received 3 units of PRBCs, CT C/A/P w/ no active bleeding within a large multilobed heterogenous collection extending from R. lower neck into the right axilla and along the right lateral chest wall consistent with hematoma - no surgical intervention necessary.  Treated empirically with Zosyn until 5/24.  Returned to RCU 5/26. Tolerating TC ATC since 5/28.  Continuing airway management with Chest PT and suction PRN.     5/31: Patient reported to have delirium yesterday; Clonopin discontinued / Vastly improved today. Patient evaluated by Palliative care discussion had while wearing PMV and she was able to make her wishes known. Patient wants to remain full code and continue medical management.   6/1-no new events.  Patient with appropriate mentation and tolerating PMV throughout the day.  Will change daytime Seroquel to BID PRN and monitor.  Trach changed to 3& cuffless portex.  An obturator was used to facilitate placement of new trach.  Suction catheter esily passes trach with sputum recovered; faint blood tinged hemoptysis post placement.  Swallow evaluation placed as per patient request.  Discharge planning in progress.  6/2-no new events 69F h/o CVA (bedbound at baseline), COPD, CHF, HTN who initially p/w acute shortness of breath to outside ED and was treated for acute pulmonary edema with sublingual nitro x 2, Lasix, and BiPAP. Pt was also found to be febrile to 103, so treated for PNA. BIPAP led to improvement in O2 to 89-90. Pt transferred to [place] on 4/16/24 for white count of 233 and possible leukophoresis. In the ED, pt intubated iso respiratory tiring and decreasing mental status, and also was started on levophed for hypotension. Following intubation and initiation of pressors, she was admitted to the MICU for AHRF and septic shock.  In MICU, heme was consulted for hyperleukocytosis, however due to mature lymphocytic leukophoresis was not performed.  Course c/b empyema s/p chest tube placement (4/16) and removal, reaccumulation of loculated pleural effusion s/p L pigtail placement and removal on 4/27.   S/p treatment with ceftriaxone (4/18-4/25) for s. pneumo bacteremia and empyema, txd with Augmentin PO x4 weeks with completed on 5/14.  S/p PEG 5/7.  Transitioned off IV pressors and on midodrine and droxidopa.  Transferred to RCU 5/9.  S/p RRT 5/20 for hypoxia, readmitted to MICU for septic vs hemorrhagic shock requiring pressors.  Received 3 units of PRBCs, CT C/A/P w/ no active bleeding within a large multilobed heterogenous collection extending from R. lower neck into the right axilla and along the right lateral chest wall consistent with hematoma - no surgical intervention necessary.  Treated empirically with Zosyn until 5/24.  Returned to RCU 5/26. Tolerating TC ATC since 5/28.  Continuing airway management with Chest PT and suction PRN.     6/2: Patient evaluated by Palliative care discussion had while wearing PMV and she was able to make her wishes known. Patient wants to remain full code and continue medical management. Patient with appropriate mentation and tolerating PMV throughout the day.  Will change daytime Seroquel to BID PRN and monitor.  Trach changed to #7 cuffless Portex.  Swallow evaluation placed as per patient request.  Discharge planning in progress.

## 2024-06-02 NOTE — PROGRESS NOTE ADULT - SUBJECTIVE AND OBJECTIVE BOX
Patient is a 69y old  Female who presents with a chief complaint of Transfer for leukophoresis (01 Jun 2024 07:12)      Interval Events:    REVIEW OF SYSTEMS:  [ ] Positive  [ ] All other systems negative  [ ] Unable to assess ROS because ________    Vital Signs Last 24 Hrs  T(C): 37 (06-02-24 @ 06:00), Max: 37.7 (06-01-24 @ 17:00)  T(F): 98.6 (06-02-24 @ 06:00), Max: 99.8 (06-01-24 @ 17:00)  HR: 74 (06-02-24 @ 06:00) (74 - 123)  BP: 140/75 (06-02-24 @ 06:00) (118/65 - 149/80)  RR: 19 (06-02-24 @ 06:00) (18 - 24)  SpO2: 97% (06-02-24 @ 06:00) (95% - 100%)    PHYSICAL EXAM:  HEENT:   [ ]Tracheostomy:  [ ]Pupils equal  [ ]No oral lesions  [ ]Abnormal    SKIN  [ ] No Rash  [ ] Abnormal  [ ] pressure    CARDIAC  [ ]Regular  [ ]Abnormal    PULMONARY  [ ]Bilateral Clear Breath Sounds  [ ]Normal Excursion  [ ]Abnormal    GI  [ ]PEG      [ ] +BS		              [ ]Soft, nondistended, nontender	  [ ]Abnormal    MUSCULOSKELETAL                                   [ ]Bedbound                 [ ]Abnormal    [ ]Ambulatory/OOB to chair                           EXTREMITIES                                         [ ]Normal  [ ]Edema                           NEUROLOGIC  [ ] Normal, non focal  [ ] Focal findings:    PSYCHIATRIC  [ ]Alert and appropriate  [ ] Sedated	 [ ]Agitated    :  Andino: [ ] Yes, if yes: Date of Placement:                   [  ] No    LINES: Central Lines [ ] Yes, if yes: Date of Placement                                     [  ] No    HOSPITAL MEDICATIONS:  MEDICATIONS  (STANDING):  ascorbic acid 500 milliGRAM(s) Oral daily  chlorhexidine 4% Liquid 1 Application(s) Topical every 12 hours  enoxaparin Injectable 40 milliGRAM(s) SubCutaneous every 12 hours  multivitamin 1 Tablet(s) Oral daily  pantoprazole  Injectable 40 milliGRAM(s) IV Push two times a day  QUEtiapine 200 milliGRAM(s) Oral <User Schedule>  senna Syrup 10 milliLiter(s) Oral at bedtime    MEDICATIONS  (PRN):  acetaminophen     Tablet .. 650 milliGRAM(s) Oral every 6 hours PRN Temp greater or equal to 38C (100.4F), Mild Pain (1 - 3)  nystatin Powder 1 Application(s) Topical two times a day PRN skin irritation  QUEtiapine 25 milliGRAM(s) Oral two times a day PRN Agitation  sodium chloride 0.9% lock flush 10 milliLiter(s) IV Push every 1 hour PRN Pre/post blood products, medications, blood draw, and to maintain line patency      LABS:                        9.0    53.63 )-----------( 184      ( 31 May 2024 10:14 )             30.8     05-31    143  |  106  |  10  ----------------------------<  108<H>  4.5   |  28  |  0.39<L>    Ca    8.8      31 May 2024 10:14  Phos  4.0     05-31  Mg     2.2     05-31        Urinalysis Basic - ( 31 May 2024 10:14 )    Color: x / Appearance: x / SG: x / pH: x  Gluc: 108 mg/dL / Ketone: x  / Bili: x / Urobili: x   Blood: x / Protein: x / Nitrite: x   Leuk Esterase: x / RBC: x / WBC x   Sq Epi: x / Non Sq Epi: x / Bacteria: x          CAPILLARY BLOOD GLUCOSE    MICROBIOLOGY:     RADIOLOGY:  [ ] Reviewed and interpreted by me                Ector Blake, -ACN  [id number] Patient is a 69y old  Female who presents with a chief complaint of Transfer for leukophoresis (01 Jun 2024 07:12)      Interval Events: No issues overnight    REVIEW OF SYSTEMS:  [ ] Positive  [x ] All other systems negative  [ ] Unable to assess ROS because ________    Vital Signs Last 24 Hrs  T(C): 37 (06-02-24 @ 06:00), Max: 37.7 (06-01-24 @ 17:00)  T(F): 98.6 (06-02-24 @ 06:00), Max: 99.8 (06-01-24 @ 17:00)  HR: 74 (06-02-24 @ 06:00) (74 - 123)  BP: 140/75 (06-02-24 @ 06:00) (118/65 - 149/80)  RR: 19 (06-02-24 @ 06:00) (18 - 24)  SpO2: 97% (06-02-24 @ 06:00) (95% - 100%)    PHYSICAL EXAM:  HEENT:   [x] Tracheostomy: #7 cuffless Portex.   [x] PERRL B/L; EOMI  [x] No oral lesions  [ ] Abnormal    SKIN  [x] No Rash  [ ] Abnormal  [ ] pressure    CARDIAC  [x] Regular  [ ] Abnormal    PULMONARY  [x] Bilateral Coarse Breath Sounds  [ ] Normal Excursion  [ ] Abnormal:     GI  [x] PEG      [x] +BS		              [x] Soft, nondistended, 	  [x] Abnormal:  mild tenderness on right lower abdomen where there is a nodule on palpation, another smaller nodule felt along lower anterior abdomen along adipose tissue layer    MUSCULOSKELETAL                                   [ ] Bedbound                 [x] Abnormal:  Unable to assess gait; moves all limbs purposefully  [ ] Ambulatory/OOB to chair                           EXTREMITIES                                         [x] Normal  [ ]Edema                           NEUROLOGIC  [ ] Normal, non focal  [x] Focal findings:  Awake and alert; responding appropriately verbally to questions (using PMV); follows simple commands; +Left hemiplegia    PSYCHIATRIC  [x] Alert and appropriate during exam  [ ] Sedated	 [ ]Agitated    :  Andino: [ ] Yes, if yes: Date of Placement:                   [x] No    LINES: Central Lines [ ] Yes, if yes: Date of Placement                                     [x] No      HOSPITAL MEDICATIONS:  MEDICATIONS  (STANDING):  ascorbic acid 500 milliGRAM(s) Oral daily  chlorhexidine 4% Liquid 1 Application(s) Topical every 12 hours  enoxaparin Injectable 40 milliGRAM(s) SubCutaneous every 12 hours  multivitamin 1 Tablet(s) Oral daily  pantoprazole  Injectable 40 milliGRAM(s) IV Push two times a day  QUEtiapine 200 milliGRAM(s) Oral <User Schedule>  senna Syrup 10 milliLiter(s) Oral at bedtime    MEDICATIONS  (PRN):  acetaminophen     Tablet .. 650 milliGRAM(s) Oral every 6 hours PRN Temp greater or equal to 38C (100.4F), Mild Pain (1 - 3)  nystatin Powder 1 Application(s) Topical two times a day PRN skin irritation  QUEtiapine 25 milliGRAM(s) Oral two times a day PRN Agitation  sodium chloride 0.9% lock flush 10 milliLiter(s) IV Push every 1 hour PRN Pre/post blood products, medications, blood draw, and to maintain line patency      LABS:                        9.0    53.63 )-----------( 184      ( 31 May 2024 10:14 )             30.8     05-31    143  |  106  |  10  ----------------------------<  108<H>  4.5   |  28  |  0.39<L>    Ca    8.8      31 May 2024 10:14  Phos  4.0     05-31  Mg     2.2     05-31

## 2024-06-02 NOTE — DISCHARGE NOTE PROVIDER - CARE PROVIDERS DIRECT ADDRESSES
[email or web address]

## 2024-06-02 NOTE — DISCHARGE NOTE PROVIDER - CARE PROVIDER_API CALL
Ameena King  Medical Oncology  [address], [place] [address]  Phone: [phone]  Fax: [phone]  Follow Up Time:     Rosemary Razo  Pulmonary Disease  [address] [place] [address]  Phone: [phone]  Fax: [phone]  Follow Up Time:

## 2024-06-02 NOTE — PROGRESS NOTE ADULT - PROBLEM SELECTOR PLAN 3
- Intubated on arrival to [facility] ED for respiratory tiring and AMS  - Unable to be extubated  - S/p Trach 4/28  - Patient tolerating TC ATC since 5/28; FIO2: 40 %  - Patient cleared for Supervised PMV Trials   - 6/1: Trach changed to #7 Cuffless Portex   - Continue Chest PT and suctioning PRN

## 2024-06-02 NOTE — DISCHARGE NOTE PROVIDER - HOSPITAL COURSE
69F h/o CVA (bedbound at baseline), COPD, CHF, HTN who initially p/w acute shortness of breath to outside ED and was treated for acute pulmonary edema with sublingual nitro x 2, Lasix, and BiPAP. Pt was also found to be febrile to 103, so treated for PNA. BIPAP led to improvement in O2 to 89-90. Pt transferred to [place] on 4/16/24 for white count of 233 and possible leukophoresis. In the ED, pt intubated iso respiratory tiring and decreasing mental status, and also was started on levophed for hypotension. Following intubation and initiation of pressors, she was admitted to the MICU for AHRF and septic shock.  In MICU, heme was consulted for hyperleukocytosis, however due to mature lymphocytic leukophoresis was not performed.  Course c/b empyema s/p chest tube placement (4/16) and removal, reaccumulation of loculated pleural effusion s/p L pigtail placement and removal on 4/27.   S/p treatment with ceftriaxone (4/18-4/25) for s. pneumo bacteremia and empyema, txd with Augmentin PO x4 weeks with completed on 5/14.  S/p PEG 5/7.  Transitioned off IV pressors and on midodrine and droxidopa.  Transferred to RCU 5/9.  S/p RRT 5/20 for hypoxia, readmitted to MICU for septic vs hemorrhagic shock requiring pressors.  Received 3 units of PRBCs, CT C/A/P w/ no active bleeding within a large multilobed heterogenous collection extending from R. lower neck into the right axilla and along the right lateral chest wall consistent with hematoma - no surgical intervention necessary.  Treated empirically with Zosyn until 5/24.  Returned to RCU 5/26. Tolerating TC ATC since 5/28.  Continuing airway management with Chest PT and suction PRN.      69F h/o CVA (bedbound at baseline), COPD, CHF, HTN who initially p/w acute shortness of breath to outside ED and was treated for acute pulmonary edema with sublingual nitro x 2, Lasix, and BiPAP. Pt was also found to be febrile to 103, so treated for PNA. BIPAP led to improvement in O2 to 89-90. Pt transferred to [place] on 4/16/24 for white count of 233 and possible leukophoresis. In the ED, pt intubated iso respiratory tiring and decreasing mental status, and also was started on levophed for hypotension. Following intubation and initiation of pressors, she was admitted to the MICU for AHRF and septic shock.  In MICU, heme was consulted for hyperleukocytosis, however due to mature lymphocytic leukophoresis was not performed.  Course c/b empyema s/p chest tube placement (4/16) and removal, reaccumulation of loculated pleural effusion s/p L pigtail placement and removal on 4/27.   S/p treatment with ceftriaxone (4/18-4/25) for s. pneumo bacteremia and empyema, txd with Augmentin PO x4 weeks with completed on 5/14.  S/p PEG 5/7.  Transitioned off IV pressors and on midodrine and droxidopa.  Transferred to RCU 5/9.  S/p RRT 5/20 for hypoxia, readmitted to MICU for septic vs hemorrhagic shock requiring pressors.  Received 3 units of PRBCs, CT C/A/P w/ no active bleeding within a large multilobed heterogenous collection extending from R. lower neck into the right axilla and along the right lateral chest wall consistent with hematoma - no surgical intervention necessary.  Treated empirically with Zosyn until 5/24.  Returned to RCU 5/26. Tolerating TC ATC since 5/28. Patient evaluated by Palliative Care during the admission and patient herself wants to be full code with all life sustaining medical management. Patient downsized to #7 Cuffless Portex and had FEES Performed on 6/3; Passed for Puree with moderately thickened liquids. Patient red capped since 6/3.  RRT called after patient Self Decannulating on 6/6. Post RRT patient continues to remain stable on 0xygen therapy 2L via NC with stable ABG findings. FEES exam repeated 6/7, however unable to advance diet due to aspiration risk. Calorie Counting in progress.  Medically clear for discharge to [facility].   69F h/o CVA (bedbound at baseline), COPD, CHF, HTN who initially p/w acute shortness of breath to outside ED and was treated for acute pulmonary edema with sublingual nitro x 2, Lasix, and BiPAP. Pt was also found to be febrile to 103, so treated for PNA. BIPAP led to improvement in O2 to 89-90. Pt transferred to [place] on 4/16/24 for white count of 233 and possible leukophoresis. In the ED, pt intubated iso respiratory tiring and decreasing mental status, and also was started on levophed for hypotension. Following intubation and initiation of pressors, she was admitted to the MICU for AHRF and septic shock.  In MICU, heme was consulted for hyperleukocytosis, however due to mature lymphocytic leukophoresis was not performed.  Course c/b empyema s/p chest tube placement (4/16) and removal, reaccumulation of loculated pleural effusion s/p L pigtail placement and removal on 4/27.   S/p treatment with ceftriaxone (4/18-4/25) for s. pneumo bacteremia and empyema, txd with Augmentin PO x4 weeks with completed on 5/14.  S/p PEG 5/7.  Transitioned off IV pressors and on midodrine and droxidopa.  Transferred to RCU 5/9.  S/p RRT 5/20 for hypoxia, readmitted to MICU for septic vs hemorrhagic shock requiring pressors.  Received 3 units of PRBCs, CT C/A/P w/ no active bleeding within a large multilobed heterogenous collection extending from R. lower neck into the right axilla and along the right lateral chest wall consistent with hematoma - no surgical intervention necessary.  Treated empirically with Zosyn until 5/24.  Returned to RCU 5/26. Tolerating TC ATC since 5/28. Patient evaluated by Palliative Care during the admission and patient herself wants to be full code with all life sustaining medical management. Patient downsized to #7 Cuffless Portex and had FEES Performed on 6/3; Passed for Puree with moderately thickened liquids. Patient red capped since 6/3.  RRT called after patient Self Decannulating on 6/6. Post RRT patient continues to remain stable on 0xygen therapy 2L via NC with stable ABG findings. FEES exam repeated 6/7, however unable to advance diet due to aspiration risk. 6/7-6/9 calorie count completed, pt meets adequate nutritional needs with PO diet, TF remain off.  6/11 repeat FEES, diet advanced to soft chew, mildly thin liquid.  Pt will need continue swallow testing upon discharge for advancement of diet.  Pt is medically clear for discharge to [facility].

## 2024-06-02 NOTE — DISCHARGE NOTE PROVIDER - NSDCMRMEDTOKEN_GEN_ALL_CORE_FT
ascorbic acid 500 mg oral tablet: 1 tab(s) by gastrostomy tube once a day  Multiple Vitamins oral tablet: 1 tab(s) by gastrostomy tube once a day  senna (sennosides) 8.8 mg/5 mL oral syrup: 10 milliliter(s) by gastrostomy tube once a day (at bedtime)   acetaminophen 325 mg oral tablet: 2 tab(s) orally every 6 hours as needed for Temp greater or equal to 38C (100.4F), Mild Pain (1 - 3)  amLODIPine 5 mg oral tablet: 1 tab(s) orally once a day  ascorbic acid 500 mg oral tablet: 1 tab(s) orally once a day  Lovenox 40 mg/0.4 mL injectable solution: 40 milligram(s) subcutaneously 2 times a day  metoprolol tartrate 25 mg oral tablet: 1 tab(s) orally 2 times a day  Multiple Vitamins oral tablet: 1 tab(s) orally once a day  nystatin 100,000 units/g topical powder: 1 Apply topically to affected area 2 times a day As needed skin irritation  QUEtiapine 150 mg oral tablet: 1 tab(s) orally once a day (at bedtime)  QUEtiapine 25 mg oral tablet: 1 tab(s) orally 2 times a day As needed agitation  senna (sennosides) 8.8 mg/5 mL oral syrup: 10 milliliter(s) orally once a day (at bedtime)

## 2024-06-02 NOTE — DISCHARGE NOTE PROVIDER - NSDCFUADDINST_GEN_ALL_CORE_FT
Diet, Pureed:  Moderately Thick Liquids  Supplemental Feeding Modality: Oral   Ensure Plus High Protein Cans or Servings Per Day: 1     Frequency: 2x/day Diet, Easy to Chew:  Mildly Thick Liquids  Supplemental Feeding Modality: Oral   Ensure Plus High Protein Cans or Servings Per Day: 1     Frequency: 2x/day    - Consider mildly thick liquids via teaspoon with  BOLUS HOLD under the supervision of an SLP at the next level of care  - Ongoing restorative swallow therapy  - Repeat instrumental exam in 2-4 weeks to assess candidacy for advancement of diet

## 2024-06-02 NOTE — PROGRESS NOTE ADULT - PROBLEM SELECTOR PLAN 4
- Patient with periods of Delirium however improving.  - Continue Seroquel 25 mg BID PRN;  Seroquel 200 mg QHS   - Klonopin discontinued on 5/30 with improved Delirium.  - Patient voiced concerns on being long term Seroquel as she had experienced weight gain in the past   - 5/28 ECG: QTc 474

## 2024-06-03 LAB
ANION GAP SERPL CALC-SCNC: 10 MMOL/L — SIGNIFICANT CHANGE UP (ref 5–17)
BUN SERPL-MCNC: 8 MG/DL — SIGNIFICANT CHANGE UP (ref 7–23)
CALCIUM SERPL-MCNC: 9.5 MG/DL — SIGNIFICANT CHANGE UP (ref 8.4–10.5)
CHLORIDE SERPL-SCNC: 102 MMOL/L — SIGNIFICANT CHANGE UP (ref 96–108)
CO2 SERPL-SCNC: 30 MMOL/L — SIGNIFICANT CHANGE UP (ref 22–31)
CREAT SERPL-MCNC: 0.39 MG/DL — LOW (ref 0.5–1.3)
EGFR: 108 ML/MIN/1.73M2 — SIGNIFICANT CHANGE UP
GAS PNL BLDA: SIGNIFICANT CHANGE UP
GLUCOSE SERPL-MCNC: 121 MG/DL — HIGH (ref 70–99)
HCT VFR BLD CALC: 32 % — LOW (ref 34.5–45)
HGB BLD-MCNC: 9.8 G/DL — LOW (ref 11.5–15.5)
MAGNESIUM SERPL-MCNC: 2.1 MG/DL — SIGNIFICANT CHANGE UP (ref 1.6–2.6)
MCHC RBC-ENTMCNC: 29.1 PG — SIGNIFICANT CHANGE UP (ref 27–34)
MCHC RBC-ENTMCNC: 30.6 GM/DL — LOW (ref 32–36)
MCV RBC AUTO: 95 FL — SIGNIFICANT CHANGE UP (ref 80–100)
NRBC # BLD: 0 /100 WBCS — SIGNIFICANT CHANGE UP (ref 0–0)
PHOSPHATE SERPL-MCNC: 4.3 MG/DL — SIGNIFICANT CHANGE UP (ref 2.5–4.5)
PLATELET # BLD AUTO: 157 K/UL — SIGNIFICANT CHANGE UP (ref 150–400)
POTASSIUM SERPL-MCNC: 4.4 MMOL/L — SIGNIFICANT CHANGE UP (ref 3.5–5.3)
POTASSIUM SERPL-SCNC: 4.4 MMOL/L — SIGNIFICANT CHANGE UP (ref 3.5–5.3)
RBC # BLD: 3.37 M/UL — LOW (ref 3.8–5.2)
RBC # FLD: 19.4 % — HIGH (ref 10.3–14.5)
SODIUM SERPL-SCNC: 142 MMOL/L — SIGNIFICANT CHANGE UP (ref 135–145)
WBC # BLD: 61.71 K/UL — CRITICAL HIGH (ref 3.8–10.5)
WBC # FLD AUTO: 61.71 K/UL — CRITICAL HIGH (ref 3.8–10.5)

## 2024-06-03 PROCEDURE — 99232 SBSQ HOSP IP/OBS MODERATE 35: CPT

## 2024-06-03 RX ORDER — AMLODIPINE BESYLATE 2.5 MG/1
5 TABLET ORAL DAILY
Refills: 0 | Status: DISCONTINUED | OUTPATIENT
Start: 2024-06-03 | End: 2024-06-06

## 2024-06-03 RX ORDER — LIDOCAINE 4 G/100G
1 CREAM TOPICAL ONCE
Refills: 0 | Status: COMPLETED | OUTPATIENT
Start: 2024-06-03 | End: 2024-06-03

## 2024-06-03 RX ADMIN — LIDOCAINE 1 PATCH: 4 CREAM TOPICAL at 22:36

## 2024-06-03 RX ADMIN — Medication 650 MILLIGRAM(S): at 22:15

## 2024-06-03 RX ADMIN — CHLORHEXIDINE GLUCONATE 1 APPLICATION(S): 213 SOLUTION TOPICAL at 05:15

## 2024-06-03 RX ADMIN — QUETIAPINE FUMARATE 200 MILLIGRAM(S): 200 TABLET, FILM COATED ORAL at 21:15

## 2024-06-03 RX ADMIN — QUETIAPINE FUMARATE 25 MILLIGRAM(S): 200 TABLET, FILM COATED ORAL at 16:04

## 2024-06-03 RX ADMIN — Medication 1 TABLET(S): at 11:26

## 2024-06-03 RX ADMIN — SENNA PLUS 10 MILLILITER(S): 8.6 TABLET ORAL at 21:15

## 2024-06-03 RX ADMIN — Medication 650 MILLIGRAM(S): at 03:00

## 2024-06-03 RX ADMIN — PANTOPRAZOLE SODIUM 40 MILLIGRAM(S): 20 TABLET, DELAYED RELEASE ORAL at 05:15

## 2024-06-03 RX ADMIN — Medication 650 MILLIGRAM(S): at 14:33

## 2024-06-03 RX ADMIN — CHLORHEXIDINE GLUCONATE 1 APPLICATION(S): 213 SOLUTION TOPICAL at 18:08

## 2024-06-03 RX ADMIN — NYSTATIN CREAM 1 APPLICATION(S): 100000 CREAM TOPICAL at 05:15

## 2024-06-03 RX ADMIN — PANTOPRAZOLE SODIUM 40 MILLIGRAM(S): 20 TABLET, DELAYED RELEASE ORAL at 18:07

## 2024-06-03 RX ADMIN — Medication 500 MILLIGRAM(S): at 11:26

## 2024-06-03 RX ADMIN — Medication 650 MILLIGRAM(S): at 21:15

## 2024-06-03 RX ADMIN — ENOXAPARIN SODIUM 40 MILLIGRAM(S): 100 INJECTION SUBCUTANEOUS at 05:15

## 2024-06-03 RX ADMIN — Medication 650 MILLIGRAM(S): at 02:07

## 2024-06-03 RX ADMIN — ENOXAPARIN SODIUM 40 MILLIGRAM(S): 100 INJECTION SUBCUTANEOUS at 18:07

## 2024-06-03 NOTE — PROGRESS NOTE ADULT - ASSESSMENT
69F h/o CVA (bedbound at baseline), COPD, CHF, HTN who initially p/w acute shortness of breath to outside ED and was treated for acute pulmonary edema with sublingual nitro x 2, Lasix, and BiPAP. Pt was also found to be febrile to 103, so treated for PNA. BIPAP led to improvement in O2 to 89-90. Pt transferred to [place] on 4/16/24 for white count of 233 and possible leukophoresis. In the ED, pt intubated iso respiratory tiring and decreasing mental status, and also was started on levophed for hypotension. Following intubation and initiation of pressors, she was admitted to the MICU for AHRF and septic shock.  In MICU, heme was consulted for hyperleukocytosis, however due to mature lymphocytic leukophoresis was not performed.  Course c/b empyema s/p chest tube placement (4/16) and removal, reaccumulation of loculated pleural effusion s/p L pigtail placement and removal on 4/27.   S/p treatment with ceftriaxone (4/18-4/25) for s. pneumo bacteremia and empyema, txd with Augmentin PO x4 weeks with completed on 5/14.  S/p PEG 5/7.  Transitioned off IV pressors and on midodrine and droxidopa.  Transferred to RCU 5/9.  S/p RRT 5/20 for hypoxia, readmitted to MICU for septic vs hemorrhagic shock requiring pressors.  Received 3 units of PRBCs, CT C/A/P w/ no active bleeding within a large multilobed heterogenous collection extending from R. lower neck into the right axilla and along the right lateral chest wall consistent with hematoma - no surgical intervention necessary.  Treated empirically with Zosyn until 5/24.  Returned to RCU 5/26. Tolerating TC ATC since 5/28.  Continuing airway management with Chest PT and suction PRN.     6/2: Patient evaluated by Palliative care discussion had while wearing PMV and she was able to make her wishes known. Patient wants to remain full code and continue medical management. Patient with appropriate mentation and tolerating PMV throughout the day.  Will change daytime Seroquel to BID PRN and monitor.  Trach changed to #7 cuffless Portex.  Swallow evaluation placed as per patient request.  Discharge planning in progress.

## 2024-06-03 NOTE — SWALLOW BEDSIDE ASSESSMENT ADULT - ADDITIONAL RECOMMENDATIONS
Restorative swallow therapy f/u 1 x week; Will continue to follow while patient is in-house; Speech language and swallowing therapy post d/c

## 2024-06-03 NOTE — SWALLOW FEES ASSESSMENT ADULT - COMMENTS
69F h/o CVA (bedbound at baseline), COPD, CHF, HTN presenting as transfer from [facility] for SOB iso leukocytosis to 233 c/f acute leukemia. Pt intubated and on pressors, transferred to MICU for further management, course c/b empyema s/p chest tube placement (4/16) and removal, reaccumulation of loculated pleural effusion s/p L pigtail placement and removal on 4/27, s/p trach on 4/27. S/p EGD/PEG with GI, transferred to RCU on 5/9. Readmitted to MICU for septic vs hemorrhagic shock.  Now hgb has been trending in 7.1-7.7. Pt weaned off sedation and pressors. Heme following for pt's leukocytosis 2/2 to CLL. Pt empirically on zosyn for possible septic shock, discontinued on 5/24. CTA C/A/P showed no active bleeding within a large multilobed heterogenous collection extending from R. lower neck into the right axilla and along right lateral chest wall c/w hematoma. General surgery consulted for hematoma, no surgical intervention at this time. No need for IR embolization with no active bleed seen on CT.  +CLL (chronic lymphocytic leukemia).   5/28/24 Pt tolerated PMV trial with SLP. See previous notes for additional info. 5/29/24 Per Nsg, Pt has tiny hole in PEG tube that leaks when bent a certain way. PEG feeds and medication able to infuse without any issues, unless bent. WBC 65.86.  6/1/24 Trach changed to #7 cuffless Portex. Patient tolerating TC ATC since 5/28; FIO2: 40 % per Pulm f/u; Patient evaluated by Palliative care discussion had while wearing PMV and she was able to make her wishes known. Patient wants to remain full code and continue medical management. Patient with appropriate mentation and tolerating PMV throughout the day. Pt awake, alert with frequent confusion despite encouragement and reorientation; c/o back pain with upright position therefore positioned semi upright in bed. Pt c/o headache as well and Nsg addressed pain prior to FEES and meds were provided. Adequate phonation achieved with  on #7 cuffless Portex with supplemental 02 via nasal canula; VSS. Pt awake, alert with frequent confusion despite encouragement and reorientation; c/o back pain with upright position therefore positioned semi upright in bed. Pt c/o headache as well and Nsg addressed pain prior to FEES and meds were provided. Adequate phonation achieved with  on #7 cuffless Portex with supplemental 02 via nasal canula; VSS. Pt with decreased tolerance to presence of the scope and requested exam to be terminated therefore limited PO trials provided.

## 2024-06-03 NOTE — PROVIDER CONTACT NOTE (CHANGE IN STATUS NOTIFICATION) - SITUATION
Pt. noted cold and clammy , not able to obtain pulse ox.  RRT called.
pt noted with high -128 and agitated.

## 2024-06-03 NOTE — PROGRESS NOTE ADULT - NS ATTEND AMEND GEN_ALL_CORE FT
68 y/o F w/prior CVA, COPD, CHF, and HTN initially admitted for hyperleukocytosis concerning for acute leukemia c/b acute hypoxemic respiratory failure and hypotension secondary to severe sepsis with septic shock due to Strep pneumonia bacteremia in setting of PNA w/empyema s/p chest tube placement and MIST now s/p trach/PEG w/hospital course c/b hemorrhagic shock 2/2 abdominal wall hematoma now back in RCU. Patient also diagnosed w/CLL.     - PMV trials  - FEES exam  - Management of CLL as per heme/onc  - Monitor off abx  - DVT prophylaxis  - Full code  - Dispo planning

## 2024-06-03 NOTE — SWALLOW BEDSIDE ASSESSMENT ADULT - SWALLOW EVAL: DIAGNOSIS
Pt is a 68 y/o female who is followed by this department for speech therapy with PMV given aphonia in presence of trach. Pt also presents with suspected pharyngeal stage dysphagia. FEES to objectively assess pharyngeal swallow and r/o aspiration is warranted. Mild labial and lingual weakness noted. PO trials deferred pending FEES given aspiration risks. Pt has been tolerating the PMV speaking valve during waking hours with supplemental 02 via trach collar.

## 2024-06-03 NOTE — PROGRESS NOTE ADULT - SUBJECTIVE AND OBJECTIVE BOX
Patient is a 69y old  Female who presents with a chief complaint of Transfer for leukophoresis (02 Jun 2024 17:10)      Interval Events:    REVIEW OF SYSTEMS:  [ ] Positive  [ ] All other systems negative  [ ] Unable to assess ROS because ________    Vital Signs Last 24 Hrs  T(C): 37 (06-03-24 @ 06:00), Max: 37 (06-03-24 @ 06:00)  T(F): 98.6 (06-03-24 @ 06:00), Max: 98.6 (06-03-24 @ 06:00)  HR: 125 (06-03-24 @ 06:00) (107 - 127)  BP: 149/79 (06-03-24 @ 06:00) (126/70 - 162/88)  RR: 20 (06-03-24 @ 06:00) (20 - 24)  SpO2: 95% (06-03-24 @ 06:00) (95% - 100%)    PHYSICAL EXAM:  HEENT:   [ ]Tracheostomy:  [ ]Pupils equal  [ ]No oral lesions  [ ]Abnormal    SKIN  [ ]No Rash  [ ] Abnormal  [ ] pressure    CARDIAC  [ ]Regular  [ ]Abnormal    PULMONARY  [ ]Bilateral Clear Breath Sounds  [ ]Normal Excursion  [ ]Abnormal    GI  [ ]PEG      [ ] +BS		              [ ]Soft, nondistended, nontender	  [ ]Abnormal    MUSCULOSKELETAL                                   [ ]Bedbound                 [ ]Abnormal    [ ]Ambulatory/OOB to chair                           EXTREMITIES                                         [ ]Normal  [ ]Edema                           NEUROLOGIC  [ ] Normal, non focal  [ ] Focal findings:    PSYCHIATRIC  [ ]Alert and appropriate  [ ] Sedated	 [ ]Agitated    :  Andino: [ ] Yes, if yes: Date of Placement:                   [  ] No    LINES: Central Lines [ ] Yes, if yes: Date of Placement                                     [  ] No    HOSPITAL MEDICATIONS:  MEDICATIONS  (STANDING):  ascorbic acid 500 milliGRAM(s) Oral daily  chlorhexidine 4% Liquid 1 Application(s) Topical every 12 hours  enoxaparin Injectable 40 milliGRAM(s) SubCutaneous every 12 hours  multivitamin 1 Tablet(s) Oral daily  pantoprazole  Injectable 40 milliGRAM(s) IV Push two times a day  QUEtiapine 200 milliGRAM(s) Oral <User Schedule>  senna Syrup 10 milliLiter(s) Oral at bedtime    MEDICATIONS  (PRN):  acetaminophen     Tablet .. 650 milliGRAM(s) Oral every 6 hours PRN Temp greater or equal to 38C (100.4F), Mild Pain (1 - 3)  nystatin Powder 1 Application(s) Topical two times a day PRN skin irritation  QUEtiapine 25 milliGRAM(s) Oral two times a day PRN Agitation  sodium chloride 0.9% lock flush 10 milliLiter(s) IV Push every 1 hour PRN Pre/post blood products, medications, blood draw, and to maintain line patency      LABS:                  CAPILLARY BLOOD GLUCOSE    MICROBIOLOGY:     RADIOLOGY:  [ ] Reviewed and interpreted by me

## 2024-06-03 NOTE — SWALLOW BEDSIDE ASSESSMENT ADULT - COMMENTS
69F h/o CVA (bedbound at baseline), COPD, CHF, HTN presenting as transfer from [facility] for SOB iso leukocytosis to 233 c/f acute leukemia. Pt intubated and on pressors, transferred to MICU for further management, course c/b empyema s/p chest tube placement (4/16) and removal, reaccumulation of loculated pleural effusion s/p L pigtail placement and removal on 4/27, s/p trach on 4/27. S/p EGD/PEG with GI, transferred to RCU on 5/9. Readmitted to MICU for septic vs hemorrhagic shock.  Now hgb has been trending in 7.1-7.7. Pt weaned off sedation and pressors. Heme following for pt's leukocytosis 2/2 to CLL. Pt empirically on zosyn for possible septic shock, discontinued on 5/24. CTA C/A/P showed no active bleeding within a large multilobed heterogenous collection extending from R. lower neck into the right axilla and along right lateral chest wall c/w hematoma. General surgery consulted for hematoma, no surgical intervention at this time. No need for IR embolization with no active bleed seen on CT.   5/28/24 Pt tolerated PMV trial with SLP. See previous notes for additional info. 5/29/24 Per Nsg, Pt has tiny hole in PEG tube that leaks when bent a certain way. PEG feeds and medication able to infuse without any issues, unless bent. WBC 65.86. 69F h/o CVA (bedbound at baseline), COPD, CHF, HTN presenting as transfer from [facility] for SOB iso leukocytosis to 233 c/f acute leukemia. Pt intubated and on pressors, transferred to MICU for further management, course c/b empyema s/p chest tube placement (4/16) and removal, reaccumulation of loculated pleural effusion s/p L pigtail placement and removal on 4/27, s/p trach on 4/27. S/p EGD/PEG with GI, transferred to RCU on 5/9. Readmitted to MICU for septic vs hemorrhagic shock.  Now hgb has been trending in 7.1-7.7. Pt weaned off sedation and pressors. Heme following for pt's leukocytosis 2/2 to CLL. Pt empirically on zosyn for possible septic shock, discontinued on 5/24. CTA C/A/P showed no active bleeding within a large multilobed heterogenous collection extending from R. lower neck into the right axilla and along right lateral chest wall c/w hematoma. General surgery consulted for hematoma, no surgical intervention at this time. No need for IR embolization with no active bleed seen on CT.  +CLL (chronic lymphocytic leukemia).   5/28/24 Pt tolerated PMV trial with SLP. See previous notes for additional info. 5/29/24 Per Nsg, Pt has tiny hole in PEG tube that leaks when bent a certain way. PEG feeds and medication able to infuse without any issues, unless bent. WBC 65.86.  6/1/24 Trach changed to #7 cuffless Portex. Patient tolerating TC ATC since 5/28; FIO2: 40 % per Pulm f/u; Patient evaluated by Palliative care discussion had while wearing PMV and she was able to make her wishes known. Patient wants to remain full code and continue medical management. Patient with appropriate mentation and tolerating PMV throughout the day.

## 2024-06-03 NOTE — SWALLOW BEDSIDE ASSESSMENT ADULT - SLP GENERAL OBSERVATIONS
Pt in bed; awake, alert. Per d/w Nsg Pt has been tolerating the PMV. Pt educated re: rationale for FEES exam and Pt in agreement. Oral exam performed and PO trials deferred to FEES.

## 2024-06-03 NOTE — PROGRESS NOTE ADULT - PROBLEM SELECTOR PLAN 3
- Intubated on arrival to [facility] ED for respiratory tiring and AMS  - Unable to be extubated  - S/p Trach 4/28  - Patient tolerating TC ATC since 5/28; FIO2: 40 %  - Patient cleared for Supervised PMV Trials   - 6/1: Trach changed to #7 Cuffless Portex   - Continue Chest PT and suctioning PRN

## 2024-06-03 NOTE — SWALLOW BEDSIDE ASSESSMENT ADULT - ASR SWALLOW LINGUAL MOBILITY
mildly reduced ROM and strength/impaired protrusion/impaired left lateral movement/impaired right lateral movement

## 2024-06-04 PROBLEM — J44.9 CHRONIC OBSTRUCTIVE PULMONARY DISEASE, UNSPECIFIED: Chronic | Status: ACTIVE | Noted: 2024-04-16

## 2024-06-04 PROBLEM — I50.9 HEART FAILURE, UNSPECIFIED: Chronic | Status: ACTIVE | Noted: 2024-04-16

## 2024-06-04 PROCEDURE — 70450 CT HEAD/BRAIN W/O DYE: CPT | Mod: 26

## 2024-06-04 PROCEDURE — 99232 SBSQ HOSP IP/OBS MODERATE 35: CPT

## 2024-06-04 RX ORDER — IPRATROPIUM/ALBUTEROL SULFATE 18-103MCG
3 AEROSOL WITH ADAPTER (GRAM) INHALATION ONCE
Refills: 0 | Status: COMPLETED | OUTPATIENT
Start: 2024-06-04 | End: 2024-06-04

## 2024-06-04 RX ORDER — ACETAMINOPHEN 500 MG
1000 TABLET ORAL ONCE
Refills: 0 | Status: COMPLETED | OUTPATIENT
Start: 2024-06-04 | End: 2024-06-04

## 2024-06-04 RX ORDER — METOPROLOL TARTRATE 50 MG
12.5 TABLET ORAL
Refills: 0 | Status: DISCONTINUED | OUTPATIENT
Start: 2024-06-04 | End: 2024-06-04

## 2024-06-04 RX ORDER — METOPROLOL TARTRATE 50 MG
25 TABLET ORAL
Refills: 0 | Status: DISCONTINUED | OUTPATIENT
Start: 2024-06-04 | End: 2024-06-10

## 2024-06-04 RX ADMIN — QUETIAPINE FUMARATE 25 MILLIGRAM(S): 200 TABLET, FILM COATED ORAL at 01:09

## 2024-06-04 RX ADMIN — Medication 650 MILLIGRAM(S): at 21:54

## 2024-06-04 RX ADMIN — CHLORHEXIDINE GLUCONATE 1 APPLICATION(S): 213 SOLUTION TOPICAL at 05:17

## 2024-06-04 RX ADMIN — LIDOCAINE 1 PATCH: 4 CREAM TOPICAL at 22:30

## 2024-06-04 RX ADMIN — Medication 650 MILLIGRAM(S): at 22:50

## 2024-06-04 RX ADMIN — Medication 500 MILLIGRAM(S): at 12:01

## 2024-06-04 RX ADMIN — ENOXAPARIN SODIUM 40 MILLIGRAM(S): 100 INJECTION SUBCUTANEOUS at 05:16

## 2024-06-04 RX ADMIN — Medication 3 MILLILITER(S): at 06:56

## 2024-06-04 RX ADMIN — ENOXAPARIN SODIUM 40 MILLIGRAM(S): 100 INJECTION SUBCUTANEOUS at 17:29

## 2024-06-04 RX ADMIN — Medication 400 MILLIGRAM(S): at 05:17

## 2024-06-04 RX ADMIN — AMLODIPINE BESYLATE 5 MILLIGRAM(S): 2.5 TABLET ORAL at 05:16

## 2024-06-04 RX ADMIN — Medication 1000 MILLIGRAM(S): at 06:15

## 2024-06-04 RX ADMIN — PANTOPRAZOLE SODIUM 40 MILLIGRAM(S): 20 TABLET, DELAYED RELEASE ORAL at 17:29

## 2024-06-04 RX ADMIN — QUETIAPINE FUMARATE 200 MILLIGRAM(S): 200 TABLET, FILM COATED ORAL at 21:54

## 2024-06-04 RX ADMIN — CHLORHEXIDINE GLUCONATE 1 APPLICATION(S): 213 SOLUTION TOPICAL at 17:29

## 2024-06-04 RX ADMIN — PANTOPRAZOLE SODIUM 40 MILLIGRAM(S): 20 TABLET, DELAYED RELEASE ORAL at 05:16

## 2024-06-04 RX ADMIN — Medication 12.5 MILLIGRAM(S): at 17:29

## 2024-06-04 RX ADMIN — LIDOCAINE 1 PATCH: 4 CREAM TOPICAL at 20:35

## 2024-06-04 RX ADMIN — Medication 1 TABLET(S): at 12:01

## 2024-06-04 RX ADMIN — SENNA PLUS 10 MILLILITER(S): 8.6 TABLET ORAL at 21:55

## 2024-06-04 NOTE — PROGRESS NOTE ADULT - NS ATTEND AMEND GEN_ALL_CORE FT
70 y/o F w/prior CVA, COPD, CHF, and HTN initially admitted for hyperleukocytosis concerning for acute leukemia c/b acute hypoxemic respiratory failure and hypotension secondary to severe sepsis with septic shock due to Strep pneumonia bacteremia in setting of PNA w/empyema s/p chest tube placement and MIST now s/p trach/PEG w/hospital course c/b hemorrhagic shock 2/2 abdominal wall hematoma now back in RCU. Patient also diagnosed w/CLL.     - PMV trials  - FEES exam  - Management of CLL as per heme/onc  - Monitor off abx  - DVT prophylaxis  - Full code  - Dispo planning 68 y/o F w/prior CVA, COPD, CHF, and HTN initially admitted for hyperleukocytosis concerning for acute leukemia c/b acute hypoxemic respiratory failure and hypotension secondary to severe sepsis with septic shock due to Strep pneumonia bacteremia in setting of PNA w/empyema s/p chest tube placement and MIST now s/p trach/PEG w/hospital course c/b hemorrhagic shock 2/2 abdominal wall hematoma now back in RCU. Patient also diagnosed w/CLL.     - Continue  trial  - Management of CLL as per heme/onc  - Monitor off abx  - DVT prophylaxis  - Full code  - Dispo planning

## 2024-06-04 NOTE — PROGRESS NOTE ADULT - SUBJECTIVE AND OBJECTIVE BOX
Patient is a 69y old  Female who presents with a chief complaint of Transfer for leukophoresis (03 Jun 2024 07:48)      Interval Events:    REVIEW OF SYSTEMS:  [ ] Positive  [ ] All other systems negative  [ ] Unable to assess ROS because ________    Vital Signs Last 24 Hrs  T(C): 36.5 (06-04-24 @ 05:30), Max: 37.3 (06-03-24 @ 11:22)  T(F): 97.7 (06-04-24 @ 05:30), Max: 99.2 (06-03-24 @ 11:22)  HR: 117 (06-04-24 @ 05:30) (100 - 125)  BP: 161/80 (06-04-24 @ 05:30) (132/77 - 165/90)  RR: 20 (06-04-24 @ 05:30) (20 - 20)  SpO2: 99% (06-04-24 @ 05:30) (94% - 100%)    PHYSICAL EXAM:  HEENT:   [ ]Tracheostomy:  [ ]Pupils equal  [ ]No oral lesions  [ ]Abnormal    SKIN  [ ]No Rash  [ ] Abnormal  [ ] pressure    CARDIAC  [ ]Regular  [ ]Abnormal    PULMONARY  [ ]Bilateral Clear Breath Sounds  [ ]Normal Excursion  [ ]Abnormal    GI  [ ]PEG      [ ] +BS		              [ ]Soft, nondistended, nontender	  [ ]Abnormal    MUSCULOSKELETAL                                   [ ]Bedbound                 [ ]Abnormal    [ ]Ambulatory/OOB to chair                           EXTREMITIES                                         [ ]Normal  [ ]Edema                           NEUROLOGIC  [ ] Normal, non focal  [ ] Focal findings:    PSYCHIATRIC  [ ]Alert and appropriate  [ ] Sedated	 [ ]Agitated    :  Andino: [ ] Yes, if yes: Date of Placement:                   [  ] No    LINES: Central Lines [ ] Yes, if yes: Date of Placement                                     [  ] No    HOSPITAL MEDICATIONS:  MEDICATIONS  (STANDING):  amLODIPine   Tablet 5 milliGRAM(s) Oral daily  ascorbic acid 500 milliGRAM(s) Oral daily  chlorhexidine 4% Liquid 1 Application(s) Topical every 12 hours  enoxaparin Injectable 40 milliGRAM(s) SubCutaneous every 12 hours  multivitamin 1 Tablet(s) Oral daily  pantoprazole  Injectable 40 milliGRAM(s) IV Push two times a day  QUEtiapine 200 milliGRAM(s) Oral <User Schedule>  senna Syrup 10 milliLiter(s) Oral at bedtime    MEDICATIONS  (PRN):  acetaminophen     Tablet .. 650 milliGRAM(s) Oral every 6 hours PRN Temp greater or equal to 38C (100.4F), Mild Pain (1 - 3)  nystatin Powder 1 Application(s) Topical two times a day PRN skin irritation  QUEtiapine 25 milliGRAM(s) Oral two times a day PRN Agitation  sodium chloride 0.9% lock flush 10 milliLiter(s) IV Push every 1 hour PRN Pre/post blood products, medications, blood draw, and to maintain line patency      LABS:                        9.8    61.71 )-----------( 157      ( 03 Jun 2024 10:43 )             32.0     06-03    142  |  102  |  8   ----------------------------<  121<H>  4.4   |  30  |  0.39<L>    Ca    9.5      03 Jun 2024 10:42  Phos  4.3     06-03  Mg     2.1     06-03        Urinalysis Basic - ( 03 Jun 2024 10:42 )    Color: x / Appearance: x / SG: x / pH: x  Gluc: 121 mg/dL / Ketone: x  / Bili: x / Urobili: x   Blood: x / Protein: x / Nitrite: x   Leuk Esterase: x / RBC: x / WBC x   Sq Epi: x / Non Sq Epi: x / Bacteria: x      Arterial Blood Gas:  06-03 @ 17:15  7.40/54/129/33/99.2/7.3  ABG lactate: --      CAPILLARY BLOOD GLUCOSE    MICROBIOLOGY:     RADIOLOGY:  [ ] Reviewed and interpreted by me

## 2024-06-04 NOTE — PROGRESS NOTE ADULT - ASSESSMENT
69F h/o CVA (bedbound at baseline), COPD, CHF, HTN who initially p/w acute shortness of breath to outside ED and was treated for acute pulmonary edema with sublingual nitro x 2, Lasix, and BiPAP. Pt was also found to be febrile to 103, so treated for PNA. BIPAP led to improvement in O2 to 89-90. Pt transferred to [facility] on 4/16/24 for white count of 233 and possible leukophoresis. In the ED, pt intubated iso respiratory tiring and decreasing mental status, and also was started on levophed for hypotension. Following intubation and initiation of pressors, she was admitted to the MICU for AHRF and septic shock.  In MICU, heme was consulted for hyperleukocytosis, however due to mature lymphocytic leukophoresis was not performed.  Course c/b empyema s/p chest tube placement (4/16) and removal, reaccumulation of loculated pleural effusion s/p L pigtail placement and removal on 4/27.   S/p treatment with ceftriaxone (4/18-4/25) for s. pneumo bacteremia and empyema, txd with Augmentin PO x4 weeks with completed on 5/14.  S/p PEG 5/7.  Transitioned off IV pressors and on midodrine and droxidopa.  Transferred to RCU 5/9.  S/p RRT 5/20 for hypoxia, readmitted to MICU for septic vs hemorrhagic shock requiring pressors.  Received 3 units of PRBCs, CT C/A/P w/ no active bleeding within a large multilobed heterogenous collection extending from R. lower neck into the right axilla and along the right lateral chest wall consistent with hematoma - no surgical intervention necessary.  Treated empirically with Zosyn until 5/24.  Returned to RCU 5/26. Tolerating TC ATC since 5/28.  Continuing airway management with Chest PT and suction PRN.     6/2: Patient evaluated by Palliative care discussion had while wearing PMV and she was able to make her wishes known. Patient wants to remain full code and continue medical management. Patient with appropriate mentation and tolerating PMV throughout the day.  Will change daytime Seroquel to BID PRN and monitor.  Trach changed to #7 cuffless Portex.  Swallow evaluation placed as per patient request.  Discharge planning in progress.

## 2024-06-04 NOTE — CHART NOTE - NSCHARTNOTEFT_GEN_A_CORE
NUTRITION FOLLOW UP NOTE    PATIENT SEEN FOR: follow up    SOURCE: [x] Patient  [x] Current Medical Record  [] RN  [] Family/support person at bedside  [] Patient unavailable/inappropriate  [x] Other: NP    CHART REVIEWED/EVENTS NOTED.  s/p trach   S/p PEG 5/7  6/3 Swallow FEES Assessment Adult Recommendations: Puree with moderately thickened liquids.    DIET ORDER:   Diet, Pureed:   Moderately Thick Liquids (MODTHICKLIQS) (24)  Diet, NPO with Tube Feed:   Tube Feeding Modality: Gastrostomy  Glucerna 1.2 Emre (GLUCERNARTH)  Total Volume for 24 Hours (mL): 1260  Continuous  Starting Tube Feed Rate {mL per Hour}: 15  Increase Tube Feed Rate by (mL): 10     Every 4 hours  Until Goal Tube Feed Rate (mL per Hour): 70  Tube Feed Duration (in Hours): 18  Tube Feed Start Time: 00:29 (24)    EN order providing at 100% provision: 1512kcal (22kcal/kg) and 76g protein (1g/kg)    ANTHROPOMETRICS:  Drug Dosing Weight  Height (cm): 172.7 (20 May 2024 00:42)  Weight (kg): 108.7 (20 May 2024 00:42)  BMI (kg/m2): 36.4 (20 May 2024 00:42)  BSA (m2): 2.21 (20 May 2024 00:42)  Weights:   Weight in k.5 (05-23)   Daily Weight in k.7 (-29)     MEDICATIONS:  MEDICATIONS  (STANDING):  amLODIPine   Tablet 5 milliGRAM(s) Oral daily  ascorbic acid 500 milliGRAM(s) Oral daily  chlorhexidine 4% Liquid 1 Application(s) Topical every 12 hours  enoxaparin Injectable 40 milliGRAM(s) SubCutaneous every 12 hours  multivitamin 1 Tablet(s) Oral daily  pantoprazole  Injectable 40 milliGRAM(s) IV Push two times a day  QUEtiapine 200 milliGRAM(s) Oral <User Schedule>  senna Syrup 10 milliLiter(s) Oral at bedtime    MEDICATIONS  (PRN):  acetaminophen     Tablet .. 650 milliGRAM(s) Oral every 6 hours PRN Temp greater or equal to 38C (100.4F), Mild Pain (1 - 3)  nystatin Powder 1 Application(s) Topical two times a day PRN skin irritation  QUEtiapine 25 milliGRAM(s) Oral two times a day PRN Agitation  sodium chloride 0.9% lock flush 10 milliLiter(s) IV Push every 1 hour PRN Pre/post blood products, medications, blood draw, and to maintain line patency      NUTRITIONALLY PERTINENT LABS:   Na142 mmol/L Glu 121 mg/dL<H> K+ 4.4 mmol/L Cr  0.39 mg/dL<L> BUN 8 mg/dL  Phos 4.3 mg/dL  Alb 3.0 g/dL<L> ALT 16 U/L AST 40 U/L Alkaline Phosphatase 70 U/L            Finger Sticks:      NUTRITIONALLY PERTINENT MEDICATIONS/LABS:  [x] Reviewed  [] Relevant notes on medications/labs:    EDEMA:  [x] Reviewed  [] Relevant notes:    GI/ I&O:  [x] Reviewed  [] Relevant notes:  [] Other:    SKIN:   [] No pressure injuries documented, per nursing flowsheet  [] Pressure injury previously noted  [] Change in pressure injury documentation:  [] Other:    ESTIMATED NEEDS:  using IBW + 10% 70kg  27-32kcal/kg 1890-2240kcal/day  1.2-1.5g/kg 84-105g protein/day  defer fluid needs to team    NUTRITION DIAGNOSIS:  [] Prior Dx:  [] New Dx:    EDUCATION:  [] Yes:  [] Not appropriate/warranted    NUTRITION CARE PLAN:  1. Diet:  2. Supplements:  3. Multivitamin/mineral supplementation:  4:     [] Achieved - Continue current nutrition intervention(s)  [] Current medical condition precludes nutrition intervention at this time.    MONITORING AND EVALUATION:   RD remains available upon request and will follow up per protocol.    Name  Available on [facility] TEAMS NUTRITION FOLLOW UP NOTE    PATIENT SEEN FOR: follow up    SOURCE: [x] Patient  [x] Current Medical Record  [] RN  [] Family/support person at bedside  [] Patient unavailable/inappropriate  [x] Other: NP    CHART REVIEWED/EVENTS NOTED.  s/p trach   S/p PEG 5/7  6/3 Swallow FEES Assessment Adult Recommendations: Puree with moderately thickened liquids.    DIET ORDER:   Diet, Pureed:   Moderately Thick Liquids (MODTHICKLIQS) (24)  Diet, NPO with Tube Feed:   Tube Feeding Modality: Gastrostomy  Glucerna 1.2 Emre (GLUCERNARTH)  Total Volume for 24 Hours (mL): 1260  Continuous  Starting Tube Feed Rate {mL per Hour}: 15  Increase Tube Feed Rate by (mL): 10     Every 4 hours  Until Goal Tube Feed Rate (mL per Hour): 70  Tube Feed Duration (in Hours): 18  Tube Feed Start Time: 00:29 (24)    EN order providing at 100% provision: 1512kcal (22kcal/kg) and 76g protein (1g/kg)    ANTHROPOMETRICS:  Drug Dosing Weight  Height (cm): 172.7 (20 May 2024 00:42)  Weight (kg): 108.7 (20 May 2024 00:42)  BMI (kg/m2): 36.4 (20 May 2024 00:42)  BSA (m2): 2.21 (20 May 2024 00:42)  Weights:   Weight in k.5 (05-23)   Daily Weight in k.7 (-29)     MEDICATIONS:  MEDICATIONS  (STANDING):  amLODIPine   Tablet 5 milliGRAM(s) Oral daily  ascorbic acid 500 milliGRAM(s) Oral daily  chlorhexidine 4% Liquid 1 Application(s) Topical every 12 hours  enoxaparin Injectable 40 milliGRAM(s) SubCutaneous every 12 hours  multivitamin 1 Tablet(s) Oral daily  pantoprazole  Injectable 40 milliGRAM(s) IV Push two times a day  QUEtiapine 200 milliGRAM(s) Oral <User Schedule>  senna Syrup 10 milliLiter(s) Oral at bedtime    MEDICATIONS  (PRN):  acetaminophen     Tablet .. 650 milliGRAM(s) Oral every 6 hours PRN Temp greater or equal to 38C (100.4F), Mild Pain (1 - 3)  nystatin Powder 1 Application(s) Topical two times a day PRN skin irritation  QUEtiapine 25 milliGRAM(s) Oral two times a day PRN Agitation  sodium chloride 0.9% lock flush 10 milliLiter(s) IV Push every 1 hour PRN Pre/post blood products, medications, blood draw, and to maintain line patency    NUTRITIONALLY PERTINENT LABS:   Na142 mmol/L Glu 121 mg/dL<H> K+ 4.4 mmol/L Cr  0.39 mg/dL<L> BUN 8 mg/dL  Phos 4.3 mg/dL  Alb 3.0 g/dL<L> ALT 16 U/L AST 40 U/L Alkaline Phosphatase 70 U/L    NUTRITIONALLY PERTINENT MEDICATIONS/LABS:  [x] Reviewed  [] Relevant notes on medications/labs:    EDEMA:  [x] Reviewed  [x] Relevant notes: 2+ generalized     GI/ I&O:  [x] Reviewed  [] Relevant notes:  [] Other:    SKIN:   per flow sheets:  sacrum suspected deep tissue injury   right heel suspected deep tissue injury      ESTIMATED NEEDS:  using IBW + 10% 70kg  27-32kcal/kg 1890-2240kcal/day  1.2-1.5g/kg 84-105g protein/day  defer fluid needs to team    NUTRITION DIAGNOSIS:  [x] Prior Dx: Increased protein-energy needs  [] New Dx:    EDUCATION:  [] Yes:  [x] Not appropriate/warranted    NUTRITION CARE PLAN:  -Per team, pt with minimal PO intake. would like to continue tube feed along with PO diet. Blood glucose appears more controlled, insulin discontinued. Can now consider: Jevity 1.5, 75ml/hr x 18 hours to provide a total volume of 1350ml, 2025kcal (29kcal/kg), 86g protein (1.2g/kg) and 1026ml free water.   -Can consider bolus if tolerated. 330ml every 6 hours for a total volume of 1320ml, 1980kcal (28kcal/kg) and 84g protein (1.2g/kg).   -Continue PO diet. If PO intake improves, consider cut down tube feeding  -Defer free water flush to team   -Continue Multivitamin and vitamin C to aid in wound healing    MONITORING AND EVALUATION:   RD remains available upon request and will follow up per protocol.    Bhargavi Andrade, MS, RD, CDN / Teams NUTRITION FOLLOW UP NOTE    PATIENT SEEN FOR: follow up    SOURCE: [x] Patient  [x] Current Medical Record  [] RN  [] Family/support person at bedside  [] Patient unavailable/inappropriate  [x] Other: NP    CHART REVIEWED/EVENTS NOTED.  s/p trach   S/p PEG 5/7  6/3 Swallow FEES Assessment Adult Recommendations: Puree with moderately thickened liquids.    DIET ORDER:   Diet, Pureed:   Moderately Thick Liquids (MODTHICKLIQS) (24)  Diet, NPO with Tube Feed:   Tube Feeding Modality: Gastrostomy  Glucerna 1.2 Emre (GLUCERNARTH)  Total Volume for 24 Hours (mL): 1260  Continuous  Starting Tube Feed Rate {mL per Hour}: 15  Increase Tube Feed Rate by (mL): 10     Every 4 hours  Until Goal Tube Feed Rate (mL per Hour): 70  Tube Feed Duration (in Hours): 18  Tube Feed Start Time: 00:29 (24)    EN order providing at 100% provision: 1512kcal (22kcal/kg) and 76g protein (1g/kg)    ANTHROPOMETRICS:  Drug Dosing Weight  Height (cm): 172.7 (20 May 2024 00:42)  Weight (kg): 108.7 (20 May 2024 00:42)  BMI (kg/m2): 36.4 (20 May 2024 00:42)  BSA (m2): 2.21 (20 May 2024 00:42)  Weights:   Weight in k.5 (05-23)   Daily Weight in k.7 (-29)     MEDICATIONS:  MEDICATIONS  (STANDING):  amLODIPine   Tablet 5 milliGRAM(s) Oral daily  ascorbic acid 500 milliGRAM(s) Oral daily  chlorhexidine 4% Liquid 1 Application(s) Topical every 12 hours  enoxaparin Injectable 40 milliGRAM(s) SubCutaneous every 12 hours  multivitamin 1 Tablet(s) Oral daily  pantoprazole  Injectable 40 milliGRAM(s) IV Push two times a day  QUEtiapine 200 milliGRAM(s) Oral <User Schedule>  senna Syrup 10 milliLiter(s) Oral at bedtime    MEDICATIONS  (PRN):  acetaminophen     Tablet .. 650 milliGRAM(s) Oral every 6 hours PRN Temp greater or equal to 38C (100.4F), Mild Pain (1 - 3)  nystatin Powder 1 Application(s) Topical two times a day PRN skin irritation  QUEtiapine 25 milliGRAM(s) Oral two times a day PRN Agitation  sodium chloride 0.9% lock flush 10 milliLiter(s) IV Push every 1 hour PRN Pre/post blood products, medications, blood draw, and to maintain line patency    NUTRITIONALLY PERTINENT LABS:   Na142 mmol/L Glu 121 mg/dL<H> K+ 4.4 mmol/L Cr  0.39 mg/dL<L> BUN 8 mg/dL  Phos 4.3 mg/dL  Alb 3.0 g/dL<L> ALT 16 U/L AST 40 U/L Alkaline Phosphatase 70 U/L    NUTRITIONALLY PERTINENT MEDICATIONS/LABS:  [x] Reviewed  [] Relevant notes on medications/labs:    EDEMA:  [x] Reviewed  [x] Relevant notes: 2+ generalized     GI/ I&O:  [x] Reviewed  [] Relevant notes:  [] Other:    SKIN:   per flow sheets:  sacrum suspected deep tissue injury   right heel suspected deep tissue injury      ESTIMATED NEEDS:  using IBW + 10% 70kg  27-32kcal/kg 1890-2240kcal/day  1.2-1.5g/kg 84-105g protein/day  defer fluid needs to team    NUTRITION DIAGNOSIS:  [x] Prior Dx: Increased protein-energy needs  [] New Dx:    EDUCATION:  [] Yes:  [x] Not appropriate/warranted    NUTRITION CARE PLAN:  -Per team, pt with minimal PO intake. would like to continue tube feed along with PO diet. Blood glucose appears more controlled, insulin discontinued. Can now consider: Jevity 1.5, 75ml/hr x 18 hours to provide a total volume of 1350ml, 2025kcal (29kcal/kg), 86g protein (1.2g/kg) and 1026ml free water.   -Can consider bolus if tolerated. 330ml every 6 hours for a total volume of 1320ml, 1980kcal (28kcal/kg) and 84g protein (1.2g/kg).   -Continue PO diet. If PO intake improves, consider cut down tube feeding  -Defer free water flush to team   -Continue Multivitamin and vitamin C to aid in wound healing  -Can consider addition of Rene to aid in wound healing     MONITORING AND EVALUATION:   RD remains available upon request and will follow up per protocol.    Bhargavi Andrade MS, RD, CDN / Teams

## 2024-06-04 NOTE — PROGRESS NOTE ADULT - PROBLEM SELECTOR PLAN 2
- On admission WBC count 233  - transferred to [facility] for possible leukophoresis  - as per heme/onc - underlying CLL (Oct 2023) with reactive lymphocytosis 2/2 sepsis  - heme/onc deferred leukophoresis due to mature lymphocytes; IVIG given 4/26/24  - CLL under control and plan for outpatient follow up at [facility] when patient closer to d/c

## 2024-06-04 NOTE — PROVIDER CONTACT NOTE (OTHER) - ASSESSMENT
pt lung sounds have wheezing on exhalation. Pt has dry, nonproductive cough started a few hours ago. SpO2 has remained stable in upper 90s on 2L NC (only drops when pt removes NC)

## 2024-06-05 DIAGNOSIS — R13.10 DYSPHAGIA, UNSPECIFIED: ICD-10-CM

## 2024-06-05 PROBLEM — Z00.00 ENCOUNTER FOR PREVENTIVE HEALTH EXAMINATION: Status: ACTIVE | Noted: 2024-06-05

## 2024-06-05 LAB
ANION GAP SERPL CALC-SCNC: 9 MMOL/L — SIGNIFICANT CHANGE UP (ref 5–17)
BASE EXCESS BLDA CALC-SCNC: 7.5 MMOL/L — HIGH (ref -2–3)
BUN SERPL-MCNC: 9 MG/DL — SIGNIFICANT CHANGE UP (ref 7–23)
CALCIUM SERPL-MCNC: 9.1 MG/DL — SIGNIFICANT CHANGE UP (ref 8.4–10.5)
CHLORIDE SERPL-SCNC: 102 MMOL/L — SIGNIFICANT CHANGE UP (ref 96–108)
CO2 BLDA-SCNC: 36 MMOL/L — HIGH (ref 19–24)
CO2 SERPL-SCNC: 30 MMOL/L — SIGNIFICANT CHANGE UP (ref 22–31)
CREAT SERPL-MCNC: 0.55 MG/DL — SIGNIFICANT CHANGE UP (ref 0.5–1.3)
EGFR: 100 ML/MIN/1.73M2 — SIGNIFICANT CHANGE UP
GAS PNL BLDA: SIGNIFICANT CHANGE UP
GLUCOSE SERPL-MCNC: 96 MG/DL — SIGNIFICANT CHANGE UP (ref 70–99)
HCO3 BLDA-SCNC: 34 MMOL/L — HIGH (ref 21–28)
HCT VFR BLD CALC: 31.7 % — LOW (ref 34.5–45)
HGB BLD-MCNC: 10.3 G/DL — LOW (ref 11.5–15.5)
HOROWITZ INDEX BLDA+IHG-RTO: 32 — SIGNIFICANT CHANGE UP
MAGNESIUM SERPL-MCNC: 2 MG/DL — SIGNIFICANT CHANGE UP (ref 1.6–2.6)
MCHC RBC-ENTMCNC: 31.8 PG — SIGNIFICANT CHANGE UP (ref 27–34)
MCHC RBC-ENTMCNC: 32.5 GM/DL — SIGNIFICANT CHANGE UP (ref 32–36)
MCV RBC AUTO: 97.8 FL — SIGNIFICANT CHANGE UP (ref 80–100)
NRBC # BLD: 0 /100 WBCS — SIGNIFICANT CHANGE UP (ref 0–0)
PCO2 BLDA: 55 MMHG — HIGH (ref 32–45)
PH BLDA: 7.4 — SIGNIFICANT CHANGE UP (ref 7.35–7.45)
PHOSPHATE SERPL-MCNC: 4.2 MG/DL — SIGNIFICANT CHANGE UP (ref 2.5–4.5)
PLATELET # BLD AUTO: 143 K/UL — LOW (ref 150–400)
PO2 BLDA: 166 MMHG — HIGH (ref 83–108)
POTASSIUM SERPL-MCNC: 4.3 MMOL/L — SIGNIFICANT CHANGE UP (ref 3.5–5.3)
POTASSIUM SERPL-SCNC: 4.3 MMOL/L — SIGNIFICANT CHANGE UP (ref 3.5–5.3)
RBC # BLD: 3.24 M/UL — LOW (ref 3.8–5.2)
RBC # FLD: 18.9 % — HIGH (ref 10.3–14.5)
SAO2 % BLDA: 99.7 % — HIGH (ref 94–98)
SODIUM SERPL-SCNC: 141 MMOL/L — SIGNIFICANT CHANGE UP (ref 135–145)
WBC # BLD: 50.3 K/UL — CRITICAL HIGH (ref 3.8–10.5)
WBC # FLD AUTO: 50.3 K/UL — CRITICAL HIGH (ref 3.8–10.5)

## 2024-06-05 PROCEDURE — 99232 SBSQ HOSP IP/OBS MODERATE 35: CPT

## 2024-06-05 PROCEDURE — 99222 1ST HOSP IP/OBS MODERATE 55: CPT

## 2024-06-05 PROCEDURE — 31615 TRCHEOBRNCHSC EST TRACHS INC: CPT

## 2024-06-05 PROCEDURE — 99233 SBSQ HOSP IP/OBS HIGH 50: CPT | Mod: 25

## 2024-06-05 RX ADMIN — PANTOPRAZOLE SODIUM 40 MILLIGRAM(S): 20 TABLET, DELAYED RELEASE ORAL at 17:54

## 2024-06-05 RX ADMIN — Medication 1 TABLET(S): at 12:39

## 2024-06-05 RX ADMIN — AMLODIPINE BESYLATE 5 MILLIGRAM(S): 2.5 TABLET ORAL at 05:02

## 2024-06-05 RX ADMIN — Medication 500 MILLIGRAM(S): at 12:39

## 2024-06-05 RX ADMIN — QUETIAPINE FUMARATE 200 MILLIGRAM(S): 200 TABLET, FILM COATED ORAL at 19:33

## 2024-06-05 RX ADMIN — Medication 650 MILLIGRAM(S): at 20:10

## 2024-06-05 RX ADMIN — Medication 650 MILLIGRAM(S): at 19:34

## 2024-06-05 RX ADMIN — PANTOPRAZOLE SODIUM 40 MILLIGRAM(S): 20 TABLET, DELAYED RELEASE ORAL at 05:02

## 2024-06-05 RX ADMIN — CHLORHEXIDINE GLUCONATE 1 APPLICATION(S): 213 SOLUTION TOPICAL at 05:02

## 2024-06-05 RX ADMIN — Medication 25 MILLIGRAM(S): at 05:02

## 2024-06-05 RX ADMIN — ENOXAPARIN SODIUM 40 MILLIGRAM(S): 100 INJECTION SUBCUTANEOUS at 05:01

## 2024-06-05 RX ADMIN — Medication 650 MILLIGRAM(S): at 13:54

## 2024-06-05 RX ADMIN — ENOXAPARIN SODIUM 40 MILLIGRAM(S): 100 INJECTION SUBCUTANEOUS at 17:54

## 2024-06-05 RX ADMIN — Medication 25 MILLIGRAM(S): at 17:54

## 2024-06-05 RX ADMIN — Medication 650 MILLIGRAM(S): at 07:25

## 2024-06-05 RX ADMIN — Medication 650 MILLIGRAM(S): at 14:24

## 2024-06-05 RX ADMIN — Medication 650 MILLIGRAM(S): at 06:27

## 2024-06-05 RX ADMIN — CHLORHEXIDINE GLUCONATE 1 APPLICATION(S): 213 SOLUTION TOPICAL at 17:55

## 2024-06-05 NOTE — CONSULT NOTE ADULT - REASON FOR ADMISSION
Transfer for leukophoresis
AHRF

## 2024-06-05 NOTE — PROGRESS NOTE ADULT - SUBJECTIVE AND OBJECTIVE BOX
ENT ISSUE/POD: Airway eval    HPI: 70 y/o Female with significant PMHx of CVA (bedbound at baseline), COPD, CHF, HTN, who initially p/w acute shortness of breath to outside ED and was treated for acute pulmonary edema with sublingual nitro x 2, Lasix, and BiPAP. Pt transferred to [place] on 4/16/24 for white count of 233 and possible leukophoresis. In the ED, pt intubated iso respiratory tiring and decreasing mental status. Pt was started on pressors and admitted to the MICU for AHRF and septic shock. Course c/b empyema s/p chest tube placement (4/16) and removal, reaccumulation of loculated pleural effusion s/p L pigtail placement and removal on 4/27. S/p trach on 4/28.  S/p PEG 5/7. Transferred to RCU 5/9. ENT initially consulted for RRT involving dislodged trach on 5/15, #7 portex cuffed trach tube was successfully replaced back into the stoma/trachea. ENT now reconsulted for airway eval for possible decannulation. Pt currently tolerating red-cap trials. Unable to obtain further history due to pts clinical condition.             PAST MEDICAL & SURGICAL HISTORY:  Acute CHF      COPD, severe        Allergies    No Known Allergies    Intolerances      MEDICATIONS  (STANDING):  amLODIPine   Tablet 5 milliGRAM(s) Oral daily  ascorbic acid 500 milliGRAM(s) Oral daily  chlorhexidine 4% Liquid 1 Application(s) Topical every 12 hours  enoxaparin Injectable 40 milliGRAM(s) SubCutaneous every 12 hours  metoprolol tartrate 25 milliGRAM(s) Oral two times a day  multivitamin 1 Tablet(s) Oral daily  pantoprazole  Injectable 40 milliGRAM(s) IV Push two times a day  QUEtiapine 200 milliGRAM(s) Oral <User Schedule>  senna Syrup 10 milliLiter(s) Oral at bedtime    MEDICATIONS  (PRN):  acetaminophen     Tablet .. 650 milliGRAM(s) Oral every 6 hours PRN Temp greater or equal to 38C (100.4F), Mild Pain (1 - 3)  nystatin Powder 1 Application(s) Topical two times a day PRN skin irritation  QUEtiapine 25 milliGRAM(s) Oral two times a day PRN Agitation  sodium chloride 0.9% lock flush 10 milliLiter(s) IV Push every 1 hour PRN Pre/post blood products, medications, blood draw, and to maintain line patency      Social History: see consult    Family history: see consult    ROS:   Unable to obtain further history due to pts clinical condition.       Vital Signs Last 24 Hrs  T(C): 36.7 (05 Jun 2024 11:27), Max: 37.2 (04 Jun 2024 18:00)  T(F): 98.1 (05 Jun 2024 11:27), Max: 98.9 (04 Jun 2024 18:00)  HR: 111 (05 Jun 2024 11:27) (103 - 120)  BP: 133/69 (05 Jun 2024 11:27) (129/75 - 171/78)  BP(mean): --  RR: 18 (05 Jun 2024 11:27) (18 - 20)  SpO2: 96% (05 Jun 2024 11:27) (95% - 100%)    Parameters below as of 05 Jun 2024 11:27  Patient On (Oxygen Delivery Method): nasal cannula                              10.3   50.30 )-----------( 143      ( 05 Jun 2024 07:12 )             31.7    06-05    141  |  102  |  9   ----------------------------<  96  4.3   |  30  |  0.55    Ca    9.1      05 Jun 2024 07:07  Phos  4.2     06-05  Mg     2.0     06-05         PHYSICAL EXAM:  Gen: NAD  Skin: No rashes, bruises, or lesions  Head: Normocephalic, Atraumatic  Face: no edema, erythema, or fluctuance. Parotid glands soft without mass  Eyes: no scleral injection  Nose: Nares bilaterally patent, no discharge  Mouth: No Stridor / Drooling / Trismus.  Mucosa moist, tongue/uvula midline, oropharynx clear  Neck: #7 Portex uncuffed trach in place, red-cap in place, secured with velcro tie. Flat, supple, no lymphadenopathy, trachea midline, no masses  Lymphatic: No lymphadenopathy  Resp: breathing easily, no stridor  Neuro: facial nerve intact, no facial droop        Fiberoptic Indirect Laryngoscopy  Reason for Laryngoscopy: Airway eval    Patient was unable to cooperate with mirror.  Nasopharynx, oropharynx, and hypopharynx clear, no bleeding. Tongue base, posterior pharyngeal wall, vallecula, epiglottis, and subglottis appear normal. No erythema, edema, pooling of secretions, masses or lesions. Airway patent, no foreign body visualized. No glottic/supraglottic edema. True vocal cords, arytenoids, vestibular folds, ventricles, pyriform sinuses, and aryepiglottic folds appear normal bilaterally. Vocal cords mobile with good contact b/l.      Tracheoscopy  Clear secretions suctioned from stoma. Bedside tracheoscopy performed, juan visualized, no purulence, no bleeding, no erythema, no evidence of tracheomalacia. Visualized vocal cords through tracheostomy stoma, no subglottic stenosis. Pt tolerated the procedure well without complications.         ENT ISSUE/POD: Airway eval    HPI: 70 y/o Female with significant PMHx of CVA (bedbound at baseline), COPD, CHF, HTN, who initially p/w acute shortness of breath to outside ED and was treated for acute pulmonary edema with sublingual nitro x 2, Lasix, and BiPAP. Pt transferred to [place] on 4/16/24 for white count of 233 and possible leukophoresis. In the ED, pt intubated iso respiratory tiring and decreasing mental status. Pt was started on pressors and admitted to the MICU for AHRF and septic shock. Course c/b empyema s/p chest tube placement (4/16) and removal, reaccumulation of loculated pleural effusion s/p L pigtail placement and removal on 4/27. S/p trach on 4/28.  S/p PEG 5/7. Transferred to [facility]CU 5/9. ENT initially consulted for RRT involving dislodged trach on 5/15, #7 portex cuffed trach tube was successfully replaced back into the stoma/trachea. ENT now reconsulted for airway eval for possible decannulation. Pt currently tolerating red-cap trials. Unable to obtain further history due to pts clinical condition.             PAST MEDICAL & SURGICAL HISTORY:  Acute CHF      COPD, severe        Allergies    No Known Allergies    Intolerances      MEDICATIONS  (STANDING):  amLODIPine   Tablet 5 milliGRAM(s) Oral daily  ascorbic acid 500 milliGRAM(s) Oral daily  chlorhexidine 4% Liquid 1 Application(s) Topical every 12 hours  enoxaparin Injectable 40 milliGRAM(s) SubCutaneous every 12 hours  metoprolol tartrate 25 milliGRAM(s) Oral two times a day  multivitamin 1 Tablet(s) Oral daily  pantoprazole  Injectable 40 milliGRAM(s) IV Push two times a day  QUEtiapine 200 milliGRAM(s) Oral <User Schedule>  senna Syrup 10 milliLiter(s) Oral at bedtime    MEDICATIONS  (PRN):  acetaminophen     Tablet .. 650 milliGRAM(s) Oral every 6 hours PRN Temp greater or equal to 38C (100.4F), Mild Pain (1 - 3)  nystatin Powder 1 Application(s) Topical two times a day PRN skin irritation  QUEtiapine 25 milliGRAM(s) Oral two times a day PRN Agitation  sodium chloride 0.9% lock flush 10 milliLiter(s) IV Push every 1 hour PRN Pre/post blood products, medications, blood draw, and to maintain line patency      Social History: see consult    Family history: see consult    ROS:   Unable to obtain further history due to pts clinical condition.       Vital Signs Last 24 Hrs  T(C): 36.7 (05 Jun 2024 11:27), Max: 37.2 (04 Jun 2024 18:00)  T(F): 98.1 (05 Jun 2024 11:27), Max: 98.9 (04 Jun 2024 18:00)  HR: 111 (05 Jun 2024 11:27) (103 - 120)  BP: 133/69 (05 Jun 2024 11:27) (129/75 - 171/78)  BP(mean): --  RR: 18 (05 Jun 2024 11:27) (18 - 20)  SpO2: 96% (05 Jun 2024 11:27) (95% - 100%)    Parameters below as of 05 Jun 2024 11:27  Patient On (Oxygen Delivery Method): nasal cannula                              10.3   50.30 )-----------( 143      ( 05 Jun 2024 07:12 )             31.7    06-05    141  |  102  |  9   ----------------------------<  96  4.3   |  30  |  0.55    Ca    9.1      05 Jun 2024 07:07  Phos  4.2     06-05  Mg     2.0     06-05         PHYSICAL EXAM:  Gen: NAD  Skin: No rashes, bruises, or lesions  Head: Normocephalic, Atraumatic  Face: no edema, erythema, or fluctuance. Parotid glands soft without mass  Eyes: no scleral injection  Nose: Nares bilaterally patent, no discharge  Mouth: No Stridor / Drooling / Trismus.  Mucosa moist, tongue/uvula midline, oropharynx clear  Neck: #7 Portex uncuffed trach in place, red-cap in place, secured with velcro tie. Flat, supple, no lymphadenopathy, trachea midline, no masses  Lymphatic: No lymphadenopathy  Resp: breathing easily, no stridor  Neuro: unable to assess        Fiberoptic Indirect Laryngoscopy  Reason for Laryngoscopy: Airway eval    Patient was unable to cooperate with mirror.  Nasopharynx, oropharynx, and hypopharynx clear, no bleeding. Tongue base, posterior pharyngeal wall, vallecula, epiglottis, and subglottis appear normal. No erythema, edema, pooling of secretions, masses or lesions. Airway patent, no foreign body visualized. No glottic/supraglottic edema. True vocal cords, arytenoids, vestibular folds, ventricles, pyriform sinuses, and aryepiglottic folds appear normal bilaterally. Vocal cords mobile with good contact b/l.      Tracheoscopy  Clear secretions suctioned from stoma. Bedside tracheoscopy performed, juan visualized, no purulence, no bleeding, no erythema, no evidence of tracheomalacia. Visualized vocal cords through tracheostomy stoma, no subglottic stenosis. Pt tolerated the procedure well without complications.

## 2024-06-05 NOTE — PROGRESS NOTE ADULT - NS ATTEND AMEND GEN_ALL_CORE FT
ENT consulted for airway evaluation     68yo female with PMHx CVA (bedbound at baseline), COPD, CHF, HTN who initially p/w acute shortness of breath to outside ED and was treated for acute pulmonary edema with sublingual nitro x 2, Lasix, and BiPAP. Pt transferred to [place] on 4/16/24 for white count of 233 and possible leukophoresis. In the ED, pt intubated iso respiratory tiring and decreasing mental status. Pt was started on pressors and admitted to the MICU for AHRF and septic shock. Course c/b empyema s/p chest tube placement (4/16) and removal, reaccumulation of loculated pleural effusion s/p L pigtail placement and removal on 4/27. S/p trach on 4/28.  S/p PEG 5/7. Transferred to RCU 5/9.     5/15/24 -Per team, RRT called after pt pulled out trach tube and upon reinsertion developed crepitus. Team was unable to pass suction catheter through trach tube and was having difficulty bagging through tube. Pt has been tolerating trach collar during the day, on vent at night.   -Tracheoscopy shows trach tube in false passage. #7 portex cuffed trach removed and reinserted in proper position centered in trachea. Carinal bifurcation visualized. Pt tolerated procedure well. Cuff inflated and pt placed on vent, ventilating well. O2 sats stable.     ENT now reconsulted for airway eval. Pt tolerating red-cap trials.     Flexible laryngoscopy and tracheoscopy shows vocal fold symmetrical and mobile. airway patent.  juan visualized, no purulence, no bleeding, no erythema, no evidence of tracheomalacia. Visualized vocal cords through tracheostomy stoma, no subglottic stenosis.    Recommend:  Airway Evaluation  - No ENT contraindication for decannulation.  - No further ENT intervention required at this time, please call back if needed.

## 2024-06-05 NOTE — PROGRESS NOTE ADULT - PROBLEM SELECTOR PLAN 1
- No ENT contraindication for decannulation.  - No further ENT intervention required at this time, please call back if needed.

## 2024-06-05 NOTE — PROGRESS NOTE ADULT - PROBLEM SELECTOR PLAN 8
- Seen by Palliative Care on 5/31  - Patient wants to remain Full code  - Aunt Aneida involved in decision making - S/p PEG 5/7 by GI   - FEES 6/3: Passed for Puree and Moderately thickened Liquids   - Still remains on TFs as currently is not taking adequate po   - Continue to encourage PO Intake and titrate TFs accordingly

## 2024-06-05 NOTE — PROGRESS NOTE ADULT - PROBLEM SELECTOR PLAN 3
- Intubated on arrival to [facility] ED for respiratory tiring and AMS  - Unable to be extubated  - S/p Trach 4/28  - Patient tolerating TC ATC since 5/28; FIO2: 40 %  - Patient cleared for Supervised PMV Trials   - 6/1: Trach changed to #7 Cuffless Portex  - Patient Red Capped since 6/3; NC dec to 2 LPM  - ENT called for airway evaluation prior to possible decannulation    - Continue Chest PT and suctioning PRN

## 2024-06-05 NOTE — CONSULT NOTE ADULT - CONSULT REASON
Fever
GOC
Evaluate Rehabilitation Needs
PEG
Loculated Effusion
right axillary/chest wall hematoma/adenopathy
left Chest Pigtail
RRT, dislodged trach
Leukocytosis

## 2024-06-05 NOTE — CONSULT NOTE ADULT - CONSULT REQUESTED DATE/TIME
16-Apr-2024 11:23
23-Apr-2024 16:17
21-May-2024 16:06
30-Apr-2024 06:19
06-May-2024
05-Jun-2024 10:11
25-Apr-2024 12:50
15-May-2024 07:09
23-Apr-2024 13:43

## 2024-06-05 NOTE — PROGRESS NOTE ADULT - PROBLEM SELECTOR PLAN 9
- CM met with patient and will send out [employer] Referral for SOL - Seen by Palliative Care on 5/31  - Patient wants to remain Full code  - Aunt Aneida involved in decision making

## 2024-06-05 NOTE — PROGRESS NOTE ADULT - ASSESSMENT
68 y/o Female with significant PMHx of CVA (bedbound at baseline), COPD, CHF, HTN, who initially p/w acute shortness of breath to outside ED and was treated for acute pulmonary edema with sublingual nitro x 2, Lasix, and BiPAP. Pt transferred to [place] on 4/16/24 for white count of 233 and possible leukophoresis. In the ED, pt intubated iso respiratory tiring and decreasing mental status. Pt was started on pressors and admitted to the MICU for AHRF and septic shock. Course c/b empyema s/p chest tube placement (4/16) and removal, reaccumulation of loculated pleural effusion s/p L pigtail placement and removal on 4/27. S/p trach on 4/28.  S/p PEG 5/7. Transferred to RCU 5/9. ENT initially consulted for RRT involving dislodged trach on 5/15, #7 portex cuffed trach tube was successfully replaced back into the stoma/trachea. ENT now reconsulted for airway eval. Pt tolerating red-cap trials. Fiberoptic laryngoscopy and tracheoscopy were unremarkable, airway patent.  70 y/o Female with significant PMHx of CVA (bedbound at baseline), COPD, CHF, HTN, who initially p/w acute shortness of breath to outside ED and was treated for acute pulmonary edema with sublingual nitro x 2, Lasix, and BiPAP. Pt transferred to [place] on 4/16/24 for white count of 233 and possible leukophoresis. In the ED, pt intubated iso respiratory tiring and decreasing mental status. Pt was started on pressors and admitted to the MICU for AHRF and septic shock. Course c/b empyema s/p chest tube placement (4/16) and removal, reaccumulation of loculated pleural effusion s/p L pigtail placement and removal on 4/27. S/p trach on 4/28.  S/p PEG 5/7. Transferred to RCU 5/9. ENT initially consulted for RRT involving dislodged trach on 5/15, #7 portex cuffed trach tube was successfully replaced back into the stoma/trachea. ENT now reconsulted for airway eval. Pt tolerating red-cap trials. Fiberoptic indirect laryngoscopy and tracheoscopy were unremarkable, airway patent.

## 2024-06-05 NOTE — PROGRESS NOTE ADULT - PROBLEM SELECTOR PLAN 4
- Patient with periods of Delirium however vastly improving.  - Continue Seroquel 25 mg BID PRN;  Seroquel 200 mg QHS   - Klonopin discontinued on 5/30 with improved Delirium.  - Patient voiced concerns on being long term Seroquel as she had experienced weight gain in the past   - 5/28 ECG: QTc 474

## 2024-06-05 NOTE — PROGRESS NOTE ADULT - NS ATTEND AMEND GEN_ALL_CORE FT
68 y/o F w/prior CVA, COPD, CHF, and HTN initially admitted for hyperleukocytosis concerning for acute leukemia c/b acute hypoxemic respiratory failure and hypotension secondary to severe sepsis with septic shock due to Strep pneumonia bacteremia in setting of PNA w/empyema s/p chest tube placement and MIST now s/p trach/PEG w/hospital course c/b hemorrhagic shock 2/2 abdominal wall hematoma now back in RCU. Patient also diagnosed w/CLL.     - Plan for decannulation  - Management of CLL as per heme/onc  - Monitor off abx  - DVT prophylaxis  - Full code  - Dispo planning

## 2024-06-05 NOTE — CHART NOTE - NSCHARTNOTEFT_GEN_A_CORE
Patient has a follow up appointment with Dr. Calderon at [facility] on 6/20 at 11 AM. Please include in discharge paperwork.    Alexus Reid M.D.  Hematology and Medical Oncology Fellow  Pager: [phone]  For weekends and evenings (5 pm - 8 am), please page Heme/Onc fellow on call.

## 2024-06-05 NOTE — CONSULT NOTE ADULT - SUBJECTIVE AND OBJECTIVE BOX
Patient is a 69y old  Female who presents with a chief complaint of Transfer for leukophoresis (05 Jun 2024 08:07)    Admission HPI:  69F h/o CVA (bedbound at baseline), COPD, CHF, HTN p/w acute shortness of breath to outside ED. Initially treated for acute pulmonary edema with sublingual nitro x 2, Lasix, and BiPAP. Pt was also found to be febrile to 103, so treated for PNA. BIPAP led to improvement in O2 to 89-90. Pt now transferred to [place] for white count of 233 and plan for possible leukophoresis. In ED, pt intubated iso respiratory tiring and decreasing mental status. Also started on levo for hypotn.  (16 Apr 2024 12:59)    Interval History:  Patient has had a very complicated hospital course: Septic shock, empyema s/p chest tube, DVT, RUE hematoma, delirium, dysphagia s/p PEG 5/7. Patient diagnosed with CLL.  With significant functional deficits.    REVIEW OF SYSTEMS: No chest pain, shortness of breath, nausea, vomiting or diarhea; other ROS neg     PAST MEDICAL & SURGICAL HISTORY  Acute CHF  COPD, severe    FUNCTIONAL HISTORY:   Has HHA who assists with ADLs, reported to be essentially bedbound PTA    CURRENT FUNCTIONAL STATUS:  Max A Bed Mob    FAMILY HISTORY   N/C    MEDICATIONS   acetaminophen     Tablet .. 650 milliGRAM(s) Oral every 6 hours PRN  amLODIPine   Tablet 5 milliGRAM(s) Oral daily  ascorbic acid 500 milliGRAM(s) Oral daily  chlorhexidine 4% Liquid 1 Application(s) Topical every 12 hours  enoxaparin Injectable 40 milliGRAM(s) SubCutaneous every 12 hours  metoprolol tartrate 25 milliGRAM(s) Oral two times a day  multivitamin 1 Tablet(s) Oral daily  nystatin Powder 1 Application(s) Topical two times a day PRN  pantoprazole  Injectable 40 milliGRAM(s) IV Push two times a day  QUEtiapine 25 milliGRAM(s) Oral two times a day PRN  QUEtiapine 200 milliGRAM(s) Oral <User Schedule>  senna Syrup 10 milliLiter(s) Oral at bedtime  sodium chloride 0.9% lock flush 10 milliLiter(s) IV Push every 1 hour PRN    ALLERGIES  No Known Allergies    VITALS  T(C): 36.6 (06-05-24 @ 04:35), Max: 37.2 (06-04-24 @ 18:00)  HR: 117 (06-05-24 @ 04:35) (103 - 120)  BP: 136/75 (06-05-24 @ 04:35) (129/75 - 171/78)  RR: 19 (06-05-24 @ 04:35) (18 - 20)  SpO2: 100% (06-05-24 @ 04:35) (95% - 100%)  Wt(kg): --    PHYSICAL EXAM  Constitutional - NAD, Comfortable  HEENT - NCAT, EOMI  Neck - Supple  Chest - No distress, no use of accessory muscles for respiration  Cardiovascular -Tachy, Well perfused  Abdomen - BS+, Soft, NTND  Extremities - No C/C/E, No calf tenderness   Neurologic Exam -                    Cognitive - Awake, Alert, AAO to self, place, date, year, situation     Communication - Fluent, No dysarthria, no aphasia     Cranial Nerves - CN 2-12 intact     Motor - No focal deficits      Sensory - Intact to LT     Reflexes - DTR Intact, No primitive reflexive  Psychiatric - Mood stable, Affect WNL    RECENT LABS/IMAGING  CBC Full  -  ( 05 Jun 2024 07:12 )  WBC Count : 50.30 K/uL  RBC Count : 3.24 M/uL  Hemoglobin : 10.3 g/dL  Hematocrit : 31.7 %  Platelet Count - Automated : 143 K/uL  Mean Cell Volume : 97.8 fl  Mean Cell Hemoglobin : 31.8 pg  Mean Cell Hemoglobin Concentration : 32.5 gm/dL  Auto Neutrophil # : x  Auto Lymphocyte # : x  Auto Monocyte # : x  Auto Eosinophil # : x  Auto Basophil # : x  Auto Neutrophil % : x  Auto Lymphocyte % : x  Auto Monocyte % : x  Auto Eosinophil % : x  Auto Basophil % : x    06-05    141  |  102  |  9   ----------------------------<  96  4.3   |  30  |  0.55    Ca    9.1      05 Jun 2024 07:07  Phos  4.2     06-05  Mg     2.0     06-05      Urinalysis Basic - ( 05 Jun 2024 07:07 )    Color: x / Appearance: x / SG: x / pH: x  Gluc: 96 mg/dL / Ketone: x  / Bili: x / Urobili: x   Blood: x / Protein: x / Nitrite: x   Leuk Esterase: x / RBC: x / WBC x   Sq Epi: x / Non Sq Epi: x / Bacteria: x    Impression:  70 yo with functional deficits secondary to diagnosis of debility    Plan:  PT- ROM, Bed Mob, Transfers, Amb w AD and bracing as needed  OT- ADLs, bracing  SLP- Dysphagia eval and treat  Prec- Falls, Cardiac, Pulm  Monitor wbc ct  DVT - On Lovenox  Skin- Turn q2 h  Dispo-  Patient is a 69y old  Female who presents with a chief complaint of Transfer for leukophoresis (05 Jun 2024 08:07)    Admission HPI:  69F h/o CVA (bedbound at baseline), COPD, CHF, HTN p/w acute shortness of breath to outside ED. Initially treated for acute pulmonary edema with sublingual nitro x 2, Lasix, and BiPAP. Pt was also found to be febrile to 103, so treated for PNA. BIPAP led to improvement in O2 to 89-90. Pt now transferred to [place] for white count of 233 and plan for possible leukophoresis. In ED, pt intubated iso respiratory tiring and decreasing mental status. Also started on levo for hypotn.  (16 Apr 2024 12:59)    Interval History:  Patient has had a very complicated hospital course: Septic shock, empyema s/p chest tube, DVT, RUE hematoma, delirium, dysphagia s/p PEG 5/7. Patient diagnosed with CLL.  With significant functional deficits.    REVIEW OF SYSTEMS: L weakness, + chronic LBP, No chest pain, shortness of breath, nausea, vomiting or diarhea; other ROS neg     PAST MEDICAL & SURGICAL HISTORY  Acute CHF  COPD, severe    FUNCTIONAL HISTORY:   Has HHA who assists with ADLs, reported to be essentially bedbound PTA    CURRENT FUNCTIONAL STATUS:  Max A Bed Mob    FAMILY HISTORY   N/C    MEDICATIONS   acetaminophen     Tablet .. 650 milliGRAM(s) Oral every 6 hours PRN  amLODIPine   Tablet 5 milliGRAM(s) Oral daily  ascorbic acid 500 milliGRAM(s) Oral daily  chlorhexidine 4% Liquid 1 Application(s) Topical every 12 hours  enoxaparin Injectable 40 milliGRAM(s) SubCutaneous every 12 hours  metoprolol tartrate 25 milliGRAM(s) Oral two times a day  multivitamin 1 Tablet(s) Oral daily  nystatin Powder 1 Application(s) Topical two times a day PRN  pantoprazole  Injectable 40 milliGRAM(s) IV Push two times a day  QUEtiapine 25 milliGRAM(s) Oral two times a day PRN  QUEtiapine 200 milliGRAM(s) Oral <User Schedule>  senna Syrup 10 milliLiter(s) Oral at bedtime  sodium chloride 0.9% lock flush 10 milliLiter(s) IV Push every 1 hour PRN    ALLERGIES  No Known Allergies    VITALS  T(C): 36.6 (06-05-24 @ 04:35), Max: 37.2 (06-04-24 @ 18:00)  HR: 117 (06-05-24 @ 04:35) (103 - 120)  BP: 136/75 (06-05-24 @ 04:35) (129/75 - 171/78)  RR: 19 (06-05-24 @ 04:35) (18 - 20)  SpO2: 100% (06-05-24 @ 04:35) (95% - 100%)  Wt(kg): --    PHYSICAL EXAM  Constitutional - NAD, Comfortable  HEENT - NCAT, EOMI  Neck - Supple  Chest - On O2,No distress, no use of accessory muscles for respiration  Cardiovascular -Tachy, Well perfused  Abdomen - BS+, Soft, NTND  Extremities - No C/C/E, No calf tenderness   Neurologic Exam -                 Alert  AAO x 3  Motor - LUE 2/5, LLE 1/5, RUE/RLE 4+/5  Sensation intact  MAS 3 spasticity L FFs  Psychiatric - Mood stable, Affect WNL    RECENT LABS/IMAGING  CBC Full  -  ( 05 Jun 2024 07:12 )  WBC Count : 50.30 K/uL  RBC Count : 3.24 M/uL  Hemoglobin : 10.3 g/dL  Hematocrit : 31.7 %  Platelet Count - Automated : 143 K/uL  Mean Cell Volume : 97.8 fl  Mean Cell Hemoglobin : 31.8 pg  Mean Cell Hemoglobin Concentration : 32.5 gm/dL  Auto Neutrophil # : x  Auto Lymphocyte # : x  Auto Monocyte # : x  Auto Eosinophil # : x  Auto Basophil # : x  Auto Neutrophil % : x  Auto Lymphocyte % : x  Auto Monocyte % : x  Auto Eosinophil % : x  Auto Basophil % : x    06-05    141  |  102  |  9   ----------------------------<  96  4.3   |  30  |  0.55    Ca    9.1      05 Jun 2024 07:07  Phos  4.2     06-05  Mg     2.0     06-05      Urinalysis Basic - ( 05 Jun 2024 07:07 )    Color: x / Appearance: x / SG: x / pH: x  Gluc: 96 mg/dL / Ketone: x  / Bili: x / Urobili: x   Blood: x / Protein: x / Nitrite: x   Leuk Esterase: x / RBC: x / WBC x   Sq Epi: x / Non Sq Epi: x / Bacteria: x    Impression:  68 yo with functional deficits secondary to diagnosis of debility    Plan:  PT- ROM, Bed Mob, Transfers, Amb w AD and bracing as needed  OT- ADLs, bracing  SLP- Dysphagia eval and treat  Prec- Falls, Cardiac, Pulm  Monitor wbc ct  DVT - On Lovenox  Skin- Turn q2 h  Dispo- At this time recommending SOL - patient likely can not tolerate 3h/d of therapies and will likely require lenghthy course of rehabilitation. If improves can change reccomendation. d/w

## 2024-06-06 LAB
BASE EXCESS BLDA CALC-SCNC: 8.7 MMOL/L — HIGH (ref -2–3)
CO2 BLDA-SCNC: 37 MMOL/L — HIGH (ref 19–24)
GAS PNL BLDA: SIGNIFICANT CHANGE UP
GLUCOSE BLDC GLUCOMTR-MCNC: 98 MG/DL — SIGNIFICANT CHANGE UP (ref 70–99)
HCO3 BLDA-SCNC: 35 MMOL/L — HIGH (ref 21–28)
HOROWITZ INDEX BLDA+IHG-RTO: 50 — SIGNIFICANT CHANGE UP
PCO2 BLDA: 54 MMHG — HIGH (ref 32–45)
PH BLDA: 7.42 — SIGNIFICANT CHANGE UP (ref 7.35–7.45)
PO2 BLDA: 180 MMHG — HIGH (ref 83–108)
SAO2 % BLDA: 99.6 % — HIGH (ref 94–98)

## 2024-06-06 PROCEDURE — 99232 SBSQ HOSP IP/OBS MODERATE 35: CPT

## 2024-06-06 RX ORDER — AMLODIPINE BESYLATE 2.5 MG/1
5 TABLET ORAL DAILY
Refills: 0 | Status: DISCONTINUED | OUTPATIENT
Start: 2024-06-07 | End: 2024-06-10

## 2024-06-06 RX ORDER — QUETIAPINE FUMARATE 200 MG/1
150 TABLET, FILM COATED ORAL
Refills: 0 | Status: DISCONTINUED | OUTPATIENT
Start: 2024-06-06 | End: 2024-06-10

## 2024-06-06 RX ORDER — ACETAMINOPHEN 500 MG
975 TABLET ORAL EVERY 6 HOURS
Refills: 0 | Status: DISCONTINUED | OUTPATIENT
Start: 2024-06-06 | End: 2024-06-08

## 2024-06-06 RX ORDER — ALPRAZOLAM 0.25 MG
1 TABLET ORAL ONCE
Refills: 0 | Status: DISCONTINUED | OUTPATIENT
Start: 2024-06-06 | End: 2024-06-06

## 2024-06-06 RX ORDER — QUETIAPINE FUMARATE 200 MG/1
50 TABLET, FILM COATED ORAL
Refills: 0 | Status: DISCONTINUED | OUTPATIENT
Start: 2024-06-06 | End: 2024-06-07

## 2024-06-06 RX ADMIN — Medication 975 MILLIGRAM(S): at 16:30

## 2024-06-06 RX ADMIN — SENNA PLUS 10 MILLILITER(S): 8.6 TABLET ORAL at 21:34

## 2024-06-06 RX ADMIN — CHLORHEXIDINE GLUCONATE 1 APPLICATION(S): 213 SOLUTION TOPICAL at 18:10

## 2024-06-06 RX ADMIN — Medication 25 MILLIGRAM(S): at 18:10

## 2024-06-06 RX ADMIN — Medication 25 MILLIGRAM(S): at 05:04

## 2024-06-06 RX ADMIN — Medication 500 MILLIGRAM(S): at 12:08

## 2024-06-06 RX ADMIN — CHLORHEXIDINE GLUCONATE 1 APPLICATION(S): 213 SOLUTION TOPICAL at 05:04

## 2024-06-06 RX ADMIN — QUETIAPINE FUMARATE 50 MILLIGRAM(S): 200 TABLET, FILM COATED ORAL at 22:27

## 2024-06-06 RX ADMIN — Medication 975 MILLIGRAM(S): at 16:00

## 2024-06-06 RX ADMIN — ENOXAPARIN SODIUM 40 MILLIGRAM(S): 100 INJECTION SUBCUTANEOUS at 05:04

## 2024-06-06 RX ADMIN — PANTOPRAZOLE SODIUM 40 MILLIGRAM(S): 20 TABLET, DELAYED RELEASE ORAL at 05:04

## 2024-06-06 RX ADMIN — Medication 1 TABLET(S): at 12:08

## 2024-06-06 RX ADMIN — AMLODIPINE BESYLATE 5 MILLIGRAM(S): 2.5 TABLET ORAL at 05:04

## 2024-06-06 RX ADMIN — PANTOPRAZOLE SODIUM 40 MILLIGRAM(S): 20 TABLET, DELAYED RELEASE ORAL at 18:10

## 2024-06-06 RX ADMIN — Medication 1 MILLIGRAM(S): at 15:49

## 2024-06-06 RX ADMIN — ENOXAPARIN SODIUM 40 MILLIGRAM(S): 100 INJECTION SUBCUTANEOUS at 18:10

## 2024-06-06 RX ADMIN — QUETIAPINE FUMARATE 150 MILLIGRAM(S): 200 TABLET, FILM COATED ORAL at 20:23

## 2024-06-06 NOTE — PROGRESS NOTE ADULT - PROBLEM SELECTOR PLAN 2
- On admission WBC count 233  - transferred to [facility] for possible leukophoresis  - as per heme/onc - underlying CLL (Oct 2023) with reactive lymphocytosis 2/2 sepsis  - heme/onc deferred leukophoresis due to mature lymphocytes; IVIG given 4/26/24  - CLL under control and plan for outpatient follow up at [facility] when patient closer to d/c

## 2024-06-06 NOTE — PROGRESS NOTE ADULT - SUBJECTIVE AND OBJECTIVE BOX
Patient is a 69y old  Female who presents with a chief complaint of Transfer for leukophoresis (05 Jun 2024 15:37)      Interval Events:    REVIEW OF SYSTEMS:  [ ] Positive  [ ] All other systems negative  [ ] Unable to assess ROS because ________    Vital Signs Last 24 Hrs  T(C): 37.1 (06-06-24 @ 06:00), Max: 37.3 (06-05-24 @ 18:43)  T(F): 98.8 (06-06-24 @ 06:00), Max: 99.2 (06-05-24 @ 18:43)  HR: 103 (06-06-24 @ 06:00) (103 - 113)  BP: 102/49 (06-06-24 @ 06:00) (102/49 - 162/62)  RR: 18 (06-06-24 @ 06:00) (18 - 20)  SpO2: 99% (06-06-24 @ 06:00) (96% - 99%)    PHYSICAL EXAM:  HEENT:   [ ]Tracheostomy:  [ ]Pupils equal  [ ]No oral lesions  [ ]Abnormal    SKIN  [ ]No Rash  [ ] Abnormal  [ ] pressure    CARDIAC  [ ]Regular  [ ]Abnormal    PULMONARY  [ ]Bilateral Clear Breath Sounds  [ ]Normal Excursion  [ ]Abnormal    GI  [ ]PEG      [ ] +BS		              [ ]Soft, nondistended, nontender	  [ ]Abnormal    MUSCULOSKELETAL                                   [ ]Bedbound                 [ ]Abnormal    [ ]Ambulatory/OOB to chair                           EXTREMITIES                                         [ ]Normal  [ ]Edema                           NEUROLOGIC  [ ] Normal, non focal  [ ] Focal findings:    PSYCHIATRIC  [ ]Alert and appropriate  [ ] Sedated	 [ ]Agitated    :  Andino: [ ] Yes, if yes: Date of Placement:                   [  ] No    LINES: Central Lines [ ] Yes, if yes: Date of Placement                                     [  ] No    HOSPITAL MEDICATIONS:  MEDICATIONS  (STANDING):  amLODIPine   Tablet 5 milliGRAM(s) Oral daily  ascorbic acid 500 milliGRAM(s) Oral daily  chlorhexidine 4% Liquid 1 Application(s) Topical every 12 hours  enoxaparin Injectable 40 milliGRAM(s) SubCutaneous every 12 hours  metoprolol tartrate 25 milliGRAM(s) Oral two times a day  multivitamin 1 Tablet(s) Oral daily  pantoprazole  Injectable 40 milliGRAM(s) IV Push two times a day  QUEtiapine 200 milliGRAM(s) Oral <User Schedule>  senna Syrup 10 milliLiter(s) Oral at bedtime    MEDICATIONS  (PRN):  acetaminophen     Tablet .. 650 milliGRAM(s) Oral every 6 hours PRN Temp greater or equal to 38C (100.4F), Mild Pain (1 - 3)  nystatin Powder 1 Application(s) Topical two times a day PRN skin irritation  QUEtiapine 25 milliGRAM(s) Oral two times a day PRN Agitation  sodium chloride 0.9% lock flush 10 milliLiter(s) IV Push every 1 hour PRN Pre/post blood products, medications, blood draw, and to maintain line patency      LABS:                        10.3   50.30 )-----------( 143      ( 05 Jun 2024 07:12 )             31.7     06-05    141  |  102  |  9   ----------------------------<  96  4.3   |  30  |  0.55    Ca    9.1      05 Jun 2024 07:07  Phos  4.2     06-05  Mg     2.0     06-05        Urinalysis Basic - ( 05 Jun 2024 07:07 )    Color: x / Appearance: x / SG: x / pH: x  Gluc: 96 mg/dL / Ketone: x  / Bili: x / Urobili: x   Blood: x / Protein: x / Nitrite: x   Leuk Esterase: x / RBC: x / WBC x   Sq Epi: x / Non Sq Epi: x / Bacteria: x      Arterial Blood Gas:  06-06 @ 06:55  7.42/54/180/35/99.6/8.7  ABG lactate: --  Arterial Blood Gas:  06-06 @ 02:25  7.40/57/78/35/97.1/9.0  ABG lactate: --  Arterial Blood Gas:  06-05 @ 10:58  7.40/55/166/34/99.7/7.5  ABG lactate: --      CAPILLARY BLOOD GLUCOSE    MICROBIOLOGY:     RADIOLOGY:  [ ] Reviewed and interpreted by me     Patient is a 69y old  Female who presents with a chief complaint of Transfer for leukophoresis (05 Jun 2024 15:37)      Interval Events: Self-decannulated    REVIEW OF SYSTEMS:  [ ] Positive  [ ] All other systems negative  [X] Unable to assess ROS because ____    Vital Signs Last 24 Hrs  T(C): 37.1 (06-06-24 @ 06:00), Max: 37.3 (06-05-24 @ 18:43)  T(F): 98.8 (06-06-24 @ 06:00), Max: 99.2 (06-05-24 @ 18:43)  HR: 103 (06-06-24 @ 06:00) (103 - 113)  BP: 102/49 (06-06-24 @ 06:00) (102/49 - 162/62)  RR: 18 (06-06-24 @ 06:00) (18 - 20)  SpO2: 99% (06-06-24 @ 06:00) (96% - 99%)    PHYSICAL EXAM:  HEENT:   [X] Tracheostomy:  post self-decannulation; stoma is partially patent and covered with gauze.  [X] PERRL B/L; EOMI  [X] No oral lesions  [ ] Abnormal    SKIN  [X] No Rash  [ ] Abnormal  [ ] pressure    CARDIAC  [X] Regular  [ ] Abnormal    PULMONARY  [X] Bilateral Clear Breath Sounds  [ ] Normal Excursion  [ ] Abnormal    GI  [X] PEG      [X] +BS		              [X] Soft, nondistended, nontender	  [ ] Abnormal    MUSCULOSKELETAL                                   [X] Bedbound                 [ ] Abnormal    [ ] Ambulatory/OOB to chair                           EXTREMITIES                                         [X] Normal  [ ]Edema                           NEUROLOGIC  [ ] Normal, non focal  [X] Focal findings:  Alert and Oriented x3; responds appropriately ro questions verbally; +LUE/LLE hemiplegia    PSYCHIATRIC  [X] Alert with intermittent delirium  [ ] Sedated	 [ ]Agitated    :  Andino: [ ] Yes, if yes: Date of Placement:                   [X] No    LINES: Central Lines [ ] Yes, if yes: Date of Placement                                     [X] No    HOSPITAL MEDICATIONS:  MEDICATIONS  (STANDING):  amLODIPine   Tablet 5 milliGRAM(s) Oral daily  ascorbic acid 500 milliGRAM(s) Oral daily  chlorhexidine 4% Liquid 1 Application(s) Topical every 12 hours  enoxaparin Injectable 40 milliGRAM(s) SubCutaneous every 12 hours  metoprolol tartrate 25 milliGRAM(s) Oral two times a day  multivitamin 1 Tablet(s) Oral daily  pantoprazole  Injectable 40 milliGRAM(s) IV Push two times a day  QUEtiapine 200 milliGRAM(s) Oral <User Schedule>  senna Syrup 10 milliLiter(s) Oral at bedtime    MEDICATIONS  (PRN):  acetaminophen     Tablet .. 650 milliGRAM(s) Oral every 6 hours PRN Temp greater or equal to 38C (100.4F), Mild Pain (1 - 3)  nystatin Powder 1 Application(s) Topical two times a day PRN skin irritation  QUEtiapine 25 milliGRAM(s) Oral two times a day PRN Agitation  sodium chloride 0.9% lock flush 10 milliLiter(s) IV Push every 1 hour PRN Pre/post blood products, medications, blood draw, and to maintain line patency      LABS:                    Arterial Blood Gas:  06-06 @ 06:55  7.42/54/180/35/99.6/8.7  ABG lactate: --  Arterial Blood Gas:  06-06 @ 02:25  7.40/57/78/35/97.1/9.0  ABG lactate: --  Arterial Blood Gas:  06-05 @ 10:58  7.40/55/166/34/99.7/7.5  ABG lactate: --      CAPILLARY BLOOD GLUCOSE    MICROBIOLOGY:     RADIOLOGY:  [ ] Reviewed and interpreted by me     Patient is a 69y old  Female who presents with a chief complaint of Transfer for leukophoresis (05 Jun 2024 15:37)      Interval Events: Self-decannulated    REVIEW OF SYSTEMS:  [ ] Positive  [X] All other systems negative  [ ] Unable to assess ROS because ____    Vital Signs Last 24 Hrs  T(C): 37.1 (06-06-24 @ 06:00), Max: 37.3 (06-05-24 @ 18:43)  T(F): 98.8 (06-06-24 @ 06:00), Max: 99.2 (06-05-24 @ 18:43)  HR: 103 (06-06-24 @ 06:00) (103 - 113)  BP: 102/49 (06-06-24 @ 06:00) (102/49 - 162/62)  RR: 18 (06-06-24 @ 06:00) (18 - 20)  SpO2: 99% (06-06-24 @ 06:00) (96% - 99%)    PHYSICAL EXAM:  HEENT:   [X] Tracheostomy:  post self-decannulation; stoma is partially patent and covered with gauze.  [X] PERRL B/L; EOMI  [X] No oral lesions  [ ] Abnormal    SKIN  [X] No Rash  [ ] Abnormal  [ ] pressure    CARDIAC  [X] Regular  [ ] Abnormal    PULMONARY  [X] Bilateral Clear Breath Sounds  [ ] Normal Excursion  [ ] Abnormal    GI  [X] PEG      [X] +BS		              [X] Soft, nondistended, nontender	  [ ] Abnormal    MUSCULOSKELETAL                                   [X] Bedbound                 [ ] Abnormal    [ ] Ambulatory/OOB to chair                           EXTREMITIES                                         [X] Normal  [ ]Edema                           NEUROLOGIC  [ ] Normal, non focal  [X] Focal findings:  Alert and Oriented x2 (self and year); responds appropriately ro questions verbally; +LUE/LLE hemiplegia    PSYCHIATRIC  [X] Alert with intermittent delirium and hallucinations  [ ] Sedated	 [ ]Agitated    :  Andino: [ ] Yes, if yes: Date of Placement:                   [X] No    LINES: Central Lines [ ] Yes, if yes: Date of Placement                                     [X] No      HOSPITAL MEDICATIONS:  MEDICATIONS  (STANDING):  amLODIPine   Tablet 5 milliGRAM(s) Oral daily  ascorbic acid 500 milliGRAM(s) Oral daily  chlorhexidine 4% Liquid 1 Application(s) Topical every 12 hours  enoxaparin Injectable 40 milliGRAM(s) SubCutaneous every 12 hours  metoprolol tartrate 25 milliGRAM(s) Oral two times a day  multivitamin 1 Tablet(s) Oral daily  pantoprazole  Injectable 40 milliGRAM(s) IV Push two times a day  QUEtiapine 200 milliGRAM(s) Oral <User Schedule>  senna Syrup 10 milliLiter(s) Oral at bedtime    MEDICATIONS  (PRN):  acetaminophen     Tablet .. 650 milliGRAM(s) Oral every 6 hours PRN Temp greater or equal to 38C (100.4F), Mild Pain (1 - 3)  nystatin Powder 1 Application(s) Topical two times a day PRN skin irritation  QUEtiapine 25 milliGRAM(s) Oral two times a day PRN Agitation  sodium chloride 0.9% lock flush 10 milliLiter(s) IV Push every 1 hour PRN Pre/post blood products, medications, blood draw, and to maintain line patency      LABS:                              Arterial Blood Gas:  06-06 @ 06:55  7.42/54/180/35/99.6/8.7  ABG lactate: --  Arterial Blood Gas:  06-06 @ 02:25  7.40/57/78/35/97.1/9.0  ABG lactate: --  Arterial Blood Gas:  06-05 @ 10:58  7.40/55/166/34/99.7/7.5  ABG lactate: --      CAPILLARY BLOOD GLUCOSE    MICROBIOLOGY:     RADIOLOGY:  [ ] Reviewed and interpreted by me

## 2024-06-06 NOTE — PROGRESS NOTE ADULT - NS ATTEND AMEND GEN_ALL_CORE FT
70 y/o F w/prior CVA, COPD, CHF, and HTN initially admitted for hyperleukocytosis concerning for acute leukemia c/b acute hypoxemic respiratory failure and hypotension secondary to severe sepsis with septic shock due to Strep pneumonia bacteremia in setting of PNA w/empyema s/p chest tube placement and MIST now s/p trach/PEG w/hospital course c/b hemorrhagic shock 2/2 abdominal wall hematoma now back in [facility]. Patient also diagnosed w/CLL. Now self decannulated on 6/6.    - Management of CLL as per heme/onc  - Monitor off abx  - DVT prophylaxis  - Full code  - Dispo planning 70 y/o F w/prior CVA, COPD, CHF, and HTN initially admitted for hyperleukocytosis concerning for acute leukemia c/b acute hypoxemic respiratory failure and hypotension secondary to severe sepsis with septic shock due to Strep pneumonia bacteremia in setting of PNA w/empyema s/p chest tube placement and MIST now s/p trach/PEG w/hospital course c/b hemorrhagic shock 2/2 abdominal wall hematoma now back in [facility]. Patient also diagnosed w/CLL. Now self decannulated on 6/6.    - Supplemental O2 as needed goal O2 sat >= 90%  - Management of CLL as per heme/onc  - Monitor off abx  - DVT prophylaxis  - Full code  - Dispo planning

## 2024-06-06 NOTE — PROGRESS NOTE ADULT - ASSESSMENT
69F h/o CVA (bedbound at baseline), COPD, CHF, HTN who initially p/w acute shortness of breath to outside ED and was treated for acute pulmonary edema with sublingual nitro x 2, Lasix, and BiPAP. Pt was also found to be febrile to 103, so treated for PNA. BIPAP led to improvement in O2 to 89-90. Pt transferred to [facility] on 4/16/24 for white count of 233 and possible leukophoresis. In the ED, pt intubated iso respiratory tiring and decreasing mental status, and also was started on levophed for hypotension. Following intubation and initiation of pressors, she was admitted to the MICU for AHRF and septic shock.  In MICU, heme was consulted for hyperleukocytosis, however due to mature lymphocytic leukophoresis was not performed.  Course c/b empyema s/p chest tube placement (4/16) and removal, reaccumulation of loculated pleural effusion s/p L pigtail placement and removal on 4/27.   S/p treatment with ceftriaxone (4/18-4/25) for s. pneumo bacteremia and empyema, txd with Augmentin PO x4 weeks with completed on 5/14.  S/p PEG 5/7.  Transitioned off IV pressors and on midodrine and droxidopa.  Transferred to RCU 5/9.  S/p RRT 5/20 for hypoxia, readmitted to MICU for septic vs hemorrhagic shock requiring pressors.  Received 3 units of PRBCs, CT C/A/P w/ no active bleeding within a large multilobed heterogenous collection extending from R. lower neck into the right axilla and along the right lateral chest wall consistent with hematoma - no surgical intervention necessary.  Treated empirically with Zosyn until 5/24.  Returned to RCU 5/26. Tolerating TC ATC since 5/28. Patient evaluated by Palliative Care during the admission and patient herself wants to be full code with all life sustaining medical management. Patient downsized to # 7 Cuffless Portex and had FEES Performed on 6/3; Passed for Puree with moderately thickened liquids. Patient red capped since 6/3.     6/5: No events reported overnight patient remains red capped. ABG performed this morning CO2 55 but remains with appropriate PH; Nasal Cannula decreased to 2 LPM based on ABG. ENT called to evaluate airway prior to decannulation. Patient seen by PMR not a candidate for Acute rehab. Will proceed with Dc planning to [facility]. 69F h/o CVA (bedbound at baseline), COPD, CHF, HTN who initially p/w acute shortness of breath to outside ED and was treated for acute pulmonary edema with sublingual nitro x 2, Lasix, and BiPAP. Pt was also found to be febrile to 103, so treated for PNA. BIPAP led to improvement in O2 to 89-90. Pt transferred to [place] on 4/16/24 for white count of 233 and possible leukophoresis. In the ED, pt intubated iso respiratory tiring and decreasing mental status, and also was started on levophed for hypotension. Following intubation and initiation of pressors, she was admitted to the MICU for AHRF and septic shock.  In MICU, heme was consulted for hyperleukocytosis, however due to mature lymphocytic leukophoresis was not performed.  Course c/b empyema s/p chest tube placement (4/16) and removal, reaccumulation of loculated pleural effusion s/p L pigtail placement and removal on 4/27.   S/p treatment with ceftriaxone (4/18-4/25) for s. pneumo bacteremia and empyema, txd with Augmentin PO x4 weeks with completed on 5/14.  S/p PEG 5/7.  Transitioned off IV pressors and on midodrine and droxidopa.  Transferred to RCU 5/9.  S/p RRT 5/20 for hypoxia, readmitted to MICU for septic vs hemorrhagic shock requiring pressors.  Received 3 units of PRBCs, CT C/A/P w/ no active bleeding within a large multilobed heterogenous collection extending from R. lower neck into the right axilla and along the right lateral chest wall consistent with hematoma - no surgical intervention necessary.  Treated empirically with Zosyn until 5/24.  Returned to RCU 5/26. Tolerating TC ATC since 5/28. Patient evaluated by Palliative Care during the admission and patient herself wants to be full code with all life sustaining medical management. Patient downsized to # 7 Cuffless Portex and had FEES Performed on 6/3; Passed for Puree with moderately thickened liquids. Patient red capped since 6/3.     6/5: No events reported overnight patient remains red capped. ABG performed this morning CO2 55 but remains with appropriate PH; Nasal Cannula decreased to 2 LPM based on ABG. ENT called to evaluate airway prior to decannulation. Patient seen by PMR not a candidate for Acute rehab. Will proceed with Dc planning to [facility].  6/6: Patient self-decannulated last night.  Evaluated by RRT and ENT, as patient was already capped and did not appear to be in distress she was left decannulated.  ABG was appropriate. 69F h/o CVA (bedbound at baseline), COPD, CHF, HTN who initially p/w acute shortness of breath to outside ED and was treated for acute pulmonary edema with sublingual nitro x 2, Lasix, and BiPAP. Pt was also found to be febrile to 103, so treated for PNA. BIPAP led to improvement in O2 to 89-90. Pt transferred to [place] on 4/16/24 for white count of 233 and possible leukophoresis. In the ED, pt intubated iso respiratory tiring and decreasing mental status, and also was started on levophed for hypotension. Following intubation and initiation of pressors, she was admitted to the MICU for AHRF and septic shock.  In MICU, heme was consulted for hyperleukocytosis, however due to mature lymphocytic leukophoresis was not performed.  Course c/b empyema s/p chest tube placement (4/16) and removal, reaccumulation of loculated pleural effusion s/p L pigtail placement and removal on 4/27.   S/p treatment with ceftriaxone (4/18-4/25) for s. pneumo bacteremia and empyema, txd with Augmentin PO x4 weeks with completed on 5/14.  S/p PEG 5/7.  Transitioned off IV pressors and on midodrine and droxidopa.  Transferred to RCU 5/9.  S/p RRT 5/20 for hypoxia, readmitted to MICU for septic vs hemorrhagic shock requiring pressors.  Received 3 units of PRBCs, CT C/A/P w/ no active bleeding within a large multilobed heterogenous collection extending from R. lower neck into the right axilla and along the right lateral chest wall consistent with hematoma - no surgical intervention necessary.  Treated empirically with Zosyn until 5/24.  Returned to RCU 5/26. Tolerating TC ATC since 5/28. Patient evaluated by Palliative Care during the admission and patient herself wants to be full code with all life sustaining medical management. Patient downsized to # 7 Cuffless Portex and had FEES Performed on 6/3; Passed for Puree with moderately thickened liquids. Patient red capped since 6/3.     6/5: No events reported overnight patient remains red capped. ABG performed this morning CO2 55 but remains with appropriate PH; Nasal Cannula decreased to 2 LPM based on ABG. ENT called to evaluate airway prior to decannulation. Patient seen by PMR not a candidate for Acute rehab. Will proceed with Dc planning to [facility].  6/6: Patient self-decannulated last night.  Evaluated by RRT and ENT, as patient was already capped and did not appear to be in distress she was left decannulated.  ABG was appropriate.  Discharge planning in progress.  Awaiting patient choices for SNF.

## 2024-06-06 NOTE — PROGRESS NOTE ADULT - PROBLEM SELECTOR PLAN 8
- S/p PEG 5/7 by GI   - FEES 6/3: Passed for Puree and Moderately thickened Liquids   - Still remains on TFs as currently is not taking adequate po   - Continue to encourage PO Intake and titrate TFs accordingly

## 2024-06-06 NOTE — PROVIDER CONTACT NOTE (OTHER) - RECOMMENDATIONS
NP made aware
PA made aware
warm pack while other pain modalities can be decided
NP and RT at bedside
RT called to bedside to place patient back on full vent support, NP made aware
Palliative care consult to be entered. HCP to be notified and GOC conversation to be had re: patient wishes
Start patient on duoneb and tell day team

## 2024-06-06 NOTE — PROVIDER CONTACT NOTE (OTHER) - SITUATION
K Evan F suffering from PNA/respiratory failure; no success with extubation
patient noted to be lethargic with shallow breathing. patient endorses some difficulty breathing
pt has 100.4 axillary temp
pt states she is having slight difficulty breathing
Pt has tiny hole in PEG tube that leaks when bent a certain way.
RRT called for patient due to decannulating self
pt complaining of abd pain
pt WBC is not within range
oral temperature 101, , /102
patient placed back to full vent support from tc 30%
patient endorses anxiety
pt has bright red blood coming from hemorrhoids in anal area, bleeding noticed when RN was wiping patient after numerous loose bowel movements

## 2024-06-06 NOTE — PROGRESS NOTE ADULT - PROBLEM SELECTOR PLAN 3
- Intubated on arrival to [facility] ED for respiratory tiring and AMS  - Unable to be extubated  - S/p Trach 4/28  - Patient tolerating TC ATC since 5/28; FIO2: 40 %  - Patient cleared for Supervised PMV Trials   - 6/1: Trach changed to #7 Cuffless Portex  - Patient Red Capped since 6/3; NC dec to 2 LPM  - ENT called for airway evaluation prior to possible decannulation    - Continue Chest PT and suctioning PRN - Intubated on arrival to [facility] ED for respiratory tiring and AMS  - Unable to be extubated  - S/p Trach 4/28  - Patient tolerating TC ATC since 5/28; FIO2: 40 %  - Patient cleared for Supervised PMV Trials   - 6/1: Trach changed to #7 Cuffless Portex  - Patient Red Capped since 6/3; NC dec to 2 LPM  - Patient self decannulated early morning of 6/6.  Evaluated by RRT and ENT, deemed stable to remain decannulated.  Subsequent ABGs have been appropriate.  Stable on 2LNC.  - Continue Chest PT and suctioning PRN

## 2024-06-06 NOTE — CHART NOTE - NSCHARTNOTEFT_GEN_A_CORE
MEDICINE NP NOTE    Event Summary:   Notified by RN that patient self decannulated.  Seen and examined patient at bedside. AOx1 self only, follows commands. Not in distress. 2 L/NC. VSS.         >VITAL SIGNS:  T(C): 37.1 (06-06-24 @ 00:00), Max: 37.3 (06-05-24 @ 18:43)  T(F): 98.7 (06-06-24 @ 00:00), Max: 99.2 (06-05-24 @ 18:43)  HR: 110 (06-06-24 @ 00:00) (110 - 117)  BP: 117/65 (06-06-24 @ 00:00) (117/65 - 162/62)  RR: 20 (06-06-24 @ 00:00) (18 - 20)  SpO2: 98% (06-06-24 @ 00:00) (96% - 100%)        ASSESSMENT/PLAN:   RRT called for patient -self decannulated. RRT responded and stabilized pt, ABG ordered and reviewed. Patient remained stable on 2 L/ NC. See RRT note for full details. Pt hemodynamically stable and resting comfortably. Will continue to monitor patient/vitals and f/u labs. Will notify attending and day team in AM.      ALVIN FormanCNP  Dept of Medicine  Spectralink [id number] MEDICINE NP NOTE      RRT called for patient -self decannulated. RRT responded and stabilized pt, ABG ordered and reviewed. Patient remained stable on 2 L/ NC. See RRT note for full details. Pt remained hemodynamically stable and resting comfortably. Will continue to monitor patient/vitals and f/u labs and ABG in AM. Will notify attending and day team in AM.      Sue Nobles, ALVINCNP  Dept of Medicine  Spectralink [id number]

## 2024-06-06 NOTE — PROGRESS NOTE ADULT - PROBLEM SELECTOR PROBLEM 6
Substance Abuse Intensive Outpatient Program  Interdisciplinary Treatment Plan    Celina Malone  NLR:[id number]   :1986    Diagnosis: F11.20 Opioid use disorder, severe  F14.20 Cocaine use disorder, severe      Level of Care: IOP    Type of Therapy: Group    Amount: 3 hours per day    Frequency: 3 days per week    The patient is expected to attend 3 weeks of group therapy unless otherwise indicated by the patient's needs/condition/progress. The treatment plan will be reviewed in 0 weeks. Plan Type: Discharge Plan    Your patient, Celina Malone was seen in the Substance Abuse Intensive Outpatient Program.  Please see below for the patient agreed upon plan, including Goal(s), Focus Indicators, Outcomes and Interventions. Building Support - Social Network  Goals: Pt able to identify need for support network, actively established supportive network  Goal Progression: Outcome Met - continue evaluating goal progress toward completion  Focus Indicators: Recognizes need to social network but lack support  Objectives: 3. Develop strategies to build positive support amongst family/friends. Implements and reports effectiveness of strategies. Interventions: Reinforce successful development of pt's network             Communication - Conversational Skills  Goals: Pt possesses effective conversational skills  Goal Progression: Outcome Met - continue evaluating goal progress toward completion  Focus Indicators: Lacks confidnce with speaking to others in a social setting  Objectives: 3. Practices conversational skills with peers, family and friends. Evaluates effectiveness in group. Interventions: Reinforce successful conversational skills and encourage patient to share insights with peers  Patient progress towards treatment goals: Pt routinely goes to 12 step recovery meetings and works continuously on her conversation skills, as evidenced per patient report.     Rationale for continued treatment: Pt appears to need additional relapse prevention training, emotional regulation, as well as working towards her goals as she goes to an outpatient level of treatment.    Mari Tavera MS, [address], [facility]  2/28/2019    Rodney Raines MSW, LCSW, [facility], [facility]  3/1/2019 DVT (deep venous thrombosis)

## 2024-06-06 NOTE — RAPID RESPONSE TEAM SUMMARY - NSSITUATIONBACKGROUNDRRT_GEN_ALL_CORE
69F w/prior CVA, COPD, CHF, and HTN initially admitted for hyperleukocytosis concerning for acute leukemia c/b acute hypoxemic respiratory failure and hypotension secondary to severe sepsis with septic shock due to Strep pneumonia bacteremia in setting of PNA w/empyema s/p chest tube placement and MIST now s/p trach/PEG w/hospital course c/b hemorrhagic shock 2/2 abdominal wall hematoma now back in RCU. Patient also diagnosed w/CLL.     Rapid response because the patient self-decannulated. She did not desaturate. On arrival, she was sitting up in bed, making jokes, speaking clearly, protecting her airway. Vital signs were stable. Per chart review, she had been thought to have been optimized for decannulation. ENT was paged to the rapid - performed laryngoscopy, and airway appeared normal. ENT initially tried to re-cannulate, but had trouble advancing tracheostomy, and ultimately decided to leave the patient de-cannulated given her re-assuring clinical status. A smaller tracheostomy was left at bedside should she need to be re-cannulated emergently overnight. 69F w/prior CVA, COPD, CHF, and HTN initially admitted for hyperleukocytosis concerning for acute leukemia c/b acute hypoxemic respiratory failure and hypotension secondary to severe sepsis with septic shock due to Strep pneumonia bacteremia in setting of PNA w/empyema s/p chest tube placement and MIST now s/p trach/PEG w/hospital course c/b hemorrhagic shock 2/2 abdominal wall hematoma now back in RCU. Patient also diagnosed w/CLL.     Rapid response because the patient self-decannulated. She did not desaturate. On arrival, she was sitting up in bed, making jokes, speaking clearly, protecting her airway. Vital signs were stable. Per chart review, she had been thought to have been optimized for decannulation. ENT was paged to the rapid - performed laryngoscopy, and airway appeared normal. ENT initially tried to re-cannulate, but had trouble advancing tracheostomy, and ultimately deferred re-cannulation given her re-assuring clinical status. A smaller tracheostomy and supplies were left at bedside should she need to be re-cannulated emergently overnight.

## 2024-06-06 NOTE — CHART NOTE - NSCHARTNOTEFT_GEN_A_CORE
ENT called during RRT for pt self-decannulated trach tube. Per team, pt has been tolerating  trials for several days, O2 saturating well.   Bedside tracheoscopy performed, juan visualized, minimal secretions, no purulence, no erythema, minimal tracheomalacia. Pt tolerated the procedure well without complications.   On exam, spO2 100%, NAD, trach stoma patent, secretions suctioned. gauze dressing taped over stoma.    Plan  - leave trach tube out. change stoma dressing prn  - monitor for respiratory distress  - if pt decompensates, attempt to place #6 portex uncuffed trach tube or smaller  - otherwise call anesthesia to intubate  - Call with questions or concerns [phone]

## 2024-06-07 LAB
ANION GAP SERPL CALC-SCNC: 11 MMOL/L — SIGNIFICANT CHANGE UP (ref 5–17)
BUN SERPL-MCNC: 11 MG/DL — SIGNIFICANT CHANGE UP (ref 7–23)
CALCIUM SERPL-MCNC: 9.3 MG/DL — SIGNIFICANT CHANGE UP (ref 8.4–10.5)
CHLORIDE SERPL-SCNC: 101 MMOL/L — SIGNIFICANT CHANGE UP (ref 96–108)
CO2 SERPL-SCNC: 30 MMOL/L — SIGNIFICANT CHANGE UP (ref 22–31)
CREAT SERPL-MCNC: 0.37 MG/DL — LOW (ref 0.5–1.3)
EGFR: 110 ML/MIN/1.73M2 — SIGNIFICANT CHANGE UP
GLUCOSE SERPL-MCNC: 136 MG/DL — HIGH (ref 70–99)
HCT VFR BLD CALC: 34.2 % — LOW (ref 34.5–45)
HGB BLD-MCNC: 10.2 G/DL — LOW (ref 11.5–15.5)
MAGNESIUM SERPL-MCNC: 2.1 MG/DL — SIGNIFICANT CHANGE UP (ref 1.6–2.6)
MCHC RBC-ENTMCNC: 29.1 PG — SIGNIFICANT CHANGE UP (ref 27–34)
MCHC RBC-ENTMCNC: 29.8 GM/DL — LOW (ref 32–36)
MCV RBC AUTO: 97.4 FL — SIGNIFICANT CHANGE UP (ref 80–100)
NRBC # BLD: 0 /100 WBCS — SIGNIFICANT CHANGE UP (ref 0–0)
PHOSPHATE SERPL-MCNC: 3.8 MG/DL — SIGNIFICANT CHANGE UP (ref 2.5–4.5)
PLATELET # BLD AUTO: 125 K/UL — LOW (ref 150–400)
POTASSIUM SERPL-MCNC: 4.5 MMOL/L — SIGNIFICANT CHANGE UP (ref 3.5–5.3)
POTASSIUM SERPL-SCNC: 4.5 MMOL/L — SIGNIFICANT CHANGE UP (ref 3.5–5.3)
RBC # BLD: 3.51 M/UL — LOW (ref 3.8–5.2)
RBC # FLD: 19 % — HIGH (ref 10.3–14.5)
SODIUM SERPL-SCNC: 142 MMOL/L — SIGNIFICANT CHANGE UP (ref 135–145)
WBC # BLD: 53.19 K/UL — CRITICAL HIGH (ref 3.8–10.5)
WBC # FLD AUTO: 53.19 K/UL — CRITICAL HIGH (ref 3.8–10.5)

## 2024-06-07 PROCEDURE — 99232 SBSQ HOSP IP/OBS MODERATE 35: CPT

## 2024-06-07 RX ORDER — KETOROLAC TROMETHAMINE 30 MG/ML
30 SYRINGE (ML) INJECTION ONCE
Refills: 0 | Status: DISCONTINUED | OUTPATIENT
Start: 2024-06-07 | End: 2024-06-07

## 2024-06-07 RX ORDER — QUETIAPINE FUMARATE 200 MG/1
25 TABLET, FILM COATED ORAL
Refills: 0 | Status: DISCONTINUED | OUTPATIENT
Start: 2024-06-07 | End: 2024-06-11

## 2024-06-07 RX ADMIN — CHLORHEXIDINE GLUCONATE 1 APPLICATION(S): 213 SOLUTION TOPICAL at 17:14

## 2024-06-07 RX ADMIN — Medication 25 MILLIGRAM(S): at 05:36

## 2024-06-07 RX ADMIN — ENOXAPARIN SODIUM 40 MILLIGRAM(S): 100 INJECTION SUBCUTANEOUS at 05:05

## 2024-06-07 RX ADMIN — PANTOPRAZOLE SODIUM 40 MILLIGRAM(S): 20 TABLET, DELAYED RELEASE ORAL at 17:14

## 2024-06-07 RX ADMIN — PANTOPRAZOLE SODIUM 40 MILLIGRAM(S): 20 TABLET, DELAYED RELEASE ORAL at 05:05

## 2024-06-07 RX ADMIN — QUETIAPINE FUMARATE 150 MILLIGRAM(S): 200 TABLET, FILM COATED ORAL at 20:27

## 2024-06-07 RX ADMIN — Medication 1 TABLET(S): at 12:34

## 2024-06-07 RX ADMIN — CHLORHEXIDINE GLUCONATE 1 APPLICATION(S): 213 SOLUTION TOPICAL at 05:36

## 2024-06-07 RX ADMIN — Medication 30 MILLIGRAM(S): at 20:02

## 2024-06-07 RX ADMIN — Medication 975 MILLIGRAM(S): at 05:05

## 2024-06-07 RX ADMIN — Medication 975 MILLIGRAM(S): at 10:33

## 2024-06-07 RX ADMIN — Medication 500 MILLIGRAM(S): at 12:34

## 2024-06-07 RX ADMIN — Medication 975 MILLIGRAM(S): at 06:05

## 2024-06-07 RX ADMIN — AMLODIPINE BESYLATE 5 MILLIGRAM(S): 2.5 TABLET ORAL at 05:05

## 2024-06-07 RX ADMIN — ENOXAPARIN SODIUM 40 MILLIGRAM(S): 100 INJECTION SUBCUTANEOUS at 17:14

## 2024-06-07 RX ADMIN — Medication 25 MILLIGRAM(S): at 17:14

## 2024-06-07 RX ADMIN — Medication 30 MILLIGRAM(S): at 19:40

## 2024-06-07 RX ADMIN — Medication 975 MILLIGRAM(S): at 11:03

## 2024-06-07 NOTE — SWALLOW FEES ASSESSMENT ADULT - DIAGNOSTIC IMPRESSIONS
Pt presents with oropharyngeal dysphagia although aspiration was not evident with puree or moderately thickened liquid texture. Formation, control and transfer of the bolus were judged to be mildly impaired. Disorders include: reduced lingual strength/ROM/Rate of motion, reduced BOT to posterior pharyngeal wall contact, delay in trigger of the swallow reflex, and reduced laryngeal closure, reduced pharyngeal contractility.
Pt was seen today to determine candidacy for diet upgrade s/p decannulation yesterday howeve Pt continues to presents with oropharyngeal dysphagia. Deep laryngeal penetration with incomplete retrieval was evident with thin liquids and soft and bite sized textures. Formation, control and transfer of the bolus were judged to be impaired. In addition, missing dentition is judged to be a contributing factor to reduced mastication. Underlying cognitive deficits may also be a contributing factor as Pt with decreased insight, oral awareness and ability to consistently follow commands. Additonal disorders include: reduced lingual strength/ROM/Rate of motion, reduced BOT to posterior pharyngeal wall contact, delay in trigger of the swallow reflex, reduced laryngeal closure, reduced pharyngeal contractility, and reduced supraglottic sensation. Although not directly visualized, aspiration is not ruled out during this exam.

## 2024-06-07 NOTE — SWALLOW FEES ASSESSMENT ADULT - ROSENBEK'S PENETRATION ASPIRATION SCALE
(1) no aspiration, material does not enter airway
(5) material contacts vocal cords, visible residue remains (penetration)

## 2024-06-07 NOTE — SWALLOW FEES ASSESSMENT ADULT - PRELIMINARY ENDOSCOPIC EXAMINATIONS
Pt leaning to right with head tilted despite multiple attempts to reposition./Baseline pooling of secretions

## 2024-06-07 NOTE — CHART NOTE - NSCHARTNOTEFT_GEN_A_CORE
NUTRITION FOLLOW UP NOTE    PATIENT SEEN FOR: consult for calorie count    SOURCE: [x] Patient  [x] Current Medical Record  [x] RN  [] Family/support person at bedside  [] Patient unavailable/inappropriate  [x] Other: PA    CHART REVIEWED/EVENTS NOTED.  s/p trach   S/p PEG 5/7  6/3 Swallow FEES Assessment Adult Recommendations: Puree with moderately thickened liquids.    DIET ORDER:   Diet, Pureed (24)    -Visited pt at bedside this afternoon. eating well (50-75% today thus far). Spoke with RN who confirmed. Pt tube feeding discontinued (glucerna 1.2). Calorie Count initiated this morning.     ANTHROPOMETRICS:  Drug Dosing Weight  Height (cm): 172.7 (20 May 2024 00:42)  Weight (kg): 108.7 (20 May 2024 00:42)  BMI (kg/m2): 36.4 (20 May 2024 00:42)  BSA (m2): 2.21 (20 May 2024 00:42)  Weights:   Daily Weight in k.2 (-) (233.6 pounds), Weight in k.5 (-23), Weight in k.2 (-17) (238 pounds)  <2% weight loss since admission. RD will continue to monitor trends.     MEDICATIONS:  MEDICATIONS  (STANDING):  amLODIPine   Tablet 5 milliGRAM(s) Oral daily  ascorbic acid 500 milliGRAM(s) Oral daily  chlorhexidine 4% Liquid 1 Application(s) Topical every 12 hours  enoxaparin Injectable 40 milliGRAM(s) SubCutaneous every 12 hours  metoprolol tartrate 25 milliGRAM(s) Oral two times a day  multivitamin 1 Tablet(s) Oral daily  pantoprazole  Injectable 40 milliGRAM(s) IV Push two times a day  QUEtiapine 150 milliGRAM(s) Oral <User Schedule>    MEDICATIONS  (PRN):  acetaminophen     Tablet .. 975 milliGRAM(s) Oral every 6 hours PRN Temp greater or equal to 38C (100.4F), Mild Pain (1 - 3)  nystatin Powder 1 Application(s) Topical two times a day PRN skin irritation  QUEtiapine 25 milliGRAM(s) Oral two times a day PRN agitation  sodium chloride 0.9% lock flush 10 milliLiter(s) IV Push every 1 hour PRN Pre/post blood products, medications, blood draw, and to maintain line patency    NUTRITIONALLY PERTINENT LABS:   Na142 mmol/L Glu 136 mg/dL<H> K+ 4.5 mmol/L Cr  0.37 mg/dL<L> BUN 11 mg/dL  Phos 3.8 mg/dL    NUTRITIONALLY PERTINENT MEDICATIONS/LABS:  [x] Reviewed  [] Relevant notes on medications/labs:    EDEMA:  [x] Reviewed  [] Relevant notes:    GI/ I&O:  [x] Reviewed  [] Relevant notes:  [] Other:    SKIN:   [] No pressure injuries documented, per nursing flowsheet  [x] Wounds previously noted  [] Change in pressure injury documentation:  [] Other:    ESTIMATED NEEDS:  [x] No change:    NUTRITION DIAGNOSIS:  [x] Prior Dx: Increased protein-energy needs  [] New Dx:    EDUCATION:  [x] Yes: PO intake encouraged   [] Not appropriate/warranted    NUTRITION CARE PLAN:  -Update diet per SLP recommendations: Puree, moderately thick  -Add Ensure Plus High protein BID to aid in meeting nutrient needs   -Continue vitamin/mineral regimen to aid in wound healing    MONITORING AND EVALUATION:   RD remains available upon request and will follow up per protocol.    Bhargavi Andrade, MS, RD, CDN / Teams NUTRITION FOLLOW UP NOTE    PATIENT SEEN FOR: consult for calorie count    SOURCE: [x] Patient  [x] Current Medical Record  [x] RN  [] Family/support person at bedside  [] Patient unavailable/inappropriate  [x] Other: PA    CHART REVIEWED/EVENTS NOTED.  s/p trach   S/p PEG 5/7  6/3 Swallow FEES Assessment Adult Recommendations: Puree with moderately thickened liquids.    DIET ORDER:   Diet, Pureed (24)    -Visited pt at bedside this afternoon. eating well (50-75% today thus far). Spoke with RN who confirmed. Pt tube feeding discontinued (Jevity 1.5). Calorie Count initiated this morning.     ANTHROPOMETRICS:  Drug Dosing Weight  Height (cm): 172.7 (20 May 2024 00:42)  Weight (kg): 108.7 (20 May 2024 00:42)  BMI (kg/m2): 36.4 (20 May 2024 00:42)  BSA (m2): 2.21 (20 May 2024 00:42)  Weights:   Daily Weight in k.2 (-) (233.6 pounds), Weight in k.5 (-23), Weight in k.2 (-17) (238 pounds)  <2% weight loss since admission. RD will continue to monitor trends.     MEDICATIONS:  MEDICATIONS  (STANDING):  amLODIPine   Tablet 5 milliGRAM(s) Oral daily  ascorbic acid 500 milliGRAM(s) Oral daily  chlorhexidine 4% Liquid 1 Application(s) Topical every 12 hours  enoxaparin Injectable 40 milliGRAM(s) SubCutaneous every 12 hours  metoprolol tartrate 25 milliGRAM(s) Oral two times a day  multivitamin 1 Tablet(s) Oral daily  pantoprazole  Injectable 40 milliGRAM(s) IV Push two times a day  QUEtiapine 150 milliGRAM(s) Oral <User Schedule>    MEDICATIONS  (PRN):  acetaminophen     Tablet .. 975 milliGRAM(s) Oral every 6 hours PRN Temp greater or equal to 38C (100.4F), Mild Pain (1 - 3)  nystatin Powder 1 Application(s) Topical two times a day PRN skin irritation  QUEtiapine 25 milliGRAM(s) Oral two times a day PRN agitation  sodium chloride 0.9% lock flush 10 milliLiter(s) IV Push every 1 hour PRN Pre/post blood products, medications, blood draw, and to maintain line patency    NUTRITIONALLY PERTINENT LABS:   Na142 mmol/L Glu 136 mg/dL<H> K+ 4.5 mmol/L Cr  0.37 mg/dL<L> BUN 11 mg/dL  Phos 3.8 mg/dL    NUTRITIONALLY PERTINENT MEDICATIONS/LABS:  [x] Reviewed  [] Relevant notes on medications/labs:    EDEMA:  [x] Reviewed  [] Relevant notes:    GI/ I&O:  [x] Reviewed  [] Relevant notes:  [] Other:    SKIN:   [] No pressure injuries documented, per nursing flowsheet  [x] Wounds previously noted  [] Change in pressure injury documentation:  [] Other:    ESTIMATED NEEDS:  [x] No change:    NUTRITION DIAGNOSIS:  [x] Prior Dx: Increased protein-energy needs  [] New Dx:    EDUCATION:  [x] Yes: PO intake encouraged   [] Not appropriate/warranted    NUTRITION CARE PLAN:  -Update diet per SLP recommendations: Puree, moderately thick  -Add Ensure Plus High protein BID to aid in meeting nutrient needs   -Continue vitamin/mineral regimen to aid in wound healing  -RD to follow up with calorie count in 3 days as able    MONITORING AND EVALUATION:   RD remains available upon request and will follow up per protocol.    Bhargavi Andrade, MS, RD, CDN / Teams

## 2024-06-07 NOTE — SWALLOW FEES ASSESSMENT ADULT - ORAL PHASE
decreased formation and control of bolus; ap spillage R>L./Uncontrolled bolus/spillover in andrea-pharynx/Uncontrolled bolus/spillover in hypopharynx Impaired mastication (>45 seconds) in setting of missing scattered dentition as well; reduced control and transfer of bolus./Uncontrolled bolus/spillover in andrea-pharynx/Uncontrolled bolus/spillover in hypopharynx

## 2024-06-07 NOTE — SWALLOW FEES ASSESSMENT ADULT - RECOMMENDED FEEDING/EATING TECHNIQUES
allow for swallow between intakes/maintain upright posture during/after eating for 30 mins/oral hygiene/small sips/bites
allow for swallow between intakes/check mouth frequently for oral residue/pocketing/crush medication (when feasible)/maintain upright posture during/after eating for 30 mins/small sips/bites

## 2024-06-07 NOTE — SWALLOW FEES ASSESSMENT ADULT - NS SWALLOW FEES REC ASPIR MON
*If evident, report to MD immediately and d/c oral diet./change of breathing pattern/cough/gurgly voice/fever/pneumonia/throat clearing/upper respiratory infection
*If evident, report to MD immediately and d/c oral diet./change of breathing pattern/cough/gurgly voice/fever/pneumonia/throat clearing/upper respiratory infection

## 2024-06-07 NOTE — PROGRESS NOTE ADULT - SUBJECTIVE AND OBJECTIVE BOX
Patient is a 69y old  Female who presents with a chief complaint of Transfer for leukophoresis (06 Jun 2024 07:52)      Interval Events:    REVIEW OF SYSTEMS:  [ ] Positive  [ ] All other systems negative  [ ] Unable to assess ROS because ________    Vital Signs Last 24 Hrs  T(C): 36.5 (06-07-24 @ 05:00), Max: 37.2 (06-06-24 @ 23:50)  T(F): 97.7 (06-07-24 @ 05:00), Max: 98.9 (06-06-24 @ 23:50)  HR: 116 (06-07-24 @ 05:00) (101 - 118)  BP: 120/67 (06-07-24 @ 05:00) (111/65 - 161/74)  RR: 18 (06-07-24 @ 05:00) (18 - 20)  SpO2: 95% (06-07-24 @ 05:00) (95% - 99%)    PHYSICAL EXAM:  HEENT:   [ ]Tracheostomy:  [ ]Pupils equal  [ ]No oral lesions  [ ]Abnormal    SKIN  [ ]No Rash  [ ] Abnormal  [ ] pressure    CARDIAC  [ ]Regular  [ ]Abnormal    PULMONARY  [ ]Bilateral Clear Breath Sounds  [ ]Normal Excursion  [ ]Abnormal    GI  [ ]PEG      [ ] +BS		              [ ]Soft, nondistended, nontender	  [ ]Abnormal    MUSCULOSKELETAL                                   [ ]Bedbound                 [ ]Abnormal    [ ]Ambulatory/OOB to chair                           EXTREMITIES                                         [ ]Normal  [ ]Edema                           NEUROLOGIC  [ ] Normal, non focal  [ ] Focal findings:    PSYCHIATRIC  [ ]Alert and appropriate  [ ] Sedated	 [ ]Agitated    :  Andino: [ ] Yes, if yes: Date of Placement:                   [  ] No    LINES: Central Lines [ ] Yes, if yes: Date of Placement                                     [  ] No    HOSPITAL MEDICATIONS:  MEDICATIONS  (STANDING):  amLODIPine   Tablet 5 milliGRAM(s) Oral daily  ascorbic acid 500 milliGRAM(s) Oral daily  chlorhexidine 4% Liquid 1 Application(s) Topical every 12 hours  enoxaparin Injectable 40 milliGRAM(s) SubCutaneous every 12 hours  metoprolol tartrate 25 milliGRAM(s) Oral two times a day  multivitamin 1 Tablet(s) Oral daily  pantoprazole  Injectable 40 milliGRAM(s) IV Push two times a day  QUEtiapine 150 milliGRAM(s) Oral <User Schedule>  senna Syrup 10 milliLiter(s) Oral at bedtime    MEDICATIONS  (PRN):  acetaminophen     Tablet .. 975 milliGRAM(s) Oral every 6 hours PRN Temp greater or equal to 38C (100.4F), Mild Pain (1 - 3)  nystatin Powder 1 Application(s) Topical two times a day PRN skin irritation  QUEtiapine 50 milliGRAM(s) Oral two times a day PRN Agitation  sodium chloride 0.9% lock flush 10 milliLiter(s) IV Push every 1 hour PRN Pre/post blood products, medications, blood draw, and to maintain line patency      LABS:              Arterial Blood Gas:  06-06 @ 06:55  7.42/54/180/35/99.6/8.7  ABG lactate: --  Arterial Blood Gas:  06-06 @ 02:25  7.40/57/78/35/97.1/9.0  ABG lactate: --  Arterial Blood Gas:  06-05 @ 10:58  7.40/55/166/34/99.7/7.5  ABG lactate: --      CAPILLARY BLOOD GLUCOSE    MICROBIOLOGY:     RADIOLOGY:  [ ] Reviewed and interpreted by me     Patient is a 69y old  Female who presents with a chief complaint of Transfer for leukophoresis (06 Jun 2024 07:52)      Interval Events:  No events reported over night    REVIEW OF SYSTEMS:  [X] Positive:  Left leg pain  [ ] All other systems negative  [ ] Unable to assess ROS because ______    Vital Signs Last 24 Hrs  T(C): 36.5 (06-07-24 @ 05:00), Max: 37.2 (06-06-24 @ 23:50)  T(F): 97.7 (06-07-24 @ 05:00), Max: 98.9 (06-06-24 @ 23:50)  HR: 116 (06-07-24 @ 05:00) (101 - 118)  BP: 120/67 (06-07-24 @ 05:00) (111/65 - 161/74)  RR: 18 (06-07-24 @ 05:00) (18 - 20)  SpO2: 95% (06-07-24 @ 05:00) (95% - 99%)      PHYSICAL EXAM:  HEENT:   [X] Tracheostomy:  post self-decannulation; stoma is partially patent and covered with gauze.  [X] PERRL B/L; EOMI  [X] No oral lesions  [ ] Abnormal    SKIN  [X] No Rash  [ ] Abnormal  [ ] pressure    CARDIAC  [X] Regular  [ ] Abnormal    PULMONARY  [X] Bilateral Clear Breath Sounds  [ ] Normal Excursion  [ ] Abnormal    GI  [X] PEG      [X] +BS		              [X] Soft, nondistended, nontender	  [ ] Abnormal    MUSCULOSKELETAL                                   [X] Bedbound                 [ ] Abnormal    [ ] Ambulatory/OOB to chair                           EXTREMITIES                                         [X] Normal  [ ]Edema                           NEUROLOGIC  [ ] Normal, non focal  [X] Focal findings:  Alert and Oriented x2 (self and year); responds appropriately ro questions verbally; +LUE/LLE hemiplegia    PSYCHIATRIC  [X] Alert with intermittent delirium   [ ] Sedated	 [ ]Agitated    :  Andino: [ ] Yes, if yes: Date of Placement:                   [X] No    LINES: Central Lines [ ] Yes, if yes: Date of Placement                                     [X] No      HOSPITAL MEDICATIONS:  MEDICATIONS  (STANDING):  amLODIPine   Tablet 5 milliGRAM(s) Oral daily  ascorbic acid 500 milliGRAM(s) Oral daily  chlorhexidine 4% Liquid 1 Application(s) Topical every 12 hours  enoxaparin Injectable 40 milliGRAM(s) SubCutaneous every 12 hours  metoprolol tartrate 25 milliGRAM(s) Oral two times a day  multivitamin 1 Tablet(s) Oral daily  pantoprazole  Injectable 40 milliGRAM(s) IV Push two times a day  QUEtiapine 150 milliGRAM(s) Oral <User Schedule>  senna Syrup 10 milliLiter(s) Oral at bedtime    MEDICATIONS  (PRN):  acetaminophen     Tablet .. 975 milliGRAM(s) Oral every 6 hours PRN Temp greater or equal to 38C (100.4F), Mild Pain (1 - 3)  nystatin Powder 1 Application(s) Topical two times a day PRN skin irritation  QUEtiapine 50 milliGRAM(s) Oral two times a day PRN Agitation  sodium chloride 0.9% lock flush 10 milliLiter(s) IV Push every 1 hour PRN Pre/post blood products, medications, blood draw, and to maintain line patency      LABS:                          10.2   53.19 )-----------( 125      ( 07 Jun 2024 10:04 )             34.2     06-07    142  |  101  |  11  ----------------------------<  136<H>  4.5   |  30  |  0.37<L>    Ca    9.3      07 Jun 2024 10:04  Phos  3.8     06-07  Mg     2.1     06-07        Arterial Blood Gas:  06-06 @ 06:55  7.42/54/180/35/99.6/8.7  ABG lactate: --  Arterial Blood Gas:  06-06 @ 02:25  7.40/57/78/35/97.1/9.0  ABG lactate: --  Arterial Blood Gas:  06-05 @ 10:58  7.40/55/166/34/99.7/7.5  ABG lactate: --      CAPILLARY BLOOD GLUCOSE    MICROBIOLOGY:     RADIOLOGY:  [ ] Reviewed and interpreted by me

## 2024-06-07 NOTE — PROGRESS NOTE ADULT - PROBLEM SELECTOR PLAN 2
- On admission WBC count 233  - transferred to [facility] for possible leukophoresis  - as per heme/onc - underlying CLL (Oct 2023) with reactive lymphocytosis 2/2 sepsis  - heme/onc deferred leukophoresis due to mature lymphocytes; IVIG given 4/26/24  - CLL under control and plan for outpatient follow up at [facility] when patient closer to d/c

## 2024-06-07 NOTE — SWALLOW FEES ASSESSMENT ADULT - ADDITIONAL RECOMMENDATIONS
Restorative swallow therapy f/u 1 x week; Pt will tolerate diet with no s/s of aspiration. Will continue to follow while patient is in-house; swallowing therapy post d/c
Restorative swallow therapy f/u 1 x week; Pt will tolerate diet with no s/s of aspiration. Will continue to follow while patient is in-house; Speech language and swallowing therapy post d/c; repeat FEES as Pt's status improves.

## 2024-06-07 NOTE — PROGRESS NOTE ADULT - ASSESSMENT
69F h/o CVA (bedbound at baseline), COPD, CHF, HTN who initially p/w acute shortness of breath to outside ED and was treated for acute pulmonary edema with sublingual nitro x 2, Lasix, and BiPAP. Pt was also found to be febrile to 103, so treated for PNA. BIPAP led to improvement in O2 to 89-90. Pt transferred to [place] on 4/16/24 for white count of 233 and possible leukophoresis. In the ED, pt intubated iso respiratory tiring and decreasing mental status, and also was started on levophed for hypotension. Following intubation and initiation of pressors, she was admitted to the MICU for AHRF and septic shock.  In MICU, heme was consulted for hyperleukocytosis, however due to mature lymphocytic leukophoresis was not performed.  Course c/b empyema s/p chest tube placement (4/16) and removal, reaccumulation of loculated pleural effusion s/p L pigtail placement and removal on 4/27.   S/p treatment with ceftriaxone (4/18-4/25) for s. pneumo bacteremia and empyema, txd with Augmentin PO x4 weeks with completed on 5/14.  S/p PEG 5/7.  Transitioned off IV pressors and on midodrine and droxidopa.  Transferred to [facility] 5/9.  S/p RRT 5/20 for hypoxia, readmitted to MICU for septic vs hemorrhagic shock requiring pressors.  Received 3 units of PRBCs, CT C/A/P w/ no active bleeding within a large multilobed heterogenous collection extending from R. lower neck into the right axilla and along the right lateral chest wall consistent with hematoma - no surgical intervention necessary.  Treated empirically with Zosyn until 5/24.  Returned to [facility] 5/26. Tolerating TC ATC since 5/28. Patient evaluated by Palliative Care during the admission and patient herself wants to be full code with all life sustaining medical management. Patient downsized to # 7 Cuffless Portex and had FEES Performed on 6/3; Passed for Puree with moderately thickened liquids. Patient red capped since 6/3.     6/5: No events reported overnight patient remains red capped. ABG performed this morning CO2 55 but remains with appropriate PH; Nasal Cannula decreased to 2 LPM based on ABG. ENT called to evaluate airway prior to decannulation. Patient seen by PMR not a candidate for Acute rehab. Will proceed with Dc planning to [facility].  6/6: Patient self-decannulated last night.  Evaluated by RRT and ENT, as patient was already capped and did not appear to be in distress she was left decannulated.  ABG was appropriate.  Discharge planning in progress.  Awaiting patient choices for SNF.  69F h/o CVA (bedbound at baseline), COPD, CHF, HTN who initially p/w acute shortness of breath to outside ED and was treated for acute pulmonary edema with sublingual nitro x 2, Lasix, and BiPAP. Pt was also found to be febrile to 103, so treated for PNA. BIPAP led to improvement in O2 to 89-90. Pt transferred to [place] on 4/16/24 for white count of 233 and possible leukophoresis. In the ED, pt intubated iso respiratory tiring and decreasing mental status, and also was started on levophed for hypotension. Following intubation and initiation of pressors, she was admitted to the MICU for AHRF and septic shock.  In MICU, heme was consulted for hyperleukocytosis, however due to mature lymphocytic leukophoresis was not performed.  Course c/b empyema s/p chest tube placement (4/16) and removal, reaccumulation of loculated pleural effusion s/p L pigtail placement and removal on 4/27.   S/p treatment with ceftriaxone (4/18-4/25) for s. pneumo bacteremia and empyema, txd with Augmentin PO x4 weeks with completed on 5/14.  S/p PEG 5/7.  Transitioned off IV pressors and on midodrine and droxidopa.  Transferred to [facility] 5/9.  S/p RRT 5/20 for hypoxia, readmitted to MICU for septic vs hemorrhagic shock requiring pressors.  Received 3 units of PRBCs, CT C/A/P w/ no active bleeding within a large multilobed heterogenous collection extending from R. lower neck into the right axilla and along the right lateral chest wall consistent with hematoma - no surgical intervention necessary.  Treated empirically with Zosyn until 5/24.  Returned to R[facility] 5/26. Tolerating TC ATC since 5/28. Patient evaluated by Palliative Care during the admission and patient herself wants to be full code with all life sustaining medical management. Patient downsized to # 7 Cuffless Portex and had FEES Performed on 6/3; Passed for Puree with moderately thickened liquids. Patient red capped since 6/3.     6/5: No events reported overnight patient remains red capped. ABG performed this morning CO2 55 but remains with appropriate PH; Nasal Cannula decreased to 2 LPM based on ABG. ENT called to evaluate airway prior to decannulation. Patient seen by PMR not a candidate for Acute rehab. Will proceed with Dc planning to [facility].  6/6: Patient self-decannulated last night.  Evaluated by RRT and ENT, as patient was already capped and did not appear to be in distress she was left decannulated.  ABG was appropriate.  Discharge planning in progress.  Awaiting patient choices for SNF.   6/7:  No acute events.  Patient given list of accepting SNFs to choose from.   also communicating with patient's aunt for disposition guidance.  Speech/Swallow specialist to repeat FEESST today to assess if diet can be advanced.

## 2024-06-07 NOTE — PROGRESS NOTE ADULT - PROBLEM SELECTOR PLAN 3
- Intubated on arrival to [facility] ED for respiratory tiring and AMS  - Unable to be extubated  - S/p Trach 4/28  - Patient tolerating TC ATC since 5/28; FIO2: 40 %  - Patient cleared for Supervised PMV Trials   - 6/1: Trach changed to #7 Cuffless Portex  - Patient Red Capped since 6/3; NC dec to 2 LPM  - Patient self decannulated early morning of 6/6.  Evaluated by RRT and ENT, deemed stable to remain decannulated.  Subsequent ABGs have been appropriate.  Stable on 2LNC.  - Continue Chest PT and suctioning PRN

## 2024-06-07 NOTE — SWALLOW FEES ASSESSMENT ADULT - RESIDUE IN LARYNGEAL VESTIBULE/VENTRICAL
reduced upon spontaneous repeat swallow but penetrated material not eliminated./Mild/Moderate reduced but inconsistently eliminated with cued cough post swallow/Trace/Mild

## 2024-06-07 NOTE — SWALLOW FEES ASSESSMENT ADULT - COMMENTS
Pt cooperative; upright in chair. Adequate phonation achieved with stoma covered by gauze. Pt has been tolerating the puree diet with moderately thickened liquids and is seen today to determine candidacy for diet upgrade post decannulation. Pt required cueing to follow simple commands intermittently; +confusion. 69F h/o CVA (bedbound at baseline), COPD, CHF, HTN presenting as transfer from [facility] for SOB iso leukocytosis to 233 c/f acute leukemia. Pt intubated and on pressors, transferred to MICU for further management, course c/b empyema s/p chest tube placement (4/16) and removal, reaccumulation of loculated pleural effusion s/p L pigtail placement and removal on 4/27, s/p trach on 4/27. S/p EGD/PEG with GI, transferred to [facility] on 5/9. Readmitted to MICU for septic vs hemorrhagic shock.  Now hgb has been trending in 7.1-7.7. Pt weaned off sedation and pressors. Heme following for pt's leukocytosis 2/2 to CLL. Pt empirically on zosyn for possible septic shock, discontinued on 5/24. CTA C/A/P showed no active bleeding within a large multilobed heterogenous collection extending from R. lower neck into the right axilla and along right lateral chest wall c/w hematoma. General surgery consulted for hematoma, no surgical intervention at this time. No need for IR embolization with no active bleed seen on CT.  +CLL (chronic lymphocytic leukemia).   5/28/24 Pt tolerated PMV trial with SLP. See previous notes for additional info. 5/29/24 Per Nsg, Pt has tiny hole in PEG tube that leaks when bent a certain way. PEG feeds and medication able to infuse without any issues, unless bent. WBC 65.86.  6/1/24 Trach changed to #7 cuffless Portex. Patient tolerating TC ATC since 5/28; FIO2: 40 % per Pulm f/u; Patient evaluated by Palliative care discussion had while wearing PMV and she was able to make her wishes known. Patient wants to remain full code and continue medical management. Patient with appropriate mentation and tolerating PMV throughout the day. 6/6/24 Pt self decannulated; ENT f/u.

## 2024-06-08 PROCEDURE — 99232 SBSQ HOSP IP/OBS MODERATE 35: CPT

## 2024-06-08 RX ORDER — ACETAMINOPHEN 500 MG
1000 TABLET ORAL EVERY 6 HOURS
Refills: 0 | Status: DISCONTINUED | OUTPATIENT
Start: 2024-06-08 | End: 2024-06-10

## 2024-06-08 RX ADMIN — Medication 1000 MILLIGRAM(S): at 17:53

## 2024-06-08 RX ADMIN — PANTOPRAZOLE SODIUM 40 MILLIGRAM(S): 20 TABLET, DELAYED RELEASE ORAL at 17:20

## 2024-06-08 RX ADMIN — ENOXAPARIN SODIUM 40 MILLIGRAM(S): 100 INJECTION SUBCUTANEOUS at 05:55

## 2024-06-08 RX ADMIN — Medication 975 MILLIGRAM(S): at 09:35

## 2024-06-08 RX ADMIN — QUETIAPINE FUMARATE 150 MILLIGRAM(S): 200 TABLET, FILM COATED ORAL at 22:02

## 2024-06-08 RX ADMIN — Medication 500 MILLIGRAM(S): at 11:28

## 2024-06-08 RX ADMIN — Medication 1000 MILLIGRAM(S): at 17:23

## 2024-06-08 RX ADMIN — CHLORHEXIDINE GLUCONATE 1 APPLICATION(S): 213 SOLUTION TOPICAL at 05:55

## 2024-06-08 RX ADMIN — Medication 975 MILLIGRAM(S): at 10:05

## 2024-06-08 RX ADMIN — Medication 1 TABLET(S): at 11:27

## 2024-06-08 RX ADMIN — CHLORHEXIDINE GLUCONATE 1 APPLICATION(S): 213 SOLUTION TOPICAL at 17:21

## 2024-06-08 RX ADMIN — PANTOPRAZOLE SODIUM 40 MILLIGRAM(S): 20 TABLET, DELAYED RELEASE ORAL at 05:55

## 2024-06-08 RX ADMIN — Medication 1000 MILLIGRAM(S): at 22:06

## 2024-06-08 RX ADMIN — AMLODIPINE BESYLATE 5 MILLIGRAM(S): 2.5 TABLET ORAL at 05:55

## 2024-06-08 RX ADMIN — Medication 1000 MILLIGRAM(S): at 22:40

## 2024-06-08 RX ADMIN — NYSTATIN CREAM 1 APPLICATION(S): 100000 CREAM TOPICAL at 11:28

## 2024-06-08 RX ADMIN — Medication 25 MILLIGRAM(S): at 05:55

## 2024-06-08 RX ADMIN — Medication 25 MILLIGRAM(S): at 17:20

## 2024-06-08 RX ADMIN — ENOXAPARIN SODIUM 40 MILLIGRAM(S): 100 INJECTION SUBCUTANEOUS at 17:20

## 2024-06-08 NOTE — PROGRESS NOTE ADULT - SUBJECTIVE AND OBJECTIVE BOX
Patient is a 69y old  Female who presents with a chief complaint of Transfer for leukophoresis (07 Jun 2024 07:30)      Interval Events:    REVIEW OF SYSTEMS:  [ ] Positive  [ ] All other systems negative  [ ] Unable to assess ROS because ________    Vital Signs Last 24 Hrs  T(C): 36.8 (06-08-24 @ 05:52), Max: 37.5 (06-07-24 @ 17:49)  T(F): 98.3 (06-08-24 @ 05:52), Max: 99.5 (06-07-24 @ 17:49)  HR: 99 (06-08-24 @ 05:52) (99 - 112)  BP: 114/58 (06-08-24 @ 05:52) (114/58 - 147/65)  RR: 18 (06-08-24 @ 05:52) (18 - 20)  SpO2: 96% (06-08-24 @ 05:52) (94% - 99%)    PHYSICAL EXAM:  HEENT:   [ ]Tracheostomy:  [ ]Pupils equal  [ ]No oral lesions  [ ]Abnormal    SKIN  [ ]No Rash  [ ] Abnormal  [ ] pressure    CARDIAC  [ ]Regular  [ ]Abnormal    PULMONARY  [ ]Bilateral Clear Breath Sounds  [ ]Normal Excursion  [ ]Abnormal    GI  [ ]PEG      [ ] +BS		              [ ]Soft, nondistended, nontender	  [ ]Abnormal    MUSCULOSKELETAL                                   [ ]Bedbound                 [ ]Abnormal    [ ]Ambulatory/OOB to chair                           EXTREMITIES                                         [ ]Normal  [ ]Edema                           NEUROLOGIC  [ ] Normal, non focal  [ ] Focal findings:    PSYCHIATRIC  [ ]Alert and appropriate  [ ] Sedated	 [ ]Agitated    :  Nadino: [ ] Yes, if yes: Date of Placement:                   [  ] No    LINES: Central Lines [ ] Yes, if yes: Date of Placement                                     [  ] No    HOSPITAL MEDICATIONS:  MEDICATIONS  (STANDING):  amLODIPine   Tablet 5 milliGRAM(s) Oral daily  ascorbic acid 500 milliGRAM(s) Oral daily  chlorhexidine 4% Liquid 1 Application(s) Topical every 12 hours  enoxaparin Injectable 40 milliGRAM(s) SubCutaneous every 12 hours  metoprolol tartrate 25 milliGRAM(s) Oral two times a day  multivitamin 1 Tablet(s) Oral daily  pantoprazole  Injectable 40 milliGRAM(s) IV Push two times a day  QUEtiapine 150 milliGRAM(s) Oral <User Schedule>    MEDICATIONS  (PRN):  acetaminophen     Tablet .. 975 milliGRAM(s) Oral every 6 hours PRN Temp greater or equal to 38C (100.4F), Mild Pain (1 - 3)  nystatin Powder 1 Application(s) Topical two times a day PRN skin irritation  QUEtiapine 25 milliGRAM(s) Oral two times a day PRN agitation  sodium chloride 0.9% lock flush 10 milliLiter(s) IV Push every 1 hour PRN Pre/post blood products, medications, blood draw, and to maintain line patency      LABS:                           CAPILLARY BLOOD GLUCOSE    MICROBIOLOGY:     RADIOLOGY:  [ ] Reviewed and interpreted by me     Patient is a 69y old  Female who presents with a chief complaint of Transfer for leukophoresis (07 Jun 2024 07:30)      Interval Events:  No events over night.    REVIEW OF SYSTEMS:  [ ] Positive  [X] All other systems negative  [ ] Unable to assess ROS because ______    Vital Signs Last 24 Hrs  T(C): 36.8 (06-08-24 @ 05:52), Max: 37.5 (06-07-24 @ 17:49)  T(F): 98.3 (06-08-24 @ 05:52), Max: 99.5 (06-07-24 @ 17:49)  HR: 99 (06-08-24 @ 05:52) (99 - 112)  BP: 114/58 (06-08-24 @ 05:52) (114/58 - 147/65)  RR: 18 (06-08-24 @ 05:52) (18 - 20)  SpO2: 96% (06-08-24 @ 05:52) (94% - 99%)        PHYSICAL EXAM:  HEENT:   [X] Tracheostomy:  post self-decannulation; stoma is partially patent and covered with gauze.  [X] PERRL B/L; EOMI  [X] No oral lesions  [ ] Abnormal    SKIN  [X] No Rash  [ ] Abnormal  [ ] pressure    CARDIAC  [X] Regular  [ ] Abnormal    PULMONARY  [X] Bilateral Clear Breath Sounds  [ ] Normal Excursion  [ ] Abnormal    GI  [X] PEG      [X] +BS		              [X] Soft, nondistended, nontender	  [ ] Abnormal    MUSCULOSKELETAL                                   [X] Bedbound                 [ ] Abnormal    [ ] Ambulatory/OOB to chair                           EXTREMITIES                                         [X] Normal  [ ]Edema                           NEUROLOGIC  [ ] Normal, non focal  [X] Focal findings:  Alert and Oriented x2 (self and year); responds appropriately ro questions verbally; +LUE/LLE hemiplegia    PSYCHIATRIC  [X] Alert with intermittent delirium   [ ] Sedated	 [ ]Agitated    :  Andino: [ ] Yes, if yes: Date of Placement:                   [X] No    LINES: Central Lines [ ] Yes, if yes: Date of Placement                                     [X] No      HOSPITAL MEDICATIONS:  MEDICATIONS  (STANDING):  amLODIPine   Tablet 5 milliGRAM(s) Oral daily  ascorbic acid 500 milliGRAM(s) Oral daily  chlorhexidine 4% Liquid 1 Application(s) Topical every 12 hours  enoxaparin Injectable 40 milliGRAM(s) SubCutaneous every 12 hours  metoprolol tartrate 25 milliGRAM(s) Oral two times a day  multivitamin 1 Tablet(s) Oral daily  pantoprazole  Injectable 40 milliGRAM(s) IV Push two times a day  QUEtiapine 150 milliGRAM(s) Oral <User Schedule>    MEDICATIONS  (PRN):  acetaminophen     Tablet .. 975 milliGRAM(s) Oral every 6 hours PRN Temp greater or equal to 38C (100.4F), Mild Pain (1 - 3)  nystatin Powder 1 Application(s) Topical two times a day PRN skin irritation  QUEtiapine 25 milliGRAM(s) Oral two times a day PRN agitation  sodium chloride 0.9% lock flush 10 milliLiter(s) IV Push every 1 hour PRN Pre/post blood products, medications, blood draw, and to maintain line patency      LABS:                           CAPILLARY BLOOD GLUCOSE    MICROBIOLOGY:     RADIOLOGY:  [ ] Reviewed and interpreted by me

## 2024-06-08 NOTE — PROGRESS NOTE ADULT - NS ATTEND AMEND GEN_ALL_CORE FT
68 y/o F w/prior CVA, COPD, CHF, and HTN initially admitted for hyperleukocytosis concerning for acute leukemia c/b acute hypoxemic respiratory failure and hypotension secondary to severe sepsis with septic shock due to Strep pneumonia bacteremia in setting of PNA w/empyema s/p chest tube placement and MIST now s/p trach/PEG w/hospital course c/b hemorrhagic shock 2/2 abdominal wall hematoma now back in RCU. Patient also diagnosed w/CLL. Now self decannulated on 6/6.  Currently doing well on 2 L NC oxygen.  Clinically stable at this time.  Agree with plan as outlined above.

## 2024-06-08 NOTE — PROGRESS NOTE ADULT - PROBLEM SELECTOR PLAN 8
- S/p PEG 5/7 by GI   - FEES 6/3: Passed for Puree and Moderately thickened Liquids   - Still remains on TFs as currently is not taking adequate po   - Continue to encourage PO Intake and titrate TFs accordingly - S/p PEG 5/7 by GI   - FEES 6/3: Passed for Puree and Moderately thickened Liquids   - 6/7:  Repeat FEESST performed however patient was not cleared for advancement of diet.  Tube feeds discontinued. Calorie count initiated.

## 2024-06-08 NOTE — PROGRESS NOTE ADULT - ASSESSMENT
69F h/o CVA (bedbound at baseline), COPD, CHF, HTN who initially p/w acute shortness of breath to outside ED and was treated for acute pulmonary edema with sublingual nitro x 2, Lasix, and BiPAP. Pt was also found to be febrile to 103, so treated for PNA. BIPAP led to improvement in O2 to 89-90. Pt transferred to [place] on 4/16/24 for white count of 233 and possible leukophoresis. In the ED, pt intubated iso respiratory tiring and decreasing mental status, and also was started on levophed for hypotension. Following intubation and initiation of pressors, she was admitted to the MICU for AHRF and septic shock.  In MICU, heme was consulted for hyperleukocytosis, however due to mature lymphocytic leukophoresis was not performed.  Course c/b empyema s/p chest tube placement (4/16) and removal, reaccumulation of loculated pleural effusion s/p L pigtail placement and removal on 4/27.   S/p treatment with ceftriaxone (4/18-4/25) for s. pneumo bacteremia and empyema, txd with Augmentin PO x4 weeks with completed on 5/14.  S/p PEG 5/7.  Transitioned off IV pressors and on midodrine and droxidopa.  Transferred to RCU 5/9.  S/p RRT 5/20 for hypoxia, readmitted to MICU for septic vs hemorrhagic shock requiring pressors.  Received 3 units of PRBCs, CT C/A/P w/ no active bleeding within a large multilobed heterogenous collection extending from R. lower neck into the right axilla and along the right lateral chest wall consistent with hematoma - no surgical intervention necessary.  Treated empirically with Zosyn until 5/24.  Returned to RCU 5/26. Tolerating TC ATC since 5/28. Patient evaluated by Palliative Care during the admission and patient herself wants to be full code with all life sustaining medical management. Patient downsized to # 7 Cuffless Portex and had FEES Performed on 6/3; Passed for Puree with moderately thickened liquids. Patient red capped since 6/3.     6/5: No events reported overnight patient remains red capped. ABG performed this morning CO2 55 but remains with appropriate PH; Nasal Cannula decreased to 2 LPM based on ABG. ENT called to evaluate airway prior to decannulation. Patient seen by PMR not a candidate for Acute rehab. Will proceed with Dc planning to [facility].  6/6: Patient self-decannulated last night.  Evaluated by RRT and ENT, as patient was already capped and did not appear to be in distress she was left decannulated.  ABG was appropriate.  Discharge planning in progress.  Awaiting patient choices for SNF.   6/7:  No acute events.  Patient given list of accepting SNFs to choose from.   also communicating with patient's aunt for disposition guidance.  Speech/Swallow specialist to repeat FEESST today to assess if diet can be advanced.  69F h/o CVA (bedbound at baseline), COPD, CHF, HTN who initially p/w acute shortness of breath to outside ED and was treated for acute pulmonary edema with sublingual nitro x 2, Lasix, and BiPAP. Pt was also found to be febrile to 103, so treated for PNA. BIPAP led to improvement in O2 to 89-90. Pt transferred to [place] on 4/16/24 for white count of 233 and possible leukophoresis. In the ED, pt intubated iso respiratory tiring and decreasing mental status, and also was started on levophed for hypotension. Following intubation and initiation of pressors, she was admitted to the MICU for AHRF and septic shock.  In MICU, heme was consulted for hyperleukocytosis, however due to mature lymphocytic leukophoresis was not performed.  Course c/b empyema s/p chest tube placement (4/16) and removal, reaccumulation of loculated pleural effusion s/p L pigtail placement and removal on 4/27.   S/p treatment with ceftriaxone (4/18-4/25) for s. pneumo bacteremia and empyema, txd with Augmentin PO x4 weeks with completed on 5/14.  S/p PEG 5/7.  Transitioned off IV pressors and on midodrine and droxidopa.  Transferred to RCU 5/9.  S/p RRT 5/20 for hypoxia, readmitted to MICU for septic vs hemorrhagic shock requiring pressors.  Received 3 units of PRBCs, CT C/A/P w/ no active bleeding within a large multilobed heterogenous collection extending from R. lower neck into the right axilla and along the right lateral chest wall consistent with hematoma - no surgical intervention necessary.  Treated empirically with Zosyn until 5/24.  Returned to RCU 5/26. Tolerating TC ATC since 5/28. Patient evaluated by Palliative Care during the admission and patient herself wants to be full code with all life sustaining medical management. Patient downsized to # 7 Cuffless Portex and had FEES Performed on 6/3; Passed for Puree with moderately thickened liquids. Patient red capped since 6/3.     6/5: No events reported overnight patient remains red capped. ABG performed this morning CO2 55 but remains with appropriate PH; Nasal Cannula decreased to 2 LPM based on ABG. ENT called to evaluate airway prior to decannulation. Patient seen by PMR not a candidate for Acute rehab. Will proceed with Dc planning to [facility] [place].  6/6: Patient self-decannulated last night.  Evaluated by RRT and ENT, as patient was already capped and did not appear to be in distress she was left decannulated.  ABG was appropriate.  Discharge planning in progress.  Awaiting patient choices for SNF.   6/7:  No acute events.  Patient given list of accepting SNFs to choose from.   also communicating with patient's aunt for disposition guidance.  Speech/Swallow specialist to repeat FEESST today to assess if diet can be advanced.   6/8: No acute events.  Remains decannulated.  Was unable to havr diet advanced due to FEEST findings.  Calorie count started yesterday while PEG feeds are discontinued.  Discharge planning in progress.  Behavior more appropriate throughout the day.

## 2024-06-08 NOTE — PROGRESS NOTE ADULT - PROBLEM SELECTOR PLAN 2
- On admission WBC count 233  - transferred to [facility] for possible leukophoresis  - as per heme/onc - underlying CLL (Oct 2023) with reactive lymphocytosis 2/2 sepsis  - heme/onc deferred leukophoresis due to mature lymphocytes; IVIG given 4/26/24  - CLL under control and plan for outpatient follow up at [facility] when patient closer to d/c

## 2024-06-08 NOTE — PROGRESS NOTE ADULT - PROBLEM SELECTOR PLAN 4
- Patient with periods of Delirium however vastly improving.  - Continue Seroquel 25 mg BID PRN;  Seroquel 200 mg QHS   - Klonopin discontinued on 5/30 with improved Delirium.  - Patient voiced concerns on being long term Seroquel as she had experienced weight gain in the past   - 5/28 ECG: QTc 474 - Patient with periods of Delirium however vastly improving.  - Continue Seroquel 25 mg BID PRN;  Seroquel 150mg QHS   - Klonopin discontinued on 5/30 with improved Delirium.  - Patient voiced concerns on being long term Seroquel as she had experienced weight gain in the past   - 5/28 ECG: QTc 474

## 2024-06-08 NOTE — PROGRESS NOTE ADULT - PROBLEM SELECTOR PLAN 3
- Intubated on arrival to [facility] ED for respiratory tiring and AMS  - Unable to be extubated  - S/p Trach 4/28  - Patient tolerating TC ATC since 5/28; FIO2: 40 %  - Patient cleared for Supervised PMV Trials   - 6/1: Trach changed to #7 Cuffless Portex  - Patient Red Capped since 6/3; NC dec to 2 LPM  - Patient self decannulated early morning of 6/6.  Evaluated by RRT and ENT, deemed stable to remain decannulated.  Subsequent ABGs have been appropriate.  Stable on 2LNC.  - Continue Chest PT and suctioning PRN

## 2024-06-09 PROCEDURE — 99232 SBSQ HOSP IP/OBS MODERATE 35: CPT

## 2024-06-09 RX ADMIN — PANTOPRAZOLE SODIUM 40 MILLIGRAM(S): 20 TABLET, DELAYED RELEASE ORAL at 05:54

## 2024-06-09 RX ADMIN — ENOXAPARIN SODIUM 40 MILLIGRAM(S): 100 INJECTION SUBCUTANEOUS at 17:16

## 2024-06-09 RX ADMIN — CHLORHEXIDINE GLUCONATE 1 APPLICATION(S): 213 SOLUTION TOPICAL at 05:54

## 2024-06-09 RX ADMIN — Medication 500 MILLIGRAM(S): at 11:28

## 2024-06-09 RX ADMIN — Medication 25 MILLIGRAM(S): at 05:54

## 2024-06-09 RX ADMIN — QUETIAPINE FUMARATE 150 MILLIGRAM(S): 200 TABLET, FILM COATED ORAL at 22:06

## 2024-06-09 RX ADMIN — ENOXAPARIN SODIUM 40 MILLIGRAM(S): 100 INJECTION SUBCUTANEOUS at 05:54

## 2024-06-09 RX ADMIN — Medication 25 MILLIGRAM(S): at 17:19

## 2024-06-09 RX ADMIN — Medication 1000 MILLIGRAM(S): at 22:06

## 2024-06-09 RX ADMIN — Medication 1000 MILLIGRAM(S): at 12:32

## 2024-06-09 RX ADMIN — Medication 1000 MILLIGRAM(S): at 11:26

## 2024-06-09 RX ADMIN — CHLORHEXIDINE GLUCONATE 1 APPLICATION(S): 213 SOLUTION TOPICAL at 17:19

## 2024-06-09 RX ADMIN — Medication 1 TABLET(S): at 11:27

## 2024-06-09 RX ADMIN — AMLODIPINE BESYLATE 5 MILLIGRAM(S): 2.5 TABLET ORAL at 05:54

## 2024-06-09 RX ADMIN — PANTOPRAZOLE SODIUM 40 MILLIGRAM(S): 20 TABLET, DELAYED RELEASE ORAL at 17:15

## 2024-06-09 RX ADMIN — Medication 1000 MILLIGRAM(S): at 22:40

## 2024-06-09 NOTE — PROGRESS NOTE ADULT - ASSESSMENT
69F h/o CVA (bedbound at baseline), COPD, CHF, HTN who initially p/w acute shortness of breath to outside ED and was treated for acute pulmonary edema with sublingual nitro x 2, Lasix, and BiPAP. Pt was also found to be febrile to 103, so treated for PNA. BIPAP led to improvement in O2 to 89-90. Pt transferred to [place] on 4/16/24 for white count of 233 and possible leukophoresis. In the ED, pt intubated iso respiratory tiring and decreasing mental status, and also was started on levophed for hypotension. Following intubation and initiation of pressors, she was admitted to the MICU for AHRF and septic shock.  In MICU, heme was consulted for hyperleukocytosis, however due to mature lymphocytic leukophoresis was not performed.  Course c/b empyema s/p chest tube placement (4/16) and removal, reaccumulation of loculated pleural effusion s/p L pigtail placement and removal on 4/27.   S/p treatment with ceftriaxone (4/18-4/25) for s. pneumo bacteremia and empyema, txd with Augmentin PO x4 weeks with completed on 5/14.  S/p PEG 5/7.  Transitioned off IV pressors and on midodrine and droxidopa.  Transferred to RCU 5/9.  S/p RRT 5/20 for hypoxia, readmitted to MICU for septic vs hemorrhagic shock requiring pressors.  Received 3 units of PRBCs, CT C/A/P w/ no active bleeding within a large multilobed heterogenous collection extending from R. lower neck into the right axilla and along the right lateral chest wall consistent with hematoma - no surgical intervention necessary.  Treated empirically with Zosyn until 5/24.  Returned to RCU 5/26. Tolerating TC ATC since 5/28. Patient evaluated by Palliative Care during the admission and patient herself wants to be full code with all life sustaining medical management. Patient downsized to # 7 Cuffless Portex and had FEES Performed on 6/3; Passed for Puree with moderately thickened liquids. Patient red capped since 6/3.     6/5: No events reported overnight patient remains red capped. ABG performed this morning CO2 55 but remains with appropriate PH; Nasal Cannula decreased to 2 LPM based on ABG. ENT called to evaluate airway prior to decannulation. Patient seen by PMR not a candidate for Acute rehab. Will proceed with Dc planning to [facility].  6/6: Patient self-decannulated last night.  Evaluated by RRT and ENT, as patient was already capped and did not appear to be in distress she was left decannulated.  ABG was appropriate.  Discharge planning in progress.  Awaiting patient choices for SNF.   6/7:  No acute events.  Patient given list of accepting SNFs to choose from.   also communicating with patient's aunt for disposition guidance.  Speech/Swallow specialist to repeat FEESST today to assess if diet can be advanced.   6/8: No acute events.  Remains decannulated.  Was unable to havr diet advanced due to FEEST findings.  Calorie count started yesterday while PEG feeds are discontinued.  Discharge planning in progress.  Behavior more appropriate throughout the day. 69F h/o CVA (bedbound at baseline), COPD, CHF, HTN who initially p/w acute shortness of breath to outside ED and was treated for acute pulmonary edema with sublingual nitro x 2, Lasix, and BiPAP. Pt was also found to be febrile to 103, so treated for PNA. BIPAP led to improvement in O2 to 89-90. Pt transferred to [place] on 4/16/24 for white count of 233 and possible leukophoresis. In the ED, pt intubated iso respiratory tiring and decreasing mental status, and also was started on levophed for hypotension. Following intubation and initiation of pressors, she was admitted to the MICU for AHRF and septic shock.  In MICU, heme was consulted for hyperleukocytosis, however due to mature lymphocytic leukophoresis was not performed.  Course c/b empyema s/p chest tube placement (4/16) and removal, reaccumulation of loculated pleural effusion s/p L pigtail placement and removal on 4/27.   S/p treatment with ceftriaxone (4/18-4/25) for s. pneumo bacteremia and empyema, txd with Augmentin PO x4 weeks with completed on 5/14.  S/p PEG 5/7.  Transitioned off IV pressors and on midodrine and droxidopa.  Transferred to RCU 5/9.  S/p RRT 5/20 for hypoxia, readmitted to MICU for septic vs hemorrhagic shock requiring pressors.  Received 3 units of PRBCs, CT C/A/P w/ no active bleeding within a large multilobed heterogenous collection extending from R. lower neck into the right axilla and along the right lateral chest wall consistent with hematoma - no surgical intervention necessary.  Treated empirically with Zosyn until 5/24.  Returned to RCU 5/26. Tolerating TC ATC since 5/28. Patient evaluated by Palliative Care during the admission and patient herself wants to be full code with all life sustaining medical management. Patient downsized to # 7 Cuffless Portex and had FEES Performed on 6/3; Passed for Puree with moderately thickened liquids. Patient red capped since 6/3.  RRT called after patient Self Decannulating on 6/6. Post RRT patient continues to remain stable on 0xygen therapy 2L via NC with stable ABG findings. FEES exam repeated 6/7, however unable to advance diet due to aspiration risk. Calorie Counting in progress. DC planning to [facility] when medically cleared     6/9: Calorie counting in progress, likely completes tomorrow. PEG feeds remains off.  Discharge planning to [facility] in progress 69F h/o CVA (bedbound at baseline), COPD, CHF, HTN who initially p/w acute shortness of breath to outside ED and was treated for acute pulmonary edema with sublingual nitro x 2, Lasix, and BiPAP. Pt was also found to be febrile to 103, so treated for PNA. BIPAP led to improvement in O2 to 89-90. Pt transferred to [place] on 4/16/24 for white count of 233 and possible leukophoresis. In the ED, pt intubated iso respiratory tiring and decreasing mental status, and also was started on levophed for hypotension. Following intubation and initiation of pressors, she was admitted to the MICU for AHRF and septic shock.  In MICU, heme was consulted for hyperleukocytosis, however due to mature lymphocytic leukophoresis was not performed.  Course c/b empyema s/p chest tube placement (4/16) and removal, reaccumulation of loculated pleural effusion s/p L pigtail placement and removal on 4/27.   S/p treatment with ceftriaxone (4/18-4/25) for s. pneumo bacteremia and empyema, txd with Augmentin PO x4 weeks with completed on 5/14.  S/p PEG 5/7.  Transitioned off IV pressors and on midodrine and droxidopa.  Transferred to RCU 5/9.  S/p RRT 5/20 for hypoxia, readmitted to MICU for septic vs hemorrhagic shock requiring pressors.  Received 3 units of PRBCs, CT C/A/P w/ no active bleeding within a large multilobed heterogenous collection extending from R. lower neck into the right axilla and along the right lateral chest wall consistent with hematoma - no surgical intervention necessary.  Treated empirically with Zosyn until 5/24.  Returned to RCU 5/26. Tolerating TC ATC since 5/28. Patient evaluated by Palliative Care during the admission and patient herself wants to be full code with all life sustaining medical management. Patient downsized to # 7 Cuffless Portex and had FEES Performed on 6/3; Passed for Puree with moderately thickened liquids. Patient red capped since 6/3.  RRT called after patient Self Decannulating on 6/6. Post RRT patient continues to remain stable on 0xygen therapy 2L via NC with stable ABG findings. FEES exam repeated 6/7, however unable to advance diet due to aspiration risk. Calorie Counting in progress. DC planning to [facility] when medically cleared     6/9: Calorie counting in progress, likely completes tomorrow. PEG feeds remains off.  Discharge planning to [facility] in progress pending accepting facility and bed availability  69F h/o CVA (bedbound at baseline), COPD, CHF, HTN who initially p/w acute shortness of breath to outside ED and was treated for acute pulmonary edema with sublingual nitro x 2, Lasix, and BiPAP. Pt was also found to be febrile to 103, so treated for PNA. BIPAP led to improvement in O2 to 89-90. Pt transferred to [place] on 4/16/24 for white count of 233 and possible leukophoresis. In the ED, pt intubated iso respiratory tiring and decreasing mental status, and also was started on levophed for hypotension. Following intubation and initiation of pressors, she was admitted to the MICU for AHRF and septic shock.  In MICU, heme was consulted for hyperleukocytosis, however due to mature lymphocytic leukophoresis was not performed.  Course c/b empyema s/p chest tube placement (4/16) and removal, reaccumulation of loculated pleural effusion s/p L pigtail placement and removal on 4/27.   S/p treatment with ceftriaxone (4/18-4/25) for s. pneumo bacteremia and empyema, txd with Augmentin PO x4 weeks with completed on 5/14.  S/p PEG 5/7.  Transitioned off IV pressors and on midodrine and droxidopa.  Transferred to RCU 5/9.  S/p RRT 5/20 for hypoxia, readmitted to MICU for septic vs hemorrhagic shock requiring pressors.  Received 3 units of PRBCs, CT C/A/P w/ no active bleeding within a large multilobed heterogenous collection extending from R. lower neck into the right axilla and along the right lateral chest wall consistent with hematoma - no surgical intervention necessary.  Treated empirically with Zosyn until 5/24.  Returned to RCU 5/26. Tolerating TC ATC since 5/28. Patient evaluated by Palliative Care during the admission and patient herself wants to be full code with all life sustaining medical management. Patient downsized to # 7 Cuffless Portex and had FEES Performed on 6/3; Passed for Puree with moderately thickened liquids. Patient red capped since 6/3.  RRT called after patient Self Decannulating on 6/6. Post RRT patient continues to remain stable on 0xygen therapy 2L via NC with stable ABG findings. FEES exam repeated 6/7, however unable to advance diet due to aspiration risk. Calorie Counting in progress. DC planning to [facility] when medically cleared     6/9: Calorie counting in progress, likely completes tomorrow. PEG feeds remains off.  Discharge planning to [facility] in progress pending accepting facility, Auth, and bed availability

## 2024-06-09 NOTE — PROGRESS NOTE ADULT - SUBJECTIVE AND OBJECTIVE BOX
Patient is a 69y old  Female who presents with a chief complaint of Transfer for leukophoresis (08 Jun 2024 07:42)      Interval Events:    REVIEW OF SYSTEMS:  [ ] Positive  [ ] All other systems negative  [ ] Unable to assess ROS because ________    Vital Signs Last 24 Hrs  T(C): 36.5 (06-09-24 @ 04:40), Max: 37 (06-08-24 @ 18:33)  T(F): 97.7 (06-09-24 @ 04:40), Max: 98.6 (06-08-24 @ 18:33)  HR: 100 (06-09-24 @ 04:40) (89 - 100)  BP: 137/70 (06-09-24 @ 04:40) (122/63 - 157/71)  RR: 18 (06-09-24 @ 04:40) (18 - 18)  SpO2: 97% (06-09-24 @ 04:40) (96% - 100%)PHYSICAL EXAM:  HEENT:   [ ]Tracheostomy:  [ ]Pupils equal  [ ]No oral lesions  [ ]Abnormal        SKIN  [ ]No Rash  [ ] Abnormal  [ ] pressure    CARDIAC  [ ]Regular  [ ]Abnormal    PULMONARY  [ ]Bilateral Clear Breath Sounds  [ ]Normal Excursion  [ ]Abnormal    GI  [ ]PEG      [ ] +BS		              [ ]Soft, nondistended, nontender	  [ ]Abnormal    MUSCULOSKELETAL                                   [ ]Bedbound                 [ ]Abnormal    [ ]Ambulatory/OOB to chair                           EXTREMITIES                                         [ ]Normal  [ ]Edema                           NEUROLOGIC  [ ] Normal, non focal  [ ] Focal findings:    PSYCHIATRIC  [ ]Alert and appropriate  [ ] Sedated	 [ ]Agitated    :  Andino: [ ] Yes, if yes: Date of Placement:                   [  ] No    LINES: Central Lines [ ] Yes, if yes: Date of Placement                                     [  ] No    HOSPITAL MEDICATIONS:  MEDICATIONS  (STANDING):  amLODIPine   Tablet 5 milliGRAM(s) Oral daily  ascorbic acid 500 milliGRAM(s) Oral daily  chlorhexidine 4% Liquid 1 Application(s) Topical every 12 hours  enoxaparin Injectable 40 milliGRAM(s) SubCutaneous every 12 hours  metoprolol tartrate 25 milliGRAM(s) Oral two times a day  multivitamin 1 Tablet(s) Oral daily  pantoprazole  Injectable 40 milliGRAM(s) IV Push two times a day  QUEtiapine 150 milliGRAM(s) Oral <User Schedule>    MEDICATIONS  (PRN):  acetaminophen   Oral Liquid .. 1000 milliGRAM(s) Enteral Tube every 6 hours PRN Temp greater or equal to 38C (100.4F), Mild Pain (1 - 3)  nystatin Powder 1 Application(s) Topical two times a day PRN skin irritation  QUEtiapine 25 milliGRAM(s) Oral two times a day PRN agitation  sodium chloride 0.9% lock flush 10 milliLiter(s) IV Push every 1 hour PRN Pre/post blood products, medications, blood draw, and to maintain line patency      LABS:                        10.2   53.19 )-----------( 125      ( 07 Jun 2024 10:04 )             34.2     06-07    142  |  101  |  11  ----------------------------<  136<H>  4.5   |  30  |  0.37<L>    Ca    9.3      07 Jun 2024 10:04  Phos  3.8     06-07  Mg     2.1     06-07        Urinalysis Basic - ( 07 Jun 2024 10:04 )    Color: x / Appearance: x / SG: x / pH: x  Gluc: 136 mg/dL / Ketone: x  / Bili: x / Urobili: x   Blood: x / Protein: x / Nitrite: x   Leuk Esterase: x / RBC: x / WBC x   Sq Epi: x / Non Sq Epi: x / Bacteria: x          CAPILLARY BLOOD GLUCOSE    MICROBIOLOGY:     RADIOLOGY:  [ ] Reviewed and interpreted by me     Patient is a 69y old  Female who presents with a chief complaint of Transfer for leukophoresis (08 Jun 2024 07:42)      Interval Events:  Calorie Counting                          No PRN Seroquel required                          No overnight event; Stable on 2L via NC     REVIEW OF SYSTEMS:  [ ] Positive  [x ] All other systems negative  [ ] Unable to assess ROS because ________    Vital Signs Last 24 Hrs  T(C): 36.5 (06-09-24 @ 04:40), Max: 37 (06-08-24 @ 18:33)  T(F): 97.7 (06-09-24 @ 04:40), Max: 98.6 (06-08-24 @ 18:33)  HR: 100 (06-09-24 @ 04:40) (89 - 100)  BP: 137/70 (06-09-24 @ 04:40) (122/63 - 157/71)  RR: 18 (06-09-24 @ 04:40) (18 - 18)  SpO2: 97% (06-09-24 @ 04:40) (96% - 100%)    PHYSICAL EXAM:    HEENT:   [X] Tracheostomy: Self decannulated 6/6, stoma covered with gauze, dressing c/d/I  [X] PERRL B/L; EOMI  [X] No oral lesions  [ ] Abnormal    SKIN  [X] No Rash  [ ] Abnormal  [ ] pressure    CARDIAC  [X] Regular  [ ] Abnormal    PULMONARY  [X] Bilateral Clear Breath Sounds  [ ] Normal Excursion  [ ] Abnormal    GI  [X] PEG site c/d/I    [X] +BS		              [X] Soft, nondistended, nontender	  [ ] Abnormal    MUSCULOSKELETAL                                   [X] Bedbound                 [ ] Abnormal    [ ] Ambulatory/OOB to chair                           EXTREMITIES                                         [X] Normal  [ ]Edema                           NEUROLOGIC  [ ] Normal, non focal  [X] Focal findings:  Alert and Oriented x2 (self and year); responds appropriately ro questions verbally; +LUE/LLE hemiplegia    PSYCHIATRIC  [X] Alert with intermittent delirium   [ ] Sedated	 [ ]Agitated    :  Andino: [ ] Yes, if yes: Date of Placement:                   [X] No    LINES: Central Lines [ ] Yes, if yes: Date of Placement                                     [X] No    HOSPITAL MEDICATIONS:  MEDICATIONS  (STANDING):  amLODIPine   Tablet 5 milliGRAM(s) Oral daily  ascorbic acid 500 milliGRAM(s) Oral daily  chlorhexidine 4% Liquid 1 Application(s) Topical every 12 hours  enoxaparin Injectable 40 milliGRAM(s) SubCutaneous every 12 hours  metoprolol tartrate 25 milliGRAM(s) Oral two times a day  multivitamin 1 Tablet(s) Oral daily  pantoprazole  Injectable 40 milliGRAM(s) IV Push two times a day  QUEtiapine 150 milliGRAM(s) Oral <User Schedule>    MEDICATIONS  (PRN):  acetaminophen   Oral Liquid .. 1000 milliGRAM(s) Enteral Tube every 6 hours PRN Temp greater or equal to 38C (100.4F), Mild Pain (1 - 3)  nystatin Powder 1 Application(s) Topical two times a day PRN skin irritation  QUEtiapine 25 milliGRAM(s) Oral two times a day PRN agitation  sodium chloride 0.9% lock flush 10 milliLiter(s) IV Push every 1 hour PRN Pre/post blood products, medications, blood draw, and to maintain line patency      LABS:                        10.2   53.19 )-----------( 125      ( 07 Jun 2024 10:04 )             34.2     06-07    142  |  101  |  11  ----------------------------<  136<H>  4.5   |  30  |  0.37<L>    Ca    9.3      07 Jun 2024 10:04  Phos  3.8     06-07  Mg     2.1     06-07        Urinalysis Basic - ( 07 Jun 2024 10:04 )    Color: x / Appearance: x / SG: x / pH: x  Gluc: 136 mg/dL / Ketone: x  / Bili: x / Urobili: x   Blood: x / Protein: x / Nitrite: x   Leuk Esterase: x / RBC: x / WBC x   Sq Epi: x / Non Sq Epi: x / Bacteria: x          CAPILLARY BLOOD GLUCOSE    MICROBIOLOGY:     RADIOLOGY:  [ ] Reviewed and interpreted by me

## 2024-06-09 NOTE — PROGRESS NOTE ADULT - PROBLEM SELECTOR PLAN 3
- Intubated on arrival to [facility] ED for respiratory tiring and AMS  - Unable to be extubated  - S/p Trach 4/28  - Patient tolerating TC ATC since 5/28; FIO2: 40 %  - Patient cleared for Supervised PMV Trials   - 6/1: Trach changed to #7 Cuffless Portex  - Patient Red Capped since 6/3; NC dec to 2 LPM  - Patient self decannulated early morning of 6/6.  Evaluated by RRT and ENT, deemed stable to remain decannulated.  Subsequent ABGs have been appropriate.  Stable on 2LNC.  - Continue Chest PT and suctioning PRN

## 2024-06-09 NOTE — PROGRESS NOTE ADULT - PROBLEM SELECTOR PLAN 8
- S/p PEG 5/7 by GI   - FEES 6/3: Passed for Puree and Moderately thickened Liquids   - 6/7:  Repeat FEESST performed however patient was not cleared for advancement of diet.  Tube feeds discontinued. Calorie count initiated.

## 2024-06-09 NOTE — PROGRESS NOTE ADULT - NS ATTEND AMEND GEN_ALL_CORE FT
68 y/o F w/prior CVA, COPD, CHF, and HTN initially admitted for hyperleukocytosis concerning for acute leukemia c/b acute hypoxemic respiratory failure and hypotension secondary to severe sepsis with septic shock due to Strep pneumonia bacteremia in setting of PNA w/empyema s/p chest tube placement and MIST now s/p trach/PEG w/hospital course c/b hemorrhagic shock 2/2 abdominal wall hematoma now back in RCU. Patient also diagnosed w/CLL. Now self decannulated on 6/6.  Currently doing well on 2 L NC oxygen.  Clinically stable at this time.  Being evaluated to see if diet can be advanced.  PEG is being used for meds.  Agree with plan as outlined above.

## 2024-06-09 NOTE — PROGRESS NOTE ADULT - PROBLEM SELECTOR PLAN 4
- Patient with periods of Delirium however vastly improving.  - Continue Seroquel 25 mg BID PRN;  Seroquel 150mg QHS   - Klonopin discontinued on 5/30 with improved Delirium.  - Patient voiced concerns on being long term Seroquel as she had experienced weight gain in the past   - 5/28 ECG: QTc 474

## 2024-06-09 NOTE — PROGRESS NOTE ADULT - PROBLEM SELECTOR PLAN 2
- On admission WBC count 233  - transferred to [facility] for possible leukophoresis  - as per heme/onc - underlying CLL (Oct 2023) with reactive lymphocytosis 2/2 sepsis  - heme/onc deferred leukophoresis due to mature lymphocytes; IVIG given 4/26/24  - CLL under control and plan for outpatient follow up at [facility] when patient closer to d/c

## 2024-06-10 LAB
ANION GAP SERPL CALC-SCNC: 12 MMOL/L — SIGNIFICANT CHANGE UP (ref 5–17)
BUN SERPL-MCNC: 8 MG/DL — SIGNIFICANT CHANGE UP (ref 7–23)
CALCIUM SERPL-MCNC: 9.4 MG/DL — SIGNIFICANT CHANGE UP (ref 8.4–10.5)
CHLORIDE SERPL-SCNC: 103 MMOL/L — SIGNIFICANT CHANGE UP (ref 96–108)
CO2 SERPL-SCNC: 29 MMOL/L — SIGNIFICANT CHANGE UP (ref 22–31)
CREAT SERPL-MCNC: 0.39 MG/DL — LOW (ref 0.5–1.3)
EGFR: 108 ML/MIN/1.73M2 — SIGNIFICANT CHANGE UP
GLUCOSE SERPL-MCNC: 73 MG/DL — SIGNIFICANT CHANGE UP (ref 70–99)
HCT VFR BLD CALC: 31.8 % — LOW (ref 34.5–45)
HGB BLD-MCNC: 9.7 G/DL — LOW (ref 11.5–15.5)
MAGNESIUM SERPL-MCNC: 2 MG/DL — SIGNIFICANT CHANGE UP (ref 1.6–2.6)
MCHC RBC-ENTMCNC: 29.3 PG — SIGNIFICANT CHANGE UP (ref 27–34)
MCHC RBC-ENTMCNC: 30.5 GM/DL — LOW (ref 32–36)
MCV RBC AUTO: 96.1 FL — SIGNIFICANT CHANGE UP (ref 80–100)
NRBC # BLD: 0 /100 WBCS — SIGNIFICANT CHANGE UP (ref 0–0)
PHOSPHATE SERPL-MCNC: 4.1 MG/DL — SIGNIFICANT CHANGE UP (ref 2.5–4.5)
PLATELET # BLD AUTO: 138 K/UL — LOW (ref 150–400)
POTASSIUM SERPL-MCNC: 3.9 MMOL/L — SIGNIFICANT CHANGE UP (ref 3.5–5.3)
POTASSIUM SERPL-SCNC: 3.9 MMOL/L — SIGNIFICANT CHANGE UP (ref 3.5–5.3)
RBC # BLD: 3.31 M/UL — LOW (ref 3.8–5.2)
RBC # FLD: 18.5 % — HIGH (ref 10.3–14.5)
SODIUM SERPL-SCNC: 144 MMOL/L — SIGNIFICANT CHANGE UP (ref 135–145)
WBC # BLD: 36.28 K/UL — HIGH (ref 3.8–10.5)
WBC # FLD AUTO: 36.28 K/UL — HIGH (ref 3.8–10.5)

## 2024-06-10 PROCEDURE — 99232 SBSQ HOSP IP/OBS MODERATE 35: CPT

## 2024-06-10 RX ORDER — QUETIAPINE FUMARATE 200 MG/1
1 TABLET, FILM COATED ORAL
Qty: 0 | Refills: 0 | DISCHARGE
Start: 2024-06-10

## 2024-06-10 RX ORDER — QUETIAPINE FUMARATE 200 MG/1
150 TABLET, FILM COATED ORAL
Refills: 0 | Status: DISCONTINUED | OUTPATIENT
Start: 2024-06-10 | End: 2024-06-11

## 2024-06-10 RX ORDER — AMLODIPINE BESYLATE 2.5 MG/1
1 TABLET ORAL
Qty: 0 | Refills: 0 | DISCHARGE
Start: 2024-06-10

## 2024-06-10 RX ORDER — METOPROLOL TARTRATE 50 MG
1 TABLET ORAL
Qty: 0 | Refills: 0 | DISCHARGE
Start: 2024-06-10

## 2024-06-10 RX ORDER — ENOXAPARIN SODIUM 100 MG/ML
40 INJECTION SUBCUTANEOUS
Qty: 30 | Refills: 0
Start: 2024-06-10 | End: 2024-07-09

## 2024-06-10 RX ORDER — KETOROLAC TROMETHAMINE 30 MG/ML
15 SYRINGE (ML) INJECTION ONCE
Refills: 0 | Status: DISCONTINUED | OUTPATIENT
Start: 2024-06-10 | End: 2024-06-10

## 2024-06-10 RX ORDER — SENNA PLUS 8.6 MG/1
10 TABLET ORAL
Qty: 300 | Refills: 0
Start: 2024-06-10 | End: 2024-07-09

## 2024-06-10 RX ORDER — ACETAMINOPHEN 500 MG
2 TABLET ORAL
Qty: 240 | Refills: 0
Start: 2024-06-10 | End: 2024-07-09

## 2024-06-10 RX ORDER — ASCORBIC ACID 60 MG
500 TABLET,CHEWABLE ORAL DAILY
Refills: 0 | Status: DISCONTINUED | OUTPATIENT
Start: 2024-06-10 | End: 2024-06-11

## 2024-06-10 RX ORDER — AMLODIPINE BESYLATE 2.5 MG/1
5 TABLET ORAL DAILY
Refills: 0 | Status: DISCONTINUED | OUTPATIENT
Start: 2024-06-10 | End: 2024-06-11

## 2024-06-10 RX ORDER — NYSTATIN CREAM 100000 [USP'U]/G
1 CREAM TOPICAL
Qty: 0 | Refills: 0 | DISCHARGE
Start: 2024-06-10

## 2024-06-10 RX ORDER — METOPROLOL TARTRATE 50 MG
25 TABLET ORAL
Refills: 0 | Status: DISCONTINUED | OUTPATIENT
Start: 2024-06-10 | End: 2024-06-11

## 2024-06-10 RX ADMIN — Medication 15 MILLIGRAM(S): at 01:55

## 2024-06-10 RX ADMIN — Medication 15 MILLIGRAM(S): at 23:15

## 2024-06-10 RX ADMIN — ENOXAPARIN SODIUM 40 MILLIGRAM(S): 100 INJECTION SUBCUTANEOUS at 17:29

## 2024-06-10 RX ADMIN — Medication 1000 MILLIGRAM(S): at 09:56

## 2024-06-10 RX ADMIN — PANTOPRAZOLE SODIUM 40 MILLIGRAM(S): 20 TABLET, DELAYED RELEASE ORAL at 17:29

## 2024-06-10 RX ADMIN — Medication 15 MILLIGRAM(S): at 22:30

## 2024-06-10 RX ADMIN — Medication 15 MILLIGRAM(S): at 02:15

## 2024-06-10 RX ADMIN — ENOXAPARIN SODIUM 40 MILLIGRAM(S): 100 INJECTION SUBCUTANEOUS at 05:27

## 2024-06-10 RX ADMIN — CHLORHEXIDINE GLUCONATE 1 APPLICATION(S): 213 SOLUTION TOPICAL at 05:27

## 2024-06-10 RX ADMIN — Medication 1 TABLET(S): at 12:27

## 2024-06-10 RX ADMIN — QUETIAPINE FUMARATE 150 MILLIGRAM(S): 200 TABLET, FILM COATED ORAL at 22:30

## 2024-06-10 RX ADMIN — PANTOPRAZOLE SODIUM 40 MILLIGRAM(S): 20 TABLET, DELAYED RELEASE ORAL at 05:27

## 2024-06-10 RX ADMIN — Medication 500 MILLIGRAM(S): at 12:27

## 2024-06-10 RX ADMIN — CHLORHEXIDINE GLUCONATE 1 APPLICATION(S): 213 SOLUTION TOPICAL at 17:29

## 2024-06-10 RX ADMIN — AMLODIPINE BESYLATE 5 MILLIGRAM(S): 2.5 TABLET ORAL at 05:27

## 2024-06-10 RX ADMIN — Medication 25 MILLIGRAM(S): at 05:27

## 2024-06-10 RX ADMIN — Medication 1000 MILLIGRAM(S): at 10:30

## 2024-06-10 RX ADMIN — Medication 25 MILLIGRAM(S): at 17:29

## 2024-06-10 NOTE — CHART NOTE - NSCHARTNOTEFT_GEN_A_CORE
NUTRITION FOLLOW UP NOTE    PATIENT SEEN FOR: follow up, calorie count assessment    SOURCE: [x] Patient  [x] Current Medical Record  [] RN  [] Family/support person at bedside  [] Patient unavailable/inappropriate  [] Other:    CHART REVIEWED/EVENTS NOTED.  [] No changes to nutrition care plan to note  [] Nutrition Status:    DIET ORDER:   Diet, Pureed:   Moderately Thick Liquids (MODTHICKLIQS)  Supplement Feeding Modality:  Oral  Ensure Plus High Protein Cans or Servings Per Day:  1       Frequency:  Two Times a day (24)    CURRENT DIET ORDER IS:  [] Appropriate:  [] Inadequate:  [] Other:    NUTRITION INTAKE/PROVISION:  [] PO:  [] Enteral Nutrition:  [] Parenteral Nutrition:    ANTHROPOMETRICS:  Drug Dosing Weight  Height (cm): 172.7 (20 May 2024 00:42)  Weight (kg): 108.7 (20 May 2024 00:42)  BMI (kg/m2): 36.4 (20 May 2024 00:42)  Weights:   Daily Weight in k.2 ()     MEDICATIONS:  MEDICATIONS  (STANDING):  amLODIPine   Tablet 5 milliGRAM(s) Oral daily  ascorbic acid 500 milliGRAM(s) Oral daily  chlorhexidine 4% Liquid 1 Application(s) Topical every 12 hours  enoxaparin Injectable 40 milliGRAM(s) SubCutaneous every 12 hours  metoprolol tartrate 25 milliGRAM(s) Oral two times a day  multivitamin 1 Tablet(s) Oral daily  pantoprazole  Injectable 40 milliGRAM(s) IV Push two times a day  QUEtiapine 150 milliGRAM(s) Oral <User Schedule>    MEDICATIONS  (PRN):  acetaminophen   Oral Liquid .. 1000 milliGRAM(s) Enteral Tube every 6 hours PRN Temp greater or equal to 38C (100.4F), Mild Pain (1 - 3)  nystatin Powder 1 Application(s) Topical two times a day PRN skin irritation  QUEtiapine 25 milliGRAM(s) Oral two times a day PRN agitation  sodium chloride 0.9% lock flush 10 milliLiter(s) IV Push every 1 hour PRN Pre/post blood products, medications, blood draw, and to maintain line patency    NUTRITIONALLY PERTINENT LABS:  06-10 Na144 mmol/L Glu 73 mg/dL K+ 3.9 mmol/L Cr  0.39 mg/dL<L> BUN 8 mg/dL 06-10 Phos 4.1 mg/dL    NUTRITIONALLY PERTINENT MEDICATIONS/LABS:  [x] Reviewed  [] Relevant notes on medications/labs:    EDEMA:  [x] Reviewed  [] Relevant notes:    GI/ I&O:  [x] Reviewed  [] Relevant notes:  [] Other:    SKIN:   [] No pressure injuries documented, per nursing flowsheet  [] Pressure injury previously noted  [] Change in pressure injury documentation:  [] Other:    ESTIMATED NEEDS:  [] No change:  [] Updated:  Energy:   kcal/day (xx-xx kcal/kg)  Protein:   g/day (xx-xx g/kg)  Fluid:   ml/day or [] defer to team  Based on:    NUTRITION DIAGNOSIS:  [] Prior Dx:  [] New Dx:    EDUCATION:  [] Yes:  [] Not appropriate/warranted    NUTRITION CARE PLAN:  1. Diet:  2. Supplements:  3. Multivitamin/mineral supplementation:    [] Achieved - Continue current nutrition intervention(s)  [] Current medical condition precludes nutrition intervention at this time.    MONITORING AND EVALUATION:   RD remains available upon request and will follow up per protocol.    William Sales RD, CDN - contact on TEAMS. NUTRITION FOLLOW UP NOTE    PATIENT SEEN FOR: follow up, calorie count assessment    SOURCE: [x] Patient  [x] Current Medical Record  [] RN  [] Family/support person at bedside  [] Patient unavailable/inappropriate  [] Other:    CHART REVIEWED/EVENTS NOTED.  [] No changes to nutrition care plan to note  [] Nutrition Status:    DIET ORDER:   Diet, Pureed:   Moderately Thick Liquids (MODTHICKLIQS)  Supplement Feeding Modality:  Oral  Ensure Plus High Protein Cans or Servings Per Day:  1       Frequency:  Two Times a day (24)    CURRENT DIET ORDER IS:  [x] Appropriate:  [] Inadequate:  [] Other:    NUTRITION INTAKE/PROVISION:  [x] PO:    ANTHROPOMETRICS:  Drug Dosing Weight  Height (cm): 172.7 (20 May 2024 00:42)  Weight (kg): 108.7 (20 May 2024 00:42)  BMI (kg/m2): 36.4 (20 May 2024 00:42)  Weights:   Daily Weight in k.2 ()     MEDICATIONS:  MEDICATIONS  (STANDING):  amLODIPine   Tablet 5 milliGRAM(s) Oral daily  ascorbic acid 500 milliGRAM(s) Oral daily  chlorhexidine 4% Liquid 1 Application(s) Topical every 12 hours  enoxaparin Injectable 40 milliGRAM(s) SubCutaneous every 12 hours  metoprolol tartrate 25 milliGRAM(s) Oral two times a day  multivitamin 1 Tablet(s) Oral daily  pantoprazole  Injectable 40 milliGRAM(s) IV Push two times a day  QUEtiapine 150 milliGRAM(s) Oral <User Schedule>    MEDICATIONS  (PRN):  acetaminophen   Oral Liquid .. 1000 milliGRAM(s) Enteral Tube every 6 hours PRN Temp greater or equal to 38C (100.4F), Mild Pain (1 - 3)  nystatin Powder 1 Application(s) Topical two times a day PRN skin irritation  QUEtiapine 25 milliGRAM(s) Oral two times a day PRN agitation  sodium chloride 0.9% lock flush 10 milliLiter(s) IV Push every 1 hour PRN Pre/post blood products, medications, blood draw, and to maintain line patency    NUTRITIONALLY PERTINENT LABS:  06-10 Na144 mmol/L Glu 73 mg/dL K+ 3.9 mmol/L Cr  0.39 mg/dL<L> BUN 8 mg/dL 06-10 Phos 4.1 mg/dL    NUTRITIONALLY PERTINENT MEDICATIONS/LABS:  [x] Reviewed  [x] Relevant notes on medications/labs:  - remains ordered for MVI, vitamin C    EDEMA:  [x] Reviewed    GI/ I&O:  [x] Reviewed  [] Relevant notes:  [] Other:    SKIN:   [x] Pressure injury previously noted: suspected DTIs to sacrum & R heel per documentation    ESTIMATED NEEDS:  [x] No change:  Energy: 1890-2240kcal/day (27-32kcal/kg)  Protein: 84-105g/day (1.2-1.5g/kg)  Fluid: [x] defer to team  Based on: IBW + 10% 70kg    NUTRITION DIAGNOSIS:  [x] Prior Dx: increased protein energy needs  [] New Dx:    EDUCATION:  [x] Yes: current diet order, staging of diet advancement, SLP's recommendations, encouraging continued adequate caloric/protein intake w/ current consistency diet, all questions were answered    NUTRITION CARE PLAN:  1. Diet:  2. Supplements:  3. Multivitamin/mineral supplementation:    [] Achieved - Continue current nutrition intervention(s)  [] Current medical condition precludes nutrition intervention at this time.    MONITORING AND EVALUATION:   RD remains available upon request and will follow up per protocol.    William Sales, DREA, CDN - contact on TEAMS. NUTRITION FOLLOW UP NOTE    PATIENT SEEN FOR: follow up, calorie count assessment    SOURCE: [x] Patient  [x] Current Medical Record  [] RN  [] Family/support person at bedside  [] Patient unavailable/inappropriate  [] Other:    CHART REVIEWED/EVENTS NOTED.  [] No changes to nutrition care plan to note  [x] Nutrition Status:  - 3 day calorie count ordered by team -, calorie count completed, results below  - noted  patient self-decannulating and deemed stable to remain decannulated per chart    DIET ORDER:   Diet, Pureed:   Moderately Thick Liquids (MODTHICKLIQS)  Supplement Feeding Modality:  Oral  Ensure Plus High Protein Cans or Servings Per Day:  1       Frequency:  Two Times a day (24)    CURRENT DIET ORDER IS:  [x] Appropriate: FEES on  w/ SLP recommending to continue pureed consistency solids w/ moderately thickened liquids    NUTRITION INTAKE/PROVISION:  [x] PO:  - patient reports tolerating current diet consistency well with no chewing/swallowing issues, only has complaint of feeling that pureed foods makes her have more BMs during the day, normally has no issues w/ PO intake PTA and no prior irregularity w/ BMs  - patient overall eager to have diet consistency advanced when it is deemed appropriate upon further SLP evaluations    ANTHROPOMETRICS:  Drug Dosing Weight  Height (cm): 172.7 (20 May 2024 00:42)  Weight (kg): 108.7 (20 May 2024 00:42)  BMI (kg/m2): 36.4 (20 May 2024 00:42)  Weights:   Daily Weight in k.2 ()     MEDICATIONS:  MEDICATIONS  (STANDING):  amLODIPine   Tablet 5 milliGRAM(s) Oral daily  ascorbic acid 500 milliGRAM(s) Oral daily  chlorhexidine 4% Liquid 1 Application(s) Topical every 12 hours  enoxaparin Injectable 40 milliGRAM(s) SubCutaneous every 12 hours  metoprolol tartrate 25 milliGRAM(s) Oral two times a day  multivitamin 1 Tablet(s) Oral daily  pantoprazole  Injectable 40 milliGRAM(s) IV Push two times a day  QUEtiapine 150 milliGRAM(s) Oral <User Schedule>    MEDICATIONS  (PRN):  acetaminophen   Oral Liquid .. 1000 milliGRAM(s) Enteral Tube every 6 hours PRN Temp greater or equal to 38C (100.4F), Mild Pain (1 - 3)  nystatin Powder 1 Application(s) Topical two times a day PRN skin irritation  QUEtiapine 25 milliGRAM(s) Oral two times a day PRN agitation  sodium chloride 0.9% lock flush 10 milliLiter(s) IV Push every 1 hour PRN Pre/post blood products, medications, blood draw, and to maintain line patency    NUTRITIONALLY PERTINENT LABS:  06-10 Na144 mmol/L Glu 73 mg/dL K+ 3.9 mmol/L Cr  0.39 mg/dL<L> BUN 8 mg/dL 06-10 Phos 4.1 mg/dL    NUTRITIONALLY PERTINENT MEDICATIONS/LABS:  [x] Reviewed  [x] Relevant notes on medications/labs:  - remains ordered for MVI, vitamin C    EDEMA:  [x] Reviewed    GI/ I&O:  [x] Reviewed  [] Relevant notes:  [] Other:    SKIN:   [x] Pressure injury previously noted: suspected DTIs to sacrum & R heel per documentation    ESTIMATED NEEDS:  [x] No change:  Energy: 1890-2240kcal/day (27-32kcal/kg)  Protein: 84-105g/day (1.2-1.5g/kg)  Fluid: [x] defer to team  Based on: IBW + 10% 70kg    NUTRITION DIAGNOSIS:  [x] Prior Dx: increased protein energy needs  [] New Dx:    EDUCATION:  [x] Yes: current diet order, staging of diet advancement, SLP's recommendations, encouraging continued adequate caloric/protein intake w/ current consistency diet, all questions were answered    NUTRITION CARE PLAN:  1. Diet:  2. Supplements:  3. Multivitamin/mineral supplementation:    [] Achieved - Continue current nutrition intervention(s)  [] Current medical condition precludes nutrition intervention at this time.    MONITORING AND EVALUATION:   RD remains available upon request and will follow up per protocol.    William Sales, DREA, CDN - contact on TEAMS. NUTRITION FOLLOW UP NOTE    PATIENT SEEN FOR: follow up, calorie count assessment    SOURCE: [x] Patient  [x] Current Medical Record  [] RN  [] Family/support person at bedside  [] Patient unavailable/inappropriate  [] Other:    CHART REVIEWED/EVENTS NOTED.  [] No changes to nutrition care plan to note  [x] Nutrition Status:  - 3 day calorie count ordered by team -, calorie count completed, results below  - noted  patient self-decannulating and deemed stable to remain decannulated per chart    DIET ORDER:   Diet, Pureed:   Moderately Thick Liquids (MODTHICKLIQS)  Supplement Feeding Modality:  Oral  Ensure Plus High Protein Cans or Servings Per Day:  1       Frequency:  Two Times a day (24)    CURRENT DIET ORDER IS:  [x] Appropriate: FEES on  w/ SLP recommending to continue pureed consistency solids w/ moderately thickened liquids    NUTRITION INTAKE/PROVISION:  [x] PO:  - patient reports tolerating current diet consistency well with no chewing/swallowing issues, only has complaint of feeling that pureed foods makes her have more BMs during the day, normally has no issues w/ PO intake PTA and no prior irregularity w/ BMs  - patient overall eager to have diet consistency advanced when it is deemed appropriate upon further SLP evaluations  - 3 day calorie count ordered by team, now completed: patient averaging ~83% EENs, 82% EPNs daily (~1575kcal, ~69g protein), patient likely to continue to increase overall intake when diet consistency can be further advanced    ANTHROPOMETRICS:  Drug Dosing Weight  Height (cm): 172.7 (20 May 2024 00:42)  Weight (kg): 108.7 (20 May 2024 00:42)  BMI (kg/m2): 36.4 (20 May 2024 00:42)  Weights:   Daily Weight in k.2 ()     MEDICATIONS:  MEDICATIONS  (STANDING):  amLODIPine   Tablet 5 milliGRAM(s) Oral daily  ascorbic acid 500 milliGRAM(s) Oral daily  chlorhexidine 4% Liquid 1 Application(s) Topical every 12 hours  enoxaparin Injectable 40 milliGRAM(s) SubCutaneous every 12 hours  metoprolol tartrate 25 milliGRAM(s) Oral two times a day  multivitamin 1 Tablet(s) Oral daily  pantoprazole  Injectable 40 milliGRAM(s) IV Push two times a day  QUEtiapine 150 milliGRAM(s) Oral <User Schedule>    MEDICATIONS  (PRN):  acetaminophen   Oral Liquid .. 1000 milliGRAM(s) Enteral Tube every 6 hours PRN Temp greater or equal to 38C (100.4F), Mild Pain (1 - 3)  nystatin Powder 1 Application(s) Topical two times a day PRN skin irritation  QUEtiapine 25 milliGRAM(s) Oral two times a day PRN agitation  sodium chloride 0.9% lock flush 10 milliLiter(s) IV Push every 1 hour PRN Pre/post blood products, medications, blood draw, and to maintain line patency    NUTRITIONALLY PERTINENT LABS:  06-10 Na144 mmol/L Glu 73 mg/dL K+ 3.9 mmol/L Cr  0.39 mg/dL<L> BUN 8 mg/dL 06-10 Phos 4.1 mg/dL    NUTRITIONALLY PERTINENT MEDICATIONS/LABS:  [x] Reviewed  [x] Relevant notes on medications/labs:  - remains ordered for MVI, vitamin C    EDEMA:  [x] Reviewed    GI/ I&O:  [x] Reviewed  [] Relevant notes:  [] Other:    SKIN:   [x] Pressure injury previously noted: suspected DTIs to sacrum & R heel per documentation    ESTIMATED NEEDS:  [x] No change:  Energy: 1890-2240kcal/day (27-32kcal/kg)  Protein: 84-105g/day (1.2-1.5g/kg)  Fluid: [x] defer to team  Based on: IBW + 10% 70kg    NUTRITION DIAGNOSIS:  [x] Prior Dx: increased protein energy needs  [] New Dx:    EDUCATION:  [x] Yes: current diet order, staging of diet advancement, SLP's recommendations, encouraging continued adequate caloric/protein intake w/ current consistency diet, all questions were answered    NUTRITION CARE PLAN:  1. Calorie Count:  - calorie count ordered by team, now completed: patient meeting ~83% caloric needs, ~82% protein needs at present; patient likely to continue to increase overall PO intake when diet consistency can be further advanced (patient eager for diet advancement)  - follow SLP recommendations upon their assessments for diet texture/consistency  - provide assistance with meals for the patient as needed  2. Supplements:  - continue to provide Ensure Plus HP BID for extra caloric/protein intake  3. Multivitamin/mineral supplementation:  - as medically feasible, continue multivitamin and vitamin C to help aid in wound healing    [] Achieved - Continue current nutrition intervention(s)  [] Current medical condition precludes nutrition intervention at this time.    MONITORING AND EVALUATION:   RD remains available upon request and will follow up per protocol.    William Sales, DREA, CDN - contact on TEAMS.

## 2024-06-10 NOTE — PROGRESS NOTE ADULT - SUBJECTIVE AND OBJECTIVE BOX
Patient is a 69y old  Female who presents with a chief complaint of Transfer for leukophoresis (09 Jun 2024 07:26)      Interval Events:    REVIEW OF SYSTEMS:  [ ] Positive  [ ] All other systems negative  [ ] Unable to assess ROS because ________    Vital Signs Last 24 Hrs  T(C): 36.5 (06-10-24 @ 06:09), Max: 37.1 (06-09-24 @ 11:50)  T(F): 97.7 (06-10-24 @ 06:09), Max: 98.7 (06-09-24 @ 11:50)  HR: 94 (06-10-24 @ 06:09) (94 - 104)  BP: 121/65 (06-10-24 @ 06:09) (107/53 - 157/83)  RR: 18 (06-10-24 @ 06:09) (18 - 20)  SpO2: 97% (06-10-24 @ 06:09) (94% - 97%)    PHYSICAL EXAM:  HEENT:   [ ]Tracheostomy:  [ ]Pupils equal  [ ]No oral lesions  [ ]Abnormal    SKIN  [ ]No Rash  [ ] Abnormal  [ ] pressure    CARDIAC  [ ]Regular  [ ]Abnormal    PULMONARY  [ ]Bilateral Clear Breath Sounds  [ ]Normal Excursion  [ ]Abnormal    GI  [ ]PEG      [ ] +BS		              [ ]Soft, nondistended, nontender	  [ ]Abnormal    MUSCULOSKELETAL                                   [ ]Bedbound                 [ ]Abnormal    [ ]Ambulatory/OOB to chair                           EXTREMITIES                                         [ ]Normal  [ ]Edema                           NEUROLOGIC  [ ] Normal, non focal  [ ] Focal findings:    PSYCHIATRIC  [ ]Alert and appropriate  [ ] Sedated	 [ ]Agitated    :  Andino: [ ] Yes, if yes: Date of Placement:                   [  ] No    LINES: Central Lines [ ] Yes, if yes: Date of Placement                                     [  ] No    HOSPITAL MEDICATIONS:  MEDICATIONS  (STANDING):  amLODIPine   Tablet 5 milliGRAM(s) Oral daily  ascorbic acid 500 milliGRAM(s) Oral daily  chlorhexidine 4% Liquid 1 Application(s) Topical every 12 hours  enoxaparin Injectable 40 milliGRAM(s) SubCutaneous every 12 hours  metoprolol tartrate 25 milliGRAM(s) Oral two times a day  multivitamin 1 Tablet(s) Oral daily  pantoprazole  Injectable 40 milliGRAM(s) IV Push two times a day  QUEtiapine 150 milliGRAM(s) Oral <User Schedule>    MEDICATIONS  (PRN):  acetaminophen   Oral Liquid .. 1000 milliGRAM(s) Enteral Tube every 6 hours PRN Temp greater or equal to 38C (100.4F), Mild Pain (1 - 3)  nystatin Powder 1 Application(s) Topical two times a day PRN skin irritation  QUEtiapine 25 milliGRAM(s) Oral two times a day PRN agitation  sodium chloride 0.9% lock flush 10 milliLiter(s) IV Push every 1 hour PRN Pre/post blood products, medications, blood draw, and to maintain line patency      LABS:                        9.7    36.28 )-----------( 138      ( 10 Gabriel 2024 06:56 )             31.8                   CAPILLARY BLOOD GLUCOSE    MICROBIOLOGY:     RADIOLOGY:  [ ] Reviewed and interpreted by me     Patient is a 69y old  Female who presents with a chief complaint of Transfer for leukophoresis (09 Jun 2024 07:26)      Interval Events:  Leg pain overnight.                                 Resolved with pain medication.     REVIEW OF SYSTEMS:  [ ] Positive  [X] All other systems negative  [ ] Unable to assess ROS because ________    Vital Signs Last 24 Hrs  T(C): 36.5 (06-10-24 @ 06:09), Max: 37.1 (06-09-24 @ 11:50)  T(F): 97.7 (06-10-24 @ 06:09), Max: 98.7 (06-09-24 @ 11:50)  HR: 94 (06-10-24 @ 06:09) (94 - 104)  BP: 121/65 (06-10-24 @ 06:09) (107/53 - 157/83)  RR: 18 (06-10-24 @ 06:09) (18 - 20)  SpO2: 97% (06-10-24 @ 06:09) (94% - 97%)    PHYSICAL EXAM:  HEENT:   [X]Tracheostomy: decannulated - dressing over site c/d/i  [X]Pupils equal  [ ]No oral lesions  [ ]Abnormal    SKIN  [X]No Rash  [ ] Abnormal  [ ] pressure    CARDIAC  [X]Regular  [ ]Abnormal    PULMONARY  [X]Bilateral Clear Breath Sounds  [ ]Normal Excursion  [ ]Abnormal    GI  [X]PEG      [X] +BS		              [X]Soft, nondistended, nontender	  [ ]Abnormal    MUSCULOSKELETAL                                   [X]Bedbound                 [ ]Abnormal    [ ]Ambulatory/OOB to chair                           EXTREMITIES                                         [ ]Normal  [X]Edema - LLE                     NEUROLOGIC  [X] Normal, non focal  [ ] Focal findings:    PSYCHIATRIC  [X] Alert and appropriate  [ ] Sedated	 [ ]Agitated    :  Andino: [ ] Yes, if yes: Date of Placement:                   [X] No    LINES: Central Lines [ ] Yes, if yes: Date of Placement                                     [X] No    HOSPITAL MEDICATIONS:  MEDICATIONS  (STANDING):  amLODIPine   Tablet 5 milliGRAM(s) Oral daily  ascorbic acid 500 milliGRAM(s) Oral daily  chlorhexidine 4% Liquid 1 Application(s) Topical every 12 hours  enoxaparin Injectable 40 milliGRAM(s) SubCutaneous every 12 hours  metoprolol tartrate 25 milliGRAM(s) Oral two times a day  multivitamin 1 Tablet(s) Oral daily  pantoprazole  Injectable 40 milliGRAM(s) IV Push two times a day  QUEtiapine 150 milliGRAM(s) Oral <User Schedule>    MEDICATIONS  (PRN):  acetaminophen   Oral Liquid .. 1000 milliGRAM(s) Enteral Tube every 6 hours PRN Temp greater or equal to 38C (100.4F), Mild Pain (1 - 3)  nystatin Powder 1 Application(s) Topical two times a day PRN skin irritation  QUEtiapine 25 milliGRAM(s) Oral two times a day PRN agitation  sodium chloride 0.9% lock flush 10 milliLiter(s) IV Push every 1 hour PRN Pre/post blood products, medications, blood draw, and to maintain line patency    LABS:                        9.7    36.28 )-----------( 138      ( 10 Gabriel 2024 06:56 )             31.8     CAPILLARY BLOOD GLUCOSE    MICROBIOLOGY:     RADIOLOGY:  [ ] Reviewed and interpreted by me

## 2024-06-10 NOTE — PROVIDER CONTACT NOTE (OTHER) - NAME OF MD/NP/PA/DO NOTIFIED:
Aubrie Singh, NP
DANGELO Clemons
Dawn Blake
Dread Collins
MERY Buckley
MERY Singh
SHANT Gross
Dread Collins
MERY Singh
María Elena Lozano
Delfina Woodson
MERY Singh
Geneva Cardenas
Quality 111:Pneumonia Vaccination Status For Older Adults: Pneumococcal Vaccination Previously Received
Quality 138: Melanoma: Coordination Of Care: A treatment plan was communicated to the physicians providing continuing care within one month of diagnosis outlining: diagnosis, tumor thickness and a plan for surgery or alternate care.
Quality 226: Preventive Care And Screening: Tobacco Use: Screening And Cessation Intervention: Patient screened for tobacco use and is an ex/non-smoker
Quality 137: Melanoma: Continuity Of Care - Recall System: Patient information entered into a recall system that includes: target date for the next exam specified AND a process to follow up with patients regarding missed or unscheduled appointments
Detail Level: Detailed
Quality 130: Documentation Of Current Medications In The Medical Record: Current Medications Documented

## 2024-06-10 NOTE — PROGRESS NOTE ADULT - PROBLEM SELECTOR PROBLEM 6
Preventive Care Advice   This is general advice given by our system to help you stay healthy. However, your care team may have specific advice just for you. Please talk to your care team about your preventive care needs.  Nutrition  Eat 5 or more servings of fruits and vegetables each day.  Try wheat bread, brown rice and whole grain pasta (instead of white bread, rice, and pasta).  Get enough calcium and vitamin D. Check the label on foods and aim for 100% of the RDA (recommended daily allowance).  Lifestyle  Exercise at least 150 minutes each week   (30 minutes a day, 5 days a week).  Do muscle strengthening activities 2 days a week. These help control your weight and prevent disease.  No smoking.  Wear sunscreen to prevent skin cancer.  Have a dental exam and cleaning every 6 months.  Yearly exams  See your health care team every year to talk about:  Any changes in your health.  Any medicines your care team has prescribed.  Preventive care, family planning, and ways to prevent chronic diseases.  Shots (vaccines)   HPV shots (up to age 26), if you've never had them before.  Hepatitis B shots (up to age 59), if you've never had them before.  COVID-19 shot: Get this shot when it's due.  Flu shot: Get a flu shot every year.  Tetanus shot: Get a tetanus shot every 10 years.  Pneumococcal, hepatitis A, and RSV shots: Ask your care team if you need these based on your risk.  Shingles shot (for age 50 and up).  General health tests  Diabetes screening:  Starting at age 35, Get screened for diabetes at least every 3 years.  If you are younger than age 35, ask your care team if you should be screened for diabetes.  Cholesterol test: At age 39, start having a cholesterol test every 5 years, or more often if advised.  Bone density scan (DEXA): At age 50, ask your care team if you should have this scan for osteoporosis (brittle bones).  Hepatitis C: Get tested at least once in your life.  STIs (sexually transmitted  infections)  Before age 24: Ask your care team if you should be screened for STIs.  After age 24: Get screened for STIs if you're at risk. You are at risk for STIs (including HIV) if:  You are sexually active with more than one person.  You don't use condoms every time.  You or a partner was diagnosed with a sexually transmitted infection.  If you are at risk for HIV, ask about PrEP medicine to prevent HIV.  Get tested for HIV at least once in your life, whether you are at risk for HIV or not.  Cancer screening tests  Cervical cancer screening: If you have a cervix, begin getting regular cervical cancer screening tests at age 21. Most people who have regular screenings with normal results can stop after age 65. Talk about this with your provider.  Breast cancer scan (mammogram): If you've ever had breasts, begin having regular mammograms starting at age 40. This is a scan to check for breast cancer.  Colon cancer screening: It is important to start screening for colon cancer at age 45.  Have a colonoscopy test every 10 years (or more often if you're at risk) Or, ask your provider about stool tests like a FIT test every year or Cologuard test every 3 years.  To learn more about your testing options, visit: https://www.[employer]/596421.pdf.  For help making a decision, visit: https://bit.ly/jx16093.  Prostate cancer screening test: If you have a prostate and are age 55 to 69, ask your provider if you would benefit from a yearly prostate cancer screening test.  Lung cancer screening: If you are a current or former smoker age 50 to 80, ask your care team if ongoing lung cancer screenings are right for you.  For informational purposes only. Not to replace the advice of your health care provider. Copyright   2023 [employer]. All rights reserved. Clinically reviewed by the [facility]. [employer] 976113 - REV 01/24.    Learning About Stress  What is stress?     Stress is your  body's response to a hard situation. Your body can have a physical, emotional, or mental response. Stress is a fact of life for most people, and it affects everyone differently. What causes stress for you may not be stressful for someone else.  A lot of things can cause stress. You may feel stress when you go on a job interview, take a test, or run a race. This kind of short-term stress is normal and even useful. It can help you if you need to work hard or react quickly. For example, stress can help you finish an important job on time.  Long-term stress is caused by ongoing stressful situations or events. Examples of long-term stress include long-term health problems, ongoing problems at work, or conflicts in your family. Long-term stress can harm your health.  How does stress affect your health?  When you are stressed, your body responds as though you are in danger. It makes hormones that speed up your heart, make you breathe faster, and give you a burst of energy. This is called the fight-or-flight stress response. If the stress is over quickly, your body goes back to normal and no harm is done.  But if stress happens too often or lasts too long, it can have bad effects. Long-term stress can make you more likely to get sick, and it can make symptoms of some diseases worse. If you tense up when you are stressed, you may develop neck, shoulder, or low back pain. Stress is linked to high blood pressure and heart disease.  Stress also harms your emotional health. It can make you morris, tense, or depressed. Your relationships may suffer, and you may not do well at work or school.  What can you do to manage stress?  You can try these things to help manage stress:   Do something active. Exercise or activity can help reduce stress. Walking is a great way to get started. Even everyday activities such as housecleaning or yard work can help.  Try yoga or miguelito chi. These techniques combine exercise and meditation. You may need  some training at first to learn them.  Do something you enjoy. For example, listen to music or go to a movie. Practice your hobby or do volunteer work.  Meditate. This can help you relax, because you are not worrying about what happened before or what may happen in the future.  Do guided imagery. Imagine yourself in any setting that helps you feel calm. You can use online videos, books, or a teacher to guide you.  Do breathing exercises. For example:  From a standing position, bend forward from the waist with your knees slightly bent. Let your arms dangle close to the floor.  Breathe in slowly and deeply as you return to a standing position. Roll up slowly and lift your head last.  Hold your breath for just a few seconds in the standing position.  Breathe out slowly and bend forward from the waist.  Let your feelings out. Talk, laugh, cry, and express anger when you need to. Talking with supportive friends or family, a counselor, or a alexander leader about your feelings is a healthy way to relieve stress. Avoid discussing your feelings with people who make you feel worse.  Write. It may help to write about things that are bothering you. This helps you find out how much stress you feel and what is causing it. When you know this, you can find better ways to cope.  What can you do to prevent stress?  You might try some of these things to help prevent stress:  Manage your time. This helps you find time to do the things you want and need to do.  Get enough sleep. Your body recovers from the stresses of the day while you are sleeping.  Get support. Your family, friends, and community can make a difference in how you experience stress.  Limit your news feed. Avoid or limit time on social media or news that may make you feel stressed.  Do something active. Exercise or activity can help reduce stress. Walking is a great way to get started.  Where can you learn more?  Go to https://www.healthwise.net/patiented  Enter N032 in the  "search box to learn more about \"Learning About Stress.\"  Current as of: October 24, 2023               Content Version: 14.0    5830-3288 [employer].   Care instructions adapted under license by your healthcare professional. If you have questions about a medical condition or this instruction, always ask your healthcare professional. [employer] disclaims any warranty or liability for your use of this information.      " DVT (deep venous thrombosis)

## 2024-06-10 NOTE — PROGRESS NOTE ADULT - PROBLEM SELECTOR PLAN 5
- Hypotensive on admission   - required pressors in ICU  - Midodrine d/cd on 5/30  - BP remains stable will continue to monitor - Hypotensive on admission   - required pressors in ICU  - Midodrine d/c'd on 5/30  - BP remains stable; continue to monitor

## 2024-06-10 NOTE — PROGRESS NOTE ADULT - PROBLEM SELECTOR PLAN 2
- On admission WBC count 233  - transferred to [facility] for possible leukophoresis  - as per heme/onc - underlying CLL (Oct 2023) with reactive lymphocytosis 2/2 sepsis  - heme/onc deferred leukophoresis due to mature lymphocytes; IVIG given 4/26/24  - CLL under control and plan for outpatient follow up at [facility] when patient closer to d/c

## 2024-06-10 NOTE — PROVIDER CONTACT NOTE (OTHER) - BACKGROUND
Patient is a 69y old  Female who presents with a chief complaint of Transfer for leukophoresis
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
pt admitted for respiratory failure and leukemia diagnosis
Had neck pain earlier in the night, given IV tylenol with good short-term relief. 1x loose to liquid BM earlier at 22:00
Patient is a 69y old  Female who presents with a chief complaint of Transfer for leukophoresis
See medical hx on chart
pt started on redcap on 4/3
Patient is a 69y old  Female who presents with a chief complaint of Transfer for leukophoresi
Patient is a 69y old  Female who presents with a chief complaint of Transfer for leukophoresis
admitted for leukemia diagnosis

## 2024-06-10 NOTE — PROGRESS NOTE ADULT - NS ATTEND AMEND GEN_ALL_CORE FT
68 y/o F w/prior CVA, COPD, CHF, and HTN initially admitted for hyperleukocytosis concerning for acute leukemia c/b acute hypoxemic respiratory failure and hypotension secondary to severe sepsis with septic shock due to Strep pneumonia bacteremia in setting of PNA w/empyema s/p chest tube placement and MIST now s/p trach/PEG w/hospital course c/b hemorrhagic shock 2/2 abdominal wall hematoma now back in RCU. Patient also diagnosed w/CLL. Now self decannulated on 6/6.  Currently doing well on 2 L NC oxygen.  Clinically stable at this time.  Being evaluated to see if diet can be advanced.  PEG is being used for meds.  Agree with plan as outlined above. 68 y/o F w/prior CVA, COPD, CHF, and HTN initially admitted for hyperleukocytosis concerning for acute leukemia c/b acute hypoxemic respiratory failure and hypotension secondary to severe sepsis with septic shock due to Strep pneumonia bacteremia in setting of PNA w/empyema s/p chest tube placement and MIST now s/p trach/PEG w/hospital course c/b hemorrhagic shock 2/2 abdominal wall hematoma now back in RCU.     - awake at baseline functional status  -  denies acute complaints  - resp status stable since decannulation   cont o2 monitoring and NC maintain sat>9()%  - outpt f/u for CLL  - hb stable, no bleeding   - RUE healing well and swelling decreased  - cont swallowing eval

## 2024-06-10 NOTE — PROGRESS NOTE ADULT - PROBLEM SELECTOR PLAN 8
- S/p PEG 5/7 by GI   - FEES 6/3: Passed for Puree and Moderately thickened Liquids   - 6/7:  Repeat FEESST performed however patient was not cleared for advancement of diet.  Tube feeds discontinued. Calorie count initiated. - S/p PEG 5/7 by GI   - FEES 6/3: Passed for Puree and Moderately thickened Liquids   - 6/7: Repeat FEEST performed however patient was not cleared for advancement of diet.  Tube feeds discontinued. Calorie count initiated. - S/p PEG 5/7 by GI   - FEES 6/3: Passed for Puree and Moderately thickened Liquids   - 6/7: Repeat FEEST performed however patient was not cleared for advancement of diet.  Tube feeds discontinued. - 6/7-6/9 calorie count - pt meeting nutritional needs with PO intake, continue diet as is

## 2024-06-10 NOTE — PROVIDER CONTACT NOTE (OTHER) - DATE AND TIME:
10-May-2024 16:00
10-May-2024 17:15
12-May-2024 00:05
09-May-2024 16:56
15-May-2024 06:45
29-May-2024 12:00
04-Jun-2024 06:39
15-May-2024 08:58
10-Gabriel-2024 01:50
12-May-2024 06:52
06-Jun-2024 15:30
24-Apr-2024 06:16
16-May-2024 05:07

## 2024-06-10 NOTE — PROGRESS NOTE ADULT - ASSESSMENT
69F h/o CVA (bedbound at baseline), COPD, CHF, HTN who initially p/w acute shortness of breath to outside ED and was treated for acute pulmonary edema with sublingual nitro x 2, Lasix, and BiPAP. Pt was also found to be febrile to 103, so treated for PNA. BIPAP led to improvement in O2 to 89-90. Pt transferred to [place] on 4/16/24 for white count of 233 and possible leukophoresis. In the ED, pt intubated iso respiratory tiring and decreasing mental status, and also was started on levophed for hypotension. Following intubation and initiation of pressors, she was admitted to the MICU for AHRF and septic shock.  In MICU, heme was consulted for hyperleukocytosis, however due to mature lymphocytic leukophoresis was not performed.  Course c/b empyema s/p chest tube placement (4/16) and removal, reaccumulation of loculated pleural effusion s/p L pigtail placement and removal on 4/27.   S/p treatment with ceftriaxone (4/18-4/25) for s. pneumo bacteremia and empyema, txd with Augmentin PO x4 weeks with completed on 5/14.  S/p PEG 5/7.  Transitioned off IV pressors and on midodrine and droxidopa.  Transferred to RCU 5/9.  S/p RRT 5/20 for hypoxia, readmitted to MICU for septic vs hemorrhagic shock requiring pressors.  Received 3 units of PRBCs, CT C/A/P w/ no active bleeding within a large multilobed heterogenous collection extending from R. lower neck into the right axilla and along the right lateral chest wall consistent with hematoma - no surgical intervention necessary.  Treated empirically with Zosyn until 5/24.  Returned to RCU 5/26. Tolerating TC ATC since 5/28. Patient evaluated by Palliative Care during the admission and patient herself wants to be full code with all life sustaining medical management. Patient downsized to # 7 Cuffless Portex and had FEES Performed on 6/3; Passed for Puree with moderately thickened liquids. Patient red capped since 6/3.  RRT called after patient Self Decannulating on 6/6. Post RRT patient continues to remain stable on 0xygen therapy 2L via NC with stable ABG findings. FEES exam repeated 6/7, however unable to advance diet due to aspiration risk. Calorie Counting in progress. DC planning to [facility] when medically cleared     6/9: Calorie counting in progress, likely completes tomorrow. PEG feeds remains off.  Discharge planning to [facility] in progress pending accepting facility, Auth, and bed availability  69F h/o CVA (bedbound at baseline), COPD, CHF, HTN who initially p/w acute shortness of breath to outside ED and was treated for acute pulmonary edema with sublingual nitro x 2, Lasix, and BiPAP. Pt was also found to be febrile to 103, so treated for PNA. BIPAP led to improvement in O2 to 89-90. Pt transferred to [place] on 4/16/24 for white count of 233 and possible leukophoresis. In the ED, pt intubated iso respiratory tiring and decreasing mental status, and also was started on levophed for hypotension. Following intubation and initiation of pressors, she was admitted to the MICU for AHRF and septic shock.  In MICU, heme was consulted for hyperleukocytosis, however due to mature lymphocytic leukophoresis was not performed.  Course c/b empyema s/p chest tube placement (4/16) and removal, reaccumulation of loculated pleural effusion s/p L pigtail placement and removal on 4/27.   S/p treatment with ceftriaxone (4/18-4/25) for s. pneumo bacteremia and empyema, txd with Augmentin PO x4 weeks with completed on 5/14.  S/p PEG 5/7.  Transitioned off IV pressors and on midodrine and droxidopa.  Transferred to RCU 5/9.  S/p RRT 5/20 for hypoxia, readmitted to MICU for septic vs hemorrhagic shock requiring pressors.  Received 3 units of PRBCs, CT C/A/P w/ no active bleeding within a large multilobed heterogenous collection extending from R. lower neck into the right axilla and along the right lateral chest wall consistent with hematoma - no surgical intervention necessary.  Treated empirically with Zosyn until 5/24.  Returned to RCU 5/26. Tolerating TC ATC since 5/28. Patient evaluated by Palliative Care during the admission and patient herself wants to be full code with all life sustaining medical management. Patient downsized to #7 Cuffless Portex and had FEES Performed on 6/3; Passed for Puree with moderately thickened liquids. Patient red capped since 6/3.  RRT called after patient Self Decannulating on 6/6. Post RRT patient continues to remain stable on 0xygen therapy 2L via NC with stable ABG findings. FEES exam repeated 6/7, however unable to advance diet due to aspiration risk. Calorie Counting in progress.  Medically clear for discharge to [facility].     6/10:  Left leg pain overnight, relief with Toradol.  As per pt, this is baseline leg pain that she always took tylenol at home for.  Pt received insurance authorization for discharge.  Awaiting bed availability at facility, anticipate discharge tomorrow.  69F h/o CVA (bedbound at baseline), COPD, CHF, HTN who initially p/w acute shortness of breath to outside ED and was treated for acute pulmonary edema with sublingual nitro x 2, Lasix, and BiPAP. Pt was also found to be febrile to 103, so treated for PNA. BIPAP led to improvement in O2 to 89-90. Pt transferred to [place] on 4/16/24 for white count of 233 and possible leukophoresis. In the ED, pt intubated iso respiratory tiring and decreasing mental status, and also was started on levophed for hypotension. Following intubation and initiation of pressors, she was admitted to the MICU for AHRF and septic shock.  In MICU, heme was consulted for hyperleukocytosis, however due to mature lymphocytic leukophoresis was not performed.  Course c/b empyema s/p chest tube placement (4/16) and removal, reaccumulation of loculated pleural effusion s/p L pigtail placement and removal on 4/27.   S/p treatment with ceftriaxone (4/18-4/25) for s. pneumo bacteremia and empyema, txd with Augmentin PO x4 weeks with completed on 5/14.  S/p PEG 5/7.  Transitioned off IV pressors and on midodrine and droxidopa.  Transferred to RCU 5/9.  S/p RRT 5/20 for hypoxia, readmitted to MICU for septic vs hemorrhagic shock requiring pressors.  Received 3 units of PRBCs, CT C/A/P w/ no active bleeding within a large multilobed heterogenous collection extending from R. lower neck into the right axilla and along the right lateral chest wall consistent with hematoma - no surgical intervention necessary.  Treated empirically with Zosyn until 5/24.  Returned to RCU 5/26. Tolerating TC ATC since 5/28. Patient evaluated by Palliative Care during the admission and patient herself wants to be full code with all life sustaining medical management. Patient downsized to #7 Cuffless Portex and had FEES Performed on 6/3; Passed for Puree with moderately thickened liquids. Patient red capped since 6/3.  RRT called after patient Self Decannulating on 6/6. Post RRT patient continues to remain stable on 0xygen therapy 2L via NC with stable ABG findings. FEES exam repeated 6/7, however unable to advance diet due to aspiration risk. Calorie Counting in progress.  Medically clear for discharge to [facility].     6/10:  Left leg pain overnight, relief with Toradol.  As per pt, this is baseline leg pain that she always took tylenol at home for.  Calorie count completed - dietitian recs no changes, meeting adequate requirements.  SLP to come tomorrow prior to discharge for repeat FEEST.  Pt received insurance authorization for discharge.  Awaiting bed availability at facility, anticipate discharge tomorrow.

## 2024-06-10 NOTE — PROGRESS NOTE ADULT - PROBLEM SELECTOR PLAN 3
- Intubated on arrival to [facility] ED for respiratory tiring and AMS  - Unable to be extubated  - S/p Trach 4/28  - Patient tolerating TC ATC since 5/28; FIO2: 40 %  - Patient cleared for Supervised PMV Trials   - 6/1: Trach changed to #7 Cuffless Portex  - Patient Red Capped since 6/3; NC dec to 2 LPM  - Patient self decannulated early morning of 6/6.  Evaluated by RRT and ENT, deemed stable to remain decannulated.  Subsequent ABGs have been appropriate.  Stable on 2LNC.  - Continue Chest PT and suctioning PRN - Intubated on arrival to [facility] ED for respiratory tiring and AMS  - Unable to be extubated  - S/p Trach 4/28  - Patient tolerating TC ATC since 5/28; FIO2: 40 %  - Patient cleared for Supervised PMV Trials   - 6/1: Trach changed to #7 Cuffless Portex  - Patient Red Capped since 6/3; NC  - Patient self decannulated early morning of 6/6.  Evaluated by RRT and ENT, deemed stable to remain decannulated.  Subsequent ABGs have been appropriate.  Remains stable on 2LNC.  - Continue Chest PT and suctioning PRN

## 2024-06-10 NOTE — PROVIDER CONTACT NOTE (OTHER) - ACTION/TREATMENT ORDERED:
patient originally had 8 portex; now has 7 portex;  LR bolus ordered for low BP; see chart for rapid notes; safety maintained; patient resting now
Palliative care consult to be entered. HCP to be notified and GOC conversation to be had re: patient wishes
Start patient on duoneb and tell day team
cultures and iv tylenol to be ordered
one time dose xanax to be ordered
patient to remain on full vent support 40% fi02 to rest. no new orders at this time
Abd x-ray ordered, warm packs for comfort
NP notified. Assessed at bedside and OK to continue to use while not leaking, but will contact GI for bedside eval. Will continue to monitor patient.
Provider aware, hemorrhoid cream ordered.
Seen and examined by PA, medicated with Toradol IVP as ordered,with some relief, will closely monitor.
tylenol given, Provider wanted ekg done, ekg done and provider reviewed., no further interventions ordered.
no new orders at this time

## 2024-06-10 NOTE — PROGRESS NOTE ADULT - PROBLEM SELECTOR PLAN 4
- Patient with periods of Delirium however vastly improving.  - Continue Seroquel 25 mg BID PRN;  Seroquel 150mg QHS   - Klonopin discontinued on 5/30 with improved Delirium.  - Patient voiced concerns on being long term Seroquel as she had experienced weight gain in the past   - 5/28 ECG: QTc 474 - Patient with periods of Delirium however vastly improving.  - Continue Seroquel 25 mg BID PRN;  Seroquel 150mg QHS   - Klonopin discontinued on 5/30 with improved Delirium  - Patient voiced concerns on being long term Seroquel as she had experienced weight gain in the past   - 5/28 ECG: QTc 474

## 2024-06-10 NOTE — PROVIDER CONTACT NOTE (OTHER) - REASON
pt has bright red blood coming from hemorrhoids in anal area
Pt c/o left thigh/leg throbbing pain
Pt states slight difficulty w/breathing
WBC 94.07
patient placed back to full vent support
pt has 100.4 axillary temp
Abd discomfort
RRT for patient decannulating self
Hole in PEG
Pt Wishes
oral temperature 101, , /102
patient endorses anxiety
patient noted to be lethargic with shallow breathing. patient endorses some difficulty breathing

## 2024-06-10 NOTE — CHART NOTE - NSCHARTNOTEFT_GEN_A_CORE
Medicine PA Note     NITA FRIEDMAN  MRN-[id number]  Allergies    No Known Allergies    Intolerances      Notified by RN pt c/o L leg pain. Pt seen and examined at bedside in NAD. Pt states she is having pain in her L thigh w/ TTP. Denies pain in her R leg. Pt denies headache, chest pain, sob, n/v/d, weakness, dizziness.    Vital Signs Last 24 Hrs  T(C): 36.4 (06-10-24 @ 00:01), Max: 37.1 (06-09-24 @ 11:50)  T(F): 97.5 (06-10-24 @ 00:01), Max: 98.7 (06-09-24 @ 11:50)  HR: 103 (06-10-24 @ 00:01) (94 - 104)  BP: 107/53 (06-10-24 @ 00:01) (107/53 - 157/83)  BP(mean): --  RR: 20 (06-10-24 @ 00:01) (18 - 20)  SpO2: 97% (06-10-24 @ 00:01) (94% - 97%)                PHYSICAL EXAM:  GENERAL: NAD, well-developed, A&Ox3  EXTREMITIES: 2+ Peripheral Pulses, No clubbing, cyanosis, or edema      Assessment/Plan: HPI:  69F h/o CVA (bedbound at baseline), COPD, CHF, HTN p/w acute shortness of breath to outside ED. Initially treated for acute pulmonary edema with sublingual nitro x 2, Lasix, and BiPAP. Pt was also found to be febrile to 103, so treated for PNA. BIPAP led to improvement in O2 to 89-90. Pt now transferred to [place] for white count of 233 and plan for possible leukophoresis. In ED, pt intubated iso respiratory tiring and decreasing mental status. Also started on levo for hypotn.  (16 Apr 2024 12:59)    Now p/w L thigh pain.    # L thigh pain   > VS hemodynamically stable  > Toradol 15 mg IVP x1 given for severe pain -- pt w/ improved pain   > C/w tylenol PRN for mild pain   > Could consider VA duplex LLE to r/o DVT   > Will endorse to AM team, attending to follow    Buddy Clemons PA-C,   Department of Medicine

## 2024-06-11 ENCOUNTER — TRANSCRIPTION ENCOUNTER (OUTPATIENT)
Age: 69
End: 2024-06-11

## 2024-06-11 VITALS
DIASTOLIC BLOOD PRESSURE: 69 MMHG | TEMPERATURE: 98 F | RESPIRATION RATE: 18 BRPM | SYSTOLIC BLOOD PRESSURE: 136 MMHG | OXYGEN SATURATION: 95 % | HEART RATE: 102 BPM

## 2024-06-11 PROCEDURE — 87077 CULTURE AEROBIC IDENTIFY: CPT

## 2024-06-11 PROCEDURE — 36430 TRANSFUSION BLD/BLD COMPNT: CPT

## 2024-06-11 PROCEDURE — 83010 ASSAY OF HAPTOGLOBIN QUANT: CPT

## 2024-06-11 PROCEDURE — 83615 LACTATE (LD) (LDH) ENZYME: CPT

## 2024-06-11 PROCEDURE — C1729: CPT

## 2024-06-11 PROCEDURE — 81001 URINALYSIS AUTO W/SCOPE: CPT

## 2024-06-11 PROCEDURE — 85810 BLOOD VISCOSITY EXAMINATION: CPT

## 2024-06-11 PROCEDURE — 84157 ASSAY OF PROTEIN OTHER: CPT

## 2024-06-11 PROCEDURE — 83520 IMMUNOASSAY QUANT NOS NONAB: CPT

## 2024-06-11 PROCEDURE — 82550 ASSAY OF CK (CPK): CPT

## 2024-06-11 PROCEDURE — 82962 GLUCOSE BLOOD TEST: CPT

## 2024-06-11 PROCEDURE — 92597 ORAL SPEECH DEVICE EVAL: CPT

## 2024-06-11 PROCEDURE — 76376 3D RENDER W/INTRP POSTPROCES: CPT

## 2024-06-11 PROCEDURE — 83986 ASSAY PH BODY FLUID NOS: CPT

## 2024-06-11 PROCEDURE — 86803 HEPATITIS C AB TEST: CPT

## 2024-06-11 PROCEDURE — 83735 ASSAY OF MAGNESIUM: CPT

## 2024-06-11 PROCEDURE — 83020 HEMOGLOBIN ELECTROPHORESIS: CPT

## 2024-06-11 PROCEDURE — 81451 HL NEO GSAP 5-50 RNA ALYS: CPT

## 2024-06-11 PROCEDURE — 99233 SBSQ HOSP IP/OBS HIGH 50: CPT

## 2024-06-11 PROCEDURE — 70450 CT HEAD/BRAIN W/O DYE: CPT | Mod: MC

## 2024-06-11 PROCEDURE — 87102 FUNGUS ISOLATION CULTURE: CPT

## 2024-06-11 PROCEDURE — C1769: CPT

## 2024-06-11 PROCEDURE — 93925 LOWER EXTREMITY STUDY: CPT

## 2024-06-11 PROCEDURE — 94799 UNLISTED PULMONARY SVC/PX: CPT

## 2024-06-11 PROCEDURE — 92523 SPEECH SOUND LANG COMPREHEN: CPT

## 2024-06-11 PROCEDURE — 85045 AUTOMATED RETICULOCYTE COUNT: CPT

## 2024-06-11 PROCEDURE — 88184 FLOWCYTOMETRY/ TC 1 MARKER: CPT

## 2024-06-11 PROCEDURE — 89051 BODY FLUID CELL COUNT: CPT

## 2024-06-11 PROCEDURE — 70487 CT MAXILLOFACIAL W/DYE: CPT | Mod: MC

## 2024-06-11 PROCEDURE — 85027 COMPLETE CBC AUTOMATED: CPT

## 2024-06-11 PROCEDURE — 85362 FIBRIN DEGRADATION PRODUCTS: CPT

## 2024-06-11 PROCEDURE — 94002 VENT MGMT INPAT INIT DAY: CPT

## 2024-06-11 PROCEDURE — 94003 VENT MGMT INPAT SUBQ DAY: CPT

## 2024-06-11 PROCEDURE — 84100 ASSAY OF PHOSPHORUS: CPT

## 2024-06-11 PROCEDURE — 86901 BLOOD TYPING SEROLOGIC RH(D): CPT

## 2024-06-11 PROCEDURE — 92526 ORAL FUNCTION THERAPY: CPT

## 2024-06-11 PROCEDURE — 88271 CYTOGENETICS DNA PROBE: CPT

## 2024-06-11 PROCEDURE — 85025 COMPLETE CBC W/AUTO DIFF WBC: CPT

## 2024-06-11 PROCEDURE — C8929: CPT

## 2024-06-11 PROCEDURE — 74177 CT ABD & PELVIS W/CONTRAST: CPT | Mod: MC

## 2024-06-11 PROCEDURE — 85379 FIBRIN DEGRADATION QUANT: CPT

## 2024-06-11 PROCEDURE — 88280 CHROMOSOME KARYOTYPE STUDY: CPT

## 2024-06-11 PROCEDURE — 82553 CREATINE MB FRACTION: CPT

## 2024-06-11 PROCEDURE — 84484 ASSAY OF TROPONIN QUANT: CPT

## 2024-06-11 PROCEDURE — 87641 MR-STAPH DNA AMP PROBE: CPT

## 2024-06-11 PROCEDURE — 81450 HL NEO GSAP 5-50DNA/DNA&RNA: CPT

## 2024-06-11 PROCEDURE — 88185 FLOWCYTOMETRY/TC ADD-ON: CPT

## 2024-06-11 PROCEDURE — 31500 INSERT EMERGENCY AIRWAY: CPT

## 2024-06-11 PROCEDURE — 86664 EPSTEIN-BARR NUCLEAR ANTIGEN: CPT

## 2024-06-11 PROCEDURE — 74176 CT ABD & PELVIS W/O CONTRAST: CPT | Mod: MC

## 2024-06-11 PROCEDURE — 71250 CT THORAX DX C-: CPT | Mod: MC

## 2024-06-11 PROCEDURE — 36600 WITHDRAWAL OF ARTERIAL BLOOD: CPT

## 2024-06-11 PROCEDURE — 80048 BASIC METABOLIC PNL TOTAL CA: CPT

## 2024-06-11 PROCEDURE — 86665 EPSTEIN-BARR CAPSID VCA: CPT

## 2024-06-11 PROCEDURE — 82565 ASSAY OF CREATININE: CPT

## 2024-06-11 PROCEDURE — 81206 BCR/ABL1 GENE MAJOR BP: CPT

## 2024-06-11 PROCEDURE — 87040 BLOOD CULTURE FOR BACTERIA: CPT

## 2024-06-11 PROCEDURE — 80053 COMPREHEN METABOLIC PANEL: CPT

## 2024-06-11 PROCEDURE — 87640 STAPH A DNA AMP PROBE: CPT

## 2024-06-11 PROCEDURE — 87086 URINE CULTURE/COLONY COUNT: CPT

## 2024-06-11 PROCEDURE — 97760 ORTHOTIC MGMT&TRAING 1ST ENC: CPT

## 2024-06-11 PROCEDURE — 87070 CULTURE OTHR SPECIMN AEROBIC: CPT

## 2024-06-11 PROCEDURE — 85610 PROTHROMBIN TIME: CPT

## 2024-06-11 PROCEDURE — 86663 EPSTEIN-BARR ANTIBODY: CPT

## 2024-06-11 PROCEDURE — 32557 INSERT CATH PLEURA W/ IMAGE: CPT

## 2024-06-11 PROCEDURE — 86923 COMPATIBILITY TEST ELECTRIC: CPT

## 2024-06-11 PROCEDURE — 92612 ENDOSCOPY SWALLOW (FEES) VID: CPT

## 2024-06-11 PROCEDURE — 87150 DNA/RNA AMPLIFIED PROBE: CPT

## 2024-06-11 PROCEDURE — 85730 THROMBOPLASTIN TIME PARTIAL: CPT

## 2024-06-11 PROCEDURE — 85014 HEMATOCRIT: CPT

## 2024-06-11 PROCEDURE — 84295 ASSAY OF SERUM SODIUM: CPT

## 2024-06-11 PROCEDURE — 92507 TX SP LANG VOICE COMM INDIV: CPT

## 2024-06-11 PROCEDURE — P9040: CPT

## 2024-06-11 PROCEDURE — 88275 CYTOGENETICS 100-300: CPT

## 2024-06-11 PROCEDURE — 71260 CT THORAX DX C+: CPT | Mod: MC

## 2024-06-11 PROCEDURE — 86850 RBC ANTIBODY SCREEN: CPT

## 2024-06-11 PROCEDURE — 87075 CULTR BACTERIA EXCEPT BLOOD: CPT

## 2024-06-11 PROCEDURE — 83605 ASSAY OF LACTIC ACID: CPT

## 2024-06-11 PROCEDURE — 94640 AIRWAY INHALATION TREATMENT: CPT

## 2024-06-11 PROCEDURE — 82533 TOTAL CORTISOL: CPT

## 2024-06-11 PROCEDURE — 82784 ASSAY IGA/IGD/IGG/IGM EACH: CPT

## 2024-06-11 PROCEDURE — 86357 NK CELLS TOTAL COUNT: CPT

## 2024-06-11 PROCEDURE — 85018 HEMOGLOBIN: CPT

## 2024-06-11 PROCEDURE — 99291 CRITICAL CARE FIRST HOUR: CPT

## 2024-06-11 PROCEDURE — 82947 ASSAY GLUCOSE BLOOD QUANT: CPT

## 2024-06-11 PROCEDURE — 82042 OTHER SOURCE ALBUMIN QUAN EA: CPT

## 2024-06-11 PROCEDURE — 81264 IGK REARRANGEABN CLONAL POP: CPT

## 2024-06-11 PROCEDURE — 83880 ASSAY OF NATRIURETIC PEPTIDE: CPT

## 2024-06-11 PROCEDURE — 93308 TTE F-UP OR LMTD: CPT

## 2024-06-11 PROCEDURE — 81003 URINALYSIS AUTO W/O SCOPE: CPT

## 2024-06-11 PROCEDURE — 74018 RADEX ABDOMEN 1 VIEW: CPT

## 2024-06-11 PROCEDURE — 82803 BLOOD GASES ANY COMBINATION: CPT

## 2024-06-11 PROCEDURE — 84132 ASSAY OF SERUM POTASSIUM: CPT

## 2024-06-11 PROCEDURE — 97530 THERAPEUTIC ACTIVITIES: CPT

## 2024-06-11 PROCEDURE — 93971 EXTREMITY STUDY: CPT

## 2024-06-11 PROCEDURE — 87449 NOS EACH ORGANISM AG IA: CPT

## 2024-06-11 PROCEDURE — 71045 X-RAY EXAM CHEST 1 VIEW: CPT

## 2024-06-11 PROCEDURE — 85652 RBC SED RATE AUTOMATED: CPT

## 2024-06-11 PROCEDURE — 87637 SARSCOV2&INF A&B&RSV AMP PRB: CPT

## 2024-06-11 PROCEDURE — 93321 DOPPLER ECHO F-UP/LMTD STD: CPT

## 2024-06-11 PROCEDURE — 92610 EVALUATE SWALLOWING FUNCTION: CPT

## 2024-06-11 PROCEDURE — 84550 ASSAY OF BLOOD/URIC ACID: CPT

## 2024-06-11 PROCEDURE — 36410 VNPNXR 3YR/> PHY/QHP DX/THER: CPT

## 2024-06-11 PROCEDURE — 86403 PARTICLE AGGLUT ANTBDY SCRN: CPT

## 2024-06-11 PROCEDURE — 97166 OT EVAL MOD COMPLEX 45 MIN: CPT

## 2024-06-11 PROCEDURE — 84478 ASSAY OF TRIGLYCERIDES: CPT

## 2024-06-11 PROCEDURE — 81245 FLT3 GENE: CPT

## 2024-06-11 PROCEDURE — 82728 ASSAY OF FERRITIN: CPT

## 2024-06-11 PROCEDURE — 81246 FLT3 GENE ANALYSIS: CPT

## 2024-06-11 PROCEDURE — 97535 SELF CARE MNGMENT TRAINING: CPT

## 2024-06-11 PROCEDURE — 88264 CHROMOSOME ANALYSIS 20-25: CPT

## 2024-06-11 PROCEDURE — 0225U NFCT DS DNA&RNA 21 SARSCOV2: CPT

## 2024-06-11 PROCEDURE — 81261 IGH GENE REARRANGE AMP METH: CPT

## 2024-06-11 PROCEDURE — L8699: CPT

## 2024-06-11 PROCEDURE — 93005 ELECTROCARDIOGRAM TRACING: CPT

## 2024-06-11 PROCEDURE — 93970 EXTREMITY STUDY: CPT

## 2024-06-11 PROCEDURE — 80061 LIPID PANEL: CPT

## 2024-06-11 PROCEDURE — 82435 ASSAY OF BLOOD CHLORIDE: CPT

## 2024-06-11 PROCEDURE — 85384 FIBRINOGEN ACTIVITY: CPT

## 2024-06-11 PROCEDURE — 81207 BCR/ABL1 GENE MINOR BP: CPT

## 2024-06-11 PROCEDURE — 36415 COLL VENOUS BLD VENIPUNCTURE: CPT

## 2024-06-11 PROCEDURE — 87205 SMEAR GRAM STAIN: CPT

## 2024-06-11 PROCEDURE — 86900 BLOOD TYPING SEROLOGIC ABO: CPT

## 2024-06-11 PROCEDURE — 88237 TISSUE CULTURE BONE MARROW: CPT

## 2024-06-11 PROCEDURE — 87186 SC STD MICRODIL/AGAR DIL: CPT

## 2024-06-11 PROCEDURE — 71275 CT ANGIOGRAPHY CHEST: CPT | Mod: MC

## 2024-06-11 PROCEDURE — 97110 THERAPEUTIC EXERCISES: CPT

## 2024-06-11 PROCEDURE — 97162 PT EVAL MOD COMPLEX 30 MIN: CPT

## 2024-06-11 PROCEDURE — 82945 GLUCOSE OTHER FLUID: CPT

## 2024-06-11 PROCEDURE — 82330 ASSAY OF CALCIUM: CPT

## 2024-06-11 RX ADMIN — Medication 25 MILLIGRAM(S): at 17:41

## 2024-06-11 RX ADMIN — ENOXAPARIN SODIUM 40 MILLIGRAM(S): 100 INJECTION SUBCUTANEOUS at 17:41

## 2024-06-11 RX ADMIN — Medication 500 MILLIGRAM(S): at 12:27

## 2024-06-11 RX ADMIN — AMLODIPINE BESYLATE 5 MILLIGRAM(S): 2.5 TABLET ORAL at 05:20

## 2024-06-11 RX ADMIN — PANTOPRAZOLE SODIUM 40 MILLIGRAM(S): 20 TABLET, DELAYED RELEASE ORAL at 17:42

## 2024-06-11 RX ADMIN — Medication 25 MILLIGRAM(S): at 05:20

## 2024-06-11 RX ADMIN — CHLORHEXIDINE GLUCONATE 1 APPLICATION(S): 213 SOLUTION TOPICAL at 05:19

## 2024-06-11 RX ADMIN — PANTOPRAZOLE SODIUM 40 MILLIGRAM(S): 20 TABLET, DELAYED RELEASE ORAL at 05:20

## 2024-06-11 RX ADMIN — Medication 1 TABLET(S): at 12:27

## 2024-06-11 RX ADMIN — ENOXAPARIN SODIUM 40 MILLIGRAM(S): 100 INJECTION SUBCUTANEOUS at 05:20

## 2024-06-11 NOTE — PROGRESS NOTE ADULT - PROBLEM SELECTOR PLAN 3
- Intubated on arrival to [facility] ED for respiratory tiring and AMS  - Unable to be extubated  - S/p Trach 4/28  - Patient tolerating TC ATC since 5/28; FIO2: 40 %  - Patient cleared for Supervised PMV Trials   - 6/1: Trach changed to #7 Cuffless Portex  - Patient Red Capped since 6/3; NC  - Patient self decannulated early morning of 6/6.  Evaluated by RRT and ENT, deemed stable to remain decannulated.  Subsequent ABGs have been appropriate.  Remains stable on 2LNC.  - Continue Chest PT and suctioning PRN

## 2024-06-11 NOTE — PROGRESS NOTE ADULT - NS ATTEND OPT1 GEN_ALL_CORE
I attest my time as attending is greater than 50% of the total combined time spent on qualifying patient care activities by the PA/NP and attending.
I independently performed the documented:
I attest my time as attending is greater than 50% of the total combined time spent on qualifying patient care activities by the PA/NP and attending.
I attest my time as attending is greater than 50% of the total combined time spent on qualifying patient care activities by the PA/NP and attending.
I independently performed the documented:
I independently performed the documented:
I attest my time as attending is greater than 50% of the total combined time spent on qualifying patient care activities by the PA/NP and attending.
I independently performed the documented:
I attest my time as attending is greater than 50% of the total combined time spent on qualifying patient care activities by the PA/NP and attending.
I independently performed the documented:
I attest my time as attending is greater than 50% of the total combined time spent on qualifying patient care activities by the PA/NP and attending.
I independently performed the documented:
I attest my time as attending is greater than 50% of the total combined time spent on qualifying patient care activities by the PA/NP and attending.
I independently performed the documented:

## 2024-06-11 NOTE — PROGRESS NOTE ADULT - TIME-BASED BILLING (NON-CRITICAL CARE)
Time-based billing (NON-critical care)

## 2024-06-11 NOTE — SWALLOW VFSS/MBS ASSESSMENT ADULT - COMMENTS
69F h/o CVA (bedbound at baseline), COPD, CHF, HTN presenting as transfer from [facility] for SOB iso leukocytosis to 233 c/f acute leukemia. Pt intubated and on pressors, transferred to MICU for further management, course c/b empyema s/p chest tube placement (4/16) and removal, reaccumulation of loculated pleural effusion s/p L pigtail placement and removal on 4/27, s/p trach on 4/27. S/p EGD/PEG with GI, transferred to RCU on 5/9. Readmitted to MICU for septic vs hemorrhagic shock.  Now hgb has been trending in 7.1-7.7. Pt weaned off sedation and pressors. Heme following for pt's leukocytosis 2/2 to CLL. Pt empirically on zosyn for possible septic shock, discontinued on 5/24. CTA C/A/P showed no active bleeding within a large multilobed heterogenous collection extending from R. lower neck into the right axilla and along right lateral chest wall c/w hematoma. General surgery consulted for hematoma, no surgical intervention at this time. No need for IR embolization with no active bleed seen on CT.  +CLL (chronic lymphocytic leukemia).   5/28/24 Pt tolerated PMV trial with SLP. See previous notes for additional info. 5/29/24 Per Nsg, Pt has tiny hole in PEG tube that leaks when bent a certain way. PEG feeds and medication able to infuse without any issues, unless bent. WBC 65.86.  6/1/24 Trach changed to #7 cuffless Portex. Patient tolerating TC ATC since 5/28; FIO2: 40 % per Pulm f/u; Patient evaluated by Palliative care discussion had while wearing PMV and she was able to make her wishes known. Patient wants to remain full code and continue medical management. Patient with appropriate mentation and tolerating PMV throughout the day. 6/6/24 Pt self decannulated; ENT f/u.

## 2024-06-11 NOTE — SWALLOW VFSS/MBS ASSESSMENT ADULT - DIAGNOSTIC IMPRESSIONS
Pt is a 69 y.o. female with  h/o CLL and a complicated hospital course remarkable for SOB iso leukocytosis with c/f acute leukemia. Pt intubated; s/p trach on 4/27; self-decannulated on 6/6. Most recent FEES on 6/7 with recommendations for puree with moderately thick liquids. Pt seen today to determine candidacy for diet upgrade prior to d/c to [facility]. Swallow function has improved since the most recent exam, however, pt continues to present with an oropharyngeal dysphagia. There is spillover across liquid trials to the hypopharynx.  +Silent aspiration of thin liquids; deep laryngeal penetration to the vocal cords with incomplete retrieval with mildly thick liquids via teaspoon. A bolus hold was effective in eliminating penetration with tsp trials of mildly thick liquids only. There is mild pharyngeal stasis with solids which is effectively reduced upon volitional secondary swallows. No aspiration of purees, minced/moist, easy to chew solids, and moderately thick liquids.    Disorders: reduced lingual strength/ROM/Rate of motion, reduced BOT to posterior pharyngeal wall contact, delay in trigger of the swallow reflex, reduced hyo-laryngeal excursion, reduced laryngeal closure, reduced supraglottic sensation, reduced subglottic sensation.

## 2024-06-11 NOTE — SWALLOW VFSS/MBS ASSESSMENT ADULT - RECOMMENDED FEEDING/EATING TECHNIQUES
allow for swallow between intakes/alternate food with liquid/maintain upright posture during/after eating for 30 mins/oral hygiene/position upright (90 degrees)/provide rest periods between swallows

## 2024-06-11 NOTE — PROGRESS NOTE ADULT - PROBLEM SELECTOR PROBLEM 1
Empyema lung
Tracheostomy in place
Empyema lung
Functional quadriplegia
Empyema lung
Empyema lung
Functional quadriplegia
Empyema lung
Functional quadriplegia
Empyema lung

## 2024-06-11 NOTE — PROGRESS NOTE ADULT - TIME BILLING
time spent in review of laboratory data, radiology images and results, discussion with primary team/patient, and monitoring for potential decompensation. Interventions performed as documented above. In addition, time also spent to risks and benefits of the procedure, alternative procedures, diagnostic and therapeutic outcomes as well as further management plan. Complex patient requiring services. Interventional Pulmonology services provided to the patient were separate from general pulmonary service due to complexity of issues and interventions required. Time spent separate from time spent doing any procedures.
review of laboratory data, radiology results, discussion with primary team\patient, and monitoring for potential decompensation. Interventions were performed as documented above
time spent in review of laboratory data, radiology images and results, discussion with primary team/patient, and monitoring for potential decompensation. Interventions performed as documented above. In addition, time also spent to risks and benefits of the procedure, alternative procedures, diagnostic and therapeutic outcomes as well as further management plan. Complex patient requiring services. Interventional Pulmonology services provided to the patient were separate from general pulmonary service due to complexity of issues and interventions required. Time spent separate from time spent doing any procedures.
coordinating care
chart and data review, clinical assessment, and coordination of care. This excludes any time spent on separate procedures or teaching.
coordinating care
review of laboratory data, radiology results, discussion with primary team\patient, and monitoring for potential decompensation. Interventions were performed as documented above
Personal review of data, imaging and discussion with medical team.
Personal review of data, imaging and discussion with medical team.
Review of patient records, including history, laboratory data, and imaging. Patient evaluation and assessment. Coordination of care. Time excludes teaching or procedures.
Symptom assessment and management, supportive counseling, coordination of care
Review of patient records, including history, laboratory data, and imaging. Patient evaluation and assessment. Coordination of care. Time excludes teaching or procedures.
Review of patient records, including history, laboratory data, and imaging. Patient evaluation and assessment. Coordination of care. Time excludes teaching or procedures.
Review of patient records, including history, laboratory data, imaging. Patient evaluation and assessment.   Coordination of care. Time excludes teaching or procedures.
Review of patient records, including history, laboratory data, and imaging. Patient evaluation and assessment. Coordination of care. Time excludes teaching or procedures.
review of laboratory data, radiology results, discussion with primary team\patient, and monitoring for potential decompensation. Interventions were performed as documented above
review of the medical record, interpretation of labs, imaging studies, discussion with interdisciplinary team, physical exam and medical decision making as above
Review of patient records, including history, laboratory data, imaging. Patient evaluation and assessment.   Coordination of care. Time excludes teaching or procedures.
Review of patient records, including history, laboratory data, and imaging. Patient evaluation and assessment. Coordination of care. Time excludes teaching or procedures.
review of the medical record, interpretation of labs, imaging studies, discussion with interdisciplinary team, physical exam and medical decision making as above
review of laboratory data, radiology results, discussion with primary team\patient, and monitoring for potential decompensation. Interventions were performed as documented above
Review of patient records, including history, laboratory data, and imaging. Patient evaluation and assessment. Coordination of care. Time excludes teaching or procedures.
review of laboratory data, radiology results, discussion with primary team\patient, and monitoring for potential decompensation. Interventions were performed as documented above
Personal review of data, imaging and discussion with medical team.
review of laboratory data, radiology results, discussion with primary team\patient, and monitoring for potential decompensation. Interventions were performed as documented above
Symptom assessment and management, supportive counseling, coordination of care
Personal review of data, imaging and discussion with medical team.
Symptom assessment and management, supportive counseling, coordination of care

## 2024-06-11 NOTE — PROGRESS NOTE ADULT - PROBLEM SELECTOR PROBLEM 7
Advanced care planning/counseling discussion
Hematoma
Advanced care planning/counseling discussion
Hematoma
Advanced care planning/counseling discussion
Hematoma

## 2024-06-11 NOTE — PROGRESS NOTE ADULT - ASSESSMENT
69F h/o CVA (bedbound at baseline), COPD, CHF, HTN who initially p/w acute shortness of breath to outside ED and was treated for acute pulmonary edema with sublingual nitro x 2, Lasix, and BiPAP. Pt was also found to be febrile to 103, so treated for PNA. BIPAP led to improvement in O2 to 89-90. Pt transferred to [place] on 4/16/24 for white count of 233 and possible leukophoresis. In the ED, pt intubated iso respiratory tiring and decreasing mental status, and also was started on levophed for hypotension. Following intubation and initiation of pressors, she was admitted to the MICU for AHRF and septic shock.  In MICU, heme was consulted for hyperleukocytosis, however due to mature lymphocytic leukophoresis was not performed.  Course c/b empyema s/p chest tube placement (4/16) and removal, reaccumulation of loculated pleural effusion s/p L pigtail placement and removal on 4/27.   S/p treatment with ceftriaxone (4/18-4/25) for s. pneumo bacteremia and empyema, txd with Augmentin PO x4 weeks with completed on 5/14.  S/p PEG 5/7.  Transitioned off IV pressors and on midodrine and droxidopa.  Transferred to RCU 5/9.  S/p RRT 5/20 for hypoxia, readmitted to MICU for septic vs hemorrhagic shock requiring pressors.  Received 3 units of PRBCs, CT C/A/P w/ no active bleeding within a large multilobed heterogenous collection extending from R. lower neck into the right axilla and along the right lateral chest wall consistent with hematoma - no surgical intervention necessary.  Treated empirically with Zosyn until 5/24.  Returned to RCU 5/26. Tolerating TC ATC since 5/28. Patient evaluated by Palliative Care during the admission and patient herself wants to be full code with all life sustaining medical management. Patient downsized to #7 Cuffless Portex and had FEES Performed on 6/3; Passed for Puree with moderately thickened liquids. Patient red capped since 6/3.  RRT called after patient Self Decannulating on 6/6. Post RRT patient continues to remain stable on 0xygen therapy 2L via NC with stable ABG findings. FEES exam repeated 6/7, however unable to advance diet due to aspiration risk. Calorie Counting in progress.  Medically clear for discharge to [facility].     6/10:  Left leg pain overnight, relief with Toradol.  As per pt, this is baseline leg pain that she always took tylenol at home for.  Calorie count completed - dietitian recs no changes, meeting adequate requirements.  SLP to come tomorrow prior to discharge for repeat FEEST.  Pt received insurance authorization for discharge.  Awaiting bed availability at facility, anticipate discharge tomorrow.  69F h/o CVA (bedbound at baseline), COPD, CHF, HTN who initially p/w acute shortness of breath to outside ED and was treated for acute pulmonary edema with sublingual nitro x 2, Lasix, and BiPAP. Pt was also found to be febrile to 103, so treated for PNA. BIPAP led to improvement in O2 to 89-90. Pt transferred to [place] on 4/16/24 for white count of 233 and possible leukophoresis. In the ED, pt intubated iso respiratory tiring and decreasing mental status, and also was started on levophed for hypotension. Following intubation and initiation of pressors, she was admitted to the MICU for AHRF and septic shock.  In MICU, heme was consulted for hyperleukocytosis, however due to mature lymphocytic leukophoresis was not performed.  Course c/b empyema s/p chest tube placement (4/16) and removal, reaccumulation of loculated pleural effusion s/p L pigtail placement and removal on 4/27.   S/p treatment with ceftriaxone (4/18-4/25) for s. pneumo bacteremia and empyema, txd with Augmentin PO x4 weeks with completed on 5/14.  S/p PEG 5/7.  Transitioned off IV pressors and on midodrine and droxidopa.  Transferred to RCU 5/9.  S/p RRT 5/20 for hypoxia, readmitted to MICU for septic vs hemorrhagic shock requiring pressors.  Received 3 units of PRBCs, CT C/A/P w/ no active bleeding within a large multilobed heterogenous collection extending from R. lower neck into the right axilla and along the right lateral chest wall consistent with hematoma - no surgical intervention necessary.  Treated empirically with Zosyn until 5/24.  Returned to RCU 5/26. Tolerating TC ATC since 5/28. Patient evaluated by Palliative Care during the admission and patient herself wants to be full code with all life sustaining medical management. Patient downsized to #7 Cuffless Portex and had FEES Performed on 6/3; Passed for Puree with moderately thickened liquids. Patient red capped since 6/3.  RRT called after patient Self Decannulating on 6/6. Post RRT patient continues to remain stable on 0xygen therapy 2L via NC with stable ABG findings. FEES exam repeated 6/7, however unable to advance diet due to aspiration risk. Calorie Counting in progress.  Medically clear for discharge to [facility].       6/11:  FEESST this am, advanced to easy to chew with mildy thickened liquids - will need to continue swallow studied at rehab.  Pt initially planned for discharge today, discharge was pushed as pt refused rehab facility she had initially chosen.   69F h/o CVA (bedbound at baseline), COPD, CHF, HTN who initially p/w acute shortness of breath to outside ED and was treated for acute pulmonary edema with sublingual nitro x 2, Lasix, and BiPAP. Pt was also found to be febrile to 103, so treated for PNA. BIPAP led to improvement in O2 to 89-90. Pt transferred to [place] on 4/16/24 for white count of 233 and possible leukophoresis. In the ED, pt intubated iso respiratory tiring and decreasing mental status, and also was started on levophed for hypotension. Following intubation and initiation of pressors, she was admitted to the MICU for AHRF and septic shock.  In MICU, heme was consulted for hyperleukocytosis, however due to mature lymphocytic leukophoresis was not performed.  Course c/b empyema s/p chest tube placement (4/16) and removal, reaccumulation of loculated pleural effusion s/p L pigtail placement and removal on 4/27.   S/p treatment with ceftriaxone (4/18-4/25) for s. pneumo bacteremia and empyema, txd with Augmentin PO x4 weeks with completed on 5/14.  S/p PEG 5/7.  Transitioned off IV pressors and on midodrine and droxidopa.  Transferred to RCU 5/9.  S/p RRT 5/20 for hypoxia, readmitted to MICU for septic vs hemorrhagic shock requiring pressors.  Received 3 units of PRBCs, CT C/A/P w/ no active bleeding within a large multilobed heterogenous collection extending from R. lower neck into the right axilla and along the right lateral chest wall consistent with hematoma - no surgical intervention necessary.  Treated empirically with Zosyn until 5/24.  Returned to RCU 5/26. Tolerating TC ATC since 5/28. Patient evaluated by Palliative Care during the admission and patient herself wants to be full code with all life sustaining medical management. Patient downsized to #7 Cuffless Portex and had FEES Performed on 6/3; Passed for Puree with moderately thickened liquids. Patient red capped since 6/3.  RRT called after patient Self Decannulating on 6/6. Post RRT patient continues to remain stable on 0xygen therapy 2L via NC with stable ABG findings. FEES exam repeated 6/7, however unable to advance diet due to aspiration risk. Calorie Counting in progress.  Medically clear for discharge to [facility].       6/11:  FEESST this am, advanced to easy to chew with mildy thickened liquids - will need to continue swallow studied at rehab.  Pt initially planned for discharge today, discharge was pushed as pt refused rehab facility she had initially chosen.  Pt accepted and received authorization for new facility and discharge will b this afternoon.

## 2024-06-11 NOTE — PROGRESS NOTE ADULT - PROBLEM SELECTOR PLAN 5
- Hypotensive on admission   - required pressors in ICU  - Midodrine d/c'd on 5/30  - BP remains stable; continue to monitor

## 2024-06-11 NOTE — PROGRESS NOTE ADULT - PROBLEM SELECTOR PLAN 8
- S/p PEG 5/7 by GI   - FEES 6/3: Passed for Puree and Moderately thickened Liquids   - 6/7: Repeat FEEST performed however patient was not cleared for advancement of diet.  Tube feeds discontinued. - 6/7-6/9 calorie count - pt meeting nutritional needs with PO intake, continue diet as is - S/p PEG 5/7 by GI   - FEES 6/3: Passed for Puree and Moderately thickened Liquids   - 6/7 Repeat FEEST performed however patient was not cleared for advancement of diet.  Tube feeds discontinued. - 6/7-6/9 calorie count - pt meeting nutritional needs with PO intake, continue diet as is.  - 6/11 Repeat FEESST - advanced to easy to chew and mildly thickened  - will need to follow up on outpatient basis with swallow studies to continue to advance diet as tolerated

## 2024-06-11 NOTE — SWALLOW VFSS/MBS ASSESSMENT ADULT - ASPIRATION DURING THE SWALLOW - SILENT
visualized blue tinged material subglottically; SILENT; poor expectorant value upon cued coughs/throat clears/Trace/Mild

## 2024-06-11 NOTE — PROGRESS NOTE ADULT - PROBLEM SELECTOR PLAN 4
- Patient with periods of Delirium however vastly improving.  - Continue Seroquel 25 mg BID PRN;  Seroquel 150mg QHS   - Klonopin discontinued on 5/30 with improved Delirium  - Patient voiced concerns on being long term Seroquel as she had experienced weight gain in the past   - 5/28 ECG: QTc 474

## 2024-06-11 NOTE — PROGRESS NOTE ADULT - PROBLEM SELECTOR PLAN 2
- On admission WBC count 233  - transferred to [facility] for possible leukophoresis  - as per heme/onc - underlying CLL (Oct 2023) with reactive lymphocytosis 2/2 sepsis  - heme/onc deferred leukophoresis due to mature lymphocytes; IVIG given 4/26/24  - CLL under control and plan for outpatient follow up at [facility] when patient closer to d/c

## 2024-06-11 NOTE — SWALLOW VFSS/MBS ASSESSMENT ADULT - ADDITIONAL RECOMMENDATIONS
Monitor for s/s aspiration/laryngeal penetration. If noted:  D/C p.o. intake, provide non-oral nutrition/hydration/meds, and contact this service @ [phone]  Maintain good oral hygiene  LTG: Pt will tolerate the least restrictive diet without overt signs or symptoms of penetration or aspiration

## 2024-06-11 NOTE — SWALLOW VFSS/MBS ASSESSMENT ADULT - ROSENBEK'S PENETRATION ASPIRATION SCALE
(8) contrast passes glottis, visible subglottic residue remains, absent patient response (aspiration) A bolus hold with tsp is effective in eliminating penetration with mildly thick liquids (PAS 1)/(5) contrast contacts vocal cords, visible residue remains (penetration) (1) no aspiration, contrast does not enter airway

## 2024-06-11 NOTE — DISCHARGE NOTE NURSING/CASE MANAGEMENT/SOCIAL WORK - PATIENT PORTAL LINK FT
You can access the FollowMyHealth Patient Portal offered by [facility] by registering at the following website: [email or web address]. By joining [facility]’s FollowMyHealth portal, you will also be able to view your health information using other applications (apps) compatible with our system.

## 2024-06-11 NOTE — PROGRESS NOTE ADULT - PROBLEM SELECTOR PLAN 7
- RT UE Hematoma   - [facility] Duplex 5/28: Superficial thrombophlebitis involving the right cephalic and basilic veins  - CTA 5/22: Unchanged size of the large right axillary hematoma extends to the right supraclavicular space  - No evidence of Active Bleeding noted on CT Imaging   - Continue to monitor Site and H+H
- full code  - Aunt Aneida involved in decision making   - palliative care has been following
- RT UE Hematoma   - VA Duplex 5/28: Superficial thrombophlebitis involving the right cephalic and basilic veins  - CTA 5/22: Unchanged size of the large right axillary hematoma extends to the right supraclavicular space  - No evidence of Active Bleeding noted on CT Imaging   - Continue to monitor Site and H+H
- full code  - Aunt Aneida involved in decision making   - palliative care has been following
- RT UE Hematoma   - VA Duplex 5/28: Superficial thrombophlebitis involving the right cephalic and basilic veins  - CTA 5/22: Unchanged size of the large right axillary hematoma extends to the right supraclavicular space  - No evidence of Active Bleeding noted on CT Imaging   - Continue to monitor Site and H+H
- full code  - Aunt Aneida involved in decision making   - palliative care has been following
- RT UE Hematoma   - VA Duplex 5/28: Superficial thrombophlebitis involving the right cephalic and basilic veins  - CTA 5/22: Unchanged size of the large right axillary hematoma extends to the right supraclavicular space  - No evidence of Active Bleeding noted on CT Imaging   - Continue to monitor Site and H+H
- full code  - Aunt Aneida involved in decision making   - palliative care has been following
- full code  - Aunt Aneida involved in decision making   - palliative care has been following
- RT UE Hematoma   - VA Duplex 5/28: Superficial thrombophlebitis involving the right cephalic and basilic veins  - CTA 5/22: Unchanged size of the large right axillary hematoma extends to the right supraclavicular space  - No evidence of Active Bleeding noted on CT Imaging   - Continue to monitor Site and H+H
- full code  - Aunt Aneida involved in decision making   - palliative care has been following

## 2024-06-11 NOTE — PROGRESS NOTE ADULT - SUBJECTIVE AND OBJECTIVE BOX
Patient is a 69y old  Female who presents with a chief complaint of Transfer for leukophoresis (10 Gabriel 2024 07:25)      Interval Events:    REVIEW OF SYSTEMS:  [ ] Positive  [ ] All other systems negative  [ ] Unable to assess ROS because ________    Vital Signs Last 24 Hrs  T(C): 36.2 (06-11-24 @ 04:20), Max: 37.2 (06-11-24 @ 00:15)  T(F): 97.2 (06-11-24 @ 04:20), Max: 98.9 (06-11-24 @ 00:15)  HR: 98 (06-11-24 @ 04:20) (95 - 102)  BP: 129/82 (06-11-24 @ 04:20) (108/64 - 167/80)  RR: 18 (06-11-24 @ 04:20) (18 - 20)  SpO2: 97% (06-11-24 @ 04:20) (95% - 99%)    PHYSICAL EXAM:  HEENT:   [ ]Tracheostomy:  [ ]Pupils equal  [ ]No oral lesions  [ ]Abnormal    SKIN  [ ]No Rash  [ ] Abnormal  [ ] pressure    CARDIAC  [ ]Regular  [ ]Abnormal    PULMONARY  [ ]Bilateral Clear Breath Sounds  [ ]Normal Excursion  [ ]Abnormal    GI  [ ]PEG      [ ] +BS		              [ ]Soft, nondistended, nontender	  [ ]Abnormal    MUSCULOSKELETAL                                   [ ]Bedbound                 [ ]Abnormal    [ ]Ambulatory/OOB to chair                           EXTREMITIES                                         [ ]Normal  [ ]Edema                           NEUROLOGIC  [ ] Normal, non focal  [ ] Focal findings:    PSYCHIATRIC  [ ]Alert and appropriate  [ ] Sedated	 [ ]Agitated    :  Andino: [ ] Yes, if yes: Date of Placement:                   [  ] No    LINES: Central Lines [ ] Yes, if yes: Date of Placement                                     [  ] No    HOSPITAL MEDICATIONS:  MEDICATIONS  (STANDING):  amLODIPine   Tablet 5 milliGRAM(s) Oral daily  ascorbic acid 500 milliGRAM(s) Oral daily  chlorhexidine 4% Liquid 1 Application(s) Topical every 12 hours  enoxaparin Injectable 40 milliGRAM(s) SubCutaneous every 12 hours  metoprolol tartrate 25 milliGRAM(s) Oral two times a day  multivitamin 1 Tablet(s) Oral daily  pantoprazole  Injectable 40 milliGRAM(s) IV Push two times a day  QUEtiapine 150 milliGRAM(s) Oral <User Schedule>    MEDICATIONS  (PRN):  nystatin Powder 1 Application(s) Topical two times a day PRN skin irritation  QUEtiapine 25 milliGRAM(s) Oral two times a day PRN agitation  sodium chloride 0.9% lock flush 10 milliLiter(s) IV Push every 1 hour PRN Pre/post blood products, medications, blood draw, and to maintain line patency      LABS:                        9.7    36.28 )-----------( 138      ( 10 Gabriel 2024 06:56 )             31.8     06-10    144  |  103  |  8   ----------------------------<  73  3.9   |  29  |  0.39<L>    Ca    9.4      10 Gabreil 2024 06:53  Phos  4.1     06-10  Mg     2.0     06-10        Urinalysis Basic - ( 10 Gabriel 2024 06:53 )    Color: x / Appearance: x / SG: x / pH: x  Gluc: 73 mg/dL / Ketone: x  / Bili: x / Urobili: x   Blood: x / Protein: x / Nitrite: x   Leuk Esterase: x / RBC: x / WBC x   Sq Epi: x / Non Sq Epi: x / Bacteria: x          CAPILLARY BLOOD GLUCOSE    MICROBIOLOGY:     RADIOLOGY:  [ ] Reviewed and interpreted by me     Patient is a 69y old  Female who presents with a chief complaint of Transfer for leukophoresis (10 Gabriel 2024 07:25)      Interval Events:  No acute events overnight.     REVIEW OF SYSTEMS:  [ ] Positive  [X] All other systems negative  [ ] Unable to assess ROS because ________    Vital Signs Last 24 Hrs  T(C): 36.2 (06-11-24 @ 04:20), Max: 37.2 (06-11-24 @ 00:15)  T(F): 97.2 (06-11-24 @ 04:20), Max: 98.9 (06-11-24 @ 00:15)  HR: 98 (06-11-24 @ 04:20) (95 - 102)  BP: 129/82 (06-11-24 @ 04:20) (108/64 - 167/80)  RR: 18 (06-11-24 @ 04:20) (18 - 20)  SpO2: 97% (06-11-24 @ 04:20) (95% - 99%)    PHYSICAL EXAM:  HEENT:   [X]Tracheostomy: decannulated - dressing removed from site, site appears c/d/i  [X]Pupils equal  [ ]No oral lesions  [ ]Abnormal    SKIN  [X]No Rash  [ ] Abnormal  [ ] pressure    CARDIAC  [X]Regular  [ ]Abnormal    PULMONARY  [X]Bilateral Clear Breath Sounds  [ ]Normal Excursion  [ ]Abnormal    GI  [X]PEG      [X] +BS		              [X]Soft, nondistended, nontender	  [ ]Abnormal    MUSCULOSKELETAL                                   [X]Bedbound                 [ ]Abnormal    [ ]Ambulatory/OOB to chair                           EXTREMITIES                                         [ ]Normal  [X]Edema - LLE                     NEUROLOGIC  [X] Normal, non focal  [ ] Focal findings:    PSYCHIATRIC  [X] Alert and appropriate  [ ] Sedated	 [ ]Agitated    :  Andino: [ ] Yes, if yes: Date of Placement:                   [X] No    LINES: Central Lines [ ] Yes, if yes: Date of Placement                                     [X] No    HOSPITAL MEDICATIONS:  MEDICATIONS  (STANDING):  amLODIPine   Tablet 5 milliGRAM(s) Oral daily  ascorbic acid 500 milliGRAM(s) Oral daily  chlorhexidine 4% Liquid 1 Application(s) Topical every 12 hours  enoxaparin Injectable 40 milliGRAM(s) SubCutaneous every 12 hours  metoprolol tartrate 25 milliGRAM(s) Oral two times a day  multivitamin 1 Tablet(s) Oral daily  pantoprazole  Injectable 40 milliGRAM(s) IV Push two times a day  QUEtiapine 150 milliGRAM(s) Oral <User Schedule>    MEDICATIONS  (PRN):  nystatin Powder 1 Application(s) Topical two times a day PRN skin irritation  QUEtiapine 25 milliGRAM(s) Oral two times a day PRN agitation  sodium chloride 0.9% lock flush 10 milliLiter(s) IV Push every 1 hour PRN Pre/post blood products, medications, blood draw, and to maintain line patency    LABS:                        9.7    36.28 )-----------( 138      ( 10 Gabriel 2024 06:56 )             31.8     06-10    144  |  103  |  8   ----------------------------<  73  3.9   |  29  |  0.39<L>    Ca    9.4      10 Gabriel 2024 06:53  Phos  4.1     06-10  Mg     2.0     06-10    Urinalysis Basic - ( 10 Gabriel 2024 06:53 )    Color: x / Appearance: x / SG: x / pH: x  Gluc: 73 mg/dL / Ketone: x  / Bili: x / Urobili: x   Blood: x / Protein: x / Nitrite: x   Leuk Esterase: x / RBC: x / WBC x   Sq Epi: x / Non Sq Epi: x / Bacteria: x    CAPILLARY BLOOD GLUCOSE    MICROBIOLOGY:     RADIOLOGY:  [ ] Reviewed and interpreted by me Patient is a 69y old  Female who presents with a chief complaint of Transfer for leukophoresis (10 Gabriel 2024 07:25)    Interval Events:  No acute events overnight.     REVIEW OF SYSTEMS:  [ ] Positive  [X] All other systems negative  [ ] Unable to assess ROS because ________    Vital Signs Last 24 Hrs  T(C): 36.2 (06-11-24 @ 04:20), Max: 37.2 (06-11-24 @ 00:15)  T(F): 97.2 (06-11-24 @ 04:20), Max: 98.9 (06-11-24 @ 00:15)  HR: 98 (06-11-24 @ 04:20) (95 - 102)  BP: 129/82 (06-11-24 @ 04:20) (108/64 - 167/80)  RR: 18 (06-11-24 @ 04:20) (18 - 20)  SpO2: 97% (06-11-24 @ 04:20) (95% - 99%)    PHYSICAL EXAM:  HEENT:   [X]Tracheostomy: decannulated - dressing removed from site, site appears c/d/i  [X]Pupils equal  [ ]No oral lesions  [ ]Abnormal    SKIN  [X]No Rash  [ ] Abnormal  [ ] pressure    CARDIAC  [X]Regular  [ ]Abnormal    PULMONARY  [X]Bilateral Clear Breath Sounds  [ ]Normal Excursion  [ ]Abnormal    GI  [X]PEG      [X] +BS		              [X]Soft, nondistended, nontender	  [ ]Abnormal    MUSCULOSKELETAL                                   [X]Bedbound                 [ ]Abnormal    [ ]Ambulatory/OOB to chair                           EXTREMITIES                                         [ ]Normal  [X]Edema - LLE                     NEUROLOGIC  [X] Normal, non focal  [ ] Focal findings:    PSYCHIATRIC  [X] Alert and appropriate  [ ] Sedated	 [ ]Agitated    :  Andino: [ ] Yes, if yes: Date of Placement:                   [X] No    LINES: Central Lines [ ] Yes, if yes: Date of Placement                                     [X] No    HOSPITAL MEDICATIONS:  MEDICATIONS  (STANDING):  amLODIPine   Tablet 5 milliGRAM(s) Oral daily  ascorbic acid 500 milliGRAM(s) Oral daily  chlorhexidine 4% Liquid 1 Application(s) Topical every 12 hours  enoxaparin Injectable 40 milliGRAM(s) SubCutaneous every 12 hours  metoprolol tartrate 25 milliGRAM(s) Oral two times a day  multivitamin 1 Tablet(s) Oral daily  pantoprazole  Injectable 40 milliGRAM(s) IV Push two times a day  QUEtiapine 150 milliGRAM(s) Oral <User Schedule>    MEDICATIONS  (PRN):  nystatin Powder 1 Application(s) Topical two times a day PRN skin irritation  QUEtiapine 25 milliGRAM(s) Oral two times a day PRN agitation  sodium chloride 0.9% lock flush 10 milliLiter(s) IV Push every 1 hour PRN Pre/post blood products, medications, blood draw, and to maintain line patency    LABS:                        9.7    36.28 )-----------( 138      ( 10 Gabriel 2024 06:56 )             31.8     06-10    144  |  103  |  8   ----------------------------<  73  3.9   |  29  |  0.39<L>    Ca    9.4      10 Gabriel 2024 06:53  Phos  4.1     06-10  Mg     2.0     06-10    Urinalysis Basic - ( 10 Gabriel 2024 06:53 )    Color: x / Appearance: x / SG: x / pH: x  Gluc: 73 mg/dL / Ketone: x  / Bili: x / Urobili: x   Blood: x / Protein: x / Nitrite: x   Leuk Esterase: x / RBC: x / WBC x   Sq Epi: x / Non Sq Epi: x / Bacteria: x    CAPILLARY BLOOD GLUCOSE    MICROBIOLOGY:     RADIOLOGY:  [ ] Reviewed and interpreted by me

## 2024-06-11 NOTE — SWALLOW VFSS/MBS ASSESSMENT ADULT - ORAL PHASE COMMENTS
mod-max spillover to the hypopharynx +mod spillover to the oropharynx and hypopharynx +mod spillover to the hypopharynx +mod spillover to the a-e folds +mild spillover to the oropharynx

## 2024-06-11 NOTE — SWALLOW VFSS/MBS ASSESSMENT ADULT - RECOMMENDED CONSISTENCY
1) Easy to chew with moderately thick liquids  2) Consider mildly thick liquids via teaspoon with  BOLUS HOLD under the supervision of an SLP at the next level of care  3) Ongoing restorative swallow therapy  4) Repeat instrumental exam in 2-4 weeks to assess candidacy for advancement of diet

## 2024-06-11 NOTE — SWALLOW VFSS/MBS ASSESSMENT ADULT - LARYNGEAL PENETRATION DURING THE SWALLOW - SILENT
over the a-e folds and arytenoids; incomplete retrieval/Trace/Mild over the a-e folds and interarytenoids deep to the posterior aspect of the vocal cords; incomplete retrieval/Trace

## 2024-06-11 NOTE — SWALLOW VFSS/MBS ASSESSMENT ADULT - SLP GENERAL OBSERVATIONS
Pt encountered bedside, awake/alert; pleasant and cooperative. Pt encountered bedside, awake/alert; pleasant and cooperative. +[id number].

## 2024-06-11 NOTE — PROGRESS NOTE ADULT - NS ATTEND OPT1A GEN_ALL_CORE
History/Exam/Medical decision making
History/Exam/Medical decision making
Medical decision making
History/Exam/Medical decision making
Medical decision making
History/Exam/Medical decision making

## 2024-06-20 ENCOUNTER — APPOINTMENT (OUTPATIENT)
Dept: HEMATOLOGY ONCOLOGY | Facility: CLINIC | Age: 69
End: 2024-06-20

## 2024-06-27 ENCOUNTER — NON-APPOINTMENT (OUTPATIENT)
Age: 69
End: 2024-06-27

## 2024-07-16 NOTE — PROGRESS NOTE ADULT - PROBLEM/PLAN-3
DISPLAY PLAN FREE TEXT
Occupational Therapy    Visit Type: treatment  SUBJECTIVE  Patient agreed to participate in therapy this date.  I got a little dizzy there. Yes I think it was because I turned my head too fast.   Patient / Family Goal: return to previous functional status    Pain   Patient denies pain.    OBJECTIVE     Cognitive Status   Orientation    - Oriented to: person, place, time and situation  Functional Communication   - Overall Communication Status: impaired  Following Direction   - follows one step commands with increased time and follows one step commands with repetition  Safety Awareness/Insight   - decreased awareness of need for assistance and decreased awareness of need for safety  Awareness of Deficits   - assistance required to compensate for deficits and decreased awareness of deficits    Patient Activity Tolerance: 2 to 1 activity to rest    Strength  (out of 5 unless noted, standard test position unless noted)   WFL: LUE, RUE  Comments / Details: LUE < RUE    Standing Balance  (JAMES = base of support)  Firm Surface: Double Leg      - Static, Eyes Open       - Trial 1 details: with tactile cues, with verbal cues, with double UE support and contact guard     - Dynamic, Eyes Open       - Trial 1 details: minimal assist, with tactle cues, with verbal cues and with double UE support    Coordination  Gross Motor:  LUE: slow movement, effortful movement, excessive compensatory strategies and overshoots/undershoots target  Fine Motor:  LUE: impairments in timing and fluidity and overshoots/undershoots target  LUE: impaired   RUE: intact    9 Hole Peg Test (sec)      • Left: 35     • Right: 31    Bed Mobility  - Supine to sit: with tactile cues, with verbal cues, contact guard/touching/steadying assist  - Sit to supine: with tactile cues, with verbal cues, contact guard/touching/steadying assist  Transfers  Assistive devices: gait belt, 2-wheeled walker  - Sit to stand: contact guard/touching/steadying assist, with verbal 
DISPLAY PLAN FREE TEXT
cues, with tactile cues  - Stand to sit: contact guard/touching/steadying assist, with verbal cues, with tactile cues      Functional Ambulation  - Assistance: minimal assist  - Assistive device: gait belt and 2-wheeled walker  Activities of Daily Living (ADLs)  Lower Body Dressing:   - Assist: maximal assist, with tactile cues and with verbal cues  Toileting:   - Toilet transfer:        - Assist: with tactile cues, with verbal cues, minimal assist and moderate assist       - Device: gait belt and 2-wheeled walker  - Assist: maximal assist, with tactile cues and with verbal cues  - Assist needed for: set up, supervision/safety, clothing management up, steadying, increased time to complete, clothing management down, verbal cueing and perineal hygiene  Interventions    Treatment provided: ADL training, activity tolerance, balance retraining, bed mobility training, body mechanics, compensatory techniques, energy conservation, functional ambulation, transfer training, safety training and neuromuscular reeducation  Skilled input: verbal instruction/cues  Verbal Consent: Writer verbally educated and received verbal consent for hand placement, positioning of patient, and techniques to be performed today from patient for clothing adjustments for techniques and therapist position for techniques as described above and how they are pertinent to the patient's plan of care.         Education:   - Present and ready to learn: patient  Education provided during session:  - Role of occupational therapy, plan of care, fall and safety precautions while in hospital    - Results of above outlined education: Verbalizes understanding, Demonstrates understanding and Needs reinforcement    ASSESSMENT   Progress: progressing toward goals  Interferring components: decreased activity tolerance and decreased insight into deficit    Discharge needs based on today's assessment:  - Current level of function: significantly below baseline level of 
function  - Therapy needs at discharge: intensive daily therapy  - Activities of daily living (ADLs) requiring support at discharge: transfers, dressing, grooming, toileting, ambulation, bed mobility and bathing  - Impairments that require further therapy intervention: strength, activity tolerance, balance, safety awareness and cognition  AM-PAC  - Prior Level of Function: IND/MOD I ([facility] 22-24)       Key: MOD A=moderate assistance, IND/MOD I=independent/modified independent  - Generalized Current Level of Function     - Current Self-Cares: 21       Scoring Key= >21 Modified Independent; 20-21 Supervision; 18-19 Minimal assist; 13-18 Moderate assist; 9-12 Max assist; <9 Total assist    Therapy Diagnosis:   Decreased ability to perform self care tasks and functional transfers.         PLAN (while hospitalized)  Suggestions for next session as indicated: Progression of ADLs and functional transfer completion.     OT Frequency: 3-5 x per week      PT/OT Mobility Equipment for Discharge: to be determined  PT/OT ADL Equipment for Discharge: to be determined    A minimum of 8 minutes per session x 1 week in the acute setting.     Agreement to plan and goals: patient agrees with goals and treatment plan      GOALS  Review Date: 7/16/2024  Long Term Goals: (to be met by time of discharge from hospital)  Upper body dressing: Patient will complete upper body dressing modified independent.  Status: progressing/ongoing  Lower body dressing: Patient will complete lower body dressing modified independent.  Status: progressing/ongoing  Toileting: Patient will complete toileting modified independent.  Status: progressing/ongoing  Toilet transfer: Patient will complete toilet transfer with gait belt and 2-wheeled walker, commode over toilet, modified independent.   Status: progressing/ongoing    Documented in the chart in the following areas: Assessment/Plan.    Patient at End of Session:   Location: in chair  Safety measures: 
DISPLAY PLAN FREE TEXT
DISPLAY PLAN FREE TEXT
alarm system in place/re-engaged and call light within reach  Handoff to: nurse      Therapy procedure time and total treatment time can be found documented on the Time Entry flowsheet  
DISPLAY PLAN FREE TEXT

## 2024-07-24 ENCOUNTER — NON-APPOINTMENT (OUTPATIENT)
Age: 69
End: 2024-07-24

## 2024-07-26 ENCOUNTER — OUTPATIENT (OUTPATIENT)
Dept: OUTPATIENT SERVICES | Facility: HOSPITAL | Age: 69
LOS: 1 days | Discharge: HOME | End: 2024-07-26

## 2024-07-26 DIAGNOSIS — C91.10 CHRONIC LYMPHOCYTIC LEUKEMIA OF B-CELL TYPE NOT HAVING ACHIEVED REMISSION: ICD-10-CM

## 2024-08-01 ENCOUNTER — NON-APPOINTMENT (OUTPATIENT)
Age: 69
End: 2024-08-01

## 2024-08-02 ENCOUNTER — APPOINTMENT (OUTPATIENT)
Dept: HEMATOLOGY ONCOLOGY | Facility: CLINIC | Age: 69
End: 2024-08-02
Payer: MEDICARE

## 2024-08-02 ENCOUNTER — RESULT REVIEW (OUTPATIENT)
Age: 69
End: 2024-08-02

## 2024-08-02 VITALS
DIASTOLIC BLOOD PRESSURE: 78 MMHG | RESPIRATION RATE: 15 BRPM | WEIGHT: 219 LBS | HEIGHT: 66 IN | OXYGEN SATURATION: 94 % | TEMPERATURE: 97.1 F | HEART RATE: 80 BPM | SYSTOLIC BLOOD PRESSURE: 137 MMHG | BODY MASS INDEX: 35.2 KG/M2

## 2024-08-02 DIAGNOSIS — C91.10 CHRONIC LYMPHOCYTIC LEUKEMIA OF B-CELL TYPE NOT HAVING ACHIEVED REMISSION: ICD-10-CM

## 2024-08-02 LAB
ALBUMIN SERPL ELPH-MCNC: 4.3 G/DL
ALP BLD-CCNC: 67 U/L
ALT SERPL-CCNC: 11 U/L
ANION GAP SERPL CALC-SCNC: 9 MMOL/L
AST SERPL-CCNC: 21 U/L
BASOPHILS # BLD AUTO: 0 K/UL — SIGNIFICANT CHANGE UP (ref 0–0.2)
BASOPHILS NFR BLD AUTO: 0 % — SIGNIFICANT CHANGE UP (ref 0–2)
BILIRUB SERPL-MCNC: 0.3 MG/DL
BUN SERPL-MCNC: 11 MG/DL
CALCIUM SERPL-MCNC: 10.2 MG/DL
CHLORIDE SERPL-SCNC: 100 MMOL/L
CO2 SERPL-SCNC: 37 MMOL/L
CREAT SERPL-MCNC: 0.43 MG/DL
EGFR: 106 ML/MIN/1.73M2
EOSINOPHIL # BLD AUTO: 0 K/UL — SIGNIFICANT CHANGE UP (ref 0–0.5)
EOSINOPHIL NFR BLD AUTO: 0 % — SIGNIFICANT CHANGE UP (ref 0–6)
GLUCOSE SERPL-MCNC: 106 MG/DL
HCT VFR BLD CALC: 43.6 % — SIGNIFICANT CHANGE UP (ref 34.5–45)
HGB BLD-MCNC: 14.2 G/DL — SIGNIFICANT CHANGE UP (ref 11.5–15.5)
LYMPHOCYTES # BLD AUTO: 48.73 K/UL — HIGH (ref 1–3.3)
LYMPHOCYTES # BLD AUTO: 88 % — HIGH (ref 13–44)
LYMPHOCYTES # SPEC AUTO: 4 % — HIGH (ref 0–0)
MCHC RBC-ENTMCNC: 28.8 PG — SIGNIFICANT CHANGE UP (ref 27–34)
MCHC RBC-ENTMCNC: 32.6 G/DL — SIGNIFICANT CHANGE UP (ref 32–36)
MCV RBC AUTO: 88.4 FL — SIGNIFICANT CHANGE UP (ref 80–100)
MONOCYTES # BLD AUTO: 0.55 K/UL — SIGNIFICANT CHANGE UP (ref 0–0.9)
MONOCYTES NFR BLD AUTO: 1 % — LOW (ref 2–14)
MYELOCYTES NFR BLD: 1 % — HIGH (ref 0–0)
NEUTROPHILS # BLD AUTO: 3.32 K/UL — SIGNIFICANT CHANGE UP (ref 1.8–7.4)
NEUTROPHILS NFR BLD AUTO: 6 % — LOW (ref 43–77)
NRBC # BLD: 0 /100 WBCS — SIGNIFICANT CHANGE UP (ref 0–0)
NRBC # BLD: SIGNIFICANT CHANGE UP /100 WBCS (ref 0–0)
PLAT MORPH BLD: NORMAL — SIGNIFICANT CHANGE UP
PLATELET # BLD AUTO: 91 K/UL — LOW (ref 150–400)
POTASSIUM SERPL-SCNC: 4.3 MMOL/L
PROT SERPL-MCNC: 6.5 G/DL
RBC # BLD: 4.93 M/UL — SIGNIFICANT CHANGE UP (ref 3.8–5.2)
RBC # FLD: 15.4 % — HIGH (ref 10.3–14.5)
RBC BLD AUTO: SIGNIFICANT CHANGE UP
SMUDGE CELLS # BLD: PRESENT — SIGNIFICANT CHANGE UP
SODIUM SERPL-SCNC: 145 MMOL/L
WBC # BLD: 55.37 K/UL — CRITICAL HIGH (ref 3.8–10.5)
WBC # FLD AUTO: 55.37 K/UL — CRITICAL HIGH (ref 3.8–10.5)

## 2024-08-02 PROCEDURE — G2211 COMPLEX E/M VISIT ADD ON: CPT

## 2024-08-02 PROCEDURE — 99214 OFFICE O/P EST MOD 30 MIN: CPT

## 2024-08-05 NOTE — ASSESSMENT
[FreeTextEntry1] : 68 yo with CLL presenting for initial consultation  Pmhx: CVA (bedbound at baseline), COPD, CHF, HTN  Disease: CLL/SLL  Molecular testinq del, IgVH mutated  Serrano staging: Stage II Treatment: Never treated.   In presence of her cousin, we discussed diagnosis of CLL. I explained the natural history of CLL along with the different prognostic factors identified. Specifically, I explained that she has a 13q deletion and IgVH mutated which is thought to have a favorable prognosis. Further, we discussed that her CLL does not need to be treated at this time and discussed the reasons why in the future treatment may be recommended. All questions were answered in apparent satisfaction.   CBC from today reviewed:  Peripheral smear was reviewed with out evidence of hemolysis. Will follow up on platelet count. Thrombocytopenia may be secondary to current antibiotic use.  RTC in 3 months  
[FreeTextEntry1] : 68 yo with CLL presenting for initial consultation  Pmhx: CVA (bedbound at baseline), COPD, CHF, HTN  Disease: CLL/SLL  Molecular testinq del, IgVH mutated  Serrano staging: Stage II Treatment: Never treated.   In presence of her cousin, we discussed diagnosis of CLL. I explained the natural history of CLL along with the different prognostic factors identified. Specifically, I explained that she has a 13q deletion and IgVH mutated which is thought to have a favorable prognosis. Further, we discussed that her CLL does not need to be treated at this time and discussed the reasons why in the future treatment may be recommended. All questions were answered in apparent satisfaction.   CBC from today reviewed:  Peripheral smear was reviewed with out evidence of hemolysis. Will follow up on platelet count. Thrombocytopenia may be secondary to current antibiotic use.  RTC in 3 months  
Statement Selected

## 2024-08-05 NOTE — HISTORY OF PRESENT ILLNESS
[de-identified] : 69F presenting for initial evaluation for CLL previously diagnosed in 2023.   PMhx: CVA (bedbound at baseline), COPD, CHF, HTN   -------------HPI --------------------- Patient initially diagnosed with CLL 10/26/23 with peripheral blood FISH c/w CLL and hemizygous 13q del. She presented to [facility] for URI found to have Strep Pneumo pneumonia in april 2024 requiring intubation and presssor support. She was found to have leukocytosis (>200K) and was then transfered to [facility] for leukemia evaluation and treatment. Review of peripheral smear identified lymphocytosis, with no blasts and work up confirmed CLL with reactive lymphocytosis 2/2 to sepsis.  Her WBC improved to <60k with treatment of her sepsis and therefore her initial leukocytosis was thought to be reactive to infection. She was discharged on 5/31/24 to [facility]  and presents today for her initial follow up with hematology.   ---------Interval Hx: ----------------- 8/2/24: Patient is seen in ambulance stretcher. She is still at [facility]. She complains that she wants to go home but the nursing home is not letting her. She denies any cough, fever, night sweats, loss of appetite, or weight loss.   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

## 2024-08-05 NOTE — HISTORY OF PRESENT ILLNESS
[de-identified] : 69F presenting for initial evaluation for CLL previously diagnosed in 2023.   PMhx: CVA (bedbound at baseline), COPD, CHF, HTN   -------------HPI --------------------- Patient initially diagnosed with CLL 10/26/23 with peripheral blood FISH c/w CLL and hemizygous 13q del. She presented to [facility] for URI found to have Strep Pneumo pneumonia in april 2024 requiring intubation and presssor support. She was found to have leukocytosis (>200K) and was then transfered to [facility] for leukemia evaluation and treatment. Review of peripheral smear identified lymphocytosis, with no blasts and work up confirmed CLL with reactive lymphocytosis 2/2 to sepsis.  Her WBC improved to <60k with treatment of her sepsis and therefore her initial leukocytosis was thought to be reactive to infection. She was discharged on 5/31/24 to [facility]  and presents today for her initial follow up with hematology.   ---------Interval Hx: ----------------- 8/2/24: Patient is seen in ambulance stretcher. She is still at [facility]. She complains that she wants to go home but the nursing home is not letting her. She denies any cough, fever, night sweats, loss of appetite, or weight loss.   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

## 2024-08-09 LAB
IGVH FAMILY: NORMAL
IGVH MUTATION ANALYSIS: POSITIVE
MUTATION RATE: NORMAL

## 2024-11-07 ENCOUNTER — OUTPATIENT (OUTPATIENT)
Dept: OUTPATIENT SERVICES | Facility: HOSPITAL | Age: 69
LOS: 1 days | Discharge: HOME | End: 2024-11-07

## 2024-11-07 DIAGNOSIS — C91.10 CHRONIC LYMPHOCYTIC LEUKEMIA OF B-CELL TYPE NOT HAVING ACHIEVED REMISSION: ICD-10-CM

## 2024-11-08 ENCOUNTER — APPOINTMENT (OUTPATIENT)
Dept: HEMATOLOGY ONCOLOGY | Facility: CLINIC | Age: 69
End: 2024-11-08

## 2024-11-18 ENCOUNTER — NON-APPOINTMENT (OUTPATIENT)
Age: 69
End: 2024-11-18

## 2025-02-13 NOTE — CONSULT NOTE ADULT - ASSESSMENT
69F PMH CVA (bedbound), COPD, CHF, initially p/w dyspnea a/w acute leukocytosis (WBC >230) c/w  CLL a/w respiratory failure and hypotension. Patient has had long hospital course including chest tube placement for empyema x 2 (both since removed), chronic respiratory failure with failure to wean s/p trache / PEG, transferred to [facility] on 5/9. Start on therapeutic AC for lower extremity DVTs, recently on lovenox, now held iso recent RRt for hypotension and dropping H/H along with hypotension and fevers. Now admitted to MICU for shock state placed back on full vent. CTAP with large fluid collection in right neck/axilla as well as chest wall collection/mixed bulky adenopathy. Patient H/H 7.6->6.2 today receiving 1u prbc.     Plan:  - no acute intervention  - can obtain CTA CAP to rule out active extrav iso new hematoma, if positive can consult IR for embolization  - patient with overall very poor prognosis, would continue comprehensive GOC discussion  - appreciate heme/onc recs on AC/DVT/CLL  - care per micu    Discussed with Dr. Muñoz  Trauma/ACS #[id number]  No
